# Patient Record
Sex: MALE | Race: BLACK OR AFRICAN AMERICAN | NOT HISPANIC OR LATINO | Employment: OTHER | URBAN - METROPOLITAN AREA
[De-identification: names, ages, dates, MRNs, and addresses within clinical notes are randomized per-mention and may not be internally consistent; named-entity substitution may affect disease eponyms.]

---

## 2020-02-09 ENCOUNTER — NURSE TRIAGE (OUTPATIENT)
Dept: ADMINISTRATIVE | Facility: CLINIC | Age: 70
End: 2020-02-09

## 2020-02-09 NOTE — TELEPHONE ENCOUNTER
Reason for Disposition   [1] SEVERE abdominal pain AND [2] age > 60    Additional Information   Negative: Shock suspected (e.g., cold/pale/clammy skin, too weak to stand, low BP, rapid pulse)   Negative: Difficult to awaken or acting confused (e.g., disoriented, slurred speech)   Negative: Sounds like a life-threatening emergency to the triager   Negative: [1] SEVERE abdominal pain (e.g., excruciating) AND [2] present > 1 hour    Protocols used: DIARRHEA-A-AH    Patient states diarrhea x 4 days and he went 10 times in the last 24 hours. He is drinking water but not a lot. He states he feels his heart beating faster. I explained that it is as sign he needs more fluids in his body. Advised him to go to the ED now. He verbalized understanding.

## 2024-07-01 ENCOUNTER — HOSPITAL ENCOUNTER (INPATIENT)
Facility: HOSPITAL | Age: 74
LOS: 3 days | Discharge: HOME HEALTH | DRG: 638 | End: 2024-07-05
Attending: EMERGENCY MEDICINE | Admitting: STUDENT IN AN ORGANIZED HEALTH CARE EDUCATION/TRAINING PROGRAM
Payer: MEDICARE

## 2024-07-01 DIAGNOSIS — I10 HYPERTENSION, UNSPECIFIED TYPE: ICD-10-CM

## 2024-07-01 DIAGNOSIS — M79.606 LEG PAIN: ICD-10-CM

## 2024-07-01 DIAGNOSIS — E11.628 DIABETIC FOOT INFECTION: ICD-10-CM

## 2024-07-01 DIAGNOSIS — R65.10 SIRS (SYSTEMIC INFLAMMATORY RESPONSE SYNDROME): Primary | ICD-10-CM

## 2024-07-01 DIAGNOSIS — R73.9 HYPERGLYCEMIA: ICD-10-CM

## 2024-07-01 DIAGNOSIS — I50.9 CHF (CONGESTIVE HEART FAILURE): ICD-10-CM

## 2024-07-01 DIAGNOSIS — L08.9 DIABETIC FOOT INFECTION: ICD-10-CM

## 2024-07-01 DIAGNOSIS — R07.9 CHEST PAIN: ICD-10-CM

## 2024-07-01 DIAGNOSIS — M79.89 LEG SWELLING: ICD-10-CM

## 2024-07-01 DIAGNOSIS — E87.5 HYPERKALEMIA: ICD-10-CM

## 2024-07-01 PROBLEM — D64.9 ANEMIA: Status: ACTIVE | Noted: 2024-07-01

## 2024-07-01 PROBLEM — E11.9 INSULIN DEPENDENT TYPE 2 DIABETES MELLITUS: Status: ACTIVE | Noted: 2024-07-01

## 2024-07-01 PROBLEM — E87.1 HYPONATREMIA: Status: ACTIVE | Noted: 2024-07-01

## 2024-07-01 PROBLEM — E87.1 HYPONATREMIA: Status: RESOLVED | Noted: 2024-07-01 | Resolved: 2024-07-01

## 2024-07-01 PROBLEM — N18.30 CKD (CHRONIC KIDNEY DISEASE) STAGE 3, GFR 30-59 ML/MIN: Status: ACTIVE | Noted: 2024-07-01

## 2024-07-01 PROBLEM — L03.90 CELLULITIS: Status: ACTIVE | Noted: 2024-07-01

## 2024-07-01 PROBLEM — E87.20 METABOLIC ACIDOSIS, NORMAL ANION GAP (NAG): Status: ACTIVE | Noted: 2024-07-01

## 2024-07-01 PROBLEM — Z79.4 INSULIN DEPENDENT TYPE 2 DIABETES MELLITUS: Status: ACTIVE | Noted: 2024-07-01

## 2024-07-01 PROBLEM — G89.29 CHRONIC PAIN: Status: ACTIVE | Noted: 2024-07-01

## 2024-07-01 LAB
ALBUMIN SERPL BCP-MCNC: 3.5 G/DL (ref 3.5–5.2)
ALLENS TEST: ABNORMAL
ALLENS TEST: NORMAL
ALP SERPL-CCNC: 98 U/L (ref 55–135)
ALT SERPL W/O P-5'-P-CCNC: 24 U/L (ref 10–44)
ANION GAP SERPL CALC-SCNC: 7 MMOL/L (ref 8–16)
ANION GAP SERPL CALC-SCNC: 8 MMOL/L (ref 8–16)
AST SERPL-CCNC: 23 U/L (ref 10–40)
BASOPHILS # BLD AUTO: 0.06 K/UL (ref 0–0.2)
BASOPHILS NFR BLD: 0.7 % (ref 0–1.9)
BILIRUB SERPL-MCNC: 0.5 MG/DL (ref 0.1–1)
BILIRUB UR QL STRIP: NEGATIVE
BNP SERPL-MCNC: <10 PG/ML (ref 0–99)
BUN SERPL-MCNC: 27 MG/DL (ref 8–23)
BUN SERPL-MCNC: 32 MG/DL (ref 8–23)
CALCIUM SERPL-MCNC: 9.4 MG/DL (ref 8.7–10.5)
CALCIUM SERPL-MCNC: 9.8 MG/DL (ref 8.7–10.5)
CHLORIDE SERPL-SCNC: 105 MMOL/L (ref 95–110)
CHLORIDE SERPL-SCNC: 111 MMOL/L (ref 95–110)
CLARITY UR REFRACT.AUTO: CLEAR
CO2 SERPL-SCNC: 15 MMOL/L (ref 23–29)
CO2 SERPL-SCNC: 15 MMOL/L (ref 23–29)
COLOR UR AUTO: YELLOW
CREAT SERPL-MCNC: 1.2 MG/DL (ref 0.5–1.4)
CREAT SERPL-MCNC: 1.3 MG/DL (ref 0.5–1.4)
DIFFERENTIAL METHOD BLD: ABNORMAL
EOSINOPHIL # BLD AUTO: 0.1 K/UL (ref 0–0.5)
EOSINOPHIL NFR BLD: 1.3 % (ref 0–8)
ERYTHROCYTE [DISTWIDTH] IN BLOOD BY AUTOMATED COUNT: 14.4 % (ref 11.5–14.5)
EST. GFR  (NO RACE VARIABLE): 58.4 ML/MIN/1.73 M^2
EST. GFR  (NO RACE VARIABLE): >60 ML/MIN/1.73 M^2
ESTIMATED AVG GLUCOSE: 206 MG/DL (ref 68–131)
FERRITIN SERPL-MCNC: 229 NG/ML (ref 20–300)
GLUCOSE SERPL-MCNC: 145 MG/DL (ref 70–110)
GLUCOSE SERPL-MCNC: 213 MG/DL (ref 70–110)
GLUCOSE SERPL-MCNC: 303 MG/DL (ref 70–110)
GLUCOSE UR QL STRIP: ABNORMAL
HBA1C MFR BLD: 8.8 % (ref 4–5.6)
HCO3 UR-SCNC: 17.1 MMOL/L (ref 24–28)
HCT VFR BLD AUTO: 38.2 % (ref 40–54)
HGB BLD-MCNC: 12.6 G/DL (ref 14–18)
HGB UR QL STRIP: NEGATIVE
IMM GRANULOCYTES # BLD AUTO: 0.05 K/UL (ref 0–0.04)
IMM GRANULOCYTES NFR BLD AUTO: 0.6 % (ref 0–0.5)
IRON SERPL-MCNC: 66 UG/DL (ref 45–160)
KETONES UR QL STRIP: ABNORMAL
LDH SERPL L TO P-CCNC: 1.43 MMOL/L (ref 0.5–2.2)
LEUKOCYTE ESTERASE UR QL STRIP: NEGATIVE
LYMPHOCYTES # BLD AUTO: 1.5 K/UL (ref 1–4.8)
LYMPHOCYTES NFR BLD: 17.6 % (ref 18–48)
MCH RBC QN AUTO: 30.1 PG (ref 27–31)
MCHC RBC AUTO-ENTMCNC: 33 G/DL (ref 32–36)
MCV RBC AUTO: 91 FL (ref 82–98)
MONOCYTES # BLD AUTO: 0.5 K/UL (ref 0.3–1)
MONOCYTES NFR BLD: 6.5 % (ref 4–15)
NEUTROPHILS # BLD AUTO: 6.1 K/UL (ref 1.8–7.7)
NEUTROPHILS NFR BLD: 73.3 % (ref 38–73)
NITRITE UR QL STRIP: NEGATIVE
NRBC BLD-RTO: 0 /100 WBC
OHS QRS DURATION: 76 MS
OHS QTC CALCULATION: 437 MS
PCO2 BLDA: 36.5 MMHG (ref 35–45)
PH SMN: 7.28 [PH] (ref 7.35–7.45)
PH UR STRIP: 5 [PH] (ref 5–8)
PLATELET # BLD AUTO: 280 K/UL (ref 150–450)
PMV BLD AUTO: 10.2 FL (ref 9.2–12.9)
PO2 BLDA: 44 MMHG (ref 40–60)
POC BE: -10 MMOL/L
POC SATURATED O2: 74 % (ref 95–100)
POC TCO2: 18 MMOL/L (ref 24–29)
POCT GLUCOSE: 154 MG/DL (ref 70–110)
POCT GLUCOSE: 167 MG/DL (ref 70–110)
POTASSIUM SERPL-SCNC: 5 MMOL/L (ref 3.5–5.1)
POTASSIUM SERPL-SCNC: 5.7 MMOL/L (ref 3.5–5.1)
PROT SERPL-MCNC: 8.8 G/DL (ref 6–8.4)
PROT UR QL STRIP: ABNORMAL
RBC # BLD AUTO: 4.19 M/UL (ref 4.6–6.2)
SAMPLE: ABNORMAL
SAMPLE: NORMAL
SATURATED IRON: 25 % (ref 20–50)
SITE: ABNORMAL
SITE: NORMAL
SODIUM SERPL-SCNC: 128 MMOL/L (ref 136–145)
SODIUM SERPL-SCNC: 133 MMOL/L (ref 136–145)
SP GR UR STRIP: 1.02 (ref 1–1.03)
TOTAL IRON BINDING CAPACITY: 265 UG/DL (ref 250–450)
TRANSFERRIN SERPL-MCNC: 179 MG/DL (ref 200–375)
TROPONIN I SERPL DL<=0.01 NG/ML-MCNC: <0.006 NG/ML (ref 0–0.03)
URN SPEC COLLECT METH UR: ABNORMAL
WBC # BLD AUTO: 8.26 K/UL (ref 3.9–12.7)

## 2024-07-01 PROCEDURE — 81003 URINALYSIS AUTO W/O SCOPE: CPT

## 2024-07-01 PROCEDURE — 96372 THER/PROPH/DIAG INJ SC/IM: CPT | Performed by: STUDENT IN AN ORGANIZED HEALTH CARE EDUCATION/TRAINING PROGRAM

## 2024-07-01 PROCEDURE — 96375 TX/PRO/DX INJ NEW DRUG ADDON: CPT

## 2024-07-01 PROCEDURE — 80069 RENAL FUNCTION PANEL: CPT | Performed by: STUDENT IN AN ORGANIZED HEALTH CARE EDUCATION/TRAINING PROGRAM

## 2024-07-01 PROCEDURE — 96368 THER/DIAG CONCURRENT INF: CPT

## 2024-07-01 PROCEDURE — 63600175 PHARM REV CODE 636 W HCPCS: Performed by: STUDENT IN AN ORGANIZED HEALTH CARE EDUCATION/TRAINING PROGRAM

## 2024-07-01 PROCEDURE — 63600175 PHARM REV CODE 636 W HCPCS: Performed by: EMERGENCY MEDICINE

## 2024-07-01 PROCEDURE — 25000003 PHARM REV CODE 250: Performed by: EMERGENCY MEDICINE

## 2024-07-01 PROCEDURE — G0378 HOSPITAL OBSERVATION PER HR: HCPCS

## 2024-07-01 PROCEDURE — 96365 THER/PROPH/DIAG IV INF INIT: CPT

## 2024-07-01 PROCEDURE — 99900035 HC TECH TIME PER 15 MIN (STAT)

## 2024-07-01 PROCEDURE — 63600175 PHARM REV CODE 636 W HCPCS

## 2024-07-01 PROCEDURE — 83880 ASSAY OF NATRIURETIC PEPTIDE: CPT

## 2024-07-01 PROCEDURE — 80053 COMPREHEN METABOLIC PANEL: CPT

## 2024-07-01 PROCEDURE — 96367 TX/PROPH/DG ADDL SEQ IV INF: CPT

## 2024-07-01 PROCEDURE — 94640 AIRWAY INHALATION TREATMENT: CPT

## 2024-07-01 PROCEDURE — 83540 ASSAY OF IRON: CPT | Performed by: STUDENT IN AN ORGANIZED HEALTH CARE EDUCATION/TRAINING PROGRAM

## 2024-07-01 PROCEDURE — 83036 HEMOGLOBIN GLYCOSYLATED A1C: CPT

## 2024-07-01 PROCEDURE — 96366 THER/PROPH/DIAG IV INF ADDON: CPT

## 2024-07-01 PROCEDURE — 25000242 PHARM REV CODE 250 ALT 637 W/ HCPCS

## 2024-07-01 PROCEDURE — 82728 ASSAY OF FERRITIN: CPT | Performed by: STUDENT IN AN ORGANIZED HEALTH CARE EDUCATION/TRAINING PROGRAM

## 2024-07-01 PROCEDURE — 87040 BLOOD CULTURE FOR BACTERIA: CPT | Mod: 59

## 2024-07-01 PROCEDURE — 25000003 PHARM REV CODE 250: Performed by: STUDENT IN AN ORGANIZED HEALTH CARE EDUCATION/TRAINING PROGRAM

## 2024-07-01 PROCEDURE — 85025 COMPLETE CBC W/AUTO DIFF WBC: CPT

## 2024-07-01 PROCEDURE — 83605 ASSAY OF LACTIC ACID: CPT

## 2024-07-01 PROCEDURE — 84484 ASSAY OF TROPONIN QUANT: CPT

## 2024-07-01 PROCEDURE — 93010 ELECTROCARDIOGRAM REPORT: CPT | Mod: ,,, | Performed by: INTERNAL MEDICINE

## 2024-07-01 PROCEDURE — 36415 COLL VENOUS BLD VENIPUNCTURE: CPT | Performed by: STUDENT IN AN ORGANIZED HEALTH CARE EDUCATION/TRAINING PROGRAM

## 2024-07-01 PROCEDURE — 82962 GLUCOSE BLOOD TEST: CPT

## 2024-07-01 PROCEDURE — 99285 EMERGENCY DEPT VISIT HI MDM: CPT | Mod: 25

## 2024-07-01 PROCEDURE — 93005 ELECTROCARDIOGRAM TRACING: CPT

## 2024-07-01 PROCEDURE — 82803 BLOOD GASES ANY COMBINATION: CPT

## 2024-07-01 PROCEDURE — 82800 BLOOD PH: CPT

## 2024-07-01 PROCEDURE — 25000003 PHARM REV CODE 250

## 2024-07-01 PROCEDURE — 80048 BASIC METABOLIC PNL TOTAL CA: CPT | Mod: XB | Performed by: STUDENT IN AN ORGANIZED HEALTH CARE EDUCATION/TRAINING PROGRAM

## 2024-07-01 RX ORDER — INSULIN ASPART 100 [IU]/ML
5 INJECTION, SOLUTION INTRAVENOUS; SUBCUTANEOUS
Status: DISCONTINUED | OUTPATIENT
Start: 2024-07-01 | End: 2024-07-05 | Stop reason: HOSPADM

## 2024-07-01 RX ORDER — POLYETHYLENE GLYCOL 3350 17 G/17G
17 POWDER, FOR SOLUTION ORAL DAILY
Status: DISCONTINUED | OUTPATIENT
Start: 2024-07-02 | End: 2024-07-05 | Stop reason: HOSPADM

## 2024-07-01 RX ORDER — GLUCAGON 1 MG
1 KIT INJECTION
Status: DISCONTINUED | OUTPATIENT
Start: 2024-07-01 | End: 2024-07-05 | Stop reason: HOSPADM

## 2024-07-01 RX ORDER — ONDANSETRON HYDROCHLORIDE 2 MG/ML
4 INJECTION, SOLUTION INTRAVENOUS
Status: COMPLETED | OUTPATIENT
Start: 2024-07-01 | End: 2024-07-01

## 2024-07-01 RX ORDER — GABAPENTIN 300 MG/1
300 CAPSULE ORAL ONCE
Status: COMPLETED | OUTPATIENT
Start: 2024-07-01 | End: 2024-07-01

## 2024-07-01 RX ORDER — FUROSEMIDE 10 MG/ML
40 INJECTION INTRAMUSCULAR; INTRAVENOUS
Status: COMPLETED | OUTPATIENT
Start: 2024-07-01 | End: 2024-07-01

## 2024-07-01 RX ORDER — ACETAMINOPHEN 325 MG/1
650 TABLET ORAL EVERY 4 HOURS PRN
Status: DISCONTINUED | OUTPATIENT
Start: 2024-07-01 | End: 2024-07-05 | Stop reason: HOSPADM

## 2024-07-01 RX ORDER — CALCIUM GLUCONATE 20 MG/ML
1 INJECTION, SOLUTION INTRAVENOUS
Status: COMPLETED | OUTPATIENT
Start: 2024-07-01 | End: 2024-07-01

## 2024-07-01 RX ORDER — LOSARTAN POTASSIUM 50 MG/1
100 TABLET ORAL DAILY
Status: DISCONTINUED | OUTPATIENT
Start: 2024-07-02 | End: 2024-07-02

## 2024-07-01 RX ORDER — IBUPROFEN 200 MG
16 TABLET ORAL
Status: DISCONTINUED | OUTPATIENT
Start: 2024-07-01 | End: 2024-07-05 | Stop reason: HOSPADM

## 2024-07-01 RX ORDER — MORPHINE SULFATE 4 MG/ML
8 INJECTION, SOLUTION INTRAMUSCULAR; INTRAVENOUS
Status: COMPLETED | OUTPATIENT
Start: 2024-07-01 | End: 2024-07-01

## 2024-07-01 RX ORDER — AMLODIPINE BESYLATE 10 MG/1
10 TABLET ORAL DAILY
Status: DISCONTINUED | OUTPATIENT
Start: 2024-07-01 | End: 2024-07-05 | Stop reason: HOSPADM

## 2024-07-01 RX ORDER — SODIUM CHLORIDE 0.9 % (FLUSH) 0.9 %
10 SYRINGE (ML) INJECTION EVERY 12 HOURS PRN
Status: DISCONTINUED | OUTPATIENT
Start: 2024-07-01 | End: 2024-07-05 | Stop reason: HOSPADM

## 2024-07-01 RX ORDER — ATORVASTATIN CALCIUM 20 MG/1
20 TABLET, FILM COATED ORAL DAILY
Status: DISCONTINUED | OUTPATIENT
Start: 2024-07-02 | End: 2024-07-05 | Stop reason: HOSPADM

## 2024-07-01 RX ORDER — TRAMADOL HYDROCHLORIDE 50 MG/1
50 TABLET ORAL EVERY 8 HOURS PRN
Status: DISCONTINUED | OUTPATIENT
Start: 2024-07-01 | End: 2024-07-05 | Stop reason: HOSPADM

## 2024-07-01 RX ORDER — INSULIN GLARGINE 100 [IU]/ML
20 INJECTION, SOLUTION SUBCUTANEOUS DAILY
Status: DISCONTINUED | OUTPATIENT
Start: 2024-07-02 | End: 2024-07-05 | Stop reason: HOSPADM

## 2024-07-01 RX ORDER — IBUPROFEN 200 MG
24 TABLET ORAL
Status: DISCONTINUED | OUTPATIENT
Start: 2024-07-01 | End: 2024-07-05 | Stop reason: HOSPADM

## 2024-07-01 RX ORDER — SODIUM BICARBONATE 650 MG/1
650 TABLET ORAL 3 TIMES DAILY
Status: DISCONTINUED | OUTPATIENT
Start: 2024-07-01 | End: 2024-07-05 | Stop reason: HOSPADM

## 2024-07-01 RX ORDER — LISINOPRIL 20 MG/1
20 TABLET ORAL
Status: COMPLETED | OUTPATIENT
Start: 2024-07-01 | End: 2024-07-01

## 2024-07-01 RX ORDER — ALBUTEROL SULFATE 2.5 MG/.5ML
10 SOLUTION RESPIRATORY (INHALATION)
Status: COMPLETED | OUTPATIENT
Start: 2024-07-01 | End: 2024-07-01

## 2024-07-01 RX ORDER — INSULIN ASPART 100 [IU]/ML
0-5 INJECTION, SOLUTION INTRAVENOUS; SUBCUTANEOUS
Status: DISCONTINUED | OUTPATIENT
Start: 2024-07-01 | End: 2024-07-05 | Stop reason: HOSPADM

## 2024-07-01 RX ORDER — TAMSULOSIN HYDROCHLORIDE 0.4 MG/1
0.4 CAPSULE ORAL DAILY
Status: DISCONTINUED | OUTPATIENT
Start: 2024-07-02 | End: 2024-07-05 | Stop reason: HOSPADM

## 2024-07-01 RX ORDER — HEPARIN SODIUM 5000 [USP'U]/ML
7500 INJECTION, SOLUTION INTRAVENOUS; SUBCUTANEOUS EVERY 8 HOURS
Status: DISCONTINUED | OUTPATIENT
Start: 2024-07-01 | End: 2024-07-05 | Stop reason: HOSPADM

## 2024-07-01 RX ORDER — BACLOFEN 10 MG/1
10 TABLET ORAL 3 TIMES DAILY
Status: DISCONTINUED | OUTPATIENT
Start: 2024-07-01 | End: 2024-07-03

## 2024-07-01 RX ORDER — NALOXONE HCL 0.4 MG/ML
0.02 VIAL (ML) INJECTION
Status: DISCONTINUED | OUTPATIENT
Start: 2024-07-01 | End: 2024-07-05 | Stop reason: HOSPADM

## 2024-07-01 RX ORDER — ASPIRIN 81 MG/1
81 TABLET ORAL DAILY
Status: DISCONTINUED | OUTPATIENT
Start: 2024-07-02 | End: 2024-07-05 | Stop reason: HOSPADM

## 2024-07-01 RX ORDER — HYDROCODONE BITARTRATE AND ACETAMINOPHEN 10; 325 MG/1; MG/1
1 TABLET ORAL EVERY 6 HOURS PRN
Status: DISCONTINUED | OUTPATIENT
Start: 2024-07-01 | End: 2024-07-05 | Stop reason: HOSPADM

## 2024-07-01 RX ADMIN — CALCIUM GLUCONATE 1 G: 20 INJECTION, SOLUTION INTRAVENOUS at 01:07

## 2024-07-01 RX ADMIN — HYDROCODONE BITARTRATE AND ACETAMINOPHEN 1 TABLET: 10; 325 TABLET ORAL at 03:07

## 2024-07-01 RX ADMIN — HEPARIN SODIUM 7500 UNITS: 5000 INJECTION INTRAVENOUS; SUBCUTANEOUS at 10:07

## 2024-07-01 RX ADMIN — AMLODIPINE BESYLATE 10 MG: 10 TABLET ORAL at 03:07

## 2024-07-01 RX ADMIN — ALBUTEROL SULFATE 10 MG: 2.5 SOLUTION RESPIRATORY (INHALATION) at 01:07

## 2024-07-01 RX ADMIN — MORPHINE SULFATE 8 MG: 4 INJECTION INTRAVENOUS at 10:07

## 2024-07-01 RX ADMIN — VANCOMYCIN HYDROCHLORIDE 2250 MG: 1 INJECTION, POWDER, LYOPHILIZED, FOR SOLUTION INTRAVENOUS at 01:07

## 2024-07-01 RX ADMIN — DEXTROSE MONOHYDRATE 500 ML: 100 INJECTION, SOLUTION INTRAVENOUS at 02:07

## 2024-07-01 RX ADMIN — TRAMADOL HYDROCHLORIDE 50 MG: 50 TABLET, COATED ORAL at 08:07

## 2024-07-01 RX ADMIN — SODIUM ZIRCONIUM CYCLOSILICATE 5 G: 5 POWDER, FOR SUSPENSION ORAL at 08:07

## 2024-07-01 RX ADMIN — BACLOFEN 10 MG: 10 TABLET ORAL at 08:07

## 2024-07-01 RX ADMIN — PIPERACILLIN SODIUM AND TAZOBACTAM SODIUM 4.5 G: 4; .5 INJECTION, POWDER, FOR SOLUTION INTRAVENOUS at 12:07

## 2024-07-01 RX ADMIN — INSULIN ASPART 5 UNITS: 100 INJECTION, SOLUTION INTRAVENOUS; SUBCUTANEOUS at 05:07

## 2024-07-01 RX ADMIN — FUROSEMIDE 40 MG: 10 INJECTION, SOLUTION INTRAVENOUS at 01:07

## 2024-07-01 RX ADMIN — ONDANSETRON 4 MG: 2 INJECTION INTRAMUSCULAR; INTRAVENOUS at 10:07

## 2024-07-01 RX ADMIN — INSULIN HUMAN 5 UNITS: 100 INJECTION, SOLUTION PARENTERAL at 02:07

## 2024-07-01 RX ADMIN — LISINOPRIL 20 MG: 20 TABLET ORAL at 02:07

## 2024-07-01 RX ADMIN — GABAPENTIN 300 MG: 300 CAPSULE ORAL at 01:07

## 2024-07-01 RX ADMIN — HYDROCODONE BITARTRATE AND ACETAMINOPHEN 1 TABLET: 10; 325 TABLET ORAL at 09:07

## 2024-07-01 NOTE — ASSESSMENT & PLAN NOTE
Creatine stable for now. BMP reviewed- noted Estimated Creatinine Clearance: 77 mL/min (based on SCr of 1.3 mg/dL). according to latest data. Based on current GFR, CKD stage is stage 3 - GFR 30-59.  Monitor UOP and serial BMP and adjust therapy as needed. Renally dose meds. Avoid nephrotoxic medications and procedures.

## 2024-07-01 NOTE — PLAN OF CARE
[facility] Dept  Initial Discharge Assessment       Primary Care Provider: Berhane Reyes (Inactive)    Admission Diagnosis: No admission diagnoses are documented for this encounter.    Admission Date: 7/1/2024  Expected Discharge Date:     Pt stated he is independent with his ADL's and ambulation and does not require assistance or equipment.    Pt to d/c home with no needs when ready    Transition of Care Barriers: (P) None    Payor: /     Extended Emergency Contact Information  Primary Emergency Contact: RashaunVictoria  Mobile Phone: [phone]  Relation: Sister  Preferred language: English   needed? No    Discharge Plan A: (P) Home  Discharge Plan B: (P) Home      [facility] [address]  [address] [place] [address]  Phone: [phone] Fax: [phone]    [id number] [address] [address]  Phone: [phone] Fax: [phone]      Initial Assessment (most recent)       Adult Discharge Assessment - 07/01/24 1409          Discharge Assessment    Assessment Type Discharge Planning Assessment     Confirmed/corrected address, phone number and insurance Yes     Confirmed Demographics Correct on Facesheet     Source of Information patient     People in Home alone     Facility Arrived From: home     Do you expect to return to your current living situation? Yes     Do you have help at home or someone to help you manage your care at home? No     Prior to hospitilization cognitive status: Alert/Oriented;No Deficits     Current cognitive status: No Deficits;Alert/Oriented     Walking or Climbing Stairs Difficulty yes     Walking or Climbing Stairs ambulation difficulty, requires equipment     Mobility Management rollater     Dressing/Bathing Difficulty no     Home Accessibility wheelchair accessible     Home Layout Able to live on 1st floor      Equipment Currently Used at Home rollator     Patient currently being followed by outpatient case management? No (P)      Do you currently have service(s) that help you manage your care at home? No (P)      Do you have any problems affording any of your prescribed medications? No (P)      Is the patient taking medications as prescribed? yes (P)      Who is going to help you get home at discharge? family/friends (P)      How do you get to doctors appointments? car, drives self (P)      Are you on dialysis? No (P)      Do you take coumadin? No (P)      Discharge Plan A Home (P)      Discharge Plan B Home (P)      DME Needed Upon Discharge  none (P)      Discharge Plan discussed with: Patient (P)      Transition of Care Barriers None (P)         Physical Activity    On average, how many days per week do you engage in moderate to strenuous exercise (like a brisk walk)? 0 days (P)      On average, how many minutes do you engage in exercise at this level? 0 min (P)         Financial Resource Strain    How hard is it for you to pay for the very basics like food, housing, medical care, and heating? Not very hard (P)         Housing Stability    In the last 12 months, was there a time when you were not able to pay the mortgage or rent on time? No (P)      At any time in the past 12 months, were you homeless or living in a shelter (including now)? No (P)         Transportation Needs    Has the lack of transportation kept you from medical appointments, meetings, work or from getting things needed for daily living? No (P)         Food Insecurity    Within the past 12 months, you worried that your food would run out before you got the money to buy more. Never true (P)      Within the past 12 months, the food you bought just didn't last and you didn't have money to get more. Never true (P)         Stress    Do you feel stress - tense, restless, nervous, or anxious, or unable to sleep at night because your mind is troubled all  the time - these days? To some extent (P)         Social Isolation    How often do you feel lonely or isolated from those around you?  Rarely (P)         Alcohol Use    Q1: How often do you have a drink containing alcohol? 2-3 times a week (P)      Q2: How many drinks containing alcohol do you have on a typical day when you are drinking? 3 or 4 (P)      Q3: How often do you have six or more drinks on one occasion? Never (P)         Utilities    In the past 12 months has the electric, gas, oil, or water company threatened to shut off services in your home? No (P)         Health Literacy    How often do you need to have someone help you when you read instructions, pamphlets, or other written material from your doctor or pharmacy? Sometimes (P)

## 2024-07-01 NOTE — ED PROVIDER NOTES
Encounter Date: 7/1/2024       History     Chief Complaint   Patient presents with    Leg Pain     Multiple weeping wounds on legs.  PMH HTN. Large Habitus.     Riaz Robb is a 73 yo male with PMH of HTN, DMII who presents today with chief complaint of leg pain in both legs originating from wounds at the posterior ankle. He states that he has had this pain for a month, it has been worsening and has been inhibiting his ability to walk, although at baseline he state he has trouble with walking. The pain is continuous in both extremities and is exacerbated by palpation of the wound. He states that he was seen at a community ER in the last 2 weeks where he was given IV antibiotics at the time. He states that he has not tried anything at home to attempt to treat the wounds. He states he has not had fevers, or shortness of breath.     The history is provided by the patient and medical records. No  was used.     Review of patient's allergies indicates:   Allergen Reactions    Lisinopril Other (See Comments)     cough     Past Medical History:   Diagnosis Date    Depression     Diabetes mellitus     Gout     High cholesterol     Hypertension      History reviewed. No pertinent surgical history.  No family history on file.  Social History     Tobacco Use    Smoking status: Never    Smokeless tobacco: Never   Substance Use Topics    Alcohol use: Not Currently     Comment: occasionally    Drug use: Yes     Types: Marijuana         Physical Exam     Initial Vitals [07/01/24 0921]   BP Pulse Resp Temp SpO2   (!) 224/128 110 (!) 22 99.9 °F (37.7 °C) 100 %      MAP       --         Physical Exam    Nursing note and vitals reviewed.  Constitutional: He appears well-developed.   Patient appears uncomfortable in the bed 2/2 pain    HENT:   Head: Normocephalic and atraumatic.   Eyes: Right eye exhibits no discharge. Left eye exhibits no discharge.   Neck:   Normal range of motion.  Cardiovascular:  Regular  rhythm, normal heart sounds and intact distal pulses.   Tachycardia present.         Pulses:       Dorsalis pedis pulses are 2+ on the right side and 2+ on the left side.   Pulmonary/Chest: Breath sounds normal. No respiratory distress.   Abdominal: Abdomen is soft. There is no abdominal tenderness.   Musculoskeletal:         General: Edema present.      Cervical back: Normal range of motion.     Neurological: He is alert and oriented to person, place, and time.   Skin:   Patient has bilateral posterior wounds to the ankles the wounds that are weeping, with  a base is granulation tissue.The wounds are tender to the touch.   Surrounding skin tense and edematous.    See media tab   Psychiatric: He has a normal mood and affect. Thought content normal.         ED Course   Procedures  Labs Reviewed   CBC W/ AUTO DIFFERENTIAL - Abnormal; Notable for the following components:       Result Value    RBC 4.19 (*)     Hemoglobin 12.6 (*)     Hematocrit 38.2 (*)     Immature Granulocytes 0.6 (*)     Immature Grans (Abs) 0.05 (*)     Gran % 73.3 (*)     Lymph % 17.6 (*)     All other components within normal limits   COMPREHENSIVE METABOLIC PANEL - Abnormal; Notable for the following components:    Sodium 133 (*)     Potassium 5.7 (*)     Chloride 111 (*)     CO2 15 (*)     Glucose 145 (*)     BUN 32 (*)     Total Protein 8.8 (*)     eGFR 58.4 (*)     Anion Gap 7 (*)     All other components within normal limits   URINALYSIS, REFLEX TO URINE CULTURE - Abnormal; Notable for the following components:    Protein, UA Trace (*)     Glucose, UA 1+ (*)     Ketones, UA 1+ (*)     All other components within normal limits    Narrative:     Specimen Source->Urine   BASIC METABOLIC PANEL - Abnormal; Notable for the following components:    Sodium 128 (*)     CO2 15 (*)     Glucose 303 (*)     BUN 27 (*)     All other components within normal limits   HEMOGLOBIN A1C - Abnormal; Notable for the following components:    Hemoglobin A1C 8.8 (*)      Estimated Avg Glucose 206 (*)     All other components within normal limits    Narrative:     Add-on GHGB to [phone] per Carrie Ramírez MD on  07/01/2024    16:20    ISTAT PROCEDURE - Abnormal; Notable for the following components:    POC PH 7.278 (*)     POC HCO3 17.1 (*)     POC BE -10 (*)     POC TCO2 18 (*)     All other components within normal limits   POCT GLUCOSE - Abnormal; Notable for the following components:    POCT Glucose 154 (*)     All other components within normal limits   CULTURE, BLOOD   CULTURE, BLOOD   B-TYPE NATRIURETIC PEPTIDE   TROPONIN I   HEMOGLOBIN A1C   FERRITIN   IRON AND TIBC   POCT GLUCOSE, HAND-HELD DEVICE   ISTAT LACTATE     EKG Readings: (Independently Interpreted)   Initial Reading: No STEMI. Previous EKG: Compared with most recent EKG Previous EKG Date: 4/10/2018. Rhythm: Sinus Tachycardia. Heart Rate: 111. Clinical Impression: Sinus Tachycardia     ECG Results              EKG 12-lead (Final result)        Collection Time Result Time QRS Duration OHS QTC Calculation    07/01/24 09:50:36 07/01/24 16:23:45 76 437                     Final result by Interface, Lab In [facility] (07/01/24 16:23:54)                   Narrative:    Test Reason : M79.606,    Vent. Rate : 111 BPM     Atrial Rate : 111 BPM     P-R Int : 182 ms          QRS Dur : 076 ms      QT Int : 322 ms       P-R-T Axes : 055 029 013 degrees     QTc Int : 437 ms    Sinus tachycardia with Premature supraventricular complexes  Otherwise normal ECG  When compared with ECG of 10-APR-2018 21:51,  Premature supraventricular complexes are now Present    Confirmed by Cezar Zamarripa MD ([id number]) on 7/1/2024 4:23:42 PM    Referred By: [email or web address]   SELF           Confirmed By:Cezar Zamarripa MD                                  Imaging Results              XR Tibia Fibula 2 View Bilateral (Final result)  Result time 07/01/24 16:15:24      Final result by Hilario Mckeon MD (07/01/24 16:15:24)                    Impression:      No osteomyelitis, septic joint, fracture, dislocation.  Additional findings above.      Electronically signed by: Hilario Mckeon MD  Date:    07/01/2024  Time:    16:15               Narrative:    EXAMINATION:  XR TIBIA FIBULA 2 VIEW BILATERAL    CLINICAL HISTORY:  Other specified soft tissue disorders    TECHNIQUE:  AP and lateral views of both tibia.    COMPARISON:  CT scan left tibia 06/21/2024    FINDINGS:  Moderate severe tricompartment DJD change on left, less prominent changes contralateral side involving medial compartment.  Bilateral subcutaneous edema appearing more prominent on left particularly left lateral ankle.  Mild moderate DJD ankle joint mortise and adjacent condyle spur most prominent on right.  Additional prominent DJD tarsal joints particularly dorsal talonavicular joints, mild left heel spur, spurring at Achilles tendon insertion site bilaterally.  Scattered calcified plaque popliteal and trifurcation vessels, scattered dystrophic subcutaneous calcification particularly left pretibial.                                       US Lower Extremity Veins Bilateral (Final result)  Result time 07/01/24 12:40:01      Final result by Michael Campos MD (07/01/24 12:40:01)                   Impression:      No evidence of deep venous thrombosis in either lower extremity.      Electronically signed by: Michael Campos MD  Date:    07/01/2024  Time:    12:40               Narrative:    EXAMINATION:  US LOWER EXTREMITY VEINS BILATERAL    CLINICAL HISTORY:  Pain in leg, unspecified    TECHNIQUE:  Duplex and color flow Doppler and dynamic compression was performed of the bilateral lower extremity veins was performed.    COMPARISON:  None    FINDINGS:  Right thigh veins: The common femoral, femoral, popliteal, upper greater saphenous, and deep femoral veins are patent and free of thrombus. The veins are normally compressible and have normal phasic flow and augmentation response.    Right calf  veins: The visualized calf veins are patent.    Left thigh veins: The common femoral, femoral, popliteal, upper greater saphenous, and deep femoral veins are patent and free of thrombus. The veins are normally compressible and have normal phasic flow and augmentation response.    Left calf veins: The visualized calf veins are patent.    Miscellaneous: None                                       X-Ray Chest AP Portable (Final result)  Result time 07/01/24 11:13:45      Final result by Enoc Thibodeaux MD (07/01/24 11:13:45)                   Impression:      No significant intrathoracic abnormality, allowing for projection and inspiratory depth level.  No significant detrimental interval change since the examinations referenced above is appreciated.      Electronically signed by: Enoc Thibodeaux MD  Date:    07/01/2024  Time:    11:13               Narrative:    EXAMINATION:  XR CHEST AP PORTABLE    CLINICAL HISTORY:  Sepsis;    TECHNIQUE:  One view    COMPARISON:  Comparison is made to 06/21/2024 and 11/17/2022.    FINDINGS:  Heart size is normal, as is the appearance of the pulmonary vascularity.  Lung zones appear clear, and are free of significant airspace consolidation or volume loss.  No pleural fluid of any substantial volume is seen on either side.  No pneumothorax.                                       Medications   dextrose 10% bolus 500 mL 500 mL (0 mLs Intravenous Stopped 7/1/24 1450)     And   dextrose 10% bolus 250 mL 250 mL (has no administration in time range)   amLODIPine tablet 10 mg (10 mg Oral Given 7/1/24 1522)   aspirin EC tablet 81 mg (has no administration in time range)   atorvastatin tablet 20 mg (has no administration in time range)   baclofen tablet 10 mg (has no administration in time range)   losartan tablet 100 mg (has no administration in time range)   tamsulosin 24 hr capsule 0.4 mg (has no administration in time range)   traMADoL tablet 50 mg (has no administration in time range)   sodium  chloride 0.9% flush 10 mL (has no administration in time range)   naloxone 0.4 mg/mL injection 0.02 mg (has no administration in time range)   glucose chewable tablet 16 g (has no administration in time range)   glucose chewable tablet 24 g (has no administration in time range)   glucagon (human recombinant) injection 1 mg (has no administration in time range)   heparin (porcine) injection 7,500 Units (has no administration in time range)   polyethylene glycol packet 17 g (has no administration in time range)   HYDROcodone-acetaminophen  mg per tablet 1 tablet (1 tablet Oral Given 7/1/24 5205)   acetaminophen tablet 650 mg (has no administration in time range)   insulin glargine U-100 (Lantus) pen 20 Units (has no administration in time range)   insulin aspart U-100 pen 5 Units (has no administration in time range)   insulin aspart U-100 pen 0-5 Units (has no administration in time range)   dextrose 10% bolus 125 mL 125 mL (has no administration in time range)   dextrose 10% bolus 250 mL 250 mL (has no administration in time range)   vancomycin - pharmacy to dose (has no administration in time range)   ceFEPIme (MAXIPIME) 1 g in D5W 100 mL IVPB (MB+) (has no administration in time range)   vancomycin 1,500 mg in D5W 250 mL IVPB (Vial-Mate) (has no administration in time range)   sodium zirconium cyclosilicate packet 5 g (has no administration in time range)   sodium bicarbonate tablet 650 mg (has no administration in time range)   morphine injection 8 mg (8 mg Intravenous Given 7/1/24 1012)   ondansetron injection 4 mg (4 mg Intravenous Given 7/1/24 1012)   vancomycin (VANCOCIN) 2,250 mg in D5W 500 mL IVPB (0 mg Intravenous Stopped 7/1/24 1539)   gabapentin capsule 300 mg (300 mg Oral Given 7/1/24 1307)   furosemide injection 40 mg (40 mg Intravenous Given 7/1/24 1356)   albuterol sulfate nebulizer solution 10 mg (10 mg Nebulization Given 7/1/24 1339)   calcium gluconate 1 g in NS IVPB (premixed) (0 g  Intravenous Stopped 7/1/24 1356)   insulin regular injection 5 Units 0.05 mL (5 Units Intravenous Given 7/1/24 1401)   lisinopriL tablet 20 mg (20 mg Oral Given 7/1/24 1405)     Medical Decision Making  Mr. Guerra 75 yo PMH of DMII, HTN, obesity presents with chief complaint of bilateral leg pain with weeping symmetrical wounds in the posterior ankle. The patient has recently been seen in the community and was treated with antibiotics for concern for cellulitis and today presents similarly  with concern for sepsis, meeting SIRS criteria with tachycardia and tachypnea. For this reason we are concerned for possible sepsis 2/2 to cellulitis and a sepsis order set has been ordered.   Additionally, concern for DVT considering the symmetry of the wounds and DVT U/S ordered to rule out. Also on the Ddx is venous stasis considering considerable edema in the skin surrounding the wounds however he does not have other sxs of fluid overload at this time.     Has been persistently hypertensive at systolic's 200+ and states he did not take his at home medication. His home medication of amlodipine (confirmed with patient) and other medication lisinopril(which is documented that he has previously taken but is not aware of taking at this time.   He is in considerable pain and was given morphine for this.   Hyperkalemic to 5.7 will treat with K shifting protocol.   Plan:   - Vanc/Zosyn   - Amlodipine 10 mg   - Lisinopril 20 mg   - K shift with albuterol, calcium gluconate, insulin   - Admitting to medicine for ongoing management of his bilateral cellulitis    Amount and/or Complexity of Data Reviewed  External Data Reviewed: notes.     Details: Recently prescribed Bactrim from 6/21 ED visit   No prior ECHO on record review     CT b/l LE from 6/21:  Induration diffusely superficial soft tissues bilateral tibia fibula most pronounced ventrally with fat stranding/induration while nonspecific may represent edema with cellulitis a  consideration in light of history.  No evidence for peripheral enhancing collection to suggest abscess.       Labs: ordered. Decision-making details documented in ED Course.  Radiology: ordered. Decision-making details documented in ED Course.     Details: No gas or osteo on plain films, no acute CXR findings   ECG/medicine tests: ordered and independent interpretation performed.    Risk  OTC drugs.  Prescription drug management.  Decision regarding hospitalization.              Attending Attestation:   Physician Attestation Statement for Resident:  As the supervising MD   Physician Attestation Statement: I have personally seen and examined this patient.   I agree with the above history.  -: 73 yo  male presenting with bilateral lower extremity swelling, wounds, pain.   Patient reports pain has been ongoing. He was previously taking Gabapentin for the pain.  He states he was evaluated in the ED recently and received antibiotics.   Denies fevers or LE numbness.    As the supervising MD I agree with the above PE.   -: Appears uncomfortable due to pain  Tachycardia  Diminished breath sounds to bases  LE are edematous, see media tab for wounds, can range knee and ankle joints w/o significant pain, DP signals on doppler b/l, compartments soft      As the supervising MD I agree with the above treatment, course, plan, and disposition.   -: No e/o acute ischemic limb.  DVT study negative.   Labs reviewed - acidosis could be 2/2 metformin use, AG normal, doubt DKA.  HGB stable. BP improved with missed home dose.   Given worsened symptoms on outpatient abx, agree with admission for IV abx.  Ordered shifting medications for hyperkalemia and diuresis.    Fluid challenge Not needed - patient is not hypotensive    Post- resuscitation assessment Yes Perfusion exam was performed within 6 hours of possible sepsis presentation after bolus shows Adequate tissue perfusion assessed by non-invasive monitoring.      I have reviewed and  agree with the residents interpretation of the following: lab data, x-rays and EKG.  I have reviewed the following: old records at this facility.                ED Course as of 07/01/24 1712 Mon Jul 01, 2024   1052 WBC: 8.26  No leukocytosis  [AB]   1052 Hemoglobin(!): 12.6  Stable, most recent 12.6 [AB]   1149 POCT Venous Blood Gas (Lactate) #1 [MR]   1149 US Lower Extremity Veins Bilateral [MR]   1237 POCT Venous Blood Gas [MR]   1304 POC PH(!!): 7.278 [MR]   1305 Creatinine: 1.3 [MR]      ED Course User Index  [AB] Karlos Hyman MD  [MR] Bj Mcgregor MD                             Clinical Impression:  Final diagnoses:  [M79.606] Leg pain  [M79.89] Leg swelling  [R65.10] SIRS (systemic inflammatory response syndrome) (Primary)  [R73.9] Hyperglycemia  [I10] Hypertension, unspecified type  [E87.5] Hyperkalemia          ED Disposition Condition    Observation Stable                Bj Mcgregor MD  Resident  07/01/24 1548       Karlos Hyman MD  07/01/24 1712

## 2024-07-01 NOTE — ASSESSMENT & PLAN NOTE
Serum bicarb 15 in the setting of normal AG with hyperkalemia. Could be secondary to recent bactrim use, possible RTA vs use of metformin and associated diarrhea.     -Monitor with RFP.   -Start sodium bicarb.

## 2024-07-01 NOTE — ED NOTES
"Pt c/o 10/10 leg pain, PRN Norco given. RN offered to clean up pt again, pt refusing stating, "I am waiting until I get upstairs".  "

## 2024-07-01 NOTE — ED NOTES
Nurses Note -- 4 Eyes      7/1/2024   9:55 AM      Skin assessed during: Admit      [] No Altered Skin Integrity Present    []Prevention Measures Documented      [x] Yes- Altered Skin Integrity Present or Discovered   [x] LDA Added if Not in Epic (Describe Wound)   [x] New Altered Skin Integrity was Present on Admit and Documented in LDA   [x] Wound Image Taken    Wound Care Consulted? No    Attending Nurse:  Soha Montano RN/Staff Member:   TIMUR Bray

## 2024-07-01 NOTE — ASSESSMENT & PLAN NOTE
Patient's FSGs are controlled on current medication regimen.  Last A1c reviewed-   Lab Results   Component Value Date    HGBA1C 8.8 (H) 07/01/2024     Most recent fingerstick glucose reviewed-   Recent Labs   Lab 07/01/24  1401   POCTGLUCOSE 154*     Current correctional scale  Low  Maintain anti-hyperglycemic dose as follows-   Antihyperglycemics (From admission, onward)      Start     Stop Route Frequency Ordered    07/02/24 0900  insulin glargine U-100 (Lantus) pen 20 Units         -- SubQ Daily 07/01/24 1518    07/01/24 1645  insulin aspart U-100 pen 5 Units         -- SubQ 3 times daily with meals 07/01/24 1518    07/01/24 1617  insulin aspart U-100 pen 0-5 Units         -- SubQ Before meals & nightly PRN 07/01/24 1518          Hold Oral hypoglycemics while patient is in the hospital.  Uptitrate to maintain 140 to 180.

## 2024-07-01 NOTE — ED NOTES
Assumed care of this pt at this time   Patient identifiers verified and correct for Riaz Guerra  LOC: The patient is aao x3  APPEARANCE: Patient appears uncomfortable but in no acute distress  SKIN: The skin is warm and dry, color consistent with ethnicity, patient has normal skin turgor and moist mucus membranes, wounds noted to BLE  MUSCULOSKELETAL: Patient moving all extremities spontaneously, swelling noted to BLE   RESPIRATORY: Airway is open and patent, respirations are spontaneous, patient has a normal effort and rate, no accessory muscle use noted, pt placed on continuous pulse ox with O2 sats noted at 96% on room air.  CARDIAC: Pt placed on cardiac monitor. Patient has a tachy rate  GASTRO: Soft and non tender to palpation, no distention noted  : Pt denies any pain or frequency with urination.  NEURO: Pt opens eyes spontaneously, facial expression symmetrical, bilateral hand grasp equal and even, purposeful motor response noted, normal sensation in all extremities when touched with a finger.

## 2024-07-01 NOTE — HPI
Riaz Guerra is a 74 year old male with insulin dependent Type 2 DM (A1c 8.8), HTN, HLD, anal fistula s/p fistulotomy, chronic pain (followed by pain medicine), chronic bilateral LE wounds with recent ED visit on 6/21 with concerns for cellulitis treated with Bactrim and discharged home now representing to [facility] from [facility] with worsening pain of bilateral lower extremities. Majority of history from chart review as he repeatedly told me to refer to the chart when asking questions. He thinks he has had fevers and chills, but is unable to elaborate more. On arrival to the ED, he was hypertensive 224/128, tachycardic 110 BPM and tachypneic 22, otherwise on room air. Labs notable for NAGMA with serum bicarb 15, hyperkalemia 5.7, Cr 1.3 (baseline), hyponatremia 133, serum glucose 300s, hgb 12.6 (baseline). He was given calcium, albuterol, lasix 40 mgx1, insulin, lisinopril, Vanc and Zosyn. Repeat BMP showed K 5.0, Na 128 (corrected 131). CXR, US LE and Xray tibia/fibula unremarkable. He was admitted to Hospital Medicine for further workup.

## 2024-07-01 NOTE — ASSESSMENT & PLAN NOTE
This patient has hyperkalemia which is uncontrolled. We will monitor for arrhythmias with EKG or continuous telemetry. We will treat the hyperkalemia with Calcium gluconate, IV insulin and dextrose, Nebulized albuterol sulfate, and Furosemide. The likely etiology of the hyperkalemia is Medication induced.  The patients latest potassium has been reviewed and the results are listed below  Recent Labs   Lab 07/01/24  1538   K 5.0     -K 5.7 to 5.0 after shifting. Lisinopril and Losartan both of med list at home, but unsure if taking both. Was also given bactrim recently which can cause type IV RTA.   -Loklema 5 mg TID with RFP q8 hours.   -Per chart review, developed cough with lisinopril, placed as an allergy.   -Strict I&O

## 2024-07-01 NOTE — PROGRESS NOTES
"Pharmacokinetic Initial Assessment: IV Vancomycin    Assessment/Plan:    Initiate intravenous vancomycin with loading dose of 2250 mg once, done in ED, followed by a maintenance dose of vancomycin 1500 mg IV every 12 hours.  Desired empiric serum trough concentration is 10 to 20 mcg/mL.  Draw vancomycin trough level 60 min prior to fourth dose on 07/03/2024 at midnight.  Pharmacy will continue to follow and monitor vancomycin.      Please contact pharmacy at extension [phone] with any questions regarding this assessment.     Thank you for the consult,   Brad Guzman       Patient brief summary:  Riaz Guerra Jr. is a 74 y.o. male initiated on antimicrobial therapy with IV Vancomycin for treatment of suspected skin & soft tissue infection.    Drug Allergies:   Review of patient's allergies indicates:   Allergen Reactions    Lisinopril Other (See Comments)     cough       Actual Body Weight:   152 kg    Renal Function:   Estimated Creatinine Clearance: 83.4 mL/min (based on SCr of 1.2 mg/dL).    CBC (last 72 hours):  Recent Labs   Lab Result Units 07/01/24  1030   WBC K/uL 8.26   Hemoglobin g/dL 12.6*   Hematocrit % 38.2*   Platelets K/uL 280   Gran % % 73.3*   Lymph % % 17.6*   Mono % % 6.5   Eosinophil % % 1.3   Basophil % % 0.7   Differential Method  Automated       Metabolic Panel (last 72 hours):  Recent Labs   Lab Result Units 07/01/24  1020 07/01/24  1030 07/01/24  1538   Sodium mmol/L  --  133* 128*   Potassium mmol/L  --  5.7* 5.0   Chloride mmol/L  --  111* 105   CO2 mmol/L  --  15* 15*   Glucose mg/dL  --  145* 303*   Glucose, UA  1+*  --   --    BUN mg/dL  --  32* 27*   Creatinine mg/dL  --  1.3 1.2   Albumin g/dL  --  3.5  --    Total Bilirubin mg/dL  --  0.5  --    Alkaline Phosphatase U/L  --  98  --    AST U/L  --  23  --    ALT U/L  --  24  --        Drug levels (last 3 results):  No results for input(s): "VANCOMYCINRA", "VANCORANDOM", "VANCOMYCINPE", "VANCOPEAK", "VANCOMYCINTR", " ""[facility]" in the last 72 hours.    Microbiologic Results:  Microbiology Results (last 7 days)       Procedure Component Value Units Date/Time    Blood culture x two cultures. Draw prior to antibiotics. [[id number]] Collected: 07/01/24 1030    Order Status: Sent Specimen: Blood from Peripheral, Hand, Left Updated: 07/01/24 1039    Blood culture x two cultures. Draw prior to antibiotics. [[id number]] Collected: 07/01/24 1029    Order Status: Sent Specimen: Blood from Peripheral, Hand, Right Updated: 07/01/24 1039            "

## 2024-07-01 NOTE — ASSESSMENT & PLAN NOTE
Patient's anemia is currently controlled. Has not received any PRBCs to date.   Current CBC reviewed-   Lab Results   Component Value Date    HGB 12.6 (L) 07/01/2024    HCT 38.2 (L) 07/01/2024     -Ferritin, Fe and TIBC  -Monitor serial CBC and transfuse if patient becomes hemodynamically unstable, symptomatic or H/H drops below 7/21.

## 2024-07-01 NOTE — ASSESSMENT & PLAN NOTE
Follows with pain medicine as an outpatient. He believes he is taking baclofen but is unsure of other medications.Unable to take gabapentin due to drowsiness.     -Continue Baclofen  -Tramadol and Norco prn for pain.

## 2024-07-01 NOTE — ED NOTES
Telemetry Confirmation    Box#: [id number]  Rhythm: Sinus tach  Rate: 101      Awaiting escort for transport

## 2024-07-01 NOTE — ED TRIAGE NOTES
Patient presents to the ED via EMS from [facility] with complaints of wounds to bilateral legs. Patient endorses pain 10 out of 10.

## 2024-07-01 NOTE — SUBJECTIVE & OBJECTIVE
Past Medical History:   Diagnosis Date    Depression     Diabetes mellitus     Gout     High cholesterol     Hypertension        History reviewed. No pertinent surgical history.    Review of patient's allergies indicates:   Allergen Reactions    Lisinopril Other (See Comments)     cough       No current facility-administered medications on file prior to encounter.     Current Outpatient Medications on File Prior to Encounter   Medication Sig    amLODIPine (NORVASC) 10 MG tablet Take 1 tablet (10 mg total) by mouth once daily.    aspirin (ECOTRIN) 81 MG EC tablet Take 81 mg by mouth once daily.    atorvastatin (LIPITOR) 20 MG tablet Take 20 mg by mouth once daily.    baclofen (LIORESAL) 10 MG tablet Take 10 mg by mouth 3 (three) times daily.    BASAGLAR KWIKPEN U-100 INSULIN glargine 100 units/mL SubQ pen Inject 35 Units into the skin once daily.    cetirizine (ZYRTEC) 10 MG tablet Take 10 mg by mouth once daily.    clotrimazole (LOTRIMIN) 1 % cream Apply topically 2 (two) times daily. for 14 days    diclofenac (VOLTAREN) 75 MG EC tablet Take 1 tablet (75 mg total) by mouth 2 (two) times daily.    furosemide (LASIX) 40 MG tablet Take 1 tablet (40 mg total) by mouth once daily. for 7 days    lisinopril-hydrochlorothiazide (PRINZIDE,ZESTORETIC) 20-25 mg Tab Take 1 tablet by mouth once daily.    losartan (COZAAR) 100 MG tablet Take 100 mg by mouth once daily.    metFORMIN (GLUCOPHAGE) 1000 MG tablet Take 1,000 mg by mouth 2 (two) times daily with meals.    NOVOLOG FLEXPEN U-100 INSULIN 100 unit/mL (3 mL) InPn pen Inject 10 Units into the skin 2 (two) times a day.    potassium chloride (KLOR-CON) 10 MEQ TbSR Take 1 tablet (10 mEq total) by mouth once daily.    tamsulosin (FLOMAX) 0.4 mg Cap Take by mouth once daily.    traMADoL (ULTRAM) 50 mg tablet Take 1 tablet (50 mg total) by mouth every 8 (eight) hours as needed for Pain.     Family History    None       Tobacco Use    Smoking status: Never    Smokeless tobacco:  Never   Substance and Sexual Activity    Alcohol use: Not Currently     Comment: occasionally    Drug use: Yes     Types: Marijuana    Sexual activity: Not on file     Review of Systems   Constitutional:  Positive for chills and fever (subjective).   Respiratory:  Negative for cough and shortness of breath.    Cardiovascular:  Negative for chest pain.   Gastrointestinal:  Negative for abdominal pain, nausea and vomiting.   Genitourinary:  Negative for difficulty urinating.   Musculoskeletal:  Positive for gait problem.   Skin:  Positive for wound.     Objective:     Vital Signs (Most Recent):  Temp: 99.9 °F (37.7 °C) (07/01/24 0921)  Pulse: 92 (07/01/24 1744)  Resp: 18 (07/01/24 1701)  BP: 138/82 (07/01/24 1701)  SpO2: 95 % (07/01/24 1701) Vital Signs (24h Range):  Temp:  [99.9 °F (37.7 °C)] 99.9 °F (37.7 °C)  Pulse:  [] 92  Resp:  [15-22] 18  SpO2:  [95 %-100 %] 95 %  BP: (138-224)/() 138/82     Weight: (!) 152 kg (335 lb)  Body mass index is 46.72 kg/m².     Physical Exam  Vitals reviewed.   Constitutional:       General: He is not in acute distress.     Appearance: He is obese. He is not ill-appearing.   HENT:      Head: Normocephalic.   Eyes:      Extraocular Movements: Extraocular movements intact.   Cardiovascular:      Rate and Rhythm: Normal rate and regular rhythm.   Pulmonary:      Effort: Pulmonary effort is normal. No respiratory distress.      Breath sounds: Normal breath sounds. No wheezing or rales.   Abdominal:      General: There is no distension.      Palpations: Abdomen is soft.      Tenderness: There is no abdominal tenderness.   Musculoskeletal:      Comments: Bilateral lower extremity wounds which are bandaged. Unable to assess for erythema but R>L is warm and tender.    Neurological:      Mental Status: He is alert. Mental status is at baseline.               Significant Labs: All pertinent labs within the past 24 hours have been reviewed.    Significant Imaging: I have reviewed  all pertinent imaging results/findings within the past 24 hours.

## 2024-07-01 NOTE — ASSESSMENT & PLAN NOTE
74 year old male with insulin dependent Type 2 DM (A1c 8.8), HTN, HLD, anal fistula s/p fistulotomy, chronic pain (followed by pain medicine), chronic bilateral LE wounds with recent ED visit on 6/21 with concerns for cellulitis treated with Bactrim and discharged home now representing to [facility] from [facility] with worsening pain of bilateral lower extremities, subjective fevers and chills. Given Vanc, Zosyn.     -Continue broad spectrum abx.   -Follow up blood cx.   -Wound care consult.

## 2024-07-01 NOTE — ED NOTES
"Pt states he urinated on himself. RN offered to clean up patient, pt states "I want to wait until I get to my room upstairs". Pt educated room assigned upstairs is dirty and may take awhile until he is physically upstairs. Pt verbalizes understanding and states he wants to wait.  "

## 2024-07-02 LAB
ALBUMIN SERPL BCP-MCNC: 3 G/DL (ref 3.5–5.2)
ALBUMIN SERPL BCP-MCNC: 3.1 G/DL (ref 3.5–5.2)
ALBUMIN SERPL BCP-MCNC: 3.1 G/DL (ref 3.5–5.2)
ANION GAP SERPL CALC-SCNC: 11 MMOL/L (ref 8–16)
ANION GAP SERPL CALC-SCNC: 8 MMOL/L (ref 8–16)
ANION GAP SERPL CALC-SCNC: 8 MMOL/L (ref 8–16)
ANION GAP SERPL CALC-SCNC: 9 MMOL/L (ref 8–16)
ANION GAP SERPL CALC-SCNC: 9 MMOL/L (ref 8–16)
BASOPHILS # BLD AUTO: 0.05 K/UL (ref 0–0.2)
BASOPHILS NFR BLD: 0.6 % (ref 0–1.9)
BUN SERPL-MCNC: 32 MG/DL (ref 8–23)
BUN SERPL-MCNC: 35 MG/DL (ref 8–23)
BUN SERPL-MCNC: 37 MG/DL (ref 8–23)
BUN SERPL-MCNC: 38 MG/DL (ref 8–23)
BUN SERPL-MCNC: 43 MG/DL (ref 8–23)
CALCIUM SERPL-MCNC: 9 MG/DL (ref 8.7–10.5)
CALCIUM SERPL-MCNC: 9 MG/DL (ref 8.7–10.5)
CALCIUM SERPL-MCNC: 9.2 MG/DL (ref 8.7–10.5)
CALCIUM SERPL-MCNC: 9.3 MG/DL (ref 8.7–10.5)
CALCIUM SERPL-MCNC: 9.5 MG/DL (ref 8.7–10.5)
CHLORIDE SERPL-SCNC: 104 MMOL/L (ref 95–110)
CHLORIDE SERPL-SCNC: 105 MMOL/L (ref 95–110)
CHLORIDE SERPL-SCNC: 105 MMOL/L (ref 95–110)
CHLORIDE SERPL-SCNC: 107 MMOL/L (ref 95–110)
CHLORIDE SERPL-SCNC: 107 MMOL/L (ref 95–110)
CHLORIDE UR-SCNC: 65 MMOL/L (ref 25–200)
CO2 SERPL-SCNC: 14 MMOL/L (ref 23–29)
CO2 SERPL-SCNC: 15 MMOL/L (ref 23–29)
CO2 SERPL-SCNC: 16 MMOL/L (ref 23–29)
CO2 SERPL-SCNC: 17 MMOL/L (ref 23–29)
CO2 SERPL-SCNC: 17 MMOL/L (ref 23–29)
CREAT SERPL-MCNC: 1.4 MG/DL (ref 0.5–1.4)
CREAT SERPL-MCNC: 1.6 MG/DL (ref 0.5–1.4)
CREAT SERPL-MCNC: 1.9 MG/DL (ref 0.5–1.4)
DIFFERENTIAL METHOD BLD: ABNORMAL
EOSINOPHIL # BLD AUTO: 0.1 K/UL (ref 0–0.5)
EOSINOPHIL NFR BLD: 1.7 % (ref 0–8)
ERYTHROCYTE [DISTWIDTH] IN BLOOD BY AUTOMATED COUNT: 14.6 % (ref 11.5–14.5)
EST. GFR  (NO RACE VARIABLE): 37 ML/MIN/1.73 M^2
EST. GFR  (NO RACE VARIABLE): 45.5 ML/MIN/1.73 M^2
EST. GFR  (NO RACE VARIABLE): 53.4 ML/MIN/1.73 M^2
GLUCOSE SERPL-MCNC: 143 MG/DL (ref 70–110)
GLUCOSE SERPL-MCNC: 166 MG/DL (ref 70–110)
GLUCOSE SERPL-MCNC: 180 MG/DL (ref 70–110)
GLUCOSE SERPL-MCNC: 191 MG/DL (ref 70–110)
GLUCOSE SERPL-MCNC: 191 MG/DL (ref 70–110)
HCT VFR BLD AUTO: 35 % (ref 40–54)
HGB BLD-MCNC: 11.2 G/DL (ref 14–18)
IMM GRANULOCYTES # BLD AUTO: 0.04 K/UL (ref 0–0.04)
IMM GRANULOCYTES NFR BLD AUTO: 0.5 % (ref 0–0.5)
LYMPHOCYTES # BLD AUTO: 1.4 K/UL (ref 1–4.8)
LYMPHOCYTES NFR BLD: 17.4 % (ref 18–48)
MAGNESIUM SERPL-MCNC: 1.8 MG/DL (ref 1.6–2.6)
MCH RBC QN AUTO: 29.4 PG (ref 27–31)
MCHC RBC AUTO-ENTMCNC: 32 G/DL (ref 32–36)
MCV RBC AUTO: 92 FL (ref 82–98)
MONOCYTES # BLD AUTO: 0.8 K/UL (ref 0.3–1)
MONOCYTES NFR BLD: 9.7 % (ref 4–15)
NEUTROPHILS # BLD AUTO: 5.6 K/UL (ref 1.8–7.7)
NEUTROPHILS NFR BLD: 70.1 % (ref 38–73)
NRBC BLD-RTO: 0 /100 WBC
OHS QRS DURATION: 76 MS
OHS QTC CALCULATION: 416 MS
PHOSPHATE SERPL-MCNC: 4.1 MG/DL (ref 2.7–4.5)
PHOSPHATE SERPL-MCNC: 4.8 MG/DL (ref 2.7–4.5)
PHOSPHATE SERPL-MCNC: 4.8 MG/DL (ref 2.7–4.5)
PHOSPHATE SERPL-MCNC: 5.3 MG/DL (ref 2.7–4.5)
PHOSPHATE SERPL-MCNC: 5.6 MG/DL (ref 2.7–4.5)
PLATELET # BLD AUTO: 260 K/UL (ref 150–450)
PMV BLD AUTO: 9.8 FL (ref 9.2–12.9)
POCT GLUCOSE: 142 MG/DL (ref 70–110)
POCT GLUCOSE: 154 MG/DL (ref 70–110)
POCT GLUCOSE: 177 MG/DL (ref 70–110)
POCT GLUCOSE: 193 MG/DL (ref 70–110)
POCT GLUCOSE: 206 MG/DL (ref 70–110)
POTASSIUM SERPL-SCNC: 4.7 MMOL/L (ref 3.5–5.1)
POTASSIUM SERPL-SCNC: 4.7 MMOL/L (ref 3.5–5.1)
POTASSIUM SERPL-SCNC: 4.9 MMOL/L (ref 3.5–5.1)
POTASSIUM SERPL-SCNC: 4.9 MMOL/L (ref 3.5–5.1)
POTASSIUM SERPL-SCNC: 5.9 MMOL/L (ref 3.5–5.1)
POTASSIUM UR-SCNC: 35 MMOL/L (ref 15–95)
RBC # BLD AUTO: 3.81 M/UL (ref 4.6–6.2)
SODIUM SERPL-SCNC: 129 MMOL/L (ref 136–145)
SODIUM SERPL-SCNC: 130 MMOL/L (ref 136–145)
SODIUM SERPL-SCNC: 130 MMOL/L (ref 136–145)
SODIUM SERPL-SCNC: 131 MMOL/L (ref 136–145)
SODIUM SERPL-SCNC: 132 MMOL/L (ref 136–145)
SODIUM UR-SCNC: 43 MMOL/L (ref 20–250)
WBC # BLD AUTO: 8.01 K/UL (ref 3.9–12.7)

## 2024-07-02 PROCEDURE — 25000003 PHARM REV CODE 250: Mod: JZ,JG | Performed by: STUDENT IN AN ORGANIZED HEALTH CARE EDUCATION/TRAINING PROGRAM

## 2024-07-02 PROCEDURE — 25000003 PHARM REV CODE 250

## 2024-07-02 PROCEDURE — 94761 N-INVAS EAR/PLS OXIMETRY MLT: CPT

## 2024-07-02 PROCEDURE — 84300 ASSAY OF URINE SODIUM: CPT | Performed by: STUDENT IN AN ORGANIZED HEALTH CARE EDUCATION/TRAINING PROGRAM

## 2024-07-02 PROCEDURE — 84133 ASSAY OF URINE POTASSIUM: CPT | Performed by: STUDENT IN AN ORGANIZED HEALTH CARE EDUCATION/TRAINING PROGRAM

## 2024-07-02 PROCEDURE — 80069 RENAL FUNCTION PANEL: CPT | Mod: 91 | Performed by: STUDENT IN AN ORGANIZED HEALTH CARE EDUCATION/TRAINING PROGRAM

## 2024-07-02 PROCEDURE — 25000242 PHARM REV CODE 250 ALT 637 W/ HCPCS: Performed by: STUDENT IN AN ORGANIZED HEALTH CARE EDUCATION/TRAINING PROGRAM

## 2024-07-02 PROCEDURE — 82436 ASSAY OF URINE CHLORIDE: CPT | Performed by: STUDENT IN AN ORGANIZED HEALTH CARE EDUCATION/TRAINING PROGRAM

## 2024-07-02 PROCEDURE — 96376 TX/PRO/DX INJ SAME DRUG ADON: CPT

## 2024-07-02 PROCEDURE — 80202 ASSAY OF VANCOMYCIN: CPT | Performed by: STUDENT IN AN ORGANIZED HEALTH CARE EDUCATION/TRAINING PROGRAM

## 2024-07-02 PROCEDURE — 96366 THER/PROPH/DIAG IV INF ADDON: CPT

## 2024-07-02 PROCEDURE — 96375 TX/PRO/DX INJ NEW DRUG ADDON: CPT

## 2024-07-02 PROCEDURE — 93005 ELECTROCARDIOGRAM TRACING: CPT

## 2024-07-02 PROCEDURE — 21400001 HC TELEMETRY ROOM

## 2024-07-02 PROCEDURE — 36415 COLL VENOUS BLD VENIPUNCTURE: CPT | Mod: XB | Performed by: STUDENT IN AN ORGANIZED HEALTH CARE EDUCATION/TRAINING PROGRAM

## 2024-07-02 PROCEDURE — 85025 COMPLETE CBC W/AUTO DIFF WBC: CPT | Performed by: STUDENT IN AN ORGANIZED HEALTH CARE EDUCATION/TRAINING PROGRAM

## 2024-07-02 PROCEDURE — 93010 ELECTROCARDIOGRAM REPORT: CPT | Mod: ,,, | Performed by: INTERNAL MEDICINE

## 2024-07-02 PROCEDURE — 11000001 HC ACUTE MED/SURG PRIVATE ROOM

## 2024-07-02 PROCEDURE — 63600175 PHARM REV CODE 636 W HCPCS: Performed by: STUDENT IN AN ORGANIZED HEALTH CARE EDUCATION/TRAINING PROGRAM

## 2024-07-02 PROCEDURE — 94640 AIRWAY INHALATION TREATMENT: CPT | Mod: XB

## 2024-07-02 PROCEDURE — 96372 THER/PROPH/DIAG INJ SC/IM: CPT | Performed by: STUDENT IN AN ORGANIZED HEALTH CARE EDUCATION/TRAINING PROGRAM

## 2024-07-02 PROCEDURE — 83735 ASSAY OF MAGNESIUM: CPT | Performed by: STUDENT IN AN ORGANIZED HEALTH CARE EDUCATION/TRAINING PROGRAM

## 2024-07-02 RX ORDER — CALCIUM GLUCONATE 20 MG/ML
1 INJECTION, SOLUTION INTRAVENOUS EVERY 10 MIN PRN
Status: DISCONTINUED | OUTPATIENT
Start: 2024-07-02 | End: 2024-07-02

## 2024-07-02 RX ORDER — FUROSEMIDE 10 MG/ML
20 INJECTION INTRAMUSCULAR; INTRAVENOUS ONCE
Status: COMPLETED | OUTPATIENT
Start: 2024-07-02 | End: 2024-07-02

## 2024-07-02 RX ORDER — ALBUTEROL SULFATE 2.5 MG/.5ML
10 SOLUTION RESPIRATORY (INHALATION) ONCE
Status: COMPLETED | OUTPATIENT
Start: 2024-07-02 | End: 2024-07-02

## 2024-07-02 RX ORDER — CALCIUM GLUCONATE 20 MG/ML
1 INJECTION, SOLUTION INTRAVENOUS ONCE
Status: COMPLETED | OUTPATIENT
Start: 2024-07-02 | End: 2024-07-02

## 2024-07-02 RX ADMIN — VANCOMYCIN HYDROCHLORIDE 1500 MG: 1.5 INJECTION, POWDER, LYOPHILIZED, FOR SOLUTION INTRAVENOUS at 12:07

## 2024-07-02 RX ADMIN — POLYETHYLENE GLYCOL 3350 17 G: 17 POWDER, FOR SOLUTION ORAL at 08:07

## 2024-07-02 RX ADMIN — TAMSULOSIN HYDROCHLORIDE 0.4 MG: 0.4 CAPSULE ORAL at 08:07

## 2024-07-02 RX ADMIN — HYDROCODONE BITARTRATE AND ACETAMINOPHEN 1 TABLET: 10; 325 TABLET ORAL at 03:07

## 2024-07-02 RX ADMIN — INSULIN ASPART 5 UNITS: 100 INJECTION, SOLUTION INTRAVENOUS; SUBCUTANEOUS at 04:07

## 2024-07-02 RX ADMIN — CEFEPIME 1 G: 1 INJECTION, POWDER, FOR SOLUTION INTRAMUSCULAR; INTRAVENOUS at 10:07

## 2024-07-02 RX ADMIN — ASPIRIN 81 MG: 81 TABLET, COATED ORAL at 08:07

## 2024-07-02 RX ADMIN — SODIUM ZIRCONIUM CYCLOSILICATE 10 G: 10 POWDER, FOR SUSPENSION ORAL at 08:07

## 2024-07-02 RX ADMIN — BACLOFEN 10 MG: 10 TABLET ORAL at 08:07

## 2024-07-02 RX ADMIN — SODIUM BICARBONATE 650 MG: 650 TABLET ORAL at 08:07

## 2024-07-02 RX ADMIN — ALBUTEROL SULFATE 10 MG: 2.5 SOLUTION RESPIRATORY (INHALATION) at 08:07

## 2024-07-02 RX ADMIN — INSULIN ASPART 5 UNITS: 100 INJECTION, SOLUTION INTRAVENOUS; SUBCUTANEOUS at 08:07

## 2024-07-02 RX ADMIN — HEPARIN SODIUM 7500 UNITS: 5000 INJECTION INTRAVENOUS; SUBCUTANEOUS at 05:07

## 2024-07-02 RX ADMIN — HEPARIN SODIUM 7500 UNITS: 5000 INJECTION INTRAVENOUS; SUBCUTANEOUS at 01:07

## 2024-07-02 RX ADMIN — INSULIN ASPART 5 UNITS: 100 INJECTION, SOLUTION INTRAVENOUS; SUBCUTANEOUS at 11:07

## 2024-07-02 RX ADMIN — CEFEPIME 1 G: 1 INJECTION, POWDER, FOR SOLUTION INTRAMUSCULAR; INTRAVENOUS at 05:07

## 2024-07-02 RX ADMIN — BACLOFEN 10 MG: 10 TABLET ORAL at 03:07

## 2024-07-02 RX ADMIN — TRAMADOL HYDROCHLORIDE 50 MG: 50 TABLET, COATED ORAL at 08:07

## 2024-07-02 RX ADMIN — AMLODIPINE BESYLATE 10 MG: 10 TABLET ORAL at 08:07

## 2024-07-02 RX ADMIN — CEFEPIME 1 G: 1 INJECTION, POWDER, FOR SOLUTION INTRAMUSCULAR; INTRAVENOUS at 02:07

## 2024-07-02 RX ADMIN — CALCIUM GLUCONATE 1 G: 20 INJECTION, SOLUTION INTRAVENOUS at 09:07

## 2024-07-02 RX ADMIN — VANCOMYCIN HYDROCHLORIDE 1500 MG: 1.5 INJECTION, POWDER, LYOPHILIZED, FOR SOLUTION INTRAVENOUS at 01:07

## 2024-07-02 RX ADMIN — ATORVASTATIN CALCIUM 20 MG: 20 TABLET, FILM COATED ORAL at 08:07

## 2024-07-02 RX ADMIN — FUROSEMIDE 20 MG: 10 INJECTION, SOLUTION INTRAMUSCULAR; INTRAVENOUS at 08:07

## 2024-07-02 RX ADMIN — INSULIN GLARGINE 20 UNITS: 100 INJECTION, SOLUTION SUBCUTANEOUS at 08:07

## 2024-07-02 RX ADMIN — SODIUM BICARBONATE 650 MG: 650 TABLET ORAL at 03:07

## 2024-07-02 RX ADMIN — HEPARIN SODIUM 7500 UNITS: 5000 INJECTION INTRAVENOUS; SUBCUTANEOUS at 08:07

## 2024-07-02 NOTE — NURSING
Patient request to sleep in recliner at this is what he does @ home. Patient assisted to recliner at bedside, patient is max assit to get up. Patient given call light , water, bedside table/ urinal within reach

## 2024-07-02 NOTE — PROGRESS NOTES
[facility] [address] [place] [address] Medicine  Progress Note    Patient Name: Riaz Guerra Jr.  MRN: [id number]  Patient Class: OP- Observation   Admission Date: 7/1/2024  Length of Stay: 0 days  Attending Physician: Carrie Ramírez MD  Primary Care Provider: Berhane Reyes (Inactive)    Subjective:     Principal Problem:Cellulitis    HPI:  Riaz Guerra is a 74 year old male with insulin dependent Type 2 DM (A1c 8.8), HTN, HLD, anal fistula s/p fistulotomy, chronic pain (followed by pain medicine), chronic bilateral LE wounds with recent ED visit on 6/21 with concerns for cellulitis treated with Bactrim and discharged home now representing to [facility] from [facility] with worsening pain of bilateral lower extremities. Majority of history from chart review as he repeatedly told me to refer to the chart when asking questions. He thinks he has had fevers and chills, but is unable to elaborate more. On arrival to the ED, he was hypertensive 224/128, tachycardic 110 BPM and tachypneic 22, otherwise on room air. Labs notable for NAGMA with serum bicarb 15, hyperkalemia 5.7, Cr 1.3 (baseline), hyponatremia 133, serum glucose 300s, hgb 12.6 (baseline). He was given calcium, albuterol, lasix 40 mgx1, insulin, lisinopril, Vanc and Zosyn. Repeat BMP showed K 5.0, Na 128 (corrected 131). CXR, US LE and Xray tibia/fibula unremarkable. He was admitted to Hospital Medicine for further workup.     Overview/Hospital Course:  74 year old male admitted to Hospital Medicine for suspected LE cellulitis, uncontrolled hypertension and hyperkalemia. He was started on broad spectrum abx. Blood cx with NGTD. Hypertension improved with resumption of home medications but ARB currently on hold given hyperkalemia. Hyperkalemia improved with shifting. Wound care consulted. If blood cx negative x48 hours, would switch to PO abx for cellulitis.     Interval History:   No complaints this morning. Pain is better controlled. RLE feels less warm  and less tender today.     Review of Systems   Constitutional:  Negative for chills and fever.   Respiratory:  Negative for cough and shortness of breath.    Cardiovascular:  Negative for chest pain.   Gastrointestinal:  Negative for abdominal pain, nausea and vomiting.   Genitourinary:  Negative for difficulty urinating.   Musculoskeletal:  Negative for gait problem.   Skin:  Positive for wound.     Objective:     Vital Signs (Most Recent):  Temp: 97.5 °F (36.4 °C) (07/02/24 1108)  Pulse: 84 (07/02/24 1108)  Resp: 20 (07/02/24 1108)  BP: 116/68 (07/02/24 1108)  SpO2: (!) 94 % (07/02/24 1108) Vital Signs (24h Range):  Temp:  [97.3 °F (36.3 °C)-98.2 °F (36.8 °C)] 97.5 °F (36.4 °C)  Pulse:  [] 84  Resp:  [16-20] 20  SpO2:  [94 %-99 %] 94 %  BP: (108-207)/() 116/68     Weight: (!) 152.2 kg (335 lb 8.6 oz)  Body mass index is 46.8 kg/m².  No intake or output data in the 24 hours ending 07/02/24 1311      Physical Exam  Vitals reviewed.   Constitutional:       General: He is not in acute distress.     Appearance: He is obese. He is not ill-appearing.   HENT:      Head: Normocephalic.   Eyes:      Extraocular Movements: Extraocular movements intact.   Cardiovascular:      Rate and Rhythm: Normal rate and regular rhythm.   Pulmonary:      Effort: Pulmonary effort is normal. No respiratory distress.      Breath sounds: Normal breath sounds. No wheezing or rales.   Abdominal:      General: There is no distension.      Palpations: Abdomen is soft.      Tenderness: There is no abdominal tenderness.   Musculoskeletal:      Comments: Bilateral lower extremity wounds which are bandaged. Unable to assess for erythema but R>L is warm and tender but significantly less than day prior.    Neurological:      Mental Status: He is alert. Mental status is at baseline.             Significant Labs: All pertinent labs within the past 24 hours have been reviewed.      Assessment/Plan:      * Cellulitis  74 year old male with  insulin dependent Type 2 DM (A1c 8.8), HTN, HLD, anal fistula s/p fistulotomy, chronic pain (followed by pain medicine), chronic bilateral LE wounds with recent ED visit on 6/21 with concerns for cellulitis treated with Bactrim and discharged home now representing to [facility] from [facility] with worsening pain of bilateral lower extremities, subjective fevers and chills. Increased warmth and tenderness of RLE. Given Vanc, Zosyn.     -Continue broad spectrum abx. If blood cx negative after 48 hours, would transition to PO.  -Follow up blood cx.   -Wound care consult.     Metabolic acidosis, normal anion gap (NAG)  Serum bicarb 15 in the setting of normal AG with hyperkalemia. Could be secondary to recent bactrim use (possible RTA) vs use of metformin and associated diarrhea vs CKD progression.    -Monitor with RFP.   -Continue sodium bicarb.       Anemia  Patient's anemia is currently controlled. Has not received any PRBCs to date.   Current CBC reviewed-   Lab Results   Component Value Date    HGB 11.2 (L) 07/02/2024    HCT 35.0 (L) 07/02/2024     -Iron studies do not reflect KENNEDY.  -Monitor serial CBC and transfuse if patient becomes hemodynamically unstable, symptomatic or H/H drops below 7/21.    CKD (chronic kidney disease) stage 3, GFR 30-59 ml/min  Creatine stable for now. Baseline 1.3 to 1.4. BMP reviewed- noted Estimated Creatinine Clearance: 62.6 mL/min (A) (based on SCr of 1.6 mg/dL (H)). according to latest data. Based on current GFR, CKD stage is stage 3 - GFR 30-59.  Monitor UOP and serial BMP and adjust therapy as needed. Renally dose meds. Avoid nephrotoxic medications and procedures.    Chronic pain  Follows with pain medicine as an outpatient. He believes he is taking baclofen but is unsure of other medications.Unable to take gabapentin due to drowsiness.     -Continue Baclofen  -Tramadol and Norco prn for pain.      Hyperkalemia  This patient has hyperkalemia which is uncontrolled. We will monitor for  arrhythmias with EKG or continuous telemetry. We will treat the hyperkalemia with Calcium gluconate, IV insulin and dextrose, Nebulized albuterol sulfate, and Furosemide. The likely etiology of the hyperkalemia is Medication induced.  The patients latest potassium has been reviewed and the results are listed below  Recent Labs   Lab 07/02/24  1059   K 4.7  4.7       -K 5.7 to 5.0 after shifting. Lisinopril and Losartan both of med list at home, but unsure if taking both. Was also given bactrim recently which can cause type IV RTA.   -RFP q4 hours. Can space out once K is below 5 on at least two repeats.   -Continue Lokelma 10 mg for now. Can increase to BID/TID if persistently hyperkalemic.  -If he needs shifting again, caution with lasix given slight uptrend in Cr.  -Per chart review, developed cough with lisinopril, placed as an allergy.   -Strict I&O    HTN (hypertension)  Chronic, controlled. Latest blood pressure and vitals reviewed-     Temp:  [97.3 °F (36.3 °C)-98.2 °F (36.8 °C)]   Pulse:  []   Resp:  [16-20]   BP: (108-207)/()   SpO2:  [94 %-99 %] .     -Continue Amlodipine. Losartan on hold given hyperkalemia.  -Can add hydralazine if systolic remains >180.     Insulin dependent type 2 diabetes mellitus  Patient's FSGs are controlled on current medication regimen.  Last A1c reviewed-   Lab Results   Component Value Date    HGBA1C 8.8 (H) 07/01/2024     Most recent fingerstick glucose reviewed-   Recent Labs   Lab 07/01/24 2022 07/02/24  0747 07/02/24  0807 07/02/24  1109   POCTGLUCOSE 167* 142* 154* 206*       Current correctional scale  Low  Maintain anti-hyperglycemic dose as follows-   Antihyperglycemics (From admission, onward)      Start     Stop Route Frequency Ordered    07/02/24 0900  insulin glargine U-100 (Lantus) pen 20 Units         -- SubQ Daily 07/01/24 1518    07/01/24 1645  insulin aspart U-100 pen 5 Units         -- SubQ 3 times daily with meals 07/01/24 1518    07/01/24 1615   insulin aspart U-100 pen 0-5 Units         -- SubQ Before meals & nightly PRN 07/01/24 1518          -Hold Oral hypoglycemics while patient is in the hospital.  -Uptitrate to maintain 140 to 180.       VTE Risk Mitigation (From admission, onward)           Ordered     heparin (porcine) injection 7,500 Units  Every 8 hours         07/01/24 1518     IP VTE HIGH RISK PATIENT  Once         07/01/24 1518     Place sequential compression device  Until discontinued         07/01/24 1518                    Discharge Planning   KELSEY:      Code Status: Full Code   Is the patient medically ready for discharge?:     Reason for patient still in hospital (select all that apply): Patient trending condition, Laboratory test, and Treatment  Discharge Plan A: Home            Carrie Casey MD  Department of Hospital Medicine   [facility] 11H

## 2024-07-02 NOTE — ASSESSMENT & PLAN NOTE
Chronic, controlled. Latest blood pressure and vitals reviewed-     Temp:  [97.3 °F (36.3 °C)-98.2 °F (36.8 °C)]   Pulse:  []   Resp:  [16-20]   BP: (108-207)/()   SpO2:  [94 %-99 %] .     -Continue Amlodipine. Losartan on hold given hyperkalemia.  -Can add hydralazine if systolic remains >180.

## 2024-07-02 NOTE — NURSING
Nurses Note -- 4 Eyes      7/1/2024   9:11 PM      Skin assessed during: Admit      [] No Altered Skin Integrity Present    []Prevention Measures Documented      [x] Yes- Altered Skin Integrity Present or Discovered   [x] LDA Added if Not in Epic (Describe Wound)   [x] New Altered Skin Integrity was Present on Admit and Documented in LDA   [x] Wound Image Taken    Wound Care Consulted? Yes    Attending Nurse:  Rudy Montano RN/Staff Member:  Lakeisha

## 2024-07-02 NOTE — ASSESSMENT & PLAN NOTE
Serum bicarb 15 in the setting of normal AG with hyperkalemia. Could be secondary to recent bactrim use (possible RTA) vs use of metformin and associated diarrhea vs CKD progression.    -Monitor with RFP.   -Continue sodium bicarb.

## 2024-07-02 NOTE — PLAN OF CARE
Patient AAOX4. No distress noted. Patient sitting in the recliner. Call light within reach.   Problem: Adult Inpatient Plan of Care  Goal: Plan of Care Review  Outcome: Progressing  Goal: Patient-Specific Goal (Individualized)  Outcome: Progressing  Goal: Absence of Hospital-Acquired Illness or Injury  Outcome: Progressing  Goal: Optimal Comfort and Wellbeing  Outcome: Progressing  Goal: Readiness for Transition of Care  Outcome: Progressing     Problem: Bariatric Environmental Safety  Goal: Safety Maintained with Care  Outcome: Progressing     Problem: Diabetes Comorbidity  Goal: Blood Glucose Level Within Targeted Range  Outcome: Progressing     Problem: Wound  Goal: Optimal Coping  Outcome: Progressing  Goal: Optimal Functional Ability  Outcome: Progressing  Goal: Absence of Infection Signs and Symptoms  Outcome: Progressing  Goal: Improved Oral Intake  Outcome: Progressing  Goal: Optimal Pain Control and Function  Outcome: Progressing  Goal: Skin Health and Integrity  Outcome: Progressing  Goal: Optimal Wound Healing  Outcome: Progressing     Problem: Skin Injury Risk Increased  Goal: Skin Health and Integrity  Outcome: Progressing     Problem: Hypertension Acute  Goal: Blood Pressure Within Desired Range  Outcome: Progressing     Problem: Pain Acute  Goal: Optimal Pain Control and Function  Outcome: Progressing     Problem: Fall Injury Risk  Goal: Absence of Fall and Fall-Related Injury  Outcome: Progressing     Problem: Electrolyte Imbalance  Goal: Electrolyte Balance  Outcome: Progressing     Problem: Chronic Kidney Disease  Goal: Optimal Coping with Chronic Illness  Outcome: Progressing  Goal: Electrolyte Balance  Outcome: Progressing  Goal: Fluid Balance  Outcome: Progressing  Goal: Optimal Functional Ability  Outcome: Progressing  Goal: Absence of Anemia Signs and Symptoms  Outcome: Progressing  Goal: Optimal Oral Intake  Outcome: Progressing  Goal: Acceptable Pain Control  Outcome: Progressing  Goal:  Minimize Renal Failure Effects  Outcome: Progressing     Problem: Anemia  Goal: Anemia Symptom Improvement  Outcome: Progressing

## 2024-07-02 NOTE — SUBJECTIVE & OBJECTIVE
Interval History:   No complaints this morning. Pain is better controlled. RLE feels less warm and less tender today.     Review of Systems   Constitutional:  Negative for chills and fever.   Respiratory:  Negative for cough and shortness of breath.    Cardiovascular:  Negative for chest pain.   Gastrointestinal:  Negative for abdominal pain, nausea and vomiting.   Genitourinary:  Negative for difficulty urinating.   Musculoskeletal:  Negative for gait problem.   Skin:  Positive for wound.     Objective:     Vital Signs (Most Recent):  Temp: 97.5 °F (36.4 °C) (07/02/24 1108)  Pulse: 84 (07/02/24 1108)  Resp: 20 (07/02/24 1108)  BP: 116/68 (07/02/24 1108)  SpO2: (!) 94 % (07/02/24 1108) Vital Signs (24h Range):  Temp:  [97.3 °F (36.3 °C)-98.2 °F (36.8 °C)] 97.5 °F (36.4 °C)  Pulse:  [] 84  Resp:  [16-20] 20  SpO2:  [94 %-99 %] 94 %  BP: (108-207)/() 116/68     Weight: (!) 152.2 kg (335 lb 8.6 oz)  Body mass index is 46.8 kg/m².  No intake or output data in the 24 hours ending 07/02/24 1311      Physical Exam  Vitals reviewed.   Constitutional:       General: He is not in acute distress.     Appearance: He is obese. He is not ill-appearing.   HENT:      Head: Normocephalic.   Eyes:      Extraocular Movements: Extraocular movements intact.   Cardiovascular:      Rate and Rhythm: Normal rate and regular rhythm.   Pulmonary:      Effort: Pulmonary effort is normal. No respiratory distress.      Breath sounds: Normal breath sounds. No wheezing or rales.   Abdominal:      General: There is no distension.      Palpations: Abdomen is soft.      Tenderness: There is no abdominal tenderness.   Musculoskeletal:      Comments: Bilateral lower extremity wounds which are bandaged. Unable to assess for erythema but R>L is warm and tender but significantly less than day prior.    Neurological:      Mental Status: He is alert. Mental status is at baseline.             Significant Labs: All pertinent labs within the past  24 hours have been reviewed.

## 2024-07-02 NOTE — ASSESSMENT & PLAN NOTE
This patient has hyperkalemia which is uncontrolled. We will monitor for arrhythmias with EKG or continuous telemetry. We will treat the hyperkalemia with Calcium gluconate, IV insulin and dextrose, Nebulized albuterol sulfate, and Furosemide. The likely etiology of the hyperkalemia is Medication induced.  The patients latest potassium has been reviewed and the results are listed below  Recent Labs   Lab 07/02/24  1059   K 4.7  4.7       -K 5.7 to 5.0 after shifting. Lisinopril and Losartan both of med list at home, but unsure if taking both. Was also given bactrim recently which can cause type IV RTA.   -RFP q4 hours. Can space out once K is below 5 on at least two repeats.   -Continue Lokelma 10 mg for now. Can increase to BID/TID if persistently hyperkalemic.  -If he needs shifting again, caution with lasix given slight uptrend in Cr.  -Per chart review, developed cough with lisinopril, placed as an allergy.   -Strict I&O

## 2024-07-02 NOTE — PLAN OF CARE
Inpatient Upgrade Note    Riaz Guerra Jr. has warranted treatment spanning two or more midnights of hospital level care for the management of celluitis and hyperkalemia . He continues to require IV antibiotics, daily labs, further testing/imaging, monitoring of vital signs, medication adjustments, further evaluation by consultants, and wound care . His condition is also complicated by the following comorbidities:   insulin dependent Type 2 DM (A1c 8.8), HTN, HLD, anal fistula s/p fistulotomy, chronic pain (followed by pain medicine), chronic bilateral LE wounds .

## 2024-07-02 NOTE — ASSESSMENT & PLAN NOTE
Creatine stable for now. Baseline 1.3 to 1.4. BMP reviewed- noted Estimated Creatinine Clearance: 62.6 mL/min (A) (based on SCr of 1.6 mg/dL (H)). according to latest data. Based on current GFR, CKD stage is stage 3 - GFR 30-59.  Monitor UOP and serial BMP and adjust therapy as needed. Renally dose meds. Avoid nephrotoxic medications and procedures.

## 2024-07-02 NOTE — HOSPITAL COURSE
74 year old man admitted to Hospital Medicine for LE cellulitis, uncontrolled hypertension and hyperkalemia. He was started on broad spectrum abx. Blood cx with NGTD. Hypertension improved with resumption of home medications but ARB held initially given hyperkalemia. Hyperkalemia improved with Lokelma. Wound care consulted. Arterial ABIs normal. Creatinine improved with diuresis and ARB was ultimately able to be restarted.     PT and OT were consulted and recommended moderate intensity therapy, but patient prefers to discharge to home with home health services.  Home health services were contacted and requested to provide PT, OT and wound care.  He will have an ambulatory referral to both wound care and podiatry.  He will discharge to home with levofloxacin for diabetic foot infection.

## 2024-07-02 NOTE — ASSESSMENT & PLAN NOTE
74 year old male with insulin dependent Type 2 DM (A1c 8.8), HTN, HLD, anal fistula s/p fistulotomy, chronic pain (followed by pain medicine), chronic bilateral LE wounds with recent ED visit on 6/21 with concerns for cellulitis treated with Bactrim and discharged home now representing to [facility] from [facility] with worsening pain of bilateral lower extremities, subjective fevers and chills. Increased warmth and tenderness of RLE. Given Vanc, Zosyn.     -Continue broad spectrum abx. If blood cx negative after 48 hours, would transition to PO.  -Follow up blood cx.   -Wound care consult.

## 2024-07-02 NOTE — ASSESSMENT & PLAN NOTE
Patient's anemia is currently controlled. Has not received any PRBCs to date.   Current CBC reviewed-   Lab Results   Component Value Date    HGB 11.2 (L) 07/02/2024    HCT 35.0 (L) 07/02/2024     -Iron studies do not reflect KENNEDY.  -Monitor serial CBC and transfuse if patient becomes hemodynamically unstable, symptomatic or H/H drops below 7/21.

## 2024-07-02 NOTE — ASSESSMENT & PLAN NOTE
Patient's FSGs are controlled on current medication regimen.  Last A1c reviewed-   Lab Results   Component Value Date    HGBA1C 8.8 (H) 07/01/2024     Most recent fingerstick glucose reviewed-   Recent Labs   Lab 07/01/24 2022 07/02/24  0747 07/02/24  0807 07/02/24  1109   POCTGLUCOSE 167* 142* 154* 206*       Current correctional scale  Low  Maintain anti-hyperglycemic dose as follows-   Antihyperglycemics (From admission, onward)      Start     Stop Route Frequency Ordered    07/02/24 0900  insulin glargine U-100 (Lantus) pen 20 Units         -- SubQ Daily 07/01/24 1518    07/01/24 1645  insulin aspart U-100 pen 5 Units         -- SubQ 3 times daily with meals 07/01/24 1518    07/01/24 1617  insulin aspart U-100 pen 0-5 Units         -- SubQ Before meals & nightly PRN 07/01/24 1518          -Hold Oral hypoglycemics while patient is in the hospital.  -Uptitrate to maintain 140 to 180.

## 2024-07-02 NOTE — PLAN OF CARE
Problem: Adult Inpatient Plan of Care  Goal: Plan of Care Review  Outcome: Progressing  Goal: Patient-Specific Goal (Individualized)  Outcome: Progressing  Goal: Absence of Hospital-Acquired Illness or Injury  Outcome: Progressing  Goal: Optimal Comfort and Wellbeing  Outcome: Progressing  Goal: Readiness for Transition of Care  Outcome: Progressing     Problem: Bariatric Environmental Safety  Goal: Safety Maintained with Care  Outcome: Progressing     Problem: Diabetes Comorbidity  Goal: Blood Glucose Level Within Targeted Range  Outcome: Progressing     Problem: Wound  Goal: Optimal Coping  Outcome: Progressing  Goal: Optimal Functional Ability  Outcome: Progressing  Goal: Absence of Infection Signs and Symptoms  Outcome: Progressing  Goal: Improved Oral Intake  Outcome: Progressing  Goal: Optimal Pain Control and Function  Outcome: Progressing  Goal: Skin Health and Integrity  Outcome: Progressing  Goal: Optimal Wound Healing  Outcome: Progressing     Problem: Skin Injury Risk Increased  Goal: Skin Health and Integrity  Outcome: Progressing     Problem: Hypertension Acute  Goal: Blood Pressure Within Desired Range  Outcome: Progressing     Problem: Pain Acute  Goal: Optimal Pain Control and Function  Outcome: Progressing     Problem: Fall Injury Risk  Goal: Absence of Fall and Fall-Related Injury  Outcome: Progressing     Problem: Electrolyte Imbalance  Goal: Electrolyte Balance  Outcome: Progressing     Problem: Chronic Kidney Disease  Goal: Optimal Coping with Chronic Illness  Outcome: Progressing  Goal: Electrolyte Balance  Outcome: Progressing  Goal: Fluid Balance  Outcome: Progressing  Goal: Optimal Functional Ability  Outcome: Progressing  Goal: Absence of Anemia Signs and Symptoms  Outcome: Progressing  Goal: Optimal Oral Intake  Outcome: Progressing  Goal: Acceptable Pain Control  Outcome: Progressing  Goal: Minimize Renal Failure Effects  Outcome: Progressing     Problem: Anemia  Goal: Anemia Symptom  Improvement  Outcome: Progressing

## 2024-07-02 NOTE — CONSULTS
[facility] - [facility]ation 11H  Wound Care    Patient Name:  Riaz Guerra Jr.   MRN:  [id number]  Date: 7/2/2024  Diagnosis: Cellulitis    History:     Past Medical History:   Diagnosis Date    Depression     Diabetes mellitus     Gout     High cholesterol     Hypertension        Social History     Socioeconomic History    Marital status: Single   Tobacco Use    Smoking status: Never    Smokeless tobacco: Never   Substance and Sexual Activity    Alcohol use: Not Currently     Comment: occasionally    Drug use: Yes     Types: Marijuana     Social Determinants of Health     Financial Resource Strain: Low Risk  (7/1/2024)    Overall Financial Resource Strain (CARDIA)     Difficulty of Paying Living Expenses: Not very hard   Food Insecurity: No Food Insecurity (7/1/2024)    Hunger Vital Sign     Worried About Running Out of Food in the Last Year: Never true     Ran Out of Food in the Last Year: Never true   Transportation Needs: No Transportation Needs (7/1/2024)    TRANSPORTATION NEEDS     Transportation : No   Physical Activity: Inactive (7/1/2024)    Exercise Vital Sign     Days of Exercise per Week: 0 days     Minutes of Exercise per Session: 0 min   Stress: Stress Concern Present (7/1/2024)    [employer] [place] [employer] - Occupational Stress Questionnaire     Feeling of Stress : To some extent   Housing Stability: Low Risk  (7/1/2024)    Housing Stability Vital Sign     Unable to Pay for Housing in the Last Year: No     Homeless in the Last Year: No       Precautions:     Allergies as of 07/01/2024 - Reviewed 07/01/2024   Allergen Reaction Noted    Lisinopril Other (See Comments) 06/21/2024       [facility] Assessment Details/Treatment   Pt seen for wound care consult- bilateral lower legs. Pt agreed to wound consult; primary nurse at bedside. Pt sitting up in chair, AAOx3. Indicated wounds are not new and have been weeping. Dressings to BLE were loose, moderate amount of drainage noted to pad under feet.  "See assessment below; pt has multiple wounds to lower legs, likely venous ulcers. Legs and feet are edematous and skin is "tight", pt did endorse pain when wounds were cleaned but tolerated wound care. Covered with Hydrofera blue foam to absorb/clean wounds, along with ABD for absorption and wrapped in kerlix. Pt said he has worn compression stockings in the past and they were not comfortable. Discussed wound care plan while inpatient and suggested to pt to f/u with outpatient wound care for BLE wounds. Encouraged pt to elevate legs when sitting or in bed. Discussed wound plan with nurse.     Recommendations:  Clean BLE with soap/water- either no rinse, CHG soap or bath wipes, every other day and prn. Apply Hydrofera foam to wounds, cover with Abd pad and wrap in kerlix.     Nova Gaston RN/VANCE  7/2/2024 07/02/24 1300   WOCN Assessment   WOCN Total Time (mins) 60   Visit Date 07/02/24   Visit Time 1300   Consult Type New   WOCN Speciality Wound   Wound venous   Number of Wounds 5   Intervention assessed;changed;chart review;coordination of care   Teaching on-going        Wound 07/01/24 0954 Ulceration Right lower Calf   Date First Assessed/Time First Assessed: 07/01/24 0954   Present on Original Admission: Yes  Primary Wound Type: Ulceration  Side: Right  Orientation: lower  Location: Calf  Is this injury device related?: No   Dressing Appearance Moist drainage;Intact   Drainage Amount Large   Drainage Characteristics/Odor No odor;Serous   Appearance Pink;Red;White;Yellow;Slough;Moist;Not granulating   Red (%), Wound Tissue Color 90 %   Yellow (%), Wound Tissue Color 5 %   Periwound Area Denuded;Edematous;Macerated;Moist;Satellite lesion   Wound Length (cm) 5 cm   Wound Width (cm) 3 cm   Wound Depth (cm) 0.2 cm   Wound Volume (cm^3) 3 cm^3   Wound Surface Area (cm^2) 15 cm^2   Care Cleansed with:;Soap and water   Dressing Removed;Changed;Methylene blue/gentian violet;Foam;Absorptive Pad;Rolled gauze        " Wound 07/01/24 0955 Ulceration Left Calf   Date First Assessed/Time First Assessed: 07/01/24 0955   Present on Original Admission: Yes  Primary Wound Type: Ulceration  Side: Left  Location: Calf  Is this injury device related?: No   Wound Image   (rover unable to take photos)   Dressing Appearance Moist drainage;Intact   Drainage Amount Moderate   Drainage Characteristics/Odor Serous;No odor   Appearance Red;Pink;White;Fibrin;Moist;Yellow   Tissue loss description Partial thickness   Red (%), Wound Tissue Color 90 %   Yellow (%), Wound Tissue Color 5 %   Periwound Area Macerated;Denuded;Dry;Edematous;Moist;Satellite lesion   Wound Edges Irregular   Wound Length (cm) 6 cm   Wound Width (cm) 3 cm   Wound Depth (cm) 0.2 cm   Wound Volume (cm^3) 3.6 cm^3   Wound Surface Area (cm^2) 18 cm^2   Care Cleansed with:;Soap and water   Dressing Changed;Methylene blue/gentian violet;Foam;Absorptive Pad;Rolled gauze

## 2024-07-03 LAB
ALBUMIN SERPL BCP-MCNC: 3 G/DL (ref 3.5–5.2)
ALBUMIN SERPL BCP-MCNC: 3.1 G/DL (ref 3.5–5.2)
ANION GAP SERPL CALC-SCNC: 9 MMOL/L (ref 8–16)
ANION GAP SERPL CALC-SCNC: 9 MMOL/L (ref 8–16)
AV INDEX (PROSTH): 1.1
AV MEAN GRADIENT: 4 MMHG
AV PEAK GRADIENT: 6 MMHG
AV VALVE AREA BY VELOCITY RATIO: 3.43 CM²
AV VALVE AREA: 4.12 CM²
AV VELOCITY RATIO: 0.92
BASOPHILS # BLD AUTO: 0.05 K/UL (ref 0–0.2)
BASOPHILS NFR BLD: 0.6 % (ref 0–1.9)
BSA FOR ECHO PROCEDURE: 2.76 M2
BUN SERPL-MCNC: 40 MG/DL (ref 8–23)
BUN SERPL-MCNC: 45 MG/DL (ref 8–23)
CALCIUM SERPL-MCNC: 9.2 MG/DL (ref 8.7–10.5)
CALCIUM SERPL-MCNC: 9.6 MG/DL (ref 8.7–10.5)
CHLORIDE SERPL-SCNC: 105 MMOL/L (ref 95–110)
CHLORIDE SERPL-SCNC: 107 MMOL/L (ref 95–110)
CO2 SERPL-SCNC: 15 MMOL/L (ref 23–29)
CO2 SERPL-SCNC: 15 MMOL/L (ref 23–29)
CREAT SERPL-MCNC: 1.5 MG/DL (ref 0.5–1.4)
CREAT SERPL-MCNC: 1.8 MG/DL (ref 0.5–1.4)
CV ECHO LV RWT: 0.88 CM
DIFFERENTIAL METHOD BLD: ABNORMAL
DOP CALC AO PEAK VEL: 1.26 M/S
DOP CALC AO VTI: 23.37 CM
DOP CALC LVOT AREA: 3.7 CM2
DOP CALC LVOT DIAMETER: 2.18 CM
DOP CALC LVOT PEAK VEL: 1.16 M/S
DOP CALC LVOT STROKE VOLUME: 96.18 CM3
DOP CALCLVOT PEAK VEL VTI: 25.78 CM
E WAVE DECELERATION TIME: 315.13 MSEC
E/A RATIO: 0.72
E/E' RATIO: 7.38 M/S
ECHO LV POSTERIOR WALL: 1.19 CM (ref 0.6–1.1)
EOSINOPHIL # BLD AUTO: 0.2 K/UL (ref 0–0.5)
EOSINOPHIL NFR BLD: 1.7 % (ref 0–8)
ERYTHROCYTE [DISTWIDTH] IN BLOOD BY AUTOMATED COUNT: 14.4 % (ref 11.5–14.5)
EST. GFR  (NO RACE VARIABLE): 39.5 ML/MIN/1.73 M^2
EST. GFR  (NO RACE VARIABLE): 49.2 ML/MIN/1.73 M^2
FRACTIONAL SHORTENING: 36 % (ref 28–44)
GLUCOSE SERPL-MCNC: 148 MG/DL (ref 70–110)
GLUCOSE SERPL-MCNC: 157 MG/DL (ref 70–110)
HCT VFR BLD AUTO: 38.1 % (ref 40–54)
HGB BLD-MCNC: 12.2 G/DL (ref 14–18)
IMM GRANULOCYTES # BLD AUTO: 0.06 K/UL (ref 0–0.04)
IMM GRANULOCYTES NFR BLD AUTO: 0.7 % (ref 0–0.5)
INTERVENTRICULAR SEPTUM: 1.04 CM (ref 0.6–1.1)
LA MAJOR: 5.27 CM
LA WIDTH: 3.62 CM
LEFT ATRIUM SIZE: 1.9 CM
LEFT ATRIUM VOLUME INDEX MOD: 16.1 ML/M2
LEFT ATRIUM VOLUME MOD: 42.39 CM3
LEFT INTERNAL DIMENSION IN SYSTOLE: 1.71 CM (ref 2.1–4)
LEFT VENTRICLE DIASTOLIC VOLUME INDEX: 10.19 ML/M2
LEFT VENTRICLE DIASTOLIC VOLUME: 26.81 ML
LEFT VENTRICLE MASS INDEX: 32 G/M2
LEFT VENTRICLE SYSTOLIC VOLUME INDEX: 3.2 ML/M2
LEFT VENTRICLE SYSTOLIC VOLUME: 8.54 ML
LEFT VENTRICULAR INTERNAL DIMENSION IN DIASTOLE: 2.69 CM (ref 3.5–6)
LEFT VENTRICULAR MASS: 83.49 G
LV LATERAL E/E' RATIO: 7.38 M/S
LV SEPTAL E/E' RATIO: 7.38 M/S
LYMPHOCYTES # BLD AUTO: 1.5 K/UL (ref 1–4.8)
LYMPHOCYTES NFR BLD: 17.1 % (ref 18–48)
MAGNESIUM SERPL-MCNC: 2 MG/DL (ref 1.6–2.6)
MCH RBC QN AUTO: 29.9 PG (ref 27–31)
MCHC RBC AUTO-ENTMCNC: 32 G/DL (ref 32–36)
MCV RBC AUTO: 93 FL (ref 82–98)
MONOCYTES # BLD AUTO: 0.6 K/UL (ref 0.3–1)
MONOCYTES NFR BLD: 6.4 % (ref 4–15)
MV PEAK A VEL: 0.82 M/S
MV PEAK E VEL: 0.59 M/S
MV STENOSIS PRESSURE HALF TIME: 91.39 MS
MV VALVE AREA P 1/2 METHOD: 2.41 CM2
NEUTROPHILS # BLD AUTO: 6.6 K/UL (ref 1.8–7.7)
NEUTROPHILS NFR BLD: 73.5 % (ref 38–73)
NRBC BLD-RTO: 0 /100 WBC
PHOSPHATE SERPL-MCNC: 4.7 MG/DL (ref 2.7–4.5)
PHOSPHATE SERPL-MCNC: 5.7 MG/DL (ref 2.7–4.5)
PLATELET # BLD AUTO: 277 K/UL (ref 150–450)
PMV BLD AUTO: 10.2 FL (ref 9.2–12.9)
POCT GLUCOSE: 155 MG/DL (ref 70–110)
POCT GLUCOSE: 161 MG/DL (ref 70–110)
POCT GLUCOSE: 178 MG/DL (ref 70–110)
POCT GLUCOSE: 194 MG/DL (ref 70–110)
POTASSIUM SERPL-SCNC: 5.1 MMOL/L (ref 3.5–5.1)
POTASSIUM SERPL-SCNC: 5.1 MMOL/L (ref 3.5–5.1)
RA PRESSURE ESTIMATED: 8 MMHG
RBC # BLD AUTO: 4.08 M/UL (ref 4.6–6.2)
RV TISSUE DOPPLER FREE WALL SYSTOLIC VELOCITY 1 (APICAL 4 CHAMBER VIEW): 15.31 CM/S
SINUS: 3.45 CM
SODIUM SERPL-SCNC: 129 MMOL/L (ref 136–145)
SODIUM SERPL-SCNC: 131 MMOL/L (ref 136–145)
TDI LATERAL: 0.08 M/S
TDI SEPTAL: 0.08 M/S
TDI: 0.08 M/S
TRICUSPID ANNULAR PLANE SYSTOLIC EXCURSION: 2.58 CM
VANCOMYCIN SERPL-MCNC: 18.8 UG/ML
VANCOMYCIN TROUGH SERPL-MCNC: 24.7 UG/ML (ref 10–22)
WBC # BLD AUTO: 9 K/UL (ref 3.9–12.7)
Z-SCORE OF LEFT VENTRICULAR DIMENSION IN END DIASTOLE: -20.19
Z-SCORE OF LEFT VENTRICULAR DIMENSION IN END SYSTOLE: -15.31

## 2024-07-03 PROCEDURE — 83735 ASSAY OF MAGNESIUM: CPT | Performed by: STUDENT IN AN ORGANIZED HEALTH CARE EDUCATION/TRAINING PROGRAM

## 2024-07-03 PROCEDURE — 11000001 HC ACUTE MED/SURG PRIVATE ROOM

## 2024-07-03 PROCEDURE — 21400001 HC TELEMETRY ROOM

## 2024-07-03 PROCEDURE — 25000003 PHARM REV CODE 250: Performed by: STUDENT IN AN ORGANIZED HEALTH CARE EDUCATION/TRAINING PROGRAM

## 2024-07-03 PROCEDURE — 36415 COLL VENOUS BLD VENIPUNCTURE: CPT | Performed by: STUDENT IN AN ORGANIZED HEALTH CARE EDUCATION/TRAINING PROGRAM

## 2024-07-03 PROCEDURE — 80069 RENAL FUNCTION PANEL: CPT | Performed by: STUDENT IN AN ORGANIZED HEALTH CARE EDUCATION/TRAINING PROGRAM

## 2024-07-03 PROCEDURE — 63600175 PHARM REV CODE 636 W HCPCS: Performed by: STUDENT IN AN ORGANIZED HEALTH CARE EDUCATION/TRAINING PROGRAM

## 2024-07-03 PROCEDURE — 25000003 PHARM REV CODE 250

## 2024-07-03 PROCEDURE — 85025 COMPLETE CBC W/AUTO DIFF WBC: CPT | Performed by: STUDENT IN AN ORGANIZED HEALTH CARE EDUCATION/TRAINING PROGRAM

## 2024-07-03 PROCEDURE — 25000003 PHARM REV CODE 250: Performed by: PHYSICIAN ASSISTANT

## 2024-07-03 PROCEDURE — 25500020 PHARM REV CODE 255: Performed by: INTERNAL MEDICINE

## 2024-07-03 PROCEDURE — 80202 ASSAY OF VANCOMYCIN: CPT | Performed by: STUDENT IN AN ORGANIZED HEALTH CARE EDUCATION/TRAINING PROGRAM

## 2024-07-03 RX ORDER — FUROSEMIDE 10 MG/ML
40 INJECTION INTRAMUSCULAR; INTRAVENOUS EVERY 12 HOURS
Status: DISCONTINUED | OUTPATIENT
Start: 2024-07-03 | End: 2024-07-05 | Stop reason: HOSPADM

## 2024-07-03 RX ORDER — SODIUM CHLORIDE, SODIUM LACTATE, POTASSIUM CHLORIDE, CALCIUM CHLORIDE 600; 310; 30; 20 MG/100ML; MG/100ML; MG/100ML; MG/100ML
INJECTION, SOLUTION INTRAVENOUS CONTINUOUS
Status: DISCONTINUED | OUTPATIENT
Start: 2024-07-03 | End: 2024-07-03

## 2024-07-03 RX ORDER — TALC
6 POWDER (GRAM) TOPICAL NIGHTLY PRN
Status: DISCONTINUED | OUTPATIENT
Start: 2024-07-03 | End: 2024-07-05 | Stop reason: HOSPADM

## 2024-07-03 RX ADMIN — CEFEPIME 1 G: 1 INJECTION, POWDER, FOR SOLUTION INTRAMUSCULAR; INTRAVENOUS at 09:07

## 2024-07-03 RX ADMIN — HUMAN ALBUMIN MICROSPHERES AND PERFLUTREN 0.66 MG: 10; .22 INJECTION, SOLUTION INTRAVENOUS at 03:07

## 2024-07-03 RX ADMIN — SODIUM CHLORIDE, POTASSIUM CHLORIDE, SODIUM LACTATE AND CALCIUM CHLORIDE: 600; 310; 30; 20 INJECTION, SOLUTION INTRAVENOUS at 09:07

## 2024-07-03 RX ADMIN — FUROSEMIDE 40 MG: 10 INJECTION, SOLUTION INTRAVENOUS at 09:07

## 2024-07-03 RX ADMIN — HYDROCODONE BITARTRATE AND ACETAMINOPHEN 1 TABLET: 10; 325 TABLET ORAL at 05:07

## 2024-07-03 RX ADMIN — HYDROCODONE BITARTRATE AND ACETAMINOPHEN 1 TABLET: 10; 325 TABLET ORAL at 11:07

## 2024-07-03 RX ADMIN — SODIUM BICARBONATE 650 MG: 650 TABLET ORAL at 09:07

## 2024-07-03 RX ADMIN — SODIUM BICARBONATE 650 MG: 650 TABLET ORAL at 03:07

## 2024-07-03 RX ADMIN — Medication 6 MG: at 09:07

## 2024-07-03 RX ADMIN — VANCOMYCIN HYDROCHLORIDE 500 MG: 500 INJECTION, POWDER, LYOPHILIZED, FOR SOLUTION INTRAVENOUS at 02:07

## 2024-07-03 RX ADMIN — HEPARIN SODIUM 7500 UNITS: 5000 INJECTION INTRAVENOUS; SUBCUTANEOUS at 09:07

## 2024-07-03 RX ADMIN — HEPARIN SODIUM 7500 UNITS: 5000 INJECTION INTRAVENOUS; SUBCUTANEOUS at 02:07

## 2024-07-03 RX ADMIN — POLYETHYLENE GLYCOL 3350 17 G: 17 POWDER, FOR SOLUTION ORAL at 09:07

## 2024-07-03 RX ADMIN — AMLODIPINE BESYLATE 10 MG: 10 TABLET ORAL at 09:07

## 2024-07-03 RX ADMIN — BACLOFEN 10 MG: 10 TABLET ORAL at 09:07

## 2024-07-03 RX ADMIN — CEFEPIME 1 G: 1 INJECTION, POWDER, FOR SOLUTION INTRAMUSCULAR; INTRAVENOUS at 05:07

## 2024-07-03 RX ADMIN — INSULIN ASPART 5 UNITS: 100 INJECTION, SOLUTION INTRAVENOUS; SUBCUTANEOUS at 11:07

## 2024-07-03 RX ADMIN — TAMSULOSIN HYDROCHLORIDE 0.4 MG: 0.4 CAPSULE ORAL at 09:07

## 2024-07-03 RX ADMIN — INSULIN ASPART 5 UNITS: 100 INJECTION, SOLUTION INTRAVENOUS; SUBCUTANEOUS at 08:07

## 2024-07-03 RX ADMIN — HYDROCODONE BITARTRATE AND ACETAMINOPHEN 1 TABLET: 10; 325 TABLET ORAL at 09:07

## 2024-07-03 RX ADMIN — CEFEPIME 1 G: 1 INJECTION, POWDER, FOR SOLUTION INTRAMUSCULAR; INTRAVENOUS at 01:07

## 2024-07-03 RX ADMIN — HEPARIN SODIUM 7500 UNITS: 5000 INJECTION INTRAVENOUS; SUBCUTANEOUS at 05:07

## 2024-07-03 RX ADMIN — INSULIN GLARGINE 20 UNITS: 100 INJECTION, SOLUTION SUBCUTANEOUS at 09:07

## 2024-07-03 RX ADMIN — ASPIRIN 81 MG: 81 TABLET, COATED ORAL at 09:07

## 2024-07-03 RX ADMIN — SODIUM ZIRCONIUM CYCLOSILICATE 10 G: 10 POWDER, FOR SUSPENSION ORAL at 09:07

## 2024-07-03 RX ADMIN — ATORVASTATIN CALCIUM 20 MG: 20 TABLET, FILM COATED ORAL at 09:07

## 2024-07-03 NOTE — NURSING
Nurses Note -- 4 Eyes      7/3/2024   1:15 AM      Skin assessed during: Daily Assessment      [] No Altered Skin Integrity Present    []Prevention Measures Documented      [x] Yes- Altered Skin Integrity Present or Discovered   [x] LDA Added if Not in Epic (Describe Wound)   [x] New Altered Skin Integrity was Present on Admit and Documented in LDA   [] Wound Image Taken    Wound Care Consulted? Yes    Attending Nurse:  Rudy Montano RN/Staff Member:  Gabriel Grant

## 2024-07-03 NOTE — ASSESSMENT & PLAN NOTE
74 year old male with insulin dependent Type 2 DM (A1c 8.8), HTN, HLD, anal fistula s/p fistulotomy, chronic pain (followed by pain medicine), chronic bilateral LE wounds with recent ED visit on 6/21 with concerns for cellulitis treated with Bactrim and discharged home now representing to [facility] from [facility] with worsening pain of bilateral lower extremities, subjective fevers and chills. Increased warmth and tenderness of RLE. Given Vanc, Zosyn.     -Continue broad spectrum abx. If blood cx negative after 48 hours, would transition to PO.  -Follow up blood cx.   -Wound care consult.

## 2024-07-03 NOTE — NURSING
Patient wanted to use bedside commode, patient was too weak to get out of the recliner. Patient did make it to edge of recliner , 4 staff members attempted to assit / to no avail. Patient remains in recliner.

## 2024-07-03 NOTE — PROGRESS NOTES
Pharmacokinetic Assessment Follow Up: IV Vancomycin    Vancomycin serum concentration assessment(s):    The trough level was drawn correctly and can be used to guide therapy at this time. The measurement is above the desired definitive target range of 10 to 20 mcg/mL.    Vancomycin Regimen Plan:    Patients BUN is increasing and scr is up to 1.8 from a baseline of ~1.1-1.3 with a history of CKD3. Will switch to dosing by levels.     Discontinue the scheduled vancomycin regimen and re-dose when the random level is less than 20 mcg/mL, next level to be drawn at 1200 on 7/3 (~24 hours post dose).    Drug levels (last 3 results):  Recent Labs   Lab Result Units 07/02/24  2314   Vancomycin-Trough ug/mL 24.7*       Pharmacy will continue to follow and monitor vancomycin.    Please contact pharmacy at extension [phone] for questions regarding this assessment.    Thank you for the consult,   Sondra Hogue       Patient brief summary:  Riaz Gurera Jr. is a 74 y.o. male initiated on antimicrobial therapy with IV Vancomycin for treatment of skin & soft tissue infection    The patient's current regimen is pulse dosing    Drug Allergies:   Review of patient's allergies indicates:   Allergen Reactions    Lisinopril Other (See Comments)     cough       Actual Body Weight:   152.2 kg (would recommend dosing based on adjusted body weight in the setting of BMI >40).    Renal Function:   Estimated Creatinine Clearance: 55.7 mL/min (A) (based on SCr of 1.8 mg/dL (H)).,     Dialysis Method (if applicable):  N/A    CBC (last 72 hours):  Recent Labs   Lab Result Units 07/01/24  1030 07/02/24  0637   WBC K/uL 8.26 8.01   Hemoglobin g/dL 12.6* 11.2*   Hemoglobin A1C % 8.8*  --    Hematocrit % 38.2* 35.0*   Platelets K/uL 280 260   Gran % % 73.3* 70.1   Lymph % % 17.6* 17.4*   Mono % % 6.5 9.7   Eosinophil % % 1.3 1.7   Basophil % % 0.7 0.6   Differential Method  Automated Automated       Metabolic Panel (last 72 hours):  Recent  Labs   Lab Result Units 07/01/24  1020 07/01/24  1030 07/01/24  1538 07/01/24  2335 07/02/24  0637 07/02/24  1059 07/02/24  1327 07/02/24  1548 07/02/24  1946 07/02/24  2314   Sodium mmol/L  --  133* 128* 130*  --  131*  130*  --  132* 129* 129*   Sodium, Urine mmol/L  --   --   --   --   --   --  43  --   --   --    Potassium mmol/L  --  5.7* 5.0 5.9*  --  4.7  4.7  --  4.9 4.9 5.1   Potassium, Urine mmol/L  --   --   --   --   --   --  35  --   --   --    Chloride mmol/L  --  111* 105 107  --  105  105  --  107 104 105   Chloride, Urine mmol/L  --   --   --   --   --   --  65  --   --   --    CO2 mmol/L  --  15* 15* 15*  --  17*  17*  --  14* 16* 15*   Glucose mg/dL  --  145* 303* 143*  --  191*  191*  --  180* 166* 157*   Glucose, UA  1+*  --   --   --   --   --   --   --   --   --    BUN mg/dL  --  32* 27* 32*  --  37*  35*  --  38* 43* 45*   Creatinine mg/dL  --  1.3 1.2 1.4  --  1.6*  1.6*  --  1.6* 1.9* 1.8*   Albumin g/dL  --  3.5  --  3.1*  --  3.0*  3.0*  --  3.1* 3.0* 3.0*   Total Bilirubin mg/dL  --  0.5  --   --   --   --   --   --   --   --    Alkaline Phosphatase U/L  --  98  --   --   --   --   --   --   --   --    AST U/L  --  23  --   --   --   --   --   --   --   --    ALT U/L  --  24  --   --   --   --   --   --   --   --    Magnesium mg/dL  --   --   --   --  1.8  --   --   --   --   --    Phosphorus mg/dL  --   --   --  4.1  --  4.8*  4.8*  --  5.3* 5.6* 5.7*       Vancomycin Administrations:  vancomycin given in the last 96 hours                     vancomycin 1,500 mg in D5W 250 mL IVPB (Vial-Mate) (mg) 1,500 mg New Bag 07/02/24 1310     1,500 mg New Bag  0037    vancomycin (VANCOCIN) 2,250 mg in D5W 500 mL IVPB (mg) 2,250 mg New Bag 07/01/24 1309                    Microbiologic Results:  Microbiology Results (last 7 days)       Procedure Component Value Units Date/Time    Blood culture x two cultures. Draw prior to antibiotics. [[id number]] Collected: 07/01/24 1029    Order Status:  Completed Specimen: Blood from Peripheral, Hand, Right Updated: 07/02/24 1212     Blood Culture, Routine No Growth to date      No Growth to date    Narrative:      Aerobic and anaerobic    Blood culture x two cultures. Draw prior to antibiotics. [[id number]] Collected: 07/01/24 1030    Order Status: Completed Specimen: Blood from Peripheral, Hand, Left Updated: 07/02/24 1212     Blood Culture, Routine No Growth to date      No Growth to date    Narrative:      Aerobic and anaerobic

## 2024-07-03 NOTE — PROGRESS NOTES
Pharmacokinetic Assessment Follow Up: IV Vancomycin    Vancomycin serum concentration assessment(s):    The random level was drawn correctly and can be used to guide therapy at this time. The measurement is above the desired definitive target range of 10 to 15 mcg/mL.    Vancomycin Regimen Plan:    Re-dose when the random level is less than 20 mcg/mL, next level to be drawn at with am labs on 07/04/2024    KEATON; watch out for accumulation due to KEATON and weight     Drug levels (last 3 results):  Recent Labs   Lab Result Units 07/02/24  2314 07/03/24  1153   Vancomycin, Random ug/mL  --  18.8   Vancomycin-Trough ug/mL 24.7*  --        Pharmacy will continue to follow and monitor vancomycin.    Please contact pharmacy at extension [phone] for questions regarding this assessment.    Thank you for the consult,   CINDI BROOKS       Patient brief summary:  Riaz Guerra Jr. is a 74 y.o. male initiated on antimicrobial therapy with IV Vancomycin for treatment of skin & soft tissue infection    The patient's current regimen is pulse dosing due to KEATON but there has been some resolution    Drug Allergies:   Review of patient's allergies indicates:   Allergen Reactions    Lisinopril Other (See Comments)     cough       Actual Body Weight:   152 kg  BMI: 46.80    Renal Function:   Estimated Creatinine Clearance: 66.8 mL/min (A) (based on SCr of 1.5 mg/dL (H)).,     Dialysis Method (if applicable):  N/A    CBC (last 72 hours):  Recent Labs   Lab Result Units 07/01/24  1030 07/02/24  0637 07/03/24  0842   WBC K/uL 8.26 8.01 9.00   Hemoglobin g/dL 12.6* 11.2* 12.2*   Hemoglobin A1C % 8.8*  --   --    Hematocrit % 38.2* 35.0* 38.1*   Platelets K/uL 280 260 277   Gran % % 73.3* 70.1 73.5*   Lymph % % 17.6* 17.4* 17.1*   Mono % % 6.5 9.7 6.4   Eosinophil % % 1.3 1.7 1.7   Basophil % % 0.7 0.6 0.6   Differential Method  Automated Automated Automated       Metabolic Panel (last 72 hours):  Recent Labs   Lab Result Units  07/01/24  1020 07/01/24  1030 07/01/24  1538 07/01/24  2335 07/02/24  0637 07/02/24  1059 07/02/24  1327 07/02/24  1548 07/02/24  1946 07/02/24  2314 07/03/24  0842   Sodium mmol/L  --  133* 128* 130*  --  131*  130*  --  132* 129* 129* 131*   Sodium, Urine mmol/L  --   --   --   --   --   --  43  --   --   --   --    Potassium mmol/L  --  5.7* 5.0 5.9*  --  4.7  4.7  --  4.9 4.9 5.1 5.1   Potassium, Urine mmol/L  --   --   --   --   --   --  35  --   --   --   --    Chloride mmol/L  --  111* 105 107  --  105  105  --  107 104 105 107   Chloride, Urine mmol/L  --   --   --   --   --   --  65  --   --   --   --    CO2 mmol/L  --  15* 15* 15*  --  17*  17*  --  14* 16* 15* 15*   Glucose mg/dL  --  145* 303* 143*  --  191*  191*  --  180* 166* 157* 148*   Glucose, UA  1+*  --   --   --   --   --   --   --   --   --   --    BUN mg/dL  --  32* 27* 32*  --  37*  35*  --  38* 43* 45* 40*   Creatinine mg/dL  --  1.3 1.2 1.4  --  1.6*  1.6*  --  1.6* 1.9* 1.8* 1.5*   Albumin g/dL  --  3.5  --  3.1*  --  3.0*  3.0*  --  3.1* 3.0* 3.0* 3.1*   Total Bilirubin mg/dL  --  0.5  --   --   --   --   --   --   --   --   --    Alkaline Phosphatase U/L  --  98  --   --   --   --   --   --   --   --   --    AST U/L  --  23  --   --   --   --   --   --   --   --   --    ALT U/L  --  24  --   --   --   --   --   --   --   --   --    Magnesium mg/dL  --   --   --   --  1.8  --   --   --   --   --  2.0   Phosphorus mg/dL  --   --   --  4.1  --  4.8*  4.8*  --  5.3* 5.6* 5.7* 4.7*       Vancomycin Administrations:  vancomycin given in the last 96 hours                     vancomycin 1,500 mg in D5W 250 mL IVPB (Vial-Mate) (mg) 1,500 mg New Bag 07/02/24 1310     1,500 mg New Bag  0037    vancomycin (VANCOCIN) 2,250 mg in D5W 500 mL IVPB (mg) 2,250 mg New Bag 07/01/24 1309                    Microbiologic Results:  Microbiology Results (last 7 days)       Procedure Component Value Units Date/Time    Blood culture x two cultures. Draw  prior to antibiotics. [[id number]] Collected: 07/01/24 1029    Order Status: Completed Specimen: Blood from Peripheral, Hand, Right Updated: 07/03/24 1212     Blood Culture, Routine No Growth to date      No Growth to date      No Growth to date    Narrative:      Aerobic and anaerobic    Blood culture x two cultures. Draw prior to antibiotics. [[id number]] Collected: 07/01/24 1030    Order Status: Completed Specimen: Blood from Peripheral, Hand, Left Updated: 07/03/24 1212     Blood Culture, Routine No Growth to date      No Growth to date      No Growth to date    Narrative:      Aerobic and anaerobic

## 2024-07-03 NOTE — ASSESSMENT & PLAN NOTE
Patient's anemia is currently controlled. Has not received any PRBCs to date.   Current CBC reviewed-   Lab Results   Component Value Date    HGB 12.2 (L) 07/03/2024    HCT 38.1 (L) 07/03/2024     -Iron studies do not reflect KENNEDY.  -Monitor serial CBC and transfuse if patient becomes hemodynamically unstable, symptomatic or H/H drops below 7/21.

## 2024-07-03 NOTE — ASSESSMENT & PLAN NOTE
Cc:  1. Elevated prolactin: resolved  2. Weight gain: sub optimal   3. ADD  4. Autism and anxiety       HOPC:   1. Patient is 23year old followed for evaluation of poor weight gain. Since last visit mom reported no illness. She is eating better and her portion are better and getting boost everyday, has 3 meals and 1 snack per day   2. She has ADD and is on ritalin and is on Abilify for autism and anxiety   3. Other concerns:had elevated proalctin related to medication and is resolved.    ROS: no bone pain, muscle cramps, no headache or visual problems, no abdominal pain, normal bowel movements, Good energy, no weakness    Visit Vitals    /79 (BP 1 Location: Left arm, BP Patient Position: Sitting)    Pulse 75    Temp 97.6 °F (36.4 °C) (Axillary)    Resp 18    Ht 4' 11.17\" (1.503 m)    Wt 105 lb 9.6 oz (47.9 kg)    LMP 10/04/2017 (Approximate)    SpO2 99%    BMI 21.2 kg/m2     Neck is supple, no lymphadenopathy, no thyromegaly, no dark circles around the neck   S1 s2 heard normal rhythm   Abdomen is soft, no striae,   Labs from last visit reviewed:   Lab Results   Component Value Date/Time    TSH 1.210 03/14/2016 10:39 AM         A/P:   1. Elevated prolactin: resolved  2. Weight gain: improved   3. ADD  4. Autism and anxiety     Growth chart reviewed. Labs reviewed, Follow up with PCP. Creatine stable for now. Baseline 1.3 to 1.4. BMP reviewed- noted Estimated Creatinine Clearance: 66.9 mL/min (A) (based on SCr of 1.5 mg/dL (H)). according to latest data. Based on current GFR, CKD stage is stage 3 - GFR 30-59.  Monitor UOP and serial BMP and adjust therapy as needed. Renally dose meds. Avoid nephrotoxic medications and procedures.

## 2024-07-03 NOTE — NURSING
patient has been in recliner since aprox 2 hours after arriving to this floor . he was able to do that on his own in about a 20 min time period. I highly encouraged him to not get into the recliner, he insisted that's how he sleeps at home. this am patient felt he might need to have a bowel movement. patient stated he was too weak to ambulate to bathroom. bedside commode provided. 4 staff members attempted to assit him out of the recliner, he is dead weight, not assisting at all, he states his right side is too weak to get up. so patient remains in recliner. Charge will contact pt to see if they can assit in getting the patient back to bed , if he will allow us to do so as he insist in staying in recliner. Physician notified .

## 2024-07-03 NOTE — NURSING
Patient educated several times during this shift about changing positions in the recliner to prevent skin break down, he verbalized understanding. Patient still continues to remain in recliner at bedside.

## 2024-07-03 NOTE — PROGRESS NOTES
[facility]ation [address] [place] [address] Medicine  Progress Note    Patient Name: Riaz Guerra Jr.  MRN: [id number]  Patient Class: IP- Inpatient   Admission Date: 7/1/2024  Length of Stay: 1 days  Attending Physician: Ellis Church,*  Primary Care Provider: Berhane Reyes (Inactive)        Subjective:     Principal Problem:Cellulitis        HPI:  Riaz Guerra is a 74 year old male with insulin dependent Type 2 DM (A1c 8.8), HTN, HLD, anal fistula s/p fistulotomy, chronic pain (followed by pain medicine), chronic bilateral LE wounds with recent ED visit on 6/21 with concerns for cellulitis treated with Bactrim and discharged home now [facility] with worsening pain of bilateral lower extremities. Majority of history from chart review as he repeatedly told me to refer to the chart when asking questions. He thinks he has had fevers and chills, but is unable to elaborate more. On arrival to the ED, he was hypertensive 224/128, tachycardic 110 BPM and tachypneic 22, otherwise on room air. Labs notable for NAGMA with serum bicarb 15, hyperkalemia 5.7, Cr 1.3 (baseline), hyponatremia 133, serum glucose 300s, hgb 12.6 (baseline). He was given calcium, albuterol, lasix 40 mgx1, insulin, lisinopril, Vanc and Zosyn. Repeat BMP showed K 5.0, Na 128 (corrected 131). CXR, US LE and Xray tibia/fibula unremarkable. He was admitted to Hospital Medicine for further workup.     Overview/Hospital Course:  74 year old male admitted to Hospital Medicine for suspected LE cellulitis, uncontrolled hypertension and hyperkalemia. He was started on broad spectrum abx. Blood cx with NGTD. Hypertension improved with resumption of home medications but ARB currently on hold given hyperkalemia. Hyperkalemia improving. Wound care consulted. If blood cx negative x48 hours, would switch to PO abx for cellulitis.     Interval History: Patient is very weak and sitting in th recliner No fever/ swelling of BLE with  wounds    Review of Systems   Constitutional: Negative.    Respiratory: Negative.     Cardiovascular:  Positive for leg swelling.   Skin:  Positive for wound.   Neurological:  Positive for weakness.     Objective:     Vital Signs (Most Recent):  Temp: 97.6 °F (36.4 °C) (07/03/24 1116)  Pulse: 84 (07/03/24 1510)  Resp: 20 (07/03/24 1116)  BP: 130/82 (07/03/24 1510)  SpO2: 97 % (07/03/24 1116) Vital Signs (24h Range):  Temp:  [97.3 °F (36.3 °C)-97.8 °F (36.6 °C)] 97.6 °F (36.4 °C)  Pulse:  [71-94] 84  Resp:  [18-20] 20  SpO2:  [93 %-97 %] 97 %  BP: (100-130)/(55-94) 130/82     Weight: (!) 152.5 kg (336 lb 3.2 oz)  Body mass index is 46.89 kg/m².  No intake or output data in the 24 hours ending 07/03/24 1627      Physical Exam  Constitutional:       General: He is not in acute distress.     Appearance: He is obese.   Cardiovascular:      Rate and Rhythm: Normal rate.      Pulses: Normal pulses.      Heart sounds: No murmur heard.  Pulmonary:      Effort: No respiratory distress.      Breath sounds: Normal breath sounds. No wheezing.   Abdominal:      General: Bowel sounds are normal. There is no distension.      Palpations: Abdomen is soft.      Tenderness: There is no abdominal tenderness.   Musculoskeletal:         General: Swelling (BLE) and tenderness present.      Right lower leg: Edema present.      Left lower leg: Edema present.   Skin:     Findings: Lesion (BLE wounds noted) present.   Neurological:      Mental Status: He is alert. Mental status is at baseline.   Psychiatric:         Mood and Affect: Mood normal.             Significant Labs: All pertinent labs within the past 24 hours have been reviewed.    Significant Imaging: I have reviewed all pertinent imaging results/findings within the past 24 hours.      Assessment/Plan:      * Cellulitis  74 year old male with insulin dependent Type 2 DM (A1c 8.8), HTN, HLD, anal fistula s/p fistulotomy, chronic pain (followed by pain medicine), chronic bilateral LE  wounds with recent ED visit on 6/21 with concerns for cellulitis treated with Bactrim and discharged home now representing to [facility] from [facility] with worsening pain of bilateral lower extremities, subjective fevers and chills. Increased warmth and tenderness of RLE. Given Vanc, Zosyn.     -Continue broad spectrum abx. If blood cx negative after 48 hours, would transition to PO.  -Follow up blood cx.   -Wound care consult.     Metabolic acidosis, normal anion gap (NAG)  Serum bicarb 15 in the setting of normal AG with hyperkalemia. Could be secondary to recent bactrim use (possible RTA) vs use of metformin and associated diarrhea vs CKD progression.    -Monitor with RFP.   -Continue sodium bicarb.       Anemia  Patient's anemia is currently controlled. Has not received any PRBCs to date.   Current CBC reviewed-   Lab Results   Component Value Date    HGB 12.2 (L) 07/03/2024    HCT 38.1 (L) 07/03/2024     -Iron studies do not reflect KENNEDY.  -Monitor serial CBC and transfuse if patient becomes hemodynamically unstable, symptomatic or H/H drops below 7/21.    CKD (chronic kidney disease) stage 3, GFR 30-59 ml/min  Creatine stable for now. Baseline 1.3 to 1.4. BMP reviewed- noted Estimated Creatinine Clearance: 66.9 mL/min (A) (based on SCr of 1.5 mg/dL (H)). according to latest data. Based on current GFR, CKD stage is stage 3 - GFR 30-59.  Monitor UOP and serial BMP and adjust therapy as needed. Renally dose meds. Avoid nephrotoxic medications and procedures.    Chronic pain  Follows with pain medicine as an outpatient. He believes he is taking baclofen but is unsure of other medications.Unable to take gabapentin due to drowsiness.     -Continue Baclofen  -Tramadol and Norco prn for pain.      Hyperkalemia  This patient has hyperkalemia which is uncontrolled. We will monitor for arrhythmias with EKG or continuous telemetry. We will treat the hyperkalemia with Calcium gluconate, IV insulin and dextrose, Nebulized  albuterol sulfate, and Furosemide. The likely etiology of the hyperkalemia is Medication induced.  The patients latest potassium has been reviewed and the results are listed below  Recent Labs   Lab 07/03/24  0842   K 5.1       -K 5.7 to 5.0 after shifting. Lisinopril and Losartan both of med list at home, but unsure if taking both. Was also given bactrim recently which can cause type IV RTA.   -RFP q4 hours. Can space out once K is below 5 on at least two repeats.   -Continue Lokelma 10 mg for now. Can increase to BID/TID if persistently hyperkalemic.  -If he needs shifting again, caution with lasix given slight uptrend in Cr.  -Per chart review, developed cough with lisinopril, placed as an allergy.   -Strict I&O    HTN (hypertension)  Chronic, controlled. Latest blood pressure and vitals reviewed-     Temp:  [97.3 °F (36.3 °C)-97.8 °F (36.6 °C)]   Pulse:  [71-94]   Resp:  [18-20]   BP: (100-130)/(55-94)   SpO2:  [93 %-97 %] .     -Continue Amlodipine. Losartan on hold given hyperkalemia.  -Can add hydralazine if systolic remains >180.     Insulin dependent type 2 diabetes mellitus  Patient's FSGs are controlled on current medication regimen.  Last A1c reviewed-   Lab Results   Component Value Date    HGBA1C 8.8 (H) 07/01/2024     Most recent fingerstick glucose reviewed-   Recent Labs   Lab 07/02/24  2106 07/03/24  0731 07/03/24  1114   POCTGLUCOSE 177* 155* 194*       Current correctional scale  Low  Maintain anti-hyperglycemic dose as follows-   Antihyperglycemics (From admission, onward)      Start     Stop Route Frequency Ordered    07/02/24 0900  insulin glargine U-100 (Lantus) pen 20 Units         -- SubQ Daily 07/01/24 1518    07/01/24 1645  insulin aspart U-100 pen 5 Units         -- SubQ 3 times daily with meals 07/01/24 1518    07/01/24 1617  insulin aspart U-100 pen 0-5 Units         -- SubQ Before meals & nightly PRN 07/01/24 1518          -Hold Oral hypoglycemics while patient is in the  hospital.  -Uptitrate to maintain 140 to 180.       VTE Risk Mitigation (From admission, onward)           Ordered     heparin (porcine) injection 7,500 Units  Every 8 hours         07/01/24 1518     IP VTE HIGH RISK PATIENT  Once         07/01/24 1518     Place sequential compression device  Until discontinued         07/01/24 1518                    Discharge Planning   KELSEY: 7/5/2024     Code Status: Full Code   Is the patient medically ready for discharge?:     Reason for patient still in hospital (select all that apply): Patient trending condition and Treatment  Discharge Plan A: Home                  Ellis Church MD  Department of Hospital Medicine   [facility] - Observation 11H

## 2024-07-03 NOTE — NURSING
"0830: Pt refuses Telemetry monitor.     1230:Pt up in chair. States that he is unable to ambulate to his bed as his legs are heavy and "in too much pain." This RN placed a PT consult as this has been ongoing since last night. Pt encouraged to try with assistance. Pt refused. This RN spoke with PT- informed that pt will be seen in the am.     13:30: Chair locked in place. Pt placed in a reclining position with legs elevated to facilitate elevation of his MARCELO LE.    1719: Pt requesting to have chair placed back in a straight up position. Legs placed back to sitting position per pt request. Complains of pain to his legs. Pain medication to follow.    1730: Wound care performed per wound care orders to patients MARCELO LE. Tolerated well.   "

## 2024-07-03 NOTE — ASSESSMENT & PLAN NOTE
Chronic, controlled. Latest blood pressure and vitals reviewed-     Temp:  [97.3 °F (36.3 °C)-97.8 °F (36.6 °C)]   Pulse:  [71-94]   Resp:  [18-20]   BP: (100-130)/(55-94)   SpO2:  [93 %-97 %] .     -Continue Amlodipine. Losartan on hold given hyperkalemia.  -Can add hydralazine if systolic remains >180.

## 2024-07-03 NOTE — SUBJECTIVE & OBJECTIVE
Interval History: Patient is very weak and sitting in th recliner No fever/ swelling of BLE with wounds    Review of Systems   Constitutional: Negative.    Respiratory: Negative.     Cardiovascular:  Positive for leg swelling.   Skin:  Positive for wound.   Neurological:  Positive for weakness.     Objective:     Vital Signs (Most Recent):  Temp: 97.6 °F (36.4 °C) (07/03/24 1116)  Pulse: 84 (07/03/24 1510)  Resp: 20 (07/03/24 1116)  BP: 130/82 (07/03/24 1510)  SpO2: 97 % (07/03/24 1116) Vital Signs (24h Range):  Temp:  [97.3 °F (36.3 °C)-97.8 °F (36.6 °C)] 97.6 °F (36.4 °C)  Pulse:  [71-94] 84  Resp:  [18-20] 20  SpO2:  [93 %-97 %] 97 %  BP: (100-130)/(55-94) 130/82     Weight: (!) 152.5 kg (336 lb 3.2 oz)  Body mass index is 46.89 kg/m².  No intake or output data in the 24 hours ending 07/03/24 1627      Physical Exam  Constitutional:       General: He is not in acute distress.     Appearance: He is obese.   Cardiovascular:      Rate and Rhythm: Normal rate.      Pulses: Normal pulses.      Heart sounds: No murmur heard.  Pulmonary:      Effort: No respiratory distress.      Breath sounds: Normal breath sounds. No wheezing.   Abdominal:      General: Bowel sounds are normal. There is no distension.      Palpations: Abdomen is soft.      Tenderness: There is no abdominal tenderness.   Musculoskeletal:         General: Swelling (BLE) and tenderness present.      Right lower leg: Edema present.      Left lower leg: Edema present.   Skin:     Findings: Lesion (BLE wounds noted) present.   Neurological:      Mental Status: He is alert. Mental status is at baseline.   Psychiatric:         Mood and Affect: Mood normal.             Significant Labs: All pertinent labs within the past 24 hours have been reviewed.    Significant Imaging: I have reviewed all pertinent imaging results/findings within the past 24 hours.

## 2024-07-03 NOTE — ASSESSMENT & PLAN NOTE
Patient's FSGs are controlled on current medication regimen.  Last A1c reviewed-   Lab Results   Component Value Date    HGBA1C 8.8 (H) 07/01/2024     Most recent fingerstick glucose reviewed-   Recent Labs   Lab 07/02/24  2106 07/03/24  0731 07/03/24  1114   POCTGLUCOSE 177* 155* 194*       Current correctional scale  Low  Maintain anti-hyperglycemic dose as follows-   Antihyperglycemics (From admission, onward)    Start     Stop Route Frequency Ordered    07/02/24 0900  insulin glargine U-100 (Lantus) pen 20 Units         -- SubQ Daily 07/01/24 1518    07/01/24 1645  insulin aspart U-100 pen 5 Units         -- SubQ 3 times daily with meals 07/01/24 1518    07/01/24 1617  insulin aspart U-100 pen 0-5 Units         -- SubQ Before meals & nightly PRN 07/01/24 1518        -Hold Oral hypoglycemics while patient is in the hospital.  -Uptitrate to maintain 140 to 180.

## 2024-07-03 NOTE — ASSESSMENT & PLAN NOTE
This patient has hyperkalemia which is uncontrolled. We will monitor for arrhythmias with EKG or continuous telemetry. We will treat the hyperkalemia with Calcium gluconate, IV insulin and dextrose, Nebulized albuterol sulfate, and Furosemide. The likely etiology of the hyperkalemia is Medication induced.  The patients latest potassium has been reviewed and the results are listed below  Recent Labs   Lab 07/03/24  0842   K 5.1       -K 5.7 to 5.0 after shifting. Lisinopril and Losartan both of med list at home, but unsure if taking both. Was also given bactrim recently which can cause type IV RTA.   -RFP q4 hours. Can space out once K is below 5 on at least two repeats.   -Continue Lokelma 10 mg for now. Can increase to BID/TID if persistently hyperkalemic.  -If he needs shifting again, caution with lasix given slight uptrend in Cr.  -Per chart review, developed cough with lisinopril, placed as an allergy.   -Strict I&O

## 2024-07-04 LAB
ANION GAP SERPL CALC-SCNC: 9 MMOL/L (ref 8–16)
BASOPHILS # BLD AUTO: 0.05 K/UL (ref 0–0.2)
BASOPHILS NFR BLD: 0.7 % (ref 0–1.9)
BUN SERPL-MCNC: 35 MG/DL (ref 8–23)
CALCIUM SERPL-MCNC: 9.4 MG/DL (ref 8.7–10.5)
CHLORIDE SERPL-SCNC: 106 MMOL/L (ref 95–110)
CO2 SERPL-SCNC: 19 MMOL/L (ref 23–29)
CREAT SERPL-MCNC: 1.3 MG/DL (ref 0.5–1.4)
DIFFERENTIAL METHOD BLD: ABNORMAL
EOSINOPHIL # BLD AUTO: 0.1 K/UL (ref 0–0.5)
EOSINOPHIL NFR BLD: 1.5 % (ref 0–8)
ERYTHROCYTE [DISTWIDTH] IN BLOOD BY AUTOMATED COUNT: 14.1 % (ref 11.5–14.5)
EST. GFR  (NO RACE VARIABLE): 58.4 ML/MIN/1.73 M^2
GLUCOSE SERPL-MCNC: 139 MG/DL (ref 70–110)
HCT VFR BLD AUTO: 36 % (ref 40–54)
HGB BLD-MCNC: 12 G/DL (ref 14–18)
IMM GRANULOCYTES # BLD AUTO: 0.04 K/UL (ref 0–0.04)
IMM GRANULOCYTES NFR BLD AUTO: 0.5 % (ref 0–0.5)
LYMPHOCYTES # BLD AUTO: 1.3 K/UL (ref 1–4.8)
LYMPHOCYTES NFR BLD: 18 % (ref 18–48)
MAGNESIUM SERPL-MCNC: 2 MG/DL (ref 1.6–2.6)
MCH RBC QN AUTO: 30.2 PG (ref 27–31)
MCHC RBC AUTO-ENTMCNC: 33.3 G/DL (ref 32–36)
MCV RBC AUTO: 91 FL (ref 82–98)
MONOCYTES # BLD AUTO: 0.5 K/UL (ref 0.3–1)
MONOCYTES NFR BLD: 7.2 % (ref 4–15)
NEUTROPHILS # BLD AUTO: 5.3 K/UL (ref 1.8–7.7)
NEUTROPHILS NFR BLD: 72.1 % (ref 38–73)
NRBC BLD-RTO: 0 /100 WBC
PLATELET # BLD AUTO: 290 K/UL (ref 150–450)
PMV BLD AUTO: 10.4 FL (ref 9.2–12.9)
POCT GLUCOSE: 126 MG/DL (ref 70–110)
POCT GLUCOSE: 139 MG/DL (ref 70–110)
POCT GLUCOSE: 161 MG/DL (ref 70–110)
POCT GLUCOSE: 203 MG/DL (ref 70–110)
POTASSIUM SERPL-SCNC: 4.7 MMOL/L (ref 3.5–5.1)
RBC # BLD AUTO: 3.97 M/UL (ref 4.6–6.2)
SODIUM SERPL-SCNC: 134 MMOL/L (ref 136–145)
VANCOMYCIN SERPL-MCNC: 14.9 UG/ML
WBC # BLD AUTO: 7.4 K/UL (ref 3.9–12.7)

## 2024-07-04 PROCEDURE — 97165 OT EVAL LOW COMPLEX 30 MIN: CPT

## 2024-07-04 PROCEDURE — 97162 PT EVAL MOD COMPLEX 30 MIN: CPT

## 2024-07-04 PROCEDURE — 83735 ASSAY OF MAGNESIUM: CPT | Performed by: STUDENT IN AN ORGANIZED HEALTH CARE EDUCATION/TRAINING PROGRAM

## 2024-07-04 PROCEDURE — 63600175 PHARM REV CODE 636 W HCPCS: Performed by: STUDENT IN AN ORGANIZED HEALTH CARE EDUCATION/TRAINING PROGRAM

## 2024-07-04 PROCEDURE — 25000003 PHARM REV CODE 250

## 2024-07-04 PROCEDURE — 97535 SELF CARE MNGMENT TRAINING: CPT

## 2024-07-04 PROCEDURE — 21400001 HC TELEMETRY ROOM

## 2024-07-04 PROCEDURE — 80202 ASSAY OF VANCOMYCIN: CPT | Performed by: STUDENT IN AN ORGANIZED HEALTH CARE EDUCATION/TRAINING PROGRAM

## 2024-07-04 PROCEDURE — 36415 COLL VENOUS BLD VENIPUNCTURE: CPT | Performed by: STUDENT IN AN ORGANIZED HEALTH CARE EDUCATION/TRAINING PROGRAM

## 2024-07-04 PROCEDURE — 85025 COMPLETE CBC W/AUTO DIFF WBC: CPT | Performed by: STUDENT IN AN ORGANIZED HEALTH CARE EDUCATION/TRAINING PROGRAM

## 2024-07-04 PROCEDURE — 80048 BASIC METABOLIC PNL TOTAL CA: CPT | Performed by: STUDENT IN AN ORGANIZED HEALTH CARE EDUCATION/TRAINING PROGRAM

## 2024-07-04 PROCEDURE — 11000001 HC ACUTE MED/SURG PRIVATE ROOM

## 2024-07-04 PROCEDURE — 25000003 PHARM REV CODE 250: Performed by: STUDENT IN AN ORGANIZED HEALTH CARE EDUCATION/TRAINING PROGRAM

## 2024-07-04 PROCEDURE — 97530 THERAPEUTIC ACTIVITIES: CPT

## 2024-07-04 RX ADMIN — HYDROCODONE BITARTRATE AND ACETAMINOPHEN 1 TABLET: 10; 325 TABLET ORAL at 07:07

## 2024-07-04 RX ADMIN — CEFEPIME 1 G: 1 INJECTION, POWDER, FOR SOLUTION INTRAMUSCULAR; INTRAVENOUS at 02:07

## 2024-07-04 RX ADMIN — HYDROCODONE BITARTRATE AND ACETAMINOPHEN 1 TABLET: 10; 325 TABLET ORAL at 05:07

## 2024-07-04 RX ADMIN — INSULIN ASPART 5 UNITS: 100 INJECTION, SOLUTION INTRAVENOUS; SUBCUTANEOUS at 11:07

## 2024-07-04 RX ADMIN — SODIUM ZIRCONIUM CYCLOSILICATE 10 G: 10 POWDER, FOR SUSPENSION ORAL at 08:07

## 2024-07-04 RX ADMIN — VANCOMYCIN HYDROCHLORIDE 1000 MG: 1 INJECTION, POWDER, LYOPHILIZED, FOR SOLUTION INTRAVENOUS at 09:07

## 2024-07-04 RX ADMIN — FUROSEMIDE 40 MG: 10 INJECTION, SOLUTION INTRAVENOUS at 09:07

## 2024-07-04 RX ADMIN — ATORVASTATIN CALCIUM 20 MG: 20 TABLET, FILM COATED ORAL at 08:07

## 2024-07-04 RX ADMIN — INSULIN ASPART 5 UNITS: 100 INJECTION, SOLUTION INTRAVENOUS; SUBCUTANEOUS at 05:07

## 2024-07-04 RX ADMIN — HYDROCODONE BITARTRATE AND ACETAMINOPHEN 1 TABLET: 10; 325 TABLET ORAL at 01:07

## 2024-07-04 RX ADMIN — ASPIRIN 81 MG: 81 TABLET, COATED ORAL at 08:07

## 2024-07-04 RX ADMIN — CEFEPIME 1 G: 1 INJECTION, POWDER, FOR SOLUTION INTRAMUSCULAR; INTRAVENOUS at 09:07

## 2024-07-04 RX ADMIN — INSULIN ASPART 2 UNITS: 100 INJECTION, SOLUTION INTRAVENOUS; SUBCUTANEOUS at 11:07

## 2024-07-04 RX ADMIN — FUROSEMIDE 40 MG: 10 INJECTION, SOLUTION INTRAVENOUS at 08:07

## 2024-07-04 RX ADMIN — HEPARIN SODIUM 7500 UNITS: 5000 INJECTION INTRAVENOUS; SUBCUTANEOUS at 09:07

## 2024-07-04 RX ADMIN — CEFEPIME 1 G: 1 INJECTION, POWDER, FOR SOLUTION INTRAMUSCULAR; INTRAVENOUS at 05:07

## 2024-07-04 RX ADMIN — INSULIN ASPART 5 UNITS: 100 INJECTION, SOLUTION INTRAVENOUS; SUBCUTANEOUS at 08:07

## 2024-07-04 RX ADMIN — SODIUM BICARBONATE 650 MG: 650 TABLET ORAL at 08:07

## 2024-07-04 RX ADMIN — INSULIN GLARGINE 20 UNITS: 100 INJECTION, SOLUTION SUBCUTANEOUS at 08:07

## 2024-07-04 RX ADMIN — HEPARIN SODIUM 7500 UNITS: 5000 INJECTION INTRAVENOUS; SUBCUTANEOUS at 05:07

## 2024-07-04 RX ADMIN — TAMSULOSIN HYDROCHLORIDE 0.4 MG: 0.4 CAPSULE ORAL at 08:07

## 2024-07-04 RX ADMIN — TRAMADOL HYDROCHLORIDE 50 MG: 50 TABLET, COATED ORAL at 05:07

## 2024-07-04 RX ADMIN — SODIUM BICARBONATE 650 MG: 650 TABLET ORAL at 05:07

## 2024-07-04 RX ADMIN — POLYETHYLENE GLYCOL 3350 17 G: 17 POWDER, FOR SOLUTION ORAL at 08:07

## 2024-07-04 RX ADMIN — HEPARIN SODIUM 7500 UNITS: 5000 INJECTION INTRAVENOUS; SUBCUTANEOUS at 01:07

## 2024-07-04 RX ADMIN — AMLODIPINE BESYLATE 10 MG: 10 TABLET ORAL at 08:07

## 2024-07-04 RX ADMIN — SODIUM BICARBONATE 650 MG: 650 TABLET ORAL at 09:07

## 2024-07-04 NOTE — PROGRESS NOTES
[facility] [address] [place] [address] Medicine  Progress Note    Patient Name: Riaz Guerra Jr.  MRN: [id number]  Patient Class: IP- Inpatient   Admission Date: 7/1/2024  Length of Stay: 2 days  Attending Physician: Ellis Church,*  Primary Care Provider: Berhane Reyes (Inactive)        Subjective:     Principal Problem:Cellulitis        HPI:  Riaz Guerra is a 74 year old male with insulin dependent Type 2 DM (A1c 8.8), HTN, HLD, anal fistula s/p fistulotomy, chronic pain (followed by pain medicine), chronic bilateral LE wounds with recent ED visit on 6/21 with concerns for cellulitis treated with Bactrim and discharged home now representing to [facility] from [facility] with worsening pain of bilateral lower extremities. Majority of history from chart review as he repeatedly told me to refer to the chart when asking questions. He thinks he has had fevers and chills, but is unable to elaborate more. On arrival to the ED, he was hypertensive 224/128, tachycardic 110 BPM and tachypneic 22, otherwise on room air. Labs notable for NAGMA with serum bicarb 15, hyperkalemia 5.7, Cr 1.3 (baseline), hyponatremia 133, serum glucose 300s, hgb 12.6 (baseline). He was given calcium, albuterol, lasix 40 mgx1, insulin, lisinopril, Vanc and Zosyn. Repeat BMP showed K 5.0, Na 128 (corrected 131). CXR, US LE and Xray tibia/fibula unremarkable. He was admitted to Hospital Medicine for further workup.     Overview/Hospital Course:  74 year old male admitted to Hospital Medicine for suspected LE cellulitis, uncontrolled hypertension and hyperkalemia. He was started on broad spectrum abx. Blood cx with NGTD. Hypertension improved with resumption of home medications but ARB currently on hold given hyperkalemia. Hyperkalemia improving.On lokelma  Wound care on board Pending arterial U/S Creatinine improving with diuresis Patient lives alone at home. Acute on chronic debility PT/OT consulted, rec mod intensity CM to assist  with placement    Interval History: Net negative 1200cc over 24 hrs Creat improving. Reports generalized weakness    Review of Systems   Constitutional: Negative.    Respiratory: Negative.     Cardiovascular: Negative.    Gastrointestinal: Negative.    Musculoskeletal:  Positive for arthralgias and myalgias.   Skin:  Positive for wound.   Neurological:  Positive for weakness.     Objective:     Vital Signs (Most Recent):  Temp: 98 °F (36.7 °C) (07/04/24 1148)  Pulse: 93 (07/04/24 1148)  Resp: 20 (07/04/24 1331)  BP: 112/66 (07/04/24 1148)  SpO2: 97 % (07/04/24 1148) Vital Signs (24h Range):  Temp:  [97.5 °F (36.4 °C)-98 °F (36.7 °C)] 98 °F (36.7 °C)  Pulse:  [82-94] 93  Resp:  [17-20] 20  SpO2:  [94 %-99 %] 97 %  BP: (112-155)/(66-89) 112/66     Weight: (!) 152.5 kg (336 lb 3.2 oz)  Body mass index is 46.89 kg/m².    Intake/Output Summary (Last 24 hours) at 7/4/2024 1404  Last data filed at 7/4/2024 0530  Gross per 24 hour   Intake 540 ml   Output 1740 ml   Net -1200 ml         Physical Exam  Constitutional:       General: He is not in acute distress.     Appearance: He is obese.   Cardiovascular:      Rate and Rhythm: Normal rate.      Pulses: Normal pulses.      Heart sounds: No murmur heard.  Pulmonary:      Effort: No respiratory distress.      Breath sounds: Normal breath sounds. No wheezing.   Abdominal:      General: Bowel sounds are normal. There is no distension.      Palpations: Abdomen is soft.      Tenderness: There is no abdominal tenderness.   Musculoskeletal:      Right lower leg: Edema present.      Left lower leg: Edema present.   Skin:     Findings: Lesion (BLE wounds noted) present.   Neurological:      Mental Status: He is alert and oriented to person, place, and time. Mental status is at baseline.   Psychiatric:         Mood and Affect: Mood normal.             Significant Labs: All pertinent labs within the past 24 hours have been reviewed.    Significant Imaging: I have reviewed all pertinent  imaging results/findings within the past 24 hours.      Assessment/Plan:      * Cellulitis  74 year old male with insulin dependent Type 2 DM (A1c 8.8), HTN, HLD, anal fistula s/p fistulotomy, chronic pain (followed by pain medicine), chronic bilateral LE wounds with recent ED visit on 6/21 with concerns for cellulitis treated with Bactrim and discharged home now representing [place] [facility] with worsening pain of bilateral lower extremities, subjective fevers and chills. Increased warmth and tenderness of RLE. Given Vanc, Zosyn.     -Continue broad spectrum abx. If blood cx negative , would transition to PO.  -Follow up blood cx.   -Wound care on board  Pending arterial U/S.     Metabolic acidosis, normal anion gap (NAG)  Serum bicarb 15 in the setting of normal AG with hyperkalemia. Could be secondary to recent bactrim use (possible RTA) vs use of metformin and associated diarrhea vs CKD progression.    -Monitor with RFP.   -Continue sodium bicarb.       Anemia  Patient's anemia is currently controlled. Has not received any PRBCs to date.   Current CBC reviewed-   Lab Results   Component Value Date    HGB 12.2 (L) 07/03/2024    HCT 38.1 (L) 07/03/2024     -Iron studies do not reflect KENNEDY.  -Monitor serial CBC and transfuse if patient becomes hemodynamically unstable, symptomatic or H/H drops below 7/21.    CKD (chronic kidney disease) stage 3, GFR 30-59 ml/min  Creatine stable for now. Baseline 1.3 to 1.4. BMP reviewed- noted Estimated Creatinine Clearance: 66.9 mL/min (A) (based on SCr of 1.5 mg/dL (H)). according to latest data. Based on current GFR, CKD stage is stage 3 - GFR 30-59.  Monitor UOP and serial BMP and adjust therapy as needed. Renally dose meds. Avoid nephrotoxic medications and procedures.    Chronic pain  Follows with pain medicine as an outpatient. He believes he is taking baclofen but is unsure of other medications.Unable to take gabapentin due to drowsiness.     -Continue  Baclofen  -Tramadol and Norco prn for pain.      Hyperkalemia  This patient has hyperkalemia which is uncontrolled. We will monitor for arrhythmias with EKG or continuous telemetry. We will treat the hyperkalemia with Calcium gluconate, IV insulin and dextrose, Nebulized albuterol sulfate, and Furosemide. The likely etiology of the hyperkalemia is Medication induced.  The patients latest potassium has been reviewed and the results are listed below  Recent Labs   Lab 07/03/24  0842   K 5.1       -K 5.7 to 5.0 after shifting. Lisinopril and Losartan both of med list at home, but unsure if taking both. Was also given bactrim recently which can cause type IV RTA.   -RFP q4 hours. Can space out once K is below 5 on at least two repeats.   -Continue Lokelma 10 mg for now. Can increase to BID/TID if persistently hyperkalemic.  -If he needs shifting again, caution with lasix given slight uptrend in Cr.  -Per chart review, developed cough with lisinopril, placed as an allergy.   -Strict I&O    HTN (hypertension)  Chronic, controlled. Latest blood pressure and vitals reviewed-     Temp:  [97.3 °F (36.3 °C)-97.8 °F (36.6 °C)]   Pulse:  [71-94]   Resp:  [18-20]   BP: (100-130)/(55-94)   SpO2:  [93 %-97 %] .     -Continue Amlodipine. Losartan on hold given hyperkalemia.  -Can add hydralazine if systolic remains >180.     Insulin dependent type 2 diabetes mellitus  Patient's FSGs are controlled on current medication regimen.  Last A1c reviewed-   Lab Results   Component Value Date    HGBA1C 8.8 (H) 07/01/2024     Most recent fingerstick glucose reviewed-   Recent Labs   Lab 07/02/24  2106 07/03/24  0731 07/03/24  1114   POCTGLUCOSE 177* 155* 194*       Current correctional scale  Low  Maintain anti-hyperglycemic dose as follows-   Antihyperglycemics (From admission, onward)      Start     Stop Route Frequency Ordered    07/02/24 0900  insulin glargine U-100 (Lantus) pen 20 Units         -- SubQ Daily 07/01/24 1518    07/01/24  1645  insulin aspart U-100 pen 5 Units         -- SubQ 3 times daily with meals 07/01/24 1518    07/01/24 1617  insulin aspart U-100 pen 0-5 Units         -- SubQ Before meals & nightly PRN 07/01/24 1518          -Hold Oral hypoglycemics while patient is in the hospital.  -Uptitrate to maintain 140 to 180.       VTE Risk Mitigation (From admission, onward)           Ordered     heparin (porcine) injection 7,500 Units  Every 8 hours         07/01/24 1518     IP VTE HIGH RISK PATIENT  Once         07/01/24 1518     Place sequential compression device  Until discontinued         07/01/24 1518                    Discharge Planning   KELSEY: 7/5/2024     Code Status: Full Code   Is the patient medically ready for discharge?:     Reason for patient still in hospital (select all that apply): Patient trending condition and Treatment  Discharge Plan A: Home                  Ellis Church MD  Department of Hospital Medicine   [facility] 11H

## 2024-07-04 NOTE — NURSING
Care provided to patient as per POC and as charted.  Pt. Intermittently yells from his room, on some instances, it is because he dropped the call light, while other instances he was either yawning or in pain.  Pt. Reports that he does not remember the last time he has walked but states it has been months.  Discussed in depth with patient the importance of getting his legs out of a dependent position.  Pt. Reports he sleeps sitting up at home and wanted to know how we were going to get him in his bed.  Pt. Was offered assistance twice overnight and refused.  Pt. Is aware that PT should be coming to see him today. Pt. Also reports that he is not able to use his fingers correctly.  He has a decreased understanding of time and it is unclear if he is confused or if he is not able to read the clock in his room.  Pt. Has been advised of the time of his next dose of pain medication and will begin calling for it about every 15 minutes one hour prior to when it is due. Upon arriving to the room, the patient once asked what time it was and stated he thought it was later than time stated. Dressings to BLE remain in tact, No visible urine on the dressings during last round.

## 2024-07-04 NOTE — PLAN OF CARE
Problem: Occupational Therapy  Goal: Occupational Therapy Goal  Description: Goals to be met by: 8/3/24     Patient will increase functional independence with ADLs by performing:    UE Dressing with Minimal Assistance.  LE Dressing with Moderate Assistance.  Grooming while standing with Stand-by Assistance.  Toileting from toilet with Moderate Assistance for hygiene and clothing management.   All functional transfers performed with Mod A      The mobility limitation cannot be sufficiently resolved by the use of a cane.   Patient's functional mobility deficit can be sufficiently resolved with the use of a walker.  Patient's mobility limitation significantly impairs their ability to participate in one of more activities of daily living.  The use of a walker will significantly improve the patient's ability to participate in MRADLS and the patient will use it on regular basis in the home.      Patient requires a hospital bed for positioning of the body in ways that are not feasible with an ordinary bed. The patient requires special positioning for pain relief, limited mobility, and/or being unable to independently make changes in body position without the use of a hospital bed. Pillows and wedges will not be adequate for resolving these positional issues.     Patient has a mobility limitation that significantly impairs their ability to participate in one or more mobility related activities of daily living, including toileting. This deficit can be resolved by using a bedside commode. Patient demonstrates mobility limitations that will cause them to be confined to one room at home without bathroom access for up to 30 days. Using a bedside commode will greatly improve the patient's ability to participate in MRADLs.   Outcome: Progressing

## 2024-07-04 NOTE — PLAN OF CARE
Problem: Physical Therapy  Goal: Physical Therapy Goal  Description: Goals to be met by: 2024     Patient will increase functional independence with mobility by performin. Supine to sit with MInimal Assistance  2. Sit to supine with MInimal Assistance  3. Sit to stand transfer with Minimal Assistance  4. Bed to chair transfer with Minimal Assistance using Rolling Walker  5. Gait  x 10 feet with Minimal Assistance using Rolling Walker.   6. Lower extremity exercise program x15 reps per handout, with assistance as needed    Outcome: Progressing

## 2024-07-04 NOTE — PLAN OF CARE
Problem: Adult Inpatient Plan of Care  Goal: Plan of Care Review  Outcome: Progressing  Goal: Patient-Specific Goal (Individualized)  Outcome: Progressing  Goal: Absence of Hospital-Acquired Illness or Injury  Outcome: Progressing  Goal: Optimal Comfort and Wellbeing  Outcome: Progressing  Goal: Readiness for Transition of Care  Outcome: Progressing     Problem: Bariatric Environmental Safety  Goal: Safety Maintained with Care  Outcome: Progressing     Problem: Diabetes Comorbidity  Goal: Blood Glucose Level Within Targeted Range  Outcome: Progressing     Problem: Wound  Goal: Optimal Coping  Outcome: Progressing  Goal: Optimal Functional Ability  Outcome: Progressing  Goal: Absence of Infection Signs and Symptoms  Outcome: Progressing  Goal: Improved Oral Intake  Outcome: Progressing  Goal: Optimal Pain Control and Function  Outcome: Progressing  Goal: Skin Health and Integrity  Outcome: Progressing  Goal: Optimal Wound Healing  Outcome: Progressing     Problem: Skin Injury Risk Increased  Goal: Skin Health and Integrity  Outcome: Progressing     Problem: Hypertension Acute  Goal: Blood Pressure Within Desired Range  Outcome: Progressing     Problem: Pain Acute  Goal: Optimal Pain Control and Function  Outcome: Progressing     Problem: Fall Injury Risk  Goal: Absence of Fall and Fall-Related Injury  Outcome: Progressing     Problem: Electrolyte Imbalance  Goal: Electrolyte Balance  Outcome: Progressing     Problem: Chronic Kidney Disease  Goal: Optimal Coping with Chronic Illness  Outcome: Progressing  Goal: Electrolyte Balance  Outcome: Progressing  Goal: Fluid Balance  Outcome: Progressing  Goal: Optimal Functional Ability  Outcome: Progressing  Goal: Absence of Anemia Signs and Symptoms  Outcome: Progressing  Goal: Optimal Oral Intake  Outcome: Progressing  Goal: Acceptable Pain Control  Outcome: Progressing  Goal: Minimize Renal Failure Effects  Outcome: Progressing     Problem: Anemia  Goal: Anemia Symptom  Improvement  Outcome: Progressing     Problem: Skin or Soft Tissue Infection  Goal: Absence of Infection Signs and Symptoms  Outcome: Progressing     Problem: Comorbidity Management  Goal: Blood Pressure in Desired Range  Outcome: Progressing     Problem: Pain Chronic (Persistent)  Goal: Optimal Pain Control and Function  Outcome: Progressing

## 2024-07-04 NOTE — NURSING
Pt requesting nurse to find a news channel.  Instructed patient on use of call light/remote, patient stated he could not press buttons.

## 2024-07-04 NOTE — PT/OT/SLP EVAL
"Occupational Therapy   Evaluation    Name: Riaz Guerra Jr.  MRN: [id number]  Admitting Diagnosis: Cellulitis  Recent Surgery: * No surgery found *      Recommendations:     Discharge Recommendations: Moderate Intensity Therapy  Discharge Equipment Recommendations:  bedside commode, bath bench, hospital bed, walker, rolling  Barriers to discharge:  None    Assessment:   CO-TX with PT for pt safety and max participation with both disciplines.    Riaz Guerra Jr. is a 74 y.o. male with a medical diagnosis of Cellulitis.  He presents with weeping and pain in BLE. Performance deficits affecting function: weakness, impaired endurance, impaired self care skills, impaired functional mobility, gait instability, impaired balance, pain, decreased lower extremity function, decreased upper extremity function, other (comment), impaired skin, edema (slight confusion).  PTA, pt was a resident of [facility], and needed assistance with ADLs as needed; pt used rollator and cane to ambulate but was unable to leave recliner once swelling started in BLE. Upon eval, pt is limited by BLE pain, requiring max x 2 for mobility and LB dsg.    Rehab Prognosis: Fair; patient would benefit from acute skilled OT services to address these deficits and reach maximum level of function.       Plan:     Patient to be seen 4 x/week to address the above listed problems via    Plan of Care Expires: 08/03/24  Plan of Care Reviewed with: patient    Subjective     Chief Complaint: "I don't think I can stand"  Patient/Family Comments/goals: return home    Occupational Profile:  Living Environment: Lives alone, in 1st floor apt, 0 PENELOPE and no hand rail, and tub shower in bathroom  Previous level of function: Assistance at baseline  Roles and Routines: Brother, friend  Equipment Used at Home: rollator, cane, quad  Assistance upon Discharge: Family and friends    Pain/Comfort:  Pain Rating 1: 8/10  Location - Side 1: Bilateral  Location - " Orientation 1: lower  Location 1: leg  Pain Addressed 1: Cessation of Activity, Distraction, Reposition, Nurse notified    Patients cultural, spiritual, Mu-ism conflicts given the current situation: no    Objective:     Communicated with: nsg prior to session.  Patient found up in chair with telemetry, peripheral IV upon OT entry to room.    General Precautions: Standard, fall  Orthopedic Precautions: N/A  Braces: N/A  Respiratory Status: Room air    Occupational Performance:    Bed Mobility:    Patient completed Sit to Supine with maximal assistance    Functional Mobility/Transfers:  Patient completed Sit <> Stand Transfer with maximal assistance and of 2 persons  with  rolling walker   Patient completed Bed <> Chair Transfer using Stand Pivot technique with maximal assistance and of 2 persons with rolling walker  Functional Mobility: Pt able to take small steps to perform transfer c/ max A x2 for balance and safety.    Activities of Daily Living:  Grooming: stand by assistance facial hygiene using wash cloth  Upper Body Dressing: maximal assistance don and adjust gown  Lower Body Dressing: total assistance don socks    Cognitive/Visual Perceptual:  A&O x4    Physical Exam:  BUE WFL and 3+/5 for MMT  Balance: fair      AMPAC 6 Click ADL:  AMPAC Total Score: 14    Treatment & Education:  Pt educated on role and purpose of therapy  Pt educated on goal setting  Pt educated on benefits of OOB activity  Pt educated on self advocacy     Patient left HOB elevated and LE propped up with all lines intact, call button in reach, nsg notified, and sister present    GOALS:   Multidisciplinary Problems       Occupational Therapy Goals          Problem: Occupational Therapy    Goal Priority Disciplines Outcome Interventions   Occupational Therapy Goal     OT, PT/OT Progressing    Description: Goals to be met by: 8/3/24     Patient will increase functional independence with ADLs by performing:    UE Dressing with Minimal  Assistance.  LE Dressing with Moderate Assistance.  Grooming while standing with Stand-by Assistance.  Toileting from toilet with Moderate Assistance for hygiene and clothing management.   All functional transfers performed with Mod A      The mobility limitation cannot be sufficiently resolved by the use of a cane.   Patient's functional mobility deficit can be sufficiently resolved with the use of a walker.  Patient's mobility limitation significantly impairs their ability to participate in one of more activities of daily living.  The use of a walker will significantly improve the patient's ability to participate in MRADLS and the patient will use it on regular basis in the home.      Patient requires a hospital bed for positioning of the body in ways that are not feasible with an ordinary bed. The patient requires special positioning for pain relief, limited mobility, and/or being unable to independently make changes in body position without the use of a hospital bed. Pillows and wedges will not be adequate for resolving these positional issues.     Patient has a mobility limitation that significantly impairs their ability to participate in one or more mobility related activities of daily living, including toileting. This deficit can be resolved by using a bedside commode. Patient demonstrates mobility limitations that will cause them to be confined to one room at home without bathroom access for up to 30 days. Using a bedside commode will greatly improve the patient's ability to participate in MRADLs.                        History:     Past Medical History:   Diagnosis Date    Depression     Diabetes mellitus     Gout     High cholesterol     Hypertension        History reviewed. No pertinent surgical history.    Time Tracking:     OT Date of Treatment: 07/04/24  OT Start Time: 0955  OT Stop Time: 1024  OT Total Time (min): 29 min    Billable Minutes:Evaluation 8  Self Care/Home Management 21 7/4/2024

## 2024-07-04 NOTE — NURSING
Pt in recliner, Telemetry not in place.  Pt. Agreed that telemetry can be placed back on him when medications administered.  Pt. States he cannot get out of recliner and deferred assistance to be repositioned.  Pt. States he is unable to use foot of recliner to elevate BLE as it causes too much pain.

## 2024-07-04 NOTE — PROGRESS NOTES
Pharmacokinetic Assessment Follow Up: IV Vancomycin    Vancomycin serum concentration assessment(s):    The random level was drawn correctly and can be used to guide therapy at this time. The measurement is within the desired definitive target range of 10 to 20 mcg/mL.    Vancomycin Regimen Plan:    Re-dose when the random level is less than 20 mcg/mL, next level to be drawn at 0430 w/ AM labs on 7/5    Drug levels (last 3 results):  Recent Labs   Lab Result Units 07/02/24  2314 07/03/24  1153 07/04/24  0551   Vancomycin, Random ug/mL  --  18.8 14.9   Vancomycin-Trough ug/mL 24.7*  --   --        Pharmacy will continue to follow and monitor vancomycin.    Please contact pharmacy at extension [phone] for questions regarding this assessment.    Thank you for the consult,   Kaycee Taylor, PharmD       Patient brief summary:  Riaz Guerra Jr. is a 74 y.o. male initiated on antimicrobial therapy with IV Vancomycin for treatment of skin & soft tissue infection    The patient's current regimen is pulse dosing    Drug Allergies:   Review of patient's allergies indicates:   Allergen Reactions    Lisinopril Other (See Comments)     cough       Actual Body Weight:   152.5 kg    Renal Function:   Estimated Creatinine Clearance: 77.2 mL/min (based on SCr of 1.3 mg/dL).,     Dialysis Method (if applicable):  N/A    CBC (last 72 hours):  Recent Labs   Lab Result Units 07/01/24  1030 07/02/24  0637 07/03/24  0842 07/04/24  0551   WBC K/uL 8.26 8.01 9.00 7.40   Hemoglobin g/dL 12.6* 11.2* 12.2* 12.0*   Hemoglobin A1C % 8.8*  --   --   --    Hematocrit % 38.2* 35.0* 38.1* 36.0*   Platelets K/uL 280 260 277 290   Gran % % 73.3* 70.1 73.5* 72.1   Lymph % % 17.6* 17.4* 17.1* 18.0   Mono % % 6.5 9.7 6.4 7.2   Eosinophil % % 1.3 1.7 1.7 1.5   Basophil % % 0.7 0.6 0.6 0.7   Differential Method  Automated Automated Automated Automated       Metabolic Panel (last 72 hours):  Recent Labs   Lab Result Units 07/01/24  1020 07/01/24  1030  07/01/24  1538 07/01/24  2335 07/02/24  0637 07/02/24  1059 07/02/24  1327 07/02/24  1548 07/02/24  1946 07/02/24  2314 07/03/24  0842 07/04/24  0551   Sodium mmol/L  --  133* 128* 130*  --  131*  130*  --  132* 129* 129* 131* 134*   Sodium, Urine mmol/L  --   --   --   --   --   --  43  --   --   --   --   --    Potassium mmol/L  --  5.7* 5.0 5.9*  --  4.7  4.7  --  4.9 4.9 5.1 5.1 4.7   Potassium, Urine mmol/L  --   --   --   --   --   --  35  --   --   --   --   --    Chloride mmol/L  --  111* 105 107  --  105  105  --  107 104 105 107 106   Chloride, Urine mmol/L  --   --   --   --   --   --  65  --   --   --   --   --    CO2 mmol/L  --  15* 15* 15*  --  17*  17*  --  14* 16* 15* 15* 19*   Glucose mg/dL  --  145* 303* 143*  --  191*  191*  --  180* 166* 157* 148* 139*   Glucose, UA  1+*  --   --   --   --   --   --   --   --   --   --   --    BUN mg/dL  --  32* 27* 32*  --  37*  35*  --  38* 43* 45* 40* 35*   Creatinine mg/dL  --  1.3 1.2 1.4  --  1.6*  1.6*  --  1.6* 1.9* 1.8* 1.5* 1.3   Albumin g/dL  --  3.5  --  3.1*  --  3.0*  3.0*  --  3.1* 3.0* 3.0* 3.1*  --    Total Bilirubin mg/dL  --  0.5  --   --   --   --   --   --   --   --   --   --    Alkaline Phosphatase U/L  --  98  --   --   --   --   --   --   --   --   --   --    AST U/L  --  23  --   --   --   --   --   --   --   --   --   --    ALT U/L  --  24  --   --   --   --   --   --   --   --   --   --    Magnesium mg/dL  --   --   --   --  1.8  --   --   --   --   --  2.0 2.0   Phosphorus mg/dL  --   --   --  4.1  --  4.8*  4.8*  --  5.3* 5.6* 5.7* 4.7*  --        Vancomycin Administrations:  vancomycin given in the last 96 hours                     vancomycin (VANCOCIN) 500 mg in D5W 100 mL IVPB (MB+) (mg) 500 mg New Bag 07/03/24 1400    vancomycin 1,500 mg in D5W 250 mL IVPB (Vial-Mate) (mg) 1,500 mg New Bag 07/02/24 1310     1,500 mg New Bag  0037    vancomycin (VANCOCIN) 2,250 mg in D5W 500 mL IVPB (mg) 2,250 mg New Bag 07/01/24 1309                     Microbiologic Results:  Microbiology Results (last 7 days)       Procedure Component Value Units Date/Time    Blood culture x two cultures. Draw prior to antibiotics. [[id number]] Collected: 07/01/24 1029    Order Status: Completed Specimen: Blood from Peripheral, Hand, Right Updated: 07/03/24 1212     Blood Culture, Routine No Growth to date      No Growth to date      No Growth to date    Narrative:      Aerobic and anaerobic    Blood culture x two cultures. Draw prior to antibiotics. [[id number]] Collected: 07/01/24 1030    Order Status: Completed Specimen: Blood from Peripheral, Hand, Left Updated: 07/03/24 1212     Blood Culture, Routine No Growth to date      No Growth to date      No Growth to date    Narrative:      Aerobic and anaerobic

## 2024-07-04 NOTE — NURSING
PT. Remains in chair, states buttocks hurt but refused to move to bed with max staff assist.  PT. Reports he thought he urinated on his dressings to leg, bilateral dressings dry at this time.  Advised patient that he will have to lift his legs on recliner to change them if they become soiled, pt. Stated he does not want to lift his legs.

## 2024-07-04 NOTE — ASSESSMENT & PLAN NOTE
74 year old male with insulin dependent Type 2 DM (A1c 8.8), HTN, HLD, anal fistula s/p fistulotomy, chronic pain (followed by pain medicine), chronic bilateral LE wounds with recent ED visit on 6/21 with concerns for cellulitis treated with Bactrim and discharged home now representing to [facility] with worsening pain of bilateral lower extremities, subjective fevers and chills. Increased warmth and tenderness of RLE. Given Vanc, Zosyn.     -Continue broad spectrum abx. If blood cx negative , would transition to PO.  -Follow up blood cx.   -Wound care on board  Pending arterial U/S.

## 2024-07-04 NOTE — PT/OT/SLP EVAL
Physical Therapy Co-Evaluation    Patient Name:  Riaz Guerra Jr.   MRN:  [id number]    Co-evaluation and co-treatment performed for this visit due to suspected patient need for two skilled therapists to ensure patient and staff safety and to accommodate for patient activity tolerance/pain management     Recommendations:     Discharge Recommendations: Moderate Intensity Therapy   Discharge Equipment Recommendations: bedside commode, hospital bed, wheelchair   Barriers to discharge: None    Assessment:     Riaz Guerra Jr. is a 74 y.o. male admitted with a medical diagnosis of Cellulitis.  He presents with the following impairments/functional limitations: weakness, impaired endurance, impaired self care skills, impaired functional mobility, gait instability, impaired balance, decreased lower extremity function, decreased safety awareness, pain, impaired skin, edema Pt. cooperative but had limited tolerance to treatment due to LE discomfort with mobility/weight bearing. Pt. required Max A x2 for stand pivot transfer from bedside chair back to bed.    Rehab Prognosis: Fair; patient would benefit from acute skilled PT services to address these deficits and reach maximum level of function.    Recent Surgery: * No surgery found *      Plan:     During this hospitalization, patient to be seen 4 x/week to address the identified rehab impairments via gait training, therapeutic activities, therapeutic exercises, neuromuscular re-education and progress toward the following goals:    Plan of Care Expires:  08/03/24    Subjective     Chief Complaint: (B) LE pain  Patient/Family Comments/goals: pt. Agreeable to PT  Pain/Comfort:  Pain Rating 1: 8/10  Location - Side 1: Bilateral  Location - Orientation 1: lower  Location 1: leg  Pain Addressed 1: Reposition, Distraction, Cessation of Activity, Nurse notified  Pain Rating Post-Intervention 1: 8/10    Patients cultural, spiritual, Mosque conflicts given the current  situation: no    Living Environment:  Pt. Lives in senior living apartment with no steps to enter  Prior to admission, patients level of function was limited amb. with rollator.  Equipment used at home: rollator.  Upon discharge, patient will need to have assistance.    Objective:     Communicated with nursing prior to session.  Patient found up in chair with peripheral IV, telemetry  upon PT entry to room.    General Precautions: Standard, fall  Orthopedic Precautions:N/A   Braces: N/A  Respiratory Status: Room air    Exams:  RLE ROM: WFL  RLE Strength: WFL  LLE ROM: WFL  LLE Strength: WFL    Functional Mobility:  Bed Mobility:     Sit to Supine: maximal assistance  Transfers:     Sit to Stand:  maximal assistance and of 2 persons with rolling walker  Bed to Chair: maximal assistance and of 2 persons with  rolling walker  using  Stand Pivot  Gait: 4 steps from bedside chair to bed with RW and Max A x2 for balance/support/weight shifting/guidance/safety. Pt. Amb. With decreased step length/yumiko/floor clearance and wide LACEY.  Balance: fair sitting and poor standing      AM-PAC 6 CLICK MOBILITY  Total Score:11       Treatment & Education:  Discussed therapy needs, goals, and POC.    Patient left supine with all lines intact and call button in reach.    GOALS:   Multidisciplinary Problems       Physical Therapy Goals          Problem: Physical Therapy    Goal Priority Disciplines Outcome Goal Variances Interventions   Physical Therapy Goal     PT, PT/OT Progressing     Description: Goals to be met by: 2024     Patient will increase functional independence with mobility by performin. Supine to sit with MInimal Assistance  2. Sit to supine with MInimal Assistance  3. Sit to stand transfer with Minimal Assistance  4. Bed to chair transfer with Minimal Assistance using Rolling Walker  5. Gait  x 10 feet with Minimal Assistance using Rolling Walker.   6. Lower extremity exercise program x15 reps per handout,  with assistance as needed                         History:     Past Medical History:   Diagnosis Date    Depression     Diabetes mellitus     Gout     High cholesterol     Hypertension        History reviewed. No pertinent surgical history.    Time Tracking:     PT Received On: 07/04/24  PT Start Time: 0955     PT Stop Time: 1020  PT Total Time (min): 25 min     Billable Minutes: Evaluation 10 and Therapeutic Activity 15      07/04/2024

## 2024-07-04 NOTE — SUBJECTIVE & OBJECTIVE
Interval History: Net negative 1200cc over 24 hrs Creat improving. Reports generalized weakness    Review of Systems   Constitutional: Negative.    Respiratory: Negative.     Cardiovascular: Negative.    Gastrointestinal: Negative.    Musculoskeletal:  Positive for arthralgias and myalgias.   Skin:  Positive for wound.   Neurological:  Positive for weakness.     Objective:     Vital Signs (Most Recent):  Temp: 98 °F (36.7 °C) (07/04/24 1148)  Pulse: 93 (07/04/24 1148)  Resp: 20 (07/04/24 1331)  BP: 112/66 (07/04/24 1148)  SpO2: 97 % (07/04/24 1148) Vital Signs (24h Range):  Temp:  [97.5 °F (36.4 °C)-98 °F (36.7 °C)] 98 °F (36.7 °C)  Pulse:  [82-94] 93  Resp:  [17-20] 20  SpO2:  [94 %-99 %] 97 %  BP: (112-155)/(66-89) 112/66     Weight: (!) 152.5 kg (336 lb 3.2 oz)  Body mass index is 46.89 kg/m².    Intake/Output Summary (Last 24 hours) at 7/4/2024 1404  Last data filed at 7/4/2024 0530  Gross per 24 hour   Intake 540 ml   Output 1740 ml   Net -1200 ml         Physical Exam  Constitutional:       General: He is not in acute distress.     Appearance: He is obese.   Cardiovascular:      Rate and Rhythm: Normal rate.      Pulses: Normal pulses.      Heart sounds: No murmur heard.  Pulmonary:      Effort: No respiratory distress.      Breath sounds: Normal breath sounds. No wheezing.   Abdominal:      General: Bowel sounds are normal. There is no distension.      Palpations: Abdomen is soft.      Tenderness: There is no abdominal tenderness.   Musculoskeletal:      Right lower leg: Edema present.      Left lower leg: Edema present.   Skin:     Findings: Lesion (BLE wounds noted) present.   Neurological:      Mental Status: He is alert and oriented to person, place, and time. Mental status is at baseline.   Psychiatric:         Mood and Affect: Mood normal.             Significant Labs: All pertinent labs within the past 24 hours have been reviewed.    Significant Imaging: I have reviewed all pertinent imaging  results/findings within the past 24 hours.

## 2024-07-04 NOTE — PLAN OF CARE
Problem: Occupational Therapy  Goal: Occupational Therapy Goal  Description: Goals to be met by: 8/3/24     Patient will increase functional independence with ADLs by performing:    UE Dressing with Minimal Assistance.  LE Dressing with Moderate Assistance.  Grooming while standing with Stand-by Assistance.  Toileting from toilet with Moderate Assistance for hygiene and clothing management.   All functional transfers performed with Mod A     Outcome: Progressing

## 2024-07-05 VITALS
DIASTOLIC BLOOD PRESSURE: 74 MMHG | WEIGHT: 315 LBS | SYSTOLIC BLOOD PRESSURE: 136 MMHG | TEMPERATURE: 99 F | HEIGHT: 71 IN | BODY MASS INDEX: 44.1 KG/M2 | HEART RATE: 94 BPM | RESPIRATION RATE: 16 BRPM | OXYGEN SATURATION: 97 %

## 2024-07-05 PROBLEM — E11.628 DIABETIC FOOT INFECTION: Status: ACTIVE | Noted: 2024-07-01

## 2024-07-05 PROBLEM — L08.9 DIABETIC FOOT INFECTION: Status: ACTIVE | Noted: 2024-07-01

## 2024-07-05 LAB
CREAT SERPL-MCNC: 1.4 MG/DL (ref 0.5–1.4)
EST. GFR  (NO RACE VARIABLE): 53.4 ML/MIN/1.73 M^2
POCT GLUCOSE: 148 MG/DL (ref 70–110)
VANCOMYCIN SERPL-MCNC: 13.6 UG/ML

## 2024-07-05 PROCEDURE — 63600175 PHARM REV CODE 636 W HCPCS: Performed by: STUDENT IN AN ORGANIZED HEALTH CARE EDUCATION/TRAINING PROGRAM

## 2024-07-05 PROCEDURE — 36415 COLL VENOUS BLD VENIPUNCTURE: CPT | Performed by: INTERNAL MEDICINE

## 2024-07-05 PROCEDURE — 80202 ASSAY OF VANCOMYCIN: CPT | Performed by: STUDENT IN AN ORGANIZED HEALTH CARE EDUCATION/TRAINING PROGRAM

## 2024-07-05 PROCEDURE — 36415 COLL VENOUS BLD VENIPUNCTURE: CPT | Performed by: STUDENT IN AN ORGANIZED HEALTH CARE EDUCATION/TRAINING PROGRAM

## 2024-07-05 PROCEDURE — 97535 SELF CARE MNGMENT TRAINING: CPT | Mod: CO

## 2024-07-05 PROCEDURE — 25000003 PHARM REV CODE 250: Performed by: STUDENT IN AN ORGANIZED HEALTH CARE EDUCATION/TRAINING PROGRAM

## 2024-07-05 PROCEDURE — 97530 THERAPEUTIC ACTIVITIES: CPT

## 2024-07-05 PROCEDURE — 25000003 PHARM REV CODE 250

## 2024-07-05 PROCEDURE — 82565 ASSAY OF CREATININE: CPT | Performed by: INTERNAL MEDICINE

## 2024-07-05 RX ORDER — HYDROCODONE BITARTRATE AND ACETAMINOPHEN 5; 325 MG/1; MG/1
1 TABLET ORAL EVERY 8 HOURS PRN
Qty: 21 TABLET | Refills: 0 | Status: SHIPPED | OUTPATIENT
Start: 2024-07-05 | End: 2024-07-12

## 2024-07-05 RX ORDER — GABAPENTIN 800 MG/1
800 TABLET ORAL 3 TIMES DAILY
COMMUNITY
Start: 2024-05-07

## 2024-07-05 RX ORDER — HYDROCODONE BITARTRATE AND ACETAMINOPHEN 7.5; 325 MG/1; MG/1
1 TABLET ORAL EVERY 8 HOURS PRN
COMMUNITY
Start: 2024-03-22

## 2024-07-05 RX ORDER — IBUPROFEN 800 MG/1
1 TABLET ORAL EVERY 4 HOURS PRN
COMMUNITY

## 2024-07-05 RX ORDER — AMMONIUM LACTATE 12 G/100G
LOTION TOPICAL
COMMUNITY
Start: 2024-05-14

## 2024-07-05 RX ORDER — DICLOFENAC POTASSIUM 50 MG/1
50 TABLET, FILM COATED ORAL
COMMUNITY

## 2024-07-05 RX ORDER — LEVOFLOXACIN 750 MG/1
750 TABLET ORAL DAILY
Qty: 10 TABLET | Refills: 0 | Status: SHIPPED | OUTPATIENT
Start: 2024-07-05 | End: 2024-07-15

## 2024-07-05 RX ORDER — TRAMADOL HYDROCHLORIDE 50 MG/1
50 TABLET ORAL ONCE AS NEEDED
Status: COMPLETED | OUTPATIENT
Start: 2024-07-05 | End: 2024-07-05

## 2024-07-05 RX ADMIN — HEPARIN SODIUM 7500 UNITS: 5000 INJECTION INTRAVENOUS; SUBCUTANEOUS at 03:07

## 2024-07-05 RX ADMIN — HYDROCODONE BITARTRATE AND ACETAMINOPHEN 1 TABLET: 10; 325 TABLET ORAL at 08:07

## 2024-07-05 RX ADMIN — ATORVASTATIN CALCIUM 20 MG: 20 TABLET, FILM COATED ORAL at 08:07

## 2024-07-05 RX ADMIN — HYDROCODONE BITARTRATE AND ACETAMINOPHEN 1 TABLET: 10; 325 TABLET ORAL at 01:07

## 2024-07-05 RX ADMIN — HEPARIN SODIUM 7500 UNITS: 5000 INJECTION INTRAVENOUS; SUBCUTANEOUS at 05:07

## 2024-07-05 RX ADMIN — HYDROCODONE BITARTRATE AND ACETAMINOPHEN 1 TABLET: 10; 325 TABLET ORAL at 03:07

## 2024-07-05 RX ADMIN — CEFEPIME 1 G: 1 INJECTION, POWDER, FOR SOLUTION INTRAMUSCULAR; INTRAVENOUS at 10:07

## 2024-07-05 RX ADMIN — SODIUM BICARBONATE 650 MG: 650 TABLET ORAL at 08:07

## 2024-07-05 RX ADMIN — SODIUM ZIRCONIUM CYCLOSILICATE 10 G: 10 POWDER, FOR SUSPENSION ORAL at 08:07

## 2024-07-05 RX ADMIN — INSULIN ASPART 5 UNITS: 100 INJECTION, SOLUTION INTRAVENOUS; SUBCUTANEOUS at 08:07

## 2024-07-05 RX ADMIN — CEFEPIME 1 G: 1 INJECTION, POWDER, FOR SOLUTION INTRAMUSCULAR; INTRAVENOUS at 01:07

## 2024-07-05 RX ADMIN — TRAMADOL HYDROCHLORIDE 50 MG: 50 TABLET, COATED ORAL at 05:07

## 2024-07-05 RX ADMIN — AMLODIPINE BESYLATE 10 MG: 10 TABLET ORAL at 08:07

## 2024-07-05 RX ADMIN — ASPIRIN 81 MG: 81 TABLET, COATED ORAL at 08:07

## 2024-07-05 RX ADMIN — INSULIN GLARGINE 20 UNITS: 100 INJECTION, SOLUTION SUBCUTANEOUS at 08:07

## 2024-07-05 RX ADMIN — TAMSULOSIN HYDROCHLORIDE 0.4 MG: 0.4 CAPSULE ORAL at 08:07

## 2024-07-05 RX ADMIN — TRAMADOL HYDROCHLORIDE 50 MG: 50 TABLET, COATED ORAL at 12:07

## 2024-07-05 RX ADMIN — SODIUM BICARBONATE 650 MG: 650 TABLET ORAL at 03:07

## 2024-07-05 RX ADMIN — FUROSEMIDE 40 MG: 10 INJECTION, SOLUTION INTRAVENOUS at 08:07

## 2024-07-05 RX ADMIN — POLYETHYLENE GLYCOL 3350 17 G: 17 POWDER, FOR SOLUTION ORAL at 08:07

## 2024-07-05 NOTE — NURSING
"Pt discharge orders are in. RN went to room to explain discharge information to pt. Pt had already removed his own IV and took off their own telemetry monitor before discharge information had been given.     Pt refused lunch time vitals, blood sugar check and lunch time insulin.     Per pt, "I do not want nothing else done. I'm trying to go home. I need a ride home."     Informed pt that we need to set up a ride home for pt and it may take some time. Pt verbalized understanding.   "

## 2024-07-05 NOTE — PT/OT/SLP PROGRESS
Physical Therapy Treatment    Patient Name:  Riaz Guerra Jr.   MRN:  [id number]    Recommendations:     Discharge Recommendations: Moderate Intensity Therapy  Discharge Equipment Recommendations: bedside commode, hospital bed, wheelchair  Barriers to discharge: None    Assessment:     Riaz Guerra Jr. is a 74 y.o. male admitted with a medical diagnosis of Diabetic foot infection.  He presents with the following impairments/functional limitations: weakness, impaired endurance, impaired self care skills, impaired functional mobility, gait instability, impaired balance, pain, decreased lower extremity function, impaired skin, edema, decreased safety awareness Pt. cooperative and tolerated treatment better today, but did not want to get OOB. Pt. agreeable to sitting EOB and then return to supine.    Rehab Prognosis: Fair; patient would benefit from acute skilled PT services to address these deficits and reach maximum level of function.    Recent Surgery: * No surgery found *      Plan:     During this hospitalization, patient to be seen 4 x/week to address the identified rehab impairments via gait training, therapeutic activities, therapeutic exercises, neuromuscular re-education and progress toward the following goals:    Plan of Care Expires:  08/03/24    Subjective     Chief Complaint: (B) LE discomfort  Patient/Family Comments/goals: pt. Agreeable to PT  Pain/Comfort:  Pain Rating 1:  (pt. did not rate)  Location - Side 1: Bilateral  Location - Orientation 1: lower  Location 1: leg  Pain Addressed 1: Reposition, Distraction, Cessation of Activity      Objective:     Communicated with nursing prior to session.  Patient found supine with peripheral IV, telemetry upon PT entry to room.     General Precautions: Standard, fall  Orthopedic Precautions: N/A  Braces: N/A  Respiratory Status: Room air     Functional Mobility:  Bed Mobility:     Rolling Left:  maximal assistance  Scooting: maximal  assistance  Supine to Sit: maximal assistance and of 2 persons  Sit to Supine: maximal assistance and of 2 persons  Balance: fair sitting       AM-PAC 6 CLICK MOBILITY  Turning over in bed (including adjusting bedclothes, sheets and blankets)?: 2  Sitting down on and standing up from a chair with arms (e.g., wheelchair, bedside commode, etc.): 2  Moving from lying on back to sitting on the side of the bed?: 2  Moving to and from a bed to a chair (including a wheelchair)?: 2  Need to walk in hospital room?: 2  Climbing 3-5 steps with a railing?: 1  Basic Mobility Total Score: 11       Treatment & Education:  Discussed pt.'s progress, goals, importance of progressing mobility, and POC. Pt. Performed sitting balance/tolerance along EOB while completing ADLs seated.    Patient left supine with all lines intact and call button in reach..    GOALS:   Multidisciplinary Problems       Physical Therapy Goals          Problem: Physical Therapy    Goal Priority Disciplines Outcome Goal Variances Interventions   Physical Therapy Goal     PT, PT/OT Progressing     Description: Goals to be met by: 2024     Patient will increase functional independence with mobility by performin. Supine to sit with MInimal Assistance  2. Sit to supine with MInimal Assistance  3. Sit to stand transfer with Minimal Assistance  4. Bed to chair transfer with Minimal Assistance using Rolling Walker  5. Gait  x 10 feet with Minimal Assistance using Rolling Walker.   6. Lower extremity exercise program x15 reps per handout, with assistance as needed                         Time Tracking:     PT Received On: 24  PT Start Time: 1323     PT Stop Time: 1343  PT Total Time (min): 20 min     Billable Minutes: Therapeutic Activity 20    Treatment Type: Treatment  PT/PTA: PT           2024

## 2024-07-05 NOTE — PLAN OF CARE
07/05/24 1533   Final Note   Assessment Type Final Discharge Note   Anticipated Discharge Disposition Home-Health   Hospital Resources/Appts/Education Provided Provided patient/caregiver with written discharge plan information   Post-Acute Status   Post-Acute Authorization Home Health   Home Health Status Set-up Complete/Auth obtained   Patient choice form signed by patient/caregiver List with quality metrics by geographic area provided   Discharge Delays None known at this time     [facility]  Discharge Final Note    Primary Care Provider: Berhane Reyes (Inactive)    Expected Discharge Date: 7/6/2024    Patient discharged home with [facility]      SW arranged ambulance transport via [facility]. Requested  time is 5:35 PM.  Requested  time does not guarantee arrival time.  If transport does not arrive by 6:00 pm please contact assigned SW or on-call for assistance.     Patient's bedside nurse notified of the above.      Discharge Plan A and Plan B have been determined by review of patient's clinical status, future medical and therapeutic needs, and coverage/benefits for post-acute care in coordination with multidisciplinary team members.    Patient cleared for discharge from case management standpoint.       Final Discharge Note (most recent)       Final Note - 07/05/24 1533          Final Note    Assessment Type Final Discharge Note (P)      Anticipated Discharge Disposition Home-Health Care Svc (P)      Hospital Resources/Appts/Education Provided Provided patient/caregiver with written discharge plan information (P)         Post-Acute Status    Post-Acute Authorization Home Health (P)      Home Health Status Set-up Complete/Auth obtained (P)      Patient choice form signed by patient/caregiver List with quality metrics by geographic area provided (P)      Discharge Delays None known at this time (P)                      Important Message from  Medicare  Important Message from Medicare regarding Discharge Appeal Rights: Given to patient/caregiver, Explained to patient/caregiver, Signed/date by patient/caregiver     Date IMM was signed: 07/05/24  Time IMM was signed: 0933    Contact Info       [email or web address]   Specialty: Podiatry    [address]  [facility] [address]   Phone: [phone]       Next Steps: Schedule an appointment as soon as possible for a visit    Instructions: For wound re-check            Future Appointments   Date Time Provider Department Center   7/8/2024  1:30 PM Álvaro Hatfield, DPM [facility]       SW scheduled post-discharge follow-up appointment and information added to AVS.     Christiana Castano, MSW  [facility]  Ext. [phone]

## 2024-07-05 NOTE — NURSING
Pt yelling loudly for help.  Pt has the call light across his abdomen.  He has been informed not to yell and to use his call light.  He states he is hurting, but then states the pain medication is working.  He also states that the staff attacked him when in fact they turned him from side to side to change him because he refused to use the urinal and urinated on himself requiring him to be dried.  I myself was standing at the door to observe his behavior during that process and at no point was the staff rough.  He refuses to be turned and repositioned to assist in alleviating pain.  He states he will discuss everything with his Drs this AM.  I have reinforced to him to use the call light instead of yelling.  The pt's near by him are complaining and if he continues I will have to call security to come speak with him. He voices understanding and states he will not yell anymore and will use his call light

## 2024-07-05 NOTE — PLAN OF CARE
CHW met with patient/family at bedside. Patient experience rounding completed and reviewed the following.     Do you know your discharge plan? Yes       If yes, what is the plan? Home Health     Have you discussed your needs and preferences with your SW/CM? Yes     If you are discharging home, do you have help at home? No  Do you think you will need help additional at home at discharge? Yes     Do you currently have difficulty keeping up with bills, affording medicine or buying food? No    Assigned SW/CM notified of any patient/family needs or concerns. Appropriate resources provided to address patient's needs.

## 2024-07-05 NOTE — NURSING
"Pt screaming for nurse and escalated to screaming help.  Upon arriving to room, patient is holding call bell in his hand.  When asked if he remembered that he was instructed to use the call light by the charge RN, Vicki, he claimed he had just found it.  When asked what he needed, pt. Stated "You fucked me over"... When asked what patient if he was referring to being cleaned and having bed linens changed two hours ago, he stated yes.  He further stated "you don't believe I am in pain".  When questioned if his pain medication helped, patient stated it was.  Advised patient that I will not longer discuss with him about whether or not we believe he is in pain and that he is not to scream into the [place]illo as it is disturbing other patients.  Call bell is in bed with patient on his right side.  "

## 2024-07-05 NOTE — PLAN OF CARE
Problem: Adult Inpatient Plan of Care  Goal: Plan of Care Review  Outcome: Progressing  Goal: Patient-Specific Goal (Individualized)  Outcome: Progressing  Goal: Absence of Hospital-Acquired Illness or Injury  Outcome: Progressing  Goal: Optimal Comfort and Wellbeing  Outcome: Progressing  Goal: Readiness for Transition of Care  Outcome: Progressing     Problem: Bariatric Environmental Safety  Goal: Safety Maintained with Care  Outcome: Progressing     Problem: Diabetes Comorbidity  Goal: Blood Glucose Level Within Targeted Range  Outcome: Progressing     Problem: Wound  Goal: Optimal Coping  Outcome: Progressing  Goal: Optimal Functional Ability  Outcome: Progressing  Goal: Absence of Infection Signs and Symptoms  Outcome: Progressing  Goal: Improved Oral Intake  Outcome: Progressing  Goal: Optimal Pain Control and Function  Outcome: Progressing  Goal: Skin Health and Integrity  Outcome: Progressing  Goal: Optimal Wound Healing  Outcome: Progressing     Problem: Skin Injury Risk Increased  Goal: Skin Health and Integrity  Outcome: Progressing     Problem: Hypertension Acute  Goal: Blood Pressure Within Desired Range  Outcome: Progressing     Problem: Pain Acute  Goal: Optimal Pain Control and Function  Outcome: Progressing     Problem: Fall Injury Risk  Goal: Absence of Fall and Fall-Related Injury  Outcome: Progressing     Problem: Electrolyte Imbalance  Goal: Electrolyte Balance  Outcome: Progressing     Problem: Chronic Kidney Disease  Goal: Optimal Coping with Chronic Illness  Outcome: Progressing  Goal: Electrolyte Balance  Outcome: Progressing  Goal: Fluid Balance  Outcome: Progressing  Goal: Optimal Functional Ability  Outcome: Progressing  Goal: Absence of Anemia Signs and Symptoms  Outcome: Progressing  Goal: Optimal Oral Intake  Outcome: Progressing  Goal: Acceptable Pain Control  Outcome: Progressing  Goal: Minimize Renal Failure Effects  Outcome: Progressing     Problem: Anemia  Goal: Anemia Symptom  Improvement  Outcome: Progressing     Problem: Skin or Soft Tissue Infection  Goal: Absence of Infection Signs and Symptoms  Outcome: Progressing     Problem: Comorbidity Management  Goal: Blood Pressure in Desired Range  Outcome: Progressing     Problem: Pain Chronic (Persistent)  Goal: Optimal Pain Control and Function  Outcome: Progressing     Problem: Mobility Impairment  Goal: Optimal Mobility Tate and Safety  Outcome: Progressing

## 2024-07-05 NOTE — DISCHARGE SUMMARY
[facility]ation [address] [place] [address] Medicine  Discharge Summary      Patient Name: Riaz Guerra Jr.  MRN: [id number]  NANCY: [id number]  Patient Class: IP- Inpatient  Admission Date: 7/1/2024  Hospital Length of Stay: 3 days  Discharge Date and Time:  07/05/2024 4:02 PM  Attending Physician: Georgiana De Souza MD   Discharging Provider: Georigana De Souza MD  Primary Care Provider: Berhane Reyes (Inactive)  Hospital Medicine Team: [facility] MED J Georgiana De Souza MD  Primary Care Team: [facility] MED J    HPI:   Riaz Guerra is a 74 year old male with insulin dependent Type 2 DM (A1c 8.8), HTN, HLD, anal fistula s/p fistulotomy, chronic pain (followed by pain medicine), chronic bilateral LE wounds with recent ED visit on 6/21 with concerns for cellulitis treated with Bactrim and discharged home now representing to [facility] from [facility] with worsening pain of bilateral lower extremities. Majority of history from chart review as he repeatedly told me to refer to the chart when asking questions. He thinks he has had fevers and chills, but is unable to elaborate more. On arrival to the ED, he was hypertensive 224/128, tachycardic 110 BPM and tachypneic 22, otherwise on room air. Labs notable for NAGMA with serum bicarb 15, hyperkalemia 5.7, Cr 1.3 (baseline), hyponatremia 133, serum glucose 300s, hgb 12.6 (baseline). He was given calcium, albuterol, lasix 40 mgx1, insulin, lisinopril, Vanc and Zosyn. Repeat BMP showed K 5.0, Na 128 (corrected 131). CXR, US LE and Xray tibia/fibula unremarkable. He was admitted to Hospital Medicine for further workup.     * No surgery found *      Hospital Course:   74 year old man admitted to Hospital Medicine for LE cellulitis, uncontrolled hypertension and hyperkalemia. He was started on broad spectrum abx. Blood cx with NGTD. Hypertension improved with resumption of home medications but ARB held initially given hyperkalemia. Hyperkalemia improved with Lokelma. Wound care  consulted. Arterial ABIs normal. Creatinine improved with diuresis and ARB was ultimately able to be restarted.     PT and OT were consulted and recommended moderate intensity therapy, but patient prefers to discharge to home with home health services.  Home health services were contacted and requested to provide PT, OT and wound care.  He will have an ambulatory referral to both wound care and podiatry.  He will discharge to home with levofloxacin for diabetic foot infection.     Goals of Care Treatment Preferences:  Code Status: Full Code      Consults:     No new Assessment & Plan notes have been filed under this hospital service since the last note was generated.  Service: Hospital Medicine    Final Active Diagnoses:    Diagnosis Date Noted POA    PRINCIPAL PROBLEM:  Diabetic foot infection [E11.628, L08.9] 07/01/2024 Yes    Insulin dependent type 2 diabetes mellitus [E11.9, Z79.4] 07/01/2024 Not Applicable    HTN (hypertension) [I10] 07/01/2024 Yes    Hyperkalemia [E87.5] 07/01/2024 Yes    Chronic pain [G89.29] 07/01/2024 Yes    CKD (chronic kidney disease) stage 3, GFR 30-59 ml/min [N18.30] 07/01/2024 Yes    Anemia [D64.9] 07/01/2024 Yes    Metabolic acidosis, normal anion gap (NAG) [E87.20] 07/01/2024 Yes      Problems Resolved During this Admission:    Diagnosis Date Noted Date Resolved POA    Hyponatremia [E87.1] 07/01/2024 07/01/2024 Yes       Discharged Condition: good    [place] [facility]    Follow Up:   Follow-up Information       [facility] [address]. Schedule an appointment as soon as possible for a visit.    Specialty: Podiatry  Why: For wound re-check  Contact information:  [address]  [facility] [address] [phone]  Additional information:  [facility] - Main Building, 5th Floor   Please park in [address] and use Atrium elevator                         Future Appointments   Date Time Provider Department Center   7/8/2024  1:30  Álvaro Montez, DPM [facility] [place]         Patient Instructions:      Ambulatory referral/consult to [facility]   Standing Status: Future   Referral Priority: Routine Referral Type: Consultation   Referral Reason: Specialty Services Required   Number of Visits Requested: 1     Ambulatory referral/consult to Podiatry   Standing Status: Future   Referral Priority: Routine Referral Type: Consultation   Referral Reason: Specialty Services Required   Requested Specialty: Podiatry   Number of Visits Requested: 1     Ambulatory referral/consult to Wound Clinic   Standing Status: Future   Referral Priority: Routine Referral Type: Consultation   Referral Reason: Specialty Services Required   Requested Specialty: Wound Care   Number of Visits Requested: 1     Diet diabetic     Notify your health care provider if you experience any of the following:  temperature >100.4     Change dressing (specify)   Order Comments: Change dressing every 2 days or if soiled.     Activity as tolerated       Significant Diagnostic Studies: Labs: CMP   Recent Labs   Lab 07/04/24  0551 07/05/24  0932   *  --    K 4.7  --      --    CO2 19*  --    *  --    BUN 35*  --    CREATININE 1.3 1.4   CALCIUM 9.4  --    ANIONGAP 9  --     and CBC   Recent Labs   Lab 07/04/24  0551   WBC 7.40   HGB 12.0*   HCT 36.0*        Microbiology: Blood Culture   Lab Results   Component Value Date    LABBLOO No Growth to date 07/01/2024    LABBLOO No Growth to date 07/01/2024    LABBLOO No Growth to date 07/01/2024    LABBLOO No Growth to date 07/01/2024    LABBLOO No Growth to date 07/01/2024       Pending Diagnostic Studies:       None           Medications:  Reconciled Home Medications:      Medication List        START taking these medications      levoFLOXacin 750 MG tablet  Commonly known as: LEVAQUIN  Take 1 tablet (750 mg total) by mouth once daily. for 10 days            CHANGE how you take these  medications      * HYDROcodone-acetaminophen 7.5-325 mg per tablet  Commonly known as: NORCO  1 tablet every 8 (eight) hours as needed for Pain.  What changed: Another medication with the same name was added. Make sure you understand how and when to take each.     * HYDROcodone-acetaminophen 5-325 mg per tablet  Commonly known as: NORCO  Take 1 tablet by mouth every 8 (eight) hours as needed for Pain.  What changed: You were already taking a medication with the same name, and this prescription was added. Make sure you understand how and when to take each.           * This list has 2 medication(s) that are the same as other medications prescribed for you. Read the directions carefully, and ask your doctor or other care provider to review them with you.                CONTINUE taking these medications      amLODIPine 10 MG tablet  Commonly known as: NORVASC  Take 1 tablet (10 mg total) by mouth once daily.     aspirin 81 MG EC tablet  Commonly known as: ECOTRIN  Take 81 mg by mouth once daily.     atorvastatin 20 MG tablet  Commonly known as: LIPITOR  Take 20 mg by mouth once daily.     baclofen 10 MG tablet  Commonly known as: LIORESAL  Take 10 mg by mouth 3 (three) times daily.     BASAGLAR KWIKPEN U-100 INSULIN 100 unit/mL (3 mL) Inpn pen  Generic drug: insulin glargine U-100 (Lantus)  Inject 35 Units into the skin once daily.     cetirizine 10 MG tablet  Commonly known as: ZYRTEC  Take 10 mg by mouth once daily.     furosemide 40 MG tablet  Commonly known as: LASIX  Take 1 tablet (40 mg total) by mouth once daily. for 7 days     gabapentin 800 MG tablet  Commonly known as: NEURONTIN  Take 800 mg by mouth 3 (three) times daily.     losartan 100 MG tablet  Commonly known as: COZAAR  Take 100 mg by mouth once daily.     metFORMIN 1000 MG tablet  Commonly known as: GLUCOPHAGE  Take 1,000 mg by mouth 2 (two) times daily with meals.     NovoLOG Flexpen U-100 Insulin 100 unit/mL (3 mL) Inpn pen  Generic drug: insulin  aspart U-100  Inject 10 Units into the skin 2 (two) times a day.     tamsulosin 0.4 mg Cap  Commonly known as: FLOMAX  Take by mouth once daily.     traMADoL 50 mg tablet  Commonly known as: ULTRAM  Take 1 tablet (50 mg total) by mouth every 8 (eight) hours as needed for Pain.            STOP taking these medications      clotrimazole 1 % cream  Commonly known as: LOTRIMIN     lisinopriL-hydrochlorothiazide 20-25 mg Tab  Commonly known as: PRINZIDE,ZESTORETIC     potassium chloride 10 MEQ Tbsr  Commonly known as: KLOR-CON            ASK your doctor about these medications      ammonium lactate 12 % lotion  Commonly known as: LAC-HYDRIN  SMARTSIG:Application Topical     diclofenac 50 MG tablet  Commonly known as: CATAFLAM  Take 50 mg by mouth.     diclofenac 75 MG EC tablet  Commonly known as: VOLTAREN  Take 1 tablet (75 mg total) by mouth 2 (two) times daily.     ibuprofen 800 MG tablet  Commonly known as: ADVIL,MOTRIN  Take 1 tablet by mouth every 4 (four) hours as needed for Pain.              Indwelling Lines/Drains at time of discharge:   Lines/Drains/Airways       None                   Time spent on the discharge of patient: 35 minutes         Georgiana D eSouza MD  Department of Hospital Medicine  [facility] - Observation 11H

## 2024-07-05 NOTE — PLAN OF CARE
Problem: Adult Inpatient Plan of Care  Goal: Plan of Care Review  Outcome: Met  Goal: Patient-Specific Goal (Individualized)  Outcome: Met  Goal: Absence of Hospital-Acquired Illness or Injury  Outcome: Met  Goal: Optimal Comfort and Wellbeing  Outcome: Met  Goal: Readiness for Transition of Care  Outcome: Met     Problem: Bariatric Environmental Safety  Goal: Safety Maintained with Care  Outcome: Met     Problem: Diabetes Comorbidity  Goal: Blood Glucose Level Within Targeted Range  Outcome: Met     Problem: Wound  Goal: Optimal Coping  Outcome: Met  Goal: Optimal Functional Ability  Outcome: Met  Goal: Absence of Infection Signs and Symptoms  Outcome: Met  Goal: Improved Oral Intake  Outcome: Met  Goal: Optimal Pain Control and Function  Outcome: Met  Goal: Skin Health and Integrity  Outcome: Met  Goal: Optimal Wound Healing  Outcome: Met     Problem: Skin Injury Risk Increased  Goal: Skin Health and Integrity  Outcome: Met     Problem: Hypertension Acute  Goal: Blood Pressure Within Desired Range  Outcome: Met     Problem: Pain Acute  Goal: Optimal Pain Control and Function  Outcome: Met     Problem: Fall Injury Risk  Goal: Absence of Fall and Fall-Related Injury  Outcome: Met     Problem: Electrolyte Imbalance  Goal: Electrolyte Balance  Outcome: Met     Problem: Chronic Kidney Disease  Goal: Optimal Coping with Chronic Illness  Outcome: Met  Goal: Electrolyte Balance  Outcome: Met  Goal: Fluid Balance  Outcome: Met  Goal: Optimal Functional Ability  Outcome: Met  Goal: Absence of Anemia Signs and Symptoms  Outcome: Met  Goal: Optimal Oral Intake  Outcome: Met  Goal: Acceptable Pain Control  Outcome: Met  Goal: Minimize Renal Failure Effects  Outcome: Met     Problem: Anemia  Goal: Anemia Symptom Improvement  Outcome: Met     Problem: Skin or Soft Tissue Infection  Goal: Absence of Infection Signs and Symptoms  Outcome: Met     Problem: Comorbidity Management  Goal: Blood Pressure in Desired Range  Outcome:  Met     Problem: Pain Chronic (Persistent)  Goal: Optimal Pain Control and Function  Outcome: Met     Problem: Mobility Impairment  Goal: Optimal Mobility [place] and Safety  Outcome: Met

## 2024-07-05 NOTE — PT/OT/SLP PROGRESS
Occupational Therapy   Treatment    Name: Riaz Guerra Jr.  MRN: [id number]  Admitting Diagnosis:  Diabetic foot infection       Recommendations:     Discharge Recommendations: Moderate Intensity Therapy  Discharge Equipment Recommendations:  bedside commode, hospital bed, wheelchair  Barriers to discharge:  None    Assessment:     Riaz Guerra Jr. is a 74 y.o. male with a medical diagnosis of Diabetic foot infection.  He presents with the fallowing performance deficits affecting function are weakness, impaired endurance, impaired self care skills, impaired functional mobility, gait instability, impaired balance, pain, decreased safety awareness, impaired skin, decreased lower extremity function, decreased upper extremity function. Patient agreeable to tx session, patient continues to required significant assistance with functional transfers, bed mobility, and self-care task, patient continues  to have limited activity tolerance due to pain and weakness 2/2 to recent hospitalization. Patient would benefit from Moderate intensity therapy intervention to address over all functional decline with mobility task, endurance, and ADLs in order to return to PLOF.     Rehab Prognosis:  Good; patient would benefit from acute skilled OT services to address these deficits and reach maximum level of function.       Plan:     Patient to be seen 4 x/week to address the above listed problems via self-care/home management, therapeutic activities, therapeutic exercises  Plan of Care Expires: 08/03/24  Plan of Care Reviewed with: patient    Subjective     Chief Complaint: pain  Patient/Family Comments/goals: to return to PLOF  Pain/Comfort:  Pain Rating 1:  (patient did not rate)  Location - Side 1: Bilateral  Location - Orientation 1: lower  Location 1: leg  Pain Addressed 1: Reposition, Distraction  Pain Rating Post-Intervention 1:  (patient did not rate)    Objective:     Communicated with: Nurse prior to session.   Patient found HOB elevated with telemetry, peripheral IV upon OT entry to room.  A client care conference was completed by the OTR and the SHANE prior to treatment by the SHANE to discuss the patient's POC and current status.   Co-treated with PT due to patient complexity deficits requiring two skilled therapist to appropriately and safely mobilized patient while facilitating functional task in addition to accommodating for patient's activity tolerance.    General Precautions: Standard, fall    Orthopedic Precautions:N/A  Braces: N/A  Respiratory Status: Room air     Occupational Performance:     Bed Mobility:    Patient completed Rolling/Turning to Left with  maximal assistance to reach for bed rail with HOB elevated   Patient completed Scooting anteriorly to EOB  with maximal assistance   Patient completed Supine to Sit with maximal assistance of 2 persons   Patient completed Sit to Supine with maximal assistance of 2 persons   Patient was able to sit up at EOB for ~10 min with SBA for static sitting balance     Functional Mobility/Transfers:  Patient decline further mobility task due to pain     Activities of Daily Living:  Grooming: supervision and stand by assistance to complete an oral care task sitting up at the EOB     [facility] 6 Click ADL: 14    Treatment & Education:  Discussed OT POC and progress  Educated patient on the importance to continue to perform exercises in order to reduce stiffness and promote joint mobility and blood flow  Addressed patient's questions and concerns within SHANE scope of practice      Patient left HOB elevated with all lines intact and call button in reach    GOALS:   Multidisciplinary Problems       Occupational Therapy Goals          Problem: Occupational Therapy    Goal Priority Disciplines Outcome Interventions   Occupational Therapy Goal     OT, PT/OT Progressing    Description: Goals to be met by: 8/3/24     Patient will increase functional independence with ADLs by  performing:    UE Dressing with Minimal Assistance.  LE Dressing with Moderate Assistance.  Grooming while standing with Stand-by Assistance.  Toileting from toilet with Moderate Assistance for hygiene and clothing management.   All functional transfers performed with Mod A      The mobility limitation cannot be sufficiently resolved by the use of a cane.   Patient's functional mobility deficit can be sufficiently resolved with the use of a walker.  Patient's mobility limitation significantly impairs their ability to participate in one of more activities of daily living.  The use of a walker will significantly improve the patient's ability to participate in MRADLS and the patient will use it on regular basis in the home.      Patient requires a hospital bed for positioning of the body in ways that are not feasible with an ordinary bed. The patient requires special positioning for pain relief, limited mobility, and/or being unable to independently make changes in body position without the use of a hospital bed. Pillows and wedges will not be adequate for resolving these positional issues.     Patient has a mobility limitation that significantly impairs their ability to participate in one or more mobility related activities of daily living, including toileting. This deficit can be resolved by using a bedside commode. Patient demonstrates mobility limitations that will cause them to be confined to one room at home without bathroom access for up to 30 days. Using a bedside commode will greatly improve the patient's ability to participate in MRADLs.                        Time Tracking:     OT Date of Treatment: 07/05/24  OT Start Time: 1324  OT Stop Time: 1344  OT Total Time (min): 20 min    Billable Minutes:Self Care/Home Management 20    OT/TALA: TALA     Number of TALA visits since last OT visit: 1    7/5/2024

## 2024-07-05 NOTE — NURSING
Nurses Note -- 4 Eyes      7/5/2024   1:30 AM      Skin assessed during: Q Shift Change      [] No Altered Skin Integrity Present    []Prevention Measures Documented      [x] Yes- Altered Skin Integrity Present or Discovered   [] LDA Added if Not in Epic (Describe Wound)   [x] New Altered Skin Integrity was Present on Admit and Documented in LDA   [] Wound Image Taken    Wound Care Consulted? Yes    Attending Nurse:  Carmita Montano RN/Staff Member:  Johanna Gallo LPN    Pt. Unable to turn for posterior skin assessment. Pt. Refused assistance from both staff members.     0140:  able to assess posterior skin, no additional wounds noted, Two PCT's at bedside for assessment, Ted

## 2024-07-05 NOTE — PLAN OF CARE
[facility]      HOME HEALTH ORDERS  FACE TO FACE ENCOUNTER    Patient Name: Riaz Guerra Jr.  YOB: 1950    PCP: Berhane Reyes (Inactive)   PCP Address: [address] / [place] [address]  PCP Phone Number: [phone]  PCP Fax: [phone]    Encounter Date: 7/1/24    Admit to Home Health    Diagnoses:  Active Hospital Problems    Diagnosis  POA    *Diabetic foot infection [E11.628, L08.9]  Yes    Insulin dependent type 2 diabetes mellitus [E11.9, Z79.4]  Not Applicable    HTN (hypertension) [I10]  Yes    Hyperkalemia [E87.5]  Yes    Chronic pain [G89.29]  Yes    CKD (chronic kidney disease) stage 3, GFR 30-59 ml/min [N18.30]  Yes    Anemia [D64.9]  Yes    Metabolic acidosis, normal anion gap (NAG) [E87.20]  Yes      Resolved Hospital Problems    Diagnosis Date Resolved POA    Hyponatremia [E87.1] 07/01/2024 Yes       Follow Up Appointments:  No future appointments.          Allergies:  Review of patient's allergies indicates:   Allergen Reactions    Lisinopril Other (See Comments)     cough       Medications: Review discharge medications with patient and family and provide education.    Current Facility-Administered Medications   Medication Dose Route Frequency Provider Last Rate Last Admin    acetaminophen tablet 650 mg  650 mg Oral Q4H PRN Carrie Ramírez MD        amLODIPine tablet 10 mg  10 mg Oral Daily Bj Mcgregor MD   10 mg at 07/05/24 0858    aspirin EC tablet 81 mg  81 mg Oral Daily Carrie Ramírez MD   81 mg at 07/05/24 0858    atorvastatin tablet 20 mg  20 mg Oral Daily Carrie Ramírez MD   20 mg at 07/05/24 0858    dextrose 10% bolus 125 mL 125 mL  12.5 g Intravenous PRN Carrie Ramírez MD        dextrose 10% bolus 250 mL 250 mL  25 g Intravenous PRN Carrie Ramírez MD        furosemide injection 40 mg  40 mg Intravenous Q12H Ellis Church MD   40 mg at 07/05/24 0858    glucagon (human recombinant) injection 1 mg  1 mg  Intramuscular PRN Carrie Ramírez MD        glucose chewable tablet 16 g  16 g Oral PRN Carrie Ramírez MD        glucose chewable tablet 24 g  24 g Oral PRN Carrie Ramírez MD        heparin (porcine) injection 7,500 Units  7,500 Units Subcutaneous Q8H Carrie Ramírez MD   7,500 Units at 07/05/24 0503    HYDROcodone-acetaminophen  mg per tablet 1 tablet  1 tablet Oral Q6H PRN Carrie Ramírez MD   1 tablet at 07/05/24 0857    insulin aspart U-100 pen 0-5 Units  0-5 Units Subcutaneous QID (AC + HS) PRN Carrie Ramírez MD   2 Units at 07/04/24 1149    insulin aspart U-100 pen 5 Units  5 Units Subcutaneous TIDWM Carrie Ramírez MD   5 Units at 07/05/24 0857    insulin glargine U-100 (Lantus) pen 20 Units  20 Units Subcutaneous Daily Carrie Ramírez MD   20 Units at 07/05/24 0857    melatonin tablet 6 mg  6 mg Oral Nightly PRN Seda Ng PA-C   6 mg at 07/03/24 2137    naloxone 0.4 mg/mL injection 0.02 mg  0.02 mg Intravenous PRN Carrie Ramírez MD        pneumoc 20-erlinda conj-dip cr(PF) (PREVNAR-20 (PF)) injection Syrg 0.5 mL  0.5 mL Intramuscular vaccine x 1 dose Carrie Ramírez MD        polyethylene glycol packet 17 g  17 g Oral Daily Carrie Ramírez MD   17 g at 07/05/24 0857    sodium bicarbonate tablet 650 mg  650 mg Oral TID Carrie Ramírez MD   650 mg at 07/05/24 0858    sodium chloride 0.9% flush 10 mL  10 mL Intravenous Q12H PRN Carrie Ramírez MD        sodium zirconium cyclosilicate packet 10 g  10 g Oral Daily Carrie Ramírez MD   10 g at 07/05/24 0857    tamsulosin 24 hr capsule 0.4 mg  0.4 mg Oral Daily Carrie Ramírez MD   0.4 mg at 07/05/24 0858    traMADoL tablet 50 mg  50 mg Oral Q8H PRN Carrie Ramírez MD   50 mg at 07/04/24 1570     Current Discharge Medication List        START taking these medications    Details   HYDROcodone-acetaminophen (NORCO) 5-325 mg per tablet Take 1 tablet by mouth every 8 (eight) hours as needed for Pain.  Qty: 21 tablet, Refills: 0     Comments: Quantity prescribed more than 7 day supply? No      levoFLOXacin (LEVAQUIN) 500 MG tablet Take 1.5 tablets (750 mg total) by mouth once daily. for 10 days  Qty: 15 tablet, Refills: 0           CONTINUE these medications which have NOT CHANGED    Details   ammonium lactate (LAC-HYDRIN) 12 % lotion SMARTSIG:Application Topical      gabapentin (NEURONTIN) 800 MG tablet Take 800 mg by mouth 3 (three) times daily.      HYDROcodone-acetaminophen (NORCO) 7.5-325 mg per tablet 1 tablet every 8 (eight) hours as needed for Pain.      amLODIPine (NORVASC) 10 MG tablet Take 1 tablet (10 mg total) by mouth once daily.  Qty: 30 tablet, Refills: 0    Associated Diagnoses: Hypertension, unspecified type      aspirin (ECOTRIN) 81 MG EC tablet Take 81 mg by mouth once daily.      atorvastatin (LIPITOR) 20 MG tablet Take 20 mg by mouth once daily.      baclofen (LIORESAL) 10 MG tablet Take 10 mg by mouth 3 (three) times daily.      BASAGLAR KWIKPEN U-100 INSULIN glargine 100 units/mL SubQ pen Inject 35 Units into the skin once daily.      cetirizine (ZYRTEC) 10 MG tablet Take 10 mg by mouth once daily.      diclofenac (CATAFLAM) 50 MG tablet Take 50 mg by mouth.      diclofenac (VOLTAREN) 75 MG EC tablet Take 1 tablet (75 mg total) by mouth 2 (two) times daily.  Qty: 40 tablet, Refills: 0    Associated Diagnoses: Arthritis of left elbow      furosemide (LASIX) 40 MG tablet Take 1 tablet (40 mg total) by mouth once daily. for 7 days  Qty: 7 tablet, Refills: 0    Associated Diagnoses: Bilateral lower leg cellulitis      ibuprofen (ADVIL,MOTRIN) 800 MG tablet Take 1 tablet by mouth every 4 (four) hours as needed for Pain.      losartan (COZAAR) 100 MG tablet Take 100 mg by mouth once daily.      metFORMIN (GLUCOPHAGE) 1000 MG tablet Take 1,000 mg by mouth 2 (two) times daily with meals.      NOVOLOG FLEXPEN U-100 INSULIN 100 unit/mL (3 mL) InPn pen Inject 10 Units into the skin 2 (two) times a day.      tamsulosin (FLOMAX) 0.4  mg Cap Take by mouth once daily.      traMADoL (ULTRAM) 50 mg tablet Take 1 tablet (50 mg total) by mouth every 8 (eight) hours as needed for Pain.  Qty: 12 each, Refills: 0    Associated Diagnoses: Bilateral lower leg cellulitis           STOP taking these medications       clotrimazole (LOTRIMIN) 1 % cream Comments:   Reason for Stopping:         lisinopril-hydrochlorothiazide (PRINZIDE,ZESTORETIC) 20-25 mg Tab Comments:   Reason for Stopping:         potassium chloride (KLOR-CON) 10 MEQ TbSR Comments:   Reason for Stopping:                 I have seen and examined this patient within the last 30 days. My clinical findings that support the need for the home health skilled services and home bound status are the following:no   Medical restrictions requiring assistance of another human to leave home due to  Unstable ambulation and Wound care needs.     Diet:   diabetic diet 2000 calorie    Labs:  SN to perform labs:  BMP: 1 week after discharge; for 1 occasion and Report Lab results to PCP.    Referrals/ Consults  Physical Therapy to evaluate and treat. Evaluate for home safety and equipment needs; Establish/upgrade home exercise program. Perform / instruct on therapeutic exercises, gait training, transfer training, and Range of Motion.  Occupational Therapy to evaluate and treat. Evaluate home environment for safety and equipment needs. Perform/Instruct on transfers, ADL training, ROM, and therapeutic exercises.    Activities:   activity as tolerated    Nursing:   Agency to admit patient within 24 hours of hospital discharge unless specified on physician order or at patient request    SN to complete comprehensive assessment including routine vital signs. Instruct on disease process and s/s of complications to report to MD. Review/verify medication list sent home with the patient at time of discharge  and instruct patient/caregiver as needed. Frequency may be adjusted depending on start of care date.     Skilled  nurse to perform up to 3 visits PRN for symptoms related to diagnosis    Notify MD if SBP > 160 or < 90; DBP > 90 or < 50; HR > 120 or < 50; Temp > 101; O2 < 88%; Other:       Ok to schedule additional visits based on staff availability and patient request on consecutive days within the home health episode.    When multiple disciplines ordered:    Start of Care occurs on Sunday - Wednesday schedule remaining discipline evaluations as ordered on separate consecutive days following the start of care.    Thursday SOC -schedule subsequent evaluations Friday and Monday the following week.     Friday - Saturday SOC - schedule subsequent discipline evaluations on consecutive days starting Monday of the following week.    For all post-discharge communication and subsequent orders please contact patient's primary care physician. If unable to reach primary care physician or do not receive response within 30 minutes, please contact PCP for clinical staff order clarification    Miscellaneous   Diabetic Care:   SN to perform and educate Diabetic management with blood glucose monitoring:, Fingerstick blood sugar AC and HS, and Report CBG < 60 or > 350 to physician.        Wound Care Orders  yes:  Diabetic Foot Ulcer:  Location: bilateral lower extremities ulceration   Every 2 days   Clean with soap/water (either no rinse, CHG soap, or bath wipes)   Apply Hydrofera foam to wounds   Cover with Abd pad and wrap in kerlix    I certify that this patient is confined to his home and needs intermittent skilled nursing care, physical therapy, and occupational therapy.      Georgiana De Souza MD, MPH, FACP  Senior Physician, Department of Hospital Medicine  [facility]  [address], [place], [place]

## 2024-07-05 NOTE — NURSING
Pt. With incontinent episode, when arriving to room, PCT's were changing patient.  Pt. Accused staff of lying about coming in to give him his pain medication and not believing he is in pain.  Pt. Does report that itching has resolved.  During  this time, pt. Was rolled and posterior aspect of skin inspected, no additional wounds noted at this time.

## 2024-07-05 NOTE — PROGRESS NOTES
Pharmacokinetic Assessment Follow Up: IV Vancomycin    Vancomycin serum concentration assessment(s):    The random level was drawn correctly and can be used to guide therapy at this time. The measurement is within the desired definitive target range of 10 to 20 mcg/mL.  Scr improving 1.8>1.5>1.4 if continues to improve change vancomycin to schedule regimen.    Vancomycin Regimen Plan:    Vancomycin 1500 mg IV x1 today   Re-dose when the random level is less than 20 mcg/mL, next level to be drawn at 0400 on 7/5/24    Drug levels (last 3 results):  Recent Labs   Lab Result Units 07/02/24  2314 07/03/24  1153 07/04/24  0551 07/05/24  0614   Vancomycin, Random ug/mL  --  18.8 14.9 13.6   Vancomycin-Trough ug/mL 24.7*  --   --   --        Pharmacy will continue to follow and monitor vancomycin.    Please contact pharmacy at extension [phone] for questions regarding this assessment.    Thank you for the consult,   Lexi Patel       Patient brief summary:  Riaz Guerra Jr. is a 74 y.o. male initiated on antimicrobial therapy with IV Vancomycin for treatment of skin & soft tissue infection    The patient's current regimen is pulse dosing    Drug Allergies:   Review of patient's allergies indicates:   Allergen Reactions    Lisinopril Other (See Comments)     cough       Actual Body Weight:   152.5 kg    Renal Function:   Estimated Creatinine Clearance: 71.6 mL/min (based on SCr of 1.4 mg/dL).,     Dialysis Method (if applicable):  N/A    CBC (last 72 hours):  Recent Labs   Lab Result Units 07/03/24  0842 07/04/24  0551   WBC K/uL 9.00 7.40   Hemoglobin g/dL 12.2* 12.0*   Hematocrit % 38.1* 36.0*   Platelets K/uL 277 290   Gran % % 73.5* 72.1   Lymph % % 17.1* 18.0   Mono % % 6.4 7.2   Eosinophil % % 1.7 1.5   Basophil % % 0.6 0.7   Differential Method  Automated Automated       Metabolic Panel (last 72 hours):  Recent Labs   Lab Result Units 07/02/24  1059 07/02/24  1327 07/02/24  1548 07/02/24  1946  07/02/24  2314 07/03/24  0842 07/04/24  0551 07/05/24  0932   Sodium mmol/L 131*  130*  --  132* 129* 129* 131* 134*  --    Sodium, Urine mmol/L  --  43  --   --   --   --   --   --    Potassium mmol/L 4.7  4.7  --  4.9 4.9 5.1 5.1 4.7  --    Potassium, Urine mmol/L  --  35  --   --   --   --   --   --    Chloride mmol/L 105  105  --  107 104 105 107 106  --    Chloride, Urine mmol/L  --  65  --   --   --   --   --   --    CO2 mmol/L 17*  17*  --  14* 16* 15* 15* 19*  --    Glucose mg/dL 191*  191*  --  180* 166* 157* 148* 139*  --    BUN mg/dL 37*  35*  --  38* 43* 45* 40* 35*  --    Creatinine mg/dL 1.6*  1.6*  --  1.6* 1.9* 1.8* 1.5* 1.3 1.4   Albumin g/dL 3.0*  3.0*  --  3.1* 3.0* 3.0* 3.1*  --   --    Magnesium mg/dL  --   --   --   --   --  2.0 2.0  --    Phosphorus mg/dL 4.8*  4.8*  --  5.3* 5.6* 5.7* 4.7*  --   --        Vancomycin Administrations:  vancomycin given in the last 96 hours                     vancomycin (VANCOCIN) 1,000 mg in D5W 250 mL IVPB (Vial-Mate) (mg) 1,000 mg New Bag 07/04/24 0911    vancomycin (VANCOCIN) 500 mg in D5W 100 mL IVPB (MB+) (mg) 500 mg New Bag 07/03/24 1400    vancomycin 1,500 mg in D5W 250 mL IVPB (Vial-Mate) (mg) 1,500 mg New Bag 07/02/24 1310     1,500 mg New Bag  0037    vancomycin (VANCOCIN) 2,250 mg in D5W 500 mL IVPB (mg) 2,250 mg New Bag 07/01/24 1309                    Microbiologic Results:  Microbiology Results (last 7 days)       Procedure Component Value Units Date/Time    Blood culture x two cultures. Draw prior to antibiotics. [[id number]] Collected: 07/01/24 1029    Order Status: Completed Specimen: Blood from Peripheral, Hand, Right Updated: 07/04/24 1212     Blood Culture, Routine No Growth to date      No Growth to date      No Growth to date      No Growth to date    Narrative:      Aerobic and anaerobic    Blood culture x two cultures. Draw prior to antibiotics. [[id number]] Collected: 07/01/24 1030    Order Status: Completed Specimen: Blood  from Peripheral, Hand, Left Updated: 07/04/24 1212     Blood Culture, Routine No Growth to date      No Growth to date      No Growth to date      No Growth to date    Narrative:      Aerobic and anaerobic

## 2024-07-05 NOTE — PROGRESS NOTES
VANCOMYCIN DOSING BY PHARMACY DISCONTINUATION NOTE    Riaz Guerra Jr. is a 74 y.o. male who had been consulted for vancomycin dosing.    The pharmacy consult for vancomycin dosing has been discontinued.     Vancomycin Dosing by Pharmacy Consult will sign-off. Please reconsult if necessary. Thank you for allowing us to participate in this patient's care.     Lexi Patel, Pharm.D.,[facility]  Phone: [phone]  ]

## 2024-07-05 NOTE — PLAN OF CARE
3:18 PM [facility] accepted patient for HH   2:54 PM SW faxed referral to [facility]      07/05/24 1511   Post-Acute Status   Post-Acute Authorization Home Health   Home Health Status Referrals Sent   Hospital Resources/Appts/Education Provided Provided patient/caregiver with written discharge plan information   Patient choice form signed by patient/caregiver List with quality metrics by geographic area provided   Discharge Delays None known at this time   Discharge Plan   Discharge Plan A Home Health   Discharge Plan B Home     Met with patient to review discharge recommendation of HH and is agreeable to plan    Patient/family provided list of facilities in-network with patient's payor plan. Providers that are owned, operated, or affiliated with [facility] are included on the list.     Notified that referral sent to below listed facilities from in-network list based on proximity to home/family support:   1.No preference   2.  3.  4.  5. (can send more than 5)    Patient/family instructed to identify preference.    Preferred Facility: (if more than 1, listed in order of descending preference)  1.No preference     If an additional preferred facility not listed above is identified, additional referral to be sent. If above facilities unable to accept, will send additional referrals to in-network providers.      Christiana Castano, JOYA  [facility]  Ext. [phone]

## 2024-07-05 NOTE — NURSING
Pt. Called RN to bedside c/o itching to bilateral feet and chest.  Reports it began after nurse checked his telemetry box to verify TTX# in use.  Johanna Gallo LPN at bedside to assess skin, no visible rash.  Pt. Repositioned as best as possible as patient stated he cannot roll over and refused assistance from nurses at bedside.

## 2024-07-06 LAB
BACTERIA BLD CULT: NORMAL
BACTERIA BLD CULT: NORMAL

## 2024-08-09 ENCOUNTER — DOCUMENT SCAN (OUTPATIENT)
Dept: HOME HEALTH SERVICES | Facility: HOSPITAL | Age: 74
End: 2024-08-09
Payer: MEDICARE

## 2024-08-13 ENCOUNTER — EXTERNAL HOME HEALTH (OUTPATIENT)
Dept: HOME HEALTH SERVICES | Facility: HOSPITAL | Age: 74
End: 2024-08-13
Payer: MEDICARE

## 2024-09-28 ENCOUNTER — HOSPITAL ENCOUNTER (EMERGENCY)
Facility: HOSPITAL | Age: 74
Discharge: HOME | End: 2024-09-29
Attending: EMERGENCY MEDICINE
Payer: MEDICARE

## 2024-09-28 DIAGNOSIS — T50.901A ACCIDENTAL OVERDOSE, INITIAL ENCOUNTER: Primary | ICD-10-CM

## 2024-09-28 DIAGNOSIS — R41.82 ALTERED MENTAL STATUS: ICD-10-CM

## 2024-09-28 DIAGNOSIS — I10 HYPERTENSION, UNSPECIFIED TYPE: ICD-10-CM

## 2024-09-28 DIAGNOSIS — E87.6 HYPOKALEMIA: ICD-10-CM

## 2024-09-28 LAB
ALLENS TEST: ABNORMAL
HCO3 UR-SCNC: 23.4 MMOL/L (ref 24–28)
PCO2 BLDA: 37.2 MMHG (ref 35–45)
PH SMN: 7.41 [PH] (ref 7.35–7.45)
PO2 BLDA: 56 MMHG (ref 40–60)
POC BE: -1 MMOL/L
POC SATURATED O2: 89 % (ref 95–100)
POC TCO2: 25 MMOL/L (ref 24–29)
SAMPLE: ABNORMAL
SITE: ABNORMAL

## 2024-09-28 PROCEDURE — 99900035 HC TECH TIME PER 15 MIN (STAT)

## 2024-09-28 PROCEDURE — 87389 HIV-1 AG W/HIV-1&-2 AB AG IA: CPT | Performed by: PHYSICIAN ASSISTANT

## 2024-09-28 PROCEDURE — 93005 ELECTROCARDIOGRAM TRACING: CPT

## 2024-09-28 PROCEDURE — 82803 BLOOD GASES ANY COMBINATION: CPT

## 2024-09-28 PROCEDURE — 87522 HEPATITIS C REVRS TRNSCRPJ: CPT | Performed by: PHYSICIAN ASSISTANT

## 2024-09-28 PROCEDURE — 82800 BLOOD PH: CPT

## 2024-09-28 PROCEDURE — 99284 EMERGENCY DEPT VISIT MOD MDM: CPT | Mod: 25

## 2024-09-28 PROCEDURE — 93010 ELECTROCARDIOGRAM REPORT: CPT | Mod: ,,, | Performed by: INTERNAL MEDICINE

## 2024-09-28 PROCEDURE — 82077 ASSAY SPEC XCP UR&BREATH IA: CPT

## 2024-09-28 PROCEDURE — 83880 ASSAY OF NATRIURETIC PEPTIDE: CPT

## 2024-09-28 PROCEDURE — 85025 COMPLETE CBC W/AUTO DIFF WBC: CPT

## 2024-09-28 PROCEDURE — 86803 HEPATITIS C AB TEST: CPT | Performed by: PHYSICIAN ASSISTANT

## 2024-09-28 PROCEDURE — 84443 ASSAY THYROID STIM HORMONE: CPT

## 2024-09-28 PROCEDURE — 80053 COMPREHEN METABOLIC PANEL: CPT

## 2024-09-29 VITALS
TEMPERATURE: 98 F | WEIGHT: 315 LBS | BODY MASS INDEX: 44.1 KG/M2 | DIASTOLIC BLOOD PRESSURE: 97 MMHG | OXYGEN SATURATION: 98 % | RESPIRATION RATE: 17 BRPM | HEART RATE: 87 BPM | SYSTOLIC BLOOD PRESSURE: 168 MMHG | HEIGHT: 71 IN

## 2024-09-29 LAB
ALBUMIN SERPL BCP-MCNC: 2.8 G/DL (ref 3.5–5.2)
ALP SERPL-CCNC: 75 U/L (ref 55–135)
ALT SERPL W/O P-5'-P-CCNC: 14 U/L (ref 10–44)
ANION GAP SERPL CALC-SCNC: 9 MMOL/L (ref 8–16)
AST SERPL-CCNC: 15 U/L (ref 10–40)
BASOPHILS # BLD AUTO: 0.05 K/UL (ref 0–0.2)
BASOPHILS NFR BLD: 0.6 % (ref 0–1.9)
BILIRUB SERPL-MCNC: 0.5 MG/DL (ref 0.1–1)
BNP SERPL-MCNC: <10 PG/ML (ref 0–99)
BUN SERPL-MCNC: 14 MG/DL (ref 8–23)
CALCIUM SERPL-MCNC: 8.7 MG/DL (ref 8.7–10.5)
CHLORIDE SERPL-SCNC: 111 MMOL/L (ref 95–110)
CO2 SERPL-SCNC: 21 MMOL/L (ref 23–29)
CREAT SERPL-MCNC: 1 MG/DL (ref 0.5–1.4)
DIFFERENTIAL METHOD BLD: ABNORMAL
EOSINOPHIL # BLD AUTO: 0.2 K/UL (ref 0–0.5)
EOSINOPHIL NFR BLD: 1.7 % (ref 0–8)
ERYTHROCYTE [DISTWIDTH] IN BLOOD BY AUTOMATED COUNT: 14.2 % (ref 11.5–14.5)
EST. GFR  (NO RACE VARIABLE): >60 ML/MIN/1.73 M^2
ETHANOL SERPL-MCNC: <10 MG/DL
GLUCOSE SERPL-MCNC: 131 MG/DL (ref 70–110)
HCT VFR BLD AUTO: 31.6 % (ref 40–54)
HCV AB SERPL QL IA: REACTIVE
HGB BLD-MCNC: 10.4 G/DL (ref 14–18)
HIV 1+2 AB+HIV1 P24 AG SERPL QL IA: NORMAL
IMM GRANULOCYTES # BLD AUTO: 0.04 K/UL (ref 0–0.04)
IMM GRANULOCYTES NFR BLD AUTO: 0.5 % (ref 0–0.5)
LYMPHOCYTES # BLD AUTO: 1.2 K/UL (ref 1–4.8)
LYMPHOCYTES NFR BLD: 14.2 % (ref 18–48)
MCH RBC QN AUTO: 30.2 PG (ref 27–31)
MCHC RBC AUTO-ENTMCNC: 32.9 G/DL (ref 32–36)
MCV RBC AUTO: 92 FL (ref 82–98)
MONOCYTES # BLD AUTO: 0.6 K/UL (ref 0.3–1)
MONOCYTES NFR BLD: 7.1 % (ref 4–15)
NEUTROPHILS # BLD AUTO: 6.5 K/UL (ref 1.8–7.7)
NEUTROPHILS NFR BLD: 75.9 % (ref 38–73)
NRBC BLD-RTO: 0 /100 WBC
OHS QRS DURATION: 88 MS
OHS QTC CALCULATION: 457 MS
PLATELET # BLD AUTO: 280 K/UL (ref 150–450)
PMV BLD AUTO: 10.6 FL (ref 9.2–12.9)
POTASSIUM SERPL-SCNC: 2.8 MMOL/L (ref 3.5–5.1)
PROT SERPL-MCNC: 7.2 G/DL (ref 6–8.4)
RBC # BLD AUTO: 3.44 M/UL (ref 4.6–6.2)
SODIUM SERPL-SCNC: 141 MMOL/L (ref 136–145)
TSH SERPL DL<=0.005 MIU/L-ACNC: 0.75 UIU/ML (ref 0.4–4)
WBC # BLD AUTO: 8.59 K/UL (ref 3.9–12.7)

## 2024-09-29 PROCEDURE — 25000003 PHARM REV CODE 250

## 2024-09-29 PROCEDURE — 25000003 PHARM REV CODE 250: Performed by: EMERGENCY MEDICINE

## 2024-09-29 RX ORDER — AMLODIPINE BESYLATE 10 MG/1
10 TABLET ORAL
Status: COMPLETED | OUTPATIENT
Start: 2024-09-29 | End: 2024-09-29

## 2024-09-29 RX ORDER — POTASSIUM CHLORIDE 750 MG/1
30 CAPSULE, EXTENDED RELEASE ORAL ONCE
Status: COMPLETED | OUTPATIENT
Start: 2024-09-29 | End: 2024-09-29

## 2024-09-29 RX ADMIN — AMLODIPINE BESYLATE 10 MG: 10 TABLET ORAL at 07:09

## 2024-09-29 RX ADMIN — POTASSIUM CHLORIDE 30 MEQ: 750 CAPSULE, EXTENDED RELEASE ORAL at 12:09

## 2024-09-29 RX ADMIN — POTASSIUM BICARBONATE 50 MEQ: 978 TABLET, EFFERVESCENT ORAL at 12:09

## 2024-09-29 NOTE — ED NOTES
Bed: 03  Expected date:   Expected time:   Means of arrival:   Comments:  Donate   [FreeTextEntry3] : PA present due to pt gender / age/  physicality  / acted as scribe for this encounter.\par

## 2024-09-29 NOTE — ED TRIAGE NOTES
Riaz Salazarkurtis Jordan, a 74 y.o. male presents to the ED w/ complaint of found down in vehicle asleep. Took 2 gabapentin for Bilateral leg pain. Bilateral lower extremity swelling. GCS 14. Per SO speech is difficult to understand at baseline.     Triage note:  Chief Complaint   Patient presents with    Leg Pain     Found down in vehicle asleep. Took 2 gabapentin for Bilateral leg pain. Bilateral lower extremity swelling. GCS 14. Per SO speech is difficult to understand at baseline.      Review of patient's allergies indicates:   Allergen Reactions    Lisinopril Other (See Comments)     cough     Past Medical History:   Diagnosis Date    Depression     Diabetes mellitus     Gout     High cholesterol     Hypertension

## 2024-09-29 NOTE — DISCHARGE INSTRUCTIONS
Your blood pressure was elevated in the emergency department.  You must follow-up with your primary care doctor for adjustment of your medicine.    Tests today showed:   Labs Reviewed   ISTAT PROCEDURE - Abnormal       Result Value    POC PH 7.406      POC PCO2 37.2      POC PO2 56      POC HCO3 23.4 (*)     POC BE -1      POC SATURATED O2 89      POC TCO2 25      Sample VENOUS      Site Other      Allens Test N/A     TSH   HIV 1 / 2 ANTIBODY   HEPATITIS C ANTIBODY   CBC W/ AUTO DIFFERENTIAL   COMPREHENSIVE METABOLIC PANEL   B-TYPE NATRIURETIC PEPTIDE   URINALYSIS, REFLEX TO URINE CULTURE   DRUG SCREEN PANEL, URINE EMERGENCY   ALCOHOL,MEDICAL (ETHANOL)   POCT GLUCOSE MONITORING CONTINUOUS     No orders to display       Treatments you had today:   Medications - No data to display    Follow-Up Plan:  - Follow-up with primary care doctor within 3 - 5 days  - Additional testing and/or evaluation as directed by your primary doctor    Return to the Emergency Department for symptoms including but not limited to: worsening symptoms, shortness of breath or chest pain, vomiting with inability to hold down fluids, fevers greater than 100.4°F, passing out/fainting/unconsciousness, or other concerning symptoms.

## 2024-09-29 NOTE — ED PROVIDER NOTES
Encounter Date: 9/28/2024       History     Chief Complaint   Patient presents with    Leg Pain     Found down in vehicle asleep. Took 2 gabapentin for Bilateral leg pain. Bilateral lower extremity swelling. GCS 14. Per SO speech is difficult to understand at baseline.      74-year-old male with a past medical history hypertension, diabetes, hyperlipidemia presents for evaluation after being found down in his car.  EMS reports finding the patient unconscious in his car; patient stated that he took 2 gabapentin and fell asleep.  EMS spoke with the family and reports that patient is very hard to understand at baseline with garbled speech.  In emergency department, patient is somnolent but arouses to questions.  He endorses dry mouth and thirst but no other symptoms at this time.  Patient reports taking gabapentin but denies any other substance use.  Is the extent of the patient's complaints at this time.    The history is provided by the patient and the EMS personnel.     Review of patient's allergies indicates:   Allergen Reactions    Lisinopril Other (See Comments)     cough     Past Medical History:   Diagnosis Date    Depression     Diabetes mellitus     Gout     High cholesterol     Hypertension      History reviewed. No pertinent surgical history.  No family history on file.  Social History     Tobacco Use    Smoking status: Never    Smokeless tobacco: Never   Substance Use Topics    Alcohol use: Not Currently     Comment: occasionally    Drug use: Yes     Types: Marijuana         Physical Exam     Initial Vitals [09/28/24 2107]   BP Pulse Resp Temp SpO2   127/74 99 19 98.4 °F (36.9 °C) 100 %      MAP       --         Physical Exam    Nursing note and vitals reviewed.  Constitutional: He is Obese .   HENT: Mouth/Throat: Mucous membranes are dry.   Eyes:   Pinpoint pupils bilaterally   Cardiovascular:  Normal rate, regular rhythm and normal heart sounds.           Pulmonary/Chest: Breath sounds normal. No  respiratory distress.   Abdominal: Abdomen is soft. He exhibits distension. There is no abdominal tenderness.   Musculoskeletal:      Comments: 3+ nonpitting edema of lower extremities bilaterally     Neurological:   Arouses to voice  Oriented to person but not to place or time.  Unclear baseline  Following commands, moving all extremities   Skin:   Numerous scattered wounds over posterior lower extremities; see media tab for images         ED Course   Procedures  Labs Reviewed   HEPATITIS C ANTIBODY - Abnormal       Result Value    Hepatitis C Ab Reactive (*)     Narrative:     Release to patient->Immediate   CBC W/ AUTO DIFFERENTIAL - Abnormal    WBC 8.59      RBC 3.44 (*)     Hemoglobin 10.4 (*)     Hematocrit 31.6 (*)     MCV 92      MCH 30.2      MCHC 32.9      RDW 14.2      Platelets 280      MPV 10.6      Immature Granulocytes 0.5      Gran # (ANC) 6.5      Immature Grans (Abs) 0.04      Lymph # 1.2      Mono # 0.6      Eos # 0.2      Baso # 0.05      nRBC 0      Gran % 75.9 (*)     Lymph % 14.2 (*)     Mono % 7.1      Eosinophil % 1.7      Basophil % 0.6      Differential Method Automated     COMPREHENSIVE METABOLIC PANEL - Abnormal    Sodium 141      Potassium 2.8 (*)     Chloride 111 (*)     CO2 21 (*)     Glucose 131 (*)     BUN 14      Creatinine 1.0      Calcium 8.7      Total Protein 7.2      Albumin 2.8 (*)     Total Bilirubin 0.5      Alkaline Phosphatase 75      AST 15      ALT 14      eGFR >60.0      Anion Gap 9     ISTAT PROCEDURE - Abnormal    POC PH 7.406      POC PCO2 37.2      POC PO2 56      POC HCO3 23.4 (*)     POC BE -1      POC SATURATED O2 89      POC TCO2 25      Sample VENOUS      Site Other      Allens Test N/A     HIV 1 / 2 ANTIBODY    HIV 1/2 Ag/Ab Non-reactive      Narrative:     Release to patient->Immediate   B-TYPE NATRIURETIC PEPTIDE    BNP <10     ALCOHOL,MEDICAL (ETHANOL)    Alcohol, Serum <10     TSH   TSH    TSH 0.753      Narrative:     ADD ON TSH PER DR ADALBERTO GALLEGO/ORDER#  [id number] @ 00:41   HEPATITIS C RNA, QUANTITATIVE, PCR     EKG Readings: (Independently Interpreted)   Initial Reading: No STEMI. Previous EKG: Compared with most recent EKG Previous EKG Date: 7/2/24. Rhythm: Normal Sinus Rhythm. Heart Rate: 92. Axis: Normal. Other Impression: Prolonged PA interval       Imaging Results    None          Medications   potassium bicarbonate disintegrating tablet 50 mEq (50 mEq Oral Given 9/29/24 0054)   potassium chloride CR capsule 30 mEq (30 mEq Oral Given 9/29/24 0054)     Medical Decision Making  74-year-old male with a past medical history hypertension, diabetes, hyperlipidemia presents for evaluation after being found down in his car.     DdX includes but isn't limited to paranoid delusions, intracranial bleed, intracranial mass, UTI, electrolyte disturbance, intoxication, dehydration, hyper hypo thyroidism.    In emergency department, hemodynamically stable.  Following commands, but does appear to be altered.  Attempted to contact family, but unable to reach anyone, left voicemail.  Given unclear baseline in this patient, initiating broad workup for altered mental status including CT head, urinalysis, drug screen, additional labs.    After initiating workup, nurse reported that patient was significantly more awake was requesting discharge.  Had discussion with patient regarding tonight's events; patient states that his significant other gave him 2 gabapentin instead of his usual 1 and patient became extremely somnolent and fell asleep in his car.  At this time, patient is alert and oriented and has capacity to make his own decisions.  Further, patient has no indications for emergent conditions that would require admission.  Patient is hypokalemic, but without symptoms.  Patient received oral replacement of potassium while in the emergency department.  Given patient's improving mental status, deferred head CT that was previously ordered.  Patient is ambulatory at baseline with  the assistance of a walker/Rollator.  We will discharge patient with walker.  Patient will need transportation back home since he was brought to ED by EMS; transport will be arranged.  Return precautions given.  Patient is comfortable with plan.    Amount and/or Complexity of Data Reviewed  Labs: ordered. Decision-making details documented in ED Course.  Radiology: ordered.  ECG/medicine tests: ordered and independent interpretation performed. Decision-making details documented in ED Course.    Risk  Prescription drug management.              Attending Attestation:   Physician Attestation Statement for Resident:  As the supervising MD   Physician Attestation Statement: I have personally seen and examined this patient.   I agree with the above history.  -:   As the supervising MD I agree with the above PE.     As the supervising MD I agree with the above treatment, course, plan, and disposition.    I have reviewed and agree with the residents interpretation of the following: lab data.  I have reviewed the following: old records at this facility.                ED Course as of 09/29/24 0634   Sat Sep 28, 2024   2354 ISTAT PROCEDURE(!)  No hypercarbia or acidosis [BH]   Sun Sep 29, 2024   0005 Patient now more awake and interactive. He states that his significant other gave him a double dose of his gabapentin medication, which caused him to become very sleepy, but he is feeling better now and requesting discharge. [BH]   0020 Brain natriuretic peptide  Not concerning for CHF [BH]   0020 Ethanol  Not concerning for intoxication [BH]   0021 Comprehensive metabolic panel(!)  Hypokalemia, decreased bicarb [BH]   0033 CBC auto differential(!)  Anemia, most recent 12.0 [BH]      ED Course User Index  [BH] Greg Stratton MD                           Clinical Impression:  Final diagnoses:  [R41.82] Altered mental status  [T50.901A] Accidental overdose, initial encounter (Primary)  [I10] Hypertension, unspecified type  [E87.6]  Hypokalemia          ED Disposition Condition    Discharge Stable          ED Prescriptions    None       Follow-up Information       Follow up With Specialties Details Why Contact Info    Berhane Reyes  Schedule an appointment as soon as possible for a visit   [address] [place] [address] [phone]               Greg Stratton MD  Resident  09/29/24 0634       Greg Stratton MD  Resident  09/29/24 0635       Soha Jaime MD  09/29/24 3280

## 2024-09-30 LAB
HCV RNA SERPL QL NAA+PROBE: NOT DETECTED
HCV RNA SPEC NAA+PROBE-ACNC: NOT DETECTED IU/ML

## 2024-11-21 ENCOUNTER — HOSPITAL ENCOUNTER (INPATIENT)
Facility: OTHER | Age: 74
LOS: 5 days | Discharge: NURSING, REHAB OR LONG-TERM CARE | DRG: 300 | End: 2024-11-26
Admitting: STUDENT IN AN ORGANIZED HEALTH CARE EDUCATION/TRAINING PROGRAM
Payer: MEDICARE

## 2024-11-21 DIAGNOSIS — L97.919 VENOUS STASIS ULCERS OF BOTH LOWER EXTREMITIES: ICD-10-CM

## 2024-11-21 DIAGNOSIS — I83.029 VENOUS STASIS ULCERS OF BOTH LOWER EXTREMITIES: ICD-10-CM

## 2024-11-21 DIAGNOSIS — L03.119 CELLULITIS OF LOWER EXTREMITY, UNSPECIFIED LATERALITY: ICD-10-CM

## 2024-11-21 DIAGNOSIS — L97.929 VENOUS STASIS ULCERS OF BOTH LOWER EXTREMITIES: ICD-10-CM

## 2024-11-21 DIAGNOSIS — M79.89 SWELLING OF LOWER EXTREMITY: ICD-10-CM

## 2024-11-21 DIAGNOSIS — G89.29 OTHER CHRONIC PAIN: ICD-10-CM

## 2024-11-21 DIAGNOSIS — I10 HYPERTENSION, UNSPECIFIED TYPE: ICD-10-CM

## 2024-11-21 DIAGNOSIS — M79.89 LEG SWELLING: ICD-10-CM

## 2024-11-21 DIAGNOSIS — Z51.89 ENCOUNTER FOR WOUND RE-CHECK: Primary | ICD-10-CM

## 2024-11-21 DIAGNOSIS — I83.019 VENOUS STASIS ULCERS OF BOTH LOWER EXTREMITIES: ICD-10-CM

## 2024-11-21 DIAGNOSIS — I87.2 VENOUS STASIS DERMATITIS OF BOTH LOWER EXTREMITIES: ICD-10-CM

## 2024-11-21 LAB
ALBUMIN SERPL BCP-MCNC: 3 G/DL (ref 3.5–5.2)
ALP SERPL-CCNC: 73 U/L (ref 40–150)
ALT SERPL W/O P-5'-P-CCNC: 15 U/L (ref 10–44)
ANION GAP SERPL CALC-SCNC: 8 MMOL/L (ref 8–16)
AST SERPL-CCNC: 26 U/L (ref 10–40)
BASOPHILS # BLD AUTO: 0.06 K/UL (ref 0–0.2)
BASOPHILS NFR BLD: 0.6 % (ref 0–1.9)
BILIRUB SERPL-MCNC: 0.5 MG/DL (ref 0.1–1)
BNP SERPL-MCNC: <10 PG/ML (ref 0–99)
BUN SERPL-MCNC: 16 MG/DL (ref 8–23)
CALCIUM SERPL-MCNC: 9.1 MG/DL (ref 8.7–10.5)
CHLORIDE SERPL-SCNC: 111 MMOL/L (ref 95–110)
CO2 SERPL-SCNC: 17 MMOL/L (ref 23–29)
CREAT SERPL-MCNC: 1 MG/DL (ref 0.5–1.4)
DIFFERENTIAL METHOD BLD: ABNORMAL
EOSINOPHIL # BLD AUTO: 0.3 K/UL (ref 0–0.5)
EOSINOPHIL NFR BLD: 2.7 % (ref 0–8)
ERYTHROCYTE [DISTWIDTH] IN BLOOD BY AUTOMATED COUNT: 14.5 % (ref 11.5–14.5)
EST. GFR  (NO RACE VARIABLE): >60 ML/MIN/1.73 M^2
GLUCOSE SERPL-MCNC: 142 MG/DL (ref 70–110)
HCT VFR BLD AUTO: 42.2 % (ref 40–54)
HGB BLD-MCNC: 13.6 G/DL (ref 14–18)
IMM GRANULOCYTES # BLD AUTO: 0.03 K/UL (ref 0–0.04)
IMM GRANULOCYTES NFR BLD AUTO: 0.3 % (ref 0–0.5)
LDH SERPL L TO P-CCNC: 1.77 MMOL/L (ref 0.5–2.2)
LYMPHOCYTES # BLD AUTO: 1.7 K/UL (ref 1–4.8)
LYMPHOCYTES NFR BLD: 17.6 % (ref 18–48)
MAGNESIUM SERPL-MCNC: 2.1 MG/DL (ref 1.6–2.6)
MCH RBC QN AUTO: 29.5 PG (ref 27–31)
MCHC RBC AUTO-ENTMCNC: 32.2 G/DL (ref 32–36)
MCV RBC AUTO: 92 FL (ref 82–98)
MONOCYTES # BLD AUTO: 0.7 K/UL (ref 0.3–1)
MONOCYTES NFR BLD: 7.7 % (ref 4–15)
NEUTROPHILS # BLD AUTO: 6.7 K/UL (ref 1.8–7.7)
NEUTROPHILS NFR BLD: 71.1 % (ref 38–73)
NRBC BLD-RTO: 0 /100 WBC
PLATELET # BLD AUTO: 325 K/UL (ref 150–450)
PMV BLD AUTO: 10.5 FL (ref 9.2–12.9)
POCT GLUCOSE: 159 MG/DL (ref 70–110)
POTASSIUM SERPL-SCNC: 4 MMOL/L (ref 3.5–5.1)
PROT SERPL-MCNC: 8.1 G/DL (ref 6–8.4)
RBC # BLD AUTO: 4.61 M/UL (ref 4.6–6.2)
SAMPLE: NORMAL
SODIUM SERPL-SCNC: 136 MMOL/L (ref 136–145)
TROPONIN I SERPL DL<=0.01 NG/ML-MCNC: 0.01 NG/ML (ref 0–0.03)
WBC # BLD AUTO: 9.41 K/UL (ref 3.9–12.7)

## 2024-11-21 PROCEDURE — 25000003 PHARM REV CODE 250: Performed by: PHYSICIAN ASSISTANT

## 2024-11-21 PROCEDURE — 80053 COMPREHEN METABOLIC PANEL: CPT | Performed by: NURSE PRACTITIONER

## 2024-11-21 PROCEDURE — 83880 ASSAY OF NATRIURETIC PEPTIDE: CPT | Performed by: PHYSICIAN ASSISTANT

## 2024-11-21 PROCEDURE — 63600175 PHARM REV CODE 636 W HCPCS: Performed by: PHYSICIAN ASSISTANT

## 2024-11-21 PROCEDURE — 96365 THER/PROPH/DIAG IV INF INIT: CPT

## 2024-11-21 PROCEDURE — 87040 BLOOD CULTURE FOR BACTERIA: CPT | Mod: 59 | Performed by: PHYSICIAN ASSISTANT

## 2024-11-21 PROCEDURE — 84484 ASSAY OF TROPONIN QUANT: CPT | Performed by: PHYSICIAN ASSISTANT

## 2024-11-21 PROCEDURE — 82962 GLUCOSE BLOOD TEST: CPT

## 2024-11-21 PROCEDURE — 93005 ELECTROCARDIOGRAM TRACING: CPT

## 2024-11-21 PROCEDURE — 96375 TX/PRO/DX INJ NEW DRUG ADDON: CPT

## 2024-11-21 PROCEDURE — 11000001 HC ACUTE MED/SURG PRIVATE ROOM

## 2024-11-21 PROCEDURE — 83735 ASSAY OF MAGNESIUM: CPT | Performed by: NURSE PRACTITIONER

## 2024-11-21 PROCEDURE — 85025 COMPLETE CBC W/AUTO DIFF WBC: CPT | Performed by: PHYSICIAN ASSISTANT

## 2024-11-21 PROCEDURE — 99285 EMERGENCY DEPT VISIT HI MDM: CPT | Mod: 25

## 2024-11-21 PROCEDURE — A4216 STERILE WATER/SALINE, 10 ML: HCPCS | Performed by: PHYSICIAN ASSISTANT

## 2024-11-21 PROCEDURE — 93010 ELECTROCARDIOGRAM REPORT: CPT | Mod: ,,, | Performed by: INTERNAL MEDICINE

## 2024-11-21 PROCEDURE — 25000242 PHARM REV CODE 250 ALT 637 W/ HCPCS: Performed by: PHYSICIAN ASSISTANT

## 2024-11-21 RX ORDER — BACLOFEN 10 MG/1
10 TABLET ORAL 3 TIMES DAILY
Status: DISCONTINUED | OUTPATIENT
Start: 2024-11-22 | End: 2024-11-26 | Stop reason: HOSPADM

## 2024-11-21 RX ORDER — TALC
6 POWDER (GRAM) TOPICAL NIGHTLY PRN
Status: DISCONTINUED | OUTPATIENT
Start: 2024-11-21 | End: 2024-11-26 | Stop reason: HOSPADM

## 2024-11-21 RX ORDER — ATORVASTATIN CALCIUM 20 MG/1
20 TABLET, FILM COATED ORAL DAILY
Status: DISCONTINUED | OUTPATIENT
Start: 2024-11-22 | End: 2024-11-26 | Stop reason: HOSPADM

## 2024-11-21 RX ORDER — LOSARTAN POTASSIUM 50 MG/1
100 TABLET ORAL DAILY
Status: DISCONTINUED | OUTPATIENT
Start: 2024-11-22 | End: 2024-11-26 | Stop reason: HOSPADM

## 2024-11-21 RX ORDER — GLUCAGON 1 MG
1 KIT INJECTION
Status: DISCONTINUED | OUTPATIENT
Start: 2024-11-21 | End: 2024-11-26 | Stop reason: HOSPADM

## 2024-11-21 RX ORDER — INSULIN GLARGINE 100 [IU]/ML
20 INJECTION, SOLUTION SUBCUTANEOUS DAILY
Status: DISCONTINUED | OUTPATIENT
Start: 2024-11-22 | End: 2024-11-25

## 2024-11-21 RX ORDER — SODIUM CHLORIDE 0.9 % (FLUSH) 0.9 %
10 SYRINGE (ML) INJECTION EVERY 8 HOURS
Status: DISCONTINUED | OUTPATIENT
Start: 2024-11-21 | End: 2024-11-26 | Stop reason: HOSPADM

## 2024-11-21 RX ORDER — INSULIN ASPART 100 [IU]/ML
5 INJECTION, SOLUTION INTRAVENOUS; SUBCUTANEOUS
Status: DISCONTINUED | OUTPATIENT
Start: 2024-11-22 | End: 2024-11-25

## 2024-11-21 RX ORDER — ACETAMINOPHEN 325 MG/1
650 TABLET ORAL EVERY 6 HOURS PRN
Status: DISCONTINUED | OUTPATIENT
Start: 2024-11-21 | End: 2024-11-26 | Stop reason: HOSPADM

## 2024-11-21 RX ORDER — KETOROLAC TROMETHAMINE 30 MG/ML
10 INJECTION, SOLUTION INTRAMUSCULAR; INTRAVENOUS
Status: COMPLETED | OUTPATIENT
Start: 2024-11-21 | End: 2024-11-21

## 2024-11-21 RX ORDER — TAMSULOSIN HYDROCHLORIDE 0.4 MG/1
0.4 CAPSULE ORAL DAILY
Status: DISCONTINUED | OUTPATIENT
Start: 2024-11-22 | End: 2024-11-26 | Stop reason: HOSPADM

## 2024-11-21 RX ORDER — MORPHINE SULFATE 4 MG/ML
4 INJECTION, SOLUTION INTRAMUSCULAR; INTRAVENOUS
Status: COMPLETED | OUTPATIENT
Start: 2024-11-21 | End: 2024-11-21

## 2024-11-21 RX ORDER — IBUPROFEN 200 MG
24 TABLET ORAL
Status: DISCONTINUED | OUTPATIENT
Start: 2024-11-21 | End: 2024-11-26 | Stop reason: HOSPADM

## 2024-11-21 RX ORDER — AMLODIPINE BESYLATE 5 MG/1
10 TABLET ORAL DAILY
Status: DISCONTINUED | OUTPATIENT
Start: 2024-11-22 | End: 2024-11-26 | Stop reason: HOSPADM

## 2024-11-21 RX ORDER — HEPARIN SODIUM 5000 [USP'U]/ML
5000 INJECTION, SOLUTION INTRAVENOUS; SUBCUTANEOUS EVERY 8 HOURS
Status: DISCONTINUED | OUTPATIENT
Start: 2024-11-21 | End: 2024-11-26 | Stop reason: HOSPADM

## 2024-11-21 RX ORDER — GABAPENTIN 400 MG/1
800 CAPSULE ORAL 3 TIMES DAILY
Status: DISCONTINUED | OUTPATIENT
Start: 2024-11-22 | End: 2024-11-23

## 2024-11-21 RX ORDER — IBUPROFEN 200 MG
16 TABLET ORAL
Status: DISCONTINUED | OUTPATIENT
Start: 2024-11-21 | End: 2024-11-26 | Stop reason: HOSPADM

## 2024-11-21 RX ORDER — LEVALBUTEROL 1.25 MG/.5ML
1.25 SOLUTION, CONCENTRATE RESPIRATORY (INHALATION) ONCE
Status: COMPLETED | OUTPATIENT
Start: 2024-11-21 | End: 2024-11-21

## 2024-11-21 RX ORDER — NALOXONE HCL 0.4 MG/ML
0.02 VIAL (ML) INJECTION
Status: DISCONTINUED | OUTPATIENT
Start: 2024-11-21 | End: 2024-11-26 | Stop reason: HOSPADM

## 2024-11-21 RX ORDER — AMOXICILLIN 250 MG
1 CAPSULE ORAL 2 TIMES DAILY PRN
Status: DISCONTINUED | OUTPATIENT
Start: 2024-11-21 | End: 2024-11-26 | Stop reason: HOSPADM

## 2024-11-21 RX ORDER — INSULIN ASPART 100 [IU]/ML
0-5 INJECTION, SOLUTION INTRAVENOUS; SUBCUTANEOUS
Status: DISCONTINUED | OUTPATIENT
Start: 2024-11-21 | End: 2024-11-26 | Stop reason: HOSPADM

## 2024-11-21 RX ORDER — ASPIRIN 81 MG/1
81 TABLET ORAL DAILY
Status: DISCONTINUED | OUTPATIENT
Start: 2024-11-22 | End: 2024-11-26 | Stop reason: HOSPADM

## 2024-11-21 RX ORDER — TRAMADOL HYDROCHLORIDE 50 MG/1
50 TABLET ORAL ONCE
Status: COMPLETED | OUTPATIENT
Start: 2024-11-21 | End: 2024-11-21

## 2024-11-21 RX ORDER — HYDROCODONE BITARTRATE AND ACETAMINOPHEN 7.5; 325 MG/1; MG/1
1 TABLET ORAL EVERY 6 HOURS PRN
Status: DISCONTINUED | OUTPATIENT
Start: 2024-11-21 | End: 2024-11-22

## 2024-11-21 RX ADMIN — MORPHINE SULFATE 4 MG: 4 INJECTION, SOLUTION INTRAMUSCULAR; INTRAVENOUS at 06:11

## 2024-11-21 RX ADMIN — LEVALBUTEROL 1.25 MG: 1.25 SOLUTION, CONCENTRATE RESPIRATORY (INHALATION) at 09:11

## 2024-11-21 RX ADMIN — Medication 10 ML: at 10:11

## 2024-11-21 RX ADMIN — PIPERACILLIN SODIUM AND TAZOBACTAM SODIUM 4.5 G: 4; .5 INJECTION, POWDER, LYOPHILIZED, FOR SOLUTION INTRAVENOUS at 07:11

## 2024-11-21 RX ADMIN — TRAMADOL HYDROCHLORIDE 50 MG: 50 TABLET, COATED ORAL at 11:11

## 2024-11-21 RX ADMIN — HYDROCODONE BITARTRATE AND ACETAMINOPHEN 1 TABLET: 7.5; 325 TABLET ORAL at 10:11

## 2024-11-21 RX ADMIN — KETOROLAC TROMETHAMINE 10 MG: 30 INJECTION, SOLUTION INTRAMUSCULAR; INTRAVENOUS at 06:11

## 2024-11-21 RX ADMIN — VANCOMYCIN HYDROCHLORIDE 2000 MG: 500 INJECTION, POWDER, LYOPHILIZED, FOR SOLUTION INTRAVENOUS at 08:11

## 2024-11-21 RX ADMIN — HEPARIN SODIUM 5000 UNITS: 5000 INJECTION INTRAVENOUS; SUBCUTANEOUS at 10:11

## 2024-11-22 PROBLEM — R33.9 URINE RETENTION: Status: ACTIVE | Noted: 2024-11-22

## 2024-11-22 LAB
ANION GAP SERPL CALC-SCNC: 4 MMOL/L (ref 8–16)
AORTIC ROOT ANNULUS: 3.39 CM
ASCENDING AORTA: 2.69 CM
AV INDEX (PROSTH): 0.7
AV MEAN GRADIENT: 5.2 MMHG
AV PEAK GRADIENT: 9 MMHG
AV VALVE AREA BY VELOCITY RATIO: 2.3 CM²
AV VALVE AREA: 2.2 CM²
AV VELOCITY RATIO: 0.73
BASOPHILS # BLD AUTO: 0.09 K/UL (ref 0–0.2)
BASOPHILS NFR BLD: 0.6 % (ref 0–1.9)
BSA FOR ECHO PROCEDURE: 2.69 M2
BUN SERPL-MCNC: 20 MG/DL (ref 8–23)
CALCIUM SERPL-MCNC: 8.6 MG/DL (ref 8.7–10.5)
CHLORIDE SERPL-SCNC: 109 MMOL/L (ref 95–110)
CO2 SERPL-SCNC: 23 MMOL/L (ref 23–29)
CREAT SERPL-MCNC: 1.4 MG/DL (ref 0.5–1.4)
CV ECHO LV RWT: 0.49 CM
DIFFERENTIAL METHOD BLD: ABNORMAL
DOP CALC AO PEAK VEL: 1.5 M/S
DOP CALC AO VTI: 30.5 CM
DOP CALC LVOT AREA: 3.1 CM2
DOP CALC LVOT DIAMETER: 2 CM
DOP CALC LVOT PEAK VEL: 1.1 M/S
DOP CALC LVOT STROKE VOLUME: 67.5 CM3
DOP CALCLVOT PEAK VEL VTI: 21.5 CM
E WAVE DECELERATION TIME: 210.87 MSEC
E/A RATIO: 1.04
E/E' RATIO: 8.22 M/S
ECHO LV POSTERIOR WALL: 1.2 CM (ref 0.6–1.1)
EOSINOPHIL # BLD AUTO: 0.1 K/UL (ref 0–0.5)
EOSINOPHIL NFR BLD: 0.6 % (ref 0–8)
ERYTHROCYTE [DISTWIDTH] IN BLOOD BY AUTOMATED COUNT: 14.5 % (ref 11.5–14.5)
EST. GFR  (NO RACE VARIABLE): 53 ML/MIN/1.73 M^2
ESTIMATED AVG GLUCOSE: 151 MG/DL (ref 68–131)
FRACTIONAL SHORTENING: 28.6 % (ref 28–44)
GLUCOSE SERPL-MCNC: 209 MG/DL (ref 70–110)
HBA1C MFR BLD: 6.9 % (ref 4–5.6)
HCT VFR BLD AUTO: 34.8 % (ref 40–54)
HGB BLD-MCNC: 11.5 G/DL (ref 14–18)
IMM GRANULOCYTES # BLD AUTO: 0.07 K/UL (ref 0–0.04)
IMM GRANULOCYTES NFR BLD AUTO: 0.5 % (ref 0–0.5)
INTERVENTRICULAR SEPTUM: 1.2 CM (ref 0.6–1.1)
IVC DIAMETER: 1.72 CM
LA MAJOR: 5.91 CM
LA MINOR: 5.32 CM
LA WIDTH: 3.9 CM
LAAS-AP2: 24 CM2
LAAS-AP4: 21 CM2
LEFT ATRIUM AREA SYSTOLIC (APICAL 2 CHAMBER): 24.87 CM2
LEFT ATRIUM AREA SYSTOLIC (APICAL 4 CHAMBER): 21.45 CM2
LEFT ATRIUM SIZE: 3.19 CM
LEFT ATRIUM VOLUME INDEX MOD: 25.8 ML/M2
LEFT ATRIUM VOLUME INDEX: 23 ML/M2
LEFT ATRIUM VOLUME MOD: 66.67 ML
LEFT ATRIUM VOLUME: 59.21 CM3
LEFT INTERNAL DIMENSION IN SYSTOLE: 3.5 CM (ref 2.1–4)
LEFT VENTRICLE DIASTOLIC VOLUME INDEX: 43.49 ML/M2
LEFT VENTRICLE DIASTOLIC VOLUME: 112.21 ML
LEFT VENTRICLE END SYSTOLIC VOLUME APICAL 2 CHAMBER: 80.09 ML
LEFT VENTRICLE END SYSTOLIC VOLUME APICAL 4 CHAMBER: 56.28 ML
LEFT VENTRICLE MASS INDEX: 87.7 G/M2
LEFT VENTRICLE SYSTOLIC VOLUME INDEX: 19.8 ML/M2
LEFT VENTRICLE SYSTOLIC VOLUME: 50.96 ML
LEFT VENTRICULAR INTERNAL DIMENSION IN DIASTOLE: 4.9 CM (ref 3.5–6)
LEFT VENTRICULAR MASS: 226.4 G
LV LATERAL E/E' RATIO: 8.22 M/S
LV SEPTAL E/E' RATIO: 8.22 M/S
LVED V (TEICH): 112.21 ML
LVES V (TEICH): 50.96 ML
LVOT MG: 2.13 MMHG
LVOT MV: 0.65 CM/S
LYMPHOCYTES # BLD AUTO: 0.8 K/UL (ref 1–4.8)
LYMPHOCYTES NFR BLD: 5.1 % (ref 18–48)
MAGNESIUM SERPL-MCNC: 2 MG/DL (ref 1.6–2.6)
MCH RBC QN AUTO: 30.1 PG (ref 27–31)
MCHC RBC AUTO-ENTMCNC: 33 G/DL (ref 32–36)
MCV RBC AUTO: 91 FL (ref 82–98)
MONOCYTES # BLD AUTO: 0.8 K/UL (ref 0.3–1)
MONOCYTES NFR BLD: 5.4 % (ref 4–15)
MV PEAK A VEL: 0.71 M/S
MV PEAK E VEL: 0.74 M/S
MV STENOSIS PRESSURE HALF TIME: 61.15 MS
MV VALVE AREA P 1/2 METHOD: 3.6 CM2
NEUTROPHILS # BLD AUTO: 13.6 K/UL (ref 1.8–7.7)
NEUTROPHILS NFR BLD: 87.8 % (ref 38–73)
NRBC BLD-RTO: 0 /100 WBC
PLATELET # BLD AUTO: 295 K/UL (ref 150–450)
PMV BLD AUTO: 10.4 FL (ref 9.2–12.9)
POCT GLUCOSE: 129 MG/DL (ref 70–110)
POCT GLUCOSE: 138 MG/DL (ref 70–110)
POCT GLUCOSE: 165 MG/DL (ref 70–110)
POCT GLUCOSE: 188 MG/DL (ref 70–110)
POTASSIUM SERPL-SCNC: 4 MMOL/L (ref 3.5–5.1)
PV MV: 0.61 M/S
PV PEAK GRADIENT: 2 MMHG
PV PEAK VELOCITY: 0.78 M/S
RA MAJOR: 3.61 CM
RA PRESSURE ESTIMATED: 3 MMHG
RA WIDTH: 3.1 CM
RBC # BLD AUTO: 3.82 M/UL (ref 4.6–6.2)
SINUS: 2.6 CM
SODIUM SERPL-SCNC: 136 MMOL/L (ref 136–145)
STJ: 2.67 CM
TDI LATERAL: 0.09 M/S
TDI SEPTAL: 0.09 M/S
TDI: 0.09 M/S
TRICUSPID ANNULAR PLANE SYSTOLIC EXCURSION: 2.2 CM
WBC # BLD AUTO: 15.5 K/UL (ref 3.9–12.7)
Z-SCORE OF LEFT VENTRICULAR DIMENSION IN END DIASTOLE: -12.67
Z-SCORE OF LEFT VENTRICULAR DIMENSION IN END SYSTOLE: -8.48

## 2024-11-22 PROCEDURE — 25000003 PHARM REV CODE 250: Performed by: STUDENT IN AN ORGANIZED HEALTH CARE EDUCATION/TRAINING PROGRAM

## 2024-11-22 PROCEDURE — 36415 COLL VENOUS BLD VENIPUNCTURE: CPT | Performed by: PHYSICIAN ASSISTANT

## 2024-11-22 PROCEDURE — 51701 INSERT BLADDER CATHETER: CPT

## 2024-11-22 PROCEDURE — 25000003 PHARM REV CODE 250: Performed by: PHYSICIAN ASSISTANT

## 2024-11-22 PROCEDURE — 80048 BASIC METABOLIC PNL TOTAL CA: CPT | Performed by: PHYSICIAN ASSISTANT

## 2024-11-22 PROCEDURE — 21400001 HC TELEMETRY ROOM

## 2024-11-22 PROCEDURE — 94761 N-INVAS EAR/PLS OXIMETRY MLT: CPT

## 2024-11-22 PROCEDURE — A4216 STERILE WATER/SALINE, 10 ML: HCPCS | Performed by: PHYSICIAN ASSISTANT

## 2024-11-22 PROCEDURE — 27000207 HC ISOLATION

## 2024-11-22 PROCEDURE — 63600175 PHARM REV CODE 636 W HCPCS: Performed by: PHYSICIAN ASSISTANT

## 2024-11-22 PROCEDURE — 51798 US URINE CAPACITY MEASURE: CPT

## 2024-11-22 PROCEDURE — 83735 ASSAY OF MAGNESIUM: CPT | Performed by: PHYSICIAN ASSISTANT

## 2024-11-22 PROCEDURE — 63600175 PHARM REV CODE 636 W HCPCS: Performed by: STUDENT IN AN ORGANIZED HEALTH CARE EDUCATION/TRAINING PROGRAM

## 2024-11-22 PROCEDURE — 83036 HEMOGLOBIN GLYCOSYLATED A1C: CPT | Performed by: PHYSICIAN ASSISTANT

## 2024-11-22 PROCEDURE — 85025 COMPLETE CBC W/AUTO DIFF WBC: CPT | Performed by: PHYSICIAN ASSISTANT

## 2024-11-22 RX ORDER — MEROPENEM 1 G/1
1 INJECTION, POWDER, FOR SOLUTION INTRAVENOUS
Status: DISCONTINUED | OUTPATIENT
Start: 2024-11-22 | End: 2024-11-22

## 2024-11-22 RX ORDER — TRAMADOL HYDROCHLORIDE 50 MG/1
50 TABLET ORAL EVERY 6 HOURS PRN
Status: DISCONTINUED | OUTPATIENT
Start: 2024-11-22 | End: 2024-11-26 | Stop reason: HOSPADM

## 2024-11-22 RX ORDER — CEFEPIME HYDROCHLORIDE 1 G/1
1 INJECTION, POWDER, FOR SOLUTION INTRAMUSCULAR; INTRAVENOUS
Status: DISCONTINUED | OUTPATIENT
Start: 2024-11-22 | End: 2024-11-24

## 2024-11-22 RX ADMIN — AMLODIPINE BESYLATE 10 MG: 5 TABLET ORAL at 09:11

## 2024-11-22 RX ADMIN — VANCOMYCIN HYDROCHLORIDE 1500 MG: 1.5 INJECTION, POWDER, LYOPHILIZED, FOR SOLUTION INTRAVENOUS at 12:11

## 2024-11-22 RX ADMIN — ATORVASTATIN CALCIUM 20 MG: 20 TABLET, FILM COATED ORAL at 09:11

## 2024-11-22 RX ADMIN — HEPARIN SODIUM 5000 UNITS: 5000 INJECTION INTRAVENOUS; SUBCUTANEOUS at 03:11

## 2024-11-22 RX ADMIN — Medication 10 ML: at 05:11

## 2024-11-22 RX ADMIN — TRAMADOL HYDROCHLORIDE 50 MG: 50 TABLET, COATED ORAL at 12:11

## 2024-11-22 RX ADMIN — HYDROCODONE BITARTRATE AND ACETAMINOPHEN 1 TABLET: 7.5; 325 TABLET ORAL at 05:11

## 2024-11-22 RX ADMIN — CEFEPIME 1 G: 1 INJECTION, POWDER, FOR SOLUTION INTRAMUSCULAR; INTRAVENOUS at 12:11

## 2024-11-22 RX ADMIN — GABAPENTIN 800 MG: 400 CAPSULE ORAL at 10:11

## 2024-11-22 RX ADMIN — TRAMADOL HYDROCHLORIDE 50 MG: 50 TABLET, COATED ORAL at 06:11

## 2024-11-22 RX ADMIN — ACETAMINOPHEN 650 MG: 325 TABLET, FILM COATED ORAL at 03:11

## 2024-11-22 RX ADMIN — HEPARIN SODIUM 5000 UNITS: 5000 INJECTION INTRAVENOUS; SUBCUTANEOUS at 05:11

## 2024-11-22 RX ADMIN — PIPERACILLIN SODIUM AND TAZOBACTAM SODIUM 4.5 G: 4; .5 INJECTION, POWDER, LYOPHILIZED, FOR SOLUTION INTRAVENOUS at 01:11

## 2024-11-22 RX ADMIN — INSULIN ASPART 5 UNITS: 100 INJECTION, SOLUTION INTRAVENOUS; SUBCUTANEOUS at 09:11

## 2024-11-22 RX ADMIN — Medication 10 ML: at 10:11

## 2024-11-22 RX ADMIN — ASPIRIN 81 MG: 81 TABLET, COATED ORAL at 09:11

## 2024-11-22 RX ADMIN — BACLOFEN 10 MG: 10 TABLET ORAL at 03:11

## 2024-11-22 RX ADMIN — TAMSULOSIN HYDROCHLORIDE 0.4 MG: 0.4 CAPSULE ORAL at 09:11

## 2024-11-22 RX ADMIN — INSULIN ASPART 5 UNITS: 100 INJECTION, SOLUTION INTRAVENOUS; SUBCUTANEOUS at 12:11

## 2024-11-22 RX ADMIN — INSULIN ASPART 5 UNITS: 100 INJECTION, SOLUTION INTRAVENOUS; SUBCUTANEOUS at 06:11

## 2024-11-22 RX ADMIN — GABAPENTIN 800 MG: 400 CAPSULE ORAL at 03:11

## 2024-11-22 RX ADMIN — BACLOFEN 10 MG: 10 TABLET ORAL at 09:11

## 2024-11-22 RX ADMIN — GABAPENTIN 800 MG: 400 CAPSULE ORAL at 09:11

## 2024-11-22 RX ADMIN — Medication 10 ML: at 03:11

## 2024-11-22 RX ADMIN — INSULIN GLARGINE 20 UNITS: 100 INJECTION, SOLUTION SUBCUTANEOUS at 09:11

## 2024-11-22 RX ADMIN — HEPARIN SODIUM 5000 UNITS: 5000 INJECTION INTRAVENOUS; SUBCUTANEOUS at 10:11

## 2024-11-22 RX ADMIN — MEROPENEM 1 G: 1 INJECTION, POWDER, FOR SOLUTION INTRAVENOUS at 04:11

## 2024-11-22 RX ADMIN — ACETAMINOPHEN 650 MG: 325 TABLET, FILM COATED ORAL at 06:11

## 2024-11-22 RX ADMIN — LOSARTAN POTASSIUM 100 MG: 50 TABLET, FILM COATED ORAL at 09:11

## 2024-11-22 RX ADMIN — CEFEPIME 1 G: 1 INJECTION, POWDER, FOR SOLUTION INTRAMUSCULAR; INTRAVENOUS at 10:11

## 2024-11-22 NOTE — HPI
"HPI by Collette ORELLANA:  Mr. Riaz Guerra Jr. Is a 74 y.o. male, with PMH of venous stasis ulcers, T2DM, HTN, CKD-3, anemia, who presented to [facility] ED on 11/21/24 for a "wound check" of the chronic wounds on his b/l lower extremities as the staff at [facility] felt the wounds had an odor today. ED notes indicate the wounds have worsening pain, drainage, and overall appearance of the wound. He reports he did have Wound Care coming to care for his wounds, but then his significant other took over care of his wounds, and now they have broken up. Last week his PCP started an outpatient care regimen for him, and he had an ED visit on 11/16 during which time he was started on Clindamycin for his wounds but reports he has had no improvement. He endorses associated chills, and diarrhea since beginning his antibiotics. He denies fever, chest pain, shortness of breath. He was evaluated in the ED with labs showing no leukocytosis or left shift on CBC. A metabolic panel was without significant abnormalities. A CXR showed no acute intrathoracic abnormalities. He was treated in the ED with vancomycin and zosyn. He was admitted to inpatient status.   "

## 2024-11-22 NOTE — ED NOTES
LOC: The patient is awake, alert, and oriented to self, place, time, and situation. Pt is calm and cooperative. Affect is appropriate.  Speech is appropriate and clear.     APPEARANCE: Patient resting on stretcher; appears uncomfortable but in no acute distress.  Patient is poorly groomed.    SKIN: The skin is warm and dry; color consistent with ethnicity.  Patient has normal skin turgor and moist mucus membranes.  Significant venous stasis wounds noted to the BLE (see photos in media). No bruising noted.     MUSCULOSKELETAL: Patient moving upper extremities without difficulty but has limited mobility to the BLE; denies reports pain in the RLE.  Denies weakness.     RESPIRATORY: Airway is open and patent. Respirations spontaneous, even, easy, and non-labored.  Patient has a normal effort and rate.  No accessory muscle use noted. Denies cough.     CARDIAC: Pt is hypertensive. Normal heart rate noted.  Peripheral edema noted to the BLE. No complaints of chest pain.     ABDOMEN: Pt is morbidly obese. Abdomen is soft and non tender to palpation.  No distention noted. Pt denies abdominal pain; denies nausea, vomiting or constipation; reports recent diarrhea.    NEUROLOGIC: Eyes open spontaneously.  Behavior appropriate to situation.  Follows commands; facial expression symmetrical.  Purposeful motor response noted; normal sensation in all extremities. Pt denies headache; denies lightheadedness or dizziness; denies visual disturbances; denies loss of balance; denies unilateral weakness.

## 2024-11-22 NOTE — PROGRESS NOTES
Pharmacokinetic Initial Assessment: IV Vancomycin    Assessment/Plan:    Initiate intravenous vancomycin with loading dose of 2000 mg once followed by a maintenance dose of vancomycin 1500 mg IV every 12 hours  Desired empiric serum trough concentration is 10 to 20 mcg/mL  Draw vancomycin trough level 60 min prior to fourth dose on 11/23 at approximately 11:00  Pharmacy will continue to follow and monitor vancomycin.      Please contact pharmacy at extension [phone] with any questions regarding this assessment.     Thank you for the consult,   Beatriz Spears       Patient brief summary:  Riaz Guerra Jr. is a 74 y.o. male initiated on antimicrobial therapy with IV Vancomycin for treatment of suspected skin & soft tissue infection    Drug Allergies:   Review of patient's allergies indicates:   Allergen Reactions    Lisinopril Other (See Comments)     cough       Actual Body Weight:   142.9 kg    Renal Function:   Estimated Creatinine Clearance: 70 mL/min (based on SCr of 1.4 mg/dL).,     Dialysis Method (if applicable):  N/A    CBC (last 72 hours):  Recent Labs   Lab Result Units 11/21/24  1802 11/22/24  0444   WBC K/uL 9.41 15.50*   Hemoglobin g/dL 13.6* 11.5*   Hematocrit % 42.2 34.8*   Platelets K/uL 325 295   Gran % % 71.1 87.8*   Lymph % % 17.6* 5.1*   Mono % % 7.7 5.4   Eosinophil % % 2.7 0.6   Basophil % % 0.6 0.6   Differential Method  Automated Automated       Metabolic Panel (last 72 hours):  Recent Labs   Lab Result Units 11/21/24  1959 11/22/24  0444   Sodium mmol/L 136 136   Potassium mmol/L 4.0 4.0   Chloride mmol/L 111* 109   CO2 mmol/L 17* 23   Glucose mg/dL 142* 209*   BUN mg/dL 16 20   Creatinine mg/dL 1.0 1.4   Albumin g/dL 3.0*  --    Total Bilirubin mg/dL 0.5  --    Alkaline Phosphatase U/L 73  --    AST U/L 26  --    ALT U/L 15  --    Magnesium mg/dL 2.1 2.0       Microbiologic Results:  Microbiology Results (last 7 days)       Procedure Component Value Units Date/Time    Blood culture #1  **CANNOT BE ORDERED STAT** [[id number]] Collected: 11/21/24 1801    Order Status: Completed Specimen: Blood from Peripheral, Hand, Right Updated: 11/22/24 0345     Blood Culture, Routine No Growth to date    Blood culture #2 **CANNOT BE ORDERED STAT** [[id number]] Collected: 11/21/24 1801    Order Status: Completed Specimen: Blood from Peripheral, Hand, Right Updated: 11/22/24 0345     Blood Culture, Routine No Growth to date    Clostridium difficile EIA [[id number]]     Order Status: No result Specimen: Stool

## 2024-11-22 NOTE — HOSPITAL COURSE
Patient was admitted on empiric antibiotics and was seen by wound care for stasis dermatitis and lymphedema.  Compression was recommended to aid with wound healing.  Antibiotics not needed on discharge.    He has chronic weakness and inability to ambulate comfortably due to leg edema.  PT/OT were consulted and SNF recommended.  Blood cultures showed no growth and the leukocytosis resolved, antibiotics discontinued.    He is on insulin as outpatient but HbA1c was only 6.9 and his glucose checks were ranging from 120-160 while he was here on no insulin, and he also had a couple of low readings.  For now would resume his metformin and Trulicity but hold off on basal/prandial insulin except for a low dose sliding scale while in SNF.  If glucose checks become consistently elevated again he can resume some basal insulin.  Patient was accepted to SNF and transferred on 11/26.  He was seen and examined on the day of discharge.

## 2024-11-22 NOTE — ASSESSMENT & PLAN NOTE
Patients blood pressure range in the last 24 hours was: BP  Min: 155/92  Max: 171/109.The patient's inpatient anti-hypertensive regimen is listed below:  Current Antihypertensives       Plan  - BP is controlled, no changes needed to their regimen

## 2024-11-22 NOTE — ED TRIAGE NOTES
Pt presents to the ER from [facility] with complaints of drainage and foul odor from venous stasis wound to the RLE. Pt also reporting multiple episodes of diarrhea for the past 3-4 days.

## 2024-11-22 NOTE — PLAN OF CARE
CLARISSA received a message from RN to give patient bry a call.     -SW called patient bry Mariana [phone]. Mariana states that patient is facing eviction from his senior independent living apartment due to deplorable living conditions. Patient can barely complete his ADLs and refuses to let anyone in to help him, per Mariana. All of patient siblings have their own health issues and he cannot live with any of them. Patient would need placement if he is not agreeable to the terms in which the apartments will set once he is discharged.

## 2024-11-22 NOTE — H&P
"  [facility] Medicine  History & Physical    Patient Name: Riaz Guerra Jr.  MRN: [id number]  Patient Class: IP- Inpatient  Admission Date: 11/21/2024  Attending Physician: Terry Avilez MD   Primary Care Provider: Berhane Reyes (Inactive)         Patient information was obtained from patient, past medical records, and ER records.     Subjective:     Principal Problem:Venous stasis ulcers of both lower extremities    Chief Complaint:   Chief Complaint   Patient presents with    Wound Check     BLE swelling, Hx of venous stasis uclers, recently seen for same c/o. Sent from [facility] d/t wound smell per EMS.         HPI: Mr. Riaz Guerra Jr. Is a 74 y.o. male, with PMH of venous stasis ulcers, T2DM, HTN, CKD-3, anemia, who presented to [facility] ED on 11/21/24 for a "wound check" of the chronic wounds on his b/l lower extremities as the staff at [facility] felt the wounds had an odor today. ED notes indicate the wounds have worsening pain, drainage, and overall appearance of the wound. He reports he did have Wound Care coming to care for his wounds, but then his significant other took over care of his wounds, and now they have broken up. Last week his PCP started an outpatient care regimen for him, and he had an ED visit on 11/16 during which time he was started on Clindamycin for his wounds but reports he has had no improvement. He endorses associated chills, and diarrhea since beginning his antibiotics. He denies fever, chest pain, shortness of breath. He was evaluated in the ED with labs showing no leukocytosis or left shift on CBC. A metabolic panel was without significant abnormalities. A CXR showed no acute intrathoracic abnormalities. He was treated in the ED with vancomycin and zosyn. He was admitted to inpatient status.     Past Medical History:   Diagnosis Date    Depression     Diabetes mellitus     Gout     High cholesterol     Hypertension  "       History reviewed. No pertinent surgical history.    Review of patient's allergies indicates:   Allergen Reactions    Lisinopril Other (See Comments)     cough       No current facility-administered medications on file prior to encounter.     Current Outpatient Medications on File Prior to Encounter   Medication Sig    amLODIPine (NORVASC) 10 MG tablet Take 1 tablet (10 mg total) by mouth once daily.    ammonium lactate (LAC-HYDRIN) 12 % lotion SMARTSIG:Application Topical    aspirin (ECOTRIN) 81 MG EC tablet Take 81 mg by mouth once daily.    atorvastatin (LIPITOR) 20 MG tablet Take 20 mg by mouth once daily.    baclofen (LIORESAL) 10 MG tablet Take 10 mg by mouth 3 (three) times daily.    BASAGLAR KWIKPEN U-100 INSULIN glargine 100 units/mL SubQ pen Inject 35 Units into the skin once daily.    cetirizine (ZYRTEC) 10 MG tablet Take 10 mg by mouth once daily.    clindamycin (CLEOCIN) 300 MG capsule Take 1 capsule (300 mg total) by mouth every 6 (six) hours. for 7 days    diclofenac (CATAFLAM) 50 MG tablet Take 50 mg by mouth.    diclofenac (VOLTAREN) 75 MG EC tablet Take 1 tablet (75 mg total) by mouth 2 (two) times daily.    furosemide (LASIX) 40 MG tablet Take 1 tablet (40 mg total) by mouth once daily. for 7 days    gabapentin (NEURONTIN) 800 MG tablet Take 800 mg by mouth 3 (three) times daily.    HYDROcodone-acetaminophen (NORCO) 7.5-325 mg per tablet 1 tablet every 8 (eight) hours as needed for Pain.    [] HYDROcodone-acetaminophen (NORCO) 7.5-325 mg per tablet Take 1 tablet by mouth every 6 (six) hours as needed for Pain.    ibuprofen (ADVIL,MOTRIN) 800 MG tablet Take 1 tablet by mouth every 4 (four) hours as needed for Pain.    losartan (COZAAR) 100 MG tablet Take 100 mg by mouth once daily.    metFORMIN (GLUCOPHAGE) 1000 MG tablet Take 1,000 mg by mouth 2 (two) times daily with meals.    NOVOLOG FLEXPEN U-100 INSULIN 100 unit/mL (3 mL) InPn pen Inject 10 Units into the skin 2 (two) times a day.     tamsulosin (FLOMAX) 0.4 mg Cap Take by mouth once daily.    traMADoL (ULTRAM) 50 mg tablet Take 1 tablet (50 mg total) by mouth every 8 (eight) hours as needed for Pain.     Family History    None       Tobacco Use    Smoking status: Never    Smokeless tobacco: Never   Substance and Sexual Activity    Alcohol use: Not Currently     Comment: occasionally    Drug use: Yes     Types: Marijuana    Sexual activity: Yes     Review of Systems   Constitutional:  Negative for chills, diaphoresis and fever.   Respiratory:  Negative for cough, shortness of breath and wheezing.    Cardiovascular:  Positive for leg swelling (b/l leg swelling). Negative for chest pain and palpitations.   Gastrointestinal:  Negative for abdominal pain, constipation, diarrhea, nausea and vomiting.   Genitourinary:  Negative for difficulty urinating, dysuria, frequency, hematuria and urgency.   Musculoskeletal:  Negative for back pain, joint swelling, myalgias, neck pain and neck stiffness.   Skin:  Positive for color change and wound. Negative for rash.   Neurological:  Negative for dizziness, syncope, weakness, light-headedness, numbness and headaches.   Hematological:  Does not bruise/bleed easily.   Psychiatric/Behavioral:  Negative for agitation, confusion and decreased concentration.      Objective:     Vital Signs (Most Recent):  Temp: 97.7 °F (36.5 °C) (11/21/24 2227)  Pulse: 89 (11/22/24 0309)  Resp: 20 (11/22/24 0526)  BP: 138/75 (11/22/24 0245)  SpO2: 99 % (11/22/24 0015) Vital Signs (24h Range):  Temp:  [97.2 °F (36.2 °C)-98.4 °F (36.9 °C)] 97.7 °F (36.5 °C)  Pulse:  [] 89  Resp:  [14-23] 20  SpO2:  [94 %-99 %] 99 %  BP: (125-171)/() 138/75     Weight: (!) 142.9 kg (315 lb 0.6 oz)  Body mass index is 42.73 kg/m².     Physical Exam  Vitals and nursing note reviewed.   Constitutional:       General: He is not in acute distress.     Appearance: Normal appearance. He is well-developed. He is obese. He is not ill-appearing,  toxic-appearing or diaphoretic.   HENT:      Head: Normocephalic and atraumatic.      Right Ear: External ear normal.      Left Ear: External ear normal.   Eyes:      General: No scleral icterus.        Right eye: No discharge.         Left eye: No discharge.      Conjunctiva/sclera: Conjunctivae normal.   Neck:      Vascular: No JVD.      Trachea: No tracheal deviation.   Cardiovascular:      Rate and Rhythm: Normal rate and regular rhythm.      Heart sounds: Normal heart sounds. No murmur heard.     No gallop.   Pulmonary:      Effort: Pulmonary effort is normal. No respiratory distress.      Breath sounds: Normal breath sounds. No stridor. No wheezing or rales.   Abdominal:      General: Bowel sounds are normal. There is no distension.      Palpations: Abdomen is soft. There is no mass.      Tenderness: There is no abdominal tenderness. There is no guarding.   Musculoskeletal:         General: No deformity. Normal range of motion.      Cervical back: Normal range of motion and neck supple.   Skin:     General: Skin is warm and dry.      Findings: Lesion present.      Comments: Chronic appearing darkened discoloration of the skin of the b/l ankles. RLE with lesion that is somewhat erythematous, with slightly more warmth than the LLE. There is no drainage or discharge.    Neurological:      General: No focal deficit present.      Mental Status: He is alert and oriented to person, place, and time.      Cranial Nerves: No cranial nerve deficit.      Motor: No abnormal muscle tone.      Coordination: Coordination normal.   Psychiatric:         Mood and Affect: Mood normal.         Behavior: Behavior normal.         Thought Content: Thought content normal.         Judgment: Judgment normal.                Significant Labs: All pertinent labs within the past 24 hours have been reviewed.  BMP:   Recent Labs   Lab 11/22/24  0444   *      K 4.0      CO2 23   BUN 20   CREATININE 1.4   CALCIUM 8.6*   MG  "2.0     CBC:   Recent Labs   Lab 11/21/24  1802 11/22/24  0444   WBC 9.41 15.50*   HGB 13.6* 11.5*   HCT 42.2 34.8*    295     CMP:   Recent Labs   Lab 11/21/24 1959 11/22/24  0444    136   K 4.0 4.0   * 109   CO2 17* 23   * 209*   BUN 16 20   CREATININE 1.0 1.4   CALCIUM 9.1 8.6*   PROT 8.1  --    ALBUMIN 3.0*  --    BILITOT 0.5  --    ALKPHOS 73  --    AST 26  --    ALT 15  --    ANIONGAP 8 4*     Urine Culture: No results for input(s): "LABURIN" in the last 48 hours.  Urine Studies: No results for input(s): "COLORU", "APPEARANCEUA", "PHUR", "SPECGRAV", "PROTEINUA", "GLUCUA", "KETONESU", "BILIRUBINUA", "OCCULTUA", "NITRITE", "UROBILINOGEN", "LEUKOCYTESUR", "RBCUA", "WBCUA", "BACTERIA", "SQUAMEPITHEL", "HYALINECASTS" in the last 48 hours.    Invalid input(s): "WRIGHTSUR"    Significant Imaging: I have reviewed all pertinent imaging results/findings within the past 24 hours.  Imaging Results              X-Ray Chest AP Portable (Final result)  Result time 11/21/24 19:04:09      Final result by Archana Dimas MD (11/21/24 19:04:09)                   Impression:      No acute intrathoracic abnormality detected.      Electronically signed by: Archana Dimas  Date:    11/21/2024  Time:    19:04               Narrative:    EXAMINATION:  AP PORTABLE CHEST    CLINICAL HISTORY:  leg swelling;    TECHNIQUE:  AP portable chest radiograph was submitted.    COMPARISON:  07/01/2024    FINDINGS:  AP portable chest radiograph demonstrates a cardiac silhouette within normal limits.  There is no focal consolidation, pneumothorax, or pleural effusion.                                       Assessment/Plan:     * Venous stasis ulcers of both lower extremities  - Mr. Riaz Guerra Jr. Presents with worsening appearance of his b/l LE wounds   - he states his help in caring for the wounds (his former significant other) is no longer present   - wound care and social work consulted   - patient states " "he has worsening swelling of the b/l LEs    - BNP is not elevated    - echo in July w/ preserved EF    - will repeat echo due to worsening LE edema     Insulin dependent type 2 diabetes mellitus  Patient's FSGs are controlled on current medication regimen.  Last A1c reviewed-   Lab Results   Component Value Date    HGBA1C 8.8 (H) 07/01/2024     Most recent fingerstick glucose reviewed- No results for input(s): "POCTGLUCOSE" in the last 24 hours.  Current correctional scale  Low  Maintain anti-hyperglycemic dose as follows-   Antihyperglycemics (From admission, onward)      Start     Stop Route Frequency Ordered    11/22/24 0900  insulin glargine U-100 (Lantus) pen 20 Units         -- SubQ Daily 11/21/24 2100 11/22/24 0715  insulin aspart U-100 pen 5 Units         -- SubQ 3 times daily with meals 11/21/24 2100 11/21/24 2159  insulin aspart U-100 pen 0-5 Units         -- SubQ Before meals & nightly PRN 11/21/24 2100          Hold Oral hypoglycemics while patient is in the hospital.    Chronic pain  - continue home meds      HTN (hypertension)  Patients blood pressure range in the last 24 hours was: BP  Min: 155/92  Max: 171/109.The patient's inpatient anti-hypertensive regimen is listed below:  Current Antihypertensives       Plan  - BP is controlled, no changes needed to their regimen    CKD (chronic kidney disease) stage 3, GFR 30-59 ml/min  Creatine stable for now. BMP reviewed- noted Estimated Creatinine Clearance: 97.9 mL/min (based on SCr of 1 mg/dL). according to latest data. Based on current GFR, CKD stage is stage 3 - GFR 30-59.  Monitor UOP and serial BMP and adjust therapy as needed. Renally dose meds. Avoid nephrotoxic medications and procedures.      VTE Risk Mitigation (From admission, onward)           Ordered     heparin (porcine) injection 5,000 Units  Every 8 hours         11/21/24 2058     IP VTE HIGH RISK PATIENT  Once         11/21/24 2058     Place sequential compression device  Until " discontinued         11/21/24 2058                                    Collette Franklin PA-C  Department of Hospital Medicine  [facility] - Emergency Dept

## 2024-11-22 NOTE — SUBJECTIVE & OBJECTIVE
Past Medical History:   Diagnosis Date    Depression     Diabetes mellitus     Gout     High cholesterol     Hypertension        History reviewed. No pertinent surgical history.    Review of patient's allergies indicates:   Allergen Reactions    Lisinopril Other (See Comments)     cough       No current facility-administered medications on file prior to encounter.     Current Outpatient Medications on File Prior to Encounter   Medication Sig    amLODIPine (NORVASC) 10 MG tablet Take 1 tablet (10 mg total) by mouth once daily.    ammonium lactate (LAC-HYDRIN) 12 % lotion SMARTSIG:Application Topical    aspirin (ECOTRIN) 81 MG EC tablet Take 81 mg by mouth once daily.    atorvastatin (LIPITOR) 20 MG tablet Take 20 mg by mouth once daily.    baclofen (LIORESAL) 10 MG tablet Take 10 mg by mouth 3 (three) times daily.    BASAGLAR KWIKPEN U-100 INSULIN glargine 100 units/mL SubQ pen Inject 35 Units into the skin once daily.    cetirizine (ZYRTEC) 10 MG tablet Take 10 mg by mouth once daily.    clindamycin (CLEOCIN) 300 MG capsule Take 1 capsule (300 mg total) by mouth every 6 (six) hours. for 7 days    diclofenac (CATAFLAM) 50 MG tablet Take 50 mg by mouth.    diclofenac (VOLTAREN) 75 MG EC tablet Take 1 tablet (75 mg total) by mouth 2 (two) times daily.    furosemide (LASIX) 40 MG tablet Take 1 tablet (40 mg total) by mouth once daily. for 7 days    gabapentin (NEURONTIN) 800 MG tablet Take 800 mg by mouth 3 (three) times daily.    HYDROcodone-acetaminophen (NORCO) 7.5-325 mg per tablet 1 tablet every 8 (eight) hours as needed for Pain.    [] HYDROcodone-acetaminophen (NORCO) 7.5-325 mg per tablet Take 1 tablet by mouth every 6 (six) hours as needed for Pain.    ibuprofen (ADVIL,MOTRIN) 800 MG tablet Take 1 tablet by mouth every 4 (four) hours as needed for Pain.    losartan (COZAAR) 100 MG tablet Take 100 mg by mouth once daily.    metFORMIN (GLUCOPHAGE) 1000 MG tablet Take 1,000 mg by mouth 2 (two) times daily  with meals.    NOVOLOG FLEXPEN U-100 INSULIN 100 unit/mL (3 mL) InPn pen Inject 10 Units into the skin 2 (two) times a day.    tamsulosin (FLOMAX) 0.4 mg Cap Take by mouth once daily.    traMADoL (ULTRAM) 50 mg tablet Take 1 tablet (50 mg total) by mouth every 8 (eight) hours as needed for Pain.     Family History    None       Tobacco Use    Smoking status: Never    Smokeless tobacco: Never   Substance and Sexual Activity    Alcohol use: Not Currently     Comment: occasionally    Drug use: Yes     Types: Marijuana    Sexual activity: Yes     Review of Systems   Constitutional:  Negative for chills, diaphoresis and fever.   Respiratory:  Negative for cough, shortness of breath and wheezing.    Cardiovascular:  Positive for leg swelling (b/l leg swelling). Negative for chest pain and palpitations.   Gastrointestinal:  Negative for abdominal pain, constipation, diarrhea, nausea and vomiting.   Genitourinary:  Negative for difficulty urinating, dysuria, frequency, hematuria and urgency.   Musculoskeletal:  Negative for back pain, joint swelling, myalgias, neck pain and neck stiffness.   Skin:  Positive for color change and wound. Negative for rash.   Neurological:  Negative for dizziness, syncope, weakness, light-headedness, numbness and headaches.   Hematological:  Does not bruise/bleed easily.   Psychiatric/Behavioral:  Negative for agitation, confusion and decreased concentration.      Objective:     Vital Signs (Most Recent):  Temp: 97.7 °F (36.5 °C) (11/21/24 2227)  Pulse: 89 (11/22/24 0309)  Resp: 20 (11/22/24 0526)  BP: 138/75 (11/22/24 0245)  SpO2: 99 % (11/22/24 0015) Vital Signs (24h Range):  Temp:  [97.2 °F (36.2 °C)-98.4 °F (36.9 °C)] 97.7 °F (36.5 °C)  Pulse:  [] 89  Resp:  [14-23] 20  SpO2:  [94 %-99 %] 99 %  BP: (125-171)/() 138/75     Weight: (!) 142.9 kg (315 lb 0.6 oz)  Body mass index is 42.73 kg/m².     Physical Exam  Vitals and nursing note reviewed.   Constitutional:       General: He  is not in acute distress.     Appearance: Normal appearance. He is well-developed. He is obese. He is not ill-appearing, toxic-appearing or diaphoretic.   HENT:      Head: Normocephalic and atraumatic.      Right Ear: External ear normal.      Left Ear: External ear normal.   Eyes:      General: No scleral icterus.        Right eye: No discharge.         Left eye: No discharge.      Conjunctiva/sclera: Conjunctivae normal.   Neck:      Vascular: No JVD.      Trachea: No tracheal deviation.   Cardiovascular:      Rate and Rhythm: Normal rate and regular rhythm.      Heart sounds: Normal heart sounds. No murmur heard.     No gallop.   Pulmonary:      Effort: Pulmonary effort is normal. No respiratory distress.      Breath sounds: Normal breath sounds. No stridor. No wheezing or rales.   Abdominal:      General: Bowel sounds are normal. There is no distension.      Palpations: Abdomen is soft. There is no mass.      Tenderness: There is no abdominal tenderness. There is no guarding.   Musculoskeletal:         General: No deformity. Normal range of motion.      Cervical back: Normal range of motion and neck supple.   Skin:     General: Skin is warm and dry.      Findings: Lesion present.      Comments: Chronic appearing darkened discoloration of the skin of the b/l ankles. RLE with lesion that is somewhat erythematous, with slightly more warmth than the LLE. There is no drainage or discharge.    Neurological:      General: No focal deficit present.      Mental Status: He is alert and oriented to person, place, and time.      Cranial Nerves: No cranial nerve deficit.      Motor: No abnormal muscle tone.      Coordination: Coordination normal.   Psychiatric:         Mood and Affect: Mood normal.         Behavior: Behavior normal.         Thought Content: Thought content normal.         Judgment: Judgment normal.                Significant Labs: All pertinent labs within the past 24 hours have been reviewed.  BMP:   Recent  "Labs   Lab 11/22/24  0444   *      K 4.0      CO2 23   BUN 20   CREATININE 1.4   CALCIUM 8.6*   MG 2.0     CBC:   Recent Labs   Lab 11/21/24 1802 11/22/24  0444   WBC 9.41 15.50*   HGB 13.6* 11.5*   HCT 42.2 34.8*    295     CMP:   Recent Labs   Lab 11/21/24 1959 11/22/24  0444    136   K 4.0 4.0   * 109   CO2 17* 23   * 209*   BUN 16 20   CREATININE 1.0 1.4   CALCIUM 9.1 8.6*   PROT 8.1  --    ALBUMIN 3.0*  --    BILITOT 0.5  --    ALKPHOS 73  --    AST 26  --    ALT 15  --    ANIONGAP 8 4*     Urine Culture: No results for input(s): "LABURIN" in the last 48 hours.  Urine Studies: No results for input(s): "COLORU", "APPEARANCEUA", "PHUR", "SPECGRAV", "PROTEINUA", "GLUCUA", "KETONESU", "BILIRUBINUA", "OCCULTUA", "NITRITE", "UROBILINOGEN", "LEUKOCYTESUR", "RBCUA", "WBCUA", "BACTERIA", "SQUAMEPITHEL", "HYALINECASTS" in the last 48 hours.    Invalid input(s): "WRIGHTSUR"    Significant Imaging: I have reviewed all pertinent imaging results/findings within the past 24 hours.  Imaging Results              X-Ray Chest AP Portable (Final result)  Result time 11/21/24 19:04:09      Final result by Archana Dimas MD (11/21/24 19:04:09)                   Impression:      No acute intrathoracic abnormality detected.      Electronically signed by: Archana Dimas  Date:    11/21/2024  Time:    19:04               Narrative:    EXAMINATION:  AP PORTABLE CHEST    CLINICAL HISTORY:  leg swelling;    TECHNIQUE:  AP portable chest radiograph was submitted.    COMPARISON:  07/01/2024    FINDINGS:  AP portable chest radiograph demonstrates a cardiac silhouette within normal limits.  There is no focal consolidation, pneumothorax, or pleural effusion.                                     "

## 2024-11-22 NOTE — ED PROVIDER NOTES
Source of History:  Patient and medical chart    Chief complaint:  Wound Check (BLE swelling, Hx of venous stasis uclers, recently seen for same c/o. Sent from [facility] d/t wound smell per EMS. )      HPI:  Riaz Guerra Jr. is a 74 y.o. male with PMH of hypertension, DM, gout, HLD and depression presents with worsening bilateral lower extremity pain and wound appearance.  Patient has known venous stasis ulcers ongoing for some time.  Had wound care coming to house however significant other assumed care of wounds.  States that they recently broke up and hence he has not been having adequate wound care.  He was seen by PCP last week where he was placed on outpatient regimen.  As he had worsening pain presented to an outside ER on 11/16.  Was started on clindamycin and pain medication.  States he has not seen any improvement in the clindamycin.  Does report increased pain and drainage from bilateral lower extremities with prevalence to the right lower.  Reports some chills however denies any definite fever.  Denies any chest pain or shortness of breath.  He also reports diarrhea after beginning antibiotic that he describes as loose and watery.    This is the extent to the patients complaints today here in the emergency department.    ROS: As per HPI     Review of patient's allergies indicates:   Allergen Reactions    Lisinopril Other (See Comments)     cough       PMH:  As per HPI and below:  Past Medical History:   Diagnosis Date    Depression     Diabetes mellitus     Gout     High cholesterol     Hypertension      History reviewed. No pertinent surgical history.    Social History     Tobacco Use    Smoking status: Never    Smokeless tobacco: Never   Substance Use Topics    Alcohol use: Not Currently     Comment: occasionally    Drug use: Yes     Types: Marijuana       Physical Exam:    BP (!) 155/92   Pulse 95   Temp 98.4 °F (36.9 °C) (Oral)   Resp 16   Wt (!) 142.9 kg (315 lb)   SpO2 96%    BMI 42.72 kg/m²   Nursing note and vital signs reviewed.  Constitutional:  Chronically ill-appearing; OB  Nontoxic  Eyes: No conjunctival injection.Extraocular muscles are intact.  ENT: Oropharynx clear.  Normal phonation.   Cardiovascular: Regular rate and rhythm.  No murmurs. No gallops. No rubs  Respiratory: Clear to auscultation bilaterally.  Good air movement.  No wheezes.  No rhonchi. No rales. No accessory muscle use.  Abdomen: Soft.  Not distended.  Nontender.  No guarding.  No rebound. Non-peritoneal.  Musculoskeletal:  Limited range of motion secondary to pain.  3+ edema to bilateral lower extremities.  Distal pulses intact  Skin:  See pictures  Neurologic: Motor intact.  Sensation intact.  No ataxia. No focal neurological deficits.  Psych: Appropriate, conversant    Labs that have been ordered have been independently reviewed and interpreted by myself.    I decided to obtain the patient's medical records.            MDM/ Differential Dx:        Results for orders placed or performed during the hospital encounter of 11/21/24   CBC auto differential    Collection Time: 11/21/24  6:02 PM   Result Value Ref Range    WBC 9.41 3.90 - 12.70 K/uL    RBC 4.61 4.60 - 6.20 M/uL    Hemoglobin 13.6 (L) 14.0 - 18.0 g/dL    Hematocrit 42.2 40.0 - 54.0 %    MCV 92 82 - 98 fL    MCH 29.5 27.0 - 31.0 pg    MCHC 32.2 32.0 - 36.0 g/dL    RDW 14.5 11.5 - 14.5 %    Platelets 325 150 - 450 K/uL    MPV 10.5 9.2 - 12.9 fL    Immature Granulocytes 0.3 0.0 - 0.5 %    Gran # (ANC) 6.7 1.8 - 7.7 K/uL    Immature Grans (Abs) 0.03 0.00 - 0.04 K/uL    Lymph # 1.7 1.0 - 4.8 K/uL    Mono # 0.7 0.3 - 1.0 K/uL    Eos # 0.3 0.0 - 0.5 K/uL    Baso # 0.06 0.00 - 0.20 K/uL    nRBC 0 0 /100 WBC    Gran % 71.1 38.0 - 73.0 %    Lymph % 17.6 (L) 18.0 - 48.0 %    Mono % 7.7 4.0 - 15.0 %    Eosinophil % 2.7 0.0 - 8.0 %    Basophil % 0.6 0.0 - 1.9 %    Differential Method Automated    Troponin I #1    Collection Time: 11/21/24  6:02 PM   Result Value  Ref Range    Troponin I 0.013 0.000 - 0.026 ng/mL   B-Type natriuretic peptide (BNP)    Collection Time: 11/21/24  6:02 PM   Result Value Ref Range    BNP <10 0 - 99 pg/mL   ISTAT Lactate    Collection Time: 11/21/24  6:13 PM   Result Value Ref Range    POC Lactate 1.77 0.5 - 2.2 mmol/L    Sample VENOUS      Imaging Results              X-Ray Chest AP Portable (Final result)  Result time 11/21/24 19:04:09      Final result by Archana Dimas MD (11/21/24 19:04:09)                   Impression:      No acute intrathoracic abnormality detected.      Electronically signed by: Archana Dimas  Date:    11/21/2024  Time:    19:04               Narrative:    EXAMINATION:  AP PORTABLE CHEST    CLINICAL HISTORY:  leg swelling;    TECHNIQUE:  AP portable chest radiograph was submitted.    COMPARISON:  07/01/2024    FINDINGS:  AP portable chest radiograph demonstrates a cardiac silhouette within normal limits.  There is no focal consolidation, pneumothorax, or pleural effusion.                                  Riaz Salazarkurtis Brian. 74 y.o. presented to the ED with c/o worsening skin eruption.  Physical exam reveals chronically ill-appearing male with longstanding history of venous stasis wounds.  Increased erythema, purulent drainage and tenderness palpation of bilateral lower extremity wounds.  Noted fecal material to left lower extremity.  States that he has had diarrhea since initiating antibiotics.  Reports chills however denies any definite fever    Differential Diagnosis includes, but is not limited to:  Venous stasis ulcer, failed outpatient treatment, CHF cellulitis,  abscess,  MRSA,  superficial thrombophlebitis, varicose vein, drug eruption, allergic reaction/urticatia, irritant/contact dermatitis, viral exanthem, local trauma/contusion, abrasion.      ED management:  Given appearance and recent antibiotic with worsening appearance reported will obtain labs including lactic, blood cultures and initiate  empiric antibiotics.  Given reported diarrhea and recent antibiotic use will place C difficile isolation orders and order set.  Labs were reassuring with no elevated lactate.  No leukocytosis however given the marked involvement of bilateral lower extremities believe he would benefit from inpatient IV antibiotics and wound care consult.     Chemistry was hemolyzed hence redraw pending at time of admission however CMP on 11/16 with no significant signs of kidney injury and blood glucose mildly elevated at that time.  Given the edema chest x-ray obtained.  No acute intrathoracic process.  Discussed plan for hospitalization with patient he is agreeable.  Discuss with Hospital Medicine who agree with admission    Impression/Plan: Patient informed of diagnosis    1. Encounter for wound re-check    2. Leg swelling    3. Venous stasis dermatitis of both lower extremities    4. Cellulitis of lower extremity, unspecified laterality           Diagnostic Impression:    1. Encounter for wound re-check    2. Leg swelling    3. Venous stasis dermatitis of both lower extremities    4. Cellulitis of lower extremity, unspecified laterality         ED Disposition Condition    Admit                Chani Monroe PA  11/21/24 2002

## 2024-11-22 NOTE — PROGRESS NOTES
"[facility] Surg [address] Medicine  Progress Note    Patient Name: Riaz Guerra Jr.  MRN: [id number]  Patient Class: IP- Inpatient   Admission Date: 11/21/2024  Length of Stay: 1 days  Attending Physician: Terry Avilez MD  Primary Care Provider: Berhane Reyes (Inactive)        Subjective:     Principal Problem:Venous stasis ulcers of both lower extremities        HPI:  . Riaz Guerra Jr. Is a 74 y.o. male, with PMH of venous stasis ulcers, T2DM, HTN, CKD-3, anemia, who presented to [facility] ED on 11/21/24 for a "wound check" of the chronic wounds on his b/l lower extremities as the staff at [facility] felt the wounds had an odor today. ED notes indicate the wounds have worsening pain, drainage, and overall appearance of the wound. He reports he did have Wound Care coming to care for his wounds, but then his significant other took over care of his wounds, and now they have broken up. Last week his PCP started an outpatient care regimen for him, and he had an ED visit on 11/16 during which time he was started on Clindamycin for his wounds but reports he has had no improvement. He endorses associated chills, and diarrhea since beginning his antibiotics. He denies fever, chest pain, shortness of breath. He was evaluated in the ED with labs showing no leukocytosis or left shift on CBC. A metabolic panel was without significant abnormalities. A CXR showed no acute intrathoracic abnormalities. He was treated in the ED with vancomycin and zosyn. He was admitted to inpatient status.     Overview/Hospital Course:  Seen by wound care. Needs compression to aid with wound healing. BNP not elevated. New leukocytosis. Cont IV abx at this time. Follow cultures. Echo, DVT US. PT/OT when medically appropriate. Reports chronic weakness and unable to walk due to leg heaviness. C diff ordered if repeat diarrhea. Retaining 322 ml and straight cath x 1. Cont flomax.     Interval History: Seen " by wound care. Needs compression. Pt amenable. Reports chronic weakness and being unable to walk from leg heaviness. Retaining and straight cath x1.     Review of Systems   Constitutional:  Positive for chills. Negative for diaphoresis and fever.   Respiratory:  Negative for cough, shortness of breath and wheezing.    Cardiovascular:  Positive for leg swelling (b/l leg swelling). Negative for chest pain and palpitations.   Gastrointestinal:  Positive for diarrhea. Negative for abdominal pain, nausea and vomiting.   Genitourinary:  Negative for difficulty urinating, dysuria, frequency, hematuria and urgency.   Skin:  Positive for color change and wound. Negative for rash.   Psychiatric/Behavioral:  Negative for agitation and confusion.      Objective:     Vital Signs (Most Recent):  Temp: 97.6 °F (36.4 °C) (11/22/24 0807)  Pulse: 79 (11/22/24 1111)  Resp: 15 (11/22/24 1103)  BP: 126/72 (11/22/24 1103)  SpO2: 96 % (11/22/24 0807) Vital Signs (24h Range):  Temp:  [97.2 °F (36.2 °C)-98.4 °F (36.9 °C)] 97.6 °F (36.4 °C)  Pulse:  [] 79  Resp:  [14-23] 15  SpO2:  [94 %-99 %] 96 %  BP: (115-171)/() 126/72     Weight: (!) 142.9 kg (315 lb)  Body mass index is 42.72 kg/m².    Intake/Output Summary (Last 24 hours) at 11/22/2024 1125  Last data filed at 11/22/2024 0003  Gross per 24 hour   Intake 720 ml   Output --   Net 720 ml         Physical Exam  Constitutional:       General: He is not in acute distress.  Pulmonary:      Effort: No respiratory distress.      Breath sounds: No wheezing.   Abdominal:      General: There is no distension.      Tenderness: There is no abdominal tenderness.   Musculoskeletal:      Right lower leg: Edema present.      Left lower leg: Edema present.   Skin:     Comments: Chronic appearing darkened discoloration of the skin of the b/l ankles. RLE with lesion that is somewhat erythematous, with slightly more warmth than the LLE. There is no drainage or discharge.  See pictures in  media     Neurological:      Mental Status: He is oriented to person, place, and time.      Comments: Legs weak. Can barely lift them off bed. Has been this way for weeks. Wheeled around on walker at home              Significant Labs: All pertinent labs within the past 24 hours have been reviewed.    Significant Imaging: I have reviewed all pertinent imaging results/findings within the past 24 hours.    Assessment/Plan:      * Venous stasis ulcers of both lower extremities  Leukocytosis  Reported chills     Mr. Riaz Guerra Jr. Presents with worsening appearance of his b/l LE wounds   He states his help in caring for the wounds (his former significant other) is no longer present  BNP not elevated     Plan -    - wound care and social work consulted   - Compression   - echo  - DVT US  - Does not appear overtly infected but in setting of leukocytosis and reported chills cont IV abx  - Follow cultures   - PT/OT       Urine retention  11/22 - Retaining 322 ml and straight cath x 1. Cont flomax.       CKD (chronic kidney disease) stage 3, GFR 30-59 ml/min  Creatine stable for now. BMP reviewed- noted Estimated Creatinine Clearance: 97.9 mL/min (based on SCr of 1 mg/dL). according to latest data. Based on current GFR, CKD stage is stage 3 - GFR 30-59.  Monitor UOP and serial BMP and adjust therapy as needed. Renally dose meds. Avoid nephrotoxic medications and procedures.    Chronic pain  - continue home meds (gabapentin).  Is intermittently prescribed Norco and tramadol      HTN (hypertension)  - continue home amlodipine on losartan    Insulin dependent type 2 diabetes mellitus  -LA, SA and SS - titrate as needed       VTE Risk Mitigation (From admission, onward)           Ordered     heparin (porcine) injection 5,000 Units  Every 8 hours         11/21/24 2058     IP VTE HIGH RISK PATIENT  Once         11/21/24 2058     Place sequential compression device  Until discontinued         11/21/24 2058                     Discharge Planning   KELSEY:      Code Status: Full Code   Is the patient medically ready for discharge?:     Reason for patient still in hospital (select all that apply): Treatment  Discharge Plan A: Home, Home Health                  Terry Aguila MD  Department of Hospital Medicine   [facility] - [facility] S[facility] ([address])

## 2024-11-22 NOTE — ASSESSMENT & PLAN NOTE
- Mr. Riaz Salazarkurtis Brian. Presents with worsening appearance of his b/l LE wounds   - he states his help in caring for the wounds (his former significant other) is no longer present   - wound care and social work consulted   - patient states he has worsening swelling of the b/l LEs    - BNP is not elevated    - echo in July w/ preserved EF    - will repeat echo due to worsening LE edema

## 2024-11-22 NOTE — CONSULTS
[facility] - Med Surg ([address])  Wound Care    Patient Name:  Riaz Guerra Jr.   MRN:  [id number]  Date: 11/22/2024  Diagnosis: Venous stasis ulcers of both lower extremities    History:     Past Medical History:   Diagnosis Date    Depression     Diabetes mellitus     Gout     High cholesterol     Hypertension        Social History     Socioeconomic History    Marital status: Single   Tobacco Use    Smoking status: Never    Smokeless tobacco: Never   Substance and Sexual Activity    Alcohol use: Not Currently     Comment: occasionally    Drug use: Yes     Types: Marijuana    Sexual activity: Yes     Social Drivers of Health     Financial Resource Strain: Low Risk  (11/22/2024)    Overall Financial Resource Strain (CARDIA)     Difficulty of Paying Living Expenses: Not very hard   Food Insecurity: No Food Insecurity (11/22/2024)    Hunger Vital Sign     Worried About Running Out of Food in the Last Year: Never true     Ran Out of Food in the Last Year: Never true   Transportation Needs: No Transportation Needs (11/22/2024)    TRANSPORTATION NEEDS     Transportation : No   Physical Activity: Inactive (11/22/2024)    Exercise Vital Sign     Days of Exercise per Week: 0 days     Minutes of Exercise per Session: 0 min   Stress: Stress Concern Present (11/22/2024)    [employer] - Occupational Stress Questionnaire     Feeling of Stress : To some extent   Housing Stability: Low Risk  (11/22/2024)    Housing Stability Vital Sign     Unable to Pay for Housing in the Last Year: No     Homeless in the Last Year: No       Precautions:     Allergies as of 11/21/2024 - Reviewed 11/21/2024   Allergen Reaction Noted    Lisinopril Other (See Comments) 06/21/2024       [facility] Assessment Details/Treatment     Wound care consult received for assessment of bilateral lower extremities. Patient is a 74 year old male with PMH of venous stasis ulcers, T2DM, HTN, CKD-3, anemia, who presented to [facility] ED on  "11/21/24 for a "wound check" of the chronic wounds on his b/l lower extremities as the staff at [facility] felt the wounds had an odor today. ED notes indicate the wounds have worsening pain, drainage, and overall appearance of the wound. He reports he did have Wound Care coming to care for his wounds, but then his significant other took over care of his wounds, and now they have broken up. Last week his PCP started an outpatient care regimen for him, and he had an ED visit on 11/16 during which time he was started on Clindamycin for his wounds but reports he has had no improvement. He endorses associated chills, and diarrhea since beginning his antibiotics. He denies fever, chest pain, shortness of breath. He was evaluated in the ED with labs showing no leukocytosis or left shift on CBC. A metabolic panel was without significant abnormalities. A CXR showed no acute intrathoracic abnormalities. He was treated in the ED with vancomycin and zosyn. He was admitted to inpatient status.     Upon assessment to bilateral lower extremities noted chronic venous stasis ulcerations with full thickness skin loss, slough and moderate serosanguinous drainage to right lower leg wound. Right leg wound with epithelialized islands across the wound bed.    Discussed with patient the need for compression therapy. Cleansed wound bed with Vashe and applied Mepliex Ag foam dressing to open wounds. Dressing secured with ABD pad and rolled gauze. Patient tolerated dressing change well. Will apply tubigrip for light compression.     Recommend 3x/week dressing changes: MWF: cleanse with Vashe and cover wounds with Mepilex Ag foam. Secure inplace with ABD pad, rolled gauze and tubi  compression stockings.     Patient will also benefit from wound care outpatient. Nursing and MD team notified. Orders placed. Wound care will follow.           11/22/24 1238        Wound 07/01/24 0954 Ulceration Right lower Calf #1   Date First " Assessed/Time First Assessed: 07/01/24 0954   Present on Original Admission: Yes  Primary Wound Type: Ulceration  Side: Right  Orientation: lower  Location: Calf  Wound Number: #1  Is this injury device related?: No   Wound Image    Dressing Appearance Open to air   Drainage Amount Moderate   Drainage Characteristics/Odor Serosanguineous;Creamy;No odor   Appearance Pink;Red;Moist   Tissue loss description Full thickness   Periwound Area Macerated;Moist   Wound Edges Open   Wound Length (cm) 13 cm   Wound Width (cm) 8 cm   Wound Depth (cm) 0.2 cm   Wound Volume (cm^3) 20.8 cm^3   Wound Surface Area (cm^2) 104 cm^2   Care Cleansed with:;Antimicrobial agent;Applied:;Removed:   Dressing Applied;Silver;Foam;Absorptive Pad;Rolled gauze  (Mepilex Ag)        Wound 07/01/24 0955 Ulceration Left Calf #2   Date First Assessed/Time First Assessed: 07/01/24 0955   Present on Original Admission: Yes  Primary Wound Type: Ulceration  Side: Left  Location: Calf  Wound Number: #2  Is this injury device related?: No   Wound Image    Dressing Appearance Open to air   Drainage Amount Small   Drainage Characteristics/Odor Serosanguineous;No odor   Appearance Pink;Red;Moist   Tissue loss description Full thickness   Periwound Area Dry   Wound Edges Open   Care Cleansed with:;Antimicrobial agent   Dressing Applied;Silver;Foam;Absorptive Pad;Rolled gauze  (Mepilex Ag)             11/22/2024

## 2024-11-22 NOTE — ASSESSMENT & PLAN NOTE
Leukocytosis  Reported chills     Mr. Riaz Guerra . Presents with worsening appearance of his b/l LE wounds   He states his help in caring for the wounds (his former significant other) is no longer present  BNP not elevated     Plan -    - wound care and social work consulted   - Compression   - echo  - DVT US  - Cont IV abx  - Follow cultures

## 2024-11-22 NOTE — ASSESSMENT & PLAN NOTE
Creatine stable for now. BMP reviewed- noted Estimated Creatinine Clearance: 97.9 mL/min (based on SCr of 1 mg/dL). according to latest data. Based on current GFR, CKD stage is stage 3 - GFR 30-59.  Monitor UOP and serial BMP and adjust therapy as needed. Renally dose meds. Avoid nephrotoxic medications and procedures.

## 2024-11-22 NOTE — PLAN OF CARE
Assessment completed with patient at bedside---patient alert and oriented. Patient denies HH, dialysis and coumadin. Patient utilizes DME listed below. Patient takes meds as prescribed and can afford with insurance. Patient will need ride home at discharge.     [facility] ([address])  Initial Discharge Assessment       Primary Care Provider: Berhane Reyes (Inactive)    Admission Diagnosis: Leg swelling [M79.89]  Encounter for wound re-check [Z51.89]  Venous stasis dermatitis of both lower extremities [I87.2]  Cellulitis of lower extremity, unspecified laterality [L03.119]    Admission Date: 11/21/2024  Expected Discharge Date:     Transition of Care Barriers: None    Payor: [employer] MANAGED MEDICARE / Plan: [employer] HMO PPO SPECIAL NEEDS / Product Type: Medicare Advantage /     Extended Emergency Contact Information  Primary Emergency Contact: Victoria Rodney  Mobile Phone: [phone]  Relation: Sister  Preferred language: English   needed? No    Discharge Plan A: Home, Home Health  Discharge Plan B: New Nursing Home placement - correction care facility      [facility] [address]  [address] [place] [address]  Phone: [phone] Fax: [phone]    [id number] [address] [address]  Phone: [phone] Fax: [phone]      Initial Assessment (most recent)       Adult Discharge Assessment - 11/22/24 1047          Discharge Assessment    Assessment Type Discharge Planning Assessment     Confirmed/corrected address, phone number and insurance Yes     Confirmed Demographics Correct on Facesheet     Source of Information patient     Communicated KELSEY with patient/caregiver Date not available/Unable to determine     People in Home alone     Do you expect to return to your current living situation? Yes     Do you have help at home or  someone to help you manage your care at home? No     Prior to hospitilization cognitive status: Alert/Oriented     Current cognitive status: Alert/Oriented     Walking or Climbing Stairs Difficulty yes     Walking or Climbing Stairs ambulation difficulty, requires equipment;transferring difficulty, requires equipment;stair climbing difficulty, dependent     Mobility Management rollator, rolling walker     Dressing/Bathing Difficulty yes     Dressing/Bathing bathing difficulty, assistance 1 person;dressing difficulty, assistance 1 person     Home Accessibility wheelchair accessible     Equipment Currently Used at Home walker, rolling;rollator;cane, quad     Readmission within 30 days? No     Patient currently being followed by outpatient case management? No     Do you currently have service(s) that help you manage your care at home? No     Do you take prescription medications? Yes     Do you have prescription coverage? Yes     Coverage [employer]     Do you have any problems affording any of your prescribed medications? No     Is the patient taking medications as prescribed? yes     Who is going to help you get home at discharge? family or ambulance     How do you get to doctors appointments? health plan transportation     Are you on dialysis? No     Do you take coumadin? No     Discharge Plan A Home;Home Health     Discharge Plan B New Nursing Home placement - senior care care facility     DME Needed Upon Discharge  none     Discharge Plan discussed with: Patient     Transition of Care Barriers None        Physical Activity    On average, how many days per week do you engage in moderate to strenuous exercise (like a brisk walk)? 0 days     On average, how many minutes do you engage in exercise at this level? 0 min        Financial Resource Strain    How hard is it for you to pay for the very basics like food, housing, medical care, and heating? Not very hard        Housing Stability    In the last 12 months, was there  a time when you were not able to pay the mortgage or rent on time? No     At any time in the past 12 months, were you homeless or living in a shelter (including now)? No        Transportation Needs    Has the lack of transportation kept you from medical appointments, meetings, work or from getting things needed for daily living? No        Food Insecurity    Within the past 12 months, you worried that your food would run out before you got the money to buy more. Never true     Within the past 12 months, the food you bought just didn't last and you didn't have money to get more. Never true        Stress    Do you feel stress - tense, restless, nervous, or anxious, or unable to sleep at night because your mind is troubled all the time - these days? To some extent        Social Isolation    How often do you feel lonely or isolated from those around you?  Rarely        Alcohol Use    Q1: How often do you have a drink containing alcohol? 2-3 times a week     Q2: How many drinks containing alcohol do you have on a typical day when you are drinking? 3 or 4     Q3: How often do you have six or more drinks on one occasion? Never        Utilities    In the past 12 months has the electric, gas, oil, or water company threatened to shut off services in your home? No        Health Literacy    How often do you need to have someone help you when you read instructions, pamphlets, or other written material from your doctor or pharmacy? Never

## 2024-11-22 NOTE — SUBJECTIVE & OBJECTIVE
Interval History: Seen by wound care. Needs compression. Pt amenable. Reports chronic weakness and being unable to walk from leg heaviness. Retaining and straight cath x1.     Review of Systems   Constitutional:  Positive for chills. Negative for diaphoresis and fever.   Respiratory:  Negative for cough, shortness of breath and wheezing.    Cardiovascular:  Positive for leg swelling (b/l leg swelling). Negative for chest pain and palpitations.   Gastrointestinal:  Positive for diarrhea. Negative for abdominal pain, nausea and vomiting.   Genitourinary:  Negative for difficulty urinating, dysuria, frequency, hematuria and urgency.   Skin:  Positive for color change and wound. Negative for rash.   Psychiatric/Behavioral:  Negative for agitation and confusion.      Objective:     Vital Signs (Most Recent):  Temp: 97.6 °F (36.4 °C) (11/22/24 0807)  Pulse: 79 (11/22/24 1111)  Resp: 15 (11/22/24 1103)  BP: 126/72 (11/22/24 1103)  SpO2: 96 % (11/22/24 0807) Vital Signs (24h Range):  Temp:  [97.2 °F (36.2 °C)-98.4 °F (36.9 °C)] 97.6 °F (36.4 °C)  Pulse:  [] 79  Resp:  [14-23] 15  SpO2:  [94 %-99 %] 96 %  BP: (115-171)/() 126/72     Weight: (!) 142.9 kg (315 lb)  Body mass index is 42.72 kg/m².    Intake/Output Summary (Last 24 hours) at 11/22/2024 1125  Last data filed at 11/22/2024 0003  Gross per 24 hour   Intake 720 ml   Output --   Net 720 ml         Physical Exam  Constitutional:       General: He is not in acute distress.  Pulmonary:      Effort: No respiratory distress.      Breath sounds: No wheezing.   Abdominal:      General: There is no distension.      Tenderness: There is no abdominal tenderness.   Musculoskeletal:      Right lower leg: Edema present.      Left lower leg: Edema present.   Skin:     Comments: Chronic appearing darkened discoloration of the skin of the b/l ankles. RLE with lesion that is somewhat erythematous, with slightly more warmth than the LLE. There is no drainage or discharge.   See pictures in media     Neurological:      Mental Status: He is oriented to person, place, and time.      Comments: Legs weak. Can barely lift them off bed. Has been this way for weeks. Wheeled around on walker at home              Significant Labs: All pertinent labs within the past 24 hours have been reviewed.    Significant Imaging: I have reviewed all pertinent imaging results/findings within the past 24 hours.

## 2024-11-22 NOTE — ASSESSMENT & PLAN NOTE
"Patient's FSGs are controlled on current medication regimen.  Last A1c reviewed-   Lab Results   Component Value Date    HGBA1C 8.8 (H) 07/01/2024     Most recent fingerstick glucose reviewed- No results for input(s): "POCTGLUCOSE" in the last 24 hours.  Current correctional scale  Low  Maintain anti-hyperglycemic dose as follows-   Antihyperglycemics (From admission, onward)      Start     Stop Route Frequency Ordered    11/22/24 0900  insulin glargine U-100 (Lantus) pen 20 Units         -- SubQ Daily 11/21/24 2100 11/22/24 0715  insulin aspart U-100 pen 5 Units         -- SubQ 3 times daily with meals 11/21/24 2100 11/21/24 2159  insulin aspart U-100 pen 0-5 Units         -- SubQ Before meals & nightly PRN 11/21/24 2100          Hold Oral hypoglycemics while patient is in the hospital.  "

## 2024-11-23 LAB
ANION GAP SERPL CALC-SCNC: 11 MMOL/L (ref 8–16)
BASOPHILS # BLD AUTO: 0.09 K/UL (ref 0–0.2)
BASOPHILS NFR BLD: 1.1 % (ref 0–1.9)
BUN SERPL-MCNC: 29 MG/DL (ref 8–23)
CALCIUM SERPL-MCNC: 8.3 MG/DL (ref 8.7–10.5)
CHLORIDE SERPL-SCNC: 108 MMOL/L (ref 95–110)
CO2 SERPL-SCNC: 17 MMOL/L (ref 23–29)
CREAT SERPL-MCNC: 1.4 MG/DL (ref 0.5–1.4)
DIFFERENTIAL METHOD BLD: ABNORMAL
EOSINOPHIL # BLD AUTO: 0.3 K/UL (ref 0–0.5)
EOSINOPHIL NFR BLD: 3.8 % (ref 0–8)
ERYTHROCYTE [DISTWIDTH] IN BLOOD BY AUTOMATED COUNT: 14.5 % (ref 11.5–14.5)
EST. GFR  (NO RACE VARIABLE): 53 ML/MIN/1.73 M^2
GLUCOSE SERPL-MCNC: 109 MG/DL (ref 70–110)
HCT VFR BLD AUTO: 34.8 % (ref 40–54)
HGB BLD-MCNC: 10.9 G/DL (ref 14–18)
IMM GRANULOCYTES # BLD AUTO: 0.04 K/UL (ref 0–0.04)
IMM GRANULOCYTES NFR BLD AUTO: 0.5 % (ref 0–0.5)
LYMPHOCYTES # BLD AUTO: 1.2 K/UL (ref 1–4.8)
LYMPHOCYTES NFR BLD: 14.5 % (ref 18–48)
MAGNESIUM SERPL-MCNC: 2.1 MG/DL (ref 1.6–2.6)
MCH RBC QN AUTO: 29.4 PG (ref 27–31)
MCHC RBC AUTO-ENTMCNC: 31.3 G/DL (ref 32–36)
MCV RBC AUTO: 94 FL (ref 82–98)
MONOCYTES # BLD AUTO: 0.6 K/UL (ref 0.3–1)
MONOCYTES NFR BLD: 7 % (ref 4–15)
NEUTROPHILS # BLD AUTO: 6 K/UL (ref 1.8–7.7)
NEUTROPHILS NFR BLD: 73.1 % (ref 38–73)
NRBC BLD-RTO: 0 /100 WBC
PLATELET # BLD AUTO: 284 K/UL (ref 150–450)
PMV BLD AUTO: 10.1 FL (ref 9.2–12.9)
POCT GLUCOSE: 138 MG/DL (ref 70–110)
POCT GLUCOSE: 145 MG/DL (ref 70–110)
POCT GLUCOSE: 176 MG/DL (ref 70–110)
POCT GLUCOSE: 179 MG/DL (ref 70–110)
POTASSIUM SERPL-SCNC: 3.9 MMOL/L (ref 3.5–5.1)
RBC # BLD AUTO: 3.71 M/UL (ref 4.6–6.2)
SODIUM SERPL-SCNC: 136 MMOL/L (ref 136–145)
WBC # BLD AUTO: 8.19 K/UL (ref 3.9–12.7)

## 2024-11-23 PROCEDURE — 63600175 PHARM REV CODE 636 W HCPCS: Performed by: STUDENT IN AN ORGANIZED HEALTH CARE EDUCATION/TRAINING PROGRAM

## 2024-11-23 PROCEDURE — 25000003 PHARM REV CODE 250: Performed by: PHYSICIAN ASSISTANT

## 2024-11-23 PROCEDURE — A4216 STERILE WATER/SALINE, 10 ML: HCPCS | Performed by: PHYSICIAN ASSISTANT

## 2024-11-23 PROCEDURE — 27000207 HC ISOLATION

## 2024-11-23 PROCEDURE — 85025 COMPLETE CBC W/AUTO DIFF WBC: CPT | Performed by: PHYSICIAN ASSISTANT

## 2024-11-23 PROCEDURE — 36415 COLL VENOUS BLD VENIPUNCTURE: CPT | Performed by: PHYSICIAN ASSISTANT

## 2024-11-23 PROCEDURE — 97166 OT EVAL MOD COMPLEX 45 MIN: CPT

## 2024-11-23 PROCEDURE — 63600175 PHARM REV CODE 636 W HCPCS: Performed by: PHYSICIAN ASSISTANT

## 2024-11-23 PROCEDURE — 25000003 PHARM REV CODE 250: Performed by: STUDENT IN AN ORGANIZED HEALTH CARE EDUCATION/TRAINING PROGRAM

## 2024-11-23 PROCEDURE — 51798 US URINE CAPACITY MEASURE: CPT

## 2024-11-23 PROCEDURE — 97162 PT EVAL MOD COMPLEX 30 MIN: CPT

## 2024-11-23 PROCEDURE — 83735 ASSAY OF MAGNESIUM: CPT | Performed by: PHYSICIAN ASSISTANT

## 2024-11-23 PROCEDURE — 97530 THERAPEUTIC ACTIVITIES: CPT

## 2024-11-23 PROCEDURE — 97535 SELF CARE MNGMENT TRAINING: CPT

## 2024-11-23 PROCEDURE — 21400001 HC TELEMETRY ROOM

## 2024-11-23 PROCEDURE — 80048 BASIC METABOLIC PNL TOTAL CA: CPT | Performed by: PHYSICIAN ASSISTANT

## 2024-11-23 PROCEDURE — 94761 N-INVAS EAR/PLS OXIMETRY MLT: CPT

## 2024-11-23 RX ORDER — GABAPENTIN 400 MG/1
800 CAPSULE ORAL 2 TIMES DAILY
Status: DISCONTINUED | OUTPATIENT
Start: 2024-11-23 | End: 2024-11-26 | Stop reason: HOSPADM

## 2024-11-23 RX ADMIN — TRAMADOL HYDROCHLORIDE 50 MG: 50 TABLET, COATED ORAL at 08:11

## 2024-11-23 RX ADMIN — Medication 10 ML: at 10:11

## 2024-11-23 RX ADMIN — LOSARTAN POTASSIUM 100 MG: 50 TABLET, FILM COATED ORAL at 08:11

## 2024-11-23 RX ADMIN — CEFEPIME 1 G: 1 INJECTION, POWDER, FOR SOLUTION INTRAMUSCULAR; INTRAVENOUS at 08:11

## 2024-11-23 RX ADMIN — INSULIN ASPART 5 UNITS: 100 INJECTION, SOLUTION INTRAVENOUS; SUBCUTANEOUS at 12:11

## 2024-11-23 RX ADMIN — BACLOFEN 10 MG: 10 TABLET ORAL at 08:11

## 2024-11-23 RX ADMIN — AMLODIPINE BESYLATE 10 MG: 5 TABLET ORAL at 08:11

## 2024-11-23 RX ADMIN — GABAPENTIN 800 MG: 400 CAPSULE ORAL at 08:11

## 2024-11-23 RX ADMIN — BACLOFEN 10 MG: 10 TABLET ORAL at 02:11

## 2024-11-23 RX ADMIN — ASPIRIN 81 MG: 81 TABLET, COATED ORAL at 08:11

## 2024-11-23 RX ADMIN — TAMSULOSIN HYDROCHLORIDE 0.4 MG: 0.4 CAPSULE ORAL at 08:11

## 2024-11-23 RX ADMIN — VANCOMYCIN HYDROCHLORIDE 1500 MG: 1.5 INJECTION, POWDER, LYOPHILIZED, FOR SOLUTION INTRAVENOUS at 12:11

## 2024-11-23 RX ADMIN — INSULIN ASPART 5 UNITS: 100 INJECTION, SOLUTION INTRAVENOUS; SUBCUTANEOUS at 04:11

## 2024-11-23 RX ADMIN — TRAMADOL HYDROCHLORIDE 50 MG: 50 TABLET, COATED ORAL at 02:11

## 2024-11-23 RX ADMIN — CEFEPIME 1 G: 1 INJECTION, POWDER, FOR SOLUTION INTRAMUSCULAR; INTRAVENOUS at 01:11

## 2024-11-23 RX ADMIN — INSULIN GLARGINE 20 UNITS: 100 INJECTION, SOLUTION SUBCUTANEOUS at 08:11

## 2024-11-23 RX ADMIN — Medication 10 ML: at 05:11

## 2024-11-23 RX ADMIN — Medication 10 ML: at 01:11

## 2024-11-23 RX ADMIN — HEPARIN SODIUM 5000 UNITS: 5000 INJECTION INTRAVENOUS; SUBCUTANEOUS at 05:11

## 2024-11-23 RX ADMIN — ATORVASTATIN CALCIUM 20 MG: 20 TABLET, FILM COATED ORAL at 08:11

## 2024-11-23 RX ADMIN — HEPARIN SODIUM 5000 UNITS: 5000 INJECTION INTRAVENOUS; SUBCUTANEOUS at 09:11

## 2024-11-23 RX ADMIN — HEPARIN SODIUM 5000 UNITS: 5000 INJECTION INTRAVENOUS; SUBCUTANEOUS at 01:11

## 2024-11-23 RX ADMIN — INSULIN ASPART 5 UNITS: 100 INJECTION, SOLUTION INTRAVENOUS; SUBCUTANEOUS at 08:11

## 2024-11-23 RX ADMIN — CEFEPIME 1 G: 1 INJECTION, POWDER, FOR SOLUTION INTRAMUSCULAR; INTRAVENOUS at 05:11

## 2024-11-23 NOTE — PROGRESS NOTES
"[facility] - [facility] [address] Medicine  Progress Note    Patient Name: Riaz Guerra Jr.  MRN: [id number]  Patient Class: IP- Inpatient   Admission Date: 11/21/2024  Length of Stay: 2 days  Attending Physician: Terry Avilez MD  Primary Care Provider: Berhane Reyes (Inactive)        Subjective:     Principal Problem:Venous stasis ulcers of both lower extremities        HPI:  . Riaz Guerra Jr. Is a 74 y.o. male, with PMH of venous stasis ulcers, T2DM, HTN, CKD-3, anemia, who [place] [facility] [place]24 for a "wound check" of the chronic wounds on his b/l lower extremities as the staff at [facility] felt the wounds had an odor today. ED notes indicate the wounds have worsening pain, drainage, and overall appearance of the wound. He reports he did have Wound Care coming to care for his wounds, but then his significant other took over care of his wounds, and now they have broken up. Last week his PCP started an outpatient care regimen for him, and he had an ED visit on 11/16 during which time he was started on Clindamycin for his wounds but reports he has had no improvement. He endorses associated chills, and diarrhea since beginning his antibiotics. He denies fever, chest pain, shortness of breath. He was evaluated in the ED with labs showing no leukocytosis or left shift on CBC. A metabolic panel was without significant abnormalities. A CXR showed no acute intrathoracic abnormalities. He was treated in the ED with vancomycin and zosyn. He was admitted to inpatient status.     Overview/Hospital Course:  Seen by wound care. Needs compression to aid with wound healing. BNP not elevated. New leukocytosis. Cont IV abx at this time. Follow cultures. Echo, DVT US.  Reports chronic weakness and unable to walk due to leg heaviness. C diff ordered if repeat diarrhea. PT/OT. 11/22 Retaining 322 ml and straight cath x 1. Cont flomax. Urinating by self thus far. No diarrhea while " inpatient.     Interval History: Urinating by self. Not documented as soaked bed. PT/OT.     Review of Systems   Constitutional:  Positive for chills. Negative for diaphoresis and fever.   Respiratory:  Negative for cough, shortness of breath and wheezing.    Cardiovascular:  Positive for leg swelling (b/l leg swelling). Negative for chest pain and palpitations.   Gastrointestinal:  Positive for diarrhea. Negative for abdominal pain, nausea and vomiting.   Genitourinary:  Negative for difficulty urinating, dysuria, frequency, hematuria and urgency.   Skin:  Positive for color change and wound. Negative for rash.   Psychiatric/Behavioral:  Negative for agitation and confusion.      Objective:     Vital Signs (Most Recent):  Temp: 97.6 °F (36.4 °C) (11/22/24 0807)  Pulse: 80 (11/23/24 0733)  Resp: 18 (11/23/24 0831)  BP: (!) 146/77 (11/23/24 0733)  SpO2: 99 % (11/23/24 0733) Vital Signs (24h Range):  Pulse:  [75-89] 80  Resp:  [14-18] 18  SpO2:  [98 %-99 %] 99 %  BP: (122-153)/(71-78) 146/77     Weight: (!) 142.9 kg (315 lb)  Body mass index is 42.72 kg/m².    Intake/Output Summary (Last 24 hours) at 11/23/2024 1142  Last data filed at 11/23/2024 1003  Gross per 24 hour   Intake 1917.79 ml   Output 400 ml   Net 1517.79 ml         Physical Exam  Constitutional:       General: He is not in acute distress.  Pulmonary:      Effort: No respiratory distress.      Breath sounds: No wheezing.   Abdominal:      General: There is no distension.      Tenderness: There is no abdominal tenderness.   Musculoskeletal:      Right lower leg: Edema present.      Left lower leg: Edema present.   Skin:     Comments: Chronic appearing darkened discoloration of the skin of the b/l ankles. RLE with lesion that is somewhat erythematous, with slightly more warmth than the LLE. There is no drainage or discharge.  See pictures in media     Neurological:      Mental Status: He is oriented to person, place, and time.      Comments: Legs weak. Can  barely lift them off bed. Has been this way for weeks. Wheeled around on walker at home              Significant Labs: All pertinent labs within the past 24 hours have been reviewed.    Significant Imaging: I have reviewed all pertinent imaging results/findings within the past 24 hours.    Assessment/Plan:      * Venous stasis ulcers of both lower extremities  Leukocytosis - resolved   Reported chills     Mr. Riaz Guerra Jr. Presents with worsening appearance of his b/l LE wounds   He states his help in caring for the wounds (his former significant other) is no longer present  BNP not elevated     DVT US - no thrombosis   Echo - 'There is normal systolic function with a visually estimated ejection fraction of 55 - 60%. There is indeterminate diastolic function. '    Plan -    - wound care and social work consulted   - Compression   - Does not appear overtly infected but in setting of leukocytosis and reported chills cont IV abx - if blood cx remain neg and WBC remains resolved trial off abx    - Follow cultures - NGTD   - PT/OT       Urine retention  11/22 - Retaining 322 ml and straight cath x 1. Cont flomax.   Urinating by self      CKD (chronic kidney disease) stage 3, GFR 30-59 ml/min  Creatine stable for now. BMP reviewed- noted Estimated Creatinine Clearance: 97.9 mL/min (based on SCr of 1 mg/dL). according to latest data. Based on current GFR, CKD stage is stage 3 - GFR 30-59.  Monitor UOP and serial BMP and adjust therapy as needed. Renally dose meds. Avoid nephrotoxic medications and procedures.    Chronic pain  - continue home meds (gabapentin - reports he takes it BID).  Is intermittently prescribed Norco and tramadol      HTN (hypertension)  - continue home amlodipine on losartan    Insulin dependent type 2 diabetes mellitus  Unsure of his home doses and he says he does not take insulin regularly. HbA1c 6.9%   - Requiring LA, SA and SS here - titrate as needed. Consider oral alternative on  discharge since non compliance with insulin and well controlled HbA1c       VTE Risk Mitigation (From admission, onward)           Ordered     heparin (porcine) injection 5,000 Units  Every 8 hours         11/21/24 2058     IP VTE HIGH RISK PATIENT  Once         11/21/24 2058     Place sequential compression device  Until discontinued         11/21/24 2058                    Discharge Planning   KELSEY: 11/27/2024     Code Status: Full Code   Is the patient medically ready for discharge?:     Reason for patient still in hospital (select all that apply): Treatment  Discharge Plan A: Home, Home Health                  Terry Aguila MD  Department of Hospital Medicine   [facility] ([address])

## 2024-11-23 NOTE — PLAN OF CARE
Problem: Adult Inpatient Plan of Care  Goal: Plan of Care Review  Outcome: Progressing  Flowsheets (Taken 11/23/2024 1157)  Plan of Care Reviewed With: patient  Goal: Optimal Comfort and Wellbeing  Outcome: Progressing  Intervention: Monitor Pain and Promote Comfort  Flowsheets (Taken 11/23/2024 1157)  Pain Management Interventions:   around-the-clock dosing utilized   care clustered   pain management plan reviewed with patient/caregiver   quiet environment facilitated   relaxation techniques promoted   pillow support provided   position adjusted     Problem: Diabetes Comorbidity  Goal: Blood Glucose Level Within Targeted Range  Outcome: Progressing  Intervention: Monitor and Manage Glycemia  Flowsheets (Taken 11/23/2024 1157)  Glycemic Management: blood glucose monitored     Problem: Wound  Goal: Absence of Infection Signs and Symptoms  Outcome: Progressing  Intervention: Prevent or Manage Infection  Flowsheets (Taken 11/23/2024 1157)  Fever Reduction/Comfort Measures: fluid intake increased  Infection Management: aseptic technique maintained  Isolation Precautions:   enhanced contact   precautions maintained  Goal: Skin Health and Integrity  Outcome: Progressing  Intervention: Optimize Skin Protection  Flowsheets (Taken 11/23/2024 1157)  Pressure Reduction Techniques:   frequent weight shift encouraged   weight shift assistance provided  Pressure Reduction Devices:   foam padding utilized   heel offloading device utilized   positioning supports utilized  Skin Protection: incontinence pads utilized  Activity Management: Arm raise - L1  Head of Bed (HOB) Positioning: HOB elevated  Goal: Optimal Wound Healing  Outcome: Progressing     Problem: Skin Injury Risk Increased  Goal: Skin Health and Integrity  Outcome: Progressing  Intervention: Optimize Skin Protection  Flowsheets (Taken 11/23/2024 1157)  Pressure Reduction Techniques:   frequent weight shift encouraged   weight shift assistance provided  Pressure  Reduction Devices:   foam padding utilized   heel offloading device utilized   positioning supports utilized  Skin Protection: incontinence pads utilized  Activity Management: Arm raise - L1  Head of Bed (HOB) Positioning: HOB elevated     Problem: Infection  Goal: Absence of Infection Signs and Symptoms  Outcome: Progressing  Intervention: Prevent or Manage Infection  Flowsheets (Taken 11/23/2024 1157)  Fever Reduction/Comfort Measures: fluid intake increased  Infection Management: aseptic technique maintained  Isolation Precautions:   enhanced contact   precautions maintained

## 2024-11-23 NOTE — ASSESSMENT & PLAN NOTE
Unsure of his home doses and he says he does not take insulin regularly. HbA1c 6.9%   - Requiring LA, SA and SS here - titrate as needed. Consider oral alternative on discharge since non compliance with insulin and well controlled HbA1c

## 2024-11-23 NOTE — ASSESSMENT & PLAN NOTE
- continue home meds (gabapentin - reports he takes it BID).  Is intermittently prescribed Norco and tramadol

## 2024-11-23 NOTE — PLAN OF CARE
Problem: Occupational Therapy  Goal: Occupational Therapy Goal  Description: Goals to be met by: 12/7/2024     Patient will increase functional independence with ADLs by performing:    UE Dressing with Moderate Assistance.  LE Dressing with Moderate Assistance.  Grooming while seated with Set-up Assistance.  Toileting from bedside commode with Moderate Assistance for hygiene and clothing management.   Toilet transfer to bedside commode with Moderate Assistance.    Outcome: Progressing     Initial OT eval/treat complete.  Has rollator.  DME needs TBD at next level of care though currently needs bariatric BSC and TTB.  Will defer AD needs to PT.  Recommend post acute Moderate Intensity therapy.  To benefit from continued acute care OT services to increase independence in self-care/functional transfers.  OT to follow.

## 2024-11-23 NOTE — PT/OT/SLP EVAL
Occupational Therapy   Evaluation and Treatment    Name: Riaz Guerra Jr.  MRN: [id number]  Admitting Diagnosis: Venous stasis ulcers of both lower extremities  Recent Surgery: * No surgery found *      Recommendations:     Discharge Recommendations: Moderate Intensity Therapy  Discharge Equipment Recommendations:  bath bench, bedside commode (will defer AD needs to PT)  Barriers to discharge:  Decreased caregiver support (current functional level)    Assessment:   Initial OT eval/treat complete.  R-lateral scoots and supine>sit requiring MIN A and MOD A respectively d/t need for LE management.  Sit<>stand MOD A of 2 persons for lift/lower EOB<>RW with MOD cues for safe hand placement during transitions; only able to tolerate static stance X 20-seconds before requiring seated rest break.  TOTAL A to don socks at bed level.  Requires short seated rest breaks after bed mobility and standing task d/t decreased endurance.  Has rollator.  DME needs TBD at next level of care though currently needs bariatric BSC and TTB.  Will defer AD needs to PT.  Recommend post acute Moderate Intensity therapy.  To benefit from continued acute care OT services to increase independence in self-care/functional transfers.  OT to follow.     Riaz Guerra Jr. is a 74 y.o. male with a medical diagnosis of Venous stasis ulcers of both lower extremities.  He presents with below deficits decreasing independence in self-care/functional transfers. Performance deficits affecting function: weakness, impaired endurance, impaired self care skills, impaired functional mobility, gait instability, impaired balance, decreased upper extremity function, decreased lower extremity function, decreased safety awareness, pain, impaired skin, decreased ROM.      Rehab Prognosis: Good; patient would benefit from acute skilled OT services to address these deficits and reach maximum level of function.       Plan:     Patient to be seen 5 x/week to address  the above listed problems via self-care/home management, therapeutic exercises, therapeutic activities  Plan of Care Expires: 12/07/24  Plan of Care Reviewed with: patient    Subjective     Chief Complaint: With c/o RLE medical thigh pain.   Patient/Family Comments/goals: No goals stated at this time.  Pt. Expressed desire to get stronger.      Occupational Profile:  Lives alone  [address] [place] apt; independent living facility  Uses rollator for ambulation  Seated using feet to propel  Tub/shower   Takes sink baths  Reports needing increased assist  With bed mobility/ADL  Gives conflicting information  States he has someone to assist initially  Then states having no one to assist  Equipment Used at Home: rollator  Assistance upon Discharge: Lives alone    Pain/Comfort:  Pain Rating 1: 5/10  Location - Side 1: Right  Location - Orientation 1: medial  Location 1: thigh  Pain Addressed 1: Distraction, Cessation of Activity, Nurse notified  Pain Rating Post-Intervention 1: 5/10    Patients cultural, spiritual, Rastafarian conflicts given the current situation:  (None stated.)    Objective:     Communicated with: Aleena James RN prior to session.  Patient found HOB elevated with peripheral IV, telemetry, bed alarm upon OT entry to room.    General Precautions: Standard, fall, diabetic, special contact (strict I&O's)  Orthopedic Precautions: N/A  Braces: N/A  Respiratory Status: Room air    Occupational Performance:    Bed Mobility:    Supine>sit MOD A   Assist for BLE management  Increased time  MIN sequencing cues  HOB elevated  Bed rail used   Short seated rest after task  Sit>supine MAX A of 2 persons   Assist for BLE management  Increased time  Increased sequencing cues  R-lateral scoots X 4 seated EOB MIN A  Assist for LE management  Increased time  Initial sequencing cues   Good follow through  Short seated rest after task  Bridging towards HOB X 3 SBA  Sequencing cues  Manual assist for BLE flex  Overhead bed rails  used    Functional Mobility/Transfers:  Sit<>stand EOB<>RW MOD A of 2 persons  Assist for lift/lower  MOD cues for safe hand placement  During transitions  Short seated rest break after task    Activities of Daily Living:  LB dress TOTAL A   Donned socks to BLE  At bed level    Cognitive/Visual Perceptual:  Cognitive/Psychosocial Skills:  -       Oriented to: Person, Place, Time, and Situation   -       Follows Commands/attention:Follows one-step commands  -       Communication: able to make basic needs known and answer questions appropriately  -       Memory: No Deficits noted  -       Safety awareness/insight to disability: impaired     Physical Exam:  Postural examination/scapula alignment: -       Rounded shoulders  -       Forward head  Skin integrity: BLE calf wounds well documented in EMR  Upper Extremity Range of Motion:  -       Right Upper Extremity: WFL except approx. 60% shoulder flex  -       Left Upper Extremity: WFL except approx. 60% shoulder flex  Upper Extremity Strength: -       Right Upper Extremity:  proximally 3-/5; distally 4-/5  -       Left Upper Extremity:  proximally 3-/5; distally 4-/5   Strength: -       Right Upper Extremity: WFL  -       Left Upper Extremity: WFL  Fine Motor Coordination: -       Intact  Left hand thumb/finger opposition skills and Right hand thumb/finger opposition skills    AMPAC 6 Click ADL:  AMPAC Total Score: 12    Treatment & Education:  Educated on role of OT, POC, bed mobility/transitional movement/ADL safety, review of call light and importance of calling for assist as needed.     Patient left HOB elevated with all lines intact, call button in reach, bed alarm on, and nursing notified    GOALS:   Multidisciplinary Problems       Occupational Therapy Goals          Problem: Occupational Therapy    Goal Priority Disciplines Outcome Interventions   Occupational Therapy Goal     OT, PT/OT Progressing    Description: Goals to be met by: 12/7/2024     Patient  will increase functional independence with ADLs by performing:    UE Dressing with Moderate Assistance.  LE Dressing with Moderate Assistance.  Grooming while seated with Set-up Assistance.  Toileting from bedside commode with Moderate Assistance for hygiene and clothing management.   Toilet transfer to bedside commode with Moderate Assistance.                         History:     Past Medical History:   Diagnosis Date    Depression     Diabetes mellitus     Gout     High cholesterol     Hypertension        History reviewed. No pertinent surgical history.    Time Tracking:     OT Date of Treatment: 11/23/24  OT Start Time: 0936  OT Stop Time: 1009  OT Total Time (min): 33 min    Overlap with PT for portions of session due to complex nature of patient, tolerance for therapeutic modalities, and safety with mobility to decrease fall risk for patient and caregiver injury requiring two skilled therapists to provide interventions.    Billable Minutes:Evaluation 10  Self Care/Home Management 10    11/23/2024

## 2024-11-23 NOTE — PT/OT/SLP EVAL
Physical Therapy Evaluation    Patient Name:  Riaz Guerra Jr.   MRN:  [id number]    Recommendations:     Discharge Recommendations: Moderate Intensity Therapy  with transition to nursing home  Discharge Equipment Recommendations: walker, rolling, wheelchair   Barriers to discharge: Decreased caregiver support and current functional status     Assessment:     Riaz Guerra Jr. is a 74 y.o. male admitted with a medical diagnosis of Venous stasis ulcers of both lower extremities.  He presents with the following impairments/functional limitations: gait instability, weakness, decreased ROM, decreased upper extremity function, impaired cardiopulmonary response to activity, impaired endurance, decreased lower extremity function, impaired balance, impaired skin, impaired functional mobility, edema, impaired self care skills, impaired muscle length, decreased safety awareness .     Patient evaluated by PT and goals established. The patient required moderate assist of two to stand edge of bed with RW. Patient reports living in assisted living facility but being unable to adequately care for himself in recent months. He reports he uses his rollator as a chair to roll around in. The patient would benefit from WC and RW at discharge.  PT will continue to follow and progress as tolerated. Rec for dc to Moderate Intensity Therapy.     The mobility limitation cannot be sufficiently resolved by the use of a cane.   Patient's functional mobility deficit can be sufficiently resolved with the use of a rolling walker. Patient's mobility limitation significantly impairs their ability to participate in one of more activities of daily living. The use of a rolling walker will significantly improve the patient's ability to participate in MRADLS and the patient will use it on regular basis in the home.       The patient has a mobility limitation that significantly impairs their ability to participate in one or more mobility related  activities of daily living (MRADLS) such as toileting, feeding, dressing, grooming, and bathing in customary locations in the home. The mobility limitation cannot be sufficiently resolved by the use of a cane or walker. The use of a manual wheelchair will significantly improve the patient's ability to participate in MRADLS and and the patient will use it on a regular basis in the home. The patient has expressed their willingness to use a manual wheelchair at home. They also have a caregiver who is available, willing and able to provide assistance with the wheelchair when needed.       Rehab Prognosis: Good; patient would benefit from acute skilled PT services to address these deficits and reach maximum level of function.    Recent Surgery: * No surgery found *      Plan:     During this hospitalization, patient to be seen 5 x/week to address the identified rehab impairments via gait training, therapeutic activities, therapeutic exercises and progress toward the following goals:    Plan of Care Expires:  12/23/24    Subjective     Chief Complaint: that he really doesn't want to get up but he knows he needs to so he will  Patient/Family Comments/goals: The patient agrees to participate in PT evaluation   Pain/Comfort:  Pain Rating 1: 5/10  Location - Side 1: Right  Location 1: thigh  Pain Addressed 1: Reposition, Distraction, Cessation of Activity    Patients cultural, spiritual, Mu-ism conflicts given the current situation: no    Living Environment:  The patient lives in an assisted living facility and reports that  he uses his rollator as a wheelchair around the facility. He reports that for the last 8 months he has been requiring more assistance with getting out of bed and transferring from the bed to the seat of the rollator.   Prior to admission, patients level of function was requiring assist with all functional mobility and ADLs.  Equipment used at home: rollator.  DME owned (not currently used): none.   Upon discharge, patient will have assistance from unsure.    Objective:     Communicated with nursing prior to session.  Patient found supine with peripheral IV, telemetry, bed alarm  upon PT entry to room.    General Precautions: Standard, fall, special contact  Orthopedic Precautions:N/A   Braces: N/A  Respiratory Status: Room air    Exams:  Cognition:   Patient is oriented to person, place, time and situation.  Pt follows approximately 100% of one and two commands.    Mood: Pleasant and cooperative.   Safety Awareness: fair   Musculoskeletal:  BMI: 42  Posture:  slouched shoulders, forward head posture   LE ROM/Strength:   R ROM: WNL  L ROM: WNL  R Strength:   Knee extension: 4/5  Dorsiflexion: unable to formally assess   L Strength:   Knee extension: 4/5  Dorsiflexion:unable to formally assess   Neuromuscular:  Sensation: not formally assessed   Tone/Reflexes: No impairments identified with functional mobility. No formal testing performed.     Balance:   Static sitting: good (CGA)  Static standing: poor(maximal assist)  Dynamic standing: NA  Visual-vestibular: No impairments identified with functional mobility. No formal testing performed.  Integument:  impaired skin of bilateral LE   Cardiopulmonary:  Edema: moderate of BLE      Functional Mobility:  Bed Mobility:     Rolling Right: moderate assistance  Scooting: contact guard assistance  Supine to Sit: moderate assistance  Sit to Supine: maximal assistance and of two  persons  Transfers:     Sit to Stand:  moderate assistance and of two persons with rolling walker  Gait: not assessed  Seated edge of bed scooting: CGA for about four small scoots to the R. Verbal cues for anterior trunk lean to facilitate scooting      AM-PAC 6 CLICK MOBILITY  Total Score:9       Treatment & Education:  PT educated patient on the following:   PT plan of care/role of PT  Safety with OOB mobility  Use of RW   Discharge disposition    Pt  verbalized understanding      Sitting  tolerance:   - the patient sat on the edge of the bed for about 10 minutes total with SBA     Standing tolerance:   - patient stood with moderate assist x 2 using the rolling walker for about 15 seconds and reports needing to sit down     Patient left supine with all lines intact and call button in reach.    GOALS:   Multidisciplinary Problems       Physical Therapy Goals          Problem: Physical Therapy    Goal Priority Disciplines Outcome Interventions   Physical Therapy Goal     PT, PT/OT Progressing    Description: Goals to be met by: 2024    Patient will increase functional independence with mobility by performin. Sit<>stand with minimal assist  with RW.  2. Gait x 10 feet with moderate assist  with RW.  3. Sitting to supine with minimal assist   4. Stand with RW for 4 minutes                           History:     Past Medical History:   Diagnosis Date    Depression     Diabetes mellitus     Gout     High cholesterol     Hypertension        History reviewed. No pertinent surgical history.    Time Tracking:     PT Received On: 24  PT Start Time: 937     PT Stop Time: 1010  PT Total Time (min): 33 min     Billable Minutes: Evaluation 20 and Therapeutic Activity 13      2024

## 2024-11-23 NOTE — PLAN OF CARE
Problem: Physical Therapy  Goal: Physical Therapy Goal  Description: Goals to be met by: 2024    Patient will increase functional independence with mobility by performin. Sit<>stand with minimal assist  with RW.  2. Gait x 10 feet with moderate assist  with RW.  3. Sitting to supine with minimal assist   4. Stand with RW for 4 minutes      Outcome: Progressing   Patient evaluated by PT and goals established. The patient required moderate assist of two to stand edge of bed with RW. Patient reports living in assisted living facility but being unable to adequately care for himself in recent months. He reports he uses his rollator as a chair to roll around in. The patient would benefit from WC and RW at discharge.  PT will continue to follow and progress as tolerated. Rec for dc to Moderate Intensity Therapy.

## 2024-11-23 NOTE — PLAN OF CARE
Problem: Adult Inpatient Plan of Care  Goal: Plan of Care Review  Outcome: Progressing  Goal: Patient-Specific Goal (Individualized)  Outcome: Progressing  Goal: Absence of Hospital-Acquired Illness or Injury  Outcome: Progressing  Goal: Optimal Comfort and Wellbeing  Outcome: Progressing  Goal: Readiness for Transition of Care  Outcome: Progressing     Problem: Bariatric Environmental Safety  Goal: Safety Maintained with Care  Outcome: Progressing     Problem: Diabetes Comorbidity  Goal: Blood Glucose Level Within Targeted Range  Outcome: Progressing     Problem: Wound  Goal: Optimal Coping  Outcome: Progressing  Goal: Optimal Functional Ability  Outcome: Progressing  Goal: Absence of Infection Signs and Symptoms  Outcome: Progressing  Goal: Improved Oral Intake  Outcome: Progressing  Goal: Optimal Pain Control and Function  Outcome: Progressing  Goal: Skin Health and Integrity  Outcome: Progressing  Goal: Optimal Wound Healing  Outcome: Progressing     Problem: Skin Injury Risk Increased  Goal: Skin Health and Integrity  Outcome: Progressing     Problem: Infection  Goal: Absence of Infection Signs and Symptoms  Outcome: Progressing

## 2024-11-23 NOTE — SUBJECTIVE & OBJECTIVE
Interval History: Urinating by self. Not documented as soaked bed. PT/OT.     Review of Systems   Constitutional:  Positive for chills. Negative for diaphoresis and fever.   Respiratory:  Negative for cough, shortness of breath and wheezing.    Cardiovascular:  Positive for leg swelling (b/l leg swelling). Negative for chest pain and palpitations.   Gastrointestinal:  Positive for diarrhea. Negative for abdominal pain, nausea and vomiting.   Genitourinary:  Negative for difficulty urinating, dysuria, frequency, hematuria and urgency.   Skin:  Positive for color change and wound. Negative for rash.   Psychiatric/Behavioral:  Negative for agitation and confusion.      Objective:     Vital Signs (Most Recent):  Temp: 97.6 °F (36.4 °C) (11/22/24 0807)  Pulse: 80 (11/23/24 0733)  Resp: 18 (11/23/24 0831)  BP: (!) 146/77 (11/23/24 0733)  SpO2: 99 % (11/23/24 0733) Vital Signs (24h Range):  Pulse:  [75-89] 80  Resp:  [14-18] 18  SpO2:  [98 %-99 %] 99 %  BP: (122-153)/(71-78) 146/77     Weight: (!) 142.9 kg (315 lb)  Body mass index is 42.72 kg/m².    Intake/Output Summary (Last 24 hours) at 11/23/2024 1142  Last data filed at 11/23/2024 1003  Gross per 24 hour   Intake 1917.79 ml   Output 400 ml   Net 1517.79 ml         Physical Exam  Constitutional:       General: He is not in acute distress.  Pulmonary:      Effort: No respiratory distress.      Breath sounds: No wheezing.   Abdominal:      General: There is no distension.      Tenderness: There is no abdominal tenderness.   Musculoskeletal:      Right lower leg: Edema present.      Left lower leg: Edema present.   Skin:     Comments: Chronic appearing darkened discoloration of the skin of the b/l ankles. RLE with lesion that is somewhat erythematous, with slightly more warmth than the LLE. There is no drainage or discharge.  See pictures in media     Neurological:      Mental Status: He is oriented to person, place, and time.      Comments: Legs weak. Can barely lift  them off bed. Has been this way for weeks. Wheeled around on walker at home              Significant Labs: All pertinent labs within the past 24 hours have been reviewed.    Significant Imaging: I have reviewed all pertinent imaging results/findings within the past 24 hours.

## 2024-11-23 NOTE — PROGRESS NOTES
Therapy with vancomycin complete and/or consult discontinued by provider.  Pharmacy will sign off, please re-consult as needed.    Thank you for the consult,    Beatriz Spears  11/23/2024

## 2024-11-24 LAB
ALBUMIN SERPL BCP-MCNC: 2.6 G/DL (ref 3.5–5.2)
ALP SERPL-CCNC: 65 U/L (ref 40–150)
ALT SERPL W/O P-5'-P-CCNC: 11 U/L (ref 10–44)
ANION GAP SERPL CALC-SCNC: 9 MMOL/L (ref 8–16)
AST SERPL-CCNC: 10 U/L (ref 10–40)
BILIRUB SERPL-MCNC: 0.2 MG/DL (ref 0.1–1)
BUN SERPL-MCNC: 20 MG/DL (ref 8–23)
CALCIUM SERPL-MCNC: 8.6 MG/DL (ref 8.7–10.5)
CHLORIDE SERPL-SCNC: 110 MMOL/L (ref 95–110)
CO2 SERPL-SCNC: 21 MMOL/L (ref 23–29)
CREAT SERPL-MCNC: 1.2 MG/DL (ref 0.5–1.4)
ERYTHROCYTE [DISTWIDTH] IN BLOOD BY AUTOMATED COUNT: 14.3 % (ref 11.5–14.5)
EST. GFR  (NO RACE VARIABLE): >60 ML/MIN/1.73 M^2
GLUCOSE SERPL-MCNC: 108 MG/DL (ref 70–110)
HCT VFR BLD AUTO: 35.3 % (ref 40–54)
HGB BLD-MCNC: 11.3 G/DL (ref 14–18)
MCH RBC QN AUTO: 29.1 PG (ref 27–31)
MCHC RBC AUTO-ENTMCNC: 32 G/DL (ref 32–36)
MCV RBC AUTO: 91 FL (ref 82–98)
OHS QRS DURATION: 78 MS
OHS QTC CALCULATION: 464 MS
PLATELET # BLD AUTO: 301 K/UL (ref 150–450)
PMV BLD AUTO: 10.6 FL (ref 9.2–12.9)
POCT GLUCOSE: 126 MG/DL (ref 70–110)
POCT GLUCOSE: 129 MG/DL (ref 70–110)
POCT GLUCOSE: 138 MG/DL (ref 70–110)
POCT GLUCOSE: 29 MG/DL (ref 70–110)
POTASSIUM SERPL-SCNC: 4.1 MMOL/L (ref 3.5–5.1)
PROT SERPL-MCNC: 7.2 G/DL (ref 6–8.4)
RBC # BLD AUTO: 3.88 M/UL (ref 4.6–6.2)
SODIUM SERPL-SCNC: 140 MMOL/L (ref 136–145)
WBC # BLD AUTO: 6.31 K/UL (ref 3.9–12.7)

## 2024-11-24 PROCEDURE — 94761 N-INVAS EAR/PLS OXIMETRY MLT: CPT

## 2024-11-24 PROCEDURE — 25000003 PHARM REV CODE 250: Performed by: PHYSICIAN ASSISTANT

## 2024-11-24 PROCEDURE — 21400001 HC TELEMETRY ROOM

## 2024-11-24 PROCEDURE — A4216 STERILE WATER/SALINE, 10 ML: HCPCS | Performed by: PHYSICIAN ASSISTANT

## 2024-11-24 PROCEDURE — 80053 COMPREHEN METABOLIC PANEL: CPT | Performed by: STUDENT IN AN ORGANIZED HEALTH CARE EDUCATION/TRAINING PROGRAM

## 2024-11-24 PROCEDURE — 85027 COMPLETE CBC AUTOMATED: CPT | Performed by: STUDENT IN AN ORGANIZED HEALTH CARE EDUCATION/TRAINING PROGRAM

## 2024-11-24 PROCEDURE — 27000207 HC ISOLATION

## 2024-11-24 PROCEDURE — 25000003 PHARM REV CODE 250: Performed by: STUDENT IN AN ORGANIZED HEALTH CARE EDUCATION/TRAINING PROGRAM

## 2024-11-24 PROCEDURE — 63600175 PHARM REV CODE 636 W HCPCS: Performed by: PHYSICIAN ASSISTANT

## 2024-11-24 PROCEDURE — 36415 COLL VENOUS BLD VENIPUNCTURE: CPT | Performed by: STUDENT IN AN ORGANIZED HEALTH CARE EDUCATION/TRAINING PROGRAM

## 2024-11-24 PROCEDURE — 63600175 PHARM REV CODE 636 W HCPCS: Performed by: STUDENT IN AN ORGANIZED HEALTH CARE EDUCATION/TRAINING PROGRAM

## 2024-11-24 RX ADMIN — BACLOFEN 10 MG: 10 TABLET ORAL at 08:11

## 2024-11-24 RX ADMIN — LOSARTAN POTASSIUM 100 MG: 50 TABLET, FILM COATED ORAL at 09:11

## 2024-11-24 RX ADMIN — BACLOFEN 10 MG: 10 TABLET ORAL at 02:11

## 2024-11-24 RX ADMIN — TAMSULOSIN HYDROCHLORIDE 0.4 MG: 0.4 CAPSULE ORAL at 09:11

## 2024-11-24 RX ADMIN — BACLOFEN 10 MG: 10 TABLET ORAL at 09:11

## 2024-11-24 RX ADMIN — GABAPENTIN 800 MG: 400 CAPSULE ORAL at 09:11

## 2024-11-24 RX ADMIN — GABAPENTIN 800 MG: 400 CAPSULE ORAL at 08:11

## 2024-11-24 RX ADMIN — Medication 24 G: at 09:11

## 2024-11-24 RX ADMIN — HEPARIN SODIUM 5000 UNITS: 5000 INJECTION INTRAVENOUS; SUBCUTANEOUS at 09:11

## 2024-11-24 RX ADMIN — Medication 10 ML: at 02:11

## 2024-11-24 RX ADMIN — TRAMADOL HYDROCHLORIDE 50 MG: 50 TABLET, COATED ORAL at 08:11

## 2024-11-24 RX ADMIN — INSULIN ASPART 5 UNITS: 100 INJECTION, SOLUTION INTRAVENOUS; SUBCUTANEOUS at 09:11

## 2024-11-24 RX ADMIN — TRAMADOL HYDROCHLORIDE 50 MG: 50 TABLET, COATED ORAL at 02:11

## 2024-11-24 RX ADMIN — AMLODIPINE BESYLATE 10 MG: 5 TABLET ORAL at 09:11

## 2024-11-24 RX ADMIN — INSULIN GLARGINE 20 UNITS: 100 INJECTION, SOLUTION SUBCUTANEOUS at 09:11

## 2024-11-24 RX ADMIN — INSULIN ASPART 5 UNITS: 100 INJECTION, SOLUTION INTRAVENOUS; SUBCUTANEOUS at 11:11

## 2024-11-24 RX ADMIN — ATORVASTATIN CALCIUM 20 MG: 20 TABLET, FILM COATED ORAL at 09:11

## 2024-11-24 RX ADMIN — Medication 10 ML: at 06:11

## 2024-11-24 RX ADMIN — CEFEPIME 1 G: 1 INJECTION, POWDER, FOR SOLUTION INTRAMUSCULAR; INTRAVENOUS at 05:11

## 2024-11-24 RX ADMIN — HEPARIN SODIUM 5000 UNITS: 5000 INJECTION INTRAVENOUS; SUBCUTANEOUS at 05:11

## 2024-11-24 RX ADMIN — TRAMADOL HYDROCHLORIDE 50 MG: 50 TABLET, COATED ORAL at 05:11

## 2024-11-24 RX ADMIN — HEPARIN SODIUM 5000 UNITS: 5000 INJECTION INTRAVENOUS; SUBCUTANEOUS at 02:11

## 2024-11-24 RX ADMIN — ASPIRIN 81 MG: 81 TABLET, COATED ORAL at 09:11

## 2024-11-24 RX ADMIN — Medication 10 ML: at 09:11

## 2024-11-24 NOTE — PROGRESS NOTES
"[facility] Surg [address] Medicine  Progress Note    Patient Name: Riaz Guerra Jr.  MRN: [id number]  Patient Class: IP- Inpatient   Admission Date: 11/21/2024  Length of Stay: 3 days  Attending Physician: Terry Avilez MD  Primary Care Provider: Berhane Reyes (Inactive)        Subjective:     Principal Problem:Venous stasis ulcers of both lower extremities        HPI:  . Riaz Guerra Jr. Is a 74 y.o. male, with PMH of venous stasis ulcers, T2DM, HTN, CKD-3, anemia, who presented to [facility] ED on 11/21/24 for a "wound check" of the chronic wounds on his b/l lower extremities as the staff at [facility] felt the wounds had an odor today. ED notes indicate the wounds have worsening pain, drainage, and overall appearance of the wound. He reports he did have Wound Care coming to care for his wounds, but then his significant other took over care of his wounds, and now they have broken up. Last week his PCP started an outpatient care regimen for him, and he had an ED visit on 11/16 during which time he was started on Clindamycin for his wounds but reports he has had no improvement. He endorses associated chills, and diarrhea since beginning his antibiotics. He denies fever, chest pain, shortness of breath. He was evaluated in the ED with labs showing no leukocytosis or left shift on CBC. A metabolic panel was without significant abnormalities. A CXR showed no acute intrathoracic abnormalities. He was treated in the ED with vancomycin and zosyn. He was admitted to inpatient status.     Overview/Hospital Course:  Seen by wound care. Needs compression to aid with wound healing. Does not believe its overtly infected. BNP not elevated. New leukocytosis. Cont IV abx at this time. Follow cultures. Echo, DVT US.  Reports chronic weakness and unable to walk due to leg heaviness. C diff ordered if repeat diarrhea. PT/OT. 11/22 Retaining 322 ml and straight cath x 1. Cont flomax. Urinating " by self thus far. No diarrhea while inpatient. PT/OT rec SNF. Pt amenable. CM to discuss options with patient. Blood cx negative and leukocytosis resolved. Trial off abx.      Interval History: PT/OT rec SNF. Pt amenable. CM to discuss options with patient. Blood cx negative and leukocytosis resolved. Trial off abx.      Review of Systems   Constitutional:  Positive for chills. Negative for diaphoresis and fever.   Respiratory:  Negative for cough, shortness of breath and wheezing.    Cardiovascular:  Positive for leg swelling (b/l leg swelling). Negative for chest pain and palpitations.   Gastrointestinal:  Positive for diarrhea. Negative for abdominal pain, nausea and vomiting.   Genitourinary:  Negative for difficulty urinating, dysuria, frequency, hematuria and urgency.   Skin:  Positive for color change and wound. Negative for rash.   Psychiatric/Behavioral:  Negative for agitation and confusion.      Objective:     Vital Signs (Most Recent):  Temp: 98 °F (36.7 °C) (11/24/24 0730)  Pulse: 81 (11/24/24 1115)  Resp: 17 (11/24/24 1108)  BP: (!) 153/78 (11/24/24 1108)  SpO2: 98 % (11/24/24 1108) Vital Signs (24h Range):  Temp:  [98 °F (36.7 °C)-98.4 °F (36.9 °C)] 98 °F (36.7 °C)  Pulse:  [78-93] 81  Resp:  [16-20] 17  SpO2:  [93 %-99 %] 98 %  BP: (135-153)/(75-91) 153/78     Weight: (!) 151.4 kg (333 lb 12.4 oz)  Body mass index is 45.27 kg/m².    Intake/Output Summary (Last 24 hours) at 11/24/2024 1222  Last data filed at 11/24/2024 0524  Gross per 24 hour   Intake 480 ml   Output 300 ml   Net 180 ml         Physical Exam  Constitutional:       General: He is not in acute distress.  Pulmonary:      Effort: No respiratory distress.      Breath sounds: No wheezing.   Abdominal:      General: There is no distension.      Tenderness: There is no abdominal tenderness.   Musculoskeletal:      Right lower leg: Edema present.      Left lower leg: Edema present.   Skin:     Comments: Chronic appearing darkened  discoloration of the skin of the b/l ankles. RLE with lesion that is somewhat erythematous, with slightly more warmth than the LLE. There is no drainage or discharge.  See pictures in media     Neurological:      Mental Status: He is oriented to person, place, and time.      Comments: Legs weak. Can barely lift them off bed. Has been this way for weeks. Wheeled around on walker at home              Significant Labs: All pertinent labs within the past 24 hours have been reviewed.    Significant Imaging: I have reviewed all pertinent imaging results/findings within the past 24 hours.    Assessment/Plan:      * Venous stasis ulcers of both lower extremities  Leukocytosis - resolved   Reported chills     Mr. Riaz Guerra Jr. Presents with worsening appearance of his b/l LE wounds   He states his help in caring for the wounds (his former significant other) is no longer present  BNP not elevated     DVT US - no thrombosis   Echo - 'There is normal systolic function with a visually estimated ejection fraction of 55 - 60%. There is indeterminate diastolic function. '    Plan -    - wound care and social work consulted   - Compression   - Wound does not appear overtly infected but in setting of leukocytosis and reported chills cont IV abx - if blood cx remain neg and WBC remains resolved trial off abx. 11/24 - stop abx    - Follow cultures - NGTD   - PT/OT rec SNF. Pt amenable. CM to discuss options with patient.      Urine retention  11/22 - Retaining 322 ml and straight cath x 1. Cont flomax.   Urinating by self      CKD (chronic kidney disease) stage 3, GFR 30-59 ml/min  Creatine stable for now. BMP reviewed- noted Estimated Creatinine Clearance: 97.9 mL/min (based on SCr of 1 mg/dL). according to latest data. Based on current GFR, CKD stage is stage 3 - GFR 30-59.  Monitor UOP and serial BMP and adjust therapy as needed. Renally dose meds. Avoid nephrotoxic medications and procedures.    Chronic pain  - continue  home meds (gabapentin - reports he takes it BID).  Is intermittently prescribed Norco and tramadol      HTN (hypertension)  - continue home amlodipine on losartan    Insulin dependent type 2 diabetes mellitus  Unsure of his home doses and he says he does not take insulin regularly. HbA1c 6.9%   - Requiring LA, SA and SS here - titrate as needed. Consider oral alternative on discharge since non compliance with insulin and well controlled HbA1c       VTE Risk Mitigation (From admission, onward)           Ordered     heparin (porcine) injection 5,000 Units  Every 8 hours         11/21/24 2058     IP VTE HIGH RISK PATIENT  Once         11/21/24 2058     Place sequential compression device  Until discontinued         11/21/24 2058                    Discharge Planning   KELSEY: 11/27/2024     Code Status: Full Code   Is the patient medically ready for discharge?:     Reason for patient still in hospital (select all that apply): Treatment  Discharge Plan A: Home, Home Health                  Terry Aguila MD  Department of Hospital Medicine   [facility] - Med Surg ([address])

## 2024-11-24 NOTE — ASSESSMENT & PLAN NOTE
Leukocytosis - resolved   Reported chills     Mr. Riaz Guerra Jr. Presents with worsening appearance of his b/l LE wounds   He states his help in caring for the wounds (his former significant other) is no longer present  BNP not elevated     DVT US - no thrombosis   Echo - 'There is normal systolic function with a visually estimated ejection fraction of 55 - 60%. There is indeterminate diastolic function. '    Plan -    - wound care and social work consulted   - Compression   - Wound does not appear overtly infected but in setting of leukocytosis and reported chills cont IV abx - if blood cx remain neg and WBC remains resolved trial off abx. 11/24 - stop abx    - Follow cultures - NGTD   - PT/OT rec SNF. Pt amenable. CM to discuss options with patient.

## 2024-11-24 NOTE — SUBJECTIVE & OBJECTIVE
Interval History: PT/OT rec SNF. Pt amenable. CM to discuss options with patient. Blood cx negative and leukocytosis resolved. Trial off abx.      Review of Systems   Constitutional:  Positive for chills. Negative for diaphoresis and fever.   Respiratory:  Negative for cough, shortness of breath and wheezing.    Cardiovascular:  Positive for leg swelling (b/l leg swelling). Negative for chest pain and palpitations.   Gastrointestinal:  Positive for diarrhea. Negative for abdominal pain, nausea and vomiting.   Genitourinary:  Negative for difficulty urinating, dysuria, frequency, hematuria and urgency.   Skin:  Positive for color change and wound. Negative for rash.   Psychiatric/Behavioral:  Negative for agitation and confusion.      Objective:     Vital Signs (Most Recent):  Temp: 98 °F (36.7 °C) (11/24/24 0730)  Pulse: 81 (11/24/24 1115)  Resp: 17 (11/24/24 1108)  BP: (!) 153/78 (11/24/24 1108)  SpO2: 98 % (11/24/24 1108) Vital Signs (24h Range):  Temp:  [98 °F (36.7 °C)-98.4 °F (36.9 °C)] 98 °F (36.7 °C)  Pulse:  [78-93] 81  Resp:  [16-20] 17  SpO2:  [93 %-99 %] 98 %  BP: (135-153)/(75-91) 153/78     Weight: (!) 151.4 kg (333 lb 12.4 oz)  Body mass index is 45.27 kg/m².    Intake/Output Summary (Last 24 hours) at 11/24/2024 1222  Last data filed at 11/24/2024 0524  Gross per 24 hour   Intake 480 ml   Output 300 ml   Net 180 ml         Physical Exam  Constitutional:       General: He is not in acute distress.  Pulmonary:      Effort: No respiratory distress.      Breath sounds: No wheezing.   Abdominal:      General: There is no distension.      Tenderness: There is no abdominal tenderness.   Musculoskeletal:      Right lower leg: Edema present.      Left lower leg: Edema present.   Skin:     Comments: Chronic appearing darkened discoloration of the skin of the b/l ankles. RLE with lesion that is somewhat erythematous, with slightly more warmth than the LLE. There is no drainage or discharge.  See pictures in  media     Neurological:      Mental Status: He is oriented to person, place, and time.      Comments: Legs weak. Can barely lift them off bed. Has been this way for weeks. Wheeled around on walker at home              Significant Labs: All pertinent labs within the past 24 hours have been reviewed.    Significant Imaging: I have reviewed all pertinent imaging results/findings within the past 24 hours.

## 2024-11-24 NOTE — PLAN OF CARE
Problem: Gas Exchange Impaired  Goal: Optimal Gas Exchange  Outcome: Progressing  Intervention: Optimize Oxygenation and Ventilation  Flowsheets (Taken 11/24/2024 1217)  Airway/Ventilation Management: airway patency maintained  Head of Bed (HOB) Positioning: HOB elevated

## 2024-11-24 NOTE — CARE UPDATE
11/24/24 0931   PRE-TX-O2   Device (Oxygen Therapy) room air   SpO2 96 %   $ Pulse Oximetry - Multiple Charge Pulse Oximetry - Multiple   Pulse 82   Resp 18

## 2024-11-24 NOTE — PLAN OF CARE
Problem: Adult Inpatient Plan of Care  Goal: Plan of Care Review  Outcome: Progressing  Goal: Patient-Specific Goal (Individualized)  Outcome: Progressing  Goal: Absence of Hospital-Acquired Illness or Injury  Outcome: Progressing  Goal: Optimal Comfort and Wellbeing  Outcome: Progressing  Goal: Readiness for Transition of Care  Outcome: Progressing     Problem: Bariatric Environmental Safety  Goal: Safety Maintained with Care  Outcome: Progressing     Problem: Diabetes Comorbidity  Goal: Blood Glucose Level Within Targeted Range  Outcome: Progressing     Problem: Wound  Goal: Optimal Coping  Outcome: Progressing  Goal: Optimal Functional Ability  Outcome: Progressing  Goal: Absence of Infection Signs and Symptoms  Outcome: Progressing  Goal: Optimal Pain Control and Function  Outcome: Progressing  Goal: Skin Health and Integrity  Outcome: Progressing  Goal: Optimal Wound Healing  Outcome: Progressing     Problem: Skin Injury Risk Increased  Goal: Skin Health and Integrity  Outcome: Progressing     Problem: Skin or Soft Tissue Infection  Goal: Absence of Infection Signs and Symptoms  Outcome: Progressing

## 2024-11-25 LAB
ANION GAP SERPL CALC-SCNC: 9 MMOL/L (ref 8–16)
BUN SERPL-MCNC: 15 MG/DL (ref 8–23)
CALCIUM SERPL-MCNC: 8.6 MG/DL (ref 8.7–10.5)
CHLORIDE SERPL-SCNC: 110 MMOL/L (ref 95–110)
CO2 SERPL-SCNC: 19 MMOL/L (ref 23–29)
CREAT SERPL-MCNC: 1 MG/DL (ref 0.5–1.4)
ERYTHROCYTE [DISTWIDTH] IN BLOOD BY AUTOMATED COUNT: 14 % (ref 11.5–14.5)
EST. GFR  (NO RACE VARIABLE): >60 ML/MIN/1.73 M^2
GLUCOSE SERPL-MCNC: 105 MG/DL (ref 70–110)
HCT VFR BLD AUTO: 35.8 % (ref 40–54)
HGB BLD-MCNC: 11.5 G/DL (ref 14–18)
MCH RBC QN AUTO: 29.1 PG (ref 27–31)
MCHC RBC AUTO-ENTMCNC: 32.1 G/DL (ref 32–36)
MCV RBC AUTO: 91 FL (ref 82–98)
PLATELET # BLD AUTO: 324 K/UL (ref 150–450)
PMV BLD AUTO: 10.7 FL (ref 9.2–12.9)
POCT GLUCOSE: 169 MG/DL (ref 70–110)
POTASSIUM SERPL-SCNC: 4.2 MMOL/L (ref 3.5–5.1)
RBC # BLD AUTO: 3.95 M/UL (ref 4.6–6.2)
SODIUM SERPL-SCNC: 138 MMOL/L (ref 136–145)
WBC # BLD AUTO: 9.19 K/UL (ref 3.9–12.7)

## 2024-11-25 PROCEDURE — 94761 N-INVAS EAR/PLS OXIMETRY MLT: CPT

## 2024-11-25 PROCEDURE — 25000003 PHARM REV CODE 250: Performed by: HOSPITALIST

## 2024-11-25 PROCEDURE — 97530 THERAPEUTIC ACTIVITIES: CPT | Mod: CQ

## 2024-11-25 PROCEDURE — 25000003 PHARM REV CODE 250: Performed by: PHYSICIAN ASSISTANT

## 2024-11-25 PROCEDURE — 80048 BASIC METABOLIC PNL TOTAL CA: CPT | Performed by: STUDENT IN AN ORGANIZED HEALTH CARE EDUCATION/TRAINING PROGRAM

## 2024-11-25 PROCEDURE — 36415 COLL VENOUS BLD VENIPUNCTURE: CPT | Performed by: STUDENT IN AN ORGANIZED HEALTH CARE EDUCATION/TRAINING PROGRAM

## 2024-11-25 PROCEDURE — 63600175 PHARM REV CODE 636 W HCPCS: Performed by: PHYSICIAN ASSISTANT

## 2024-11-25 PROCEDURE — A4216 STERILE WATER/SALINE, 10 ML: HCPCS | Performed by: PHYSICIAN ASSISTANT

## 2024-11-25 PROCEDURE — 25000003 PHARM REV CODE 250: Performed by: STUDENT IN AN ORGANIZED HEALTH CARE EDUCATION/TRAINING PROGRAM

## 2024-11-25 PROCEDURE — 85027 COMPLETE CBC AUTOMATED: CPT | Performed by: STUDENT IN AN ORGANIZED HEALTH CARE EDUCATION/TRAINING PROGRAM

## 2024-11-25 PROCEDURE — 21400001 HC TELEMETRY ROOM

## 2024-11-25 RX ORDER — METFORMIN HYDROCHLORIDE 500 MG/1
500 TABLET ORAL 2 TIMES DAILY WITH MEALS
Status: DISCONTINUED | OUTPATIENT
Start: 2024-11-25 | End: 2024-11-26 | Stop reason: HOSPADM

## 2024-11-25 RX ADMIN — LOSARTAN POTASSIUM 100 MG: 50 TABLET, FILM COATED ORAL at 09:11

## 2024-11-25 RX ADMIN — TAMSULOSIN HYDROCHLORIDE 0.4 MG: 0.4 CAPSULE ORAL at 09:11

## 2024-11-25 RX ADMIN — GABAPENTIN 800 MG: 400 CAPSULE ORAL at 08:11

## 2024-11-25 RX ADMIN — HEPARIN SODIUM 5000 UNITS: 5000 INJECTION INTRAVENOUS; SUBCUTANEOUS at 02:11

## 2024-11-25 RX ADMIN — Medication 10 ML: at 09:11

## 2024-11-25 RX ADMIN — METFORMIN HYDROCHLORIDE 500 MG: 500 TABLET, FILM COATED ORAL at 09:11

## 2024-11-25 RX ADMIN — ATORVASTATIN CALCIUM 20 MG: 20 TABLET, FILM COATED ORAL at 09:11

## 2024-11-25 RX ADMIN — BACLOFEN 10 MG: 10 TABLET ORAL at 09:11

## 2024-11-25 RX ADMIN — Medication 10 ML: at 06:11

## 2024-11-25 RX ADMIN — AMLODIPINE BESYLATE 10 MG: 5 TABLET ORAL at 09:11

## 2024-11-25 RX ADMIN — ASPIRIN 81 MG: 81 TABLET, COATED ORAL at 09:11

## 2024-11-25 RX ADMIN — TRAMADOL HYDROCHLORIDE 50 MG: 50 TABLET, COATED ORAL at 12:11

## 2024-11-25 RX ADMIN — TRAMADOL HYDROCHLORIDE 50 MG: 50 TABLET, COATED ORAL at 07:11

## 2024-11-25 RX ADMIN — BACLOFEN 10 MG: 10 TABLET ORAL at 02:11

## 2024-11-25 RX ADMIN — HEPARIN SODIUM 5000 UNITS: 5000 INJECTION INTRAVENOUS; SUBCUTANEOUS at 09:11

## 2024-11-25 RX ADMIN — BACLOFEN 10 MG: 10 TABLET ORAL at 08:11

## 2024-11-25 RX ADMIN — Medication 10 ML: at 02:11

## 2024-11-25 RX ADMIN — TRAMADOL HYDROCHLORIDE 50 MG: 50 TABLET, COATED ORAL at 06:11

## 2024-11-25 RX ADMIN — METFORMIN HYDROCHLORIDE 500 MG: 500 TABLET, FILM COATED ORAL at 04:11

## 2024-11-25 RX ADMIN — HEPARIN SODIUM 5000 UNITS: 5000 INJECTION INTRAVENOUS; SUBCUTANEOUS at 06:11

## 2024-11-25 RX ADMIN — GABAPENTIN 800 MG: 400 CAPSULE ORAL at 09:11

## 2024-11-25 NOTE — PROGRESS NOTES
[facility] - Med Surg ([address])  Wound Care    Patient Name:  Riaz Guerra Jr.   MRN:  [id number]  Date: 11/25/2024  Diagnosis: Venous stasis ulcers of both lower extremities    History:     Past Medical History:   Diagnosis Date    Depression     Diabetes mellitus     Gout     High cholesterol     Hypertension        Social History     Socioeconomic History    Marital status: Single   Tobacco Use    Smoking status: Never    Smokeless tobacco: Never   Substance and Sexual Activity    Alcohol use: Not Currently     Comment: occasionally    Drug use: Yes     Types: Marijuana    Sexual activity: Yes     Social Drivers of Health     Financial Resource Strain: Low Risk  (11/22/2024)    Overall Financial Resource Strain (CARDIA)     Difficulty of Paying Living Expenses: Not very hard   Food Insecurity: No Food Insecurity (11/22/2024)    Hunger Vital Sign     Worried About Running Out of Food in the Last Year: Never true     Ran Out of Food in the Last Year: Never true   Transportation Needs: No Transportation Needs (11/22/2024)    TRANSPORTATION NEEDS     Transportation : No   Physical Activity: Inactive (11/22/2024)    Exercise Vital Sign     Days of Exercise per Week: 0 days     Minutes of Exercise per Session: 0 min   Stress: Stress Concern Present (11/22/2024)    [place] [employer] - Occupational Stress Questionnaire     Feeling of Stress : To some extent   Housing Stability: Low Risk  (11/22/2024)    Housing Stability Vital Sign     Unable to Pay for Housing in the Last Year: No     Homeless in the Last Year: No       Precautions:     Allergies as of 11/21/2024 - Reviewed 11/21/2024   Allergen Reaction Noted    Lisinopril Other (See Comments) 06/21/2024       WO Assessment Details/Treatment     Attempted to see patient for bilateral lower leg dressing changes. Patient received some bad news and visibly upset. Patient asked that I come back at a later time. Nursing notified. Will try  again later today.     11/25/2024

## 2024-11-25 NOTE — SUBJECTIVE & OBJECTIVE
Interval History: Patient very upset, says he is being put out of his apartment.   came to bedside, patient became calmer.  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    Review of Systems   Unable to perform ROS: Acuity of condition     Objective:     Vital Signs (Most Recent):  Temp: 98.4 °F (36.9 °C) (11/24/24 2109)  Pulse: 98 (11/25/24 1635)  Resp: 14 (11/25/24 1635)  BP: (!) 143/74 (11/25/24 1635)  SpO2: 99 % (11/25/24 1105) Vital Signs (24h Range):  Temp:  [98.4 °F (36.9 °C)] 98.4 °F (36.9 °C)  Pulse:  [] 98  Resp:  [14-21] 14  SpO2:  [96 %-99 %] 99 %  BP: (141-174)/(74-84) 143/74     Weight: (!) 151.4 kg (333 lb 12.4 oz)  Body mass index is 45.27 kg/m².    Intake/Output Summary (Last 24 hours) at 11/25/2024 1754  Last data filed at 11/25/2024 1747  Gross per 24 hour   Intake --   Output 200 ml   Net -200 ml         Physical Exam  Constitutional:       Comments: Tearful, yelling on the phone   Cardiovascular:      Rate and Rhythm: Normal rate and regular rhythm.      Pulses: Normal pulses.      Heart sounds: Normal heart sounds. No murmur heard.     No gallop.   Pulmonary:      Effort: Pulmonary effort is normal.      Breath sounds: Normal breath sounds.   Abdominal:      General: Bowel sounds are normal.      Palpations: Abdomen is soft.   Musculoskeletal:      Comments: Both legs with wraps/tubigrips in place.   Skin:     General: Skin is warm and dry.   Neurological:      Mental Status: He is alert.             Significant Labs: All pertinent labs within the past 24 hours have been reviewed.    Significant Imaging: I have reviewed all pertinent imaging results/findings within the past 24 hours.

## 2024-11-25 NOTE — PLAN OF CARE
Patient has been accepted to [facility] for SNF. [place] [facility] is submitting for auth today.

## 2024-11-25 NOTE — PROGRESS NOTES
"[facility] Surg [address] Medicine  Progress Note    Patient Name: Riaz Guerra Jr.  MRN: [id number]  Patient Class: IP- Inpatient   Admission Date: 11/21/2024  Length of Stay: 4 days  Attending Physician: Melissa Desouza MD  Primary Care Provider: Berhane Reyes (Inactive)        Subjective:     Principal Problem:Venous stasis ulcers of both lower extremities        HPI:  HPI by Collette Franklin PA:  Mr. Riaz Guerra Jr. Is a 74 y.o. male, with PMH of venous stasis ulcers, T2DM, HTN, CKD-3, anemia, who presented to [facility] ED on 11/21/24 for a "wound check" of the chronic wounds on his b/l lower extremities as the staff at [facility] felt the wounds had an odor today. ED notes indicate the wounds have worsening pain, drainage, and overall appearance of the wound. He reports he did have Wound Care coming to care for his wounds, but then his significant other took over care of his wounds, and now they have broken up. Last week his PCP started an outpatient care regimen for him, and he had an ED visit on 11/16 during which time he was started on Clindamycin for his wounds but reports he has had no improvement. He endorses associated chills, and diarrhea since beginning his antibiotics. He denies fever, chest pain, shortness of breath. He was evaluated in the ED with labs showing no leukocytosis or left shift on CBC. A metabolic panel was without significant abnormalities. A CXR showed no acute intrathoracic abnormalities. He was treated in the ED with vancomycin and zosyn. He was admitted to inpatient status.     Overview/Hospital Course:  Seen by wound care.  Legs not infected, need compression to aid with wound healing. BNP not elevated. New leukocytosis. Cont IV abx at this time. Follow cultures. Echo, DVT US.  Reports chronic weakness and unable to walk due to leg heaviness. C diff ordered if repeat diarrhea. PT/OT. 11/22 Retaining 322 ml and straight cath x 1. Cont flomax. " Urinating by self thus far. No diarrhea while inpatient. PT/OT rec SNF. Pt amenable. CM to discuss options with patient. Blood cx negative and leukocytosis resolved. Trial off abx.      Interval History: Patient very upset, says he is being put out of his apartment.   came to bedside, patient became calmer.  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    Review of Systems   Unable to perform ROS: Acuity of condition     Objective:     Vital Signs (Most Recent):  Temp: 98.4 °F (36.9 °C) (11/24/24 2109)  Pulse: 98 (11/25/24 1635)  Resp: 14 (11/25/24 1635)  BP: (!) 143/74 (11/25/24 1635)  SpO2: 99 % (11/25/24 1105) Vital Signs (24h Range):  Temp:  [98.4 °F (36.9 °C)] 98.4 °F (36.9 °C)  Pulse:  [] 98  Resp:  [14-21] 14  SpO2:  [96 %-99 %] 99 %  BP: (141-174)/(74-84) 143/74     Weight: (!) 151.4 kg (333 lb 12.4 oz)  Body mass index is 45.27 kg/m².    Intake/Output Summary (Last 24 hours) at 11/25/2024 1754  Last data filed at 11/25/2024 1747  Gross per 24 hour   Intake --   Output 200 ml   Net -200 ml         Physical Exam  Constitutional:       Comments: Tearful, yelling on the phone   Cardiovascular:      Rate and Rhythm: Normal rate and regular rhythm.      Pulses: Normal pulses.      Heart sounds: Normal heart sounds. No murmur heard.     No gallop.   Pulmonary:      Effort: Pulmonary effort is normal.      Breath sounds: Normal breath sounds.   Abdominal:      General: Bowel sounds are normal.      Palpations: Abdomen is soft.   Musculoskeletal:      Comments: Both legs with wraps/tubigrips in place.   Skin:     General: Skin is warm and dry.   Neurological:      Mental Status: He is alert.             Significant Labs: All pertinent labs within the past 24 hours have been reviewed.    Significant Imaging: I have reviewed all pertinent imaging results/findings within the past 24 hours.    Assessment/Plan:      * Venous stasis ulcers of both lower extremities  Leukocytosis - resolved    Reported chills     Mr. Riaz Guerra Jr. Presents with worsening appearance of his b/l LE wounds   He states his help in caring for the wounds (his former significant other) is no longer present  BNP not elevated     DVT US - no thrombosis   Echo - 'There is normal systolic function with a visually estimated ejection fraction of 55 - 60%. There is indeterminate diastolic function. '    Plan -    - wound care and social work consulted   - Compression   - Wound does not appear overtly infected but in setting of leukocytosis and reported chills cont IV abx - if blood cx remain neg and WBC remains resolved trial off abx. 11/24 - stop abx    - Follow cultures - NGTD   - PT/OT rec SNF. Pt amenable. CM to discuss options with patient.      Urine retention  11/22 - Retaining 322 ml and straight cath x 1. Cont flomax.   Urinating by self      CKD (chronic kidney disease) stage 3, GFR 30-59 ml/min  Creatine stable for now. BMP reviewed- noted Estimated Creatinine Clearance: 97.9 mL/min (based on SCr of 1 mg/dL). according to latest data. Based on current GFR, CKD stage is stage 3 - GFR 30-59.  Monitor UOP and serial BMP and adjust therapy as needed. Renally dose meds. Avoid nephrotoxic medications and procedures.    Chronic pain  - continue home meds (gabapentin - reports he takes it BID).  Is intermittently prescribed Norco and tramadol      HTN (hypertension)  - continue home amlodipine on losartan    Insulin dependent type 2 diabetes mellitus  Unsure of his home doses and he says he does not take insulin regularly. HbA1c 6.9%   - Requiring LA, SA and SS here - titrate as needed. Consider oral alternative on discharge since non compliance with insulin and well controlled HbA1c       VTE Risk Mitigation (From admission, onward)           Ordered     heparin (porcine) injection 5,000 Units  Every 8 hours         11/21/24 2058     IP VTE HIGH RISK PATIENT  Once         11/21/24 2058     Place sequential compression  device  Until discontinued         11/21/24 2058                    Discharge Planning   KELSEY: 11/26/2024     Code Status: Full Code   Is the patient medically ready for discharge?:     Reason for patient still in hospital (select all that apply): Pending disposition  Discharge Plan A: Home, Home Health                  Melissa Antonio MD  Department of Hospital Medicine   [facility] - [facility] Surg ([address])

## 2024-11-25 NOTE — PT/OT/SLP PROGRESS
"Physical Therapy Treatment    Patient Name:  Riaz Guerra Jr.   MRN:  [id number]    Recommendations:     Discharge Recommendations: Moderate Intensity Therapy  Discharge Equipment Recommendations: walker, rolling, wheelchair  Barriers to discharge: Decreased caregiver support    Assessment:     Riaz Guerra Jr. is a 74 y.o. male admitted with a medical diagnosis of Venous stasis ulcers of both lower extremities.  He presents with the following impairments/functional limitations: weakness, impaired endurance, impaired self care skills, impaired functional mobility, gait instability, impaired balance, decreased coordination, decreased upper extremity function, decreased lower extremity function, decreased safety awareness, pain, decreased ROM, impaired skin, edema, impaired cardiopulmonary response to activity ;pt with fair mobility today, limited by weakness and B LE pain when trying to move OOB and stand. Req'd mod/max.A x 2 to stand from EOB, w/ bed ht elevated slightly.    Rehab Prognosis: Good and Fair; patient would benefit from acute skilled PT services to address these deficits and reach maximum level of function.    Recent Surgery: * No surgery found *      Plan:     During this hospitalization, patient to be seen 5 x/week to address the identified rehab impairments via gait training, therapeutic activities, therapeutic exercises and progress toward the following goals:    Plan of Care Expires:  12/23/24    Subjective     Chief Complaint: pain in lower legs, and B knees  Patient/Family Comments/goals: pt agreeable to session , later stating "Why can't I stand up?"   Pain/Comfort:  Pain Rating 1: 10/10  Location - Side 1: Bilateral  Location - Orientation 1: lower  Location 1: leg  Pain Addressed 1: Pre-medicate for activity, Reposition, Distraction, Cessation of Activity, Nurse notified  Pain Rating Post-Intervention 1: 10/10      Objective:     Communicated with nurse prior to session.  Patient " found HOB elevated with bed alarm, telemetry, peripheral IV upon PT entry to room.     General Precautions: Standard, fall, diabetic  Orthopedic Precautions: N/A  Braces: N/A  Respiratory Status: Room air     Functional Mobility:  Bed Mobility:     Rolling Right: maximal assistance and of 1 persons  Scooting: moderate assistance, of 2 persons, and in supine w/ bed in trendelenburg and pt using UE/LE's to assist w/ max. Cueing.  Supine to Sit: maximal assistance and of 1 persons  Sit to Supine: maximal assistance, total assistance, and of 2 persons  Transfers:     Sit to Stand:  moderate assistance, maximal assistance, and of 2 persons with rolling walker      AM-PAC 6 CLICK MOBILITY  Turning over in bed (including adjusting bedclothes, sheets and blankets)?: 2  Sitting down on and standing up from a chair with arms (e.g., wheelchair, bedside commode, etc.): 2  Moving from lying on back to sitting on the side of the bed?: 2  Moving to and from a bed to a chair (including a wheelchair)?: 1  Need to walk in hospital room?: 1  Climbing 3-5 steps with a railing?: 1  Basic Mobility Total Score: 9       Treatment & Education:  Pt sat up EOB ~20 min. w/ SBA/Sup, attempted standing 4x's from EOB w/ max/totA x 1 person, pt not putting effort out, even w/ bed ht elevated, unable to stand.   PCT assisted w/ sit to stand w/ BRW and mod/maxA x 2, able to stand ~:3 sec. On 1st attempt and ~:5 sec on 2nd attempt, max cueing req'd for technique.   Worked on scooting along EOB w/ max.A, once supine able to assist w/ scooting by using UE's on rails and LE's on bed, needing modA x 2.    Patient left HOB elevated with all lines intact, call button in reach, nurse notified, and LE's elevated w/ pillow ..    GOALS:   Multidisciplinary Problems       Physical Therapy Goals          Problem: Physical Therapy    Goal Priority Disciplines Outcome Interventions   Physical Therapy Goal     PT, PT/OT Progressing    Description: Goals to be met  by: 2024    Patient will increase functional independence with mobility by performin. Sit<>stand with minimal assist  with RW.  2. Gait x 10 feet with moderate assist  with RW.  3. Sitting to supine with minimal assist   4. Stand with RW for 4 minutes                           Time Tracking:     PT Received On: 24  PT Start Time: 1618     PT Stop Time: 1650  PT Total Time (min): 32 min     Billable Minutes: Therapeutic Activity 32    Treatment Type: Treatment  PT/PTA: PTA     Number of PTA visits since last PT visit: 1     2024

## 2024-11-25 NOTE — PLAN OF CARE
"SW called in [facility], faxed in [facility]--awaiting 142.     Your fax has been successfully sent to [phone] at [phone].  ------------------------------------------------------------  From: [id number]  ------------------------------------------------------------  11/25/2024 9:49:09 AM Transmission Record          Sent to [phone] with remote ID "Fax "          Result: (0/339;0/0) Success          Page record: 1 - 6          Elapsed time: 02:22 on channel 63   "

## 2024-11-25 NOTE — PROGRESS NOTES
[facility] - Med Surg ([address])  Wound Care    Patient Name:  Riaz Guerra Jr.   MRN:  [id number]  Date: 11/25/2024  Diagnosis: Venous stasis ulcers of both lower extremities    History:     Past Medical History:   Diagnosis Date    Depression     Diabetes mellitus     Gout     High cholesterol     Hypertension        Social History     Socioeconomic History    Marital status: Single   Tobacco Use    Smoking status: Never    Smokeless tobacco: Never   Substance and Sexual Activity    Alcohol use: Not Currently     Comment: occasionally    Drug use: Yes     Types: Marijuana    Sexual activity: Yes     Social Drivers of Health     Financial Resource Strain: Low Risk  (11/22/2024)    Overall Financial Resource Strain (CARDIA)     Difficulty of Paying Living Expenses: Not very hard   Food Insecurity: No Food Insecurity (11/22/2024)    Hunger Vital Sign     Worried About Running Out of Food in the Last Year: Never true     Ran Out of Food in the Last Year: Never true   Transportation Needs: No Transportation Needs (11/22/2024)    TRANSPORTATION NEEDS     Transportation : No   Physical Activity: Inactive (11/22/2024)    Exercise Vital Sign     Days of Exercise per Week: 0 days     Minutes of Exercise per Session: 0 min   Stress: Stress Concern Present (11/22/2024)    [employer] - Occupational Stress Questionnaire     Feeling of Stress : To some extent   Housing Stability: Low Risk  (11/22/2024)    Housing Stability Vital Sign     Unable to Pay for Housing in the Last Year: No     Homeless in the Last Year: No       Precautions:     Allergies as of 11/21/2024 - Reviewed 11/21/2024   Allergen Reaction Noted    Lisinopril Other (See Comments) 06/21/2024       [facility] Assessment Details/Treatment     Wound care follow up:     Completed wound care to bilateral lower extremities. Wounds responding well to Mepilex Ag dressings.Right leg continues with maceration. Will likely need to increase  frequency of dressing changes.  Patient complaining of pain and does not want Tubigrip compression stockings applied at this time. Tubigrip left at bedside.  Will continue with orders currently in place.Will continue to follow.        11/25/24 1252        Wound 07/01/24 0954 Ulceration Right lower Calf #1   Date First Assessed/Time First Assessed: 07/01/24 0954   Present on Original Admission: Yes  Primary Wound Type: Ulceration  Side: Right  Orientation: lower  Location: Calf  Wound Number: #1  Is this injury device related?: No   Wound Image    Dressing Appearance Dry;Intact;Moist drainage   Drainage Amount Small   Drainage Characteristics/Odor Serosanguineous;No odor   Appearance Pink;Red;Moist   Tissue loss description Full thickness   Periwound Area Macerated   Wound Edges Open   Care Cleansed with:;Antimicrobial agent   Dressing Applied;Silver;Foam;Absorptive Pad;Rolled gauze   Dressing Change Due 11/27/24        Wound 07/01/24 0955 Ulceration Left Calf #2   Date First Assessed/Time First Assessed: 07/01/24 0955   Present on Original Admission: Yes  Primary Wound Type: Ulceration  Side: Left  Location: Calf  Wound Number: #2  Is this injury device related?: No   Wound Image    Dressing Appearance Dry;Intact;Clean   Drainage Amount Scant   Drainage Characteristics/Odor Serosanguineous;No odor   Appearance Pink;Red;Moist;Epithelialization   Tissue loss description Partial thickness   Periwound Area Dry   Wound Edges Open   Care Cleansed with:;Antimicrobial agent   Dressing Applied;Silver;Foam;Absorptive Pad;Rolled gauze   Dressing Change Due 11/27/24 11/22  - left leg                                      11/25- left leg        11/22 right leg                                          11/25 right leg       11/25/2024

## 2024-11-25 NOTE — PLAN OF CARE
Problem: Adult Inpatient Plan of Care  Goal: Plan of Care Review  Outcome: Progressing  Goal: Patient-Specific Goal (Individualized)  Outcome: Progressing  Goal: Absence of Hospital-Acquired Illness or Injury  Outcome: Progressing  Goal: Optimal Comfort and Wellbeing  Outcome: Progressing  Goal: Readiness for Transition of Care  Outcome: Progressing     Problem: Bariatric Environmental Safety  Goal: Safety Maintained with Care  Outcome: Progressing     Problem: Diabetes Comorbidity  Goal: Blood Glucose Level Within Targeted Range  Outcome: Progressing     Problem: Wound  Goal: Optimal Coping  Outcome: Progressing  Goal: Optimal Functional Ability  Outcome: Progressing  Goal: Absence of Infection Signs and Symptoms  Outcome: Progressing  Goal: Improved Oral Intake  Outcome: Progressing  Goal: Optimal Pain Control and Function  Outcome: Progressing  Goal: Skin Health and Integrity  Outcome: Progressing  Goal: Optimal Wound Healing  Outcome: Progressing     Problem: Skin Injury Risk Increased  Goal: Skin Health and Integrity  Outcome: Progressing     Problem: Infection  Goal: Absence of Infection Signs and Symptoms  Outcome: Progressing     Problem: Skin or Soft Tissue Infection  Goal: Absence of Infection Signs and Symptoms  Outcome: Progressing     Problem: Gas Exchange Impaired  Goal: Optimal Gas Exchange  Outcome: Progressing

## 2024-11-26 VITALS
RESPIRATION RATE: 19 BRPM | DIASTOLIC BLOOD PRESSURE: 73 MMHG | OXYGEN SATURATION: 99 % | BODY MASS INDEX: 42.66 KG/M2 | SYSTOLIC BLOOD PRESSURE: 149 MMHG | HEIGHT: 72 IN | TEMPERATURE: 98 F | WEIGHT: 315 LBS | HEART RATE: 96 BPM

## 2024-11-26 LAB
BACTERIA BLD CULT: NORMAL
BACTERIA BLD CULT: NORMAL

## 2024-11-26 PROCEDURE — 25000003 PHARM REV CODE 250: Performed by: PHYSICIAN ASSISTANT

## 2024-11-26 PROCEDURE — 25000003 PHARM REV CODE 250: Performed by: STUDENT IN AN ORGANIZED HEALTH CARE EDUCATION/TRAINING PROGRAM

## 2024-11-26 PROCEDURE — 30200315 PPD INTRADERMAL TEST REV CODE 302: Performed by: HOSPITALIST

## 2024-11-26 PROCEDURE — 97535 SELF CARE MNGMENT TRAINING: CPT

## 2024-11-26 PROCEDURE — 86580 TB INTRADERMAL TEST: CPT | Performed by: HOSPITALIST

## 2024-11-26 PROCEDURE — 25000003 PHARM REV CODE 250: Performed by: HOSPITALIST

## 2024-11-26 PROCEDURE — 63600175 PHARM REV CODE 636 W HCPCS: Performed by: PHYSICIAN ASSISTANT

## 2024-11-26 PROCEDURE — A4216 STERILE WATER/SALINE, 10 ML: HCPCS | Performed by: PHYSICIAN ASSISTANT

## 2024-11-26 PROCEDURE — 97530 THERAPEUTIC ACTIVITIES: CPT | Mod: CQ

## 2024-11-26 RX ORDER — LOSARTAN POTASSIUM AND HYDROCHLOROTHIAZIDE 12.5; 1 MG/1; MG/1
1 TABLET ORAL DAILY
Status: ON HOLD | COMMUNITY
Start: 2024-11-21

## 2024-11-26 RX ORDER — AMLODIPINE BESYLATE 10 MG/1
10 TABLET ORAL DAILY
Status: ON HOLD
Start: 2024-11-26 | End: 2024-12-26

## 2024-11-26 RX ORDER — HYDROCODONE BITARTRATE AND ACETAMINOPHEN 7.5; 325 MG/1; MG/1
1 TABLET ORAL EVERY 8 HOURS PRN
Qty: 15 TABLET | Refills: 0 | Status: ON HOLD | OUTPATIENT
Start: 2024-11-26

## 2024-11-26 RX ORDER — CLOTRIMAZOLE 1 G/ML
1 SOLUTION TOPICAL 2 TIMES DAILY
Status: ON HOLD | COMMUNITY
End: 2024-11-26 | Stop reason: HOSPADM

## 2024-11-26 RX ORDER — HYDROCODONE BITARTRATE AND ACETAMINOPHEN 7.5; 325 MG/1; MG/1
1 TABLET ORAL EVERY 8 HOURS PRN
Qty: 15 TABLET | Refills: 0 | Status: SHIPPED | OUTPATIENT
Start: 2024-11-26 | End: 2024-11-26

## 2024-11-26 RX ORDER — GABAPENTIN 800 MG/1
800 TABLET ORAL 2 TIMES DAILY
Status: ON HOLD
Start: 2024-11-26

## 2024-11-26 RX ORDER — DULAGLUTIDE 0.75 MG/.5ML
0.75 INJECTION, SOLUTION SUBCUTANEOUS
Status: ON HOLD | COMMUNITY
Start: 2024-10-31 | End: 2024-11-26 | Stop reason: HOSPADM

## 2024-11-26 RX ADMIN — ATORVASTATIN CALCIUM 20 MG: 20 TABLET, FILM COATED ORAL at 08:11

## 2024-11-26 RX ADMIN — BACLOFEN 10 MG: 10 TABLET ORAL at 02:11

## 2024-11-26 RX ADMIN — METFORMIN HYDROCHLORIDE 500 MG: 500 TABLET, FILM COATED ORAL at 08:11

## 2024-11-26 RX ADMIN — ASPIRIN 81 MG: 81 TABLET, COATED ORAL at 08:11

## 2024-11-26 RX ADMIN — AMLODIPINE BESYLATE 10 MG: 5 TABLET ORAL at 08:11

## 2024-11-26 RX ADMIN — GABAPENTIN 800 MG: 400 CAPSULE ORAL at 08:11

## 2024-11-26 RX ADMIN — HEPARIN SODIUM 5000 UNITS: 5000 INJECTION INTRAVENOUS; SUBCUTANEOUS at 01:11

## 2024-11-26 RX ADMIN — TRAMADOL HYDROCHLORIDE 50 MG: 50 TABLET, COATED ORAL at 10:11

## 2024-11-26 RX ADMIN — BACLOFEN 10 MG: 10 TABLET ORAL at 08:11

## 2024-11-26 RX ADMIN — TRAMADOL HYDROCHLORIDE 50 MG: 50 TABLET, COATED ORAL at 02:11

## 2024-11-26 RX ADMIN — LOSARTAN POTASSIUM 100 MG: 50 TABLET, FILM COATED ORAL at 08:11

## 2024-11-26 RX ADMIN — Medication 10 ML: at 05:11

## 2024-11-26 RX ADMIN — HEPARIN SODIUM 5000 UNITS: 5000 INJECTION INTRAVENOUS; SUBCUTANEOUS at 05:11

## 2024-11-26 RX ADMIN — TAMSULOSIN HYDROCHLORIDE 0.4 MG: 0.4 CAPSULE ORAL at 08:11

## 2024-11-26 RX ADMIN — Medication 10 ML: at 02:11

## 2024-11-26 RX ADMIN — TUBERCULIN PURIFIED PROTEIN DERIVATIVE 5 UNITS: 5 INJECTION, SOLUTION INTRADERMAL at 02:11

## 2024-11-26 NOTE — PLAN OF CARE
SKILLED NURSING ORDERS    11/26/2024  [facility] LOC[facility] ([facility])  [facility] - [facility] ([address])  [address]  [facility] [address]  Dept: [phone]  Loc: [phone]     Admit to Nursing Home:      Diagnoses:  Active Hospital Problems    Diagnosis  POA    *Venous stasis ulcers of both lower extremities [I83.019, I83.029, L97.919, L97.929]  Yes    Urine retention [R33.9]  Yes    CKD (chronic kidney disease) stage 3, GFR 30-59 ml/min [N18.30]  Yes    Chronic pain [G89.29]  Yes    HTN (hypertension) [I10]  Yes    Insulin dependent type 2 diabetes mellitus [E11.9, Z79.4]  Not Applicable      Resolved Hospital Problems   No resolved problems to display.       Patient is homebound due to:  Venous stasis ulcers of both lower extremities    Allergies:  Review of patient's allergies indicates:   Allergen Reactions    Lisinopril Other (See Comments)     cough       Vitals:  Routine    Diet: diabetic diet: 2000 calorie    Activities:   Activity as tolerated    Goals of Care Treatment Preferences:  Code Status: Full Code      Nursing Precautions:  Fall and Pressure ulcer prevention    Consults:   PT to evaluate and treat- 5 times a week, OT to evaluate and treat- 5 times a week, and Wound Care     Wound Care:  BLEs: cleanse with Vashe and cover wounds with Mepilex Ag foam. Secure inplace with ABD pad, rolled gauze and tubi  compression stockings.  Change every other day or when soiled.                   Diabetes Care:  Fingerstick blood sugar AC and HS, check every 6 hours if NPO or if unable to eat.    Sliding Scale/Hypoglycemia Protocol:     For FSG <80, give 1/2 amp D50 or 1 glucose tablet and call MD  For FSG , do nothing. For -200, give 1 unit of novolog in addition to pre-meal insulin.   For -250, give 2 units of insulin aspart in addition to pre-meal insulin.   For -300, give 3 units of insulin aspart in addition to pre-meal insulin.   For -350, give 4 units of  insulin aspart in addition to pre-meal insulin.   For -400, give 5 units of insulin aspart in addition to pre-meal insulin.   For FSG >400, give 5 units of insulin aspart in addition to pre-meal insulin and please call MD     Medications: Discontinue all previous medication orders, if any. See new list below.     Medication List        CHANGE how you take these medications      gabapentin 800 MG tablet  Commonly known as: NEURONTIN  Take 1 tablet (800 mg total) by mouth 2 (two) times daily.  What changed: when to take this     HYDROcodone-acetaminophen 7.5-325 mg per tablet  Commonly known as: NORCO  Take 1 tablet by mouth every 8 (eight) hours as needed for Pain.  What changed: how to take this            CONTINUE taking these medications      amLODIPine 10 MG tablet  Commonly known as: NORVASC  Take 1 tablet (10 mg total) by mouth once daily.     aspirin 81 MG EC tablet  Commonly known as: ECOTRIN  Take 81 mg by mouth once daily.     atorvastatin 20 MG tablet  Commonly known as: LIPITOR  Take 20 mg by mouth once daily.     baclofen 10 MG tablet  Commonly known as: LIORESAL  Take 10 mg by mouth 3 (three) times daily.     losartan-hydrochlorothiazide 100-12.5 mg 100-12.5 mg Tab  Commonly known as: HYZAAR  Take 1 tablet by mouth once daily.     metFORMIN 1000 MG tablet  Commonly known as: GLUCOPHAGE  Take 1,000 mg by mouth 2 (two) times daily with meals.     tamsulosin 0.4 mg Cap  Commonly known as: FLOMAX  Take by mouth once daily.            STOP taking these medications      ammonium lactate 12 % lotion  Commonly known as: LAC-HYDRIN     BASAGLAR KWIKPEN U-100 INSULIN 100 unit/mL (3 mL) Inpn pen  Generic drug: insulin glargine U-100 (Lantus)     cetirizine 10 MG tablet  Commonly known as: ZYRTEC     clotrimazole 1 % Soln  Commonly known as: LOTRIMIN     diclofenac 50 MG tablet  Commonly known as: CATAFLAM     ibuprofen 800 MG tablet  Commonly known as: ADVIL,MOTRIN     NovoLOG Flexpen U-100 Insulin 100  unit/mL (3 mL) Inpn pen  Generic drug: insulin aspart U-100     TRULICITY 0.75 mg/0.5 mL pen injector  Generic drug: dulaglutide                _________________________________  Melissa Antonio MD  11/26/2024

## 2024-11-26 NOTE — PT/OT/SLP PROGRESS
Physical Therapy Treatment    Patient Name:  Riaz Guerra Jr.   MRN:  [id number]    Recommendations:     Discharge Recommendations: Moderate Intensity Therapy  Discharge Equipment Recommendations: walker, rolling, wheelchair, to be determined by next level of care (bariatric sizes)  Barriers to discharge: Decreased caregiver support    Assessment:     Riaz Guerra Jr. is a 74 y.o. male admitted with a medical diagnosis of Venous stasis ulcers of both lower extremities.  He presents with the following impairments/functional limitations: weakness, impaired endurance, impaired self care skills, impaired functional mobility, gait instability, impaired balance, decreased upper extremity function, decreased lower extremity function, decreased safety awareness, pain, decreased ROM, impaired skin, edema, impaired cardiopulmonary response to activity ;pt with fair/good mobility today, able to stand 3x's w/ RW and min/modA x 2, ~1 min each, and take 2 small side steps, limited by pain in R hip and knee mostly. .    Rehab Prognosis: Good; patient would benefit from acute skilled PT services to address these deficits and reach maximum level of function.    Recent Surgery: * No surgery found *      Plan:     During this hospitalization, patient to be seen 5 x/week to address the identified rehab impairments via gait training, therapeutic activities, therapeutic exercises and progress toward the following goals:    Plan of Care Expires:  12/23/24    Subjective     Chief Complaint: pain in R hip/knee  Patient/Family Comments/goals: pt agreeable to session, though needing some encouragement to participate  Pain/Comfort:  Pain Rating 1:  (did not rate, but appears to be high)  Location - Side 1: Right  Location - Orientation 1: generalized  Location 1: hip (and knee)  Pain Addressed 1: Reposition, Distraction, Pre-medicate for activity, Nurse notified      Objective:     Communicated with nurse prior to session.  Patient  found HOB elevated with bed alarm, peripheral IV, Condom Catheter, telemetry upon PT entry to room.     General Precautions: Standard, diabetic, fall  Orthopedic Precautions: N/A  Braces: N/A  Respiratory Status: Room air     Functional Mobility:  Bed Mobility:     Rolling Right: maximal assistance and of 1 persons  Scooting: stand by assistance and in supine w/ bed in trendelenburg and pt using UE's on rails and LE's to assist  Supine to Sit: moderate assistance, maximal assistance, and of 2 persons  Sit to Supine: maximal assistance, total assistance, and of 2 persons  Transfers:     Sit to Stand:  minimum assistance, moderate assistance, and of 2 persons with rolling walker  Gait: pt took 2 small side steps w/ RW and Cher x 2, cueing and wt shifting assistance needed, pt w/ minimal ability to lift feet off floor.      AM-PAC 6 CLICK MOBILITY  Turning over in bed (including adjusting bedclothes, sheets and blankets)?: 2  Sitting down on and standing up from a chair with arms (e.g., wheelchair, bedside commode, etc.): 2  Moving from lying on back to sitting on the side of the bed?: 2  Moving to and from a bed to a chair (including a wheelchair)?: 1  Need to walk in hospital room?: 1  Climbing 3-5 steps with a railing?: 1  Basic Mobility Total Score: 9       Treatment & Education:  Pt found in bed, c/o pain, not wanting to move, agreeable to session, perf'd AAROM heelslides in bed x 3-5 reps ea., pt assisted to EOB w/ mod/maxA x 2, cueing to using bedrail, sat up EOB w/ SBA/Sup, elevated EOB to assist w/ sit to stand t/f's w/ Bariatric RW and min/modA x 2 for 2 stands, then Cher x 2 for last stand.     Patient left HOB elevated with all lines intact, call button in reach, bed alarm on, nruse notified, and LE's elevated on pillow ..    GOALS:   Multidisciplinary Problems       Physical Therapy Goals          Problem: Physical Therapy    Goal Priority Disciplines Outcome Interventions   Physical Therapy Goal     PT,  PT/OT Progressing    Description: Goals to be met by: 2024    Patient will increase functional independence with mobility by performin. Sit<>stand with minimal assist  with RW.  2. Gait x 10 feet with moderate assist  with RW.  3. Sitting to supine with minimal assist   4. Stand with RW for 4 minutes                           Time Tracking:     PT Received On: 24  PT Start Time: 1135     PT Stop Time: 1211  PT Total Time (min): 36 min     Billable Minutes: Therapeutic Activity 36    Treatment Type: Treatment  PT/PTA: PTA     Number of PTA visits since last PT visit: 2     2024

## 2024-11-26 NOTE — PT/OT/SLP PROGRESS
"Physical Therapy Treatment    Patient Name:  Riaz Guerra Jr.   MRN:  [id number]    Recommendations:     Discharge Recommendations: Moderate Intensity Therapy  Discharge Equipment Recommendations: walker, rolling, wheelchair, to be determined by next level of care (bariatric sizes)  Barriers to discharge: Decreased caregiver support    Assessment:     Riaz Guerra Jr. is a 74 y.o. male admitted with a medical diagnosis of Venous stasis ulcers of both lower extremities.  He presents with the following impairments/functional limitations: weakness, impaired endurance, impaired self care skills, impaired functional mobility, gait instability, impaired balance, decreased upper extremity function, decreased lower extremity function, decreased safety awareness, pain, decreased ROM, impaired skin, edema, impaired cardiopulmonary response to activity.    Patient seen for t/f to . Required mod-maxA x2 people for sit<>stand and unable to take steps. Rec [facility] dc to [facility].    Rehab Prognosis: Good; patient would benefit from acute skilled PT services to address these deficits and reach maximum level of function.    Recent Surgery: * No surgery found *      Plan:     During this hospitalization, patient to be seen 5 x/week to address the identified rehab impairments via gait training, therapeutic activities, therapeutic exercises and progress toward the following goals:    Plan of Care Expires:  12/23/24    Subjective     Chief Complaint: Pain, feels like he's going to fall in the josué steady  Patient/Family Comments/goals: "they're putting me out!"  Pain/Comfort:  Pain Rating 1:  (unrated)  Pain Rating Post-Intervention 1:  (unrated)      Objective:     Communicated with RN prior to session.  Patient found sitting edge of bed with   upon PT entry to room.     General Precautions: Standard, diabetic, fall  Orthopedic Precautions: N/A  Braces: N/A  Respiratory Status: Room air     Patient donned non slip socks and gait " belt for OOB mobility.    Functional Mobility:  Transfers:     Sit to Stand:  moderate assistance, maximal assistance, and of 2 persons with josué steady      AM-PAC 6 CLICK MOBILITY  Turning over in bed (including adjusting bedclothes, sheets and blankets)?: 2  Sitting down on and standing up from a chair with arms (e.g., wheelchair, bedside commode, etc.): 2  Moving from lying on back to sitting on the side of the bed?: 2  Moving to and from a bed to a chair (including a wheelchair)?: 1  Need to walk in hospital room?: 1  Climbing 3-5 steps with a railing?: 1  Basic Mobility Total Score: 9       Treatment & Education:  Cueing for hand placement and knee extension for sit<>stand from elevated EOB and from josué steady seat    Patient left  sitting in WC  with  transporter present ..    GOALS:   Multidisciplinary Problems       Physical Therapy Goals       Not on file              Multidisciplinary Problems (Resolved)          Problem: Physical Therapy    Goal Priority Disciplines Outcome Interventions   Physical Therapy Goal   (Resolved)     PT, PT/OT Met    Description: Goals to be met by: 2024    Patient will increase functional independence with mobility by performin. Sit<>stand with minimal assist  with RW.  2. Gait x 10 feet with moderate assist  with RW.  3. Sitting to supine with minimal assist   4. Stand with RW for 4 minutes                           Time Tracking:     PT Received On: 24  PT Start Time: 1600     PT Stop Time: 1611  PT Total Time (min): 11 min     Billable Minutes: Therapeutic Activity 11    Treatment Type: Treatment  PT/PTA: PT     Number of PTA visits since last PT visit: 0     2024

## 2024-11-26 NOTE — DISCHARGE SUMMARY
"[facility] - [facility] ([address] Medicine  Discharge Summary      Patient Name: Riaz Guerra Jr.  MRN: [id number]  [facility]: [id number]  Patient Class: IP- Inpatient  Admission Date: 11/21/2024  Hospital Length of Stay: 5 days  Discharge Date and Time:  11/26/2024 5:01 PM  Attending Physician: Melissa Desouza MD   Discharging Provider: Melissa Desouza MD  Primary Care Provider: Berhane Reyes (Inactive)    Primary Care Team: Networked reference to record PCT     HPI:   HPI by Collette Franklin PA:  Mr. Riaz Guerra Jr. Is a 74 y.o. male, with PMH of venous stasis ulcers, T2DM, HTN, CKD-3, anemia, who [place] [facility] [place] for a "wound check" of the chronic wounds on his b/l lower extremities as the staff at [facility] felt the wounds had an odor today. ED notes indicate the wounds have worsening pain, drainage, and overall appearance of the wound. He reports he did have Wound Care coming to care for his wounds, but then his significant other took over care of his wounds, and now they have broken up. Last week his PCP started an outpatient care regimen for him, and he had an ED visit on 11/16 during which time he was started on Clindamycin for his wounds but reports he has had no improvement. He endorses associated chills, and diarrhea since beginning his antibiotics. He denies fever, chest pain, shortness of breath. He was evaluated in the ED with labs showing no leukocytosis or left shift on CBC. A metabolic panel was without significant abnormalities. A CXR showed no acute intrathoracic abnormalities. He was treated in the ED with vancomycin and zosyn. He was admitted to inpatient status.           Hospital Course:   Patient was admitted on empiric antibiotics and was seen by wound care for stasis dermatitis and lymphedema.  Compression was recommended to aid with wound healing.  Antibiotics not needed on discharge.    He has chronic weakness and inability to ambulate " comfortably due to leg edema.  PT/OT were consulted and SNF recommended.  Blood cultures showed no growth and the leukocytosis resolved, antibiotics discontinued.    He is on insulin as outpatient but HbA1c was only 6.9 and his glucose checks were ranging from 120-160 while he was here on no insulin, and he also had a couple of low readings.  For now would resume his metformin and Trulicity but hold off on basal/prandial insulin except for a low dose sliding scale while in SNF.  If glucose checks become consistently elevated again he can resume some basal insulin.  Patient was accepted to SNF and transferred on 11/26.  He was seen and examined on the day of discharge.       Goals of Care Treatment Preferences:  Code Status: Full Code       Consults:   Consults (From admission, onward)          Status Ordering Provider     Inpatient consult to Social Work  Once        Provider:  (Not yet assigned)    Completed KRISTA NORIEGA              Final Active Diagnoses:    Diagnosis Date Noted POA    PRINCIPAL PROBLEM:  Venous stasis ulcers of both lower extremities [I83.019, I83.029, L97.919, L97.929] 11/21/2024 Yes    Urine retention [R33.9] 11/22/2024 Yes    CKD (chronic kidney disease) stage 3, GFR 30-59 ml/min [N18.30] 07/01/2024 Yes    Chronic pain [G89.29] 07/01/2024 Yes    HTN (hypertension) [I10] 07/01/2024 Yes    Insulin dependent type 2 diabetes mellitus [E11.9, Z79.4] 07/01/2024 Not Applicable      Problems Resolved During this Admission:       Discharged Condition: stable    Disposition: Skilled Nursing Facility    Follow Up:   Follow-up Information       Berhane Reyes Follow up.    Why: Follow up for the next available appointment after SNF  Contact information:  [address] [address] [phone]                           Patient Instructions:      Diet diabetic     Activity as tolerated         Medications:  Reconciled Home Medications:      Medication List        CHANGE how  you take these medications      gabapentin 800 MG tablet  Commonly known as: NEURONTIN  Take 1 tablet (800 mg total) by mouth 2 (two) times daily.  What changed: when to take this     HYDROcodone-acetaminophen 7.5-325 mg per tablet  Commonly known as: NORCO  Take 1 tablet by mouth every 8 (eight) hours as needed for Pain.  What changed: how to take this            CONTINUE taking these medications      amLODIPine 10 MG tablet  Commonly known as: NORVASC  Take 1 tablet (10 mg total) by mouth once daily.     aspirin 81 MG EC tablet  Commonly known as: ECOTRIN  Take 81 mg by mouth once daily.     atorvastatin 20 MG tablet  Commonly known as: LIPITOR  Take 20 mg by mouth once daily.     baclofen 10 MG tablet  Commonly known as: LIORESAL  Take 10 mg by mouth 3 (three) times daily.     losartan-hydrochlorothiazide 100-12.5 mg 100-12.5 mg Tab  Commonly known as: HYZAAR  Take 1 tablet by mouth once daily.     metFORMIN 1000 MG tablet  Commonly known as: GLUCOPHAGE  Take 1,000 mg by mouth 2 (two) times daily with meals.     tamsulosin 0.4 mg Cap  Commonly known as: FLOMAX  Take by mouth once daily.            STOP taking these medications      ammonium lactate 12 % lotion  Commonly known as: LAC-HYDRIN     BASAGLAR KWIKPEN U-100 INSULIN 100 unit/mL (3 mL) Inpn pen  Generic drug: insulin glargine U-100 (Lantus)     cetirizine 10 MG tablet  Commonly known as: ZYRTEC     clotrimazole 1 % Soln  Commonly known as: LOTRIMIN     diclofenac 50 MG tablet  Commonly known as: CATAFLAM     ibuprofen 800 MG tablet  Commonly known as: ADVIL,MOTRIN     NovoLOG Flexpen U-100 Insulin 100 unit/mL (3 mL) Inpn pen  Generic drug: insulin aspart U-100     TRULICITY 0.75 mg/0.5 mL pen injector  Generic drug: dulaglutide                Time spent on the discharge of patient: >30 minutes         Melissa Antonio MD  Department of Hospital Medicine  [facility] [place] ([address])

## 2024-11-26 NOTE — PLAN OF CARE
Case Management Final Discharge Note      Discharge Disposition: Skilled Nursing Facility ([facility])    New DME ordered / company name: none    Relevant SDOH / Transition of Care Barriers:  none    Primary Caretaker and contact information: self    Scheduled followup appointment: n/a    Referrals placed: none    Transportation: Patient will transport via [place] van.     SW called and updated patient niece Mariana of transport and room number for facility. Patient niece verbalized understanding.       Patient and family educated on discharge services and updated on DC plan. Bedside RN notified, patient clear to discharge from Case Management Perspective.       [facility] ([address])  Discharge Final Note    Primary Care Provider: Berhane Reyes (Inactive)    Expected Discharge Date: 11/26/2024    Final Discharge Note (most recent)       Final Note - 11/26/24 1451          Final Note    Assessment Type Final Discharge Note     Anticipated Discharge Disposition Skilled Nursing Facility     Hospital Resources/Appts/Education Provided Provided patient/caregiver with written discharge plan information        Post-Acute Status    Post-Acute Authorization Placement     Post-Acute Placement Status Set-up Complete/Auth obtained     Discharge Delays None known at this time                     Important Message from Medicare             Contact Info       Berhane Reyes   Relationship: PCP - General    [facility]  [address] [place] [address]   Phone: [phone]       Next Steps: Follow up    Instructions: Follow up for the next available appointment after SNF             no

## 2024-11-26 NOTE — PHYSICIAN QUERY
Provider, please specify the diagnosis associated with the documented BMI of 45.27.  Morbid Obesity

## 2024-11-26 NOTE — PLAN OF CARE
Medicare Message     Important Message from Medicare regarding Discharge Appeal Rights Explained to patient/caregiver; Other (comments)Important Message from Medicare regarding Discharge Appeal Rights. Explained to patient/caregiver; Other (comments). The comment is Verbal Consent. Taken on 11/26/24 1502   Date IMM was signed 11/26/2024   Time IMM was signed 1058

## 2024-11-26 NOTE — PT/OT/SLP PROGRESS
Occupational Therapy   Treatment    Name: Riaz Guerra Jr.  MRN: [id number]  Admitting Diagnosis:  Venous stasis ulcers of both lower extremities       Recommendations:     Discharge Recommendations: Moderate Intensity Therapy  Discharge Equipment Recommendations:  to be determined by next level of care, wheelchair (Bariatric RW)  Barriers to discharge:  Decreased caregiver support, Inaccessible home environment (Current functional level)    Assessment:     Riaz Guerra Jr. is a 74 y.o. male with a medical diagnosis of Venous stasis ulcers of both lower extremities.  He presents with the following performance deficits affecting function: weakness, impaired endurance, impaired self care skills, impaired functional mobility, gait instability, impaired balance, decreased coordination, edema, impaired skin, decreased ROM, pain, decreased safety awareness, decreased lower extremity function, impaired cardiopulmonary response to activity, impaired joint extensibility.     Rehab Prognosis:   Fair/Good ; patient would benefit from acute skilled OT services to address these deficits and reach maximum level of function.       Plan:     Patient to be seen 5 x/week to address the above listed problems via self-care/home management, therapeutic activities, therapeutic exercises  Plan of Care Expires: 12/07/24  Plan of Care Reviewed with: patient    Subjective     Chief Complaint: Right hip and knee pain  Patient/Family Comments/goals: Pt agreeable to OT tx session but fearful due to pain.   Pain/Comfort:  Pain Rating 1:  (Did not rate pain, nurse reporting pt had Tramadol around 1000, pt reporting right hip and knee pain)    Objective:     Communicated with: nurse prior to session.  Patient found HOB elevated with bed alarm, peripheral IV, Condom Catheter, telemetry upon OT entry to room.    General Precautions: Standard, diabetic, fall    Orthopedic Precautions:N/A  Braces: N/A  Respiratory Status: Room air      Occupational Performance:     Bed Mobility:    Supine to sit: Mod A x 2   Sit to supine:  Max A x 2    Functional/ADL Mobility/Transfers:  Sit to stand: Min A x 1 and Mod A x 1 with RW and height of bed elevated (2 person assist) for first and second stand then Min A x 2 with RW for third stand.  Functional Mobility: Pt able to take steps with right foot but unable to lift left foot and bear increased weight through right LE  Cues for looking up and pushing through UE's when standing and counting prior to sit to stand    Activities of Daily Living:  LB  dressing: Total A  Toileting: Condom catheter      [facility] 6 Click ADL: 12    Treatment & Education:  Role of OT, POC, education on pt's progress and how he has to keep focused on doing more than he did the day prior for motivation, increasing activity tolerance, strength and balance for increased Indep with ADL and ADL mobility, discharge recs, bed mobility    Patient left HOB elevated and bilateral heels floated off bed with pillow with all lines intact, call button in reach, and nurse notified    GOALS:   Multidisciplinary Problems       Occupational Therapy Goals          Problem: Occupational Therapy    Goal Priority Disciplines Outcome Interventions   Occupational Therapy Goal     OT, PT/OT Progressing    Description: Goals to be met by: 12/7/2024     Patient will increase functional independence with ADLs by performing:    UE Dressing with Moderate Assistance.  LE Dressing with Moderate Assistance.  Grooming while seated with Set-up Assistance.  Toileting from bedside commode with Moderate Assistance for hygiene and clothing management.   Toilet transfer to bedside commode with Moderate Assistance.                         Time Tracking:     OT Date of Treatment: 11/26/24  OT Start Time: 1140  OT Stop Time: 1215  OT Total Time (min): 35 min    Billable Minutes:Self Care/Home Management 35    OT/TALA: OT          11/26/2024

## 2024-11-27 LAB
POCT GLUCOSE: 110 MG/DL (ref 70–110)
POCT GLUCOSE: 115 MG/DL (ref 70–110)
POCT GLUCOSE: 116 MG/DL (ref 70–110)
POCT GLUCOSE: 138 MG/DL (ref 70–110)
POCT GLUCOSE: 148 MG/DL (ref 70–110)
POCT GLUCOSE: 162 MG/DL (ref 70–110)
POCT GLUCOSE: 165 MG/DL (ref 70–110)

## 2024-12-06 ENCOUNTER — HOSPITAL ENCOUNTER (OUTPATIENT)
Facility: OTHER | Age: 74
Discharge: HOME | End: 2024-12-13
Attending: EMERGENCY MEDICINE | Admitting: EMERGENCY MEDICINE
Payer: MEDICARE

## 2024-12-06 DIAGNOSIS — Z74.09 IMPAIRED MOBILITY AND ACTIVITIES OF DAILY LIVING: ICD-10-CM

## 2024-12-06 DIAGNOSIS — Z78.9 IMPAIRED MOBILITY AND ACTIVITIES OF DAILY LIVING: ICD-10-CM

## 2024-12-06 DIAGNOSIS — Z75.8 PROBLEM RELATED TO DISCHARGE PLANNING: ICD-10-CM

## 2024-12-06 DIAGNOSIS — G89.29 OTHER CHRONIC PAIN: ICD-10-CM

## 2024-12-06 DIAGNOSIS — M79.606 LEG PAIN: ICD-10-CM

## 2024-12-06 DIAGNOSIS — T14.8XXA CHRONIC WOUND: Primary | ICD-10-CM

## 2024-12-06 PROBLEM — D63.1 ANEMIA DUE TO STAGE 3 CHRONIC KIDNEY DISEASE: Status: ACTIVE | Noted: 2024-07-01

## 2024-12-06 PROBLEM — N18.30 ANEMIA DUE TO STAGE 3 CHRONIC KIDNEY DISEASE: Status: ACTIVE | Noted: 2024-07-01

## 2024-12-06 LAB
ALBUMIN SERPL BCP-MCNC: 3.1 G/DL (ref 3.5–5.2)
ALP SERPL-CCNC: 99 U/L (ref 40–150)
ALT SERPL W/O P-5'-P-CCNC: 12 U/L (ref 10–44)
ANION GAP SERPL CALC-SCNC: 12 MMOL/L (ref 8–16)
AST SERPL-CCNC: 19 U/L (ref 10–40)
BASOPHILS # BLD AUTO: 0.1 K/UL (ref 0–0.2)
BASOPHILS NFR BLD: 1.2 % (ref 0–1.9)
BILIRUB SERPL-MCNC: 0.4 MG/DL (ref 0.1–1)
BUN SERPL-MCNC: 31 MG/DL (ref 8–23)
CALCIUM SERPL-MCNC: 10 MG/DL (ref 8.7–10.5)
CHLORIDE SERPL-SCNC: 103 MMOL/L (ref 95–110)
CK SERPL-CCNC: 82 U/L (ref 20–200)
CO2 SERPL-SCNC: 23 MMOL/L (ref 23–29)
CREAT SERPL-MCNC: 1.4 MG/DL (ref 0.5–1.4)
CRP SERPL-MCNC: 81 MG/L (ref 0–8.2)
DIFFERENTIAL METHOD BLD: ABNORMAL
EOSINOPHIL # BLD AUTO: 0.2 K/UL (ref 0–0.5)
EOSINOPHIL NFR BLD: 2.5 % (ref 0–8)
ERYTHROCYTE [DISTWIDTH] IN BLOOD BY AUTOMATED COUNT: 13.6 % (ref 11.5–14.5)
EST. GFR  (NO RACE VARIABLE): 53 ML/MIN/1.73 M^2
GLUCOSE SERPL-MCNC: 147 MG/DL (ref 70–110)
HCT VFR BLD AUTO: 40.6 % (ref 40–54)
HGB BLD-MCNC: 13.4 G/DL (ref 14–18)
IMM GRANULOCYTES # BLD AUTO: 0.04 K/UL (ref 0–0.04)
IMM GRANULOCYTES NFR BLD AUTO: 0.5 % (ref 0–0.5)
LYMPHOCYTES # BLD AUTO: 1.4 K/UL (ref 1–4.8)
LYMPHOCYTES NFR BLD: 16.4 % (ref 18–48)
MCH RBC QN AUTO: 29.3 PG (ref 27–31)
MCHC RBC AUTO-ENTMCNC: 33 G/DL (ref 32–36)
MCV RBC AUTO: 89 FL (ref 82–98)
MONOCYTES # BLD AUTO: 0.5 K/UL (ref 0.3–1)
MONOCYTES NFR BLD: 5.7 % (ref 4–15)
NEUTROPHILS # BLD AUTO: 6.3 K/UL (ref 1.8–7.7)
NEUTROPHILS NFR BLD: 73.7 % (ref 38–73)
NRBC BLD-RTO: 0 /100 WBC
PLATELET # BLD AUTO: 466 K/UL (ref 150–450)
PMV BLD AUTO: 9.8 FL (ref 9.2–12.9)
POCT GLUCOSE: 194 MG/DL (ref 70–110)
POCT GLUCOSE: 196 MG/DL (ref 70–110)
POTASSIUM SERPL-SCNC: 4.3 MMOL/L (ref 3.5–5.1)
PROT SERPL-MCNC: 9.5 G/DL (ref 6–8.4)
RBC # BLD AUTO: 4.57 M/UL (ref 4.6–6.2)
SODIUM SERPL-SCNC: 138 MMOL/L (ref 136–145)
WBC # BLD AUTO: 8.49 K/UL (ref 3.9–12.7)

## 2024-12-06 PROCEDURE — 94761 N-INVAS EAR/PLS OXIMETRY MLT: CPT

## 2024-12-06 PROCEDURE — 25000003 PHARM REV CODE 250: Performed by: NURSE PRACTITIONER

## 2024-12-06 PROCEDURE — G0378 HOSPITAL OBSERVATION PER HR: HCPCS

## 2024-12-06 PROCEDURE — 82962 GLUCOSE BLOOD TEST: CPT

## 2024-12-06 PROCEDURE — 86140 C-REACTIVE PROTEIN: CPT | Performed by: EMERGENCY MEDICINE

## 2024-12-06 PROCEDURE — 36415 COLL VENOUS BLD VENIPUNCTURE: CPT | Performed by: EMERGENCY MEDICINE

## 2024-12-06 PROCEDURE — 82550 ASSAY OF CK (CPK): CPT | Performed by: EMERGENCY MEDICINE

## 2024-12-06 PROCEDURE — 96374 THER/PROPH/DIAG INJ IV PUSH: CPT

## 2024-12-06 PROCEDURE — 96372 THER/PROPH/DIAG INJ SC/IM: CPT | Performed by: NURSE PRACTITIONER

## 2024-12-06 PROCEDURE — 85025 COMPLETE CBC W/AUTO DIFF WBC: CPT | Performed by: EMERGENCY MEDICINE

## 2024-12-06 PROCEDURE — 87040 BLOOD CULTURE FOR BACTERIA: CPT | Mod: 59 | Performed by: EMERGENCY MEDICINE

## 2024-12-06 PROCEDURE — 63600175 PHARM REV CODE 636 W HCPCS: Performed by: EMERGENCY MEDICINE

## 2024-12-06 PROCEDURE — 80053 COMPREHEN METABOLIC PANEL: CPT | Performed by: EMERGENCY MEDICINE

## 2024-12-06 PROCEDURE — 99285 EMERGENCY DEPT VISIT HI MDM: CPT | Mod: 25

## 2024-12-06 PROCEDURE — 25000003 PHARM REV CODE 250: Performed by: EMERGENCY MEDICINE

## 2024-12-06 PROCEDURE — 96375 TX/PRO/DX INJ NEW DRUG ADDON: CPT

## 2024-12-06 PROCEDURE — 63600175 PHARM REV CODE 636 W HCPCS: Performed by: NURSE PRACTITIONER

## 2024-12-06 RX ORDER — ONDANSETRON HYDROCHLORIDE 2 MG/ML
4 INJECTION, SOLUTION INTRAVENOUS
Status: COMPLETED | OUTPATIENT
Start: 2024-12-06 | End: 2024-12-06

## 2024-12-06 RX ORDER — AMOXICILLIN 250 MG
1 CAPSULE ORAL 2 TIMES DAILY
Status: DISCONTINUED | OUTPATIENT
Start: 2024-12-06 | End: 2024-12-13 | Stop reason: HOSPADM

## 2024-12-06 RX ORDER — LOSARTAN POTASSIUM 50 MG/1
100 TABLET ORAL DAILY
Status: DISCONTINUED | OUTPATIENT
Start: 2024-12-07 | End: 2024-12-13 | Stop reason: HOSPADM

## 2024-12-06 RX ORDER — GLUCAGON 1 MG
1 KIT INJECTION
Status: DISCONTINUED | OUTPATIENT
Start: 2024-12-06 | End: 2024-12-13 | Stop reason: HOSPADM

## 2024-12-06 RX ORDER — ACETAMINOPHEN 325 MG/1
650 TABLET ORAL EVERY 4 HOURS PRN
Status: DISCONTINUED | OUTPATIENT
Start: 2024-12-06 | End: 2024-12-13 | Stop reason: HOSPADM

## 2024-12-06 RX ORDER — INSULIN ASPART 100 [IU]/ML
0-5 INJECTION, SOLUTION INTRAVENOUS; SUBCUTANEOUS
Status: DISCONTINUED | OUTPATIENT
Start: 2024-12-06 | End: 2024-12-13 | Stop reason: HOSPADM

## 2024-12-06 RX ORDER — AMLODIPINE BESYLATE 5 MG/1
10 TABLET ORAL DAILY
Status: DISCONTINUED | OUTPATIENT
Start: 2024-12-07 | End: 2024-12-13 | Stop reason: HOSPADM

## 2024-12-06 RX ORDER — KETOROLAC TROMETHAMINE 30 MG/ML
15 INJECTION, SOLUTION INTRAMUSCULAR; INTRAVENOUS
Status: COMPLETED | OUTPATIENT
Start: 2024-12-06 | End: 2024-12-06

## 2024-12-06 RX ORDER — SODIUM CHLORIDE 0.9 % (FLUSH) 0.9 %
10 SYRINGE (ML) INJECTION
Status: DISCONTINUED | OUTPATIENT
Start: 2024-12-06 | End: 2024-12-06

## 2024-12-06 RX ORDER — ONDANSETRON 8 MG/1
8 TABLET, ORALLY DISINTEGRATING ORAL EVERY 8 HOURS PRN
Status: DISCONTINUED | OUTPATIENT
Start: 2024-12-06 | End: 2024-12-13 | Stop reason: HOSPADM

## 2024-12-06 RX ORDER — GABAPENTIN 300 MG/1
600 CAPSULE ORAL 2 TIMES DAILY
Status: DISCONTINUED | OUTPATIENT
Start: 2024-12-06 | End: 2024-12-07

## 2024-12-06 RX ORDER — IBUPROFEN 200 MG
16 TABLET ORAL
Status: DISCONTINUED | OUTPATIENT
Start: 2024-12-06 | End: 2024-12-13 | Stop reason: HOSPADM

## 2024-12-06 RX ORDER — HYDROCODONE BITARTRATE AND ACETAMINOPHEN 5; 325 MG/1; MG/1
1 TABLET ORAL EVERY 4 HOURS PRN
Status: DISCONTINUED | OUTPATIENT
Start: 2024-12-06 | End: 2024-12-13 | Stop reason: HOSPADM

## 2024-12-06 RX ORDER — ATORVASTATIN CALCIUM 20 MG/1
20 TABLET, FILM COATED ORAL DAILY
Status: DISCONTINUED | OUTPATIENT
Start: 2024-12-07 | End: 2024-12-13 | Stop reason: HOSPADM

## 2024-12-06 RX ORDER — MORPHINE SULFATE 4 MG/ML
4 INJECTION, SOLUTION INTRAMUSCULAR; INTRAVENOUS
Status: COMPLETED | OUTPATIENT
Start: 2024-12-06 | End: 2024-12-06

## 2024-12-06 RX ORDER — TAMSULOSIN HYDROCHLORIDE 0.4 MG/1
0.4 CAPSULE ORAL DAILY
Status: DISCONTINUED | OUTPATIENT
Start: 2024-12-07 | End: 2024-12-13 | Stop reason: HOSPADM

## 2024-12-06 RX ORDER — ONDANSETRON HYDROCHLORIDE 2 MG/ML
4 INJECTION, SOLUTION INTRAVENOUS EVERY 8 HOURS PRN
Status: DISCONTINUED | OUTPATIENT
Start: 2024-12-06 | End: 2024-12-13 | Stop reason: HOSPADM

## 2024-12-06 RX ORDER — HEPARIN SODIUM 5000 [USP'U]/ML
5000 INJECTION, SOLUTION INTRAVENOUS; SUBCUTANEOUS EVERY 8 HOURS
Status: DISCONTINUED | OUTPATIENT
Start: 2024-12-06 | End: 2024-12-13 | Stop reason: HOSPADM

## 2024-12-06 RX ORDER — POLYETHYLENE GLYCOL 3350 17 G/17G
17 POWDER, FOR SOLUTION ORAL 2 TIMES DAILY PRN
Status: DISCONTINUED | OUTPATIENT
Start: 2024-12-06 | End: 2024-12-07

## 2024-12-06 RX ORDER — HYDROCHLOROTHIAZIDE 12.5 MG/1
12.5 TABLET ORAL DAILY
Status: DISCONTINUED | OUTPATIENT
Start: 2024-12-07 | End: 2024-12-09

## 2024-12-06 RX ORDER — TALC
6 POWDER (GRAM) TOPICAL NIGHTLY PRN
Status: DISCONTINUED | OUTPATIENT
Start: 2024-12-06 | End: 2024-12-06

## 2024-12-06 RX ORDER — TALC
6 POWDER (GRAM) TOPICAL NIGHTLY PRN
Status: DISCONTINUED | OUTPATIENT
Start: 2024-12-06 | End: 2024-12-13 | Stop reason: HOSPADM

## 2024-12-06 RX ORDER — IBUPROFEN 200 MG
24 TABLET ORAL
Status: DISCONTINUED | OUTPATIENT
Start: 2024-12-06 | End: 2024-12-13 | Stop reason: HOSPADM

## 2024-12-06 RX ORDER — HYDROCODONE BITARTRATE AND ACETAMINOPHEN 10; 325 MG/1; MG/1
1 TABLET ORAL EVERY 4 HOURS PRN
Status: DISCONTINUED | OUTPATIENT
Start: 2024-12-06 | End: 2024-12-13 | Stop reason: HOSPADM

## 2024-12-06 RX ORDER — BACLOFEN 10 MG/1
10 TABLET ORAL 3 TIMES DAILY
Status: DISCONTINUED | OUTPATIENT
Start: 2024-12-06 | End: 2024-12-13 | Stop reason: HOSPADM

## 2024-12-06 RX ORDER — SODIUM CHLORIDE 0.9 % (FLUSH) 0.9 %
10 SYRINGE (ML) INJECTION
Status: DISCONTINUED | OUTPATIENT
Start: 2024-12-06 | End: 2024-12-07

## 2024-12-06 RX ORDER — ASPIRIN 81 MG/1
81 TABLET ORAL DAILY
Status: DISCONTINUED | OUTPATIENT
Start: 2024-12-07 | End: 2024-12-13 | Stop reason: HOSPADM

## 2024-12-06 RX ORDER — HYDROCODONE BITARTRATE AND ACETAMINOPHEN 10; 325 MG/1; MG/1
1 TABLET ORAL
Status: COMPLETED | OUTPATIENT
Start: 2024-12-06 | End: 2024-12-06

## 2024-12-06 RX ADMIN — HYDROCODONE BITARTRATE AND ACETAMINOPHEN 1 TABLET: 5; 325 TABLET ORAL at 06:12

## 2024-12-06 RX ADMIN — KETOROLAC TROMETHAMINE 15 MG: 30 INJECTION, SOLUTION INTRAMUSCULAR at 12:12

## 2024-12-06 RX ADMIN — HEPARIN SODIUM 5000 UNITS: 5000 INJECTION INTRAVENOUS; SUBCUTANEOUS at 10:12

## 2024-12-06 RX ADMIN — GABAPENTIN 600 MG: 300 CAPSULE ORAL at 09:12

## 2024-12-06 RX ADMIN — ONDANSETRON 4 MG: 2 INJECTION INTRAMUSCULAR; INTRAVENOUS at 11:12

## 2024-12-06 RX ADMIN — MORPHINE SULFATE 4 MG: 4 INJECTION, SOLUTION INTRAMUSCULAR; INTRAVENOUS at 11:12

## 2024-12-06 RX ADMIN — HYDROCODONE BITARTRATE AND ACETAMINOPHEN 1 TABLET: 10; 325 TABLET ORAL at 02:12

## 2024-12-06 RX ADMIN — HYDROCODONE BITARTRATE AND ACETAMINOPHEN 1 TABLET: 10; 325 TABLET ORAL at 11:12

## 2024-12-06 RX ADMIN — BACLOFEN 10 MG: 10 TABLET ORAL at 09:12

## 2024-12-06 NOTE — Clinical Note
Diagnosis: Chronic wound [[id number]]   Future Attending Provider: LENA RÍOS [[id number]]   Special Needs:: No Special Needs [1]

## 2024-12-06 NOTE — PLAN OF CARE
12/06/24 1428   Post-Acute Status   Post-Acute Authorization Placement   Post-Acute Placement Status Referrals Sent   Patient choice form signed by patient/caregiver List with quality metrics by geographic area provided;List from CMS Compare;List from System Post-Acute Care   Discharge Delays None known at this time   Discharge Plan   Discharge Plan A New Nursing Home placement - halfway care facility   Discharge Plan B New Nursing Home placement - halfway care facility     I certify I provided patient choice and a list to the patient/family of CMS rated Home Health, SNF, IRF, LTACH, halfway Nursing Homes.  Patient/Family signed Patient's Choice Disclosure Form choosing the following  [facility]  2. [facility]

## 2024-12-06 NOTE — ED NOTES
Pt complaining of pain to his R leg, R knee, and R hip. Notified provider. Pt had morphine, now given toradol for pain and more imaging added.

## 2024-12-06 NOTE — PLAN OF CARE
Patient was sent to [facility] for SNF and AMA'd. Patient is now evicted from his apartment. Patient has no family willing to let him live with him. Patient looking for [facility].

## 2024-12-06 NOTE — ED PROVIDER NOTES
"Encounter Date: 12/6/2024       History     Chief Complaint   Patient presents with    Leg Pain     BL chronic leg pain and weakness, worsening over past 3 days. Per EMS, pt fell on ground last night d/t weakness and was down for approx 3 hours. Denies head trauma, LOC. Hx diabetes, noncompliant.     Seen by physician at 10:40AM:    Patient is a 74-year-old male who presents to the emergency department with diffuse body pain and concerns with open wounds.  Patient was recently admitted for these wounds and discharged to a skilled nursing facility.  Patient states that he was discharged a couple days ago because he was "not improving" and has been at home with minimal help.  He states that his pain has been worsening and he has no one to help care for his wounds.  He fell while trying to get up out of bed last night and was unable to get back up.  He denies any fevers.  He denies any chest pain or shortness breath.  Denies any vomiting or diarrhea.          Review of patient's allergies indicates:   Allergen Reactions    Lisinopril Other (See Comments)     cough     Past Medical History:   Diagnosis Date    Depression     Diabetes mellitus     Gout     High cholesterol     Hypertension      History reviewed. No pertinent surgical history.  No family history on file.  Social History     Tobacco Use    Smoking status: Never    Smokeless tobacco: Never   Substance Use Topics    Alcohol use: Not Currently     Comment: occasionally    Drug use: Yes     Types: Marijuana     Review of Systems   Constitutional:  Negative for chills and fever.   HENT:  Negative for congestion and rhinorrhea.    Respiratory:  Negative for chest tightness and shortness of breath.    Cardiovascular:  Negative for chest pain and palpitations.   Gastrointestinal:  Negative for abdominal pain, diarrhea, nausea and vomiting.   Genitourinary:  Negative for dysuria and flank pain.   Musculoskeletal:  Positive for arthralgias. Negative for back pain " and neck pain.   Skin:  Positive for wound. Negative for color change.   Neurological:  Negative for dizziness and headaches.       Physical Exam     Initial Vitals [12/06/24 1034]   BP Pulse Resp Temp SpO2   (!) 189/103 104 18 97.9 °F (36.6 °C) 95 %      MAP       --         Physical Exam    Nursing note and vitals reviewed.  Constitutional: He appears well-developed and well-nourished.   HENT:   Head: Normocephalic and atraumatic.   Eyes: Conjunctivae are normal.   Neck:   Normal range of motion.  Cardiovascular:  Normal rate, regular rhythm and normal heart sounds.           Pulmonary/Chest: Breath sounds normal. No respiratory distress. He has no wheezes. He has no rales.   Musculoskeletal:         General: Edema present.      Cervical back: Normal range of motion.      Comments: Chronic venous stasis.       Neurological: He is alert and oriented to person, place, and time.   Skin: Skin is warm and dry. Capillary refill takes less than 2 seconds.   Large open wound to the right distal lateral calf, no surrounding erythema, no drainage noted, pink healing base.         ED Course   Procedures  Labs Reviewed   COMPREHENSIVE METABOLIC PANEL - Abnormal       Result Value    Sodium 138      Potassium 4.3      Chloride 103      CO2 23      Glucose 147 (*)     BUN 31 (*)     Creatinine 1.4      Calcium 10.0      Total Protein 9.5 (*)     Albumin 3.1 (*)     Total Bilirubin 0.4      Alkaline Phosphatase 99      AST 19      ALT 12      eGFR 53 (*)     Anion Gap 12     CBC W/ AUTO DIFFERENTIAL - Abnormal    WBC 8.49      RBC 4.57 (*)     Hemoglobin 13.4 (*)     Hematocrit 40.6      MCV 89      MCH 29.3      MCHC 33.0      RDW 13.6      Platelets 466 (*)     MPV 9.8      Immature Granulocytes 0.5      Gran # (ANC) 6.3      Immature Grans (Abs) 0.04      Lymph # 1.4      Mono # 0.5      Eos # 0.2      Baso # 0.10      nRBC 0      Gran % 73.7 (*)     Lymph % 16.4 (*)     Mono % 5.7      Eosinophil % 2.5      Basophil % 1.2       Differential Method Automated     C-REACTIVE PROTEIN - Abnormal    CRP 81.0 (*)    CULTURE, BLOOD   CULTURE, BLOOD   CK   CK    CPK 82            Imaging Results              X-Ray Tibia Fibula 2 View Right (Final result)  Result time 12/06/24 13:20:14      Final result by Blanco Arzate III, MD (12/06/24 13:20:14)                   Narrative:    EXAMINATION:  XR TIBIA FIBULA 2 VIEW RIGHT    CLINICAL HISTORY:  Pain in leg, unspecified    FINDINGS:  Tib fib two views right.    There is DJD of the knee and ankle with spurs on the patella.  No fracture dislocation bone destruction or periosteal reaction seen.      Electronically signed by: Blanco Arzate MD  Date:    12/06/2024  Time:    13:20                                     X-Ray Hip 2 or 3 views Right with Pelvis when performed (Final result)  Result time 12/06/24 13:22:00      Final result by Blanco Arzate III, MD (12/06/24 13:22:00)                   Narrative:    EXAMINATION:  XR HIP WITH PELVIS WHEN PERFORMED 2 OR 3 VIEWS RIGHT    CLINICAL HISTORY:  hip pain;    FINDINGS:  Right hip.  Three views right hip.    There is severe advanced DJD and impingement change.  There is likely a chronic labral tear.  No acute fracture dislocation bone destruction seen.      Electronically signed by: Blanco Arzate MD  Date:    12/06/2024  Time:    13:22                                     CT Cervical Spine Without Contrast (Final result)  Result time 12/06/24 12:13:54      Final result by Blanco Arzate III, MD (12/06/24 12:13:54)                   Impression:      No acute process seen.    DJD and bilateral areas of neural foraminal narrowing.      Electronically signed by: Blanco Arzate MD  Date:    12/06/2024  Time:    12:13               Narrative:    EXAMINATION:  CT CERVICAL SPINE WITHOUT CONTRAST    CLINICAL HISTORY:  Neck trauma (Age >= 65y);    FINDINGS:  There is moderate DJD and dish seen throughout the cervical spine.  Craniocervical junction is  unremarkable.  Odontoid, prevertebral soft tissues, and posterior elements are intact.  The facets are well aligned.  Intracranial structures demonstrate nothing unusual.  No soft tissue mass, or adenopathy is seen.  Upper mediastinum and lungs are unremarkable.  No skull base fracture seen.  There is DJD with disc osteophyte complex and bilateral areas of neural foraminal narrowing.  No fracture, dislocation, or bone destruction seen.                                       CT Head Without Contrast (Final result)  Result time 12/06/24 12:14:23      Final result by Ry Doshi MD (12/06/24 12:14:23)                   Impression:      1. There is a punctate focus of low attenuation along the medial aspect of the caudate and as well as within the anterior limb of the right internal capsule.  Findings likely reflect that of remote infarct however no previous exams are available for comparison and therefore remain age-indeterminate.  Correlation with current symptomatology recommended.  2. Sequela of chronic microvascular ischemic change and senescent change.  3. Mild mastoid effusions.      Electronically signed by: Ry Doshi MD  Date:    12/06/2024  Time:    12:14               Narrative:    EXAMINATION:  CT HEAD WITHOUT CONTRAST    CLINICAL HISTORY:  Head trauma, minor (Age >= 65y);    TECHNIQUE:  Low dose axial images were obtained through the head.  Coronal and sagittal reformations were also performed. Contrast was not administered.    COMPARISON:  None.    FINDINGS:  There is motion artifact.    There is generalized cerebral volume loss.  There is hypoattenuation in a periventricular fashion, likely sequela of chronic microvascular ischemic change.There is a focus of low attenuation along the medial aspect of the right caudate.  An additional punctate focus of low attenuation is noted at the level of the anterior limb of the right internal capsule.  There is no evidence of acute major vascular  territory infarct, hemorrhage, or mass.  There is no hydrocephalus.  There are no abnormal extra-axial fluid collections.  There is fluid within the left sphenoid sinus and mild fluid in the inferior most mastoid air cells, otherwise the visualized paranasal sinuses and mastoid air cells are clear, and there is no evidence of calvarial fracture.  The visualized soft tissues are unremarkable.                                    X-Rays:   Independently Interpreted Readings:   Other Readings:  Hip XR: No acute fracture or dislocation  Tib-fib XR: No acute fracture or dislocation    Medications   morphine injection 4 mg (4 mg Intravenous Given 12/6/24 1116)   ondansetron injection 4 mg (4 mg Intravenous Given 12/6/24 1116)   ketorolac injection 15 mg (15 mg Intravenous Given 12/6/24 1241)   HYDROcodone-acetaminophen  mg per tablet 1 tablet (1 tablet Oral Given 12/6/24 7132)     Medical Decision Making  10:40AM:  Patient is a 74-year-old male who presents to the emergency department with open wounds along with diffuse body pain.  It appears well, nontoxic.  The main wound in his right lower leg is large but does not appear acutely infected.  Will plan for labs, will continue to follow and reassess.    Amount and/or Complexity of Data Reviewed  External Data Reviewed: notes.  Labs: ordered. Decision-making details documented in ED Course.  Radiology: ordered and independent interpretation performed. Decision-making details documented in ED Course.    Risk  Prescription drug management.      2:30PM:  Patient doing well, feeling better after pain medication.  His imaging is negative for any acute findings.  I had a further discussion with the patient.  Patient states that he is getting evicted from his home and he is not sure why.  I had a discussion with  who came and discussed his living situation with him.  Patient was supposed to go to SNF to become strong enough to become independent and continue  living at the assisted living facility but he was unable to do so and therefore he has no where else to go.  Per , patient does need to be admitted for nursing home placement.  Patient agreeable to plan.    3:06 PM:  I discussed pt with Dr. Avilez, [facility] medicine, who is agreeable to plan for admission under Dr. Aguero.                                    Clinical Impression:  Final diagnoses:  [M79.606] Leg pain  [T14.8XXA] Chronic wound (Primary)          ED Disposition Condition    Observation Stable                Cherry Gonzalez MD  12/06/24 1858

## 2024-12-07 LAB
ALBUMIN SERPL BCP-MCNC: 2.6 G/DL (ref 3.5–5.2)
ALP SERPL-CCNC: 85 U/L (ref 40–150)
ALT SERPL W/O P-5'-P-CCNC: 10 U/L (ref 10–44)
ANION GAP SERPL CALC-SCNC: 9 MMOL/L (ref 8–16)
AST SERPL-CCNC: 14 U/L (ref 10–40)
BASOPHILS # BLD AUTO: 0.09 K/UL (ref 0–0.2)
BASOPHILS NFR BLD: 1.4 % (ref 0–1.9)
BILIRUB SERPL-MCNC: 0.4 MG/DL (ref 0.1–1)
BUN SERPL-MCNC: 31 MG/DL (ref 8–23)
CALCIUM SERPL-MCNC: 9.3 MG/DL (ref 8.7–10.5)
CHLORIDE SERPL-SCNC: 105 MMOL/L (ref 95–110)
CO2 SERPL-SCNC: 26 MMOL/L (ref 23–29)
CREAT SERPL-MCNC: 1.3 MG/DL (ref 0.5–1.4)
DIFFERENTIAL METHOD BLD: ABNORMAL
EOSINOPHIL # BLD AUTO: 0.3 K/UL (ref 0–0.5)
EOSINOPHIL NFR BLD: 4.1 % (ref 0–8)
ERYTHROCYTE [DISTWIDTH] IN BLOOD BY AUTOMATED COUNT: 13.6 % (ref 11.5–14.5)
EST. GFR  (NO RACE VARIABLE): 58 ML/MIN/1.73 M^2
GLUCOSE SERPL-MCNC: 154 MG/DL (ref 70–110)
HCT VFR BLD AUTO: 36.5 % (ref 40–54)
HGB BLD-MCNC: 11.9 G/DL (ref 14–18)
IMM GRANULOCYTES # BLD AUTO: 0.02 K/UL (ref 0–0.04)
IMM GRANULOCYTES NFR BLD AUTO: 0.3 % (ref 0–0.5)
LYMPHOCYTES # BLD AUTO: 1.7 K/UL (ref 1–4.8)
LYMPHOCYTES NFR BLD: 26.3 % (ref 18–48)
MAGNESIUM SERPL-MCNC: 2.1 MG/DL (ref 1.6–2.6)
MCH RBC QN AUTO: 29.2 PG (ref 27–31)
MCHC RBC AUTO-ENTMCNC: 32.6 G/DL (ref 32–36)
MCV RBC AUTO: 90 FL (ref 82–98)
MONOCYTES # BLD AUTO: 0.7 K/UL (ref 0.3–1)
MONOCYTES NFR BLD: 9.9 % (ref 4–15)
NEUTROPHILS # BLD AUTO: 3.8 K/UL (ref 1.8–7.7)
NEUTROPHILS NFR BLD: 58 % (ref 38–73)
NRBC BLD-RTO: 0 /100 WBC
PLATELET # BLD AUTO: 426 K/UL (ref 150–450)
PMV BLD AUTO: 9.5 FL (ref 9.2–12.9)
POCT GLUCOSE: 150 MG/DL (ref 70–110)
POCT GLUCOSE: 165 MG/DL (ref 70–110)
POCT GLUCOSE: 173 MG/DL (ref 70–110)
POCT GLUCOSE: 227 MG/DL (ref 70–110)
POTASSIUM SERPL-SCNC: 4.1 MMOL/L (ref 3.5–5.1)
PROT SERPL-MCNC: 8.1 G/DL (ref 6–8.4)
RBC # BLD AUTO: 4.08 M/UL (ref 4.6–6.2)
SODIUM SERPL-SCNC: 140 MMOL/L (ref 136–145)
WBC # BLD AUTO: 6.59 K/UL (ref 3.9–12.7)

## 2024-12-07 PROCEDURE — 36415 COLL VENOUS BLD VENIPUNCTURE: CPT | Performed by: NURSE PRACTITIONER

## 2024-12-07 PROCEDURE — G0378 HOSPITAL OBSERVATION PER HR: HCPCS

## 2024-12-07 PROCEDURE — 25000003 PHARM REV CODE 250: Performed by: NURSE PRACTITIONER

## 2024-12-07 PROCEDURE — 80053 COMPREHEN METABOLIC PANEL: CPT | Performed by: NURSE PRACTITIONER

## 2024-12-07 PROCEDURE — 85025 COMPLETE CBC W/AUTO DIFF WBC: CPT | Performed by: NURSE PRACTITIONER

## 2024-12-07 PROCEDURE — 83735 ASSAY OF MAGNESIUM: CPT | Performed by: NURSE PRACTITIONER

## 2024-12-07 PROCEDURE — 63600175 PHARM REV CODE 636 W HCPCS: Performed by: NURSE PRACTITIONER

## 2024-12-07 PROCEDURE — 96372 THER/PROPH/DIAG INJ SC/IM: CPT | Performed by: NURSE PRACTITIONER

## 2024-12-07 RX ORDER — FLUTICASONE PROPIONATE 50 MCG
2 SPRAY, SUSPENSION (ML) NASAL DAILY
Status: DISCONTINUED | OUTPATIENT
Start: 2024-12-08 | End: 2024-12-13 | Stop reason: HOSPADM

## 2024-12-07 RX ORDER — POLYETHYLENE GLYCOL 3350 17 G/17G
17 POWDER, FOR SOLUTION ORAL 2 TIMES DAILY
Status: DISCONTINUED | OUTPATIENT
Start: 2024-12-07 | End: 2024-12-13 | Stop reason: HOSPADM

## 2024-12-07 RX ORDER — GABAPENTIN 300 MG/1
600 CAPSULE ORAL 3 TIMES DAILY
Status: DISCONTINUED | OUTPATIENT
Start: 2024-12-08 | End: 2024-12-07

## 2024-12-07 RX ORDER — GABAPENTIN 400 MG/1
800 CAPSULE ORAL 2 TIMES DAILY
Status: COMPLETED | OUTPATIENT
Start: 2024-12-07 | End: 2024-12-07

## 2024-12-07 RX ORDER — GABAPENTIN 400 MG/1
800 CAPSULE ORAL 2 TIMES DAILY
Status: DISCONTINUED | OUTPATIENT
Start: 2024-12-07 | End: 2024-12-07

## 2024-12-07 RX ORDER — GABAPENTIN 400 MG/1
400 CAPSULE ORAL 3 TIMES DAILY
Status: DISCONTINUED | OUTPATIENT
Start: 2024-12-08 | End: 2024-12-08

## 2024-12-07 RX ADMIN — HEPARIN SODIUM 5000 UNITS: 5000 INJECTION INTRAVENOUS; SUBCUTANEOUS at 09:12

## 2024-12-07 RX ADMIN — LOSARTAN POTASSIUM 100 MG: 50 TABLET, FILM COATED ORAL at 08:12

## 2024-12-07 RX ADMIN — HYDROCODONE BITARTRATE AND ACETAMINOPHEN 1 TABLET: 10; 325 TABLET ORAL at 05:12

## 2024-12-07 RX ADMIN — GABAPENTIN 800 MG: 400 CAPSULE ORAL at 08:12

## 2024-12-07 RX ADMIN — TAMSULOSIN HYDROCHLORIDE 0.4 MG: 0.4 CAPSULE ORAL at 08:12

## 2024-12-07 RX ADMIN — HEPARIN SODIUM 5000 UNITS: 5000 INJECTION INTRAVENOUS; SUBCUTANEOUS at 03:12

## 2024-12-07 RX ADMIN — ATORVASTATIN CALCIUM 20 MG: 20 TABLET, FILM COATED ORAL at 08:12

## 2024-12-07 RX ADMIN — SENNOSIDES AND DOCUSATE SODIUM 1 TABLET: 50; 8.6 TABLET ORAL at 08:12

## 2024-12-07 RX ADMIN — ASPIRIN 81 MG: 81 TABLET, COATED ORAL at 08:12

## 2024-12-07 RX ADMIN — BACLOFEN 10 MG: 10 TABLET ORAL at 08:12

## 2024-12-07 RX ADMIN — HYDROCODONE BITARTRATE AND ACETAMINOPHEN 1 TABLET: 10; 325 TABLET ORAL at 06:12

## 2024-12-07 RX ADMIN — HYDROCHLOROTHIAZIDE 12.5 MG: 12.5 TABLET ORAL at 08:12

## 2024-12-07 RX ADMIN — HEPARIN SODIUM 5000 UNITS: 5000 INJECTION INTRAVENOUS; SUBCUTANEOUS at 06:12

## 2024-12-07 RX ADMIN — BACLOFEN 10 MG: 10 TABLET ORAL at 03:12

## 2024-12-07 RX ADMIN — INSULIN ASPART 2 UNITS: 100 INJECTION, SOLUTION INTRAVENOUS; SUBCUTANEOUS at 12:12

## 2024-12-07 NOTE — H&P
"[facility] Medicine  History & Physical    Patient Name: Riaz Guerra Jr.  MRN: [id number]  Patient Class: OP- Observation  Admission Date: 12/6/2024  Attending Physician: LENA Aguero MD   Primary Care Provider: Berhane Alvarez MD    Patient information was obtained from patient, past medical records, and ER records.     Subjective:     Principal Problem:Problem related to discharge planning    Chief Complaint:   Chief Complaint   Patient presents with    Leg Pain     BL chronic leg pain and weakness, worsening over past 3 days. Per EMS, pt fell on ground last night d/t weakness and was down for approx 3 hours. Denies head trauma, LOC. Hx diabetes, noncompliant.        HPI: Mr. Guerra is a 74 YOM with PMHx of HTN, HLD, DM type II, CKD III (baseline SCr 1-1.6), anemia of chronic disease, chronic bilateral venous stasis with chronic wounds, debility/weakness, and obesity.     He presents to ED via EMS due to fall at home last night and inability to get up by himself. He notes that since fall he has had diffuse body pain, worse from baseline chronic pain and he was concerned about his chronic BLE wounds. He reports that he was recently admitted to [facility] and discharged to SNF facility. While at SNF he notes that he was not improving and having more pain than "when he went in" and ultimately ended up returning home to [facility]. It is unclear if he was discharged home or left AMA from SNF. He also reports that today once he activated EMS his was approached with an eviction notice from his apartment. He reports no further housing options and he has not family that he "is close with". He endorses needed assistance with placement. He denies fever, chills, palpitations, SOB, ROSSI, CP, abdominal pain, N/V/D, constipation, urinary symptoms or hematuria,decreased appetite, changes in PO intake, light headiness, dizziness, or headaches.     In the ED he was initially " hypertensive with improvement over ED course, otherwise HDS and afebrile.  CBC with WBC 8, H/H 13.4/40.6, platelets 466.  Chemistry was , K 4.3, chloride 103, CO2 23, BUN 31, SCr 1.4 (baseline 1-1.6), glucose 147.  LFTs unremarkable.  CRP 81.  CPK 82.  Blood cultures in process.  CT C-spine with no acute process seen. DJD and bilateral areas of neural foraminal narrowing. CTH with a punctate focus of low attenuation along the medial aspect of the caudate and as well as within the anterior limb of the right internal capsule.  Findings likely reflect that of remote infarct however no previous exams are available for comparison and therefore remain age-indeterminate.  Correlation with current symptomatology recommended. Sequela of chronic microvascular ischemic change and senescent change.  Mild mastoid effusions. R tib/fib XR with DJD of the knee and ankle with spurs on the patella.  No fracture dislocation bone destruction or periosteal reaction seen. R hip and pelvis with severe advanced DJD and impingement change.  There is likely a chronic labral tear.  No acute fracture dislocation bone destruction seen.    The patient was placed in observation to the Hospital Medicine Service for further evaluation and treatment.     Past Medical History:   Diagnosis Date    Depression     Diabetes mellitus     Gout     High cholesterol     Hypertension        History reviewed. No pertinent surgical history.    Review of patient's allergies indicates:   Allergen Reactions    Lisinopril Other (See Comments)     cough       No current facility-administered medications on file prior to encounter.     Current Outpatient Medications on File Prior to Encounter   Medication Sig    amLODIPine (NORVASC) 10 MG tablet Take 1 tablet (10 mg total) by mouth once daily.    aspirin (ECOTRIN) 81 MG EC tablet Take 81 mg by mouth once daily.    atorvastatin (LIPITOR) 20 MG tablet Take 20 mg by mouth once daily.    baclofen (LIORESAL) 10 MG  tablet Take 10 mg by mouth 3 (three) times daily.    gabapentin (NEURONTIN) 800 MG tablet Take 1 tablet (800 mg total) by mouth 2 (two) times daily.    HYDROcodone-acetaminophen (NORCO) 7.5-325 mg per tablet Take 1 tablet by mouth every 8 (eight) hours as needed for Pain.    losartan-hydrochlorothiazide 100-12.5 mg (HYZAAR) 100-12.5 mg Tab Take 1 tablet by mouth once daily.    metFORMIN (GLUCOPHAGE) 1000 MG tablet Take 1,000 mg by mouth 2 (two) times daily with meals.    tamsulosin (FLOMAX) 0.4 mg Cap Take by mouth once daily.     Family History    None       Tobacco Use    Smoking status: Never    Smokeless tobacco: Never   Substance and Sexual Activity    Alcohol use: Not Currently     Comment: occasionally    Drug use: Yes     Types: Marijuana    Sexual activity: Yes     Review of Systems   Constitutional:  Positive for activity change and fatigue. Negative for appetite change.   Respiratory:  Negative for cough, chest tightness, shortness of breath and wheezing.    Cardiovascular:  Negative for chest pain, palpitations and leg swelling.   Gastrointestinal:  Negative for abdominal pain, constipation, diarrhea, nausea and vomiting.   Genitourinary:  Negative for difficulty urinating and dysuria.   Musculoskeletal:  Positive for arthralgias and gait problem.   Skin:  Positive for wound.   Neurological:  Positive for weakness. Negative for dizziness, light-headedness and headaches.     Objective:     Vital Signs (Most Recent):  Temp: 99.4 °F (37.4 °C) (12/06/24 1913)  Pulse: 87 (12/06/24 2044)  Resp: 18 (12/06/24 2044)  BP: (!) 97/56 (12/06/24 2003)  SpO2: 95 % (12/06/24 2044) Vital Signs (24h Range):  Temp:  [97.9 °F (36.6 °C)-99.4 °F (37.4 °C)] 99.4 °F (37.4 °C)  Pulse:  [] 87  Resp:  [15-20] 18  SpO2:  [93 %-99 %] 95 %  BP: ()/() 97/56     Weight: (!) 140.6 kg (310 lb)  Body mass index is 43.24 kg/m².     Physical Exam  Vitals and nursing note reviewed.   Constitutional:       General: He is not  in acute distress.     Appearance: Normal appearance. He is well-developed. He is obese. He is ill-appearing (chronically ill appearing). He is not toxic-appearing.   HENT:      Head: Normocephalic and atraumatic.      Mouth/Throat:      Dentition: Normal dentition.   Eyes:      General: Lids are normal.      Extraocular Movements: Extraocular movements intact.      Conjunctiva/sclera: Conjunctivae normal.   Cardiovascular:      Rate and Rhythm: Normal rate and regular rhythm.      Heart sounds: Normal heart sounds. No murmur heard.  Pulmonary:      Effort: Pulmonary effort is normal.      Breath sounds: Normal breath sounds.   Abdominal:      General: There is no distension.      Palpations: Abdomen is soft.      Tenderness: There is no abdominal tenderness.   Musculoskeletal:      Cervical back: Neck supple.      Right lower leg: No edema.      Left lower leg: No edema.   Skin:     General: Skin is warm and dry.      Findings: No erythema or rash.   Neurological:      Mental Status: He is alert and oriented to person, place, and time.             Significant Labs: All pertinent labs within the past 24 hours have been reviewed.  CBC:   Recent Labs   Lab 12/06/24  1115   WBC 8.49   HGB 13.4*   HCT 40.6   *     CMP:   Recent Labs   Lab 12/06/24  1115      K 4.3      CO2 23   *   BUN 31*   CREATININE 1.4   CALCIUM 10.0   PROT 9.5*   ALBUMIN 3.1*   BILITOT 0.4   ALKPHOS 99   AST 19   ALT 12   ANIONGAP 12     Significant Imaging: I have reviewed all pertinent imaging results/findings within the past 24 hours.  Assessment/Plan:     Problem related to discharge planning  Chronic wounds; BLE   Venous stasis ulcers of both lower extremities   Chronic pain   Impaired mobility and activities of daily living   Recurrent falls  -placed in observation due to fall at home and inability to complete ADLS with decreased caregiver support and likely homelessness  -at admission HDS and afebrile  -ED workup  detailed above  -PT/OT consulted   -CM/SW to follow with likely nursing home placement   -continue home baclofen and gabapentin  -PRN supportive care for pain   -dose/medication adjustment as appropriate   -Wound Care consulted  -dressing changes per their recommendations  -fall precautions     HTN (hypertension)  -chronic   -controlled   -continue home amlodipine, losartan, and HCTZ  -dose/medication adjustment as appropriate     Insulin dependent type 2 diabetes mellitus  -chronic   -not controlled   -most recent hemoglobin A1c 6.9   -hold home metformin and begin low-dose SSI  -dose/medication adjustment as appropriate   -monitor accuchecks AC/HS and PRN hypoglycemic protocol     CKD (chronic kidney disease) stage 3, GFR 30-59 ml/min  -chronic   -SCr 1.4 at admission   -baseline SCr 1-1.6   -avoid nephrotoxins and hypotension as able, renally dose medications    Anemia due to stage 3 chronic kidney disease  -chronic   -H/H stable at admission   -trend CBC over course and transfuse if becomes clinically indicated      VTE Risk Mitigation (From admission, onward)           Ordered     heparin (porcine) injection 5,000 Units  Every 8 hours         12/06/24 1545     IP VTE HIGH RISK PATIENT  Once         12/06/24 1545     Place sequential compression device  Until discontinued         12/06/24 1545                  On 12/06/2024, patient placed in hospital observation services.        Andie Cheema, DNP, AG-ACNP, BC  Department of Hospital Medicine  [facility]

## 2024-12-07 NOTE — ASSESSMENT & PLAN NOTE
-chronic   -H/H stable at admission   -trend CBC over course and transfuse if becomes clinically indicated

## 2024-12-07 NOTE — ASSESSMENT & PLAN NOTE
Chronic wounds; BLE   Venous stasis ulcers of both lower extremities   Chronic pain   Impaired mobility and activities of daily living   Recurrent falls  -placed in observation due to fall at home and inability to complete ADLS with decreased caregiver support and likely homelessness  -at admission HDS and afebrile  -ED workup detailed above  -PT/OT consulted   -CM/SW to follow with likely nursing home placement   -continue home baclofen and gabapentin  -PRN supportive care for pain   -dose/medication adjustment as appropriate   -Wound Care consulted  -dressing changes per their recommendations  -fall precautions

## 2024-12-07 NOTE — SUBJECTIVE & OBJECTIVE
Past Medical History:   Diagnosis Date    Depression     Diabetes mellitus     Gout     High cholesterol     Hypertension        History reviewed. No pertinent surgical history.    Review of patient's allergies indicates:   Allergen Reactions    Lisinopril Other (See Comments)     cough       No current facility-administered medications on file prior to encounter.     Current Outpatient Medications on File Prior to Encounter   Medication Sig    amLODIPine (NORVASC) 10 MG tablet Take 1 tablet (10 mg total) by mouth once daily.    aspirin (ECOTRIN) 81 MG EC tablet Take 81 mg by mouth once daily.    atorvastatin (LIPITOR) 20 MG tablet Take 20 mg by mouth once daily.    baclofen (LIORESAL) 10 MG tablet Take 10 mg by mouth 3 (three) times daily.    gabapentin (NEURONTIN) 800 MG tablet Take 1 tablet (800 mg total) by mouth 2 (two) times daily.    HYDROcodone-acetaminophen (NORCO) 7.5-325 mg per tablet Take 1 tablet by mouth every 8 (eight) hours as needed for Pain.    losartan-hydrochlorothiazide 100-12.5 mg (HYZAAR) 100-12.5 mg Tab Take 1 tablet by mouth once daily.    metFORMIN (GLUCOPHAGE) 1000 MG tablet Take 1,000 mg by mouth 2 (two) times daily with meals.    tamsulosin (FLOMAX) 0.4 mg Cap Take by mouth once daily.     Family History    None       Tobacco Use    Smoking status: Never    Smokeless tobacco: Never   Substance and Sexual Activity    Alcohol use: Not Currently     Comment: occasionally    Drug use: Yes     Types: Marijuana    Sexual activity: Yes     Review of Systems   Constitutional:  Positive for activity change and fatigue. Negative for appetite change.   Respiratory:  Negative for cough, chest tightness, shortness of breath and wheezing.    Cardiovascular:  Negative for chest pain, palpitations and leg swelling.   Gastrointestinal:  Negative for abdominal pain, constipation, diarrhea, nausea and vomiting.   Genitourinary:  Negative for difficulty urinating and dysuria.   Musculoskeletal:  Positive  for arthralgias and gait problem.   Skin:  Positive for wound.   Neurological:  Positive for weakness. Negative for dizziness, light-headedness and headaches.     Objective:     Vital Signs (Most Recent):  Temp: 99.4 °F (37.4 °C) (12/06/24 1913)  Pulse: 87 (12/06/24 2044)  Resp: 18 (12/06/24 2044)  BP: (!) 97/56 (12/06/24 2003)  SpO2: 95 % (12/06/24 2044) Vital Signs (24h Range):  Temp:  [97.9 °F (36.6 °C)-99.4 °F (37.4 °C)] 99.4 °F (37.4 °C)  Pulse:  [] 87  Resp:  [15-20] 18  SpO2:  [93 %-99 %] 95 %  BP: ()/() 97/56     Weight: (!) 140.6 kg (310 lb)  Body mass index is 43.24 kg/m².     Physical Exam  Vitals and nursing note reviewed.   Constitutional:       General: He is not in acute distress.     Appearance: Normal appearance. He is well-developed. He is obese. He is ill-appearing (chronically ill appearing). He is not toxic-appearing.   HENT:      Head: Normocephalic and atraumatic.      Mouth/Throat:      Dentition: Normal dentition.   Eyes:      General: Lids are normal.      Extraocular Movements: Extraocular movements intact.      Conjunctiva/sclera: Conjunctivae normal.   Cardiovascular:      Rate and Rhythm: Normal rate and regular rhythm.      Heart sounds: Normal heart sounds. No murmur heard.  Pulmonary:      Effort: Pulmonary effort is normal.      Breath sounds: Normal breath sounds.   Abdominal:      General: There is no distension.      Palpations: Abdomen is soft.      Tenderness: There is no abdominal tenderness.   Musculoskeletal:      Cervical back: Neck supple.      Right lower leg: No edema.      Left lower leg: No edema.   Skin:     General: Skin is warm and dry.      Findings: No erythema or rash.   Neurological:      Mental Status: He is alert and oriented to person, place, and time.             Significant Labs: All pertinent labs within the past 24 hours have been reviewed.  CBC:   Recent Labs   Lab 12/06/24  1115   WBC 8.49   HGB 13.4*   HCT 40.6   *     CMP:    Recent Labs   Lab 12/06/24  1115      K 4.3      CO2 23   *   BUN 31*   CREATININE 1.4   CALCIUM 10.0   PROT 9.5*   ALBUMIN 3.1*   BILITOT 0.4   ALKPHOS 99   AST 19   ALT 12   ANIONGAP 12     Significant Imaging: I have reviewed all pertinent imaging results/findings within the past 24 hours.

## 2024-12-07 NOTE — ASSESSMENT & PLAN NOTE
-chronic   -controlled   -continue home amlodipine, losartan, and HCTZ  -dose/medication adjustment as appropriate

## 2024-12-07 NOTE — PLAN OF CARE
Case Management Assessment     PCP: Berhane Alvarez   Pharmacy: Nursing home     Patient Arrived From: SNF  Existing Help at Home: None    Barriers to Discharge: Needs nursing home placement     Discharge Plan:    A. SNF   B. senior living nursing home     [facility] ([address])  Initial Discharge Assessment       Primary Care Provider: Berhane Alvarez MD    Admission Diagnosis: Leg pain [M79.606]  Chronic wound [T14.8XXA]    Admission Date: 12/6/2024  Expected Discharge Date:     Transition of Care Barriers: (P) Unable to afford medication, Homeless    Payor: [employer] MANAGED MEDICARE / Plan: [employer] SNP HMO PPO SPECIAL NEEDS / Product Type: Medicare Advantage /     Extended Emergency Contact Information  Primary Emergency Contact: SusanElinor  Mobile Phone: [phone]  Relation: Sister   needed? No  Secondary Emergency Contact: Victoria Rodney  Mobile Phone: [phone]  Relation: Sister  Preferred language: English   needed? No    Discharge Plan A: (P) New Nursing Home placement - [facility] care facility  Discharge Plan B: (P) New Nursing Home placement - [facility] care facility      [facility] [address]  [address] [place] [address]  Phone: [phone] Fax: [phone]    [id number] [address] [address]  Phone: [phone] Fax: [phone]      Initial Assessment (most recent)       Adult Discharge Assessment - 12/07/24 0857          Discharge Assessment    Assessment Type Discharge Planning Assessment (P)      Confirmed/corrected address, phone number and insurance Yes (P)      Confirmed Demographics Correct on Facesheet (P)      Source of Information patient;health record (P)      People in Home alone (P)      Do you expect to return to your current living situation? No (P)      Do you have help at home or someone to  help you manage your care at home? No (P)      Prior to hospitilization cognitive status: Alert/Oriented;No Deficits (P)      Current cognitive status: Alert/Oriented;No Deficits (P)      Equipment Currently Used at Home wheelchair;walker, rolling (P)      Readmission within 30 days? Yes (P)      Patient currently being followed by outpatient case management? No (P)      Do you currently have service(s) that help you manage your care at home? No (P)      Do you take prescription medications? Yes (P)      Do you have prescription coverage? Yes (P)      Do you have any problems affording any of your prescribed medications? Yes (P)      Is the patient taking medications as prescribed? yes (P)      How do you get to doctors appointments? family or friend will provide (P)      Are you on dialysis? No (P)    Patient is diabetic    Do you take coumadin? No (P)      Discharge Plan A New Nursing Home placement - senior living care facility (P)      Discharge Plan B New Nursing Home placement - senior living care facility (P)      Discharge Plan discussed with: Patient (P)      Transition of Care Barriers Unable to afford medication;Homeless (P)

## 2024-12-07 NOTE — PLAN OF CARE
Patient refused to sign EDDY.        12/07/24 0904   EDDY Message   Medicare Outpatient and Observation Notification regarding financial responsibility Given to patient/caregiver;Explained to patient/caregiver

## 2024-12-07 NOTE — PLAN OF CARE
Problem: Skin Injury Risk Increased  Goal: Skin Health and Integrity  12/7/2024 0442 by Apple Aguilera RN  Outcome: Progressing  12/7/2024 0441 by Apple Aguilera RN  Outcome: Progressing     Problem: Adult Inpatient Plan of Care  Goal: Plan of Care Review  12/7/2024 0442 by Apple Aguilera RN  Outcome: Progressing  12/7/2024 0441 by Apple Aguilera RN  Outcome: Progressing  Goal: Patient-Specific Goal (Individualized)  12/7/2024 0442 by Apple Aguilera RN  Outcome: Progressing  12/7/2024 0441 by Apple Aguilera RN  Outcome: Progressing  Goal: Absence of Hospital-Acquired Illness or Injury  12/7/2024 0442 by Apple Aguilera RN  Outcome: Progressing  12/7/2024 0441 by Apple Aguilera RN  Outcome: Progressing  Goal: Optimal Comfort and Wellbeing  12/7/2024 0442 by Apple Aguilera RN  Outcome: Progressing  12/7/2024 0441 by Apple Aguilera RN  Outcome: Progressing  Goal: Readiness for Transition of Care  12/7/2024 0442 by Apple Aguilera RN  Outcome: Progressing  12/7/2024 0441 by Apple Aguilera RN  Outcome: Progressing     Problem: Bariatric Environmental Safety  Goal: Safety Maintained with Care  12/7/2024 0442 by Apple Aguilera RN  Outcome: Progressing  12/7/2024 0441 by Apple Aguilera RN  Outcome: Progressing     Problem: Diabetes Comorbidity  Goal: Blood Glucose Level Within Targeted Range  12/7/2024 0442 by Apple Aguilera RN  Outcome: Progressing  12/7/2024 0441 by Apple Aguilera RN  Outcome: Progressing     Problem: Wound  Goal: Optimal Coping  12/7/2024 0442 by Apple Aguilera RN  Outcome: Progressing  12/7/2024 0441 by Apple Aguilera RN  Outcome: Progressing  Goal: Optimal Functional Ability  12/7/2024 0442 by Apple Aguilera RN  Outcome: Progressing  12/7/2024 0441 by Apple Aguilera RN  Outcome: Progressing  Goal: Absence of Infection Signs and Symptoms  12/7/2024 0442 by Apple Aguilera RN  Outcome: Progressing  12/7/2024 0441 by Apple Aguilera, RN  Outcome: Progressing  Goal:  Improved Oral Intake  12/7/2024 0442 by Apple Aguilera RN  Outcome: Progressing  12/7/2024 0441 by Apple Aguilera RN  Outcome: Progressing  Goal: Optimal Pain Control and Function  12/7/2024 0442 by Apple Aguilera RN  Outcome: Progressing  12/7/2024 0441 by Apple Aguilera RN  Outcome: Progressing  Goal: Skin Health and Integrity  12/7/2024 0442 by Apple Aguilera RN  Outcome: Progressing  12/7/2024 0441 by Apple Aguilera RN  Outcome: Progressing  Goal: Optimal Wound Healing  12/7/2024 0442 by Apple Aguilera RN  Outcome: Progressing  12/7/2024 0441 by Apple Aguilera RN  Outcome: Progressing

## 2024-12-07 NOTE — ASSESSMENT & PLAN NOTE
-chronic   -not controlled   -most recent hemoglobin A1c 6.9   -hold home metformin and begin low-dose SSI  -dose/medication adjustment as appropriate   -monitor accuchecks AC/HS and PRN hypoglycemic protocol

## 2024-12-07 NOTE — ASSESSMENT & PLAN NOTE
Chronic wounds; BLE   Venous stasis ulcers of both lower extremities   Chronic pain   Impaired mobility and activities of daily living   Recurrent falls  -placed in observation due to fall at home and inability to complete ADLS with decreased caregiver support and likely homelessness  -at admission HDS and afebrile  -ED workup detailed above  -PT/OT consulted   -CM/SW to follow with likely nursing home placement   -continue home baclofen and gabapentin  -PRN supportive care for pain   -dose/medication adjustment as appropriate   -Wound Care consulted  -dressing changes per their recommendations  -fall precautions   -MRI lumbar spine ordered

## 2024-12-07 NOTE — SUBJECTIVE & OBJECTIVE
Interval History: Mr Mello is having 0/10 pain to right LE> He also reports pain all over his body. He was evicted from his apartment and he believes it is because he called EMS too many times. He also left [facility] bc he felt that his pain was getting worse and poorly managed.  He is unable to stand at this time bc of weakness. He reports that he was bale to get around his apt with his walker. Discussed importance of working with therapy.    Review of Systems   Constitutional:  Positive for activity change. Negative for diaphoresis.   HENT:  Negative for congestion.    Eyes:  Negative for visual disturbance.   Respiratory:  Negative for cough and shortness of breath.    Gastrointestinal:  Positive for constipation (NO BM in one week). Negative for abdominal distention, abdominal pain, diarrhea, nausea and vomiting.   Genitourinary:  Negative for difficulty urinating and dysuria.   Musculoskeletal:  Positive for arthralgias and gait problem.   Neurological:  Positive for weakness. Negative for dizziness and headaches.     Objective:     Vital Signs (Most Recent):  Temp: 97.5 °F (36.4 °C) (12/07/24 0748)  Pulse: 83 (12/07/24 1428)  Resp: 18 (12/07/24 0748)  BP: (!) 142/84 (12/07/24 0748)  SpO2: (!) 92 % (12/07/24 0748) Vital Signs (24h Range):  Temp:  [97.5 °F (36.4 °C)-99.5 °F (37.5 °C)] 97.5 °F (36.4 °C)  Pulse:  [74-95] 83  Resp:  [17-20] 18  SpO2:  [92 %-97 %] 92 %  BP: ()/(56-85) 142/84     Weight: (!) 140.5 kg (309 lb 11.9 oz)  Body mass index is 43.2 kg/m².    Intake/Output Summary (Last 24 hours) at 12/7/2024 1510  Last data filed at 12/7/2024 1216  Gross per 24 hour   Intake 720 ml   Output 1100 ml   Net -380 ml         Physical Exam  Vitals and nursing note reviewed.   HENT:      Head: Normocephalic.      Mouth/Throat:      Mouth: Mucous membranes are moist.   Cardiovascular:      Rate and Rhythm: Normal rate.   Pulmonary:      Effort: Pulmonary effort is normal.   Abdominal:      General:  Abdomen is flat.      Palpations: Abdomen is soft.   Musculoskeletal:         General: Swelling (BLLE) present.      Comments: Decreased ROM to BBLE   Skin:     General: Skin is dry.      Comments: BLLE dry, thick skin, wound right shin   Neurological:      Mental Status: He is alert. Mental status is at baseline.             Significant Labs: All pertinent labs within the past 24 hours have been reviewed.  BMP:   Recent Labs   Lab 12/07/24  0449   *      K 4.1      CO2 26   BUN 31*   CREATININE 1.3   CALCIUM 9.3   MG 2.1     CBC:   Recent Labs   Lab 12/06/24  1115 12/07/24  0449   WBC 8.49 6.59   HGB 13.4* 11.9*   HCT 40.6 36.5*   * 426     CMP:   Recent Labs   Lab 12/06/24  1115 12/07/24  0449    140   K 4.3 4.1    105   CO2 23 26   * 154*   BUN 31* 31*   CREATININE 1.4 1.3   CALCIUM 10.0 9.3   PROT 9.5* 8.1   ALBUMIN 3.1* 2.6*   BILITOT 0.4 0.4   ALKPHOS 99 85   AST 19 14   ALT 12 10   ANIONGAP 12 9       Significant Imaging: I have reviewed all pertinent imaging results/findings within the past 24 hours.  X-Ray Hip 2 or 3 views Right with Pelvis when performed  EXAMINATION:  XR HIP WITH PELVIS WHEN PERFORMED 2 OR 3 VIEWS RIGHT    CLINICAL HISTORY:  hip pain;    FINDINGS:  Right hip.  Three views right hip.    There is severe advanced DJD and impingement change.  There is likely a chronic labral tear.  No acute fracture dislocation bone destruction seen.    Electronically signed by: Blanco Arzate MD  Date:    12/06/2024  Time:    13:22  X-Ray Tibia Fibula 2 View Right  EXAMINATION:  XR TIBIA FIBULA 2 VIEW RIGHT    CLINICAL HISTORY:  Pain in leg, unspecified    FINDINGS:  Tib fib two views right.    There is DJD of the knee and ankle with spurs on the patella.  No fracture dislocation bone destruction or periosteal reaction seen.    Electronically signed by: Blanco Arzate MD  Date:    12/06/2024  Time:    13:20  CT Head Without Contrast  Narrative: EXAMINATION:  CT  HEAD WITHOUT CONTRAST    CLINICAL HISTORY:  Head trauma, minor (Age >= 65y);    TECHNIQUE:  Low dose axial images were obtained through the head.  Coronal and sagittal reformations were also performed. Contrast was not administered.    COMPARISON:  None.    FINDINGS:  There is motion artifact.    There is generalized cerebral volume loss.  There is hypoattenuation in a periventricular fashion, likely sequela of chronic microvascular ischemic change.There is a focus of low attenuation along the medial aspect of the right caudate.  An additional punctate focus of low attenuation is noted at the level of the anterior limb of the right internal capsule.  There is no evidence of acute major vascular territory infarct, hemorrhage, or mass.  There is no hydrocephalus.  There are no abnormal extra-axial fluid collections.  There is fluid within the left sphenoid sinus and mild fluid in the inferior most mastoid air cells, otherwise the visualized paranasal sinuses and mastoid air cells are clear, and there is no evidence of calvarial fracture.  The visualized soft tissues are unremarkable.  Impression: 1. There is a punctate focus of low attenuation along the medial aspect of the caudate and as well as within the anterior limb of the right internal capsule.  Findings likely reflect that of remote infarct however no previous exams are available for comparison and therefore remain age-indeterminate.  Correlation with current symptomatology recommended.  2. Sequela of chronic microvascular ischemic change and senescent change.  3. Mild mastoid effusions.    Electronically signed by: Ry Doshi MD  Date:    12/06/2024  Time:    12:14  CT Cervical Spine Without Contrast  Narrative: EXAMINATION:  CT CERVICAL SPINE WITHOUT CONTRAST    CLINICAL HISTORY:  Neck trauma (Age >= 65y);    FINDINGS:  There is moderate DJD and dish seen throughout the cervical spine.  Craniocervical junction is unremarkable.  Odontoid, prevertebral  soft tissues, and posterior elements are intact.  The facets are well aligned.  Intracranial structures demonstrate nothing unusual.  No soft tissue mass, or adenopathy is seen.  Upper mediastinum and lungs are unremarkable.  No skull base fracture seen.  There is DJD with disc osteophyte complex and bilateral areas of neural foraminal narrowing.  No fracture, dislocation, or bone destruction seen.  Impression: No acute process seen.    DJD and bilateral areas of neural foraminal narrowing.    Electronically signed by: Blanco Arzate MD  Date:    12/06/2024  Time:    12:13

## 2024-12-07 NOTE — ASSESSMENT & PLAN NOTE
-chronic   -SCr 1.4 at admission --1.3 on 12/7  -baseline SCr 1-1.6   -avoid nephrotoxins and hypotension as able, renally dose medications

## 2024-12-07 NOTE — PROGRESS NOTES
"[facility] ([address] Medicine  Progress Note    Patient Name: Riaz Guerra Jr.  MRN: [id number]  Patient Class: OP- Observation   Admission Date: 12/6/2024  Length of Stay: 0 days  Attending Physician: Idalmis Gao MD  Primary Care Provider: Berhane Avlarez MD        Subjective     Principal Problem:Problem related to discharge planning        HPI:  Mr. Guerra is a 74 YOM with PMHx of HTN, HLD, DM type II, CKD III (baseline SCr 1-1.6), anemia of chronic disease, chronic bilateral venous stasis with chronic wounds, debility/weakness, and obesity.     He presents to ED via EMS due to fall at home last night and inability to get up by himself. He notes that since fall he has had diffuse body pain, worse from baseline chronic pain and he was concerned about his chronic BLE wounds. He reports that he was recently admitted to [facility] and discharged to SNF facility. While at SNF he notes that he was not improving and having more pain than "when he went in" and ultimately ended up returning home to [facility]. It is unclear if he was discharged home or left AMA from SNF. He also reports that today once he activated EMS his was approached with an eviction notice from his apartment. He reports no further housing options and he has not family that he "is close with". He endorses needed assistance with placement. He denies fever, chills, palpitations, SOB, ROSSI, CP, abdominal pain, N/V/D, constipation, urinary symptoms or hematuria,decreased appetite, changes in PO intake, light headiness, dizziness, or headaches.     In the ED he was initially hypertensive with improvement over ED course, otherwise HDS and afebrile.  CBC with WBC 8, H/H 13.4/40.6, platelets 466.  Chemistry was , K 4.3, chloride 103, CO2 23, BUN 31, SCr 1.4 (baseline 1-1.6), glucose 147.  LFTs unremarkable.  CRP 81.  CPK 82.  Blood cultures in process.  CT C-spine with no acute process seen. DJD and " bilateral areas of neural foraminal narrowing. CTH with a punctate focus of low attenuation along the medial aspect of the caudate and as well as within the anterior limb of the right internal capsule.  Findings likely reflect that of remote infarct however no previous exams are available for comparison and therefore remain age-indeterminate.  Correlation with current symptomatology recommended. Sequela of chronic microvascular ischemic change and senescent change.  Mild mastoid effusions. R tib/fib XR with DJD of the knee and ankle with spurs on the patella.  No fracture dislocation bone destruction or periosteal reaction seen. R hip and pelvis with severe advanced DJD and impingement change.  There is likely a chronic labral tear.  No acute fracture dislocation bone destruction seen.    The patient was placed in observation to the Hospital Medicine Service for further evaluation and treatment.     Overview/Hospital Course:  No notes on file    Interval History: Mr Mello is having 0/10 pain to right LE> He also reports pain all over his body. He was evicted from his apartment and he believes it is because he called EMS too many times. He also left [facility] bc he felt that his pain was getting worse and poorly managed.  He is unable to stand at this time bc of weakness. He reports that he was bale to get around his apt with his walker. Discussed importance of working with therapy.    Review of Systems   Constitutional:  Positive for activity change. Negative for diaphoresis.   HENT:  Negative for congestion.    Eyes:  Negative for visual disturbance.   Respiratory:  Negative for cough and shortness of breath.    Gastrointestinal:  Positive for constipation (NO BM in one week). Negative for abdominal distention, abdominal pain, diarrhea, nausea and vomiting.   Genitourinary:  Negative for difficulty urinating and dysuria.   Musculoskeletal:  Positive for arthralgias and gait problem.   Neurological:  Positive  for weakness. Negative for dizziness and headaches.     Objective:     Vital Signs (Most Recent):  Temp: 97.5 °F (36.4 °C) (12/07/24 0748)  Pulse: 83 (12/07/24 1428)  Resp: 18 (12/07/24 0748)  BP: (!) 142/84 (12/07/24 0748)  SpO2: (!) 92 % (12/07/24 0748) Vital Signs (24h Range):  Temp:  [97.5 °F (36.4 °C)-99.5 °F (37.5 °C)] 97.5 °F (36.4 °C)  Pulse:  [74-95] 83  Resp:  [17-20] 18  SpO2:  [92 %-97 %] 92 %  BP: ()/(56-85) 142/84     Weight: (!) 140.5 kg (309 lb 11.9 oz)  Body mass index is 43.2 kg/m².    Intake/Output Summary (Last 24 hours) at 12/7/2024 1510  Last data filed at 12/7/2024 1216  Gross per 24 hour   Intake 720 ml   Output 1100 ml   Net -380 ml         Physical Exam  Vitals and nursing note reviewed.   HENT:      Head: Normocephalic.      Mouth/Throat:      Mouth: Mucous membranes are moist.   Cardiovascular:      Rate and Rhythm: Normal rate.   Pulmonary:      Effort: Pulmonary effort is normal.   Abdominal:      General: Abdomen is flat.      Palpations: Abdomen is soft.   Musculoskeletal:         General: Swelling (BLLE) present.      Comments: Decreased ROM to BBLE   Skin:     General: Skin is dry.      Comments: BLLE dry, thick skin, wound right shin   Neurological:      Mental Status: He is alert. Mental status is at baseline.             Significant Labs: All pertinent labs within the past 24 hours have been reviewed.  BMP:   Recent Labs   Lab 12/07/24 0449   *      K 4.1      CO2 26   BUN 31*   CREATININE 1.3   CALCIUM 9.3   MG 2.1     CBC:   Recent Labs   Lab 12/06/24  1115 12/07/24 0449   WBC 8.49 6.59   HGB 13.4* 11.9*   HCT 40.6 36.5*   * 426     CMP:   Recent Labs   Lab 12/06/24  1115 12/07/24 0449    140   K 4.3 4.1    105   CO2 23 26   * 154*   BUN 31* 31*   CREATININE 1.4 1.3   CALCIUM 10.0 9.3   PROT 9.5* 8.1   ALBUMIN 3.1* 2.6*   BILITOT 0.4 0.4   ALKPHOS 99 85   AST 19 14   ALT 12 10   ANIONGAP 12 9       Significant Imaging: I  have reviewed all pertinent imaging results/findings within the past 24 hours.  X-Ray Hip 2 or 3 views Right with Pelvis when performed  EXAMINATION:  XR HIP WITH PELVIS WHEN PERFORMED 2 OR 3 VIEWS RIGHT    CLINICAL HISTORY:  hip pain;    FINDINGS:  Right hip.  Three views right hip.    There is severe advanced DJD and impingement change.  There is likely a chronic labral tear.  No acute fracture dislocation bone destruction seen.    Electronically signed by: Blanco Arzate MD  Date:    12/06/2024  Time:    13:22  X-Ray Tibia Fibula 2 View Right  EXAMINATION:  XR TIBIA FIBULA 2 VIEW RIGHT    CLINICAL HISTORY:  Pain in leg, unspecified    FINDINGS:  Tib fib two views right.    There is DJD of the knee and ankle with spurs on the patella.  No fracture dislocation bone destruction or periosteal reaction seen.    Electronically signed by: Blanco Arzate MD  Date:    12/06/2024  Time:    13:20  CT Head Without Contrast  Narrative: EXAMINATION:  CT HEAD WITHOUT CONTRAST    CLINICAL HISTORY:  Head trauma, minor (Age >= 65y);    TECHNIQUE:  Low dose axial images were obtained through the head.  Coronal and sagittal reformations were also performed. Contrast was not administered.    COMPARISON:  None.    FINDINGS:  There is motion artifact.    There is generalized cerebral volume loss.  There is hypoattenuation in a periventricular fashion, likely sequela of chronic microvascular ischemic change.There is a focus of low attenuation along the medial aspect of the right caudate.  An additional punctate focus of low attenuation is noted at the level of the anterior limb of the right internal capsule.  There is no evidence of acute major vascular territory infarct, hemorrhage, or mass.  There is no hydrocephalus.  There are no abnormal extra-axial fluid collections.  There is fluid within the left sphenoid sinus and mild fluid in the inferior most mastoid air cells, otherwise the visualized paranasal sinuses and mastoid air cells  are clear, and there is no evidence of calvarial fracture.  The visualized soft tissues are unremarkable.  Impression: 1. There is a punctate focus of low attenuation along the medial aspect of the caudate and as well as within the anterior limb of the right internal capsule.  Findings likely reflect that of remote infarct however no previous exams are available for comparison and therefore remain age-indeterminate.  Correlation with current symptomatology recommended.  2. Sequela of chronic microvascular ischemic change and senescent change.  3. Mild mastoid effusions.    Electronically signed by: Ry Doshi MD  Date:    12/06/2024  Time:    12:14  CT Cervical Spine Without Contrast  Narrative: EXAMINATION:  CT CERVICAL SPINE WITHOUT CONTRAST    CLINICAL HISTORY:  Neck trauma (Age >= 65y);    FINDINGS:  There is moderate DJD and dish seen throughout the cervical spine.  Craniocervical junction is unremarkable.  Odontoid, prevertebral soft tissues, and posterior elements are intact.  The facets are well aligned.  Intracranial structures demonstrate nothing unusual.  No soft tissue mass, or adenopathy is seen.  Upper mediastinum and lungs are unremarkable.  No skull base fracture seen.  There is DJD with disc osteophyte complex and bilateral areas of neural foraminal narrowing.  No fracture, dislocation, or bone destruction seen.  Impression: No acute process seen.    DJD and bilateral areas of neural foraminal narrowing.    Electronically signed by: Blanco Arzate MD  Date:    12/06/2024  Time:    12:13        Assessment and Plan     * Problem related to discharge planning  Chronic wounds; BLE   Venous stasis ulcers of both lower extremities   Chronic pain   Impaired mobility and activities of daily living   Recurrent falls  -placed in observation due to fall at home and inability to complete ADLS with decreased caregiver support and likely homelessness  -at admission HDS and afebrile  -ED workup detailed  above  -PT/OT consulted   -CM/SW to follow with likely nursing home placement   -continue home baclofen and gabapentin  -PRN supportive care for pain   -dose/medication adjustment as appropriate   -Wound Care consulted  -dressing changes per their recommendations  -fall precautions   -MRI lumbar spine ordered    Anemia due to stage 3 chronic kidney disease  -chronic   -H/H stable at admission   -trend CBC over course and transfuse if becomes clinically indicated    CKD (chronic kidney disease) stage 3, GFR 30-59 ml/min  -chronic   -SCr 1.4 at admission --1.3 on 12/7  -baseline SCr 1-1.6   -avoid nephrotoxins and hypotension as able, renally dose medications    HTN (hypertension)  -chronic   -controlled   -continue home amlodipine, losartan, and HCTZ  -dose/medication adjustment as appropriate     Insulin dependent type 2 diabetes mellitus  -chronic   -not controlled   -most recent hemoglobin A1c 6.9   -hold home metformin and begin low-dose SSI  -dose/medication adjustment as appropriate   -monitor accuchecks AC/HS and PRN hypoglycemic protocol       VTE Risk Mitigation (From admission, onward)           Ordered     heparin (porcine) injection 5,000 Units  Every 8 hours         12/06/24 1545     IP VTE HIGH RISK PATIENT  Once         12/06/24 1545     Place sequential compression device  Until discontinued         12/06/24 1545                    Discharge Planning   KELSEY:      Code Status: Full Code   Medical Readiness for Discharge Date:   Discharge Plan A: [facility] Nursing [facility] placement [facility]   Discharge Delays: None known at this time                Collette Sahu DNP  Department of Hospital Medicine   [facility] - Med Surg ([address])

## 2024-12-07 NOTE — ED NOTES
Pt resting comfortably. Connected to cardiac monitor, BP cuff, and pulse oximeter. ED workup in progress. Call light within reach. Safety measures in place. Denies further needs. Plan of care ongoing.

## 2024-12-07 NOTE — HPI
"Mr. Guerra is a 74 YOM with PMHx of HTN, HLD, DM type II, CKD III (baseline SCr 1-1.6), anemia of chronic disease, chronic bilateral venous stasis with chronic wounds, debility/weakness, and obesity.     He presents to ED via EMS due to fall at home last night and inability to get up by himself. He notes that since fall he has had diffuse body pain, worse from baseline chronic pain and he was concerned about his chronic BLE wounds. He reports that he was recently admitted to [facility] and discharged to SNF facility. While at SNF he notes that he was not improving and having more pain than "when he went in" and ultimately ended up returning home to [facility]. It is unclear if he was discharged home or left AMA from SNF. He also reports that today once he activated EMS his was approached with an eviction notice from his apartment. He reports no further housing options and he has not family that he "is close with". He endorses needed assistance with placement. He denies fever, chills, palpitations, SOB, ROSSI, CP, abdominal pain, N/V/D, constipation, urinary symptoms or hematuria,decreased appetite, changes in PO intake, light headiness, dizziness, or headaches.     In the ED he was initially hypertensive with improvement over ED course, otherwise HDS and afebrile.  CBC with WBC 8, H/H 13.4/40.6, platelets 466.  Chemistry was , K 4.3, chloride 103, CO2 23, BUN 31, SCr 1.4 (baseline 1-1.6), glucose 147.  LFTs unremarkable.  CRP 81.  CPK 82.  Blood cultures in process.  CT C-spine with no acute process seen. DJD and bilateral areas of neural foraminal narrowing. CTH with a punctate focus of low attenuation along the medial aspect of the caudate and as well as within the anterior limb of the right internal capsule.  Findings likely reflect that of remote infarct however no previous exams are available for comparison and therefore remain age-indeterminate.  Correlation with current " symptomatology recommended. Sequela of chronic microvascular ischemic change and senescent change.  Mild mastoid effusions. R tib/fib XR with DJD of the knee and ankle with spurs on the patella.  No fracture dislocation bone destruction or periosteal reaction seen. R hip and pelvis with severe advanced DJD and impingement change.  There is likely a chronic labral tear.  No acute fracture dislocation bone destruction seen.    The patient was placed in observation to the Hospital Medicine Service for further evaluation and treatment.

## 2024-12-07 NOTE — PT/OT/SLP PROGRESS
"Physical Therapy      Patient Name:  Riaz Guerra Jr.   MRN:  [id number]    Patient not seen today secondary to Pain, Patient unwilling to participate. Will follow-up Sunday if willing to participate. Upon entry to patients room, he reported that he did not want a "little girl" to help him since he is older and a large man. Discussed that therapy staff does have mostly women and that we can co treat him for more safety but he refused. 20 minutes spent discussing importance of therapy in order to get placement into NS facility. He reported that he was in too much pain to attempt to sit on EOB. Discussed male PT will be present Sunday for evaluation. .    "

## 2024-12-07 NOTE — ASSESSMENT & PLAN NOTE
-chronic   -SCr 1.4 at admission   -baseline SCr 1-1.6   -avoid nephrotoxins and hypotension as able, renally dose medications

## 2024-12-07 NOTE — PLAN OF CARE
12/07/24 0904   Readmission   Was this a planned readmission? No   Why were you readmitted? Related to previous admission   When you left the hospital where did you go? SNF   Did patient/caregiver refused recommended DC plan? No   Did you try to manage your symptoms your self? No   Did you call anyone? No   Did you try to see or did see a doctor or nurse before you came? No   Did you have  a follow-up appointment on discharge? No

## 2024-12-08 LAB
POCT GLUCOSE: 154 MG/DL (ref 70–110)
POCT GLUCOSE: 163 MG/DL (ref 70–110)
POCT GLUCOSE: 169 MG/DL (ref 70–110)
POCT GLUCOSE: 178 MG/DL (ref 70–110)

## 2024-12-08 PROCEDURE — 25000003 PHARM REV CODE 250: Performed by: NURSE PRACTITIONER

## 2024-12-08 PROCEDURE — 97530 THERAPEUTIC ACTIVITIES: CPT

## 2024-12-08 PROCEDURE — 97161 PT EVAL LOW COMPLEX 20 MIN: CPT

## 2024-12-08 PROCEDURE — 97166 OT EVAL MOD COMPLEX 45 MIN: CPT

## 2024-12-08 PROCEDURE — 96372 THER/PROPH/DIAG INJ SC/IM: CPT | Performed by: NURSE PRACTITIONER

## 2024-12-08 PROCEDURE — 97535 SELF CARE MNGMENT TRAINING: CPT

## 2024-12-08 PROCEDURE — 63600175 PHARM REV CODE 636 W HCPCS: Performed by: NURSE PRACTITIONER

## 2024-12-08 PROCEDURE — G0378 HOSPITAL OBSERVATION PER HR: HCPCS

## 2024-12-08 RX ORDER — GABAPENTIN 400 MG/1
800 CAPSULE ORAL 2 TIMES DAILY
Status: DISCONTINUED | OUTPATIENT
Start: 2024-12-08 | End: 2024-12-13 | Stop reason: HOSPADM

## 2024-12-08 RX ORDER — GABAPENTIN 400 MG/1
400 CAPSULE ORAL ONCE
Status: COMPLETED | OUTPATIENT
Start: 2024-12-08 | End: 2024-12-08

## 2024-12-08 RX ADMIN — BACLOFEN 10 MG: 10 TABLET ORAL at 09:12

## 2024-12-08 RX ADMIN — GABAPENTIN 800 MG: 400 CAPSULE ORAL at 09:12

## 2024-12-08 RX ADMIN — HEPARIN SODIUM 5000 UNITS: 5000 INJECTION INTRAVENOUS; SUBCUTANEOUS at 09:12

## 2024-12-08 RX ADMIN — ASPIRIN 81 MG: 81 TABLET, COATED ORAL at 09:12

## 2024-12-08 RX ADMIN — HYDROCHLOROTHIAZIDE 12.5 MG: 12.5 TABLET ORAL at 09:12

## 2024-12-08 RX ADMIN — LOSARTAN POTASSIUM 100 MG: 50 TABLET, FILM COATED ORAL at 09:12

## 2024-12-08 RX ADMIN — AMLODIPINE BESYLATE 10 MG: 5 TABLET ORAL at 09:12

## 2024-12-08 RX ADMIN — ATORVASTATIN CALCIUM 20 MG: 20 TABLET, FILM COATED ORAL at 09:12

## 2024-12-08 RX ADMIN — GABAPENTIN 400 MG: 400 CAPSULE ORAL at 04:12

## 2024-12-08 RX ADMIN — HYDROCODONE BITARTRATE AND ACETAMINOPHEN 1 TABLET: 10; 325 TABLET ORAL at 06:12

## 2024-12-08 RX ADMIN — POLYETHYLENE GLYCOL 3350 17 G: 17 POWDER, FOR SOLUTION ORAL at 09:12

## 2024-12-08 RX ADMIN — HEPARIN SODIUM 5000 UNITS: 5000 INJECTION INTRAVENOUS; SUBCUTANEOUS at 05:12

## 2024-12-08 RX ADMIN — HYDROCODONE BITARTRATE AND ACETAMINOPHEN 1 TABLET: 10; 325 TABLET ORAL at 11:12

## 2024-12-08 RX ADMIN — SENNOSIDES AND DOCUSATE SODIUM 1 TABLET: 50; 8.6 TABLET ORAL at 09:12

## 2024-12-08 RX ADMIN — TAMSULOSIN HYDROCHLORIDE 0.4 MG: 0.4 CAPSULE ORAL at 09:12

## 2024-12-08 RX ADMIN — GABAPENTIN 400 MG: 400 CAPSULE ORAL at 09:12

## 2024-12-08 NOTE — PLAN OF CARE
Problem: Occupational Therapy  Goal: Occupational Therapy Goal  Description: Goals to be met by: 12/22/2024     Patient will increase functional independence with ADLs by performing:    UE Dressing with Minimal Assistance.  LE Dressing with Maximum Assistance and Assistive Devices as needed.  Grooming while seated with Set-up Assistance.  Toileting from bedside commode with Maximum Assistance for hygiene and clothing management.   Toilet transfer to bedside commode with Maximum Assistance.    Outcome: Progressing     Initial OT eval/treat complete.  Has rollator though unsure of the state of his belongings since his eviction; his brother will be following up on his belongings on Tuesday, 12/10/2024.  Will defer DME needs to next level of care.  Recommend post acute Moderate Intensity therapy with transition to nursing home placement.  To benefit from continued acute care OT services to increase independence in self-care/functional transfers.  OT to follow.

## 2024-12-08 NOTE — ASSESSMENT & PLAN NOTE
Chronic wounds; BLE   Venous stasis ulcers of both lower extremities   Chronic pain   Impaired mobility and activities of daily living   Recurrent falls  -placed in observation due to fall at home and inability to complete ADLS with decreased caregiver support and likely homelessness  -at admission HDS and afebrile  -ED workup detailed above  -PT/OT consulted   -CM/SW to follow with likely nursing home placement   -continue home baclofen and gabapentin  -PRN supportive care for pain   -dose/medication adjustment as appropriate   -Wound Care consulted  -dressing changes per their recommendations  -fall precautions   -MRI lumbar spine ordered--pt declined

## 2024-12-08 NOTE — PLAN OF CARE
Problem: Physical Therapy  Goal: Physical Therapy Goal  Description: Goals to be met by: 25    Patient will increase functional independence with mobility by performin. Sit<>stand with Max A with RW.  2. Gait x 10 ft feet with RW with Mod A.  3. Supine<>sit with Mod A.      Outcome: Progressing   Orders received and Physical Therapy evaluation completed.    Pt required Max A of two to transfer supine<>sit and sit<>stand with a RW.  Pt was able to extends his knees and raise his buttock off the bed, but was not able to fully extend his hips while standing for 45 seconds. He also required Max A to scoot x 2 his L sitting at EOB. He was able to sit at EOB with SBA for approximately 20 minutes.    Rec: Moderate Intensity Therapy  DME: WC and RW, TBD at next level of care    The mobility limitation cannot be sufficiently resolved by the use of a cane.   Patient's functional mobility deficit can be sufficiently resolved with the use of a rolling walker. Patient's mobility limitation significantly impairs their ability to participate in one of more activities of daily living. The use of a rolling walker will significantly improve the patient's ability to participate in MRADLS and the patient will use it on regular basis in the home.      This patient has a mobility limitation that significantly impairs their ability to participate in or or more mobility related activities of daily living (MRADL's) such as toileting, feeing, dressing, grooming and bathing in customary locations in the home. The mobility limitation cannot be sufficiently resolved by the use of a cane or walker. The use of a manual wheelchair will significantly improve this patient's ability to participate in MRADL's and the patient will use it on a regular basis in the home. This patient is willing to use a manual wheelchair in the home. There is a caregiver who is available, willing, and able to provide assistance with the wheelchair when  needed.      This patient has a mobility limitation that   Prevents them entirely  from accomplishing MRADL's in customary locations in the home   Places them at reasonable determined heightened risk of mobility or mortality secondary to attempts to perform an MRADL   Prevents them from completing MRADL's in a reasonable time frame

## 2024-12-08 NOTE — SUBJECTIVE & OBJECTIVE
Interval History: Pt was able to stand with PT but not ambulate. The pain in his BLLE is controlled.  No drainage to RLE wound. Working with CM to get SNF to group home.     Review of Systems   Constitutional:  Positive for activity change. Negative for diaphoresis.   HENT:  Negative for congestion.    Eyes:  Negative for visual disturbance.   Respiratory:  Negative for cough and shortness of breath.    Gastrointestinal:  Positive for constipation (NO BM in one week). Negative for abdominal distention, abdominal pain, diarrhea, nausea and vomiting.   Genitourinary:  Negative for difficulty urinating and dysuria.   Musculoskeletal:  Positive for arthralgias and gait problem.   Neurological:  Positive for weakness. Negative for dizziness and headaches.     Objective:     Vital Signs (Most Recent):  Temp: 98.4 °F (36.9 °C) (12/07/24 2048)  Pulse: 80 (12/08/24 1704)  Resp: 17 (12/08/24 1704)  BP: (!) 157/75 (12/08/24 1704)  SpO2: 99 % (12/08/24 1128) Vital Signs (24h Range):  Temp:  [98.4 °F (36.9 °C)] 98.4 °F (36.9 °C)  Pulse:  [77-86] 80  Resp:  [13-19] 17  SpO2:  [93 %-99 %] 99 %  BP: (138-157)/(70-90) 157/75     Weight: (!) 140.5 kg (309 lb 11.9 oz)  Body mass index is 43.2 kg/m².    Intake/Output Summary (Last 24 hours) at 12/8/2024 1756  Last data filed at 12/8/2024 1556  Gross per 24 hour   Intake 830 ml   Output 1100 ml   Net -270 ml         Physical Exam  Vitals and nursing note reviewed.   HENT:      Head: Normocephalic.      Mouth/Throat:      Mouth: Mucous membranes are moist.   Cardiovascular:      Rate and Rhythm: Normal rate.   Pulmonary:      Effort: Pulmonary effort is normal.   Abdominal:      General: Abdomen is flat.      Palpations: Abdomen is soft.   Musculoskeletal:         General: Swelling (BLLE) present.      Comments: Decreased ROM to BBLE   Skin:     General: Skin is dry.      Comments: BLLE dry, thick skin, wound right shin   Neurological:      Mental Status: He is alert. Mental status is at  baseline.             Significant Labs: All pertinent labs within the past 24 hours have been reviewed.  BMP:   Recent Labs   Lab 12/07/24  0449   *      K 4.1      CO2 26   BUN 31*   CREATININE 1.3   CALCIUM 9.3   MG 2.1     CBC:   Recent Labs   Lab 12/07/24  0449   WBC 6.59   HGB 11.9*   HCT 36.5*        CMP:   Recent Labs   Lab 12/07/24  0449      K 4.1      CO2 26   *   BUN 31*   CREATININE 1.3   CALCIUM 9.3   PROT 8.1   ALBUMIN 2.6*   BILITOT 0.4   ALKPHOS 85   AST 14   ALT 10   ANIONGAP 9       Significant Imaging: I have reviewed all pertinent imaging results/findings within the past 24 hours.  X-Ray Hip 2 or 3 views Right with Pelvis when performed  EXAMINATION:  XR HIP WITH PELVIS WHEN PERFORMED 2 OR 3 VIEWS RIGHT    CLINICAL HISTORY:  hip pain;    FINDINGS:  Right hip.  Three views right hip.    There is severe advanced DJD and impingement change.  There is likely a chronic labral tear.  No acute fracture dislocation bone destruction seen.    Electronically signed by: Blanco Arzate MD  Date:    12/06/2024  Time:    13:22  X-Ray Tibia Fibula 2 View Right  EXAMINATION:  XR TIBIA FIBULA 2 VIEW RIGHT    CLINICAL HISTORY:  Pain in leg, unspecified    FINDINGS:  Tib fib two views right.    There is DJD of the knee and ankle with spurs on the patella.  No fracture dislocation bone destruction or periosteal reaction seen.    Electronically signed by: Blanco Arzate MD  Date:    12/06/2024  Time:    13:20  CT Head Without Contrast  Narrative: EXAMINATION:  CT HEAD WITHOUT CONTRAST    CLINICAL HISTORY:  Head trauma, minor (Age >= 65y);    TECHNIQUE:  Low dose axial images were obtained through the head.  Coronal and sagittal reformations were also performed. Contrast was not administered.    COMPARISON:  None.    FINDINGS:  There is motion artifact.    There is generalized cerebral volume loss.  There is hypoattenuation in a periventricular fashion, likely sequela of  chronic microvascular ischemic change.There is a focus of low attenuation along the medial aspect of the right caudate.  An additional punctate focus of low attenuation is noted at the level of the anterior limb of the right internal capsule.  There is no evidence of acute major vascular territory infarct, hemorrhage, or mass.  There is no hydrocephalus.  There are no abnormal extra-axial fluid collections.  There is fluid within the left sphenoid sinus and mild fluid in the inferior most mastoid air cells, otherwise the visualized paranasal sinuses and mastoid air cells are clear, and there is no evidence of calvarial fracture.  The visualized soft tissues are unremarkable.  Impression: 1. There is a punctate focus of low attenuation along the medial aspect of the caudate and as well as within the anterior limb of the right internal capsule.  Findings likely reflect that of remote infarct however no previous exams are available for comparison and therefore remain age-indeterminate.  Correlation with current symptomatology recommended.  2. Sequela of chronic microvascular ischemic change and senescent change.  3. Mild mastoid effusions.    Electronically signed by: Ry Doshi MD  Date:    12/06/2024  Time:    12:14  CT Cervical Spine Without Contrast  Narrative: EXAMINATION:  CT CERVICAL SPINE WITHOUT CONTRAST    CLINICAL HISTORY:  Neck trauma (Age >= 65y);    FINDINGS:  There is moderate DJD and dish seen throughout the cervical spine.  Craniocervical junction is unremarkable.  Odontoid, prevertebral soft tissues, and posterior elements are intact.  The facets are well aligned.  Intracranial structures demonstrate nothing unusual.  No soft tissue mass, or adenopathy is seen.  Upper mediastinum and lungs are unremarkable.  No skull base fracture seen.  There is DJD with disc osteophyte complex and bilateral areas of neural foraminal narrowing.  No fracture, dislocation, or bone destruction seen.  Impression: No  acute process seen.    DJD and bilateral areas of neural foraminal narrowing.    Electronically signed by: Blanco Arzate MD  Date:    12/06/2024  Time:    12:13

## 2024-12-08 NOTE — PROGRESS NOTES
"[facility]urg ([address] Medicine  Progress Note    Patient Name: Riaz Guerra Jr.  MRN: [id number]  Patient Class: OP- Observation   Admission Date: 12/6/2024  Length of Stay: 0 days  Attending Physician: Idalmis Gao MD  Primary Care Provider: Berhane Alvarez MD        Subjective     Principal Problem:Problem related to discharge planning        HPI:  Mr. Guerra is a 74 YOM with PMHx of HTN, HLD, DM type II, CKD III (baseline SCr 1-1.6), anemia of chronic disease, chronic bilateral venous stasis with chronic wounds, debility/weakness, and obesity.     He presents to ED via EMS due to fall at home last night and inability to get up by himself. He notes that since fall he has had diffuse body pain, worse from baseline chronic pain and he was concerned about his chronic BLE wounds. He reports that he was recently admitted to [facility] and discharged to SNF facility. While at SNF he notes that he was not improving and having more pain than "when he went in" and ultimately ended up returning home to [facility]. It is unclear if he was discharged home or left AMA from SNF. He also reports that today once he activated EMS his was approached with an eviction notice from his apartment. He reports no further housing options and he has not family that he "is close with". He endorses needed assistance with placement. He denies fever, chills, palpitations, SOB, ROSSI, CP, abdominal pain, N/V/D, constipation, urinary symptoms or hematuria,decreased appetite, changes in PO intake, light headiness, dizziness, or headaches.     In the ED he was initially hypertensive with improvement over ED course, otherwise HDS and afebrile.  CBC with WBC 8, H/H 13.4/40.6, platelets 466.  Chemistry was , K 4.3, chloride 103, CO2 23, BUN 31, SCr 1.4 (baseline 1-1.6), glucose 147.  LFTs unremarkable.  CRP 81.  CPK 82.  Blood cultures in process.  CT C-spine with no acute process seen. DJD and " bilateral areas of neural foraminal narrowing. CTH with a punctate focus of low attenuation along the medial aspect of the caudate and as well as within the anterior limb of the right internal capsule.  Findings likely reflect that of remote infarct however no previous exams are available for comparison and therefore remain age-indeterminate.  Correlation with current symptomatology recommended. Sequela of chronic microvascular ischemic change and senescent change.  Mild mastoid effusions. R tib/fib XR with DJD of the knee and ankle with spurs on the patella.  No fracture dislocation bone destruction or periosteal reaction seen. R hip and pelvis with severe advanced DJD and impingement change.  There is likely a chronic labral tear.  No acute fracture dislocation bone destruction seen.    The patient was placed in observation to the Hospital Medicine Service for further evaluation and treatment.     Overview/Hospital Course:  No notes on file    Interval History: Pt was able to stand with PT but not ambulate. The pain in his BLLE is controlled.  No drainage to RLE wound. Working with CM to get SNF to [facility].     Review of Systems   Constitutional:  Positive for activity change. Negative for diaphoresis.   HENT:  Negative for congestion.    Eyes:  Negative for visual disturbance.   Respiratory:  Negative for cough and shortness of breath.    Gastrointestinal:  Positive for constipation (NO BM in one week). Negative for abdominal distention, abdominal pain, diarrhea, nausea and vomiting.   Genitourinary:  Negative for difficulty urinating and dysuria.   Musculoskeletal:  Positive for arthralgias and gait problem.   Neurological:  Positive for weakness. Negative for dizziness and headaches.     Objective:     Vital Signs (Most Recent):  Temp: 98.4 °F (36.9 °C) (12/07/24 2048)  Pulse: 80 (12/08/24 1704)  Resp: 17 (12/08/24 1704)  BP: (!) 157/75 (12/08/24 1704)  SpO2: 99 % (12/08/24 1128) Vital Signs (24h  Range):  Temp:  [98.4 °F (36.9 °C)] 98.4 °F (36.9 °C)  Pulse:  [77-86] 80  Resp:  [13-19] 17  SpO2:  [93 %-99 %] 99 %  BP: (138-157)/(70-90) 157/75     Weight: (!) 140.5 kg (309 lb 11.9 oz)  Body mass index is 43.2 kg/m².    Intake/Output Summary (Last 24 hours) at 12/8/2024 1756  Last data filed at 12/8/2024 1556  Gross per 24 hour   Intake 830 ml   Output 1100 ml   Net -270 ml         Physical Exam  Vitals and nursing note reviewed.   HENT:      Head: Normocephalic.      Mouth/Throat:      Mouth: Mucous membranes are moist.   Cardiovascular:      Rate and Rhythm: Normal rate.   Pulmonary:      Effort: Pulmonary effort is normal.   Abdominal:      General: Abdomen is flat.      Palpations: Abdomen is soft.   Musculoskeletal:         General: Swelling (BLLE) present.      Comments: Decreased ROM to BBLE   Skin:     General: Skin is dry.      Comments: BLLE dry, thick skin, wound right shin   Neurological:      Mental Status: He is alert. Mental status is at baseline.             Significant Labs: All pertinent labs within the past 24 hours have been reviewed.  BMP:   Recent Labs   Lab 12/07/24  0449   *      K 4.1      CO2 26   BUN 31*   CREATININE 1.3   CALCIUM 9.3   MG 2.1     CBC:   Recent Labs   Lab 12/07/24  0449   WBC 6.59   HGB 11.9*   HCT 36.5*        CMP:   Recent Labs   Lab 12/07/24  0449      K 4.1      CO2 26   *   BUN 31*   CREATININE 1.3   CALCIUM 9.3   PROT 8.1   ALBUMIN 2.6*   BILITOT 0.4   ALKPHOS 85   AST 14   ALT 10   ANIONGAP 9       Significant Imaging: I have reviewed all pertinent imaging results/findings within the past 24 hours.  X-Ray Hip 2 or 3 views Right with Pelvis when performed  EXAMINATION:  XR HIP WITH PELVIS WHEN PERFORMED 2 OR 3 VIEWS RIGHT    CLINICAL HISTORY:  hip pain;    FINDINGS:  Right hip.  Three views right hip.    There is severe advanced DJD and impingement change.  There is likely a chronic labral tear.  No acute fracture  dislocation bone destruction seen.    Electronically signed by: Blanco Arzate MD  Date:    12/06/2024  Time:    13:22  X-Ray Tibia Fibula 2 View Right  EXAMINATION:  XR TIBIA FIBULA 2 VIEW RIGHT    CLINICAL HISTORY:  Pain in leg, unspecified    FINDINGS:  Tib fib two views right.    There is DJD of the knee and ankle with spurs on the patella.  No fracture dislocation bone destruction or periosteal reaction seen.    Electronically signed by: Blanco Arzate MD  Date:    12/06/2024  Time:    13:20  CT Head Without Contrast  Narrative: EXAMINATION:  CT HEAD WITHOUT CONTRAST    CLINICAL HISTORY:  Head trauma, minor (Age >= 65y);    TECHNIQUE:  Low dose axial images were obtained through the head.  Coronal and sagittal reformations were also performed. Contrast was not administered.    COMPARISON:  None.    FINDINGS:  There is motion artifact.    There is generalized cerebral volume loss.  There is hypoattenuation in a periventricular fashion, likely sequela of chronic microvascular ischemic change.There is a focus of low attenuation along the medial aspect of the right caudate.  An additional punctate focus of low attenuation is noted at the level of the anterior limb of the right internal capsule.  There is no evidence of acute major vascular territory infarct, hemorrhage, or mass.  There is no hydrocephalus.  There are no abnormal extra-axial fluid collections.  There is fluid within the left sphenoid sinus and mild fluid in the inferior most mastoid air cells, otherwise the visualized paranasal sinuses and mastoid air cells are clear, and there is no evidence of calvarial fracture.  The visualized soft tissues are unremarkable.  Impression: 1. There is a punctate focus of low attenuation along the medial aspect of the caudate and as well as within the anterior limb of the right internal capsule.  Findings likely reflect that of remote infarct however no previous exams are available for comparison and therefore  remain age-indeterminate.  Correlation with current symptomatology recommended.  2. Sequela of chronic microvascular ischemic change and senescent change.  3. Mild mastoid effusions.    Electronically signed by: Ry Doshi MD  Date:    12/06/2024  Time:    12:14  CT Cervical Spine Without Contrast  Narrative: EXAMINATION:  CT CERVICAL SPINE WITHOUT CONTRAST    CLINICAL HISTORY:  Neck trauma (Age >= 65y);    FINDINGS:  There is moderate DJD and dish seen throughout the cervical spine.  Craniocervical junction is unremarkable.  Odontoid, prevertebral soft tissues, and posterior elements are intact.  The facets are well aligned.  Intracranial structures demonstrate nothing unusual.  No soft tissue mass, or adenopathy is seen.  Upper mediastinum and lungs are unremarkable.  No skull base fracture seen.  There is DJD with disc osteophyte complex and bilateral areas of neural foraminal narrowing.  No fracture, dislocation, or bone destruction seen.  Impression: No acute process seen.    DJD and bilateral areas of neural foraminal narrowing.    Electronically signed by: Blanco Arzate MD  Date:    12/06/2024  Time:    12:13        Assessment and Plan     * Problem related to discharge planning  Chronic wounds; BLE   Venous stasis ulcers of both lower extremities   Chronic pain   Impaired mobility and activities of daily living   Recurrent falls  -placed in observation due to fall at home and inability to complete ADLS with decreased caregiver support and likely homelessness  -at admission HDS and afebrile  -ED workup detailed above  -PT/OT consulted   -CM/SW to follow with likely nursing home placement   -continue home baclofen and gabapentin  -PRN supportive care for pain   -dose/medication adjustment as appropriate   -Wound Care consulted  -dressing changes per their recommendations  -fall precautions   -MRI lumbar spine ordered--pt declined    Anemia due to stage 3 chronic kidney disease  -chronic   -H/H stable at  admission   -trend CBC over course and transfuse if becomes clinically indicated    CKD (chronic kidney disease) stage 3, GFR 30-59 ml/min  -chronic   -SCr 1.4 at admission --1.3 on 12/7  -baseline SCr 1-1.6   -avoid nephrotoxins and hypotension as able, renally dose medications    HTN (hypertension)  -chronic   -controlled   -continue home amlodipine, losartan, and HCTZ  -dose/medication adjustment as appropriate     Insulin dependent type 2 diabetes mellitus  -chronic   -not controlled   -most recent hemoglobin A1c 6.9   -hold home metformin and begin low-dose SSI  -dose/medication adjustment as appropriate   -monitor accuchecks AC/HS and PRN hypoglycemic protocol       VTE Risk Mitigation (From admission, onward)           Ordered     heparin (porcine) injection 5,000 Units  Every 8 hours         12/06/24 1545     IP VTE HIGH RISK PATIENT  Once         12/06/24 1545     Place sequential compression device  Until discontinued         12/06/24 1545                    Discharge Planning   KELSEY:      Code Status: Full Code   Medical Readiness for Discharge Date:   Discharge Plan A: New Nursing Home placement - [facility] care facility   Discharge Delays: None known at this time            Collette Sahu DNP  Department of Hospital Medicine   [facility] ([address])

## 2024-12-08 NOTE — PT/OT/SLP EVAL
Occupational Therapy   Evaluation and Treatment    Name: Riaz Guerra Jr.  MRN: [id number]  Admitting Diagnosis: Problem related to discharge planning  Recent Surgery: * No surgery found *      Recommendations:     Discharge Recommendations: Moderate Intensity Therapy (with transitoin to nursing home placement)  Discharge Equipment Recommendations:  to be determined by next level of care  Barriers to discharge:  Decreased caregiver support (homelessness d/t evicition just PTA; current functional level)    Assessment:   Initial OT eval/treat complete.  Currently requires MAX A of 2 persons for supine<>sit and sit<>stand from EOB; tolerated static stance at RW with forward stopped posture long enough for pad placement and back ni hygiene though requiring a prolonged seated rest after task d/t decreased endurance.  TOTAL A for back ni hygiene with stool while in stance d/t stool smudges noted at pads upon standing.  Has rollator though unsure of the state of his belongings since his eviction just PTA; his brother will be following up on his belongings on Tuesday, 12/10/2024.  Pt. Discharged from [facility] to SNF where he left AMA ON 12/4/2024; he was admitted to [facility] again on 12/6/2024 after being home for only 2 days.  He reports that his landlord issued an eviction d/t his frequent falls and ambulance being called too often.  Will defer DME needs to next level of care.  Recommend post acute Moderate Intensity therapy with transition to nursing home placement.  To benefit from continued acute care OT services to increase independence in self-care/functional transfers.  OT to follow.     Riaz Guerra Jr. is a 74 y.o. male with a medical diagnosis of Problem related to discharge planning.  He presents with below deficits decreasing independence in self-care/functional transfers. Performance deficits affecting function: weakness, impaired endurance, impaired self care skills, impaired  functional mobility, gait instability, impaired balance, decreased coordination, decreased upper extremity function, decreased lower extremity function, decreased safety awareness, pain, edema, impaired skin, decreased ROM, impaired cardiopulmonary response to activity.      Rehab Prognosis: Good; patient would benefit from acute skilled OT services to address these deficits and reach maximum level of function.       Plan:     Patient to be seen 4 x/week to address the above listed problems via self-care/home management, therapeutic activities, therapeutic exercises  Plan of Care Expires: 12/22/24  Plan of Care Reviewed with: patient    Subjective     Chief Complaint: With c/o thigh, knee, and hip pain.  RLE worse than LLE especially with movement.   Patient/Family Comments/goals: Pt. Expressed needing to move and get OOB more.     Occupational Profile:  Pt. Discharged from [facility] to [facility] where he left AMA ON 12/4/2024; he was admitted to [facility] again on 12/6/2024 after being home for only 2 days.  He reports that his landlord issued an eviction d/t his frequent falls and ambulance being called too often.  Prior to this, living situation was as follows:  Lives alone  [address] [place] apt; independent living facility  Uses rollator for ambulation  Seated using feet to propel  Tub/shower       Takes sink baths  Reports needing increased assist  With bed mobility/ADL  Gives conflicting information  States he has someone to assist initially  Then states having no one to assist  Equipment Used at Home: rollator (unsure if he still has rollator d/t recent eviction)  Assistance upon Discharge: Lives alone; will have assist from nursing home staff if discharging to SNF and/or nursing home.     Pain/Comfort:  Pain Rating 1: 6/10  Location - Side 1: Bilateral  Location - Orientation 1: generalized  Location 1: knee (thigh; hip)  Pain Addressed 1: Distraction, Cessation of Activity, Nurse notified, Reposition  Pain  Rating Post-Intervention 1: 8/10    Patients cultural, spiritual, Jew conflicts given the current situation:  (None stated.)    Objective:     Communicated with: Rafa BROWN RN prior to session.  Patient found HOB elevated with peripheral IV, telemetry upon OT entry to room.    General Precautions: Standard, fall, diabetic (strict I&O's)  Orthopedic Precautions: N/A  Braces: N/A  Respiratory Status: Room air    Occupational Performance:    Bed Mobility:    Supine<>sit MAX A of 2 persons  Assist for BLE/trunk management  Increased sequencing cues  HOB elevated when coming into sit  Forward scoots X 4 MAX A of 2 persons  Assist for lateral weight shift  Assist for forward scoots   MOD sequencing cues needed  R-lateral scoots X 2 MAX A of 2 persons  Assist for lift/lower/hip excursion/LE management  MOD sequencing cues needed   B-rolling MAX A   Assist for upper/lower trunk management  Assist for BLE positioning  HOHA to initiate bed rail use  Bridging towards HOB X 3 trials setup  Manual assist for BLE in tolerable knee flex  Verbal cue for overhead rail use    Functional Mobility/Transfers:  Sit<>stand MAX A of 2 persons   With initial HHA for transitions   Then transitions each hand to RW   Assist for lift/lower  Forward stooped posture observed  Increased cues to correct   MIN follow through  Tolerated static stance MAX A of 2 persons to maintain  Held for 45-seconds  Forward stooped posture   Prolonged seated rest needed after task  D/t decreased endurance/activity tolerance     Activities of Daily Living:  [facility] seated EOB  Able to thread BUE  Assist needed for completely getting over shoulders  Assist for tying at posterior neck  Back ni hygiene in stance TOTAL A   BUE supported at RW   Forward stooped posture noted   Stool smudges found at padding  Linen change received   Donned socks to BLE TOTAL A at bed level    Cognitive/Visual Perceptual:  Cognitive/Psychosocial Skills:  -        Oriented to: Person, Place, Time, and Situation   -       Follows Commands/attention:Follows one-step commands  -       Communication: able to make basic needs known and answer questions appropriately  -       Memory: No Deficits noted  -       Safety awareness/insight to disability: impaired   -       Mood/Affect/Coping skills/emotional control: Cooperative    Physical Exam:  Postural examination/scapula alignment: -       Rounded shoulders  -       Forward head  -       Forward flexed trunk in stance   Skin integrity:  B-calf/leg wounds well documented in EMR  Upper Extremity Range of Motion:  -       Right Upper Extremity: WFL  -       Left Upper Extremity: WFL  Upper Extremity Strength: 3+/5 proximally and 3+/5 to 4-/5 distally   Strength: WFL  Fine Motor Coordination: intact B-finger to thumb opposition       AMPAC 6 Click ADL:  AMPAC Total Score: 12    Treatment & Education:  Educated on role of OT, POC, bed mobility/functional transfer/ADL safety, review of call light, and importance of calling for assist as needed.      Patient left HOB elevated with all lines intact, call button in reach, bed alarm on, and nursing notified    GOALS:   Multidisciplinary Problems       Occupational Therapy Goals          Problem: Occupational Therapy    Goal Priority Disciplines Outcome Interventions   Occupational Therapy Goal     OT, PT/OT Progressing    Description: Goals to be met by: 12/22/2024     Patient will increase functional independence with ADLs by performing:    UE Dressing with Minimal Assistance.  LE Dressing with Maximum Assistance and Assistive Devices as needed.  Grooming while seated with Set-up Assistance.  Toileting from bedside commode with Maximum Assistance for hygiene and clothing management.   Toilet transfer to bedside commode with Maximum Assistance.                         History:     Past Medical History:   Diagnosis Date    Depression     Diabetes mellitus     Gout     High cholesterol      Hypertension        History reviewed. No pertinent surgical history.    Time Tracking:     OT Date of Treatment: 12/08/24  OT Start Time: 1127  OT Stop Time: 1212  OT Total Time (min): 45 min    Overlap with PT for portions of session due to complex nature of patient, tolerance for therapeutic modalities, and safety with mobility to decrease fall risk for patient and caregiver injury requiring two skilled therapists to provide interventions.    Billable Minutes:Evaluation 15  Self Care/Home Management 30    12/8/2024

## 2024-12-08 NOTE — PT/OT/SLP EVAL
Physical Therapy Evaluation    Patient Name:  Riaz Guerra Jr.   MRN:  [id number]    Recommendations:     Discharge Recommendations: Moderate Intensity Therapy   Discharge Equipment Recommendations: to be determined by next level of care, wheelchair, walker, rolling   Barriers to discharge: Inaccessible home and Decreased caregiver support    Assessment:     Riaz Guerra Jr. is a 74 y.o. male admitted with a medical diagnosis of Problem related to discharge planning.  He presents with the following impairments/functional limitations: weakness, impaired endurance, impaired self care skills, impaired functional mobility, gait instability, impaired balance, decreased coordination, decreased upper extremity function, decreased lower extremity function, decreased safety awareness, pain, impaired coordination, impaired fine motor, impaired skin, edema, impaired cardiopulmonary response to activity. Pt required Max A of two to transfer supine<>sit and sit<>stand with a RW.  Pt was able to extends his knees and raise his buttock off the bed, but was not able to fully extend his hips while standing for 45 seconds. He also required Max A to scoot x 2 his L sitting at EOB. He was able to sit at EOB with SBA for approximately 20 minutes.    Rec: Moderate Intensity Therapy  DME: WC and RW, TBD at next level of care    The mobility limitation cannot be sufficiently resolved by the use of a cane.   Patient's functional mobility deficit can be sufficiently resolved with the use of a rolling walker. Patient's mobility limitation significantly impairs their ability to participate in one of more activities of daily living. The use of a rolling walker will significantly improve the patient's ability to participate in MRADLS and the patient will use it on regular basis in the home.      This patient has a mobility limitation that significantly impairs their ability to participate in or or more mobility related activities of  daily living (MRADL's) such as toileting, feeing, dressing, grooming and bathing in customary locations in the home. The mobility limitation cannot be sufficiently resolved by the use of a cane or walker. The use of a manual wheelchair will significantly improve this patient's ability to participate in MRADL's and the patient will use it on a regular basis in the home. This patient is willing to use a manual wheelchair in the home. There is a caregiver who is available, willing, and able to provide assistance with the wheelchair when needed.      This patient has a mobility limitation that   Prevents them entirely  from accomplishing MRADL's in customary locations in the home   Places them at reasonable determined heightened risk of mobility or mortality secondary to attempts to perform an MRADL   Prevents them from completing MRADL's in a reasonable time frame   .    Rehab Prognosis: Fair; patient would benefit from acute skilled PT services to address these deficits and reach maximum level of function.    Recent Surgery: * No surgery found *      Plan:     During this hospitalization, patient to be seen 5 x/week to address the identified rehab impairments via gait training, therapeutic activities, therapeutic exercises, neuromuscular re-education, wheelchair management/training and progress toward the following goals:    Plan of Care Expires:  01/08/25    Subjective     Chief Complaint: Pain in his B knees, thighs and hips  Patient/Family Comments/goals: Pt agreeable to evaluation  Pain/Comfort:  Pain Rating 1: 6/10  Location - Side 1: Bilateral  Location 1: knee  Pain Addressed 1: Reposition, Distraction, Cessation of Activity, Nurse notified  Pain Rating Post-Intervention 1: 8/10  Pain Rating 2: 6/10  Location - Side 2: Bilateral  Location 2: hip  Pain Addressed 2: Reposition, Distraction, Cessation of Activity, Nurse notified  Pain Rating Post-Intervention 2: 8/10    Patients cultural, spiritual, Lutheran  conflicts given the current situation: no    Living Environment:  Pt was previously discharged to a SNF.  He left there AMA and returned to his assisted living facility where he states that he has been evicted from.  Prior to admission, patients level of function was Pt reported that he was living in an assisted living facility, but was not able to care for himself..  Equipment used at home: rollator, other (see comments) (Pt has recently been evicted from assisted living and unsure of the status of his rollator).  DME owned (not currently used): none.  Upon discharge, patient will have assistance from unknown.    Objective:     Communicated with Nurse prior to session.  Patient found HOB elevated with bed alarm, peripheral IV, telemetry  upon PT entry to room.    General Precautions: Standard, diabetic, fall  Orthopedic Precautions:N/A   Braces: N/A  Respiratory Status: Room air    Exams:  Cognitive Exam:  Patient is oriented to Person, Place, Time, and Situation  Sensation:    -       Intact  RLE ROM: WFL  RLE Strength: Deficits: 2/5  LLE ROM: WFL  LLE Strength: Deficits: 2/5    Functional Mobility:  Bed Mobility:     Supine to Sit: maximal assistance and of 2 persons  Sit to Supine: maximal assistance and of 2 persons  Transfers:     Sit to Stand:  maximal assistance, of 2 persons, and Pt was able to extend his knees, but was not able to fully extend his hips to stand upright with rolling walker      AM-PAC 6 CLICK MOBILITY  Total Score:9       Treatment & Education:  Role of Physical Therapy  Plan of Care  Physical Therapy evaluation   Bed mobility and transfer training with a RW    Patient left HOB elevated with all lines intact, call button in reach, bed alarm on, and Nurse notified.    GOALS:   Multidisciplinary Problems       Physical Therapy Goals          Problem: Physical Therapy    Goal Priority Disciplines Outcome Interventions   Physical Therapy Goal     PT, PT/OT Progressing    Description: Goals to  be met by: 25    Patient will increase functional independence with mobility by performin. Sit<>stand with Max A with RW.  2. Gait x 10 ft feet with RW with Mod A.  3. Supine<>sit with Mod A.                           History:     Past Medical History:   Diagnosis Date    Depression     Diabetes mellitus     Gout     High cholesterol     Hypertension        History reviewed. No pertinent surgical history.    Time Tracking:     PT Received On: 24  PT Start Time: 1126     PT Stop Time: 1212  PT Total Time (min): 46 min     Billable Minutes: Evaluation 15 and Therapeutic Activity 31      2024

## 2024-12-09 LAB
ANION GAP SERPL CALC-SCNC: 9 MMOL/L (ref 8–16)
BUN SERPL-MCNC: 29 MG/DL (ref 8–23)
CALCIUM SERPL-MCNC: 9.2 MG/DL (ref 8.7–10.5)
CHLORIDE SERPL-SCNC: 101 MMOL/L (ref 95–110)
CO2 SERPL-SCNC: 23 MMOL/L (ref 23–29)
CREAT SERPL-MCNC: 1.3 MG/DL (ref 0.5–1.4)
EST. GFR  (NO RACE VARIABLE): 58 ML/MIN/1.73 M^2
GLUCOSE SERPL-MCNC: 161 MG/DL (ref 70–110)
POCT GLUCOSE: 168 MG/DL (ref 70–110)
POCT GLUCOSE: 170 MG/DL (ref 70–110)
POCT GLUCOSE: 172 MG/DL (ref 70–110)
POCT GLUCOSE: 205 MG/DL (ref 70–110)
POTASSIUM SERPL-SCNC: 4.2 MMOL/L (ref 3.5–5.1)
SARS-COV-2 RDRP RESP QL NAA+PROBE: NEGATIVE
SODIUM SERPL-SCNC: 133 MMOL/L (ref 136–145)

## 2024-12-09 PROCEDURE — 87635 SARS-COV-2 COVID-19 AMP PRB: CPT | Performed by: NURSE PRACTITIONER

## 2024-12-09 PROCEDURE — 86580 TB INTRADERMAL TEST: CPT | Performed by: NURSE PRACTITIONER

## 2024-12-09 PROCEDURE — 25000242 PHARM REV CODE 250 ALT 637 W/ HCPCS: Performed by: NURSE PRACTITIONER

## 2024-12-09 PROCEDURE — 97608 NEG PRS WND THER NDME>50SQCM: CPT

## 2024-12-09 PROCEDURE — 25000003 PHARM REV CODE 250: Performed by: NURSE PRACTITIONER

## 2024-12-09 PROCEDURE — G0378 HOSPITAL OBSERVATION PER HR: HCPCS

## 2024-12-09 PROCEDURE — 30200315 PPD INTRADERMAL TEST REV CODE 302: Performed by: NURSE PRACTITIONER

## 2024-12-09 PROCEDURE — 36415 COLL VENOUS BLD VENIPUNCTURE: CPT | Performed by: NURSE PRACTITIONER

## 2024-12-09 PROCEDURE — 63600175 PHARM REV CODE 636 W HCPCS: Performed by: NURSE PRACTITIONER

## 2024-12-09 PROCEDURE — 80048 BASIC METABOLIC PNL TOTAL CA: CPT | Performed by: NURSE PRACTITIONER

## 2024-12-09 PROCEDURE — 96372 THER/PROPH/DIAG INJ SC/IM: CPT | Performed by: NURSE PRACTITIONER

## 2024-12-09 RX ORDER — AMMONIUM LACTATE 12 G/100G
LOTION TOPICAL 2 TIMES DAILY
Status: DISCONTINUED | OUTPATIENT
Start: 2024-12-09 | End: 2024-12-13 | Stop reason: HOSPADM

## 2024-12-09 RX ADMIN — BACLOFEN 10 MG: 10 TABLET ORAL at 10:12

## 2024-12-09 RX ADMIN — HEPARIN SODIUM 5000 UNITS: 5000 INJECTION INTRAVENOUS; SUBCUTANEOUS at 05:12

## 2024-12-09 RX ADMIN — SENNOSIDES AND DOCUSATE SODIUM 1 TABLET: 50; 8.6 TABLET ORAL at 08:12

## 2024-12-09 RX ADMIN — INSULIN ASPART 2 UNITS: 100 INJECTION, SOLUTION INTRAVENOUS; SUBCUTANEOUS at 06:12

## 2024-12-09 RX ADMIN — HEPARIN SODIUM 5000 UNITS: 5000 INJECTION INTRAVENOUS; SUBCUTANEOUS at 03:12

## 2024-12-09 RX ADMIN — ASPIRIN 81 MG: 81 TABLET, COATED ORAL at 10:12

## 2024-12-09 RX ADMIN — AMLODIPINE BESYLATE 10 MG: 5 TABLET ORAL at 10:12

## 2024-12-09 RX ADMIN — GABAPENTIN 800 MG: 400 CAPSULE ORAL at 10:12

## 2024-12-09 RX ADMIN — ATORVASTATIN CALCIUM 20 MG: 20 TABLET, FILM COATED ORAL at 10:12

## 2024-12-09 RX ADMIN — HYDROCODONE BITARTRATE AND ACETAMINOPHEN 1 TABLET: 10; 325 TABLET ORAL at 01:12

## 2024-12-09 RX ADMIN — TAMSULOSIN HYDROCHLORIDE 0.4 MG: 0.4 CAPSULE ORAL at 10:12

## 2024-12-09 RX ADMIN — BACLOFEN 10 MG: 10 TABLET ORAL at 08:12

## 2024-12-09 RX ADMIN — GABAPENTIN 800 MG: 400 CAPSULE ORAL at 08:12

## 2024-12-09 RX ADMIN — LOSARTAN POTASSIUM 100 MG: 50 TABLET, FILM COATED ORAL at 10:12

## 2024-12-09 RX ADMIN — HYDROCODONE BITARTRATE AND ACETAMINOPHEN 1 TABLET: 10; 325 TABLET ORAL at 10:12

## 2024-12-09 RX ADMIN — BACLOFEN 10 MG: 10 TABLET ORAL at 03:12

## 2024-12-09 RX ADMIN — HEPARIN SODIUM 5000 UNITS: 5000 INJECTION INTRAVENOUS; SUBCUTANEOUS at 09:12

## 2024-12-09 RX ADMIN — AMMONIUM LACTATE: 120 LOTION TOPICAL at 08:12

## 2024-12-09 RX ADMIN — POLYETHYLENE GLYCOL 3350 17 G: 17 POWDER, FOR SOLUTION ORAL at 08:12

## 2024-12-09 RX ADMIN — FLUTICASONE PROPIONATE 100 MCG: 50 SPRAY, METERED NASAL at 10:12

## 2024-12-09 RX ADMIN — TUBERCULIN PURIFIED PROTEIN DERIVATIVE 5 UNITS: 5 INJECTION, SOLUTION INTRADERMAL at 05:12

## 2024-12-09 RX ADMIN — HYDROCODONE BITARTRATE AND ACETAMINOPHEN 1 TABLET: 10; 325 TABLET ORAL at 05:12

## 2024-12-09 NOTE — PLAN OF CARE
MOON Message    Medicare Outpatient and Observation Notification regarding financial responsibilityGiven to patient/caregiver; Explained to patient/caregiverOther (comments)Medicare Outpatient and Observation Notification regarding financial responsibility. Other (comments). The comment is Patient Refused to sign EDDY. Taken on 12/9/24 0904Date EDDY was signed--12/8/2024Time EDDY was signed--1400

## 2024-12-09 NOTE — CONSULTS
[facility] ([address])  Wound Care    Patient Name:  Riaz Guerra Jr.   MRN:  [id number]  Date: 12/9/2024  Diagnosis: Problem related to discharge planning    History:     Past Medical History:   Diagnosis Date    Depression     Diabetes mellitus     Gout     High cholesterol     Hypertension        Social History     Socioeconomic History    Marital status: Single   Tobacco Use    Smoking status: Never    Smokeless tobacco: Never   Substance and Sexual Activity    Alcohol use: Not Currently     Comment: occasionally    Drug use: Yes     Types: Marijuana    Sexual activity: Yes     Social Drivers of Health     Financial Resource Strain: Low Risk  (11/22/2024)    Overall Financial Resource Strain (CARDIA)     Difficulty of Paying Living Expenses: Not very hard   Food Insecurity: No Food Insecurity (11/22/2024)    Hunger Vital Sign     Worried About Running Out of Food in the Last Year: Never true     Ran Out of Food in the Last Year: Never true   Transportation Needs: No Transportation Needs (11/22/2024)    TRANSPORTATION NEEDS     Transportation : No   Physical Activity: Inactive (11/22/2024)    Exercise Vital Sign     Days of Exercise per Week: 0 days     Minutes of Exercise per Session: 0 min   Stress: Stress Concern Present (11/22/2024)    [employer] [facility] [employer] [facility] - Occupational Stress Questionnaire     Feeling of Stress : To some extent   Housing Stability: Low Risk  (11/22/2024)    Housing Stability Vital Sign     Unable to Pay for Housing in the Last Year: No     Homeless in the Last Year: No     WO Assessment Details:        12/09/24 1310        Wound 07/01/24 0954 Ulceration Right lower Calf #1   Date First Assessed/Time First Assessed: 07/01/24 0954   Present on Original Admission: Yes  Primary Wound Type: Ulceration  Side: Right  Orientation: lower  Location: Calf  Wound Number: #1  Is this injury device related?: No   Wound Image      Drainage Amount Moderate   Drainage  Characteristics/Odor Serosanguineous   Appearance Pink;Red;Moist;Granulating;Epithelialization   Tissue loss description Full thickness   Periwound Area Scar tissue   Wound Edges Undefined;Jagged;Irregular;Open   Wound Length (cm) 7 cm   Wound Width (cm) 5 cm   Wound Depth (cm) 0.2 cm   Wound Volume (cm^3) 7 cm^3   Wound Surface Area (cm^2) 35 cm^2   Tunneling (depth (cm)/location) 0   Undermining (depth (cm)/location) 0   Care Cleansed with:;Wound cleanser;Applied:;Skin Barrier   Dressing Applied;Other (comment)  (NPWT)   Dressing Change Due 12/14/24        Negative Pressure Wound Therapy  12/09/24 1300 Right   Placement Date/Time: 12/09/24 1300   Side: Right  Location: Calf  Additional Comments: [id number]   NPWT Type Vacuum Therapy   Therapy Setting NPWT Continuous therapy   Pressure Setting NPWT 125 mmHg   Therapy Interventions NPWT Seal intact;Canister changed;Dressing changed   General Output (mL) 0   Edema   Dependent Edema 2+ (Mild)     Admitted for discharge planning, patient known to service for Bilateral venous stasis ulcers.  LLE completely healed and resurfaced, noted keratosis to bilat lower extremities.  Tolerated wound care much better today than in the past.   RLE,  NPWT applied using a peel and stick dressing for ulceration measuring 7cm x 5cm x 0.2cm.  Wound edges are irregular and open, with pink, moist wound bed.  Different stages of healing noted.  Recommendations made and orders entered.    12/09/2024

## 2024-12-09 NOTE — PT/OT/SLP PROGRESS
Physical Therapy      Patient Name:  Riaz Guerra Jr.   MRN:  [id number]    Patient not seen today secondary to Patient unwilling to participate- pt sitting EOB upon arrival and reports he has been sitting up for 2 hours and is ready to get back in bed. Assisted with returning pt to supine. Will re-attempt tomorrow.

## 2024-12-09 NOTE — ASSESSMENT & PLAN NOTE
Chronic wounds; BLE   Venous stasis ulcers of both lower extremities   Chronic pain   Impaired mobility and activities of daily living   Recurrent falls  -placed in observation due to fall at home and inability to complete ADLS with decreased caregiver support and likely homelessness  -at admission HDS and afebrile  -ED workup detailed above  -PT/OT consulted   -CM/SW to follow with likely nursing home placement   -continue home baclofen and gabapentin  -PRN supportive care for pain   -dose/medication adjustment as appropriate   -Wound Care consulted  -dressing changes per their recommendations  -fall precautions   -MRI lumbar spine ordered--pt declined  - WC placed wound vac today

## 2024-12-09 NOTE — PLAN OF CARE
Problem: Skin Injury Risk Increased  Goal: Skin Health and Integrity  Outcome: Progressing     Problem: Wound  Goal: Optimal Functional Ability  Outcome: Progressing  Intervention: Optimize Functional Ability  Flowsheets (Taken 12/9/2024 1642)  Activity Management:   Sitting at edge of bed - L2   Rolling - L1  Goal: Absence of Infection Signs and Symptoms  Outcome: Progressing  Intervention: Prevent or Manage Infection  Flowsheets (Taken 12/9/2024 1642)  Fever Reduction/Comfort Measures:   lightweight bedding   lightweight clothing

## 2024-12-09 NOTE — PROGRESS NOTES
"[facility] ([address] Medicine  Progress Note    Patient Name: Riaz Guerra Jr.  MRN: [id number]  Patient Class: OP- Observation   Admission Date: 12/6/2024  Length of Stay: 0 days  Attending Physician: Idalmis Gao MD  Primary Care Provider: Berhane Alvarez MD        Subjective     Principal Problem:Problem related to discharge planning        HPI:  Mr. Guerra is a 74 YOM with PMHx of HTN, HLD, DM type II, CKD III (baseline SCr 1-1.6), anemia of chronic disease, chronic bilateral venous stasis with chronic wounds, debility/weakness, and obesity.     He presents to ED via EMS due to fall at home last night and inability to get up by himself. He notes that since fall he has had diffuse body pain, worse from baseline chronic pain and he was concerned about his chronic BLE wounds. He reports that he was recently admitted to [facility] and discharged to SNF facility. While at SNF he notes that he was not improving and having more pain than "when he went in" and ultimately ended up returning home to [facility]. It is unclear if he was discharged home or left AMA from SNF. He also reports that today once he activated EMS his was approached with an eviction notice from his apartment. He reports no further housing options and he has not family that he "is close with". He endorses needed assistance with placement. He denies fever, chills, palpitations, SOB, ROSSI, CP, abdominal pain, N/V/D, constipation, urinary symptoms or hematuria,decreased appetite, changes in PO intake, light headiness, dizziness, or headaches.     In the ED he was initially hypertensive with improvement over ED course, otherwise HDS and afebrile.  CBC with WBC 8, H/H 13.4/40.6, platelets 466.  Chemistry was , K 4.3, chloride 103, CO2 23, BUN 31, SCr 1.4 (baseline 1-1.6), glucose 147.  LFTs unremarkable.  CRP 81.  CPK 82.  Blood cultures in process.  CT C-spine with no acute process seen. DJD and " bilateral areas of neural foraminal narrowing. CTH with a punctate focus of low attenuation along the medial aspect of the caudate and as well as within the anterior limb of the right internal capsule.  Findings likely reflect that of remote infarct however no previous exams are available for comparison and therefore remain age-indeterminate.  Correlation with current symptomatology recommended. Sequela of chronic microvascular ischemic change and senescent change.  Mild mastoid effusions. R tib/fib XR with DJD of the knee and ankle with spurs on the patella.  No fracture dislocation bone destruction or periosteal reaction seen. R hip and pelvis with severe advanced DJD and impingement change.  There is likely a chronic labral tear.  No acute fracture dislocation bone destruction seen.    The patient was placed in observation to the Hospital Medicine Service for further evaluation and treatment.     Overview/Hospital Course:  No notes on file    Interval History: Mr Guerra with impaired mobility. He needs to have BM but does not want to use BSC. He had active bowel sounds. He refused therapy today. Awaiting [facility] NH placement.     Review of Systems   Constitutional:  Positive for activity change. Negative for diaphoresis.   HENT:  Negative for congestion.    Eyes:  Negative for visual disturbance.   Respiratory:  Negative for cough and shortness of breath.    Gastrointestinal:  Positive for constipation (NO BM in one week). Negative for abdominal distention, abdominal pain, diarrhea, nausea and vomiting.   Genitourinary:  Negative for difficulty urinating and dysuria.   Musculoskeletal:  Positive for arthralgias and gait problem.   Neurological:  Positive for weakness. Negative for dizziness and headaches.     Objective:     Vital Signs (Most Recent):  Temp: 98.2 °F (36.8 °C) (12/09/24 0800)  Pulse: 74 (12/09/24 1515)  Resp: 18 (12/09/24 1007)  BP: (!) 163/97 (12/09/24 0800)  SpO2: 97 % (12/09/24 0800) Vital  "Signs (24h Range):  Temp:  [97.8 °F (36.6 °C)-98.5 °F (36.9 °C)] 98.2 °F (36.8 °C)  Pulse:  [64-89] 74  Resp:  [17-18] 18  SpO2:  [92 %-97 %] 97 %  BP: (126-163)/(66-97) 163/97     Weight: (!) 140.5 kg (309 lb 11.9 oz)  Body mass index is 43.2 kg/m².    Intake/Output Summary (Last 24 hours) at 12/9/2024 1636  Last data filed at 12/9/2024 1348  Gross per 24 hour   Intake 588 ml   Output 1100 ml   Net -512 ml         Physical Exam  Vitals and nursing note reviewed.   HENT:      Head: Normocephalic.      Mouth/Throat:      Mouth: Mucous membranes are moist.   Cardiovascular:      Rate and Rhythm: Normal rate.   Pulmonary:      Effort: Pulmonary effort is normal.   Abdominal:      General: Abdomen is flat.      Palpations: Abdomen is soft.   Musculoskeletal:         General: Swelling (BLLE) present.      Comments: Decreased ROM to BBLE   Skin:     General: Skin is dry.      Comments: BLLE dry, thick skin, wound right shin   Neurological:      Mental Status: He is alert. Mental status is at baseline.             Significant Labs: All pertinent labs within the past 24 hours have been reviewed.  Blood Culture: No results for input(s): "LABBLOO" in the last 48 hours.  BMP:   Recent Labs   Lab 12/09/24  0415   *   *   K 4.2      CO2 23   BUN 29*   CREATININE 1.3   CALCIUM 9.2     CBC: No results for input(s): "WBC", "HGB", "HCT", "PLT" in the last 48 hours.  CMP:   Recent Labs   Lab 12/09/24  0415   *   K 4.2      CO2 23   *   BUN 29*   CREATININE 1.3   CALCIUM 9.2   ANIONGAP 9       Significant Imaging: I have reviewed all pertinent imaging results/findings within the past 24 hours.  X-Ray Hip 2 or 3 views Right with Pelvis when performed  EXAMINATION:  XR HIP WITH PELVIS WHEN PERFORMED 2 OR 3 VIEWS RIGHT    CLINICAL HISTORY:  hip pain;    FINDINGS:  Right hip.  Three views right hip.    There is severe advanced DJD and impingement change.  There is likely a chronic labral tear.  No " acute fracture dislocation bone destruction seen.    Electronically signed by: Blanco Arzate MD  Date:    12/06/2024  Time:    13:22  X-Ray Tibia Fibula 2 View Right  EXAMINATION:  XR TIBIA FIBULA 2 VIEW RIGHT    CLINICAL HISTORY:  Pain in leg, unspecified    FINDINGS:  Tib fib two views right.    There is DJD of the knee and ankle with spurs on the patella.  No fracture dislocation bone destruction or periosteal reaction seen.    Electronically signed by: Blanco Arzate MD  Date:    12/06/2024  Time:    13:20  CT Head Without Contrast  Narrative: EXAMINATION:  CT HEAD WITHOUT CONTRAST    CLINICAL HISTORY:  Head trauma, minor (Age >= 65y);    TECHNIQUE:  Low dose axial images were obtained through the head.  Coronal and sagittal reformations were also performed. Contrast was not administered.    COMPARISON:  None.    FINDINGS:  There is motion artifact.    There is generalized cerebral volume loss.  There is hypoattenuation in a periventricular fashion, likely sequela of chronic microvascular ischemic change.There is a focus of low attenuation along the medial aspect of the right caudate.  An additional punctate focus of low attenuation is noted at the level of the anterior limb of the right internal capsule.  There is no evidence of acute major vascular territory infarct, hemorrhage, or mass.  There is no hydrocephalus.  There are no abnormal extra-axial fluid collections.  There is fluid within the left sphenoid sinus and mild fluid in the inferior most mastoid air cells, otherwise the visualized paranasal sinuses and mastoid air cells are clear, and there is no evidence of calvarial fracture.  The visualized soft tissues are unremarkable.  Impression: 1. There is a punctate focus of low attenuation along the medial aspect of the caudate and as well as within the anterior limb of the right internal capsule.  Findings likely reflect that of remote infarct however no previous exams are available for comparison and  therefore remain age-indeterminate.  Correlation with current symptomatology recommended.  2. Sequela of chronic microvascular ischemic change and senescent change.  3. Mild mastoid effusions.    Electronically signed by: Ry Doshi MD  Date:    12/06/2024  Time:    12:14  CT Cervical Spine Without Contrast  Narrative: EXAMINATION:  CT CERVICAL SPINE WITHOUT CONTRAST    CLINICAL HISTORY:  Neck trauma (Age >= 65y);    FINDINGS:  There is moderate DJD and dish seen throughout the cervical spine.  Craniocervical junction is unremarkable.  Odontoid, prevertebral soft tissues, and posterior elements are intact.  The facets are well aligned.  Intracranial structures demonstrate nothing unusual.  No soft tissue mass, or adenopathy is seen.  Upper mediastinum and lungs are unremarkable.  No skull base fracture seen.  There is DJD with disc osteophyte complex and bilateral areas of neural foraminal narrowing.  No fracture, dislocation, or bone destruction seen.  Impression: No acute process seen.    DJD and bilateral areas of neural foraminal narrowing.    Electronically signed by: Blanco Arzate MD  Date:    12/06/2024  Time:    12:13        Assessment and Plan     * Problem related to discharge planning  Chronic wounds; BLE   Venous stasis ulcers of both lower extremities   Chronic pain   Impaired mobility and activities of daily living   Recurrent falls  -placed in observation due to fall at home and inability to complete ADLS with decreased caregiver support and likely homelessness  -at admission HDS and afebrile  -ED workup detailed above  -PT/OT consulted   -CM/SW to follow with likely nursing home placement   -continue home baclofen and gabapentin  -PRN supportive care for pain   -dose/medication adjustment as appropriate   -Wound Care consulted  -dressing changes per their recommendations  -fall precautions   -MRI lumbar spine ordered--pt declined  - WC placed wound vac today    Anemia due to stage 3 chronic  kidney disease  -chronic   -H/H stable at admission   -trend CBC over course and transfuse if becomes clinically indicated    CKD (chronic kidney disease) stage 3, GFR 30-59 ml/min  -chronic   -SCr 1.4 at admission --1.3 on 12/7  -baseline SCr 1-1.6   -avoid nephrotoxins and hypotension as able, renally dose medications    HTN (hypertension)  -chronic   -controlled   -continue home amlodipine, losartan, and HCTZ  -dose/medication adjustment as appropriate     Insulin dependent type 2 diabetes mellitus  -chronic   -not controlled   -most recent hemoglobin A1c 6.9   -hold home metformin and begin low-dose SSI  -dose/medication adjustment as appropriate   -monitor accuchecks AC/HS and PRN hypoglycemic protocol       VTE Risk Mitigation (From admission, onward)           Ordered     heparin (porcine) injection 5,000 Units  Every 8 hours         12/06/24 1545     IP VTE HIGH RISK PATIENT  Once         12/06/24 1545     Place sequential compression device  Until discontinued         12/06/24 1545                    Discharge Planning   KELSEY: 12/11/2024     Code Status: Full Code   Medical Readiness for Discharge Date:   Discharge Plan A: New Nursing Home placement - shelter care facility   Discharge Delays: None known at this time        Collette Sahu DNP  Department of Hospital Medicine   [facility] ([address])

## 2024-12-09 NOTE — PLAN OF CARE
Pressure Injury Prevention bundle, Support surface, and Registered dietician consultation ordered for a David scale score of 16.

## 2024-12-09 NOTE — PLAN OF CARE
[facility] is able to accept for SNF to residential and are submitting for authorization. Sw updated patient and his family.

## 2024-12-09 NOTE — PLAN OF CARE
Sw met with patient at bedside. Sw assisted with obtaining bank statements. Sw obtained bank statements and sent to [facility].     [facility] cannot accept patient for SNF but can for retirement.

## 2024-12-09 NOTE — PLAN OF CARE
Britt spoke with patient's niece. She sent over patient's SSI rewards letter. She did not have 3 months worth of bank statements. Britt explained how the nursing homes might still request bank statements. Britt requested an update from [facility] and [facility] on referrals.

## 2024-12-09 NOTE — SUBJECTIVE & OBJECTIVE
Interval History: Mr Guerra with impaired mobility. He needs to have BM but does not want to use BSC. He had active bowel sounds. He refused therapy today. Awaiting long term NH placement.     Review of Systems   Constitutional:  Positive for activity change. Negative for diaphoresis.   HENT:  Negative for congestion.    Eyes:  Negative for visual disturbance.   Respiratory:  Negative for cough and shortness of breath.    Gastrointestinal:  Positive for constipation (NO BM in one week). Negative for abdominal distention, abdominal pain, diarrhea, nausea and vomiting.   Genitourinary:  Negative for difficulty urinating and dysuria.   Musculoskeletal:  Positive for arthralgias and gait problem.   Neurological:  Positive for weakness. Negative for dizziness and headaches.     Objective:     Vital Signs (Most Recent):  Temp: 98.2 °F (36.8 °C) (12/09/24 0800)  Pulse: 74 (12/09/24 1515)  Resp: 18 (12/09/24 1007)  BP: (!) 163/97 (12/09/24 0800)  SpO2: 97 % (12/09/24 0800) Vital Signs (24h Range):  Temp:  [97.8 °F (36.6 °C)-98.5 °F (36.9 °C)] 98.2 °F (36.8 °C)  Pulse:  [64-89] 74  Resp:  [17-18] 18  SpO2:  [92 %-97 %] 97 %  BP: (126-163)/(66-97) 163/97     Weight: (!) 140.5 kg (309 lb 11.9 oz)  Body mass index is 43.2 kg/m².    Intake/Output Summary (Last 24 hours) at 12/9/2024 1636  Last data filed at 12/9/2024 1348  Gross per 24 hour   Intake 588 ml   Output 1100 ml   Net -512 ml         Physical Exam  Vitals and nursing note reviewed.   HENT:      Head: Normocephalic.      Mouth/Throat:      Mouth: Mucous membranes are moist.   Cardiovascular:      Rate and Rhythm: Normal rate.   Pulmonary:      Effort: Pulmonary effort is normal.   Abdominal:      General: Abdomen is flat.      Palpations: Abdomen is soft.   Musculoskeletal:         General: Swelling (BLLE) present.      Comments: Decreased ROM to BBLE   Skin:     General: Skin is dry.      Comments: BLLE dry, thick skin, wound right shin   Neurological:      Mental  "Status: He is alert. Mental status is at baseline.             Significant Labs: All pertinent labs within the past 24 hours have been reviewed.  Blood Culture: No results for input(s): "LABBLOO" in the last 48 hours.  BMP:   Recent Labs   Lab 12/09/24  0415   *   *   K 4.2      CO2 23   BUN 29*   CREATININE 1.3   CALCIUM 9.2     CBC: No results for input(s): "WBC", "HGB", "HCT", "PLT" in the last 48 hours.  CMP:   Recent Labs   Lab 12/09/24  0415   *   K 4.2      CO2 23   *   BUN 29*   CREATININE 1.3   CALCIUM 9.2   ANIONGAP 9       Significant Imaging: I have reviewed all pertinent imaging results/findings within the past 24 hours.  X-Ray Hip 2 or 3 views Right with Pelvis when performed  EXAMINATION:  XR HIP WITH PELVIS WHEN PERFORMED 2 OR 3 VIEWS RIGHT    CLINICAL HISTORY:  hip pain;    FINDINGS:  Right hip.  Three views right hip.    There is severe advanced DJD and impingement change.  There is likely a chronic labral tear.  No acute fracture dislocation bone destruction seen.    Electronically signed by: Blanco Arzate MD  Date:    12/06/2024  Time:    13:22  X-Ray Tibia Fibula 2 View Right  EXAMINATION:  XR TIBIA FIBULA 2 VIEW RIGHT    CLINICAL HISTORY:  Pain in leg, unspecified    FINDINGS:  Tib fib two views right.    There is DJD of the knee and ankle with spurs on the patella.  No fracture dislocation bone destruction or periosteal reaction seen.    Electronically signed by: Blanco Arzate MD  Date:    12/06/2024  Time:    13:20  CT Head Without Contrast  Narrative: EXAMINATION:  CT HEAD WITHOUT CONTRAST    CLINICAL HISTORY:  Head trauma, minor (Age >= 65y);    TECHNIQUE:  Low dose axial images were obtained through the head.  Coronal and sagittal reformations were also performed. Contrast was not administered.    COMPARISON:  None.    FINDINGS:  There is motion artifact.    There is generalized cerebral volume loss.  There is hypoattenuation in a periventricular " fashion, likely sequela of chronic microvascular ischemic change.There is a focus of low attenuation along the medial aspect of the right caudate.  An additional punctate focus of low attenuation is noted at the level of the anterior limb of the right internal capsule.  There is no evidence of acute major vascular territory infarct, hemorrhage, or mass.  There is no hydrocephalus.  There are no abnormal extra-axial fluid collections.  There is fluid within the left sphenoid sinus and mild fluid in the inferior most mastoid air cells, otherwise the visualized paranasal sinuses and mastoid air cells are clear, and there is no evidence of calvarial fracture.  The visualized soft tissues are unremarkable.  Impression: 1. There is a punctate focus of low attenuation along the medial aspect of the caudate and as well as within the anterior limb of the right internal capsule.  Findings likely reflect that of remote infarct however no previous exams are available for comparison and therefore remain age-indeterminate.  Correlation with current symptomatology recommended.  2. Sequela of chronic microvascular ischemic change and senescent change.  3. Mild mastoid effusions.    Electronically signed by: Ry Doshi MD  Date:    12/06/2024  Time:    12:14  CT Cervical Spine Without Contrast  Narrative: EXAMINATION:  CT CERVICAL SPINE WITHOUT CONTRAST    CLINICAL HISTORY:  Neck trauma (Age >= 65y);    FINDINGS:  There is moderate DJD and dish seen throughout the cervical spine.  Craniocervical junction is unremarkable.  Odontoid, prevertebral soft tissues, and posterior elements are intact.  The facets are well aligned.  Intracranial structures demonstrate nothing unusual.  No soft tissue mass, or adenopathy is seen.  Upper mediastinum and lungs are unremarkable.  No skull base fracture seen.  There is DJD with disc osteophyte complex and bilateral areas of neural foraminal narrowing.  No fracture, dislocation, or bone  destruction seen.  Impression: No acute process seen.    DJD and bilateral areas of neural foraminal narrowing.    Electronically signed by: Blanco Arzate MD  Date:    12/06/2024  Time:    12:13

## 2024-12-10 LAB
POCT GLUCOSE: 160 MG/DL (ref 70–110)
POCT GLUCOSE: 171 MG/DL (ref 70–110)
POCT GLUCOSE: 175 MG/DL (ref 70–110)
POCT GLUCOSE: 215 MG/DL (ref 70–110)

## 2024-12-10 PROCEDURE — 97530 THERAPEUTIC ACTIVITIES: CPT | Mod: CQ

## 2024-12-10 PROCEDURE — 25000003 PHARM REV CODE 250: Performed by: NURSE PRACTITIONER

## 2024-12-10 PROCEDURE — 94761 N-INVAS EAR/PLS OXIMETRY MLT: CPT

## 2024-12-10 PROCEDURE — 97530 THERAPEUTIC ACTIVITIES: CPT

## 2024-12-10 PROCEDURE — 63600175 PHARM REV CODE 636 W HCPCS: Performed by: NURSE PRACTITIONER

## 2024-12-10 PROCEDURE — 97535 SELF CARE MNGMENT TRAINING: CPT

## 2024-12-10 PROCEDURE — G0378 HOSPITAL OBSERVATION PER HR: HCPCS

## 2024-12-10 PROCEDURE — 96372 THER/PROPH/DIAG INJ SC/IM: CPT | Performed by: NURSE PRACTITIONER

## 2024-12-10 RX ORDER — FLUTICASONE PROPIONATE 50 MCG
2 SPRAY, SUSPENSION (ML) NASAL DAILY
Qty: 11.1 ML | Refills: 0 | Status: SHIPPED | OUTPATIENT
Start: 2024-12-10

## 2024-12-10 RX ADMIN — BACLOFEN 10 MG: 10 TABLET ORAL at 08:12

## 2024-12-10 RX ADMIN — ATORVASTATIN CALCIUM 20 MG: 20 TABLET, FILM COATED ORAL at 10:12

## 2024-12-10 RX ADMIN — BACLOFEN 10 MG: 10 TABLET ORAL at 03:12

## 2024-12-10 RX ADMIN — TAMSULOSIN HYDROCHLORIDE 0.4 MG: 0.4 CAPSULE ORAL at 10:12

## 2024-12-10 RX ADMIN — HEPARIN SODIUM 5000 UNITS: 5000 INJECTION INTRAVENOUS; SUBCUTANEOUS at 02:12

## 2024-12-10 RX ADMIN — SENNOSIDES AND DOCUSATE SODIUM 1 TABLET: 50; 8.6 TABLET ORAL at 08:12

## 2024-12-10 RX ADMIN — BACLOFEN 10 MG: 10 TABLET ORAL at 10:12

## 2024-12-10 RX ADMIN — HEPARIN SODIUM 5000 UNITS: 5000 INJECTION INTRAVENOUS; SUBCUTANEOUS at 09:12

## 2024-12-10 RX ADMIN — AMMONIUM LACTATE: 120 LOTION TOPICAL at 10:12

## 2024-12-10 RX ADMIN — HEPARIN SODIUM 5000 UNITS: 5000 INJECTION INTRAVENOUS; SUBCUTANEOUS at 05:12

## 2024-12-10 RX ADMIN — INSULIN ASPART 2 UNITS: 100 INJECTION, SOLUTION INTRAVENOUS; SUBCUTANEOUS at 02:12

## 2024-12-10 RX ADMIN — FLUTICASONE PROPIONATE 100 MCG: 50 SPRAY, METERED NASAL at 10:12

## 2024-12-10 RX ADMIN — LOSARTAN POTASSIUM 100 MG: 50 TABLET, FILM COATED ORAL at 10:12

## 2024-12-10 RX ADMIN — ASPIRIN 81 MG: 81 TABLET, COATED ORAL at 10:12

## 2024-12-10 RX ADMIN — GABAPENTIN 800 MG: 400 CAPSULE ORAL at 10:12

## 2024-12-10 RX ADMIN — AMMONIUM LACTATE: 120 LOTION TOPICAL at 08:12

## 2024-12-10 RX ADMIN — HYDROCODONE BITARTRATE AND ACETAMINOPHEN 1 TABLET: 10; 325 TABLET ORAL at 10:12

## 2024-12-10 RX ADMIN — GABAPENTIN 800 MG: 400 CAPSULE ORAL at 08:12

## 2024-12-10 RX ADMIN — AMLODIPINE BESYLATE 10 MG: 5 TABLET ORAL at 10:12

## 2024-12-10 NOTE — PLAN OF CARE
Recommendations  1) Zach BID to promote wound healing    2) Continue diabetic diet as tolerated    3) RD to monitor and follow up    Goals: Pt will be able to tolerate >75% EEN/EPN by RD follow up  Nutrition Goal Status: new  Communication of RD Recs:  (POC)

## 2024-12-10 NOTE — PROGRESS NOTES
"[facility] Surg ([address] Medicine  Progress Note    Patient Name: Riaz Guerra Jr.  MRN: [id number]  Patient Class: OP- Observation   Admission Date: 12/6/2024  Length of Stay: 0 days  Attending Physician: Idalmis Gao MD  Primary Care Provider: Berhane Alvarez MD        Subjective     Principal Problem:Problem related to discharge planning        HPI:  Mr. Guerra is a 74 YOM with PMHx of HTN, HLD, DM type II, CKD III (baseline SCr 1-1.6), anemia of chronic disease, chronic bilateral venous stasis with chronic wounds, debility/weakness, and obesity.     He presents to ED via EMS due to fall at home last night and inability to get up by himself. He notes that since fall he has had diffuse body pain, worse from baseline chronic pain and he was concerned about his chronic BLE wounds. He reports that he was recently admitted to [facility] and discharged to SNF facility. While at SNF he notes that he was not improving and having more pain than "when he went in" and ultimately ended up returning home to [facility]. It is unclear if he was discharged home or left AMA from SNF. He also reports that today once he activated EMS his was approached with an eviction notice from his apartment. He reports no further housing options and he has not family that he "is close with". He endorses needed assistance with placement. He denies fever, chills, palpitations, SOB, ROSSI, CP, abdominal pain, N/V/D, constipation, urinary symptoms or hematuria,decreased appetite, changes in PO intake, light headiness, dizziness, or headaches.     In the ED he was initially hypertensive with improvement over ED course, otherwise HDS and afebrile.  CBC with WBC 8, H/H 13.4/40.6, platelets 466.  Chemistry was , K 4.3, chloride 103, CO2 23, BUN 31, SCr 1.4 (baseline 1-1.6), glucose 147.  LFTs unremarkable.  CRP 81.  CPK 82.  Blood cultures in process.  CT C-spine with no acute process seen. DJD and " bilateral areas of neural foraminal narrowing. CTH with a punctate focus of low attenuation along the medial aspect of the caudate and as well as within the anterior limb of the right internal capsule.  Findings likely reflect that of remote infarct however no previous exams are available for comparison and therefore remain age-indeterminate.  Correlation with current symptomatology recommended. Sequela of chronic microvascular ischemic change and senescent change.  Mild mastoid effusions. R tib/fib XR with DJD of the knee and ankle with spurs on the patella.  No fracture dislocation bone destruction or periosteal reaction seen. R hip and pelvis with severe advanced DJD and impingement change.  There is likely a chronic labral tear.  No acute fracture dislocation bone destruction seen.    The patient was placed in observation to the Hospital Medicine Service for further evaluation and treatment.     Overview/Hospital Course:  No notes on file    Interval History: Working with therapy.  Had BM today. Reports feeling better.  Working on placement in NH.     Review of Systems   Constitutional:  Positive for activity change. Negative for diaphoresis.   HENT:  Negative for congestion.    Eyes:  Negative for visual disturbance.   Respiratory:  Negative for cough and shortness of breath.    Gastrointestinal:  Positive for constipation (NO BM in one week). Negative for abdominal distention, abdominal pain, diarrhea, nausea and vomiting.   Genitourinary:  Negative for difficulty urinating and dysuria.   Musculoskeletal:  Positive for arthralgias and gait problem.   Neurological:  Positive for weakness. Negative for dizziness and headaches.     Objective:     Vital Signs (Most Recent):  Temp: 97.6 °F (36.4 °C) (12/10/24 0725)  Pulse: 83 (12/10/24 1340)  Resp: 17 (12/10/24 1340)  BP: (!) 158/75 (12/10/24 1340)  SpO2: 98 % (12/10/24 1340) Vital Signs (24h Range):  Temp:  [97.6 °F (36.4 °C)] 97.6 °F (36.4 °C)  Pulse:  [67-96]  "83  Resp:  [11-18] 17  SpO2:  [93 %-99 %] 98 %  BP: (128-176)/() 158/75     Weight: (!) 140.5 kg (309 lb 11.9 oz)  Body mass index is 43.2 kg/m².    Intake/Output Summary (Last 24 hours) at 12/10/2024 1405  Last data filed at 12/10/2024 1358  Gross per 24 hour   Intake 960 ml   Output 2550 ml   Net -1590 ml         Physical Exam  Vitals and nursing note reviewed.   HENT:      Head: Normocephalic.      Mouth/Throat:      Mouth: Mucous membranes are moist.   Cardiovascular:      Rate and Rhythm: Normal rate.   Pulmonary:      Effort: Pulmonary effort is normal.   Abdominal:      General: Abdomen is flat.      Palpations: Abdomen is soft.   Musculoskeletal:         General: Swelling (BLLE) present.      Comments: Decreased ROM to BBLE   Skin:     General: Skin is dry.      Comments: BLLE dry, thick skin, wound right shin   Neurological:      Mental Status: He is alert. Mental status is at baseline.             Significant Labs: All pertinent labs within the past 24 hours have been reviewed.  BMP:   Recent Labs   Lab 12/09/24  0415   *   *   K 4.2      CO2 23   BUN 29*   CREATININE 1.3   CALCIUM 9.2     CBC: No results for input(s): "WBC", "HGB", "HCT", "PLT" in the last 48 hours.  CMP:   Recent Labs   Lab 12/09/24  0415   *   K 4.2      CO2 23   *   BUN 29*   CREATININE 1.3   CALCIUM 9.2   ANIONGAP 9       Significant Imaging: I have reviewed all pertinent imaging results/findings within the past 24 hours.  X-Ray Chest 1 View  Narrative: EXAMINATION:  XR CHEST 1 VIEW    CLINICAL HISTORY:  NH placement-r/o Tb;    TECHNIQUE:  Single frontal view of the chest was performed.    COMPARISON:  November 21, 2024    FINDINGS:  The cardiac silhouette is not enlarged.  There is no significant pleural effusion.  The osseous structures are intact.  The lungs are clear.  Impression: No significant abnormality or detrimental interval change    Electronically signed by: Kaitlynn Doshi, " MD  Date:    12/10/2024  Time:    08:07        Assessment and Plan     * Problem related to discharge planning  Chronic wounds; BLE   Venous stasis ulcers of both lower extremities   Chronic pain   Impaired mobility and activities of daily living   Recurrent falls  -placed in observation due to fall at home and inability to complete ADLS with decreased caregiver support and likely homelessness  -at admission HDS and afebrile  -ED workup detailed above  -PT/OT consulted   -CM/SW to follow with likely nursing home placement   -continue home baclofen and gabapentin  -PRN supportive care for pain   -dose/medication adjustment as appropriate   -Wound Care consulted  -dressing changes per their recommendations  -fall precautions   -MRI lumbar spine ordered--pt declined  - WC placed wound vac today    Anemia due to stage 3 chronic kidney disease  -chronic   -H/H stable at admission   -trend CBC over course and transfuse if becomes clinically indicated    CKD (chronic kidney disease) stage 3, GFR 30-59 ml/min  -chronic   -SCr 1.4 at admission --1.3 on 12/7  -baseline SCr 1-1.6   -avoid nephrotoxins and hypotension as able, renally dose medications    HTN (hypertension)  -chronic   -controlled   -continue home amlodipine, losartan, and HCTZ  -dose/medication adjustment as appropriate     Insulin dependent type 2 diabetes mellitus  -chronic   -not controlled   -most recent hemoglobin A1c 6.9   -hold home metformin and begin low-dose SSI  -dose/medication adjustment as appropriate   -monitor accuchecks AC/HS and PRN hypoglycemic protocol       VTE Risk Mitigation (From admission, onward)           Ordered     heparin (porcine) injection 5,000 Units  Every 8 hours         12/06/24 1545     IP VTE HIGH RISK PATIENT  Once         12/06/24 1545     Place sequential compression device  Until discontinued         12/06/24 1545                    Discharge Planning   KELSEY: 12/11/2024     Code Status: Full Code   Medical Readiness for  Discharge Date:   Discharge Plan A: New Nursing Home placement - [facility]   Discharge Delays: None known at this time      Collette Sahu DNP  Department of Hospital Medicine   [facility] - Med Surg ([address])

## 2024-12-10 NOTE — PT/OT/SLP PROGRESS
Physical Therapy Treatment    Patient Name:  Riaz Guerra Jr.   MRN:  [id number]    Recommendations:     Discharge Recommendations: Moderate Intensity Therapy  Discharge Equipment Recommendations: to be determined by next level of care, wheelchair, walker, rolling  Barriers to discharge: Inaccessible home and Decreased caregiver support    Assessment:     Riaz Guerra Jr. is a 74 y.o. male admitted with a medical diagnosis of Problem related to discharge planning.  He presents with the following impairments/functional limitations: weakness, gait instability, pain, edema, impaired balance, impaired cardiopulmonary response to activity, impaired endurance, impaired functional mobility, impaired self care skills, impaired skin, decreased coordination, decreased lower extremity function, decreased ROM, decreased safety awareness, decreased upper extremity function.    Supine>sit with maxA/HHA  Sit>stand from EOB with dayton rollator and modA  Bed>rollator seat with modA and increased time, using stand/pivot (180 degrees)  Propelled self on rollator seat with BLE x 10 feet, mod(I)  Pt gratified to get OOB, no gait performed  Rec Moderate Intensity Therapy    Rehab Prognosis: Fair; patient would benefit from acute skilled PT services to address these deficits and reach maximum level of function.    Recent Surgery: * No surgery found *      Plan:     During this hospitalization, patient to be seen 5 x/week to address the identified rehab impairments via gait training, therapeutic activities, therapeutic exercises, neuromuscular re-education, wheelchair management/training and progress toward the following goals:    Plan of Care Expires:  01/08/25    Subjective     Chief Complaint: wanting to be up  Patient/Family Comments/goals: so if the SNF doesn't take me, where will they put me?  Pain/Comfort:  Pain Rating 1: 0/10  Pain Rating Post-Intervention 1: 0/10      Objective:     Communicated with nurse Edmond  prior to session.  Patient found HOB elevated with telemetry, peripheral IV, wound vac upon PT entry to room.     General Precautions: Standard, diabetic, fall  Orthopedic Precautions: N/A  Braces: N/A  Respiratory Status: Room air     Functional Mobility:  Bed Mobility:     Supine to Sit: maximal assistance  Transfers:     Sit to Stand:  moderate assistance with 4 wheeled walker  Bed to Chair: moderate assistance with  4 wheeled walker  using  Stand Pivot and (pt turning 180 degrees to sit on rollator seat, with increased time needed)      AM-PAC 6 CLICK MOBILITY  Turning over in bed (including adjusting bedclothes, sheets and blankets)?: 2  Sitting down on and standing up from a chair with arms (e.g., wheelchair, bedside commode, etc.): 2  Moving from lying on back to sitting on the side of the bed?: 2  Moving to and from a bed to a chair (including a wheelchair)?: 2  Need to walk in hospital room?: 1  Climbing 3-5 steps with a railing?: 1  Basic Mobility Total Score: 10       Treatment & Education:  Propelled self on rollator seat with BLE x 10 feet, mod(I)    Transfer as noted    Patient left  seated on rollator seat  with all lines intact, call button in reach, and nurse Feli notified..    GOALS:   Multidisciplinary Problems       Physical Therapy Goals          Problem: Physical Therapy    Goal Priority Disciplines Outcome Interventions   Physical Therapy Goal     PT, PT/OT Progressing    Description: Goals to be met by: 25    Patient will increase functional independence with mobility by performin. Sit<>stand with Max A with RW.  2. Gait x 10 ft feet with RW with Mod A.  3. Supine<>sit with Mod A.                           Time Tracking:     PT Received On: 12/10/24  PT Start Time: 1507     PT Stop Time: 1543  PT Total Time (min): 36 min     Billable Minutes: Therapeutic Activity 36    (Nursing later assisted pt back to bed)    Treatment Type: Treatment  PT/PTA: PTA     Number of PTA visits  since last PT visit: 1     12/10/2024

## 2024-12-10 NOTE — PLAN OF CARE
Problem: Occupational Therapy  Goal: Occupational Therapy Goal  Description: Goals to be met by: 12/22/2024     Patient will increase functional independence with ADLs by performing:    UE Dressing with Minimal Assistance.  LE Dressing with Maximum Assistance and Assistive Devices as needed.  Grooming while seated with Set-up Assistance.  Toileting from bedside commode with Maximum Assistance for hygiene and clothing management. MET 12/10/2024  Toilet transfer to bedside commode with Maximum Assistance. MET 12/10/2024    Goals updated 12/10/2024; to be met by 12/22/2024    Riaz is able to transfer from EOB to bed side commode with mod(A) using step pivot transfer.  Riaz is able to tolerate standing at EOB with DME for balance with CGA.    12/10/2024 1247 by VALENTIN Padilla, OTR/L  Outcome: Adequate for Care Transition  12/10/2024 1246 by VALENTIN Padilla, OTR/L  Outcome: Progressing

## 2024-12-10 NOTE — CONSULTS
"  [facility] - [facility] ([address])  Adult Nutrition  Consult Note    SUMMARY     Recommendations  1) Zach BID to promote wound healing    2) Continue diabetic diet as tolerated    3) RD to monitor and follow up    Goals: Pt will be able to tolerate >75% EEN/EPN by RD follow up  Nutrition Goal Status: new  Communication of RD Recs:  (POC)    Assessment and Plan  Nutrition Problem  Increased protein needs    Related to (etiology):   Wound healing    Signs and Symptoms (as evidenced by):   Bilateral venous stasis ulcers  NPWT     Interventions/Recommendations (treatment strategy):  Medical food supplement: zach  CHO modified diet  Collaboration of care with other providers    Nutrition Diagnosis Status:   New      Reason for Assessment    Reason For Assessment: consult (low lonnie)  Diagnosis:  (Problem related to discharge planning)  General Information Comments: Pt receiving a diabetic diet, tolerating meals with no GI issues. Good appetite noted. NPWT to bilateral venous stasis ulcers. Recommend Zach BID to promote wound healing. Pt waiting placement. PIV. lonnie 16 - R calf. NFPE pt nourished.  Nutrition Discharge Planning: dc on diabetic diet    Nutrition Related Social Determinants of Health: SDOH: Adequate food in home environment    Nutrition Risk Screen    Nutrition Risk Screen: large or nonhealing wound, burn or pressure injury    Nutrition/Diet History    Spiritual, Cultural Beliefs, Jewish Practices, Values that Affect Care:  (None stated.)  Food Allergies: NKFA  Factors Affecting Nutritional Intake: None identified at this time    Anthropometrics    Temp: 98.2 °F (36.8 °C)  Height Method: Stated  Height: 5' 11" (180.3 cm)  Height (inches): 71 in  Weight Method: Bed Scale  Weight: (!) 140.5 kg (309 lb 11.9 oz)  Weight (lb): (!) 309.75 lb  Ideal Body Weight (IBW), Male: 172 lb  % Ideal Body Weight, Male (lb): 180.09 %  BMI (Calculated): 43.2  BMI Grade: greater than 40 - morbid obesity   " "    Lab/Procedures/Meds    Pertinent Labs Reviewed: reviewed  CBC:  No results for input(s): "WBC", "HGB", "HCT", "PLT" in the last 24 hours.  CMP:  Recent Labs   Lab 12/09/24  0415   CALCIUM 9.2   *   K 4.2   CO2 23      BUN 29*   CREATININE 1.3     BMP:   Recent Labs   Lab 12/07/24  0449 12/09/24  0415   * 161*    133*   K 4.1 4.2    101   CO2 26 23   BUN 31* 29*   CREATININE 1.3 1.3   CALCIUM 9.3 9.2   MG 2.1  --        Pertinent Medications Reviewed: reviewed  Scheduled Meds:   amLODIPine  10 mg Oral Daily    ammonium lactate   Topical (Top) BID    aspirin  81 mg Oral Daily    atorvastatin  20 mg Oral Daily    baclofen  10 mg Oral TID    fluticasone propionate  2 spray Each Nostril Daily    gabapentin  800 mg Oral BID    heparin (porcine)  5,000 Units Subcutaneous Q8H    losartan  100 mg Oral Daily    polyethylene glycol  17 g Oral BID    senna-docusate 8.6-50 mg  1 tablet Oral BID    tamsulosin  0.4 mg Oral Daily     Continuous Infusions:  PRN Meds:.  Current Facility-Administered Medications:     acetaminophen, 650 mg, Oral, Q4H PRN    dextrose 10%, 12.5 g, Intravenous, PRN    dextrose 10%, 25 g, Intravenous, PRN    glucagon (human recombinant), 1 mg, Intramuscular, PRN    glucose, 16 g, Oral, PRN    glucose, 24 g, Oral, PRN    HYDROcodone-acetaminophen, 1 tablet, Oral, Q4H PRN    HYDROcodone-acetaminophen, 1 tablet, Oral, Q4H PRN    insulin aspart U-100, 0-5 Units, Subcutaneous, QID (AC + HS) PRN    melatonin, 6 mg, Oral, Nightly PRN    ondansetron, 8 mg, Oral, Q8H PRN    ondansetron, 4 mg, Intravenous, Q8H PRN    Estimated/Assessed Needs    Weight Used For Calorie Calculations: (!) 140.5 kg (309 lb 11.9 oz)  Energy Calorie Requirements (kcal): 2600  Energy Need Method: [place]-St Jeor (x 1.2)  Protein Requirements: 126 g (0.9 g/kg)  Weight Used For Protein Calculations: (!) 140.5 kg (309 lb 11.9 oz)  Fluid Requirements (mL): 1 mL/kcal  Estimated Fluid Requirement Method:  (or " per MD)  RDA Method (mL): 2600  CHO Requirement: 325 g      Nutrition Prescription Ordered    Current Diet Order: Diabetic    Evaluation of Received Nutrient/Fluid Intake    I/O: -402 mL since admit  Energy Calories Required: meeting needs  Protein Required: meeting needs  Fluid Required: meeting needs  Comments: LBM: 12/5  Tolerance: tolerating  % Intake of Estimated Energy Needs: 50 - 75 %  % Meal Intake: 50 - 75 %    Nutrition Risk    Level of Risk/Frequency of Follow-up: moderate       Monitor and Evaluation    Food and Nutrient Intake: food and beverage intake  Food and Nutrient Adminstration: diet order  Physical Activity and Function: factors affecting access to physical activity, nutrition-related ADLs and IADLs  Anthropometric Measurements: weight, body mass index  Biochemical Data, Medical Tests and Procedures: electrolyte and renal panel, gastrointestinal profile, glucose/endocrine profile, inflammatory profile, lipid profile  Nutrition-Focused Physical Findings: overall appearance, skin       Nutrition Follow-Up    RD Follow-up?: Yes    Patricia Zamorano RDN, SURIN

## 2024-12-10 NOTE — PLAN OF CARE
Britt requested an update on patient's authorization from [facility] for SNF. [facility] is requesting a Peer to Peer. Britt informed provider and provided the contact information.

## 2024-12-10 NOTE — PT/OT/SLP PROGRESS
Occupational Therapy   Treatment    Name: Riaz Guerra Jr.  MRN: [id number]  Admitting Diagnosis:  Problem related to discharge planning       Recommendations:     Discharge Recommendations: Moderate Intensity Therapy  Discharge Equipment Recommendations:  to be determined by next level of care  Barriers to discharge:   patient recently evicted due to medical needs.    Assessment:     Riaz Guerra Jr. is a 74 y.o. male with a medical diagnosis of Problem related to discharge planning.  He presents with wound vac in place and report of not having had bowel movement for 3 days. Performance deficits affecting function are weakness, impaired endurance, impaired self care skills, impaired functional mobility, gait instability, impaired balance, decreased coordination, decreased upper extremity function, decreased lower extremity function, decreased safety awareness, pain, edema, impaired skin, decreased ROM, impaired cardiopulmonary response to activity.     Rehab Prognosis:  Good; patient would benefit from acute skilled OT services to address these deficits and reach maximum level of function.       Plan:     Patient to be seen 4 x/week to address the above listed problems via self-care/home management, therapeutic activities, therapeutic exercises  Plan of Care Expires: 12/22/24  Plan of Care Reviewed with: patient    Subjective     Chief Complaint: I don't think it's right what they did to me. I have been paying rent for 9 years. I was thinking I should make a case out of it, but I don't have the energy. I don't want to end up in a nursing home. I could live in my car if need be. There are enough people that will come and help me in and out of the car.  Patient/Family Comments/goals: to be able to live in independent living housing[facility].   During session, goal to get to the commode.   Pain/Comfort:  Pain Rating 1:  (rated pain initially 8/10, but then stated it was tolerable,  indicating decreased understanding of functional pain scale.)  Location - Side 1: Right  Location - Orientation 1: lateral  Location 1: leg (general)  Pain Addressed 1: Reposition, Distraction    Objective:     Communicated with: RN prior to session. Patient found supine with telemetry, peripheral IV, wound vac upon OT entry to room. Agreeable to therapy session.    General Precautions: Standard, diabetic, fall, other (see comments) (wound right lower leg)    Orthopedic Precautions:N/A  Braces: N/A  Respiratory Status: Room air     Occupational Performance:     Bed Mobility:    Patient completed Rolling/Turning to Left with  - did not occur  Patient completed Rolling/Turning to Right with maximal assistance  Patient completed Scooting/Bridging with maximal assistance once seated partially at EOB. In supine, with bed in trendelenburg, able to scoot with min(A) provided for (R) LE management.  Patient completed Supine to Sit with maximal assistance  Patient completed Sit to Supine with maximal assistance; log rolling attempted.    Functional Mobility/Transfers: education on technique given; limited due to fear of falling and pain.  Patient completed Sit <> Stand Transfer with maximal assistance  with  no assistive device   Patient completed Toilet Transfer Scoot Pivot technique with maximal assistance with  no AD  Sit to stand from commode with min(A), but not coming to complete stand due to pain in LE.   Scoot transfer back to bed with max(A)  Scooting along side EOB with max(A), and 3 attempts.   Functional Mobility: did not occur.    Activities of Daily Living:  Bathing: total assistance ; set up for perineal hygiene once in supine.  Upper Body Dressing: minimum assistance while seated  Lower Body Dressing: total assistance donning and doffing socks, while seated. Educated on existence of AD to ease LE dressing.  Toileting: supervision to wipe while seated, and use of urinal when in supine, however, due to edema in  scrotum, difficulty managing urinal when seated on bed side commode, causing spillage on self and on floor.    [facility] 6 Click ADL: 16    Treatment & Education:  Bed mobility, transfer, ADL, education. See for details under occupational performance.    Patient left with bed in partial chair position with all lines intact, call button in reach, and RN notified.    GOALS:   Multidisciplinary Problems       Occupational Therapy Goals          Problem: Occupational Therapy    Goal Priority Disciplines Outcome Interventions   Occupational Therapy Goal     OT, PT/OT Adequate for Care Transition    Description: Goals to be met by: 12/22/2024     Patient will increase functional independence with ADLs by performing:    UE Dressing with Minimal Assistance.  LE Dressing with Maximum Assistance and Assistive Devices as needed.  Grooming while seated with Set-up Assistance.  Toileting from bedside commode with Maximum Assistance for hygiene and clothing management. MET 12/10/2024  Toilet transfer to bedside commode with Maximum Assistance. MET 12/10/2024    Goals updated 12/10/2024; to be met by 12/22/2024    Riaz is able to transfer from EOB to bed side commode with mod(A) using step pivot transfer.  Riaz is able to tolerate standing at EOB with DME for balance with CGA.                         Time Tracking:     OT Date of Treatment: 12/10/24  OT Start Time: 0848  OT Stop Time: 0956  OT Total Time (min): 68 min    Billable Minutes:Self Care/Home Management 38  Therapeutic Activity 30    VALENTIN Mata OTR/L, CEAS-I  12/10/2024

## 2024-12-10 NOTE — PLAN OF CARE
intermediate NURSING HOME ORDERS    12/10/2024  [facility] ([facility])  [facility] ([address])  [address]  [facility] [address]  Dept: [phone]  Loc: [phone]     Admit to Nursing Home:      Diagnoses:  Active Hospital Problems    Diagnosis  POA    *Problem related to discharge planning [Z75.8]  Yes     Priority: 1 - High    Anemia due to stage 3 chronic kidney disease [N18.30, D63.1]  Yes     Priority: 6     Chronic wounds; BLE [T14.8XXA]  Yes    Impaired mobility and activities of daily living [Z74.09, Z78.9]  Yes    Venous stasis ulcers of both lower extremities [I83.019, I83.029, L97.919, L97.929]  Yes    Insulin dependent type 2 diabetes mellitus [E11.9, Z79.4]  Not Applicable    HTN (hypertension) [I10]  Yes    Chronic pain [G89.29]  Yes    CKD (chronic kidney disease) stage 3, GFR 30-59 ml/min [N18.30]  Yes      Resolved Hospital Problems   No resolved problems to display.       Patient is homebound due to:  Problem related to discharge planning    Allergies:  Review of patient's allergies indicates:   Allergen Reactions    Lisinopril Other (See Comments)     cough       Vitals:  Routine    Diet: diabetic diet: 1800 calorie    Activities:   Activity as tolerated    Goals of Care Treatment Preferences:  Code Status: Full Code      Labs:  routine    Nursing Precautions:  Fall and Pressure ulcer prevention    Consults:   PT to evaluate and treat- 5 times a week, OT to evaluate and treat- 5 times a week, and Wound Care     Miscellaneous Care: Routine Skin for Bedridden     Wound Care:     Every Mon & Thurs @ 1400    Right lower extremity   Peel&Stick Vac Dressing change. Cleanse with Vashe, pat dry. Change Dressing.     Negative pressure settin mmHg      Ensure that canister is engaged into pump, replace canister weekly or when full b. If leak alarm occurs, apply additional thin film at location of leak c. Alarm troubleshooting contact [facility]. Phone # is [phone], [employer]  option 5. [employer] representative is available 24 hours a day 7 days per week. e. When therapy is discontinued or patient is discharged, please return non disposable NPWT unit per facility guidelines.                    Diabetes Care:  per facility      Medications: Discontinue all previous medication orders, if any. See new list below.     Medication List        START taking these medications      fluticasone propionate 50 mcg/actuation nasal spray  Commonly known as: FLONASE  2 sprays (100 mcg total) by Each Nostril route once daily.            CONTINUE taking these medications      amLODIPine 10 MG tablet  Commonly known as: NORVASC  Take 1 tablet (10 mg total) by mouth once daily.     aspirin 81 MG EC tablet  Commonly known as: ECOTRIN  Take 81 mg by mouth once daily.     atorvastatin 20 MG tablet  Commonly known as: LIPITOR  Take 20 mg by mouth once daily.     baclofen 10 MG tablet  Commonly known as: LIORESAL  Take 10 mg by mouth 3 (three) times daily.     gabapentin 800 MG tablet  Commonly known as: NEURONTIN  Take 1 tablet (800 mg total) by mouth 2 (two) times daily.     HYDROcodone-acetaminophen 7.5-325 mg per tablet  Commonly known as: NORCO  Take 1 tablet by mouth every 8 (eight) hours as needed for Pain.     losartan-hydrochlorothiazide 100-12.5 mg 100-12.5 mg Tab  Commonly known as: HYZAAR  Take 1 tablet by mouth once daily.     metFORMIN 1000 MG tablet  Commonly known as: GLUCOPHAGE  Take 1,000 mg by mouth 2 (two) times daily with meals.     tamsulosin 0.4 mg Cap  Commonly known as: FLOMAX  Take by mouth once daily.                Immunizations Administered as of 12/10/2024       No immunizations on file.                  _________________________________  Collette Sahu, DARSHANA  12/10/2024

## 2024-12-10 NOTE — SUBJECTIVE & OBJECTIVE
Interval History: Working with therapy.  Had BM today. Reports feeling better.  Working on placement in NH.     Review of Systems   Constitutional:  Positive for activity change. Negative for diaphoresis.   HENT:  Negative for congestion.    Eyes:  Negative for visual disturbance.   Respiratory:  Negative for cough and shortness of breath.    Gastrointestinal:  Positive for constipation (NO BM in one week). Negative for abdominal distention, abdominal pain, diarrhea, nausea and vomiting.   Genitourinary:  Negative for difficulty urinating and dysuria.   Musculoskeletal:  Positive for arthralgias and gait problem.   Neurological:  Positive for weakness. Negative for dizziness and headaches.     Objective:     Vital Signs (Most Recent):  Temp: 97.6 °F (36.4 °C) (12/10/24 0725)  Pulse: 83 (12/10/24 1340)  Resp: 17 (12/10/24 1340)  BP: (!) 158/75 (12/10/24 1340)  SpO2: 98 % (12/10/24 1340) Vital Signs (24h Range):  Temp:  [97.6 °F (36.4 °C)] 97.6 °F (36.4 °C)  Pulse:  [67-96] 83  Resp:  [11-18] 17  SpO2:  [93 %-99 %] 98 %  BP: (128-176)/() 158/75     Weight: (!) 140.5 kg (309 lb 11.9 oz)  Body mass index is 43.2 kg/m².    Intake/Output Summary (Last 24 hours) at 12/10/2024 1405  Last data filed at 12/10/2024 1358  Gross per 24 hour   Intake 960 ml   Output 2550 ml   Net -1590 ml         Physical Exam  Vitals and nursing note reviewed.   HENT:      Head: Normocephalic.      Mouth/Throat:      Mouth: Mucous membranes are moist.   Cardiovascular:      Rate and Rhythm: Normal rate.   Pulmonary:      Effort: Pulmonary effort is normal.   Abdominal:      General: Abdomen is flat.      Palpations: Abdomen is soft.   Musculoskeletal:         General: Swelling (BLLE) present.      Comments: Decreased ROM to BBLE   Skin:     General: Skin is dry.      Comments: BLLE dry, thick skin, wound right shin   Neurological:      Mental Status: He is alert. Mental status is at baseline.             Significant Labs: All pertinent  "labs within the past 24 hours have been reviewed.  BMP:   Recent Labs   Lab 12/09/24  0415   *   *   K 4.2      CO2 23   BUN 29*   CREATININE 1.3   CALCIUM 9.2     CBC: No results for input(s): "WBC", "HGB", "HCT", "PLT" in the last 48 hours.  CMP:   Recent Labs   Lab 12/09/24  0415   *   K 4.2      CO2 23   *   BUN 29*   CREATININE 1.3   CALCIUM 9.2   ANIONGAP 9       Significant Imaging: I have reviewed all pertinent imaging results/findings within the past 24 hours.  X-Ray Chest 1 View  Narrative: EXAMINATION:  XR CHEST 1 VIEW    CLINICAL HISTORY:  NH placement-r/o Tb;    TECHNIQUE:  Single frontal view of the chest was performed.    COMPARISON:  November 21, 2024    FINDINGS:  The cardiac silhouette is not enlarged.  There is no significant pleural effusion.  The osseous structures are intact.  The lungs are clear.  Impression: No significant abnormality or detrimental interval change    Electronically signed by: Kaitlynn Doshi MD  Date:    12/10/2024  Time:    08:07      " none

## 2024-12-11 LAB
BACTERIA BLD CULT: NORMAL
BACTERIA BLD CULT: NORMAL
POCT GLUCOSE: 138 MG/DL (ref 70–110)
POCT GLUCOSE: 145 MG/DL (ref 70–110)
POCT GLUCOSE: 145 MG/DL (ref 70–110)
POCT GLUCOSE: 280 MG/DL (ref 70–110)
POCT GLUCOSE: 306 MG/DL (ref 70–110)
TB INDURATION 48 - 72 HR READ: NORMAL
TB SKIN TEST 48 - 72 HR READ: NORMAL

## 2024-12-11 PROCEDURE — 63600175 PHARM REV CODE 636 W HCPCS: Performed by: NURSE PRACTITIONER

## 2024-12-11 PROCEDURE — 94761 N-INVAS EAR/PLS OXIMETRY MLT: CPT

## 2024-12-11 PROCEDURE — 97530 THERAPEUTIC ACTIVITIES: CPT | Mod: CQ

## 2024-12-11 PROCEDURE — 97530 THERAPEUTIC ACTIVITIES: CPT

## 2024-12-11 PROCEDURE — G0378 HOSPITAL OBSERVATION PER HR: HCPCS

## 2024-12-11 PROCEDURE — 96372 THER/PROPH/DIAG INJ SC/IM: CPT | Performed by: NURSE PRACTITIONER

## 2024-12-11 PROCEDURE — 25000242 PHARM REV CODE 250 ALT 637 W/ HCPCS: Performed by: NURSE PRACTITIONER

## 2024-12-11 PROCEDURE — 25000003 PHARM REV CODE 250: Performed by: NURSE PRACTITIONER

## 2024-12-11 PROCEDURE — 97535 SELF CARE MNGMENT TRAINING: CPT

## 2024-12-11 RX ORDER — LIDOCAINE 50 MG/G
1 PATCH TOPICAL
Status: DISCONTINUED | OUTPATIENT
Start: 2024-12-11 | End: 2024-12-13 | Stop reason: HOSPADM

## 2024-12-11 RX ADMIN — AMLODIPINE BESYLATE 10 MG: 5 TABLET ORAL at 10:12

## 2024-12-11 RX ADMIN — BACLOFEN 10 MG: 10 TABLET ORAL at 10:12

## 2024-12-11 RX ADMIN — ASPIRIN 81 MG: 81 TABLET, COATED ORAL at 10:12

## 2024-12-11 RX ADMIN — AMMONIUM LACTATE: 120 LOTION TOPICAL at 10:12

## 2024-12-11 RX ADMIN — HYDROCODONE BITARTRATE AND ACETAMINOPHEN 1 TABLET: 10; 325 TABLET ORAL at 05:12

## 2024-12-11 RX ADMIN — LOSARTAN POTASSIUM 100 MG: 50 TABLET, FILM COATED ORAL at 10:12

## 2024-12-11 RX ADMIN — GABAPENTIN 800 MG: 400 CAPSULE ORAL at 08:12

## 2024-12-11 RX ADMIN — FLUTICASONE PROPIONATE 100 MCG: 50 SPRAY, METERED NASAL at 10:12

## 2024-12-11 RX ADMIN — TAMSULOSIN HYDROCHLORIDE 0.4 MG: 0.4 CAPSULE ORAL at 10:12

## 2024-12-11 RX ADMIN — BACLOFEN 10 MG: 10 TABLET ORAL at 04:12

## 2024-12-11 RX ADMIN — AMMONIUM LACTATE: 120 LOTION TOPICAL at 08:12

## 2024-12-11 RX ADMIN — SENNOSIDES AND DOCUSATE SODIUM 1 TABLET: 50; 8.6 TABLET ORAL at 08:12

## 2024-12-11 RX ADMIN — HEPARIN SODIUM 5000 UNITS: 5000 INJECTION INTRAVENOUS; SUBCUTANEOUS at 05:12

## 2024-12-11 RX ADMIN — INSULIN ASPART 1 UNITS: 100 INJECTION, SOLUTION INTRAVENOUS; SUBCUTANEOUS at 08:12

## 2024-12-11 RX ADMIN — LIDOCAINE 1 PATCH: 50 PATCH CUTANEOUS at 04:12

## 2024-12-11 RX ADMIN — BACLOFEN 10 MG: 10 TABLET ORAL at 08:12

## 2024-12-11 RX ADMIN — GABAPENTIN 800 MG: 400 CAPSULE ORAL at 10:12

## 2024-12-11 RX ADMIN — HYDROCODONE BITARTRATE AND ACETAMINOPHEN 1 TABLET: 10; 325 TABLET ORAL at 01:12

## 2024-12-11 RX ADMIN — HEPARIN SODIUM 5000 UNITS: 5000 INJECTION INTRAVENOUS; SUBCUTANEOUS at 09:12

## 2024-12-11 RX ADMIN — ATORVASTATIN CALCIUM 20 MG: 20 TABLET, FILM COATED ORAL at 10:12

## 2024-12-11 NOTE — PT/OT/SLP PROGRESS
Occupational Therapy   Treatment    Name: Riaz Guerra Jr.  MRN: [id number]  Admitting Diagnosis:  Problem related to discharge planning       Recommendations:     Discharge Recommendations: Moderate Intensity Therapy  Discharge Equipment Recommendations:  to be determined by next level of care  Barriers to discharge:  Decreased caregiver support (Current functional level)    Assessment:     Riaz Guerra Jr. is a 74 y.o. male with a medical diagnosis of Problem related to discharge planning.  He presents with the following performance deficits affecting function: weakness, impaired endurance, pain, impaired self care skills, impaired functional mobility, gait instability, impaired balance, decreased ROM, decreased coordination, edema, impaired skin, decreased safety awareness, decreased lower extremity function, impaired cardiopulmonary response to activity, impaired joint extensibility.     Rehab Prognosis:  Fair; patient would benefit from acute skilled OT services to address these deficits and reach maximum level of function.       Plan:     Patient to be seen 4 x/week to address the above listed problems via self-care/home management, therapeutic activities, therapeutic exercises  Plan of Care Expires: 12/22/24  Plan of Care Reviewed with: patient    Subjective     Chief Complaint: Bilateral knee pain during mobility and standing  Patient/Family Comments/goals: Pt wanting to return to Indep level.   Pain/Comfort:  Pain Rating 1:  (Bilateral knees during mobility and standing. Pt asking for pain  cream or gel to rub on knees, NP and nurse notified.)    Objective:     Communicated with: nurse prior to session.  Patient found up in chair with telemetry, wound vac, peripheral IV upon OT entry to room.    General Precautions: Standard, diabetic, fall (Wound right LE)    Orthopedic Precautions:N/A  Braces: N/A  Respiratory Status: Room air     Occupational Performance:     Functional  Mobility/Transfers:  Sit to stand: Three attempts, First attempt: 25% stand at Max A with RW for 1 second, Second attempt: 50% at Max A with RW for 3 seconds, Third attempt: Full stand at Max A with RW for 20 seconds  Education on set up of sit to stand transition including hand and feet placement, use of momentum and counting to assist with sit to stand      [facility] 6 Click ADL: 15    Treatment & Education:    Role of OT, POC, importance of standing each treatment session to progress wtih ADL and ADL mobility, discharge recs     Patient left up in chair with all lines intact, call button in reach, and nurse notified    GOALS:   Multidisciplinary Problems       Occupational Therapy Goals          Problem: Occupational Therapy    Goal Priority Disciplines Outcome Interventions   Occupational Therapy Goal     OT, PT/OT Adequate for Care Transition    Description: Goals to be met by: 12/22/2024     Patient will increase functional independence with ADLs by performing:    UE Dressing with Minimal Assistance.  LE Dressing with Maximum Assistance and Assistive Devices as needed.  Grooming while seated with Set-up Assistance.  Toileting from bedside commode with Maximum Assistance for hygiene and clothing management. MET 12/10/2024  Toilet transfer to bedside commode with Maximum Assistance. MET 12/10/2024    Goals updated 12/10/2024; to be met by 12/22/2024    Riaz is able to transfer from EOB to bed side commode with mod(A) using step pivot transfer.  Riaz is able to tolerate standing at EOB with DME for balance with CGA.                         Time Tracking:     OT Date of Treatment: 12/11/24  OT Start Time: 1348  OT Stop Time: 1411  OT Total Time (min): 23 min    Billable Minutes:Therapeutic Activity 23    OT/TALA: OT          12/11/2024

## 2024-12-11 NOTE — PLAN OF CARE
Problem: Skin Injury Risk Increased  Goal: Skin Health and Integrity  Outcome: Progressing     Problem: Adult Inpatient Plan of Care  Goal: Plan of Care Review  Outcome: Progressing  Goal: Patient-Specific Goal (Individualized)  Outcome: Progressing  Goal: Absence of Hospital-Acquired Illness or Injury  Outcome: Progressing  Goal: Optimal Comfort and Wellbeing  Outcome: Progressing  Goal: Readiness for Transition of Care  Outcome: Progressing     Problem: Bariatric Environmental Safety  Goal: Safety Maintained with Care  Outcome: Progressing     Problem: Diabetes Comorbidity  Goal: Blood Glucose Level Within Targeted Range  Outcome: Progressing     Problem: Wound  Goal: Optimal Coping  Outcome: Progressing  Goal: Optimal Functional Ability  Outcome: Progressing  Goal: Absence of Infection Signs and Symptoms  Outcome: Progressing  Goal: Improved Oral Intake  Outcome: Progressing  Goal: Optimal Pain Control and Function  Outcome: Progressing  Goal: Skin Health and Integrity  Outcome: Progressing  Goal: Optimal Wound Healing  Outcome: Progressing     POC reviewed with patient. Patient verbalized understanding. AAOX4. VSS. Pain managed per prn medication orders. Call bell left within reach. Bed lowered and wheels locked. Patient free of falls and injury.

## 2024-12-11 NOTE — PLAN OF CARE
[facility] ([address])      HOME HEALTH ORDERS  FACE TO FACE ENCOUNTER    Patient Name: Riaz Guerra Jr.  YOB: 1950    PCP: Berhane Alvarez MD   PCP Address: [address] [place] [address] / [facility] [address]  PCP Phone Number: [phone]  PCP Fax: [phone]    Encounter Date: 12/13/2024    Admit to Home Health    Diagnoses:  Active Hospital Problems    Diagnosis  POA    *Problem related to discharge planning [Z75.8]  Yes     Priority: 1 - High    Anemia due to stage 3 chronic kidney disease [N18.30, D63.1]  Yes     Priority: 6     Chronic wounds; BLE [T14.8XXA]  Yes    Impaired mobility and activities of daily living [Z74.09, Z78.9]  Yes    Venous stasis ulcers of both lower extremities [I83.019, I83.029, L97.919, L97.929]  Yes    Insulin dependent type 2 diabetes mellitus [E11.9, Z79.4]  Not Applicable    HTN (hypertension) [I10]  Yes    Chronic pain [G89.29]  Yes    CKD (chronic kidney disease) stage 3, GFR 30-59 ml/min [N18.30]  Yes      Resolved Hospital Problems   No resolved problems to display.       Follow Up Appointments:  No future appointments.    Allergies:  Review of patient's allergies indicates:   Allergen Reactions    Lisinopril Other (See Comments)     cough       Medications: Review discharge medications with patient and family and provide education.    Current Facility-Administered Medications   Medication Dose Route Frequency Provider Last Rate Last Admin    acetaminophen tablet 650 mg  650 mg Oral Q4H PRN Andie Cheema, NP        amLODIPine tablet 10 mg  10 mg Oral Daily Andie Cheema, NP   10 mg at 12/11/24 1030    ammonium lactate 12 % lotion   Topical (Top) BID Collette Sahu DNP   Given at 12/11/24 1032    aspirin EC tablet 81 mg  81 mg Oral Daily Andie Cheema, NP   81 mg at 12/11/24 1030    atorvastatin tablet 20 mg  20 mg Oral Daily Andie Cheema, NP   20 mg at 12/11/24 1030    baclofen tablet 10 mg  10 mg  Oral TID Andie Cheema, NP   10 mg at 12/11/24 1030    dextrose 10% bolus 125 mL 125 mL  12.5 g Intravenous PRN Andie Cheema, NP        dextrose 10% bolus 250 mL 250 mL  25 g Intravenous PRN Andie Cheema, NP        fluticasone propionate 50 mcg/actuation nasal spray 100 mcg  2 spray Each Nostril Daily Collette Sahu, DARSHANA   100 mcg at 12/11/24 1034    gabapentin capsule 800 mg  800 mg Oral BID Collette Sahu, DNP   800 mg at 12/11/24 1030    glucagon (human recombinant) injection 1 mg  1 mg Intramuscular PRN Andie Cheema, NP        glucose chewable tablet 16 g  16 g Oral PRN Andie Cheema, NP        glucose chewable tablet 24 g  24 g Oral PRN Andie Cheema, NP        heparin (porcine) injection 5,000 Units  5,000 Units Subcutaneous Q8H Andie Cheema, NP   5,000 Units at 12/11/24 0511    HYDROcodone-acetaminophen  mg per tablet 1 tablet  1 tablet Oral Q4H PRN Andie Cheema, NP   1 tablet at 12/11/24 0511    HYDROcodone-acetaminophen 5-325 mg per tablet 1 tablet  1 tablet Oral Q4H PRN Andie Cheema, NP   1 tablet at 12/06/24 1829    insulin aspart U-100 pen 0-5 Units  0-5 Units Subcutaneous QID (AC + HS) PRN Andie Cheema, NP   2 Units at 12/10/24 1401    losartan tablet 100 mg  100 mg Oral Daily Andie Cheema, NP   100 mg at 12/11/24 1030    melatonin tablet 6 mg  6 mg Oral Nightly PRN Andie Cheema, NP        ondansetron disintegrating tablet 8 mg  8 mg Oral Q8H PRN Andie Cheema, NP        ondansetron injection 4 mg  4 mg Intravenous Q8H PRN Andie Cheema, NP        polyethylene glycol packet 17 g  17 g Oral BID Collette Sahu, DNP   17 g at 12/09/24 2006    senna-docusate 8.6-50 mg per tablet 1 tablet  1 tablet Oral BID Andei Cheema NP   1 tablet at 12/10/24 2055    tamsulosin 24 hr capsule 0.4 mg  0.4 mg Oral Daily Andie Cheema NP   0.4 mg at 12/11/24 1030        Medication List        START  taking these medications      fluticasone propionate 50 mcg/actuation nasal spray  Commonly known as: FLONASE  2 sprays (100 mcg total) by Each Nostril route once daily.            CONTINUE taking these medications      amLODIPine 10 MG tablet  Commonly known as: NORVASC  Take 1 tablet (10 mg total) by mouth once daily.     aspirin 81 MG EC tablet  Commonly known as: ECOTRIN  Take 81 mg by mouth once daily.     atorvastatin 20 MG tablet  Commonly known as: LIPITOR  Take 20 mg by mouth once daily.     baclofen 10 MG tablet  Commonly known as: LIORESAL  Take 10 mg by mouth 3 (three) times daily.     gabapentin 800 MG tablet  Commonly known as: NEURONTIN  Take 1 tablet (800 mg total) by mouth 2 (two) times daily.     HYDROcodone-acetaminophen 7.5-325 mg per tablet  Commonly known as: NORCO  Take 1 tablet by mouth every 8 (eight) hours as needed for Pain.     losartan-hydrochlorothiazide 100-12.5 mg 100-12.5 mg Tab  Commonly known as: HYZAAR  Take 1 tablet by mouth once daily.     metFORMIN 1000 MG tablet  Commonly known as: GLUCOPHAGE  Take 1,000 mg by mouth 2 (two) times daily with meals.     tamsulosin 0.4 mg Cap  Commonly known as: FLOMAX  Take by mouth once daily.                I have seen and examined this patient within the last 30 days. My clinical findings that support the need for the home health skilled services and home bound status are the following:no   Weakness/numbness causing balance and gait disturbance due to Weakness/Debility making it taxing to leave home.  Requiring assistive device to leave home due to unsteady gait caused by  Weakness/Debility.     Diet:   cardiac diet, diabetic diet 1800 calorie, and 2 gram sodium diet    Labs:  none    Referrals/ Consults  Physical Therapy to evaluate and treat. Evaluate for home safety and equipment needs; Establish/upgrade home exercise program. Perform / instruct on therapeutic exercises, gait training, transfer training, and Range of Motion.  Occupational  Therapy to evaluate and treat. Evaluate home environment for safety and equipment needs. Perform/Instruct on transfers, ADL training, ROM, and therapeutic exercises.  Aide to provide assistance with personal care, ADLs, and vital signs.    Activities:   activity as tolerated    Nursing:   Agency to admit patient within 24 hours of hospital discharge unless specified on physician order or at patient request    SN to complete comprehensive assessment including routine vital signs. Instruct on disease process and s/s of complications to report to MD. Review/verify medication list sent home with the patient at time of discharge  and instruct patient/caregiver as needed. Frequency may be adjusted depending on start of care date.     Skilled nurse to perform up to 3 visits PRN for symptoms related to diagnosis    Notify MD if SBP > 160 or < 90; DBP > 90 or < 50; HR > 120 or < 50; Temp > 101; O2 < 88%    Ok to schedule additional visits based on staff availability and patient request on consecutive days within the home health episode.    When multiple disciplines ordered:    Start of Care occurs on Sunday - Wednesday schedule remaining discipline evaluations as ordered on separate consecutive days following the start of care.    Thursday SOC -schedule subsequent evaluations Friday and Monday the following week.     Friday - Saturday SOC - schedule subsequent discipline evaluations on consecutive days starting Monday of the following week.    For all post-discharge communication and subsequent orders please contact patient's primary care physician. If unable to reach primary care physician or do not receive response within 30 minutes, please contact [facility] for clinical staff order clarification    Miscellaneous   Routine Skin for Bedridden Patients: Instruct patient/caregiver to apply moisture barrier cream to all skin folds and wet areas in perineal area daily and after baths and all bowel movements.    Home Health  Aide:  Nursing Three times weekly, Physical Therapy Three times weekly, Occupational Therapy Three times weekly, and Home Health Aide Twice weekly    Wound Care Orders  Every Mon & Thurs    Right lower extremity    Cleanse with Vashe, pat dry. Change Dressing.       I certify that this patient is confined to his home and needs intermittent skilled nursing care, physical therapy, and occupational therapy.

## 2024-12-11 NOTE — SUBJECTIVE & OBJECTIVE
Interval History: c/o right knee pain and requested a brace. Will xray. P2P with [facility] today. They feel that he is not a SNF candidate from reading his chart.     Review of Systems   Constitutional:  Positive for activity change. Negative for diaphoresis.   HENT:  Negative for congestion.    Eyes:  Negative for visual disturbance.   Respiratory:  Negative for cough and shortness of breath.    Gastrointestinal:  Negative for abdominal distention, abdominal pain, constipation, diarrhea, nausea and vomiting.        BM 12/10   Genitourinary:  Negative for difficulty urinating and dysuria.   Musculoskeletal:  Positive for arthralgias and gait problem.   Neurological:  Positive for weakness. Negative for dizziness and headaches.     Objective:     Vital Signs (Most Recent):  Temp: 98.4 °F (36.9 °C) (12/11/24 0809)  Pulse: 64 (12/11/24 1113)  Resp: 18 (12/11/24 0809)  BP: (!) 140/80 (12/11/24 1030)  SpO2: 95 % (12/11/24 0809) Vital Signs (24h Range):  Temp:  [98 °F (36.7 °C)-98.4 °F (36.9 °C)] 98.4 °F (36.9 °C)  Pulse:  [64-93] 64  Resp:  [13-18] 18  SpO2:  [94 %-99 %] 95 %  BP: (130-167)/(68-99) 140/80     Weight: (!) 140.5 kg (309 lb 11.9 oz)  Body mass index is 43.2 kg/m².    Intake/Output Summary (Last 24 hours) at 12/11/2024 1200  Last data filed at 12/11/2024 0558  Gross per 24 hour   Intake 840 ml   Output 1150 ml   Net -310 ml         Physical Exam  Vitals and nursing note reviewed.   HENT:      Head: Normocephalic.      Mouth/Throat:      Mouth: Mucous membranes are moist.   Cardiovascular:      Rate and Rhythm: Normal rate.   Pulmonary:      Effort: Pulmonary effort is normal.   Abdominal:      General: Abdomen is flat.      Palpations: Abdomen is soft.   Musculoskeletal:         General: Swelling (BLLE) present.      Comments: Decreased ROM to BBLE   Skin:     General: Skin is dry.      Comments: BLLE dry, thick skin, wound right shin   Neurological:      Mental Status: He is alert. Mental status is at  "baseline.             Significant Labs: All pertinent labs within the past 24 hours have been reviewed.  BMP: No results for input(s): "GLU", "NA", "K", "CL", "CO2", "BUN", "CREATININE", "CALCIUM", "MG" in the last 48 hours.  CBC: No results for input(s): "WBC", "HGB", "HCT", "PLT" in the last 48 hours.  CMP: No results for input(s): "NA", "K", "CL", "CO2", "GLU", "BUN", "CREATININE", "CALCIUM", "PROT", "ALBUMIN", "BILITOT", "ALKPHOS", "AST", "ALT", "ANIONGAP", "EGFRNONAA" in the last 48 hours.    Invalid input(s): "[place]"    Significant Imaging: I have reviewed all pertinent imaging results/findings within the past 24 hours.  X-Ray Chest 1 View  Narrative: EXAMINATION:  XR CHEST 1 VIEW    CLINICAL HISTORY:  NH placement-r/o Tb;    TECHNIQUE:  Single frontal view of the chest was performed.    COMPARISON:  November 21, 2024    FINDINGS:  The cardiac silhouette is not enlarged.  There is no significant pleural effusion.  The osseous structures are intact.  The lungs are clear.  Impression: No significant abnormality or detrimental interval change    Electronically signed by: Kaitlynn Dosih MD  Date:    12/10/2024  Time:    08:07      "

## 2024-12-11 NOTE — PT/OT/SLP PROGRESS
Physical Therapy Treatment    Patient Name:  Riaz Guerra Jr.   MRN:  [id number]    Recommendations:     Discharge Recommendations: Moderate Intensity Therapy  Discharge Equipment Recommendations: to be determined by next level of care, wheelchair, walker, rolling  Barriers to discharge: Inaccessible home and Decreased caregiver support    Assessment:     Riaz Guerra Jr. is a 74 y.o. male admitted with a medical diagnosis of Problem related to discharge planning.  He presents with the following impairments/functional limitations: weakness, gait instability, pain, edema, impaired balance, impaired cardiopulmonary response to activity, impaired endurance, impaired functional mobility, impaired self care skills, impaired skin, decreased coordination, decreased lower extremity function, decreased ROM, decreased safety awareness, decreased upper extremity function.    Sit>stand from EOB with RW and maxA x 2  Bed>Chair with RW and modA x 2, using stand/pivot, cueing for sequencing, increased time needed  Pt's knee pain makes WB'ing and transferring difficult, though pt is motivated to progress his mobility  Rec Moderate Intensity Therapy    Rehab Prognosis: Fair; patient would benefit from acute skilled PT services to address these deficits and reach maximum level of function.    Recent Surgery: * No surgery found *      Plan:     During this hospitalization, patient to be seen 5 x/week to address the identified rehab impairments via gait training, therapeutic activities, therapeutic exercises, neuromuscular re-education, wheelchair management/training and progress toward the following goals:    Plan of Care Expires:  01/08/25    Subjective     Chief Complaint: knees  Patient/Family Comments/goals: patient wanting to get into chair  Pain/Comfort:  Pain Rating 1: other (see comments) (unrated pain with transfer/WB)  Location - Side 1: Bilateral  Location 1: knee  Pain Addressed 1: Reposition, Distraction,  Cessation of Activity, Nurse notified  Pain Rating Post-Intervention 1: other (see comments) (pt comfortably situated in chair)      Objective:     Communicated with nurse Heidi prior to session.  Patient found sitting edge of bed with telemetry, peripheral IV, wound vac upon PT entry to room.     General Precautions: Standard, diabetic, fall  Orthopedic Precautions: N/A  Braces: N/A  Respiratory Status: Room air     Functional Mobility:  Transfers:     Sit to Stand:  maximal assistance and of 2 persons with rolling walker  Bed to Chair: moderate assistance and of 2 persons with  rolling walker  using  Stand Pivot      AM-PAC 6 CLICK MOBILITY  Turning over in bed (including adjusting bedclothes, sheets and blankets)?: 2  Sitting down on and standing up from a chair with arms (e.g., wheelchair, bedside commode, etc.): 2  Moving from lying on back to sitting on the side of the bed?: 2  Moving to and from a bed to a chair (including a wheelchair)?: 2  Need to walk in hospital room?: 1  Climbing 3-5 steps with a railing?: 1  Basic Mobility Total Score: 10       Treatment & Education:  Transfer as noted  Pt asked about braces for his knees, nurse notified    Patient left up in chair with all lines intact, call button in reach, and nurse Heidi notified..    GOALS:   Multidisciplinary Problems       Physical Therapy Goals          Problem: Physical Therapy    Goal Priority Disciplines Outcome Interventions   Physical Therapy Goal     PT, PT/OT Progressing    Description: Goals to be met by: 25    Patient will increase functional independence with mobility by performin. Sit<>stand with Max A with RW.  2. Gait x 10 ft feet with RW with Mod A.  3. Supine<>sit with Mod A.                           Time Tracking:     PT Received On: 24  PT Start Time: 955     PT Stop Time:   PT Total Time (min): 17 min     Billable Minutes: Therapeutic Activity 17    Treatment Type: Treatment  PT/PTA: PTA     Number of  PTA visits since last PT visit: 2     12/11/2024

## 2024-12-11 NOTE — HOSPITAL COURSE
Mr Mello was admitted for placement after falling a few days after leaving [facility]. He was weakness to right side of body. He refused MRI lumbar spine.  Strength improved with therapy but he remains a moderate to max assist with standing, ambulating, and transferring from chair. Wound care placed a wound vac to RLE wound. [employer] denied him SNF due to poor therapy participation and for leaving SNF [place] [facility]. The recommend retirement with HH PT/OT. Patient was indecisive on dispo, ultimately decided on retirement NH. Stable for dc with PCP fu, return precautions discussed, no further questions at dc

## 2024-12-11 NOTE — PROGRESS NOTES
"[facility] Surg ([address] Medicine  Progress Note    Patient Name: Riaz Guerra Jr.  MRN: [id number]  Patient Class: OP- Observation   Admission Date: 12/6/2024  Length of Stay: 0 days  Attending Physician: Idalmis Gao MD  Primary Care Provider: Berhane Alvarez MD        Subjective     Principal Problem:Problem related to discharge planning        HPI:  Mr. Guerra is a 74 YOM with PMHx of HTN, HLD, DM type II, CKD III (baseline SCr 1-1.6), anemia of chronic disease, chronic bilateral venous stasis with chronic wounds, debility/weakness, and obesity.     He presents to ED via EMS due to fall at home last night and inability to get up by himself. He notes that since fall he has had diffuse body pain, worse from baseline chronic pain and he was concerned about his chronic BLE wounds. He reports that he was recently admitted to [facility] and discharged to SNF facility. While at SNF he notes that he was not improving and having more pain than "when he went in" and ultimately ended up returning home to [facility]. It is unclear if he was discharged home or left AMA from SNF. He also reports that today once he activated EMS his was approached with an eviction notice from his apartment. He reports no further housing options and he has not family that he "is close with". He endorses needed assistance with placement. He denies fever, chills, palpitations, SOB, ROSSI, CP, abdominal pain, N/V/D, constipation, urinary symptoms or hematuria,decreased appetite, changes in PO intake, light headiness, dizziness, or headaches.     In the ED he was initially hypertensive with improvement over ED course, otherwise HDS and afebrile.  CBC with WBC 8, H/H 13.4/40.6, platelets 466.  Chemistry was , K 4.3, chloride 103, CO2 23, BUN 31, SCr 1.4 (baseline 1-1.6), glucose 147.  LFTs unremarkable.  CRP 81.  CPK 82.  Blood cultures in process.  CT C-spine with no acute process seen. DJD and " bilateral areas of neural foraminal narrowing. CTH with a punctate focus of low attenuation along the medial aspect of the caudate and as well as within the anterior limb of the right internal capsule.  Findings likely reflect that of remote infarct however no previous exams are available for comparison and therefore remain age-indeterminate.  Correlation with current symptomatology recommended. Sequela of chronic microvascular ischemic change and senescent change.  Mild mastoid effusions. R tib/fib XR with DJD of the knee and ankle with spurs on the patella.  No fracture dislocation bone destruction or periosteal reaction seen. R hip and pelvis with severe advanced DJD and impingement change.  There is likely a chronic labral tear.  No acute fracture dislocation bone destruction seen.    The patient was placed in observation to the Hospital Medicine Service for further evaluation and treatment.     Overview/Hospital Course:  Mr Mello was admitted for placement after falling a few days after leaving [facility]. He was weakness to right side of body. He refused MRI lumbar spine.  Right knee XRAY and brace ordered. Strength improved with therapy but he remains a moderate to max assist with standing, ambulating, and transferring from chair. Wound care placed a wound vac to RLE wound. [employer] denied him SNF due to poor therapy participation and for leaving SNF [facility]. The recommend alf with HH PT/OT.     Interval History: c/o right knee pain and requested a brace. Will xray. P2P with nichelle today. They feel that he is not a SNF candidate from reading his chart.     Review of Systems   Constitutional:  Positive for activity change. Negative for diaphoresis.   HENT:  Negative for congestion.    Eyes:  Negative for visual disturbance.   Respiratory:  Negative for cough and shortness of breath.    Gastrointestinal:  Negative for abdominal distention, abdominal pain, constipation, diarrhea, nausea and vomiting.         "BM 12/10   Genitourinary:  Negative for difficulty urinating and dysuria.   Musculoskeletal:  Positive for arthralgias and gait problem.   Neurological:  Positive for weakness. Negative for dizziness and headaches.     Objective:     Vital Signs (Most Recent):  Temp: 98.4 °F (36.9 °C) (12/11/24 0809)  Pulse: 64 (12/11/24 1113)  Resp: 18 (12/11/24 0809)  BP: (!) 140/80 (12/11/24 1030)  SpO2: 95 % (12/11/24 0809) Vital Signs (24h Range):  Temp:  [98 °F (36.7 °C)-98.4 °F (36.9 °C)] 98.4 °F (36.9 °C)  Pulse:  [64-93] 64  Resp:  [13-18] 18  SpO2:  [94 %-99 %] 95 %  BP: (130-167)/(68-99) 140/80     Weight: (!) 140.5 kg (309 lb 11.9 oz)  Body mass index is 43.2 kg/m².    Intake/Output Summary (Last 24 hours) at 12/11/2024 1200  Last data filed at 12/11/2024 0558  Gross per 24 hour   Intake 840 ml   Output 1150 ml   Net -310 ml         Physical Exam  Vitals and nursing note reviewed.   HENT:      Head: Normocephalic.      Mouth/Throat:      Mouth: Mucous membranes are moist.   Cardiovascular:      Rate and Rhythm: Normal rate.   Pulmonary:      Effort: Pulmonary effort is normal.   Abdominal:      General: Abdomen is flat.      Palpations: Abdomen is soft.   Musculoskeletal:         General: Swelling (BLLE) present.      Comments: Decreased ROM to BBLE   Skin:     General: Skin is dry.      Comments: BLLE dry, thick skin, wound right shin   Neurological:      Mental Status: He is alert. Mental status is at baseline.             Significant Labs: All pertinent labs within the past 24 hours have been reviewed.  BMP: No results for input(s): "GLU", "NA", "K", "CL", "CO2", "BUN", "CREATININE", "CALCIUM", "MG" in the last 48 hours.  CBC: No results for input(s): "WBC", "HGB", "HCT", "PLT" in the last 48 hours.  CMP: No results for input(s): "NA", "K", "CL", "CO2", "GLU", "BUN", "CREATININE", "CALCIUM", "PROT", "ALBUMIN", "BILITOT", "ALKPHOS", "AST", "ALT", "ANIONGAP", "EGFRNONAA" in the last 48 hours.    Invalid input(s): " ""ESTGFAFR[place]"    Significant Imaging: I have reviewed all pertinent imaging results/findings within the past 24 hours.  X-Ray Chest 1 View  Narrative: EXAMINATION:  XR CHEST 1 VIEW    CLINICAL HISTORY:  NH placement-r/o Tb;    TECHNIQUE:  Single frontal view of the chest was performed.    COMPARISON:  November 21, 2024    FINDINGS:  The cardiac silhouette is not enlarged.  There is no significant pleural effusion.  The osseous structures are intact.  The lungs are clear.  Impression: No significant abnormality or detrimental interval change    Electronically signed by: Kaitlynn Doshi MD  Date:    12/10/2024  Time:    08:07        Assessment and Plan     * Problem related to discharge planning  Chronic wounds; BLE   Venous stasis ulcers of both lower extremities   Chronic pain   Impaired mobility and activities of daily living   Recurrent falls  -placed in observation due to fall at home and inability to complete ADLS with decreased caregiver support and likely homelessness  -at admission HDS and afebrile  -ED workup detailed above  -PT/OT consulted   -CM/SW to follow with likely nursing home placement   -continue home baclofen and gabapentin  -PRN supportive care for pain   -dose/medication adjustment as appropriate   -Wound Care consulted  -dressing changes per their recommendations  -fall precautions   -MRI lumbar spine ordered--pt declined  - WC placed wound vac today  - Right knee brace and xray ordered    Anemia due to stage 3 chronic kidney disease  -chronic   -H/H stable at admission   -trend CBC over course and transfuse if becomes clinically indicated    CKD (chronic kidney disease) stage 3, GFR 30-59 ml/min  -chronic   -SCr 1.4 at admission --1.3 on 12/7  -baseline SCr 1-1.6   -avoid nephrotoxins and hypotension as able, renally dose medications    HTN (hypertension)  -chronic   -controlled   -continue home amlodipine, losartan, and HCTZ  -dose/medication adjustment as appropriate     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  -chronic   -not controlled   -most recent hemoglobin A1c 6.9   -hold home metformin and begin low-dose SSI  -dose/medication adjustment as appropriate   -monitor accuchecks AC/HS and PRN hypoglycemic protocol       VTE Risk Mitigation (From admission, onward)           Ordered     heparin (porcine) injection 5,000 Units  Every 8 hours         12/06/24 1545     IP VTE HIGH RISK PATIENT  Once         12/06/24 1545     Place sequential compression device  Until discontinued         12/06/24 1545                    Discharge Planning   KELSEY: 12/11/2024     Code Status: Full Code   Medical Readiness for Discharge Date:   Discharge Plan A: New Nursing Home placement - nursing home care facility   Discharge Delays: None known at this time      Collette Sahu DNP  Department of Hospital Medicine   [facility] ([address])

## 2024-12-11 NOTE — PLAN OF CARE
Patient's was denied for SNF. Sw met with patient and spoke with him at bedside. Patient is agreeable to senior living nursing home placement at [facility]. Britt informed Bethanie with [facility].       [facility] is unable to accept patient. Sw reached out to [facility] who will need to review patient again to see if they can accept for a senior living placement. Sw resent packet to [facility] and send more referrals to other facilities for a senior living nursing home placement.     Sw updated patient's family

## 2024-12-11 NOTE — ASSESSMENT & PLAN NOTE
Chronic wounds; BLE   Venous stasis ulcers of both lower extremities   Chronic pain   Impaired mobility and activities of daily living   Recurrent falls  -placed in observation due to fall at home and inability to complete ADLS with decreased caregiver support and likely homelessness  -at admission HDS and afebrile  -ED workup detailed above  -PT/OT consulted   -CM/SW to follow with likely nursing home placement   -continue home baclofen and gabapentin  -PRN supportive care for pain   -dose/medication adjustment as appropriate   -Wound Care consulted  -dressing changes per their recommendations  -fall precautions   -MRI lumbar spine ordered--pt declined  - WC placed wound vac today  - Right knee brace and xray ordered

## 2024-12-11 NOTE — PLAN OF CARE
Patient sat up in chair for a majority of shift today, did not want PT to help him back to chair, patient educated to call for help when he is ready and discussed with SHERRELL Phan, the best way to get back in the bed  Problem: Skin Injury Risk Increased  Goal: Skin Health and Integrity  Outcome: Progressing     Problem: Adult Inpatient Plan of Care  Goal: Plan of Care Review  Outcome: Progressing  Goal: Patient-Specific Goal (Individualized)  Outcome: Progressing  Goal: Absence of Hospital-Acquired Illness or Injury  Outcome: Progressing  Goal: Optimal Comfort and Wellbeing  Outcome: Progressing  Goal: Readiness for Transition of Care  Outcome: Progressing     Problem: Bariatric Environmental Safety  Goal: Safety Maintained with Care  Outcome: Progressing     Problem: Diabetes Comorbidity  Goal: Blood Glucose Level Within Targeted Range  Outcome: Progressing     Problem: Wound  Goal: Optimal Coping  Outcome: Progressing  Goal: Optimal Functional Ability  Outcome: Progressing  Goal: Absence of Infection Signs and Symptoms  Outcome: Progressing  Goal: Improved Oral Intake  Outcome: Progressing  Goal: Optimal Pain Control and Function  Outcome: Progressing  Goal: Skin Health and Integrity  Outcome: Progressing  Goal: Optimal Wound Healing  Outcome: Progressing

## 2024-12-12 LAB
POCT GLUCOSE: 147 MG/DL (ref 70–110)
POCT GLUCOSE: 156 MG/DL (ref 70–110)
POCT GLUCOSE: 168 MG/DL (ref 70–110)
POCT GLUCOSE: 227 MG/DL (ref 70–110)

## 2024-12-12 PROCEDURE — 97535 SELF CARE MNGMENT TRAINING: CPT

## 2024-12-12 PROCEDURE — 25000003 PHARM REV CODE 250: Performed by: NURSE PRACTITIONER

## 2024-12-12 PROCEDURE — 94761 N-INVAS EAR/PLS OXIMETRY MLT: CPT

## 2024-12-12 PROCEDURE — 25000242 PHARM REV CODE 250 ALT 637 W/ HCPCS: Performed by: NURSE PRACTITIONER

## 2024-12-12 PROCEDURE — 63600175 PHARM REV CODE 636 W HCPCS: Performed by: NURSE PRACTITIONER

## 2024-12-12 PROCEDURE — G0378 HOSPITAL OBSERVATION PER HR: HCPCS

## 2024-12-12 PROCEDURE — 96372 THER/PROPH/DIAG INJ SC/IM: CPT | Performed by: NURSE PRACTITIONER

## 2024-12-12 RX ADMIN — ASPIRIN 81 MG: 81 TABLET, COATED ORAL at 08:12

## 2024-12-12 RX ADMIN — HYDROCODONE BITARTRATE AND ACETAMINOPHEN 1 TABLET: 10; 325 TABLET ORAL at 05:12

## 2024-12-12 RX ADMIN — BACLOFEN 10 MG: 10 TABLET ORAL at 02:12

## 2024-12-12 RX ADMIN — HEPARIN SODIUM 5000 UNITS: 5000 INJECTION INTRAVENOUS; SUBCUTANEOUS at 05:12

## 2024-12-12 RX ADMIN — AMMONIUM LACTATE: 120 LOTION TOPICAL at 08:12

## 2024-12-12 RX ADMIN — LOSARTAN POTASSIUM 100 MG: 50 TABLET, FILM COATED ORAL at 08:12

## 2024-12-12 RX ADMIN — FLUTICASONE PROPIONATE 100 MCG: 50 SPRAY, METERED NASAL at 08:12

## 2024-12-12 RX ADMIN — GABAPENTIN 800 MG: 400 CAPSULE ORAL at 08:12

## 2024-12-12 RX ADMIN — HEPARIN SODIUM 5000 UNITS: 5000 INJECTION INTRAVENOUS; SUBCUTANEOUS at 02:12

## 2024-12-12 RX ADMIN — TAMSULOSIN HYDROCHLORIDE 0.4 MG: 0.4 CAPSULE ORAL at 08:12

## 2024-12-12 RX ADMIN — ATORVASTATIN CALCIUM 20 MG: 20 TABLET, FILM COATED ORAL at 08:12

## 2024-12-12 RX ADMIN — BACLOFEN 10 MG: 10 TABLET ORAL at 08:12

## 2024-12-12 RX ADMIN — HYDROCODONE BITARTRATE AND ACETAMINOPHEN 1 TABLET: 10; 325 TABLET ORAL at 08:12

## 2024-12-12 RX ADMIN — HEPARIN SODIUM 5000 UNITS: 5000 INJECTION INTRAVENOUS; SUBCUTANEOUS at 10:12

## 2024-12-12 RX ADMIN — AMMONIUM LACTATE: 120 LOTION TOPICAL at 09:12

## 2024-12-12 RX ADMIN — LIDOCAINE 1 PATCH: 50 PATCH CUTANEOUS at 02:12

## 2024-12-12 RX ADMIN — HYDROCODONE BITARTRATE AND ACETAMINOPHEN 1 TABLET: 10; 325 TABLET ORAL at 02:12

## 2024-12-12 RX ADMIN — AMLODIPINE BESYLATE 10 MG: 5 TABLET ORAL at 08:12

## 2024-12-12 NOTE — PLAN OF CARE
Problem: Skin Injury Risk Increased  Goal: Skin Health and Integrity  Outcome: Progressing     Problem: Adult Inpatient Plan of Care  Goal: Plan of Care Review  Outcome: Progressing  Goal: Patient-Specific Goal (Individualized)  Outcome: Progressing  Goal: Absence of Hospital-Acquired Illness or Injury  Outcome: Progressing  Goal: Optimal Comfort and Wellbeing  Outcome: Progressing  Goal: Readiness for Transition of Care  Outcome: Progressing     Problem: Bariatric Environmental Safety  Goal: Safety Maintained with Care  Outcome: Progressing     Problem: Diabetes Comorbidity  Goal: Blood Glucose Level Within Targeted Range  Outcome: Progressing     Problem: Wound  Goal: Optimal Coping  Outcome: Progressing  Goal: Optimal Functional Ability  Outcome: Progressing  Goal: Absence of Infection Signs and Symptoms  Outcome: Progressing  Goal: Improved Oral Intake  Outcome: Progressing  Goal: Optimal Pain Control and Function  Outcome: Progressing  Goal: Skin Health and Integrity  Outcome: Progressing  Goal: Optimal Wound Healing  Outcome: Progressing    POC reviewed with patient. Patient verbalized understanding. AAOX4. VSS. Call bell left within reach. Bed lowered and wheels locked. Patient free of falls and injury

## 2024-12-12 NOTE — PT/OT/SLP PROGRESS
Occupational Therapy   Treatment    Name: Riaz Guerra Jr.  MRN: [id number]  Admitting Diagnosis:  Problem related to discharge planning       Recommendations:     Discharge Recommendations: Moderate Intensity Therapy  Discharge Equipment Recommendations:  to be determined by next level of care (Bariatric RW, Bariatric BSC)  Barriers to discharge:  Inaccessible home environment, Decreased caregiver support (Current functional level)    Assessment:     Riaz Guerra Jr. is a 74 y.o. male with a medical diagnosis of Problem related to discharge planning.  He presents with the following performance deficits affecting function: weakness, impaired self care skills, impaired functional mobility, gait instability, impaired balance, decreased coordination, edema, impaired skin, impaired fine motor, decreased ROM, pain, decreased safety awareness, impaired endurance, decreased lower extremity function, decreased upper extremity function, impaired cardiopulmonary response to activity, impaired joint extensibility.     Rehab Prognosis:  Fair; patient would benefit from acute skilled OT services to address these deficits and reach maximum level of function.       Plan:     Patient to be seen 4 x/week to address the above listed problems via self-care/home management, therapeutic activities, therapeutic exercises  Plan of Care Expires: 12/22/24  Plan of Care Reviewed with: patient    Subjective     Chief Complaint: Not wanting to wear a hospital gown any longer.   Patient/Family Comments/goals: Pt reporting he spilled his urinal on the floor.  OT cleaned urine from floor with towel then wiped area with Sani-wipes.  Communicated to housekeeping that pt needed floor mopped.    Pain/Comfort:  Pain Rating 1:  (Bilateral knees, did not rate pain, reporting that his knee pain is what is preventing him from doing better with standing and walking)    Objective:   Patient found up in chair with peripheral IV, wound vac  (Telemetry laying on bed, urinal was spilled on the floor, pt wet with urine, food on floor in front of pt's chair, shorts half way up pt's legs put not over his hips, with wound VAC tubing not through the shorts leg.) upon OT entry to room.    General Precautions: Standard, diabetic, fall (wound right LE)    Orthopedic Precautions:N/A  Braces: N/A  Respiratory Status: Room air     Occupational Performance:     Functional Mobility/Transfers:  Sit to stand: Max A with RW, pt needing about a minute from start of sit to stand to full standing, extended time to walk feet backwards as he is pushing up from the recliner and then more time to transfer each hand from recliner arm rest to RW.    Standing tolerance: 1 minute for toilet hygiene, pt standing at CGA with RW      Activities of Daily Living:  UB dressing: Set up  for T shirt  Sponge bathing: Max A for LB sitting in recliner  LB dressing:  Pt donned slip on shoes at SBA with extended time needed, OT threaded wound VAC line through pt's shorts, pt needing Total A to don shorts over his feet, shorts not pulled up over pt's hips because he said he needed to sit down.  Once pt sitting back down, he said he couldn't stand again.  Pt declined donning hospital gown.  Pt to have shorts pulled all the way up later after he rested,  pt not exposed but unable to standing again to pull shorts over his hips.      [facility] 6 Click ADL: 16    Treatment & Education:  Role of OT, POC, ADL and ADL mobility, safety, infection control/sking integrity with urinary incontinence, use of call button for needs, discharge recs     Patient left up in chair with all lines intact, call button in reach, and nurse notified.   Sylvester WYNNE to see pt for therapy later today.  OT communicated about pt activities during OT session.     GOALS:   Multidisciplinary Problems       Occupational Therapy Goals          Problem: Occupational Therapy    Goal Priority Disciplines Outcome Interventions    Occupational Therapy Goal     OT, PT/OT Adequate for Care Transition    Description: Goals to be met by: 12/22/2024     Patient will increase functional independence with ADLs by performing:    UE Dressing with Minimal Assistance.  LE Dressing with Maximum Assistance and Assistive Devices as needed.  Grooming while seated with Set-up Assistance.  Toileting from bedside commode with Maximum Assistance for hygiene and clothing management. MET 12/10/2024  Toilet transfer to bedside commode with Maximum Assistance. MET 12/10/2024    Goals updated 12/10/2024; to be met by 12/22/2024    Riaz is able to transfer from EOB to bed side commode with mod(A) using step pivot transfer.  Riaz is able to tolerate standing at EOB with DME for balance with CGA.                         Time Tracking:     OT Date of Treatment: 12/12/24  OT Start Time: 0950  OT Stop Time: 1026  OT Total Time (min): 36 min    Billable Minutes:Self Care/Home Management 36    OT/TALA: OT          12/12/2024

## 2024-12-12 NOTE — SUBJECTIVE & OBJECTIVE
Interval History: Notes reviewed no acute events overnight. Wound care consulted and RLE wound improving. Patient agreeable to skilled nursing care and referrals sent. Chronic R knee swelling.       Review of Systems   Constitutional:  Positive for activity change. Negative for appetite change, chills and fever.   HENT:  Negative for congestion, sore throat and trouble swallowing.    Eyes:  Negative for photophobia and visual disturbance.   Respiratory:  Negative for cough, chest tightness and shortness of breath.    Cardiovascular:  Negative for chest pain, palpitations and leg swelling.   Gastrointestinal:  Negative for abdominal pain, diarrhea and nausea.   Genitourinary:  Negative for dysuria, flank pain and hematuria.   Musculoskeletal:  Positive for arthralgias, gait problem, joint swelling (r knee -chronic) and myalgias. Negative for back pain.   Neurological:  Negative for dizziness, weakness and headaches.   Psychiatric/Behavioral:  Negative for confusion.      Objective:     Vital Signs (Most Recent):  Temp: 97.5 °F (36.4 °C) (12/12/24 1128)  Pulse: 91 (12/12/24 1448)  Resp: 16 (12/12/24 1448)  BP: (!) 146/72 (12/12/24 1448)  SpO2: 95 % (12/12/24 1128) Vital Signs (24h Range):  Temp:  [97.4 °F (36.3 °C)-98.2 °F (36.8 °C)] 97.5 °F (36.4 °C)  Pulse:  [74-97] 91  Resp:  [14-18] 16  SpO2:  [95 %-100 %] 95 %  BP: (119-155)/(70-82) 146/72     Weight: (!) 140.5 kg (309 lb 11.9 oz)  Body mass index is 43.2 kg/m².    Intake/Output Summary (Last 24 hours) at 12/12/2024 1702  Last data filed at 12/12/2024 1633  Gross per 24 hour   Intake 300 ml   Output 600 ml   Net -300 ml         Physical Exam  Vitals reviewed.   Constitutional:       Appearance: Normal appearance. He is normal weight.   HENT:      Head: Normocephalic.      Mouth/Throat:      Mouth: Mucous membranes are moist.      Pharynx: Oropharynx is clear.   Eyes:      Pupils: Pupils are equal, round, and reactive to light.   Cardiovascular:      Rate and Rhythm:  "Normal rate and regular rhythm.      Pulses: Normal pulses.      Heart sounds: Normal heart sounds.   Pulmonary:      Effort: Pulmonary effort is normal.      Breath sounds: Normal breath sounds.   Abdominal:      General: Bowel sounds are normal.      Palpations: Abdomen is soft.   Musculoskeletal:         General: Normal range of motion.      Cervical back: Normal range of motion.   Skin:     General: Skin is warm and dry.      Comments: Chronic venous stasis with open RLE wound - no drainage (see photo in chart)   Neurological:      Mental Status: He is alert and oriented to person, place, and time. Mental status is at baseline.   Psychiatric:         Mood and Affect: Mood normal.             Significant Labs: All pertinent labs within the past 24 hours have been reviewed.  CBC: No results for input(s): "WBC", "HGB", "HCT", "PLT" in the last 48 hours.  CMP: No results for input(s): "NA", "K", "CL", "CO2", "GLU", "BUN", "CREATININE", "CALCIUM", "PROT", "ALBUMIN", "BILITOT", "ALKPHOS", "AST", "ALT", "ANIONGAP", "EGFRNONAA" in the last 48 hours.    Invalid input(s): "ESTGFAFRICA"    Significant Imaging: I have reviewed all pertinent imaging results/findings within the past 24 hours.  "

## 2024-12-12 NOTE — PLAN OF CARE
Britt spoke with [facility] who stated they can admit patient tomorrow. They are sending the admission paperwork to patient's niece to be completed tonight.

## 2024-12-12 NOTE — PLAN OF CARE
Sw sent orders to [facility].     [facility] asked for more bank statements. Sw upload bank statements to care [facility] and sent them to [facility].

## 2024-12-12 NOTE — PLAN OF CARE
Problem: Skin Injury Risk Increased  Goal: Skin Health and Integrity  12/12/2024 1732 by Neha Baires RN  Outcome: Progressing  12/12/2024 1709 by Neha Baires RN  Outcome: Progressing     Problem: Adult Inpatient Plan of Care  Goal: Plan of Care Review  12/12/2024 1732 by Neha Baires RN  Outcome: Progressing  12/12/2024 1709 by Neha Baires RN  Outcome: Progressing  Goal: Patient-Specific Goal (Individualized)  12/12/2024 1732 by Neha Baires RN  Outcome: Progressing  12/12/2024 1709 by Neha Baires RN  Outcome: Progressing  Goal: Absence of Hospital-Acquired Illness or Injury  12/12/2024 1732 by Neha Baires RN  Outcome: Progressing  12/12/2024 1709 by Neha Baires RN  Outcome: Progressing  Goal: Optimal Comfort and Wellbeing  12/12/2024 1732 by Neha Baires RN  Outcome: Progressing  12/12/2024 1709 by Neha Baires RN  Outcome: Progressing  Goal: Readiness for Transition of Care  12/12/2024 1732 by Neha Baires RN  Outcome: Progressing  12/12/2024 1709 by Neha Baires RN  Outcome: Progressing     Problem: Bariatric Environmental Safety  Goal: Safety Maintained with Care  12/12/2024 1732 by Neha Baires RN  Outcome: Progressing  12/12/2024 1709 by Neha Baires RN  Outcome: Progressing     Problem: Diabetes Comorbidity  Goal: Blood Glucose Level Within Targeted Range  12/12/2024 1732 by Neha Baires RN  Outcome: Progressing  12/12/2024 1709 by Neha Baires RN  Outcome: Progressing     Problem: Wound  Goal: Optimal Coping  12/12/2024 1732 by Neha Baires RN  Outcome: Progressing  12/12/2024 1709 by Neha Baires RN  Outcome: Progressing  Goal: Optimal Functional Ability  12/12/2024 1732 by Neha Baires RN  Outcome: Progressing  12/12/2024 1709 by Neha Baires RN  Outcome: Progressing  Goal: Absence of Infection Signs and Symptoms  12/12/2024 1732 by Neha Baires, RN  Outcome: Progressing  12/12/2024 1709 by Neha Baires,  RN  Outcome: Progressing  Goal: Improved Oral Intake  12/12/2024 1732 by Neha Baires RN  Outcome: Progressing  12/12/2024 1709 by Neha Baires RN  Outcome: Progressing  Goal: Optimal Pain Control and Function  12/12/2024 1732 by Neha Baires, RN  Outcome: Progressing  12/12/2024 1709 by Neha Baires, RN  Outcome: Progressing  Goal: Skin Health and Integrity  12/12/2024 1732 by Neha Baires, RN  Outcome: Progressing  12/12/2024 1709 by Neha Baires, RN  Outcome: Progressing  Goal: Optimal Wound Healing  12/12/2024 1732 by Neha Baires RN  Outcome: Progressing  12/12/2024 1709 by Neha Baires, RN  Outcome: Progressing

## 2024-12-12 NOTE — PT/OT/SLP PROGRESS
Physical Therapy      Patient Name:  Riaz Guerra    MRN:  [id number]    Patient not seen today secondary to Patient unwilling to participate (pt preparing to d/c to NH, feeling depressed.). Will follow-up next treatment day if pt remains in the hospital.

## 2024-12-12 NOTE — PLAN OF CARE
Recommendations  1) Continue diabetic diet as tolerated    2) Encourage intake of meals and Zach to promote wound healing   3) RD to monitor and follow up    Goals: Pt will be able to tolerate >75% EEN/EPN by RD follow up  Nutrition Goal Status: progressing towards goal  Communication of RD Recs:  (POC)

## 2024-12-12 NOTE — PLAN OF CARE
Sw reviewed accepting nursing homes with the patient at bedside. Patient is agreeable to [facility] for [facility] nursing home placement.     Sw contacted [facility] and updated them. They stated they would be reaching out to his niece for the admissions paperwork.

## 2024-12-12 NOTE — PLAN OF CARE
NURSING HOME ORDERS skilled nursing CARE    12/13/2024  [facility])  [facility] ([address])  [address]  [facility] [address]  Dept: [phone]  Loc: [phone]     Admit to Nursing Home:      Diagnoses:  Active Hospital Problems    Diagnosis  POA    *Problem related to discharge planning [Z75.8]  Yes    Chronic wounds; BLE [T14.8XXA]  Yes    Impaired mobility and activities of daily living [Z74.09, Z78.9]  Yes    Venous stasis ulcers of both lower extremities [I83.019, I83.029, L97.919, L97.929]  Yes    Insulin dependent type 2 diabetes mellitus [E11.9, Z79.4]  Not Applicable    HTN (hypertension) [I10]  Yes    Chronic pain [G89.29]  Yes    CKD (chronic kidney disease) stage 3, GFR 30-59 ml/min [N18.30]  Yes    Anemia due to stage 3 chronic kidney disease [N18.30, D63.1]  Yes      Resolved Hospital Problems   No resolved problems to display.       Patient is homebound due to:  Problem related to discharge planning    Allergies:  Review of patient's allergies indicates:   Allergen Reactions    Lisinopril Other (See Comments)     cough       Vitals:  Routine    Diet: cardiac diet    Activities:   Activity as tolerated    Goals of Care Treatment Preferences:  Code Status: Full Code      Nursing Precautions:  Fall    Consults:   PT to evaluate and treat- 3-5 times a week, OT to evaluate and treat- 3-5 times a week, and Wound Care     Miscellaneous Care: Wound Care: yes:  Wound:   Location:  RLE           Dressing changes every Monday, Wednesday and Friday.         Clean wound with wound cleanser and apply AG hydrofiber dressing and wrap with rolled laci                   Diabetes Care:  SN to perform and educate Diabetic management with blood glucose monitoring:      Medications: Discontinue all previous medication orders, if any. See new list below.     Medication List        START taking these medications      fluticasone propionate 50 mcg/actuation nasal spray  Commonly known as:  FLONASE  2 sprays (100 mcg total) by Each Nostril route once daily.            CONTINUE taking these medications      amLODIPine 10 MG tablet  Commonly known as: NORVASC  Take 1 tablet (10 mg total) by mouth once daily.     aspirin 81 MG EC tablet  Commonly known as: ECOTRIN  Take 81 mg by mouth once daily.     atorvastatin 20 MG tablet  Commonly known as: LIPITOR  Take 20 mg by mouth once daily.     baclofen 10 MG tablet  Commonly known as: LIORESAL  Take 10 mg by mouth 3 (three) times daily.     gabapentin 800 MG tablet  Commonly known as: NEURONTIN  Take 1 tablet (800 mg total) by mouth 2 (two) times daily.     HYDROcodone-acetaminophen 7.5-325 mg per tablet  Commonly known as: NORCO  Take 1 tablet by mouth every 8 (eight) hours as needed for Pain.     losartan-hydrochlorothiazide 100-12.5 mg 100-12.5 mg Tab  Commonly known as: HYZAAR  Take 1 tablet by mouth once daily.     metFORMIN 1000 MG tablet  Commonly known as: GLUCOPHAGE  Take 1,000 mg by mouth 2 (two) times daily with meals.     tamsulosin 0.4 mg Cap  Commonly known as: FLOMAX  Take by mouth once daily.                Immunizations Administered as of 12/12/2024       No immunizations on file.                Some patients may experience side effects after vaccination.  These may include fever, headache, muscle or joint aches.  Most symptoms resolve with 24-48 hours and do not require urgent medical evaluation unless they persist for more than 72 hours or symptoms are concerning for an unrelated medical condition.          _________________________________  Aysha Cruz NP  12/12/2024

## 2024-12-12 NOTE — PROGRESS NOTES
"[facility] ([address])  Adult Nutrition  Progress Note    SUMMARY       Recommendations  1) Continue diabetic diet as tolerated    2) Encourage intake of meals and Zach to promote wound healing   3) RD to monitor and follow up    Goals: Pt will be able to tolerate >75% EEN/EPN by RD follow up  Nutrition Goal Status: progressing towards goal  Communication of RD Recs:  (POC)    Assessment and Plan  Nutrition Problem  Increased protein needs     Related to (etiology):   Wound healing     Signs and Symptoms (as evidenced by):   Bilateral venous stasis ulcers  NPWT      Interventions  (treatment strategy):  Medical food supplement: zach  CHO modified diet  Collaboration of care with other providers     Nutrition Diagnosis Status:   Continues    Reason for Assessment    Reason For Assessment: RD follow-up  Diagnosis:  (Problem related to discharge planning)  General Information Comments: Pt sitting in chair at time of assessment. Receiving a diabetic diet, tolerating meals. Discussed Zach product sitting on bedside table and demonstrated how to prepare in ~8oz cup of water. Pt really liked orange flavored Zach. Encouraged intake to aid in wound healing. Lunch arrived shortly after. NPWT to calf. Pt pending placement. PIV. [facility] - BL[facility]. NFPE completed 12/9 pt is nourished.  Nutrition Discharge Planning: dc on diabetic diet    Nutrition Related Social Determinants of Health: SDOH: Adequate food in home environment    Nutrition Risk Screen    Nutrition Risk Screen: large or nonhealing wound, burn or pressure injury    Nutrition/Diet History    Spiritual, Cultural Beliefs, Advent Practices, Values that Affect Care: no  Food Allergies: NKFA  Factors Affecting Nutritional Intake: None identified at this time    Anthropometrics    Temp: 97.5 °F (36.4 °C)  Height Method: Stated  Height: 5' 11" (180.3 cm)  Height (inches): 71 in  Weight Method: Bed Scale  Weight: (!) 140.5 kg (309 lb 11.9 oz)  Weight (lb): " (!) 309.75 lb  Ideal Body Weight (IBW), Male: 172 lb  % Ideal Body Weight, Male (lb): 180.09 %  BMI (Calculated): 43.2  BMI Grade: greater than 40 - morbid obesity       Lab/Procedures/Meds    Pertinent Labs Reviewed: reviewed  Pertinent Medications Reviewed: reviewed  Scheduled Meds:   amLODIPine  10 mg Oral Daily    ammonium lactate   Topical (Top) BID    aspirin  81 mg Oral Daily    atorvastatin  20 mg Oral Daily    baclofen  10 mg Oral TID    fluticasone propionate  2 spray Each Nostril Daily    gabapentin  800 mg Oral BID    heparin (porcine)  5,000 Units Subcutaneous Q8H    LIDOcaine  1 patch Transdermal Q24H    losartan  100 mg Oral Daily    polyethylene glycol  17 g Oral BID    senna-docusate 8.6-50 mg  1 tablet Oral BID    tamsulosin  0.4 mg Oral Daily     Continuous Infusions:  PRN Meds:.  Current Facility-Administered Medications:     acetaminophen, 650 mg, Oral, Q4H PRN    dextrose 10%, 12.5 g, Intravenous, PRN    dextrose 10%, 25 g, Intravenous, PRN    glucagon (human recombinant), 1 mg, Intramuscular, PRN    glucose, 16 g, Oral, PRN    glucose, 24 g, Oral, PRN    HYDROcodone-acetaminophen, 1 tablet, Oral, Q4H PRN    HYDROcodone-acetaminophen, 1 tablet, Oral, Q4H PRN    insulin aspart U-100, 0-5 Units, Subcutaneous, QID (AC + HS) PRN    melatonin, 6 mg, Oral, Nightly PRN    ondansetron, 8 mg, Oral, Q8H PRN    ondansetron, 4 mg, Intravenous, Q8H PRN      Estimated/Assessed Needs    Weight Used For Calorie Calculations: (!) 140.5 kg (309 lb 11.9 oz)  Energy Calorie Requirements (kcal): 2600  Energy Need Method: [place]-St Jeor (x 1.2)  Protein Requirements: 126 g (0.9 g/kg)  Weight Used For Protein Calculations: (!) 140.5 kg (309 lb 11.9 oz)  Fluid Requirements (mL): 1 mL/kcal  Estimated Fluid Requirement Method:  (or per MD)  RDA Method (mL): 2600  CHO Requirement: 325 g      Nutrition Prescription Ordered    Current Diet Order: Diabetic  Oral Nutrition Supplement: Zach BID    Evaluation of Received  Nutrient/Fluid Intake    I/O: 300/600  Energy Calories Required: meeting needs  Protein Required: meeting needs  Fluid Required: meeting needs  Comments: LBM: 12/11  Tolerance: tolerating  % Intake of Estimated Energy Needs: 50 - 75 %  % Meal Intake: 50 - 75 %    Nutrition Risk    Level of Risk/Frequency of Follow-up: moderate     Monitor and Evaluation    Food and Nutrient Intake: food and beverage intake  Food and Nutrient Adminstration: diet order  Physical Activity and Function: factors affecting access to physical activity, nutrition-related ADLs and IADLs  Anthropometric Measurements: weight, body mass index  Biochemical Data, Medical Tests and Procedures: electrolyte and renal panel, gastrointestinal profile, glucose/endocrine profile, inflammatory profile, lipid profile  Nutrition-Focused Physical Findings: overall appearance, skin     Nutrition Follow-Up    RD Follow-up?: Yes    Patricia Zamorano RDN, SURIN

## 2024-12-12 NOTE — PROGRESS NOTES
"[facility] Surg ([address] Medicine  Progress Note    Patient Name: Riaz Guerra Jr.  MRN: [id number]  Patient Class: OP- Observation   Admission Date: 12/6/2024  Length of Stay: 0 days  Attending Physician: LENA Aguero MD  Primary Care Provider: Berhane Alvarez MD        Subjective     Principal Problem:Problem related to discharge planning        HPI:  Mr. Guerra is a 74 YOM with PMHx of HTN, HLD, DM type II, CKD III (baseline SCr 1-1.6), anemia of chronic disease, chronic bilateral venous stasis with chronic wounds, debility/weakness, and obesity.     He presents to ED via EMS due to fall at home last night and inability to get up by himself. He notes that since fall he has had diffuse body pain, worse from baseline chronic pain and he was concerned about his chronic BLE wounds. He reports that he was recently admitted to [facility] and discharged to SNF facility. While at SNF he notes that he was not improving and having more pain than "when he went in" and ultimately ended up returning home to [facility]. It is unclear if he was discharged home or left AMA from SNF. He also reports that today once he activated EMS his was approached with an eviction notice from his apartment. He reports no further housing options and he has not family that he "is close with". He endorses needed assistance with placement. He denies fever, chills, palpitations, SOB, ROSSI, CP, abdominal pain, N/V/D, constipation, urinary symptoms or hematuria,decreased appetite, changes in PO intake, light headiness, dizziness, or headaches.     In the ED he was initially hypertensive with improvement over ED course, otherwise HDS and afebrile.  CBC with WBC 8, H/H 13.4/40.6, platelets 466.  Chemistry was , K 4.3, chloride 103, CO2 23, BUN 31, SCr 1.4 (baseline 1-1.6), glucose 147.  LFTs unremarkable.  CRP 81.  CPK 82.  Blood cultures in process.  CT C-spine with no acute process seen. DJD " and bilateral areas of neural foraminal narrowing. CTH with a punctate focus of low attenuation along the medial aspect of the caudate and as well as within the anterior limb of the right internal capsule.  Findings likely reflect that of remote infarct however no previous exams are available for comparison and therefore remain age-indeterminate.  Correlation with current symptomatology recommended. Sequela of chronic microvascular ischemic change and senescent change.  Mild mastoid effusions. R tib/fib XR with DJD of the knee and ankle with spurs on the patella.  No fracture dislocation bone destruction or periosteal reaction seen. R hip and pelvis with severe advanced DJD and impingement change.  There is likely a chronic labral tear.  No acute fracture dislocation bone destruction seen.    The patient was placed in observation to the Hospital Medicine Service for further evaluation and treatment.     Overview/Hospital Course:  Mr Mello was admitted for placement after falling a few days after leaving [facility]. He was weakness to right side of body. He refused MRI lumbar spine.  Right knee XRAY and brace ordered. Strength improved with therapy but he remains a moderate to max assist with standing, ambulating, and transferring from chair. Wound care placed a wound vac to RLE wound. [employer] denied him SNF due to poor therapy participation and for leaving SNF [place]. The recommend retirement with HH PT/OT.     Interval History: Notes reviewed no acute events overnight. Wound care consulted and RLE wound improving. Patient agreeable to retirement care and referrals sent. Chronic R knee swelling.       Review of Systems   Constitutional:  Positive for activity change. Negative for appetite change, chills and fever.   HENT:  Negative for congestion, sore throat and trouble swallowing.    Eyes:  Negative for photophobia and visual disturbance.   Respiratory:  Negative for cough, chest tightness and shortness of breath.     Cardiovascular:  Negative for chest pain, palpitations and leg swelling.   Gastrointestinal:  Negative for abdominal pain, diarrhea and nausea.   Genitourinary:  Negative for dysuria, flank pain and hematuria.   Musculoskeletal:  Positive for arthralgias, gait problem, joint swelling (r knee -chronic) and myalgias. Negative for back pain.   Neurological:  Negative for dizziness, weakness and headaches.   Psychiatric/Behavioral:  Negative for confusion.      Objective:     Vital Signs (Most Recent):  Temp: 97.5 °F (36.4 °C) (12/12/24 1128)  Pulse: 91 (12/12/24 1448)  Resp: 16 (12/12/24 1448)  BP: (!) 146/72 (12/12/24 1448)  SpO2: 95 % (12/12/24 1128) Vital Signs (24h Range):  Temp:  [97.4 °F (36.3 °C)-98.2 °F (36.8 °C)] 97.5 °F (36.4 °C)  Pulse:  [74-97] 91  Resp:  [14-18] 16  SpO2:  [95 %-100 %] 95 %  BP: (119-155)/(70-82) 146/72     Weight: (!) 140.5 kg (309 lb 11.9 oz)  Body mass index is 43.2 kg/m².    Intake/Output Summary (Last 24 hours) at 12/12/2024 1702  Last data filed at 12/12/2024 1633  Gross per 24 hour   Intake 300 ml   Output 600 ml   Net -300 ml         Physical Exam  Vitals reviewed.   Constitutional:       Appearance: Normal appearance. He is normal weight.   HENT:      Head: Normocephalic.      Mouth/Throat:      Mouth: Mucous membranes are moist.      Pharynx: Oropharynx is clear.   Eyes:      Pupils: Pupils are equal, round, and reactive to light.   Cardiovascular:      Rate and Rhythm: Normal rate and regular rhythm.      Pulses: Normal pulses.      Heart sounds: Normal heart sounds.   Pulmonary:      Effort: Pulmonary effort is normal.      Breath sounds: Normal breath sounds.   Abdominal:      General: Bowel sounds are normal.      Palpations: Abdomen is soft.   Musculoskeletal:         General: Normal range of motion.      Cervical back: Normal range of motion.   Skin:     General: Skin is warm and dry.      Comments: Chronic venous stasis with open RLE wound - no drainage (see photo in  "chart)   Neurological:      Mental Status: He is alert and oriented to person, place, and time. Mental status is at baseline.   Psychiatric:         Mood and Affect: Mood normal.             Significant Labs: All pertinent labs within the past 24 hours have been reviewed.  CBC: No results for input(s): "WBC", "HGB", "HCT", "PLT" in the last 48 hours.  CMP: No results for input(s): "NA", "K", "CL", "CO2", "GLU", "BUN", "CREATININE", "CALCIUM", "PROT", "ALBUMIN", "BILITOT", "ALKPHOS", "AST", "ALT", "ANIONGAP", "EGFRNONAA" in the last 48 hours.    Invalid input(s): "ESTGFAFRICA"    Significant Imaging: I have reviewed all pertinent imaging results/findings within the past 24 hours.    Assessment and Plan     * Problem related to discharge planning  Chronic wounds; BLE   Venous stasis ulcers of both lower extremities   Chronic pain   Impaired mobility and activities of daily living   Recurrent falls  -placed in observation due to fall at home and inability to complete ADLS with decreased caregiver support and likely homelessness  -at admission HDS and afebrile  -ED workup detailed above  -PT/OT consulted   -CM/SW to follow with likely nursing home placement   -continue home baclofen and gabapentin  -PRN supportive care for pain   -dose/medication adjustment as appropriate   -Wound Care consulted  -dressing changes per their recommendations  -fall precautions   -MRI lumbar spine ordered--pt declined  - WC placed wound vac today  - Right knee brace and xray ordered    Venous stasis ulcers of both lower extremities  Chronic    Continue to follow recommendations of wound care      Anemia due to stage 3 chronic kidney disease  -chronic   -H/H stable at admission   -trend CBC over course and transfuse if becomes clinically indicated    CKD (chronic kidney disease) stage 3, GFR 30-59 ml/min  -chronic   -SCr 1.4 at admission --1.3 on 12/7  -baseline SCr 1-1.6   -avoid nephrotoxins and hypotension as able, renally dose " medications    HTN (hypertension)  -chronic   -controlled   -continue home amlodipine, losartan, and HCTZ  -dose/medication adjustment as appropriate     Insulin dependent type 2 diabetes mellitus  -chronic   -not controlled   -most recent hemoglobin A1c 6.9   -hold home metformin and begin low-dose SSI  -dose/medication adjustment as appropriate   -monitor accuchecks AC/HS and PRN hypoglycemic protocol       VTE Risk Mitigation (From admission, onward)           Ordered     heparin (porcine) injection 5,000 Units  Every 8 hours         12/06/24 1545     IP VTE HIGH RISK PATIENT  Once         12/06/24 1545     Place sequential compression device  Until discontinued         12/06/24 1545                    Discharge Planning   KELSEY: 12/13/2024     Code Status: Full Code   Medical Readiness for Discharge Date:   Discharge Plan A: New Nursing Home placement - alf care facility   Discharge Delays: None known at this time              Aysha Cruz NP  Department of Hospital Medicine   [facility] - Med Surg ([address])

## 2024-12-13 VITALS
TEMPERATURE: 98 F | SYSTOLIC BLOOD PRESSURE: 134 MMHG | OXYGEN SATURATION: 99 % | BODY MASS INDEX: 43.36 KG/M2 | WEIGHT: 309.75 LBS | DIASTOLIC BLOOD PRESSURE: 85 MMHG | HEIGHT: 71 IN | HEART RATE: 92 BPM | RESPIRATION RATE: 15 BRPM

## 2024-12-13 PROCEDURE — G0378 HOSPITAL OBSERVATION PER HR: HCPCS

## 2024-12-13 PROCEDURE — 25000003 PHARM REV CODE 250: Performed by: NURSE PRACTITIONER

## 2024-12-13 PROCEDURE — 97530 THERAPEUTIC ACTIVITIES: CPT | Mod: CQ

## 2024-12-13 PROCEDURE — 63600175 PHARM REV CODE 636 W HCPCS: Performed by: NURSE PRACTITIONER

## 2024-12-13 PROCEDURE — 96372 THER/PROPH/DIAG INJ SC/IM: CPT | Performed by: NURSE PRACTITIONER

## 2024-12-13 RX ORDER — HYDROCODONE BITARTRATE AND ACETAMINOPHEN 7.5; 325 MG/1; MG/1
1 TABLET ORAL EVERY 8 HOURS PRN
Qty: 12 TABLET | Refills: 0 | Status: SHIPPED | OUTPATIENT
Start: 2024-12-13

## 2024-12-13 RX ORDER — GABAPENTIN 800 MG/1
800 TABLET ORAL 2 TIMES DAILY
Qty: 20 TABLET | Refills: 0 | Status: SHIPPED | OUTPATIENT
Start: 2024-12-13

## 2024-12-13 RX ADMIN — LIDOCAINE 1 PATCH: 50 PATCH CUTANEOUS at 02:12

## 2024-12-13 RX ADMIN — TAMSULOSIN HYDROCHLORIDE 0.4 MG: 0.4 CAPSULE ORAL at 08:12

## 2024-12-13 RX ADMIN — BACLOFEN 10 MG: 10 TABLET ORAL at 08:12

## 2024-12-13 RX ADMIN — HYDROCODONE BITARTRATE AND ACETAMINOPHEN 1 TABLET: 10; 325 TABLET ORAL at 01:12

## 2024-12-13 RX ADMIN — AMMONIUM LACTATE: 120 LOTION TOPICAL at 09:12

## 2024-12-13 RX ADMIN — LOSARTAN POTASSIUM 100 MG: 50 TABLET, FILM COATED ORAL at 08:12

## 2024-12-13 RX ADMIN — ATORVASTATIN CALCIUM 20 MG: 20 TABLET, FILM COATED ORAL at 08:12

## 2024-12-13 RX ADMIN — HYDROCODONE BITARTRATE AND ACETAMINOPHEN 1 TABLET: 10; 325 TABLET ORAL at 02:12

## 2024-12-13 RX ADMIN — ASPIRIN 81 MG: 81 TABLET, COATED ORAL at 08:12

## 2024-12-13 RX ADMIN — GABAPENTIN 800 MG: 400 CAPSULE ORAL at 08:12

## 2024-12-13 RX ADMIN — FLUTICASONE PROPIONATE 100 MCG: 50 SPRAY, METERED NASAL at 09:12

## 2024-12-13 RX ADMIN — HEPARIN SODIUM 5000 UNITS: 5000 INJECTION INTRAVENOUS; SUBCUTANEOUS at 06:12

## 2024-12-13 RX ADMIN — BACLOFEN 10 MG: 10 TABLET ORAL at 02:12

## 2024-12-13 RX ADMIN — AMLODIPINE BESYLATE 10 MG: 5 TABLET ORAL at 08:12

## 2024-12-13 RX ADMIN — HEPARIN SODIUM 5000 UNITS: 5000 INJECTION INTRAVENOUS; SUBCUTANEOUS at 01:12

## 2024-12-13 NOTE — PLAN OF CARE
Patient is refusing to pay admission fee for nursing home. Patient states he can dc to [id number] [address] with Perla ([phone]).     Sw sent out referrals for .

## 2024-12-13 NOTE — PT/OT/SLP PROGRESS
Physical Therapy Treatment    Patient Name:  Riaz Guerra Jr.   MRN:  [id number]    Recommendations:     Discharge Recommendations: Moderate Intensity Therapy  Discharge Equipment Recommendations: to be determined by next level of care, wheelchair, walker, rolling  Barriers to discharge: Inaccessible home and Decreased caregiver support    Assessment:     Riaz Guerra Jr. is a 74 y.o. male admitted with a medical diagnosis of Problem related to discharge planning.  He presents with the following impairments/functional limitations: weakness, gait instability, edema, pain, impaired balance, impaired cardiopulmonary response to activity, impaired endurance, impaired fine motor, impaired functional mobility, impaired joint extensibility, impaired self care skills, impaired skin, decreased coordination, decreased lower extremity function, decreased ROM, decreased safety awareness, decreased upper extremity function.    Sit>stand from chair with rollator and maxA x 2, cueing for full extension  Chair>rollator seat with stand/pivot and maxA x 2; pt twisting body to turn and sit on seat, difficulty mobilizing BLE for proper pivot/turn  Pt req'ing significant assist for all transfers, unable to ambulate  Rec moderate Intensity Therapy with transition to penitentiary care    Rehab Prognosis: Fair; patient would benefit from acute skilled PT services to address these deficits and reach maximum level of function.    Recent Surgery: * No surgery found *      Plan:     During this hospitalization, patient to be seen 5 x/week to address the identified rehab impairments via gait training, therapeutic activities, therapeutic exercises, neuromuscular re-education, wheelchair management/training and progress toward the following goals:    Plan of Care Expires:  01/08/25    Subjective     Chief Complaint: butt hurts  Patient/Family Comments/goals: I'm not going to that NH. I'm going home.  Pain/Comfort:  Pain Rating 1: other  (see comments) (pt reporting pain in R buttock with mobility, unrated)  Location - Side 1: Right  Location 1: gluteal  Pain Addressed 1: Reposition, Distraction, Cessation of Activity  Pain Rating Post-Intervention 1: 0/10      Objective:     Communicated with nurse Rina prior to session.  Patient found up in chair with peripheral IV upon PT entry to room.     General Precautions: Standard, diabetic, fall  Orthopedic Precautions: N/A  Braces: N/A  Respiratory Status: Room air     Functional Mobility:  Transfers:     Sit to Stand:  maximal assistance and of 2 persons with 4 wheeled walker  Chair to rollator seat: maximal assistance and of 2 persons with  4 wheeled walker  using  Stand Pivot      AM-PAC 6 CLICK MOBILITY  Turning over in bed (including adjusting bedclothes, sheets and blankets)?: 2  Sitting down on and standing up from a chair with arms (e.g., wheelchair, bedside commode, etc.): 2  Moving from lying on back to sitting on the side of the bed?: 2  Moving to and from a bed to a chair (including a wheelchair)?: 2  Need to walk in hospital room?: 1  Climbing 3-5 steps with a railing?: 1  Basic Mobility Total Score: 10       Treatment & Education:  Transfers as noted  Gait belt provided to pt    Patient left up in chair with call button in reach..    GOALS:   Multidisciplinary Problems       Physical Therapy Goals          Problem: Physical Therapy    Goal Priority Disciplines Outcome Interventions   Physical Therapy Goal     PT, PT/OT Progressing    Description: Goals to be met by: 25    Patient will increase functional independence with mobility by performin. Sit<>stand with Max A with RW.  2. Gait x 10 ft feet with RW with Mod A.  3. Supine<>sit with Mod A.                           Time Tracking:     PT Received On: 24  PT Start Time: 1105     PT Stop Time: 1115  PT Total Time (min): 10 min     Billable Minutes: Therapeutic Activity 10    Treatment Type: Treatment  PT/PTA: PTA     Number  of PTA visits since last PT visit: 3     12/13/2024

## 2024-12-13 NOTE — PROGRESS NOTES
12/13/24 1505   Discharge Delays   Discharge Delays (!) Ambulance Transport/Facility Transport

## 2024-12-13 NOTE — PLAN OF CARE
Problem: Skin Injury Risk Increased  Goal: Skin Health and Integrity  Outcome: Progressing     Problem: Adult Inpatient Plan of Care  Goal: Plan of Care Review  Outcome: Progressing  Goal: Patient-Specific Goal (Individualized)  Outcome: Progressing  Goal: Absence of Hospital-Acquired Illness or Injury  Outcome: Progressing  Goal: Optimal Comfort and Wellbeing  Outcome: Progressing  Goal: Readiness for Transition of Care  Outcome: Progressing     Problem: Bariatric Environmental Safety  Goal: Safety Maintained with Care  Outcome: Progressing     Problem: Diabetes Comorbidity  Goal: Blood Glucose Level Within Targeted Range  Outcome: Progressing     Problem: Wound  Goal: Optimal Coping  Outcome: Progressing  Goal: Optimal Functional Ability  Outcome: Progressing  Goal: Absence of Infection Signs and Symptoms  Outcome: Progressing  Goal: Improved Oral Intake  Outcome: Progressing  Goal: Optimal Pain Control and Function  Outcome: Progressing  Goal: Skin Health and Integrity  Outcome: Progressing  Goal: Optimal Wound Healing  Outcome: Progressing

## 2024-12-13 NOTE — PROGRESS NOTES
Nurse Rina spoke with patient. Patient agreeable to dressing. Attempted to dress leg wound with Aquacel Ag and pt refused. Attempted to use xeroform gauze and patient refused. Patient asked for ABD and rolled gauze to be applied. Dressing placed. Wound resolving. Few open areas of partial thickness skin loss to posterior calf. Unable to take photo of calf as patient would not allow me to lift his leg. Nursing notified. Wound care signing off.

## 2024-12-13 NOTE — PLAN OF CARE
Case Management Final Discharge Note      Discharge Disposition: Nursing home at [facility] DME ordered / company name: NA    Relevant SDOH / Transition of Care Barriers:  homeless    Primary Caretaker and contact information: Niece     Scheduled followup appointment: PCP at NH    Referrals placed: NA    Transportation: Sw scheduled transportation at 5:30 PM     Patient and family educated on discharge services and updated on DC plan. Bedside RN notified, patient clear to discharge from Case Management Perspective.     [facility] - Med Surg ([address])  Discharge Final Note    Primary Care Provider: Berhane Alvarez MD    Expected Discharge Date: 12/13/2024    Final Discharge Note (most recent)       Final Note - 12/13/24 1626          Final Note    Assessment Type Final Discharge Note     Anticipated Discharge Disposition senior care Nursing Facility     Hospital Resources/Appts/Education Provided Provided patient/caregiver with written discharge plan information;Appointments scheduled and added to AVS        Post-Acute Status    Post-Acute Authorization Placement (P)      Post-Acute Placement Status Set-up Complete/Auth obtained (P)      Patient choice form signed by patient/caregiver List with quality metrics by geographic area provided (P)      Discharge Delays None known at this time (P)

## 2024-12-13 NOTE — PROGRESS NOTES
[facility] - Med Surg ([address])  Wound Care    Patient Name:  Riaz Guerra Jr.   MRN:  [id number]  Date: 12/13/2024  Diagnosis: Problem related to discharge planning    History:     Past Medical History:   Diagnosis Date    Depression     Diabetes mellitus     Gout     High cholesterol     Hypertension        Social History     Socioeconomic History    Marital status: Single   Tobacco Use    Smoking status: Never    Smokeless tobacco: Never   Substance and Sexual Activity    Alcohol use: Not Currently     Comment: occasionally    Drug use: Yes     Types: Marijuana    Sexual activity: Yes     Social Drivers of Health     Financial Resource Strain: Low Risk  (12/10/2024)    Overall Financial Resource Strain (CARDIA)     Difficulty of Paying Living Expenses: Not hard at all   Food Insecurity: No Food Insecurity (12/10/2024)    Hunger Vital Sign     Worried About Running Out of Food in the Last Year: Never true     Ran Out of Food in the Last Year: Never true   Transportation Needs: No Transportation Needs (12/10/2024)    TRANSPORTATION NEEDS     Transportation : No   Physical Activity: Inactive (11/22/2024)    Exercise Vital Sign     Days of Exercise per Week: 0 days     Minutes of Exercise per Session: 0 min   Stress: No Stress Concern Present (12/10/2024)    [employer] [place] [employer] - Occupational Stress Questionnaire     Feeling of Stress : Not at all   Recent Concern: Stress - Stress Concern Present (11/22/2024)    [employer] [place] [employer] - Occupational Stress Questionnaire     Feeling of Stress : To some extent   Housing Stability: Low Risk  (12/10/2024)    Housing Stability Vital Sign     Unable to Pay for Housing in the Last Year: No     Homeless in the Last Year: No       Precautions:     Allergies as of 12/06/2024 - Reviewed 12/06/2024   Allergen Reaction Noted    Lisinopril Other (See Comments) 06/21/2024       WO Assessment Details/Treatment     Wound care notified  that patient removing vac dressing from his right leg.  Upon entering room vac dressing being removed by patient with vac alarming leakage. Wound care nurse removed vac dressing and then attempted to redress wound with standard Aquacel Ag Hydrofiber dressing. Patient refused dressing to be placed to leg. Primary nurse and charge nurse notified. Hospital wound vac brought to [address] [place] [address] room and material management called for pickup.       12/13/2024

## 2024-12-14 LAB
POCT GLUCOSE: 156 MG/DL (ref 70–110)
POCT GLUCOSE: 158 MG/DL (ref 70–110)
POCT GLUCOSE: 174 MG/DL (ref 70–110)

## 2024-12-14 NOTE — ASSESSMENT & PLAN NOTE
Chronic wounds; BLE   Venous stasis ulcers of both lower extremities   Chronic pain   Impaired mobility and activities of daily living   Recurrent falls  -placed in observation due to fall at home and inability to complete ADLS with decreased caregiver support and likely homelessness  -at admission HDS and afebrile  -ED workup detailed above  -PT/OT consulted   -CM/SW to follow with likely nursing home placement   -continue home baclofen and gabapentin  -PRN supportive care for pain   -dose/medication adjustment as appropriate   -Wound Care consulted  -dressing changes per their recommendations  -fall precautions   -MRI lumbar spine ordered--pt declined  - WC placed wound vac today  - Right knee brace and xray orderedNo fracture or dislocation.  No joint effusion.   Ultimately decided to go long-term NH, stable for discharge

## 2024-12-14 NOTE — DISCHARGE SUMMARY
"[facility] Surg ([address] Medicine  Discharge Summary      Patient Name: Riaz Guerra Jr.  MRN: [id number]  NANCY: [id number]  Patient Class: OP- Observation  Admission Date: 12/6/2024  Hospital Length of Stay: 0 days  Discharge Date and Time: 12/13/2024  7:28 PM  Attending Physician: No att. providers found   Discharging Provider: Chiara Poon PA-C  Primary Care Provider: Berhane Alvarez MD    Primary Care Team: Networked reference to record PCT     HPI:   Mr. Guerra is a 74 YOM with PMHx of HTN, HLD, DM type II, CKD III (baseline SCr 1-1.6), anemia of chronic disease, chronic bilateral venous stasis with chronic wounds, debility/weakness, and obesity.     He presents to ED via EMS due to fall at home last night and inability to get up by himself. He notes that since fall he has had diffuse body pain, worse from baseline chronic pain and he was concerned about his chronic BLE wounds. He reports that he was recently admitted to [facility] and discharged to SNF facility. While at SNF he notes that he was not improving and having more pain than "when he went in" and ultimately ended up returning home to [facility]. It is unclear if he was discharged home or left AMA from SNF. He also reports that today once he activated EMS his was approached with an eviction notice from his apartment. He reports no further housing options and he has not family that he "is close with". He endorses needed assistance with placement. He denies fever, chills, palpitations, SOB, ROSSI, CP, abdominal pain, N/V/D, constipation, urinary symptoms or hematuria,decreased appetite, changes in PO intake, light headiness, dizziness, or headaches.     In the ED he was initially hypertensive with improvement over ED course, otherwise HDS and afebrile.  CBC with WBC 8, H/H 13.4/40.6, platelets 466.  Chemistry was , K 4.3, chloride 103, CO2 23, BUN 31, SCr 1.4 (baseline 1-1.6), glucose 147.  LFTs " unremarkable.  CRP 81.  CPK 82.  Blood cultures in process.  CT C-spine with no acute process seen. DJD and bilateral areas of neural foraminal narrowing. CTH with a punctate focus of low attenuation along the medial aspect of the caudate and as well as within the anterior limb of the right internal capsule.  Findings likely reflect that of remote infarct however no previous exams are available for comparison and therefore remain age-indeterminate.  Correlation with current symptomatology recommended. Sequela of chronic microvascular ischemic change and senescent change.  Mild mastoid effusions. R tib/fib XR with DJD of the knee and ankle with spurs on the patella.  No fracture dislocation bone destruction or periosteal reaction seen. R hip and pelvis with severe advanced DJD and impingement change.  There is likely a chronic labral tear.  No acute fracture dislocation bone destruction seen.    The patient was placed in observation to the Hospital Medicine Service for further evaluation and treatment.     * No surgery found *      Hospital Course:   Mr Mello was admitted for placement after falling a few days after leaving [facility]. He was weakness to right side of body. He refused MRI lumbar spine.  Strength improved with therapy but he remains a moderate to max assist with standing, ambulating, and transferring from chair. Wound care placed a wound vac to RLE wound. [employer] denied him SNF due to poor therapy participation and for leaving SNF [facility]. The recommend [facility] with HH PT/OT. Patient was indecisive on dispo, ultimately decided on [facility] NH. Stable for dc with PCP fu, return precautions discussed, no further questions at dc     Goals of Care Treatment Preferences:  Code Status: Full Code      SDOH Screening:  The patient was screened for utility difficulties, food insecurity, transport difficulties, housing insecurity, and interpersonal safety and there were no concerns identified this admission.      Consults:   Consults (From admission, onward)          Status Ordering Provider     Inpatient consult to Registered Dietitian/Nutritionist  Once        Provider:  (Not yet assigned)    Completed COREY EGAN            * Problem related to discharge planning  Chronic wounds; BLE   Venous stasis ulcers of both lower extremities   Chronic pain   Impaired mobility and activities of daily living   Recurrent falls  -placed in observation due to fall at home and inability to complete ADLS with decreased caregiver support and likely homelessness  -at admission HDS and afebrile  -ED workup detailed above  -PT/OT consulted   -CM/SW to follow with likely nursing home placement   -continue home baclofen and gabapentin  -PRN supportive care for pain   -dose/medication adjustment as appropriate   -Wound Care consulted  -dressing changes per their recommendations  -fall precautions   -MRI lumbar spine ordered--pt declined  - WC placed wound vac today  - Right knee brace and xray orderedNo fracture or dislocation.  No joint effusion.   Ultimately decided to go intermediate NH, stable for discharge     Venous stasis ulcers of both lower extremities  Chronic    Continue to follow recommendations of wound care      Anemia due to stage 3 chronic kidney disease  -chronic   -H/H stable at admission   -trend CBC over course and transfuse if becomes clinically indicated    CKD (chronic kidney disease) stage 3, GFR 30-59 ml/min  -chronic   -SCr 1.4 at admission --1.3 on 12/7  -baseline SCr 1-1.6   -avoid nephrotoxins and hypotension as able, renally dose medications    HTN (hypertension)  -chronic   -controlled   -continue home amlodipine, losartan, and HCTZ  -dose/medication adjustment as appropriate     Insulin dependent type 2 diabetes mellitus  -chronic   -not controlled   -most recent hemoglobin A1c 6.9   -hold home metformin and begin low-dose SSI  -dose/medication adjustment as appropriate   -monitor accuchecks AC/HS and PRN  hypoglycemic protocol       Final Active Diagnoses:    Diagnosis Date Noted POA    PRINCIPAL PROBLEM:  Problem related to discharge planning [Z75.8] 12/06/2024 Yes    Chronic wounds; BLE [T14.8XXA] 12/06/2024 Yes    Impaired mobility and activities of daily living [Z74.09, Z78.9] 12/06/2024 Yes    Venous stasis ulcers of both lower extremities [I83.019, I83.029, L97.919, L97.929] 11/21/2024 Yes    Insulin dependent type 2 diabetes mellitus [E11.9, Z79.4] 07/01/2024 Not Applicable    HTN (hypertension) [I10] 07/01/2024 Yes    Chronic pain [G89.29] 07/01/2024 Yes    CKD (chronic kidney disease) stage 3, GFR 30-59 ml/min [N18.30] 07/01/2024 Yes    Anemia due to stage 3 chronic kidney disease [N18.30, D63.1] 07/01/2024 Yes      Problems Resolved During this Admission:       Discharged Condition: stable    Disposition: Home or Self Care    Follow Up:    Patient Instructions:      Ambulatory referral/consult to Pain Clinic   Standing Status: Future   Referral Priority: Routine Referral Type: Consultation   Referral Reason: Specialty Services Required   Requested Specialty: Pain Medicine   Number of Visits Requested: 1     Notify your health care provider if you experience any of the following:  temperature >100.4     Notify your health care provider if you experience any of the following:  severe uncontrolled pain     Notify your health care provider if you experience any of the following:  redness, tenderness, or signs of infection (pain, swelling, redness, odor or green/yellow discharge around incision site)     Notify your health care provider if you experience any of the following:  worsening rash     Notify your health care provider if you experience any of the following:  persistent dizziness, light-headedness, or visual disturbances     Notify your health care provider if you experience any of the following:  increased confusion or weakness     Activity as tolerated       Significant Diagnostic Studies: Radiology:  CT scan:  CT: CT C-spine:  1. There is a punctate focus of low attenuation along the medial aspect of the caudate and as well as within the anterior limb of the right internal capsule.  Findings likely reflect that of remote infarct however no previous exams are available for comparison and therefore remain age-indeterminate.  Correlation with current symptomatology recommended.  2. Sequela of chronic microvascular ischemic change and senescent change.    CT-C spine:  No acute process seen.  DJD and bilateral areas of neural foraminal narrowing.    Pending Diagnostic Studies:       None           Medications:  Reconciled Home Medications:      Medication List        START taking these medications      fluticasone propionate 50 mcg/actuation nasal spray  Commonly known as: FLONASE  2 sprays (100 mcg total) by Each Nostril route once daily.            CONTINUE taking these medications      amLODIPine 10 MG tablet  Commonly known as: NORVASC  Take 1 tablet (10 mg total) by mouth once daily.     aspirin 81 MG EC tablet  Commonly known as: ECOTRIN  Take 81 mg by mouth once daily.     atorvastatin 20 MG tablet  Commonly known as: LIPITOR  Take 20 mg by mouth once daily.     baclofen 10 MG tablet  Commonly known as: LIORESAL  Take 10 mg by mouth 3 (three) times daily.     gabapentin 800 MG tablet  Commonly known as: NEURONTIN  Take 1 tablet (800 mg total) by mouth 2 (two) times daily.     HYDROcodone-acetaminophen 7.5-325 mg per tablet  Commonly known as: NORCO  Take 1 tablet by mouth every 8 (eight) hours as needed for Pain.     losartan-hydrochlorothiazide 100-12.5 mg 100-12.5 mg Tab  Commonly known as: HYZAAR  Take 1 tablet by mouth once daily.     metFORMIN 1000 MG tablet  Commonly known as: GLUCOPHAGE  Take 1,000 mg by mouth 2 (two) times daily with meals.     tamsulosin 0.4 mg Cap  Commonly known as: FLOMAX  Take by mouth once daily.              Indwelling Lines/Drains at time of discharge:   Lines/Drains/Airways        None                   Time spent on the discharge of patient: >45 minutes         Chiara Poon PA-C  Department of Hospital Medicine  [facility] - Med Surg ([address])

## 2024-12-27 NOTE — ASSESSMENT & PLAN NOTE
Chronic, uncontrolled. Latest blood pressure and vitals reviewed-     Temp:  [99.9 °F (37.7 °C)]   Pulse:  []   Resp:  [15-22]   BP: (138-224)/()   SpO2:  [95 %-100 %] .     -Continue Amlodipine, Losartan (as long as K normalizes).   -Can add hydralazine if systolic remains >180.    Spoke with daughter Bri who is upset.  She said her mother is in need of a paracentesis today and weekly.   Patient went to [facility] yesterday to get a para and after 4 hours waiting she went home. She could not wait anymore.

## 2025-01-01 ENCOUNTER — RESULTS FOLLOW-UP (OUTPATIENT)
Dept: HEPATOLOGY | Facility: HOSPITAL | Age: 75
End: 2025-01-01

## 2025-01-01 ENCOUNTER — HOSPITAL ENCOUNTER (EMERGENCY)
Facility: HOSPITAL | Age: 75
End: 2025-05-02
Attending: STUDENT IN AN ORGANIZED HEALTH CARE EDUCATION/TRAINING PROGRAM
Payer: MEDICARE

## 2025-01-01 VITALS
SYSTOLIC BLOOD PRESSURE: 96 MMHG | HEART RATE: 132 BPM | TEMPERATURE: 99 F | WEIGHT: 308 LBS | DIASTOLIC BLOOD PRESSURE: 55 MMHG | BODY MASS INDEX: 42.95 KG/M2 | OXYGEN SATURATION: 91 % | RESPIRATION RATE: 23 BRPM

## 2025-01-01 DIAGNOSIS — N39.0 URINARY TRACT INFECTION WITH HEMATURIA, SITE UNSPECIFIED: ICD-10-CM

## 2025-01-01 DIAGNOSIS — Z13.6 SCREENING FOR CARDIOVASCULAR CONDITION: ICD-10-CM

## 2025-01-01 DIAGNOSIS — I46.9 CARDIAC ARREST WITH PULSELESS ELECTRICAL ACTIVITY: Primary | ICD-10-CM

## 2025-01-01 DIAGNOSIS — N17.9 ACUTE RENAL FAILURE, UNSPECIFIED ACUTE RENAL FAILURE TYPE: ICD-10-CM

## 2025-01-01 DIAGNOSIS — R41.82 ALTERED MENTAL STATUS, UNSPECIFIED ALTERED MENTAL STATUS TYPE: ICD-10-CM

## 2025-01-01 DIAGNOSIS — R31.9 URINARY TRACT INFECTION WITH HEMATURIA, SITE UNSPECIFIED: ICD-10-CM

## 2025-01-01 DIAGNOSIS — R94.31 ABNORMAL EKG: ICD-10-CM

## 2025-01-01 DIAGNOSIS — E87.5 HYPERKALEMIA: ICD-10-CM

## 2025-01-01 DIAGNOSIS — I46.9 CARDIAC ARREST: ICD-10-CM

## 2025-01-01 DIAGNOSIS — J96.01 ACUTE HYPOXEMIC RESPIRATORY FAILURE: ICD-10-CM

## 2025-01-01 LAB
ABSOLUTE EOSINOPHIL (OHS): 0.02 K/UL
ABSOLUTE MONOCYTE (OHS): 0.14 K/UL (ref 0.3–1)
ABSOLUTE NEUTROPHIL COUNT (OHS): 7.9 K/UL (ref 1.8–7.7)
ALBUMIN SERPL BCP-MCNC: 2.9 G/DL (ref 3.5–5.2)
ALP SERPL-CCNC: 109 UNIT/L (ref 40–150)
ALT SERPL W/O P-5'-P-CCNC: 7 UNIT/L (ref 10–44)
ANION GAP (OHS): 22 MMOL/L (ref 8–16)
APTT PPP: 26.7 SECONDS (ref 21–32)
APTT PPP: 27.2 SECONDS (ref 21–32)
AST SERPL-CCNC: 12 UNIT/L (ref 11–45)
BACTERIA #/AREA URNS AUTO: ABNORMAL /HPF
BASOPHILS # BLD AUTO: 0.02 K/UL
BASOPHILS NFR BLD AUTO: 0.2 %
BILIRUB SERPL-MCNC: 0.5 MG/DL (ref 0.1–1)
BILIRUB UR QL STRIP.AUTO: NEGATIVE
BIPAP: 0
BNP SERPL-MCNC: <10 PG/ML (ref 0–99)
BUN SERPL-MCNC: 118 MG/DL (ref 8–23)
CALCIUM SERPL-MCNC: 9.5 MG/DL (ref 8.7–10.5)
CHLORIDE SERPL-SCNC: 108 MMOL/L (ref 95–110)
CLARITY UR: ABNORMAL
CO2 SERPL-SCNC: 10 MMOL/L (ref 23–29)
COLOR UR AUTO: ABNORMAL
CORRECTED TEMPERATURE (PCO2): 41.3 MMHG
CORRECTED TEMPERATURE (PCO2): 42.3 MMHG
CORRECTED TEMPERATURE (PCO2): 47.7 MMHG
CORRECTED TEMPERATURE (PH): 6.96
CORRECTED TEMPERATURE (PH): 7.12
CORRECTED TEMPERATURE (PH): 7.15
CORRECTED TEMPERATURE (PO2): 28.8 MMHG
CORRECTED TEMPERATURE (PO2): 35.2 MMHG
CORRECTED TEMPERATURE (PO2): 74 MMHG
CREAT SERPL-MCNC: 9.1 MG/DL (ref 0.5–1.4)
ERYTHROCYTE [DISTWIDTH] IN BLOOD BY AUTOMATED COUNT: 14.6 % (ref 11.5–14.5)
FIO2: 21 %
GFR SERPLBLD CREATININE-BSD FMLA CKD-EPI: 6 ML/MIN/1.73/M2
GLUCOSE SERPL-MCNC: 71 MG/DL (ref 70–110)
GLUCOSE UR QL STRIP: NEGATIVE
HCT VFR BLD AUTO: 43.5 % (ref 40–54)
HCT VFR BLD CALC: 41.6 %
HCT VFR BLD CALC: 46.3 %
HCT VFR BLD CALC: 47.1 %
HGB BLD-MCNC: 13.7 GM/DL (ref 14–18)
HGB UR QL STRIP: ABNORMAL
HOLD SPECIMEN: NORMAL
IMM GRANULOCYTES # BLD AUTO: 0.03 K/UL (ref 0–0.04)
IMM GRANULOCYTES NFR BLD AUTO: 0.3 % (ref 0–0.5)
INR PPP: 1.1 (ref 0.8–1.2)
KETONES UR QL STRIP: NEGATIVE
LDH SERPL L TO P-CCNC: 3.9 MMOL/L (ref 0.5–2.2)
LDH SERPL L TO P-CCNC: 4.5 MMOL/L (ref 0.5–2.2)
LDH SERPL L TO P-CCNC: 8.5 MMOL/L (ref 0.5–2.2)
LEUKOCYTE ESTERASE UR QL STRIP: ABNORMAL
LYMPHOCYTES # BLD AUTO: 1.35 K/UL (ref 1–4.8)
MCH RBC QN AUTO: 30.1 PG (ref 27–31)
MCHC RBC AUTO-ENTMCNC: 31.5 G/DL (ref 32–36)
MCV RBC AUTO: 96 FL (ref 82–98)
MICROSCOPIC COMMENT: ABNORMAL
NITRITE UR QL STRIP: NEGATIVE
NON-SQ EPI CELLS #/AREA URNS AUTO: 5 /HPF
NUCLEATED RBC (/100WBC) (OHS): 0 /100 WBC
PCO2 BLDA: 41.3 MMHG
PCO2 BLDA: 42.3 MMHG
PCO2 BLDA: 47.7 MMHG
PH SMN: 6.96 [PH]
PH SMN: 7.12 [PH]
PH SMN: 7.15 [PH]
PH UR STRIP: 6 [PH]
PLATELET # BLD AUTO: 315 K/UL (ref 150–450)
PMV BLD AUTO: 10.7 FL (ref 9.2–12.9)
PO2 BLDA: 28.8 MMHG
PO2 BLDA: 35.2 MMHG
PO2 BLDA: 74 MMHG
POC BASE DEFICIT: -14.2 MMOL/L
POC BASE DEFICIT: -15.3 MMOL/L
POC BASE DEFICIT: -21.2 MMOL/L
POC HCO3: 12.5 MMOL/L
POC HCO3: 12.9 MMOL/L
POC HCO3: 9.5 MMOL/L
POC IONIZED CALCIUM: 1.13 MMOL/L (ref 1.06–1.42)
POC IONIZED CALCIUM: 1.15 MMOL/L (ref 1.06–1.42)
POC PERFORMED BY: ABNORMAL
POC TEMPERATURE: 37 C
POTASSIUM BLD-SCNC: 6.1 MMOL/L (ref 3.5–5.1)
POTASSIUM BLD-SCNC: 6.4 MMOL/L (ref 3.5–5.1)
POTASSIUM SERPL-SCNC: 6.7 MMOL/L (ref 3.5–5.1)
PROT SERPL-MCNC: 8.7 GM/DL (ref 6–8.4)
PROT UR QL STRIP: ABNORMAL
PROTHROMBIN TIME: 11.9 SECONDS (ref 9–12.5)
RBC # BLD AUTO: 4.55 M/UL (ref 4.6–6.2)
RBC #/AREA URNS AUTO: 65 /HPF (ref 0–4)
RELATIVE EOSINOPHIL (OHS): 0.2 %
RELATIVE LYMPHOCYTE (OHS): 14.3 % (ref 18–48)
RELATIVE MONOCYTE (OHS): 1.5 % (ref 4–15)
RELATIVE NEUTROPHIL (OHS): 83.5 % (ref 38–73)
SODIUM BLD-SCNC: 140 MMOL/L (ref 136–145)
SODIUM BLD-SCNC: 141 MMOL/L (ref 136–145)
SODIUM SERPL-SCNC: 140 MMOL/L (ref 136–145)
SP GR UR STRIP: 1.02
SPECIMEN SOURCE: ABNORMAL
SQUAMOUS #/AREA URNS AUTO: 18 /HPF
TROPONIN I SERPL HS-MCNC: 7 NG/L
UROBILINOGEN UR STRIP-ACNC: NEGATIVE EU/DL
WBC # BLD AUTO: 9.46 K/UL (ref 3.9–12.7)
WBC #/AREA URNS AUTO: >100 /HPF (ref 0–5)
WBC CLUMPS UR QL AUTO: ABNORMAL

## 2025-01-01 PROCEDURE — 51702 INSERT TEMP BLADDER CATH: CPT

## 2025-01-01 PROCEDURE — 63600175 PHARM REV CODE 636 W HCPCS

## 2025-01-01 PROCEDURE — 87186 SC STD MICRODIL/AGAR DIL: CPT

## 2025-01-01 PROCEDURE — 31500 INSERT EMERGENCY AIRWAY: CPT

## 2025-01-01 PROCEDURE — 82803 BLOOD GASES ANY COMBINATION: CPT | Mod: 91

## 2025-01-01 PROCEDURE — 63600175 PHARM REV CODE 636 W HCPCS: Mod: TB

## 2025-01-01 PROCEDURE — 96375 TX/PRO/DX INJ NEW DRUG ADDON: CPT

## 2025-01-01 PROCEDURE — 93010 ELECTROCARDIOGRAM REPORT: CPT | Mod: ,,, | Performed by: INTERNAL MEDICINE

## 2025-01-01 PROCEDURE — 99291 CRITICAL CARE FIRST HOUR: CPT

## 2025-01-01 PROCEDURE — 93005 ELECTROCARDIOGRAM TRACING: CPT

## 2025-01-01 PROCEDURE — 92950 HEART/LUNG RESUSCITATION CPR: CPT

## 2025-01-01 PROCEDURE — 85610 PROTHROMBIN TIME: CPT

## 2025-01-01 PROCEDURE — 25000003 PHARM REV CODE 250: Performed by: STUDENT IN AN ORGANIZED HEALTH CARE EDUCATION/TRAINING PROGRAM

## 2025-01-01 PROCEDURE — 84132 ASSAY OF SERUM POTASSIUM: CPT

## 2025-01-01 PROCEDURE — 63600175 PHARM REV CODE 636 W HCPCS: Performed by: STUDENT IN AN ORGANIZED HEALTH CARE EDUCATION/TRAINING PROGRAM

## 2025-01-01 PROCEDURE — 85730 THROMBOPLASTIN TIME PARTIAL: CPT

## 2025-01-01 PROCEDURE — 99900035 HC TECH TIME PER 15 MIN (STAT)

## 2025-01-01 PROCEDURE — 93010 ELECTROCARDIOGRAM REPORT: CPT | Mod: 76,,, | Performed by: INTERNAL MEDICINE

## 2025-01-01 PROCEDURE — 85025 COMPLETE CBC W/AUTO DIFF WBC: CPT

## 2025-01-01 PROCEDURE — 96365 THER/PROPH/DIAG IV INF INIT: CPT | Mod: 59

## 2025-01-01 PROCEDURE — 94002 VENT MGMT INPAT INIT DAY: CPT

## 2025-01-01 PROCEDURE — 82330 ASSAY OF CALCIUM: CPT

## 2025-01-01 PROCEDURE — 81001 URINALYSIS AUTO W/SCOPE: CPT

## 2025-01-01 PROCEDURE — 83605 ASSAY OF LACTIC ACID: CPT

## 2025-01-01 PROCEDURE — 83880 ASSAY OF NATRIURETIC PEPTIDE: CPT

## 2025-01-01 PROCEDURE — 84295 ASSAY OF SERUM SODIUM: CPT

## 2025-01-01 PROCEDURE — 87077 CULTURE AEROBIC IDENTIFY: CPT

## 2025-01-01 PROCEDURE — 84484 ASSAY OF TROPONIN QUANT: CPT

## 2025-01-01 PROCEDURE — 80053 COMPREHEN METABOLIC PANEL: CPT

## 2025-01-01 RX ORDER — ROCURONIUM BROMIDE 10 MG/ML
75 INJECTION, SOLUTION INTRAVENOUS ONCE
Status: DISCONTINUED | OUTPATIENT
Start: 2025-01-01 | End: 2025-01-01

## 2025-01-01 RX ORDER — FENTANYL CITRATE-0.9 % NACL/PF 10 MCG/ML
0-250 PLASTIC BAG, INJECTION (ML) INTRAVENOUS CONTINUOUS
Refills: 0 | Status: DISCONTINUED | OUTPATIENT
Start: 2025-01-01 | End: 2025-05-03 | Stop reason: HOSPADM

## 2025-01-01 RX ORDER — AMIODARONE HYDROCHLORIDE 150 MG/3ML
INJECTION, SOLUTION INTRAVENOUS CODE/TRAUMA/SEDATION MEDICATION
Status: COMPLETED | OUTPATIENT
Start: 2025-01-01 | End: 2025-01-01

## 2025-01-01 RX ORDER — EPINEPHRINE 0.1 MG/ML
INJECTION INTRAVENOUS CODE/TRAUMA/SEDATION MEDICATION
Status: COMPLETED | OUTPATIENT
Start: 2025-01-01 | End: 2025-01-01

## 2025-01-01 RX ORDER — CALCIUM CHLORIDE INJECTION 100 MG/ML
INJECTION, SOLUTION INTRAVENOUS CODE/TRAUMA/SEDATION MEDICATION
Status: COMPLETED | OUTPATIENT
Start: 2025-01-01 | End: 2025-01-01

## 2025-01-01 RX ORDER — SODIUM BICARBONATE 1 MEQ/ML
SYRINGE (ML) INTRAVENOUS CODE/TRAUMA/SEDATION MEDICATION
Status: COMPLETED | OUTPATIENT
Start: 2025-01-01 | End: 2025-01-01

## 2025-01-01 RX ORDER — PROPOFOL 10 MG/ML
0-50 INJECTION, EMULSION INTRAVENOUS CONTINUOUS
Status: DISCONTINUED | OUTPATIENT
Start: 2025-01-01 | End: 2025-05-03 | Stop reason: HOSPADM

## 2025-01-01 RX ORDER — ETOMIDATE 2 MG/ML
20 INJECTION INTRAVENOUS
Status: DISCONTINUED | OUTPATIENT
Start: 2025-01-01 | End: 2025-01-01

## 2025-01-01 RX ORDER — HEPARIN SODIUM,PORCINE/D5W 25000/250
0-40 INTRAVENOUS SOLUTION INTRAVENOUS CONTINUOUS
Status: DISCONTINUED | OUTPATIENT
Start: 2025-01-01 | End: 2025-05-03 | Stop reason: HOSPADM

## 2025-01-01 RX ORDER — CALCIUM GLUCONATE 20 MG/ML
1 INJECTION, SOLUTION INTRAVENOUS ONCE
Status: COMPLETED | OUTPATIENT
Start: 2025-01-01 | End: 2025-01-01

## 2025-01-01 RX ORDER — NOREPINEPHRINE BITARTRATE/D5W 4MG/250ML
0-.2 PLASTIC BAG, INJECTION (ML) INTRAVENOUS CONTINUOUS
Status: DISCONTINUED | OUTPATIENT
Start: 2025-01-01 | End: 2025-05-03 | Stop reason: HOSPADM

## 2025-01-01 RX ORDER — NOREPINEPHRINE BITARTRATE/D5W 4MG/250ML
0-.2 PLASTIC BAG, INJECTION (ML) INTRAVENOUS CONTINUOUS
Status: DISCONTINUED | OUTPATIENT
Start: 2025-01-01 | End: 2025-01-01

## 2025-01-01 RX ORDER — LIDOCAINE HYDROCHLORIDE 10 MG/ML
5 INJECTION, SOLUTION EPIDURAL; INFILTRATION; INTRACAUDAL; PERINEURAL
Status: DISCONTINUED | OUTPATIENT
Start: 2025-01-01 | End: 2025-01-01

## 2025-01-01 RX ORDER — ROCURONIUM BROMIDE 10 MG/ML
100 INJECTION, SOLUTION INTRAVENOUS
Status: COMPLETED | OUTPATIENT
Start: 2025-01-01 | End: 2025-01-01

## 2025-01-01 RX ORDER — ETOMIDATE 2 MG/ML
30 INJECTION INTRAVENOUS
Status: COMPLETED | OUTPATIENT
Start: 2025-01-01 | End: 2025-01-01

## 2025-01-01 RX ORDER — PROPOFOL 10 MG/ML
0-50 INJECTION, EMULSION INTRAVENOUS CONTINUOUS
Status: DISCONTINUED | OUTPATIENT
Start: 2025-01-01 | End: 2025-01-01

## 2025-01-01 RX ADMIN — CALCIUM GLUCONATE 1 G: 20 INJECTION, SOLUTION INTRAVENOUS at 08:05

## 2025-01-01 RX ADMIN — EPINEPHRINE 1 MG: 0.1 INJECTION INTRAVENOUS at 09:05

## 2025-01-01 RX ADMIN — NOREPINEPHRINE BITARTRATE 0.02 MCG/KG/MIN: 4 INJECTION, SOLUTION INTRAVENOUS at 08:05

## 2025-01-01 RX ADMIN — ALTEPLASE 50 MG: KIT at 09:05

## 2025-01-01 RX ADMIN — ETOMIDATE 30 MG: 2 INJECTION INTRAVENOUS at 08:05

## 2025-01-01 RX ADMIN — PROPOFOL 10 MCG/KG/MIN: 10 INJECTION, EMULSION INTRAVENOUS at 08:05

## 2025-01-01 RX ADMIN — AMIODARONE HYDROCHLORIDE 300 MG: 50 INJECTION, SOLUTION INTRAVENOUS at 08:05

## 2025-01-01 RX ADMIN — SODIUM BICARBONATE 50 MEQ: 84 INJECTION INTRAVENOUS at 08:05

## 2025-01-01 RX ADMIN — CALCIUM CHLORIDE INJECTION 1 G: 100 INJECTION, SOLUTION INTRAVENOUS at 09:05

## 2025-01-01 RX ADMIN — EPINEPHRINE 1 MG: 0.1 INJECTION INTRAVENOUS at 08:05

## 2025-01-01 RX ADMIN — AMIODARONE HYDROCHLORIDE 1 MG/MIN: 1.8 INJECTION, SOLUTION INTRAVENOUS at 09:05

## 2025-01-01 RX ADMIN — ROCURONIUM BROMIDE 100 MG: 10 INJECTION, SOLUTION INTRAVENOUS at 08:05

## 2025-01-01 RX ADMIN — CALCIUM CHLORIDE INJECTION 1 G: 100 INJECTION, SOLUTION INTRAVENOUS at 08:05

## 2025-01-01 RX ADMIN — AMIODARONE HYDROCHLORIDE 150 MG: 50 INJECTION, SOLUTION INTRAVENOUS at 08:05

## 2025-02-01 ENCOUNTER — HOSPITAL ENCOUNTER (INPATIENT)
Facility: HOSPITAL | Age: 75
LOS: 1 days | Discharge: NURSING, REHAB OR LONG-TERM CARE | DRG: 641 | End: 2025-02-04
Attending: STUDENT IN AN ORGANIZED HEALTH CARE EDUCATION/TRAINING PROGRAM | Admitting: INTERNAL MEDICINE
Payer: MEDICARE

## 2025-02-01 DIAGNOSIS — M25.551 RIGHT HIP PAIN: ICD-10-CM

## 2025-02-01 DIAGNOSIS — R07.9 CHEST PAIN: ICD-10-CM

## 2025-02-01 DIAGNOSIS — E87.5 HYPERKALEMIA: Primary | ICD-10-CM

## 2025-02-01 DIAGNOSIS — R41.3 MEMORY DISORDER: ICD-10-CM

## 2025-02-01 DIAGNOSIS — R10.30 GROIN PAIN: ICD-10-CM

## 2025-02-01 LAB
ALBUMIN SERPL BCP-MCNC: 3.4 G/DL (ref 3.5–5.2)
ALP SERPL-CCNC: 106 U/L (ref 40–150)
ALT SERPL W/O P-5'-P-CCNC: 11 U/L (ref 10–44)
ANION GAP SERPL CALC-SCNC: 11 MMOL/L (ref 8–16)
AST SERPL-CCNC: 16 U/L (ref 10–40)
BACTERIA #/AREA URNS AUTO: ABNORMAL /HPF
BASOPHILS # BLD AUTO: 0.04 K/UL (ref 0–0.2)
BASOPHILS NFR BLD: 0.5 % (ref 0–1.9)
BILIRUB SERPL-MCNC: 0.4 MG/DL (ref 0.1–1)
BILIRUB UR QL STRIP: NEGATIVE
BUN SERPL-MCNC: 74 MG/DL (ref 8–23)
BUN SERPL-MCNC: 81 MG/DL (ref 6–30)
CALCIUM SERPL-MCNC: 10 MG/DL (ref 8.7–10.5)
CHLORIDE SERPL-SCNC: 103 MMOL/L (ref 95–110)
CHLORIDE SERPL-SCNC: 105 MMOL/L (ref 95–110)
CLARITY UR REFRACT.AUTO: CLEAR
CO2 SERPL-SCNC: 18 MMOL/L (ref 23–29)
COLOR UR AUTO: YELLOW
CREAT SERPL-MCNC: 2.1 MG/DL (ref 0.5–1.4)
CREAT SERPL-MCNC: 2.5 MG/DL (ref 0.5–1.4)
CRP SERPL-MCNC: 37.1 MG/L (ref 0–8.2)
DIFFERENTIAL METHOD BLD: ABNORMAL
EOSINOPHIL # BLD AUTO: 0.2 K/UL (ref 0–0.5)
EOSINOPHIL NFR BLD: 2.2 % (ref 0–8)
ERYTHROCYTE [DISTWIDTH] IN BLOOD BY AUTOMATED COUNT: 15 % (ref 11.5–14.5)
ERYTHROCYTE [SEDIMENTATION RATE] IN BLOOD BY PHOTOMETRIC METHOD: 74 MM/HR (ref 0–23)
EST. GFR  (NO RACE VARIABLE): 32.6 ML/MIN/1.73 M^2
GLUCOSE SERPL-MCNC: 91 MG/DL (ref 70–110)
GLUCOSE SERPL-MCNC: 96 MG/DL (ref 70–110)
GLUCOSE UR QL STRIP: NEGATIVE
HCT VFR BLD AUTO: 43.4 % (ref 40–54)
HCT VFR BLD CALC: 46 %PCV (ref 36–54)
HGB BLD-MCNC: 14.1 G/DL (ref 14–18)
HGB UR QL STRIP: NEGATIVE
IMM GRANULOCYTES # BLD AUTO: 0.03 K/UL (ref 0–0.04)
IMM GRANULOCYTES NFR BLD AUTO: 0.4 % (ref 0–0.5)
KETONES UR QL STRIP: NEGATIVE
LEUKOCYTE ESTERASE UR QL STRIP: ABNORMAL
LYMPHOCYTES # BLD AUTO: 1.8 K/UL (ref 1–4.8)
LYMPHOCYTES NFR BLD: 22.3 % (ref 18–48)
MCH RBC QN AUTO: 28.6 PG (ref 27–31)
MCHC RBC AUTO-ENTMCNC: 32.5 G/DL (ref 32–36)
MCV RBC AUTO: 88 FL (ref 82–98)
MICROSCOPIC COMMENT: ABNORMAL
MONOCYTES # BLD AUTO: 0.4 K/UL (ref 0.3–1)
MONOCYTES NFR BLD: 5.3 % (ref 4–15)
NEUTROPHILS # BLD AUTO: 5.6 K/UL (ref 1.8–7.7)
NEUTROPHILS NFR BLD: 69.3 % (ref 38–73)
NITRITE UR QL STRIP: NEGATIVE
NRBC BLD-RTO: 0 /100 WBC
PH UR STRIP: 6 [PH] (ref 5–8)
PLATELET # BLD AUTO: 314 K/UL (ref 150–450)
PMV BLD AUTO: 10.1 FL (ref 9.2–12.9)
POC IONIZED CALCIUM: 1.25 MMOL/L (ref 1.06–1.42)
POC TCO2 (MEASURED): 24 MMOL/L (ref 23–29)
POTASSIUM BLD-SCNC: 6 MMOL/L (ref 3.5–5.1)
POTASSIUM SERPL-SCNC: 5.9 MMOL/L (ref 3.5–5.1)
PROT SERPL-MCNC: 9.6 G/DL (ref 6–8.4)
PROT UR QL STRIP: ABNORMAL
RBC # BLD AUTO: 4.93 M/UL (ref 4.6–6.2)
RBC #/AREA URNS AUTO: 1 /HPF (ref 0–4)
SAMPLE: ABNORMAL
SODIUM BLD-SCNC: 136 MMOL/L (ref 136–145)
SODIUM SERPL-SCNC: 132 MMOL/L (ref 136–145)
SP GR UR STRIP: 1.01 (ref 1–1.03)
SQUAMOUS #/AREA URNS AUTO: 1 /HPF
URN SPEC COLLECT METH UR: ABNORMAL
WBC # BLD AUTO: 8.04 K/UL (ref 3.9–12.7)
WBC #/AREA URNS AUTO: 6 /HPF (ref 0–5)

## 2025-02-01 PROCEDURE — 80053 COMPREHEN METABOLIC PANEL: CPT | Performed by: EMERGENCY MEDICINE

## 2025-02-01 PROCEDURE — 96375 TX/PRO/DX INJ NEW DRUG ADDON: CPT

## 2025-02-01 PROCEDURE — 93010 ELECTROCARDIOGRAM REPORT: CPT | Mod: ,,, | Performed by: INTERNAL MEDICINE

## 2025-02-01 PROCEDURE — 85025 COMPLETE CBC W/AUTO DIFF WBC: CPT | Performed by: EMERGENCY MEDICINE

## 2025-02-01 PROCEDURE — 86140 C-REACTIVE PROTEIN: CPT | Performed by: EMERGENCY MEDICINE

## 2025-02-01 PROCEDURE — 63600175 PHARM REV CODE 636 W HCPCS: Performed by: STUDENT IN AN ORGANIZED HEALTH CARE EDUCATION/TRAINING PROGRAM

## 2025-02-01 PROCEDURE — 80047 BASIC METABLC PNL IONIZED CA: CPT

## 2025-02-01 PROCEDURE — 99285 EMERGENCY DEPT VISIT HI MDM: CPT | Mod: 25

## 2025-02-01 PROCEDURE — 81001 URINALYSIS AUTO W/SCOPE: CPT | Performed by: EMERGENCY MEDICINE

## 2025-02-01 PROCEDURE — 93005 ELECTROCARDIOGRAM TRACING: CPT

## 2025-02-01 PROCEDURE — 85652 RBC SED RATE AUTOMATED: CPT | Performed by: EMERGENCY MEDICINE

## 2025-02-01 RX ORDER — MORPHINE SULFATE 4 MG/ML
4 INJECTION, SOLUTION INTRAMUSCULAR; INTRAVENOUS
Status: COMPLETED | OUTPATIENT
Start: 2025-02-01 | End: 2025-02-01

## 2025-02-01 RX ADMIN — MORPHINE SULFATE 4 MG: 4 INJECTION INTRAVENOUS at 07:02

## 2025-02-02 PROBLEM — N17.9 ACUTE RENAL FAILURE SUPERIMPOSED ON STAGE 3 CHRONIC KIDNEY DISEASE: Status: ACTIVE | Noted: 2024-07-01

## 2025-02-02 PROBLEM — M25.551 RIGHT HIP PAIN: Status: ACTIVE | Noted: 2025-02-02

## 2025-02-02 PROBLEM — I87.8 VENOUS STASIS OF BOTH LOWER EXTREMITIES: Status: ACTIVE | Noted: 2024-11-21

## 2025-02-02 PROBLEM — R53.81 DEBILITY: Status: ACTIVE | Noted: 2025-02-02

## 2025-02-02 PROBLEM — K40.20 BILATERAL INGUINAL HERNIA WITHOUT OBSTRUCTION OR GANGRENE: Status: ACTIVE | Noted: 2025-02-02

## 2025-02-02 LAB
ALBUMIN SERPL BCP-MCNC: 3.2 G/DL (ref 3.5–5.2)
ALP SERPL-CCNC: 98 U/L (ref 40–150)
ALT SERPL W/O P-5'-P-CCNC: 8 U/L (ref 10–44)
ANION GAP SERPL CALC-SCNC: 11 MMOL/L (ref 8–16)
ANION GAP SERPL CALC-SCNC: 9 MMOL/L (ref 8–16)
AST SERPL-CCNC: 10 U/L (ref 10–40)
BASOPHILS # BLD AUTO: 0.06 K/UL (ref 0–0.2)
BASOPHILS NFR BLD: 0.8 % (ref 0–1.9)
BILIRUB SERPL-MCNC: 0.4 MG/DL (ref 0.1–1)
BUN SERPL-MCNC: 66 MG/DL (ref 8–23)
BUN SERPL-MCNC: 70 MG/DL (ref 8–23)
CALCIUM SERPL-MCNC: 10.1 MG/DL (ref 8.7–10.5)
CALCIUM SERPL-MCNC: 10.3 MG/DL (ref 8.7–10.5)
CHLORIDE SERPL-SCNC: 106 MMOL/L (ref 95–110)
CHLORIDE SERPL-SCNC: 108 MMOL/L (ref 95–110)
CO2 SERPL-SCNC: 13 MMOL/L (ref 23–29)
CO2 SERPL-SCNC: 17 MMOL/L (ref 23–29)
CREAT SERPL-MCNC: 1.5 MG/DL (ref 0.5–1.4)
CREAT SERPL-MCNC: 1.6 MG/DL (ref 0.5–1.4)
CREAT UR-MCNC: 89 MG/DL (ref 23–375)
DIFFERENTIAL METHOD BLD: ABNORMAL
EOSINOPHIL # BLD AUTO: 0.2 K/UL (ref 0–0.5)
EOSINOPHIL NFR BLD: 2.7 % (ref 0–8)
ERYTHROCYTE [DISTWIDTH] IN BLOOD BY AUTOMATED COUNT: 14.9 % (ref 11.5–14.5)
EST. GFR  (NO RACE VARIABLE): 45.2 ML/MIN/1.73 M^2
EST. GFR  (NO RACE VARIABLE): 48.9 ML/MIN/1.73 M^2
GLUCOSE SERPL-MCNC: 89 MG/DL (ref 70–110)
GLUCOSE SERPL-MCNC: 97 MG/DL (ref 70–110)
HCT VFR BLD AUTO: 41.6 % (ref 40–54)
HGB BLD-MCNC: 13.6 G/DL (ref 14–18)
IMM GRANULOCYTES # BLD AUTO: 0.02 K/UL (ref 0–0.04)
IMM GRANULOCYTES NFR BLD AUTO: 0.3 % (ref 0–0.5)
LYMPHOCYTES # BLD AUTO: 1.9 K/UL (ref 1–4.8)
LYMPHOCYTES NFR BLD: 24.7 % (ref 18–48)
MAGNESIUM SERPL-MCNC: 1.8 MG/DL (ref 1.6–2.6)
MCH RBC QN AUTO: 28.8 PG (ref 27–31)
MCHC RBC AUTO-ENTMCNC: 32.7 G/DL (ref 32–36)
MCV RBC AUTO: 88 FL (ref 82–98)
MONOCYTES # BLD AUTO: 0.6 K/UL (ref 0.3–1)
MONOCYTES NFR BLD: 8 % (ref 4–15)
NEUTROPHILS # BLD AUTO: 4.8 K/UL (ref 1.8–7.7)
NEUTROPHILS NFR BLD: 63.5 % (ref 38–73)
NRBC BLD-RTO: 0 /100 WBC
OHS QRS DURATION: 78 MS
OHS QRS DURATION: 82 MS
OHS QTC CALCULATION: 383 MS
OHS QTC CALCULATION: 387 MS
PLATELET # BLD AUTO: 283 K/UL (ref 150–450)
PMV BLD AUTO: 10.1 FL (ref 9.2–12.9)
POCT GLUCOSE: 107 MG/DL (ref 70–110)
POCT GLUCOSE: 108 MG/DL (ref 70–110)
POCT GLUCOSE: 133 MG/DL (ref 70–110)
POCT GLUCOSE: 134 MG/DL (ref 70–110)
POCT GLUCOSE: 88 MG/DL (ref 70–110)
POTASSIUM SERPL-SCNC: 5.4 MMOL/L (ref 3.5–5.1)
POTASSIUM SERPL-SCNC: 5.6 MMOL/L (ref 3.5–5.1)
PROT SERPL-MCNC: 8.7 G/DL (ref 6–8.4)
PROT UR-MCNC: 15 MG/DL (ref 0–15)
RBC # BLD AUTO: 4.73 M/UL (ref 4.6–6.2)
SODIUM SERPL-SCNC: 132 MMOL/L (ref 136–145)
SODIUM SERPL-SCNC: 132 MMOL/L (ref 136–145)
SODIUM UR-SCNC: 81 MMOL/L (ref 20–250)
WBC # BLD AUTO: 7.52 K/UL (ref 3.9–12.7)

## 2025-02-02 PROCEDURE — G0378 HOSPITAL OBSERVATION PER HR: HCPCS

## 2025-02-02 PROCEDURE — 96372 THER/PROPH/DIAG INJ SC/IM: CPT | Performed by: NURSE PRACTITIONER

## 2025-02-02 PROCEDURE — 63600175 PHARM REV CODE 636 W HCPCS: Performed by: STUDENT IN AN ORGANIZED HEALTH CARE EDUCATION/TRAINING PROGRAM

## 2025-02-02 PROCEDURE — 84156 ASSAY OF PROTEIN URINE: CPT | Performed by: EMERGENCY MEDICINE

## 2025-02-02 PROCEDURE — 25000003 PHARM REV CODE 250: Performed by: NURSE PRACTITIONER

## 2025-02-02 PROCEDURE — 82962 GLUCOSE BLOOD TEST: CPT

## 2025-02-02 PROCEDURE — 25000003 PHARM REV CODE 250: Performed by: STUDENT IN AN ORGANIZED HEALTH CARE EDUCATION/TRAINING PROGRAM

## 2025-02-02 PROCEDURE — 80048 BASIC METABOLIC PNL TOTAL CA: CPT | Mod: XB | Performed by: NURSE PRACTITIONER

## 2025-02-02 PROCEDURE — 96375 TX/PRO/DX INJ NEW DRUG ADDON: CPT

## 2025-02-02 PROCEDURE — 93010 ELECTROCARDIOGRAM REPORT: CPT | Mod: ,,, | Performed by: INTERNAL MEDICINE

## 2025-02-02 PROCEDURE — 85025 COMPLETE CBC W/AUTO DIFF WBC: CPT | Performed by: NURSE PRACTITIONER

## 2025-02-02 PROCEDURE — 96376 TX/PRO/DX INJ SAME DRUG ADON: CPT

## 2025-02-02 PROCEDURE — 84300 ASSAY OF URINE SODIUM: CPT | Performed by: EMERGENCY MEDICINE

## 2025-02-02 PROCEDURE — 96365 THER/PROPH/DIAG IV INF INIT: CPT

## 2025-02-02 PROCEDURE — 93005 ELECTROCARDIOGRAM TRACING: CPT

## 2025-02-02 PROCEDURE — 83735 ASSAY OF MAGNESIUM: CPT | Performed by: NURSE PRACTITIONER

## 2025-02-02 PROCEDURE — 63600175 PHARM REV CODE 636 W HCPCS: Performed by: NURSE PRACTITIONER

## 2025-02-02 PROCEDURE — 97530 THERAPEUTIC ACTIVITIES: CPT

## 2025-02-02 PROCEDURE — 97161 PT EVAL LOW COMPLEX 20 MIN: CPT

## 2025-02-02 PROCEDURE — 97535 SELF CARE MNGMENT TRAINING: CPT

## 2025-02-02 PROCEDURE — 96361 HYDRATE IV INFUSION ADD-ON: CPT

## 2025-02-02 PROCEDURE — 97165 OT EVAL LOW COMPLEX 30 MIN: CPT

## 2025-02-02 PROCEDURE — 25000003 PHARM REV CODE 250: Performed by: INTERNAL MEDICINE

## 2025-02-02 PROCEDURE — 80053 COMPREHEN METABOLIC PANEL: CPT | Performed by: NURSE PRACTITIONER

## 2025-02-02 PROCEDURE — 25000242 PHARM REV CODE 250 ALT 637 W/ HCPCS: Performed by: NURSE PRACTITIONER

## 2025-02-02 PROCEDURE — 82570 ASSAY OF URINE CREATININE: CPT | Performed by: EMERGENCY MEDICINE

## 2025-02-02 RX ORDER — TAMSULOSIN HYDROCHLORIDE 0.4 MG/1
0.4 CAPSULE ORAL DAILY
Status: DISCONTINUED | OUTPATIENT
Start: 2025-02-02 | End: 2025-02-04 | Stop reason: HOSPADM

## 2025-02-02 RX ORDER — IBUPROFEN 200 MG
16 TABLET ORAL
Status: DISCONTINUED | OUTPATIENT
Start: 2025-02-02 | End: 2025-02-04 | Stop reason: HOSPADM

## 2025-02-02 RX ORDER — MORPHINE SULFATE 2 MG/ML
2 INJECTION, SOLUTION INTRAMUSCULAR; INTRAVENOUS EVERY 4 HOURS PRN
Status: DISCONTINUED | OUTPATIENT
Start: 2025-02-02 | End: 2025-02-04 | Stop reason: HOSPADM

## 2025-02-02 RX ORDER — GLUCAGON 1 MG
1 KIT INJECTION
Status: DISCONTINUED | OUTPATIENT
Start: 2025-02-02 | End: 2025-02-04 | Stop reason: HOSPADM

## 2025-02-02 RX ORDER — HYDROCODONE BITARTRATE AND ACETAMINOPHEN 7.5; 325 MG/1; MG/1
1 TABLET ORAL EVERY 6 HOURS PRN
Status: DISCONTINUED | OUTPATIENT
Start: 2025-02-02 | End: 2025-02-04 | Stop reason: HOSPADM

## 2025-02-02 RX ORDER — TALC
6 POWDER (GRAM) TOPICAL NIGHTLY PRN
Status: DISCONTINUED | OUTPATIENT
Start: 2025-02-02 | End: 2025-02-04 | Stop reason: HOSPADM

## 2025-02-02 RX ORDER — FUROSEMIDE 10 MG/ML
20 INJECTION INTRAMUSCULAR; INTRAVENOUS
Status: COMPLETED | OUTPATIENT
Start: 2025-02-02 | End: 2025-02-02

## 2025-02-02 RX ORDER — AMLODIPINE BESYLATE 10 MG/1
10 TABLET ORAL DAILY
Status: DISCONTINUED | OUTPATIENT
Start: 2025-02-02 | End: 2025-02-04 | Stop reason: HOSPADM

## 2025-02-02 RX ORDER — MORPHINE SULFATE 4 MG/ML
4 INJECTION, SOLUTION INTRAMUSCULAR; INTRAVENOUS
Status: COMPLETED | OUTPATIENT
Start: 2025-02-02 | End: 2025-02-02

## 2025-02-02 RX ORDER — BACLOFEN 10 MG/1
10 TABLET ORAL 3 TIMES DAILY
Status: DISCONTINUED | OUTPATIENT
Start: 2025-02-02 | End: 2025-02-04 | Stop reason: HOSPADM

## 2025-02-02 RX ORDER — SODIUM CHLORIDE 0.9 % (FLUSH) 0.9 %
10 SYRINGE (ML) INJECTION EVERY 12 HOURS PRN
Status: DISCONTINUED | OUTPATIENT
Start: 2025-02-02 | End: 2025-02-04 | Stop reason: HOSPADM

## 2025-02-02 RX ORDER — SERTRALINE HYDROCHLORIDE 25 MG/1
25 TABLET, FILM COATED ORAL DAILY
Status: ON HOLD | COMMUNITY
Start: 2025-01-22 | End: 2025-02-04

## 2025-02-02 RX ORDER — SERTRALINE HYDROCHLORIDE 25 MG/1
25 TABLET, FILM COATED ORAL DAILY
Status: DISCONTINUED | OUTPATIENT
Start: 2025-02-02 | End: 2025-02-04 | Stop reason: HOSPADM

## 2025-02-02 RX ORDER — CHOLECALCIFEROL (VITAMIN D3) 25 MCG
1000 TABLET ORAL DAILY
Status: ON HOLD | COMMUNITY

## 2025-02-02 RX ORDER — FLUTICASONE PROPIONATE 50 MCG
2 SPRAY, SUSPENSION (ML) NASAL DAILY
Status: DISCONTINUED | OUTPATIENT
Start: 2025-02-02 | End: 2025-02-04 | Stop reason: HOSPADM

## 2025-02-02 RX ORDER — ATORVASTATIN CALCIUM 20 MG/1
20 TABLET, FILM COATED ORAL DAILY
Status: DISCONTINUED | OUTPATIENT
Start: 2025-02-02 | End: 2025-02-04 | Stop reason: HOSPADM

## 2025-02-02 RX ORDER — ONDANSETRON HYDROCHLORIDE 2 MG/ML
4 INJECTION, SOLUTION INTRAVENOUS EVERY 8 HOURS PRN
Status: DISCONTINUED | OUTPATIENT
Start: 2025-02-02 | End: 2025-02-04 | Stop reason: HOSPADM

## 2025-02-02 RX ORDER — NALOXONE HCL 0.4 MG/ML
0.02 VIAL (ML) INJECTION
Status: DISCONTINUED | OUTPATIENT
Start: 2025-02-02 | End: 2025-02-04 | Stop reason: HOSPADM

## 2025-02-02 RX ORDER — ASPIRIN 81 MG/1
81 TABLET ORAL DAILY
Status: DISCONTINUED | OUTPATIENT
Start: 2025-02-02 | End: 2025-02-04 | Stop reason: HOSPADM

## 2025-02-02 RX ORDER — HEPARIN SODIUM 5000 [USP'U]/ML
7500 INJECTION, SOLUTION INTRAVENOUS; SUBCUTANEOUS EVERY 8 HOURS
Status: DISCONTINUED | OUTPATIENT
Start: 2025-02-02 | End: 2025-02-03

## 2025-02-02 RX ORDER — INSULIN ASPART 100 [IU]/ML
0-5 INJECTION, SOLUTION INTRAVENOUS; SUBCUTANEOUS
Status: DISCONTINUED | OUTPATIENT
Start: 2025-02-02 | End: 2025-02-04 | Stop reason: HOSPADM

## 2025-02-02 RX ORDER — ACETAMINOPHEN 325 MG/1
650 TABLET ORAL EVERY 6 HOURS PRN
Status: DISCONTINUED | OUTPATIENT
Start: 2025-02-02 | End: 2025-02-04 | Stop reason: HOSPADM

## 2025-02-02 RX ORDER — IBUPROFEN 200 MG
24 TABLET ORAL
Status: DISCONTINUED | OUTPATIENT
Start: 2025-02-02 | End: 2025-02-04 | Stop reason: HOSPADM

## 2025-02-02 RX ORDER — SODIUM CHLORIDE 9 MG/ML
INJECTION, SOLUTION INTRAVENOUS CONTINUOUS
Status: ACTIVE | OUTPATIENT
Start: 2025-02-02 | End: 2025-02-02

## 2025-02-02 RX ORDER — GABAPENTIN 400 MG/1
800 CAPSULE ORAL 2 TIMES DAILY
Status: DISCONTINUED | OUTPATIENT
Start: 2025-02-02 | End: 2025-02-04 | Stop reason: HOSPADM

## 2025-02-02 RX ADMIN — ASPIRIN 81 MG: 81 TABLET, COATED ORAL at 09:02

## 2025-02-02 RX ADMIN — FUROSEMIDE 20 MG: 10 INJECTION, SOLUTION INTRAMUSCULAR; INTRAVENOUS at 01:02

## 2025-02-02 RX ADMIN — SERTRALINE HYDROCHLORIDE 25 MG: 25 TABLET ORAL at 09:02

## 2025-02-02 RX ADMIN — TAMSULOSIN HYDROCHLORIDE 0.4 MG: 0.4 CAPSULE ORAL at 09:02

## 2025-02-02 RX ADMIN — HYDROCODONE BITARTRATE AND ACETAMINOPHEN 1 TABLET: 7.5; 325 TABLET ORAL at 09:02

## 2025-02-02 RX ADMIN — ATORVASTATIN CALCIUM 20 MG: 20 TABLET, FILM COATED ORAL at 09:02

## 2025-02-02 RX ADMIN — GABAPENTIN 800 MG: 300 CAPSULE ORAL at 09:02

## 2025-02-02 RX ADMIN — SODIUM CHLORIDE 1000 ML: 9 INJECTION, SOLUTION INTRAVENOUS at 12:02

## 2025-02-02 RX ADMIN — BACLOFEN 10 MG: 10 TABLET ORAL at 02:02

## 2025-02-02 RX ADMIN — HEPARIN SODIUM 7500 UNITS: 5000 INJECTION INTRAVENOUS; SUBCUTANEOUS at 02:02

## 2025-02-02 RX ADMIN — SODIUM CHLORIDE: 9 INJECTION, SOLUTION INTRAVENOUS at 09:02

## 2025-02-02 RX ADMIN — HEPARIN SODIUM 7500 UNITS: 5000 INJECTION INTRAVENOUS; SUBCUTANEOUS at 06:02

## 2025-02-02 RX ADMIN — HEPARIN SODIUM 7500 UNITS: 5000 INJECTION INTRAVENOUS; SUBCUTANEOUS at 09:02

## 2025-02-02 RX ADMIN — CALCIUM GLUCONATE 1 G: 98 INJECTION, SOLUTION INTRAVENOUS at 02:02

## 2025-02-02 RX ADMIN — BACLOFEN 10 MG: 10 TABLET ORAL at 09:02

## 2025-02-02 RX ADMIN — Medication 6 MG: at 09:02

## 2025-02-02 RX ADMIN — FLUTICASONE PROPIONATE 100 MCG: 50 SPRAY, METERED NASAL at 09:02

## 2025-02-02 RX ADMIN — SODIUM ZIRCONIUM CYCLOSILICATE 10 G: 10 POWDER, FOR SUSPENSION ORAL at 01:02

## 2025-02-02 RX ADMIN — AMLODIPINE BESYLATE 10 MG: 10 TABLET ORAL at 09:02

## 2025-02-02 RX ADMIN — HYDROCODONE BITARTRATE AND ACETAMINOPHEN 1 TABLET: 7.5; 325 TABLET ORAL at 10:02

## 2025-02-02 RX ADMIN — SODIUM CHLORIDE: 9 INJECTION, SOLUTION INTRAVENOUS at 02:02

## 2025-02-02 RX ADMIN — MORPHINE SULFATE 4 MG: 4 INJECTION INTRAVENOUS at 12:02

## 2025-02-02 RX ADMIN — SODIUM ZIRCONIUM CYCLOSILICATE 10 G: 5 POWDER, FOR SUSPENSION ORAL at 06:02

## 2025-02-02 NOTE — PT/OT/SLP EVAL
Occupational Therapy   Evaluation, Co-Treatment  and Discharge Note    Name: Riaz Guerra Jr.  MRN: [id number]  Admitting Diagnosis: Right hip pain  Recent Surgery: * No surgery found *      Recommendations:     Discharge Recommendations: No Therapy Indicated (return to NH)  Discharge Equipment Recommendations: none  Barriers to discharge:       Assessment:     Riaz Guerra Jr. is a 75 y.o. male with a medical diagnosis of Right hip pain. At this time, patient is functioning at their prior level of function and does not require further acute OT services.     Plan:     During this hospitalization, patient does not require further acute OT services.  Please re-consult if situation changes.    Plan of Care Reviewed with: patient    Subjective     Chief Complaint: stiffness in legs   Patient/Family Comments/goals: to get better     Occupational Profile:  Living Environment: Pt lives at [facility] (NH; since Nov 2024).  Previous level of function: Pt had 8 month decline in mobility needing A prior to Nov 2024. Subsequently, pt unfortunately has been a half-way resident at [facility] needing A for all Adls and mobility. Pt uses a brief for toileting, is provided meals in bed, and has opportunity to get OOB to a w/c using a lift device ~1/wk. Pt explained he has not stood in over 8 months.  Roles and Routines: enjoys watching football  Equipment Used at home: hospital bed, wheelchair, lift device (NH equipment)  Assistance upon Discharge: half-way staff     Pain/Comfort:  Pain Rating 1: other (see comments) (leg stiffness not ranked)  Location - Orientation 1: generalized  Location 1: leg  Pain Addressed 1: Reposition, Distraction  Pain Addressed 2: Distraction, Cessation of Activity    Patients cultural, spiritual, Faith conflicts given the current situation: no    Objective:   Co-treatment with PT for maximal pt participation, safety, and activity tolerance     Communicated with: RN prior to  session.  Patient found HOB elevated with telemetry, PureWick (purewick not connected) upon OT entry to room.    General Precautions: Standard, fall  Orthopedic Precautions: N/A  Braces: N/A  Respiratory Status: Room air     Occupational Performance:    Bed Mobility:    Patient completed Rolling/Turning to Left with  maximal assistance and 2 persons  Patient completed Rolling/Turning to Right with maximal assistance and 2 persons  Patient completed Scooting/Bridging with maximal assistance and 2 persons  Patient completed Supine to Sit with maximal assistance and 2 persons  Patient completed Sit to Supine with total assistance and 2 persons    Functional Mobility/Transfers:  Functional Mobility: Pt sat EOB with SBA-CGA ~5 minutes    Activities of Daily Living:  Toileting: total assistance for linen change and replacement of purewick    Cognitive/Visual Perceptual:  Cognitive/Psychosocial Skills:     -       Oriented to: Person, Place, Time, and Situation   -       Follows Commands/attention:Follows multistep  commands  -       Communication: clear/fluent  -       Memory: No Deficits noted  -       Safety awareness/insight to disability: intact   -       Mood/Affect/Coping skills/emotional control: Appropriate to situation    Physical Exam:  Balance:    -       demo good sitting balance   Dominant hand:    -       right  Upper Extremity Range of Motion:     -       Right Upper Extremity: WFL  -       Left Upper Extremity: WFL  Upper Extremity Strength:    -       Right Upper Extremity: WFL  -       Left Upper Extremity: WFL   Strength:    -       Right Upper Extremity: WFL  -       Left Upper Extremity: WFL    AMPAC 6 Click ADL:  AMPAC Total Score: 9    Treatment & Education:  Pt educated on role of occupational therapy, POC, and safety during ADLs and functional mobility. Pt and OT discussed importance of safe, continued mobility to optimize daily living skills. Pt verbalized understanding.     White board  updated during session. Pt given instruction to call for medical staff/nurse for assistance.       Patient left HOB elevated with all lines intact, call button in reach, and RN notified    GOALS:   Multidisciplinary Problems       Occupational Therapy Goals       Not on file                    DME Justifications:  No DME recommended requiring DME justifications    History:     Past Medical History:   Diagnosis Date    Depression     Diabetes mellitus     Gout     High cholesterol     Hypertension        History reviewed. No pertinent surgical history.    Time Tracking:     OT Date of Treatment: 02/02/25  OT Start Time: 1143  OT Stop Time: 1207  OT Total Time (min): 24 min    Billable Minutes:Evaluation 12 min  Self Care/Home Management 12 min    2/2/2025

## 2025-02-02 NOTE — SUBJECTIVE & OBJECTIVE
Past Medical History:   Diagnosis Date    Depression     Diabetes mellitus     Gout     High cholesterol     Hypertension        History reviewed. No pertinent surgical history.    Review of patient's allergies indicates:   Allergen Reactions    Lisinopril Other (See Comments)     cough       No current facility-administered medications on file prior to encounter.     Current Outpatient Medications on File Prior to Encounter   Medication Sig    sertraline (ZOLOFT) 25 MG tablet Take 25 mg by mouth once daily.    amLODIPine (NORVASC) 10 MG tablet Take 1 tablet (10 mg total) by mouth once daily.    aspirin (ECOTRIN) 81 MG EC tablet Take 81 mg by mouth once daily.    atorvastatin (LIPITOR) 20 MG tablet Take 20 mg by mouth once daily.    baclofen (LIORESAL) 10 MG tablet Take 10 mg by mouth 3 (three) times daily.    fluticasone propionate (FLONASE) 50 mcg/actuation nasal spray 2 sprays (100 mcg total) by Each Nostril route once daily.    gabapentin (NEURONTIN) 800 MG tablet Take 1 tablet (800 mg total) by mouth 2 (two) times daily.    HYDROcodone-acetaminophen (NORCO) 7.5-325 mg per tablet Take 1 tablet by mouth every 8 (eight) hours as needed for Pain.    losartan-hydrochlorothiazide 100-12.5 mg (HYZAAR) 100-12.5 mg Tab Take 1 tablet by mouth once daily.    metFORMIN (GLUCOPHAGE) 1000 MG tablet Take 1,000 mg by mouth 2 (two) times daily with meals.    tamsulosin (FLOMAX) 0.4 mg Cap Take by mouth once daily.     Family History       Problem Relation (Age of Onset)    Diabetes Mother, Father    Heart disease Mother          Tobacco Use    Smoking status: Never    Smokeless tobacco: Never   Substance and Sexual Activity    Alcohol use: Not Currently     Comment: occasionally    Drug use: Yes     Types: Marijuana    Sexual activity: Yes     Review of Systems   Constitutional:  Negative for appetite change, chills, diaphoresis, fatigue and fever.   HENT:  Negative for congestion, rhinorrhea and sore throat.    Eyes:  Negative  for photophobia and visual disturbance.   Respiratory:  Negative for cough, shortness of breath and wheezing.    Cardiovascular:  Negative for chest pain, palpitations and leg swelling.   Gastrointestinal:  Negative for abdominal distention, abdominal pain, diarrhea, nausea and vomiting.   Genitourinary:  Negative for dysuria, frequency, hematuria, penile pain and testicular pain.   Musculoskeletal:  Positive for arthralgias, gait problem (chronic) and myalgias. Negative for back pain and neck pain.   Skin:  Negative for color change, pallor, rash and wound.   Neurological:  Positive for weakness and numbness. Negative for syncope, light-headedness and headaches.   Psychiatric/Behavioral:  Negative for confusion and hallucinations. The patient is not nervous/anxious.      Objective:     Vital Signs (Most Recent):  Temp: 98.2 °F (36.8 °C) (02/01/25 2236)  Pulse: 85 (02/01/25 2236)  Resp: 16 (02/02/25 0035)  BP: 136/83 (02/01/25 2236)  SpO2: 98 % (02/01/25 2236) Vital Signs (24h Range):  Temp:  [97.9 °F (36.6 °C)-98.2 °F (36.8 °C)] 98.2 °F (36.8 °C)  Pulse:  [85-98] 85  Resp:  [16-18] 16  SpO2:  [97 %-98 %] 98 %  BP: (136-148)/(82-83) 136/83        There is no height or weight on file to calculate BMI.     Physical Exam  Vitals and nursing note reviewed.   Constitutional:       General: He is not in acute distress.     Appearance: He is obese. He is not toxic-appearing or diaphoretic.   HENT:      Head: Normocephalic and atraumatic.      Nose: Nose normal.      Mouth/Throat:      Mouth: Mucous membranes are moist.   Eyes:      Pupils: Pupils are equal, round, and reactive to light.   Cardiovascular:      Rate and Rhythm: Normal rate and regular rhythm.      Pulses: Normal pulses.   Pulmonary:      Effort: Pulmonary effort is normal. No respiratory distress.      Breath sounds: No wheezing, rhonchi or rales.   Abdominal:      General: Bowel sounds are normal. There is no distension.      Palpations: Abdomen is soft.       Tenderness: There is no abdominal tenderness. There is no guarding.   Musculoskeletal:      Cervical back: Normal range of motion.      Right hip: Tenderness present. Decreased range of motion (2/2 pain).      Right lower leg: No edema.      Left lower leg: No edema.   Skin:     General: Skin is warm and dry.      Capillary Refill: Capillary refill takes 2 to 3 seconds.      Comments: Chronic skin changes to BLE consistent with venous stasis. No wounds noted.   Neurological:      General: No focal deficit present.      Mental Status: He is alert and oriented to person, place, and time.      Sensory: Sensory deficit present.      Motor: No weakness.   Psychiatric:         Mood and Affect: Mood normal.         Behavior: Behavior normal.              CRANIAL NERVES     CN III, IV, VI   Pupils are equal, round, and reactive to light.       Significant Labs: All pertinent labs within the past 24 hours have been reviewed.  CBC:   Recent Labs   Lab 02/01/25 1943 02/01/25 1945   WBC 8.04  --    HGB 14.1  --    HCT 43.4 46     --      CMP:   Recent Labs   Lab 02/01/25 1943   *   K 5.9*      CO2 18*   GLU 91   BUN 74*   CREATININE 2.1*   CALCIUM 10.0   PROT 9.6*   ALBUMIN 3.4*   BILITOT 0.4   ALKPHOS 106   AST 16   ALT 11   ANIONGAP 11     Urine Studies:   Recent Labs   Lab 02/01/25 1929   COLORU Yellow   APPEARANCEUA Clear   PHUR 6.0   SPECGRAV 1.015   PROTEINUA Trace*   GLUCUA Negative   KETONESU Negative   BILIRUBINUA Negative   OCCULTUA Negative   NITRITE Negative   LEUKOCYTESUR Trace*   RBCUA 1   WBCUA 6*   BACTERIA Rare   SQUAMEPITHEL 1       Significant Imaging: I have reviewed all pertinent imaging results/findings within the past 24 hours.  Imaging Results              CT Pelvis Without Contrast (Final result)  Result time 02/01/25 23:56:46      Final result by Billy Lopez MD (02/01/25 23:56:46)                   Impression:      Advanced degenerative or erosive changes in the  bilateral hips, similar to same day radiograph.  Consider orthopedic evaluation or follow-up when clinically appropriate.    No acute displaced fracture.    Bilateral fat containing inguinal hernias.  Recommend correlation with additional clinical history.      Electronically signed by: Billy Lopez MD  Date:    02/01/2025  Time:    23:56               Narrative:    EXAMINATION:  CT PELVIS WITHOUT CONTRAST    CLINICAL HISTORY:  Hernia, complicated;    TECHNIQUE:  CT images of the pelvis obtained without IV contrast.  Axial, coronal, and sagittal reconstructions were created from the source data.    COMPARISON:  Pelvis radiograph, 02/01/2025.    FINDINGS:  No acute displaced fracture.  No dislocation.  Advanced degenerative changes in bilateral hips, with significant sclerosis and lucencies in the bilateral femoral heads and acetabula, likely degenerative or erosive changes.  Correlation clinical findings will be needed to exclude any possibility of superimposed infection.  Small bilateral joint effusions.  Degenerative changes in the spine and sacroiliac joints.    Mild-to-moderate stool burden in the distal colon.  Normal appendix.  Mild aortoiliac atherosclerosis.  Small fat containing umbilical hernias, right side greater than left.  No bowel within the hernias.  No soft tissue gas.  No significant pelvic free fluid.                                       X-Ray Femur Ap/Lat Right (Final result)  Result time 02/01/25 21:35:36      Final result by Demarcus Amaro DO (02/01/25 21:35:36)                   Impression:      No acute osseous abnormality of the pelvis or the right femur.    Degenerative changes as above.      Electronically signed by: Demarcus Amaro  Date:    02/01/2025  Time:    21:35               Narrative:    EXAMINATION:  XR PELVIS ROUTINE AP; XR FEMUR 2 VIEW RIGHT    CLINICAL HISTORY:  right hip pain;  Pain in right hip    TECHNIQUE:  AP view of the pelvis was performed.    AP and lateral  radiographs of the proximal and distal right femur.    COMPARISON:  None.    FINDINGS:  There is diffuse osteopenia.    Pelvis: There is no evidence of an acute fracture or dislocation of the pelvis.  There is severe joint space narrowing of the bilateral femoroacetabular joints with subchondral sclerosis and adjacent marginal osteophytes.  There is degenerative change of the pubic symphysis, the bilateral sacroiliac joints, and the partially visualized lower lumbar spine.    Right femur: There is no acute fracture or dislocation of the right femur.  Alignment is normal.  There are degenerative changes of the knee, most significant in the medial compartment with there is moderate joint space narrowing and marginal osteophytes.  There are vascular calcifications.                                       X-Ray Pelvis Routine AP (Final result)  Result time 02/01/25 21:35:36   Procedure changed from X-Ray Hip 2 or 3 views Right with Pelvis when performed     Final result by Demarcus Amaro DO (02/01/25 21:35:36)                   Impression:      No acute osseous abnormality of the pelvis or the right femur.    Degenerative changes as above.      Electronically signed by: Demarcus Amaro  Date:    02/01/2025  Time:    21:35               Narrative:    EXAMINATION:  XR PELVIS ROUTINE AP; XR FEMUR 2 VIEW RIGHT    CLINICAL HISTORY:  right hip pain;  Pain in right hip    TECHNIQUE:  AP view of the pelvis was performed.    AP and lateral radiographs of the proximal and distal right femur.    COMPARISON:  None.    FINDINGS:  There is diffuse osteopenia.    Pelvis: There is no evidence of an acute fracture or dislocation of the pelvis.  There is severe joint space narrowing of the bilateral femoroacetabular joints with subchondral sclerosis and adjacent marginal osteophytes.  There is degenerative change of the pubic symphysis, the bilateral sacroiliac joints, and the partially visualized lower lumbar spine.    Right femur:  There is no acute fracture or dislocation of the right femur.  Alignment is normal.  There are degenerative changes of the knee, most significant in the medial compartment with there is moderate joint space narrowing and marginal osteophytes.  There are vascular calcifications.

## 2025-02-02 NOTE — ED TRIAGE NOTES
Riaz Salazarkurtis Brian., a 75 y.o. male presents to the ED w/ complaint of R groin pain x3 weeks. Pt reports he was told in the past he may need surgery on the groin. Unsure why. Unsure if he has an inguinal hernia or not. Pt denies dysuria, denies testicular pain. Pt also requesting social work consult because he does not want to return to his current nursing home.    Triage note:  Chief Complaint   Patient presents with    Groin Pain     Arrives via EMS with c/o groin pain x3 weeks coming from [facility], also states does not want to go back [facility] wants to talk with SW and get placed somewhere else      Review of patient's allergies indicates:   Allergen Reactions    Lisinopril Other (See Comments)     cough     Past Medical History:   Diagnosis Date    Depression     Diabetes mellitus     Gout     High cholesterol     Hypertension

## 2025-02-02 NOTE — PT/OT/SLP EVAL
"Physical Therapy Evaluation and Discharge Note    Patient Name:  Riaz Guerra Jr.   MRN:  [id number]    Recommendations:     Discharge Recommendations: No Therapy Indicated (return to NH)  Discharge Equipment Recommendations: none   Barriers to discharge: None    Assessment:     Riaz Guerra Jr. is a 75 y.o. male admitted with a medical diagnosis of Right hip pain. .  At this time, patient is functioning at their prior level of function and does not require further acute PT services.     Recent Surgery: * No surgery found *      Plan:     During this hospitalization, patient does not require further acute PT services.  Please re-consult if situation changes.      Subjective     Chief Complaint: BLE stiffness and pain around groin  Patient/Family Comments/goals: "yesterday I was great but today I have all this stiffness, I don't know how I'm going to move"  Pain/Comfort:  Pain Rating 1:  ("stiffness")  Location - Side 1: Bilateral  Location - Orientation 1: generalized  Location 1: leg  Pain Addressed 1: Reposition, Distraction, Cessation of Activity    Patients cultural, spiritual, Taoism conflicts given the current situation:      Patient History:     Living Environment: Pt admitted from [facility]  Prior Level of Function: primarily bed bound, staff at [facility] would use lift device for transfers. When he is transferred to the w/ with the lift device he is able to self propel. Pt reports he has not been able to amb since before November 2024. Pt reports he using briefs in bed.  DME owned: [facility], hospital bed, life device at NH  Caregiver Assistance: staff at NH    Objective:     Communicated with RN prior to session.  Patient found HOB elevated with telemetry, [employer]ick upon PT entry to room.    General Precautions: Standard, fall    Orthopedic Precautions:N/A   Braces: N/A  Respiratory Status: Room air    Exams:  Gross Motor Coordination:  WFL  RLE ROM: WFL  RLE Strength: grossly " 3-/5  LLE ROM: WFL  LLE Strength: grossly 3-/5    Functional Mobility:    Bed Mobility:   Rolling: to L and R with maximal assistance and of 2 persons  Supine > Sit: maximal assistance and of 2 persons  Sit > Supine: total assistance and of 2 persons    Balance:   Sitting balance: FAIR+: Maintains balance through MINIMAL excursions of active trunk motion  Pt able to sit EOB with SBA    AM-PAC 6 CLICK MOBILITY  Total Score:8       Treatment and Education:  Pt educated on tip to reduce fall risk and safety with mobility and using call button for assistance from nursing staff with OOB mobility.  Pt educated on sitting up in chair throughout most of day  Pt educated on amb 2-3x per day with assistance from staff and increased movement during hospital stay.  All questions answered within the scope of PT.  White board updated accordingly.    AM-PAC 6 CLICK MOBILITY  Total Score:8     Patient left HOB elevated with all lines intact, call button in reach, and RN notified.    GOALS:   Multidisciplinary Problems       Physical Therapy Goals       Not on file                    History:     Past Medical History:   Diagnosis Date    Depression     Diabetes mellitus     Gout     High cholesterol     Hypertension        History reviewed. No pertinent surgical history.    Time Tracking:     PT Received On: 02/02/25  PT Start Time: 1143     PT Stop Time: 1207  PT Total Time (min): 24 min     Billable Minutes: Evaluation 12 and Therapeutic Activity 12      02/02/2025

## 2025-02-02 NOTE — H&P
[facility] Medicine  History & Physical    Patient Name: Riaz Guerra Jr.  MRN: [id number]  Patient Class: OP- Observation  Admission Date: 2/1/2025  Attending Physician: Omari Baez MD   Primary Care Provider: Berhane Alvarez MD         Patient information was obtained from patient, past medical records, and ER records.     Subjective:     Principal Problem:Right hip pain    Chief Complaint:   Chief Complaint   Patient presents with    Groin Pain     Arrives via EMS with c/o groin pain x3 weeks coming from [facility], also states does not want to go back [facility] wants to talk with SW and get placed somewhere else         HPI: Riaz Guerra Jr. Is a 75 y.o. male with PMHx HTN, HLD, DM2, obesity, gout, CKD3, anemia, chronic venous stasis, chronic pain, and debility who presents to the ED from [facility] with complaints of R hip/groin pain. Patient states that it has been ongoing for the past month. He states that his right hip and right groin are exquisitely tender to touch and hurt when his hip is manipulated or he has turned when he is being changed. He is chronically debilitated at baseline and states that he is wheelchair bound. Reports chronic back and right lower leg pain. He denies testicular or penile pain. Denies any urinary complaints. Denies decreased appetite but reports he has been trying to eat less intentionally to lose weight. He denies fever/chills, cough, congestion, chest pain, SOB, N/V/D, or headache.    In the ED, VSS, afebrile. CBC unremarkable. Sed rate 74. CRP 37.1. K+5.9. Na 132. Cr 2.1 (bl~1.3). Bicarb 18. Total protein 9.6. Albumin 3.4. UA non infectious. Xray pelvis and R femur with no acute osseous abnormality of the pelvis or the right femur. Degenerative changes as above. CT pelvis with advanced degenerative or erosive changes in the bilateral hips, similar to same day radiograph. Consider orthopedic evaluation  or follow-up when clinically appropriate. No acute displaced fracture. Bilateral fat containing inguinal hernias. The patient received morphine 2mg x2, 1L NS, lokelma 10g, and 20mg IV lasix.     Past Medical History:   Diagnosis Date    Depression     Diabetes mellitus     Gout     High cholesterol     Hypertension        History reviewed. No pertinent surgical history.    Review of patient's allergies indicates:   Allergen Reactions    Lisinopril Other (See Comments)     cough       No current facility-administered medications on file prior to encounter.     Current Outpatient Medications on File Prior to Encounter   Medication Sig    sertraline (ZOLOFT) 25 MG tablet Take 25 mg by mouth once daily.    amLODIPine (NORVASC) 10 MG tablet Take 1 tablet (10 mg total) by mouth once daily.    aspirin (ECOTRIN) 81 MG EC tablet Take 81 mg by mouth once daily.    atorvastatin (LIPITOR) 20 MG tablet Take 20 mg by mouth once daily.    baclofen (LIORESAL) 10 MG tablet Take 10 mg by mouth 3 (three) times daily.    fluticasone propionate (FLONASE) 50 mcg/actuation nasal spray 2 sprays (100 mcg total) by Each Nostril route once daily.    gabapentin (NEURONTIN) 800 MG tablet Take 1 tablet (800 mg total) by mouth 2 (two) times daily.    HYDROcodone-acetaminophen (NORCO) 7.5-325 mg per tablet Take 1 tablet by mouth every 8 (eight) hours as needed for Pain.    losartan-hydrochlorothiazide 100-12.5 mg (HYZAAR) 100-12.5 mg Tab Take 1 tablet by mouth once daily.    metFORMIN (GLUCOPHAGE) 1000 MG tablet Take 1,000 mg by mouth 2 (two) times daily with meals.    tamsulosin (FLOMAX) 0.4 mg Cap Take by mouth once daily.     Family History       Problem Relation (Age of Onset)    Diabetes Mother, Father    Heart disease Mother          Tobacco Use    Smoking status: Never    Smokeless tobacco: Never   Substance and Sexual Activity    Alcohol use: Not Currently     Comment: occasionally    Drug use: Yes     Types: Marijuana    Sexual activity:  Yes     Review of Systems   Constitutional:  Negative for appetite change, chills, diaphoresis, fatigue and fever.   HENT:  Negative for congestion, rhinorrhea and sore throat.    Eyes:  Negative for photophobia and visual disturbance.   Respiratory:  Negative for cough, shortness of breath and wheezing.    Cardiovascular:  Negative for chest pain, palpitations and leg swelling.   Gastrointestinal:  Negative for abdominal distention, abdominal pain, diarrhea, nausea and vomiting.   Genitourinary:  Negative for dysuria, frequency, hematuria, penile pain and testicular pain.   Musculoskeletal:  Positive for arthralgias, gait problem (chronic) and myalgias. Negative for back pain and neck pain.   Skin:  Negative for color change, pallor, rash and wound.   Neurological:  Positive for weakness and numbness. Negative for syncope, light-headedness and headaches.   Psychiatric/Behavioral:  Negative for confusion and hallucinations. The patient is not nervous/anxious.      Objective:     Vital Signs (Most Recent):  Temp: 98.2 °F (36.8 °C) (02/01/25 2236)  Pulse: 85 (02/01/25 2236)  Resp: 16 (02/02/25 0035)  BP: 136/83 (02/01/25 2236)  SpO2: 98 % (02/01/25 2236) Vital Signs (24h Range):  Temp:  [97.9 °F (36.6 °C)-98.2 °F (36.8 °C)] 98.2 °F (36.8 °C)  Pulse:  [85-98] 85  Resp:  [16-18] 16  SpO2:  [97 %-98 %] 98 %  BP: (136-148)/(82-83) 136/83        There is no height or weight on file to calculate BMI.     Physical Exam  Vitals and nursing note reviewed.   Constitutional:       General: He is not in acute distress.     Appearance: He is obese. He is not toxic-appearing or diaphoretic.   HENT:      Head: Normocephalic and atraumatic.      Nose: Nose normal.      Mouth/Throat:      Mouth: Mucous membranes are moist.   Eyes:      Pupils: Pupils are equal, round, and reactive to light.   Cardiovascular:      Rate and Rhythm: Normal rate and regular rhythm.      Pulses: Normal pulses.   Pulmonary:      Effort: Pulmonary effort is  normal. No respiratory distress.      Breath sounds: No wheezing, rhonchi or rales.   Abdominal:      General: Bowel sounds are normal. There is no distension.      Palpations: Abdomen is soft.      Tenderness: There is no abdominal tenderness. There is no guarding.   Musculoskeletal:      Cervical back: Normal range of motion.      Right hip: Tenderness present. Decreased range of motion (2/2 pain).      Right lower leg: No edema.      Left lower leg: No edema.   Skin:     General: Skin is warm and dry.      Capillary Refill: Capillary refill takes 2 to 3 seconds.      Comments: Chronic skin changes to BLE consistent with venous stasis. No wounds noted.   Neurological:      General: No focal deficit present.      Mental Status: He is alert and oriented to person, place, and time.      Sensory: Sensory deficit present.      Motor: No weakness.   Psychiatric:         Mood and Affect: Mood normal.         Behavior: Behavior normal.              CRANIAL NERVES     CN III, IV, VI   Pupils are equal, round, and reactive to light.       Significant Labs: All pertinent labs within the past 24 hours have been reviewed.  CBC:   Recent Labs   Lab 02/01/25 1943 02/01/25 1945   WBC 8.04  --    HGB 14.1  --    HCT 43.4 46     --      CMP:   Recent Labs   Lab 02/01/25 1943   *   K 5.9*      CO2 18*   GLU 91   BUN 74*   CREATININE 2.1*   CALCIUM 10.0   PROT 9.6*   ALBUMIN 3.4*   BILITOT 0.4   ALKPHOS 106   AST 16   ALT 11   ANIONGAP 11     Urine Studies:   Recent Labs   Lab 02/01/25 1929   COLORU Yellow   APPEARANCEUA Clear   PHUR 6.0   SPECGRAV 1.015   PROTEINUA Trace*   GLUCUA Negative   KETONESU Negative   BILIRUBINUA Negative   OCCULTUA Negative   NITRITE Negative   LEUKOCYTESUR Trace*   RBCUA 1   WBCUA 6*   BACTERIA Rare   SQUAMEPITHEL 1       Significant Imaging: I have reviewed all pertinent imaging results/findings within the past 24 hours.  Imaging Results              CT Pelvis Without Contrast  (Final result)  Result time 02/01/25 23:56:46      Final result by Billy Lopez MD (02/01/25 23:56:46)                   Impression:      Advanced degenerative or erosive changes in the bilateral hips, similar to same day radiograph.  Consider orthopedic evaluation or follow-up when clinically appropriate.    No acute displaced fracture.    Bilateral fat containing inguinal hernias.  Recommend correlation with additional clinical history.      Electronically signed by: Billy Lopez MD  Date:    02/01/2025  Time:    23:56               Narrative:    EXAMINATION:  CT PELVIS WITHOUT CONTRAST    CLINICAL HISTORY:  Hernia, complicated;    TECHNIQUE:  CT images of the pelvis obtained without IV contrast.  Axial, coronal, and sagittal reconstructions were created from the source data.    COMPARISON:  Pelvis radiograph, 02/01/2025.    FINDINGS:  No acute displaced fracture.  No dislocation.  Advanced degenerative changes in bilateral hips, with significant sclerosis and lucencies in the bilateral femoral heads and acetabula, likely degenerative or erosive changes.  Correlation clinical findings will be needed to exclude any possibility of superimposed infection.  Small bilateral joint effusions.  Degenerative changes in the spine and sacroiliac joints.    Mild-to-moderate stool burden in the distal colon.  Normal appendix.  Mild aortoiliac atherosclerosis.  Small fat containing umbilical hernias, right side greater than left.  No bowel within the hernias.  No soft tissue gas.  No significant pelvic free fluid.                                       X-Ray Femur Ap/Lat Right (Final result)  Result time 02/01/25 21:35:36      Final result by Demarcus Amaro DO (02/01/25 21:35:36)                   Impression:      No acute osseous abnormality of the pelvis or the right femur.    Degenerative changes as above.      Electronically signed by: Demarcus Amaro  Date:    02/01/2025  Time:    21:35               Narrative:     EXAMINATION:  XR PELVIS ROUTINE AP; XR FEMUR 2 VIEW RIGHT    CLINICAL HISTORY:  right hip pain;  Pain in right hip    TECHNIQUE:  AP view of the pelvis was performed.    AP and lateral radiographs of the proximal and distal right femur.    COMPARISON:  None.    FINDINGS:  There is diffuse osteopenia.    Pelvis: There is no evidence of an acute fracture or dislocation of the pelvis.  There is severe joint space narrowing of the bilateral femoroacetabular joints with subchondral sclerosis and adjacent marginal osteophytes.  There is degenerative change of the pubic symphysis, the bilateral sacroiliac joints, and the partially visualized lower lumbar spine.    Right femur: There is no acute fracture or dislocation of the right femur.  Alignment is normal.  There are degenerative changes of the knee, most significant in the medial compartment with there is moderate joint space narrowing and marginal osteophytes.  There are vascular calcifications.                                       X-Ray Pelvis Routine AP (Final result)  Result time 02/01/25 21:35:36   Procedure changed from X-Ray Hip 2 or 3 views Right with Pelvis when performed     Final result by Demarcus Amaro DO (02/01/25 21:35:36)                   Impression:      No acute osseous abnormality of the pelvis or the right femur.    Degenerative changes as above.      Electronically signed by: Demarcus Amaro  Date:    02/01/2025  Time:    21:35               Narrative:    EXAMINATION:  XR PELVIS ROUTINE AP; XR FEMUR 2 VIEW RIGHT    CLINICAL HISTORY:  right hip pain;  Pain in right hip    TECHNIQUE:  AP view of the pelvis was performed.    AP and lateral radiographs of the proximal and distal right femur.    COMPARISON:  None.    FINDINGS:  There is diffuse osteopenia.    Pelvis: There is no evidence of an acute fracture or dislocation of the pelvis.  There is severe joint space narrowing of the bilateral femoroacetabular joints with subchondral sclerosis and  adjacent marginal osteophytes.  There is degenerative change of the pubic symphysis, the bilateral sacroiliac joints, and the partially visualized lower lumbar spine.    Right femur: There is no acute fracture or dislocation of the right femur.  Alignment is normal.  There are degenerative changes of the knee, most significant in the medial compartment with there is moderate joint space narrowing and marginal osteophytes.  There are vascular calcifications.                                      Assessment/Plan:     * Right hip pain  Chronic pain   Pt presents with R groin/hip pain ongoing for the past month. He states that his right hip and right groin are exquisitely tender to touch and hurt when his hip is manipulated or he has turned when he is being changed. He is chronically debilitated at baseline and states that he is wheelchair bound. Upon chart review noted to have chronic hip pain for several years. Pain likely secondary to degenerative changes to his right hip.    -Afebrile, no leukocytosis.  -Xray pelvis and R femur with No acute osseous abnormality of the pelvis or the right femur. Degenerative changes as above.  -CT pelvis with advanced degenerative or erosive changes in the bilateral hips, similar to same day radiograph.  Consider orthopedic evaluation or follow-up when clinically appropriate. No acute displaced fracture. Bilateral fat containing inguinal hernias  -Continue home gabapentin and baclofen. PRN norco and morphine for breakthrough pain.  -Pt interested in referral to pain management and orthopedics at discharge. Would also like to continue PT/OT at discharge.    Acute renal failure superimposed on stage 3 chronic kidney disease  KEATON is likely due to pre-renal azotemia due to dehydration. Baseline creatinine is  ~1.3 . Most recent creatinine and eGFR are listed below.  Recent Labs     02/01/25 1943   CREATININE 2.1*   EGFRNORACEVR 32.6*      Plan  - KEATON is stable  - Avoid nephrotoxins and  renally dose meds for GFR listed above  - Monitor urine output, serial BMP, and adjust therapy as needed  - Continue IVF overnight. Hold losartan and hctz.    Hyperkalemia  Hyperkalemia is likely due to KEATON.The patients most recent potassium results are listed below.  Recent Labs     02/01/25 1943   K 5.9*     Plan  - Monitor for arrhythmias with EKG and/or continuous telemetry.   - Treat the hyperkalemia with Potassium Binders, Calcium gluconate, and Furosemide.   - Monitor potassium: Every 12 hours  - The patient's hyperkalemia is stable    Bilateral inguinal hernia without obstruction or gangrene  Noted on CT pelvis. R groin pain seems related to hip pain. No significant tenderness to palpitation over right groin/inguinal area.   -Pt requesting general surgery referral at discharge.    Debility  Patient with Acute on chronic debility due to bed confinement status, age-related physical debility, and other reduced mobility. The patient's latest AMPAC (Activity Measure for Post Acute Care) Score is listed below.    AM-PAC Score - How much help does the patient need for each activity listed       Plan  - Progressive mobility protocol initated  - PT/OT consulted  - Fall precautions in place    Venous stasis of both lower extremities  -Chronic, stable.  -Compression stockings.    Metabolic acidosis, normal anion gap (NAG)  -Bicarb 18 on admit with normal AG likely 2/2 to acute on chronic renal failure. Continue treatment of KEATON as above.  -Monitor on daily labs.    Anemia due to stage 3 chronic kidney disease  Anemia is likely due to chronic disease due to Chronic Kidney Disease. Most recent hemoglobin and hematocrit are listed below.  Recent Labs     02/01/25 1943 02/01/25 1945   HGB 14.1  --    HCT 43.4 46     Plan  - Monitor serial CBC: Daily  - Transfuse PRBC if patient becomes hemodynamically unstable, symptomatic or H/H drops below 7/21.  - Patient has not received any PRBC transfusions to date  - Patient's  anemia is currently stable    HTN (hypertension)  Patient's blood pressure range in the last 24 hours was: BP  Min: 136/83  Max: 148/82.The patient's inpatient anti-hypertensive regimen is listed below:  Current Antihypertensives  amLODIPine tablet 10 mg, Daily, Oral    Plan  - BP is controlled, no changes needed to their regimen    Insulin dependent type 2 diabetes mellitus  Patient's FSGs are controlled on current medication regimen.  Last A1c reviewed-   Lab Results   Component Value Date    HGBA1C 6.9 (H) 11/22/2024     Most recent fingerstick glucose reviewed-   Recent Labs   Lab 02/02/25  0137   POCTGLUCOSE 88     Current correctional scale  Low  Maintain anti-hyperglycemic dose as follows-   Antihyperglycemics (From admission, onward)      Start     Stop Route Frequency Ordered    02/02/25 0222  insulin aspart U-100 pen 0-5 Units         -- SubQ Before meals & nightly PRN 02/02/25 0122          Hold Oral hypoglycemics while patient is in the hospital.      VTE Risk Mitigation (From admission, onward)           Ordered     Place SPARKLE hose  Until discontinued         02/02/25 0702     heparin (porcine) injection 7,500 Units  Every 8 hours         02/02/25 0122     IP VTE HIGH RISK PATIENT  Once         02/02/25 0122     Place sequential compression device  Until discontinued         02/02/25 0122                         On 02/02/2025, patient should be placed in hospital observation services under my care in collaboration with Arina Hussein DO.           Clarissa Degroot NP  Department of Hospital Medicine  [facility] Dept

## 2025-02-02 NOTE — ASSESSMENT & PLAN NOTE
Patient with Acute on chronic debility due to bed confinement status, age-related physical debility, and other reduced mobility. The patient's latest AMPAC (Activity Measure for Post Acute Care) Score is listed below.    AM-PAC Score - How much help does the patient need for each activity listed       Plan  - Progressive mobility protocol initated  - PT/OT consulted  - Fall precautions in place

## 2025-02-02 NOTE — ASSESSMENT & PLAN NOTE
Hyperkalemia is likely due to KEATON.The patients most recent potassium results are listed below.  Recent Labs     02/01/25 1943   K 5.9*     Plan  - Monitor for arrhythmias with EKG and/or continuous telemetry.   - Treat the hyperkalemia with Potassium Binders, Calcium gluconate, and Furosemide.   - Monitor potassium: Every 12 hours  - The patient's hyperkalemia is stable

## 2025-02-02 NOTE — ASSESSMENT & PLAN NOTE
Chronic pain   Pt presents with R groin/hip pain ongoing for the past month. He states that his right hip and right groin are exquisitely tender to touch and hurt when his hip is manipulated or he has turned when he is being changed. He is chronically debilitated at baseline and states that he is wheelchair bound. Upon chart review noted to have chronic hip pain for several years. Pain likely secondary to degenerative changes to his right hip.    -Afebrile, no leukocytosis.  -Xray pelvis and R femur with No acute osseous abnormality of the pelvis or the right femur. Degenerative changes as above.  -CT pelvis with advanced degenerative or erosive changes in the bilateral hips, similar to same day radiograph.  Consider orthopedic evaluation or follow-up when clinically appropriate. No acute displaced fracture. Bilateral fat containing inguinal hernias  -Continue home gabapentin and baclofen. PRN norco and morphine for breakthrough pain.  -Pt interested in referral to pain management and orthopedics at discharge. Would also like to continue PT/OT at discharge.

## 2025-02-02 NOTE — ASSESSMENT & PLAN NOTE
Noted on CT pelvis. R groin pain seems related to hip pain. No significant tenderness to palpitation over right groin/inguinal area.   -Pt requesting general surgery referral at discharge.

## 2025-02-02 NOTE — PLAN OF CARE
02/02/25 1209   Post-Acute Status   Post-Acute Authorization Placement   Post-Acute Placement Status Set-up Complete/Auth obtained   Discharge Delays None known at this time   Discharge Plan   Discharge Plan A Return to nursing home     Pt to return to [facility] as [facility] resident      Mary Beth Sahu, NIDA, MSW, LMSW, RSW   Case Management  [facility]  Email: [email or web address]

## 2025-02-02 NOTE — ED PROVIDER NOTES
Encounter Date: 2/1/2025       History     Chief Complaint   Patient presents with    Groin Pain     Arrives via EMS with c/o groin pain x3 weeks coming from [facility], also states does not want to go back [facility] wants to talk with SW and get placed somewhere else      HPI  The patient is a 75-year-old male with history of diabetes, hypertension, hypercholesterolemia, gout, CKD, anemia of chronic disease, chronic bilateral venous stasis coming from [facility] for right hip and groin pain.  Patient states that it has been ongoing for the past month.  He states that his right hip and right groin are exquisitely tender to touch and hurt when his hip is manipulated or he has turned when he is being changed.  He is chronically debilitated at baseline and states that he does not walk.  He denies any urinary symptoms.  He is still stooling appropriately.  No constipation.  No abdominal pain.  No vomiting.  No fevers or chills.  No testicular or penile pain.  He denies any injuries to the area.  He is also requesting to speak to social work to see if he can help get placed at another nursing facility besides [facility].    Review of patient's allergies indicates:   Allergen Reactions    Lisinopril Other (See Comments)     cough     Past Medical History:   Diagnosis Date    Depression     Diabetes mellitus     Gout     High cholesterol     Hypertension      History reviewed. No pertinent surgical history.  Family History   Problem Relation Name Age of Onset    Heart disease Mother      Diabetes Mother      Diabetes Father       Social History     Tobacco Use    Smoking status: Never    Smokeless tobacco: Never   Substance Use Topics    Alcohol use: Not Currently     Comment: occasionally    Drug use: Yes     Types: Marijuana     Review of Systems  A full ROS was obtained, see HPI for pertinent positives.     Physical Exam     Initial Vitals [02/01/25 1701]   BP Pulse Resp Temp  SpO2   (!) 148/82 98 18 97.9 °F (36.6 °C) 97 %      MAP       --         Physical Exam  Constitutional: No acute distress, well-appearing, nontoxic  HENT: Normocephalic, atraumatic  Neck: Supple, normal range of motion  Respiratory: Non-labored  Cardiovascular: Well perfused, normal rate, regular rhythm  Gastrointestinal: Soft, non-tender, non-distended, no obvious palpable hernia in the groin, no significant groin tenderness it seems more in his right hip but difficult to distinguish  Genitourinary: Testes descended bilaterally, no scrotal swelling, edema or tenderness to palpation, penis uncircumcised, foreskin easily retractable, no penile discharge or tenderness  Integumentary: Warm and dry  Musculoskeletal: No deformity, tender to palpation over the right hip, no overlying skin changes or erythema but he is exquisitely tender when the skin is palpated near the hip, he has decreased range of motion of the hip at baseline from debility  Neurological: Awake and alert  Psychiatric: Cooperative     ED Course   Procedures  Labs Reviewed   COMPREHENSIVE METABOLIC PANEL - Abnormal       Result Value    Sodium 132 (*)     Potassium 5.9 (*)     Chloride 103      CO2 18 (*)     Glucose 91      BUN 74 (*)     Creatinine 2.1 (*)     Calcium 10.0      Total Protein 9.6 (*)     Albumin 3.4 (*)     Total Bilirubin 0.4      Alkaline Phosphatase 106      AST 16      ALT 11      eGFR 32.6 (*)     Anion Gap 11     CBC W/ AUTO DIFFERENTIAL - Abnormal    WBC 8.04      RBC 4.93      Hemoglobin 14.1      Hematocrit 43.4      MCV 88      MCH 28.6      MCHC 32.5      RDW 15.0 (*)     Platelets 314      MPV 10.1      Immature Granulocytes 0.4      Gran # (ANC) 5.6      Immature Grans (Abs) 0.03      Lymph # 1.8      Mono # 0.4      Eos # 0.2      Baso # 0.04      nRBC 0      Gran % 69.3      Lymph % 22.3      Mono % 5.3      Eosinophil % 2.2      Basophil % 0.5      Differential Method Automated     URINALYSIS, REFLEX TO URINE CULTURE -  Abnormal    Specimen UA Urine, Clean Catch      Color, UA Yellow      Appearance, UA Clear      pH, UA 6.0      Specific Gravity, UA 1.015      Protein, UA Trace (*)     Glucose, UA Negative      Ketones, UA Negative      Bilirubin (UA) Negative      Occult Blood UA Negative      Nitrite, UA Negative      Leukocytes, UA Trace (*)     Narrative:     Specimen Source->Urine   URINALYSIS MICROSCOPIC - Abnormal    RBC, UA 1      WBC, UA 6 (*)     Bacteria Rare      Squam Epithel, UA 1      Microscopic Comment SEE COMMENT      Narrative:     Specimen Source->Urine   C-REACTIVE PROTEIN - Abnormal    CRP 37.1 (*)     Narrative:     ADD ON CRP PER DR OUMAR BADILLO/ORDER# [id number]   SEDIMENTATION RATE - Abnormal    Sed Rate 74 (*)     Narrative:     ADD ON CRP PER DR OUMAR BADILLO/ORDER# [id number]    PER TAWNY DE SANTIAGO MD REQUEST ADD ON SEDIMENTATION RATE (ORDER   [id number])  02/01/2025  21:13    ISTAT PROCEDURE - Abnormal    POC Glucose 96      POC BUN 81 (*)     POC Creatinine 2.5 (*)     POC Sodium 136      POC Potassium 6.0 (*)     POC Chloride 105      POC TCO2 (MEASURED) 24      POC Ionized Calcium 1.25      POC Hematocrit 46      Sample PHYLLIS     SEDIMENTATION RATE   C-REACTIVE PROTEIN   ISTAT CHEM8          Imaging Results              CT Pelvis Without Contrast (Final result)  Result time 02/01/25 23:56:46      Final result by Billy Lopez MD (02/01/25 23:56:46)                   Impression:      Advanced degenerative or erosive changes in the bilateral hips, similar to same day radiograph.  Consider orthopedic evaluation or follow-up when clinically appropriate.    No acute displaced fracture.    Bilateral fat containing inguinal hernias.  Recommend correlation with additional clinical history.      Electronically signed by: Billy Lopez MD  Date:    02/01/2025  Time:    23:56               Narrative:    EXAMINATION:  CT PELVIS WITHOUT CONTRAST    CLINICAL HISTORY:  Hernia,  complicated;    TECHNIQUE:  CT images of the pelvis obtained without IV contrast.  Axial, coronal, and sagittal reconstructions were created from the source data.    COMPARISON:  Pelvis radiograph, 02/01/2025.    FINDINGS:  No acute displaced fracture.  No dislocation.  Advanced degenerative changes in bilateral hips, with significant sclerosis and lucencies in the bilateral femoral heads and acetabula, likely degenerative or erosive changes.  Correlation clinical findings will be needed to exclude any possibility of superimposed infection.  Small bilateral joint effusions.  Degenerative changes in the spine and sacroiliac joints.    Mild-to-moderate stool burden in the distal colon.  Normal appendix.  Mild aortoiliac atherosclerosis.  Small fat containing umbilical hernias, right side greater than left.  No bowel within the hernias.  No soft tissue gas.  No significant pelvic free fluid.                                       X-Ray Femur Ap/Lat Right (Final result)  Result time 02/01/25 21:35:36      Final result by Demarcus Amaro DO (02/01/25 21:35:36)                   Impression:      No acute osseous abnormality of the pelvis or the right femur.    Degenerative changes as above.      Electronically signed by: Demarcus Amaro  Date:    02/01/2025  Time:    21:35               Narrative:    EXAMINATION:  XR PELVIS ROUTINE AP; XR FEMUR 2 VIEW RIGHT    CLINICAL HISTORY:  right hip pain;  Pain in right hip    TECHNIQUE:  AP view of the pelvis was performed.    AP and lateral radiographs of the proximal and distal right femur.    COMPARISON:  None.    FINDINGS:  There is diffuse osteopenia.    Pelvis: There is no evidence of an acute fracture or dislocation of the pelvis.  There is severe joint space narrowing of the bilateral femoroacetabular joints with subchondral sclerosis and adjacent marginal osteophytes.  There is degenerative change of the pubic symphysis, the bilateral sacroiliac joints, and the partially  visualized lower lumbar spine.    Right femur: There is no acute fracture or dislocation of the right femur.  Alignment is normal.  There are degenerative changes of the knee, most significant in the medial compartment with there is moderate joint space narrowing and marginal osteophytes.  There are vascular calcifications.                                       X-Ray Pelvis Routine AP (Final result)  Result time 02/01/25 21:35:36   Procedure changed from X-Ray Hip 2 or 3 views Right with Pelvis when performed     Final result by Demarcus Amaro DO (02/01/25 21:35:36)                   Impression:      No acute osseous abnormality of the pelvis or the right femur.    Degenerative changes as above.      Electronically signed by: Demarcus Amaro  Date:    02/01/2025  Time:    21:35               Narrative:    EXAMINATION:  XR PELVIS ROUTINE AP; XR FEMUR 2 VIEW RIGHT    CLINICAL HISTORY:  right hip pain;  Pain in right hip    TECHNIQUE:  AP view of the pelvis was performed.    AP and lateral radiographs of the proximal and distal right femur.    COMPARISON:  None.    FINDINGS:  There is diffuse osteopenia.    Pelvis: There is no evidence of an acute fracture or dislocation of the pelvis.  There is severe joint space narrowing of the bilateral femoroacetabular joints with subchondral sclerosis and adjacent marginal osteophytes.  There is degenerative change of the pubic symphysis, the bilateral sacroiliac joints, and the partially visualized lower lumbar spine.    Right femur: There is no acute fracture or dislocation of the right femur.  Alignment is normal.  There are degenerative changes of the knee, most significant in the medial compartment with there is moderate joint space narrowing and marginal osteophytes.  There are vascular calcifications.                                       Medications   sodium chloride 0.9% bolus 1,000 mL 1,000 mL (1,000 mLs Intravenous New Bag 2/2/25 0032)   morphine injection 4 mg (4 mg  Intravenous Given 2/1/25 1951)   morphine injection 4 mg (4 mg Intravenous Given 2/2/25 0035)     Medical Decision Making  The patient is a 75-year-old male coming from [facility] who presents for right groin pain.  On my evaluation, the pain seems to be more in his proximal femur and hip with less tenderness in the actual groin itself.  I do not palpate any hernias.  He has a normal genital exam.  Symptoms have been ongoing for the past 1 month.  He has been told that he might have to have a groin surgery but he can not tell me what for.  Given his focal tenderness in his right hip, will obtain x-ray imaging to rule out occult fracture although patient denies any injuries.  Will also check basic labs and inflammatory markers to rule out septic arthritis.  Will obtain CT pelvis to further evaluate for hernias even though I do not palpate one in this area.     Labs and imaging reviewed.  X-ray with no occult fracture.  He has no leukocytosis.  Inflammatory markers are elevated but CRP is downtrending from 1 month ago.  I do not suspect septic arthritis.  He does have a new KEATON.  I considered ureteral stone but CT pelvis shows no evidence of hydronephrosis or ureteral stone on my independent interpretation of CT.  Final read still pending.  Patient will require admission to Hospital Medicine for KETAON.  Care transitioned to oncoming staff.    Amount and/or Complexity of Data Reviewed  Labs: ordered.  Radiology: ordered.  ECG/medicine tests: ordered and independent interpretation performed. Decision-making details documented in ED Course.    Risk  Prescription drug management.               ED Course as of 02/02/25 0041   Sat Feb 01, 2025 1943 Will have social work come talked to the patient regarding his current placement at [facility] [NN]   1958 EKG with sinus rhythm, rate 78, no STEMI [NN]   2347 Patient has new KEATON, mild hyperkalemia but no EKG changes. [NN]   2347 No acute fracture on x-ray  imaging.  CRP downtending from prior so do not suspect septic arthritis of the hip. CT read still pending.  [NN]   2357 CT pelvis read still pending at time of admission, but I see no hydronephrosis or ureteral stones.  [NN]      ED Course User Index  [NN] Samantha Ellison MD                           Clinical Impression:  Final diagnoses:  [M25.551] Right hip pain  [R10.30] Groin pain                 Samantha Ellison MD  02/02/25 0041

## 2025-02-02 NOTE — ASSESSMENT & PLAN NOTE
KEATON is likely due to pre-renal azotemia due to dehydration. Baseline creatinine is  ~1.3 . Most recent creatinine and eGFR are listed below.  Recent Labs     02/01/25 1943   CREATININE 2.1*   EGFRNORACEVR 32.6*      Plan  - KEATON is stable  - Avoid nephrotoxins and renally dose meds for GFR listed above  - Monitor urine output, serial BMP, and adjust therapy as needed  - Continue IVF overnight. Hold losartan and hctz.

## 2025-02-02 NOTE — ASSESSMENT & PLAN NOTE
Patient's FSGs are controlled on current medication regimen.  Last A1c reviewed-   Lab Results   Component Value Date    HGBA1C 6.9 (H) 11/22/2024     Most recent fingerstick glucose reviewed-   Recent Labs   Lab 02/02/25  0137   POCTGLUCOSE 88     Current correctional scale  Low  Maintain anti-hyperglycemic dose as follows-   Antihyperglycemics (From admission, onward)      Start     Stop Route Frequency Ordered    02/02/25 0222  insulin aspart U-100 pen 0-5 Units         -- SubQ Before meals & nightly PRN 02/02/25 0122          Hold Oral hypoglycemics while patient is in the hospital.

## 2025-02-02 NOTE — PLAN OF CARE
Patient aao to self confused about time and place needs constant reorienting. Able to verbalize needs. Patient was agitated and aggressive at the beginning of the shift kept attempting to get out of bed without assistance, yelling and pressing call button continuously. Patient also removed two iv's and was resistant to care and refused labs. Patient c/o leg pain, prn norco given and was effective. Patient is pleasant now but still confused removed pure wick and soiled the bed. Bed alarm set, bed changed by two providers, call light in reach, and all safety precautions maintained. Plan of care ongoing.                   Problem: Skin Injury Risk Increased  Goal: Skin Health and Integrity  Outcome: Progressing     Problem: Adult Inpatient Plan of Care  Goal: Plan of Care Review  Outcome: Progressing  Goal: Patient-Specific Goal (Individualized)  Outcome: Progressing  Goal: Absence of Hospital-Acquired Illness or Injury  Outcome: Progressing  Goal: Optimal Comfort and Wellbeing  Outcome: Progressing  Goal: Readiness for Transition of Care  Outcome: Progressing     Problem: Bariatric Environmental Safety  Goal: Safety Maintained with Care  Outcome: Progressing

## 2025-02-02 NOTE — ASSESSMENT & PLAN NOTE
Anemia is likely due to chronic disease due to Chronic Kidney Disease. Most recent hemoglobin and hematocrit are listed below.  Recent Labs     02/01/25 1943 02/01/25 1945   HGB 14.1  --    HCT 43.4 46     Plan  - Monitor serial CBC: Daily  - Transfuse PRBC if patient becomes hemodynamically unstable, symptomatic or H/H drops below 7/21.  - Patient has not received any PRBC transfusions to date  - Patient's anemia is currently stable

## 2025-02-02 NOTE — ASSESSMENT & PLAN NOTE
-Bicarb 18 on admit with normal AG likely 2/2 to acute on chronic renal failure. Continue treatment of KEATON as above.  -Monitor on daily labs.

## 2025-02-02 NOTE — ASSESSMENT & PLAN NOTE
Patient's blood pressure range in the last 24 hours was: BP  Min: 136/83  Max: 148/82.The patient's inpatient anti-hypertensive regimen is listed below:  Current Antihypertensives  amLODIPine tablet 10 mg, Daily, Oral    Plan  - BP is controlled, no changes needed to their regimen

## 2025-02-02 NOTE — ED NOTES
I-STAT Chem-8+ Results:   Value Reference Range   Sodium 136 136-145 mmol/L   Potassium  6 3.5-5.1 mmol/L   Chloride 105  mmol/L   Ionized Calcium 1.25 1.06-1.42 mmol/L   CO2 (measured) 24 23-29 mmol/L   Glucose 96  mg/dL   BUN 81 6-30 mg/dL   Creatinine 2.5 0.5-1.4 mg/dL   Hematocrit 46 36-54%

## 2025-02-02 NOTE — PLAN OF CARE
[facility] - Emergency Dept  Discharge Assessment    Primary Care Provider: Berhane Alvarez MD     Pt stated he has been living at [facility] for 4-bryant months.  SW contacted [facility] [phone] and pt is a [facility] resident of the facility.     SW explained to pt that we do not change NH placements.  Pt expressed understanding.      Pt to return to [facility] when ready    Discharge Assessment (most recent)       BRIEF DISCHARGE ASSESSMENT - 02/02/25 5445          Discharge Planning    Assessment Type Discharge Planning Brief Assessment (P)      Resource/Environmental Concerns none (P)      Support Systems Family members (P)      Assistance Needed full (P)      Current Living Arrangements residential facility (P)      Care Facility Name [facility] (P)      Patient/Family Anticipates Transition to long-term care facility (P)      Patient/Family Anticipated Services at Transition none (P)      DME Needed Upon Discharge  none (P)      Discharge Plan A Return to nursing home (P)      Discharge Plan B Return to Nursing Home (P)                    Discharge Plan A and Plan B have been determined by review of patient's clinical status, future medical and therapeutic needs, and coverage/benefits for post-acute care in coordination with multidisciplinary team members.    Mary Beth Sahu, NIDA, MSW, LMSW, RSW   Case Management  [facility]  Email: [email or web address]

## 2025-02-02 NOTE — PROGRESS NOTES
75 y.o. male with PMHx HTN, HLD, DM2, obesity, gout, CKD3, anemia, chronic venous stasis, chronic pain, and debility who presents to the ED from [facility] with complaints of R hip/groin pain, admitted for KEATON and hyperkalemia. CT pelvis with advanced degenerative or erosive changes in the bilateral hips, similar to same day radiograph. Kidney function improving. Pt confused during rounds. He removed his PIV because he was concerned the fluid was making his legs stiff.     Plan:  Cont to monitor kidney function  Lokelma   Delirium/fall precautions  PT/OT  Will need outpatient ortho referral

## 2025-02-02 NOTE — PHARMACY MED REC
"  Admission Medication History     The home medication history was taken by Fern Ortiz.    You may go to "Admission" then "Reconcile Home Medications" tabs to review and/or act upon these items.     The home medication list has been updated by the Pharmacy department.   Please read ALL comments highlighted in yellow.   Please address this information as you see fit.    Feel free to contact us if you have any questions or require assistance.      Medications listed below were obtained from: Nursing home  Current Outpatient Medications on File Prior to Encounter   Medication Sig    sertraline (ZOLOFT) 25 MG tablet Take 25 mg by mouth once daily.    vitamin D (VITAMIN D3) 1000 units Tab Take 1,000 Units by mouth once daily.    amLODIPine (NORVASC) 10 MG tablet Take 1 tablet (10 mg total) by mouth once daily.    aspirin (ECOTRIN) 81 MG EC tablet Take 81 mg by mouth once daily.    atorvastatin (LIPITOR) 20 MG tablet Take 20 mg by mouth once daily.    baclofen (LIORESAL) 10 MG tablet Take 10 mg by mouth 3 (three) times daily.    fluticasone propionate (FLONASE) 50 mcg/actuation nasal spray 2 sprays (100 mcg total) by Each Nostril route once daily.    gabapentin (NEURONTIN) 800 MG tablet Take 1 tablet (800 mg total) by mouth 2 (two) times daily.    HYDROcodone-acetaminophen (NORCO) 7.5-325 mg per tablet Take 1 tablet by mouth every 8 (eight) hours as needed for Pain.    losartan-hydrochlorothiazide 100-12.5 mg (HYZAAR) 100-12.5 mg Tab Take 1 tablet by mouth once daily.    metFORMIN (GLUCOPHAGE) 1000 MG tablet Take 1,000 mg by mouth 2 (two) times daily with meals.    tamsulosin (FLOMAX) 0.4 mg Cap Take  1 capsule by mouth once daily.       Potential issues to be addressed PRIOR TO DISCHARGE  The listed medications were obtained from another facility ([facility]). The patient may not have been able to fill these prescriptions prior to this admission and may require new scripts upon discharge. "           Fern Ortiz  EXT [id number]                .

## 2025-02-03 PROBLEM — E66.01 SEVERE OBESITY (BMI >= 40): Status: ACTIVE | Noted: 2025-02-03

## 2025-02-03 LAB
ALBUMIN SERPL BCP-MCNC: 3 G/DL (ref 3.5–5.2)
ALP SERPL-CCNC: 92 U/L (ref 40–150)
ALT SERPL W/O P-5'-P-CCNC: 6 U/L (ref 10–44)
ANION GAP SERPL CALC-SCNC: 9 MMOL/L (ref 8–16)
AST SERPL-CCNC: 9 U/L (ref 10–40)
BASOPHILS # BLD AUTO: 0.04 K/UL (ref 0–0.2)
BASOPHILS NFR BLD: 0.5 % (ref 0–1.9)
BILIRUB SERPL-MCNC: 0.4 MG/DL (ref 0.1–1)
BUN SERPL-MCNC: 69 MG/DL (ref 8–23)
CALCIUM SERPL-MCNC: 9.9 MG/DL (ref 8.7–10.5)
CHLORIDE SERPL-SCNC: 106 MMOL/L (ref 95–110)
CO2 SERPL-SCNC: 17 MMOL/L (ref 23–29)
CREAT SERPL-MCNC: 1.6 MG/DL (ref 0.5–1.4)
DIFFERENTIAL METHOD BLD: ABNORMAL
EOSINOPHIL # BLD AUTO: 0.2 K/UL (ref 0–0.5)
EOSINOPHIL NFR BLD: 2.5 % (ref 0–8)
ERYTHROCYTE [DISTWIDTH] IN BLOOD BY AUTOMATED COUNT: 14.9 % (ref 11.5–14.5)
EST. GFR  (NO RACE VARIABLE): 45.2 ML/MIN/1.73 M^2
GLUCOSE SERPL-MCNC: 99 MG/DL (ref 70–110)
HCT VFR BLD AUTO: 41.3 % (ref 40–54)
HGB BLD-MCNC: 13.2 G/DL (ref 14–18)
IMM GRANULOCYTES # BLD AUTO: 0.02 K/UL (ref 0–0.04)
IMM GRANULOCYTES NFR BLD AUTO: 0.3 % (ref 0–0.5)
LYMPHOCYTES # BLD AUTO: 2.1 K/UL (ref 1–4.8)
LYMPHOCYTES NFR BLD: 28.1 % (ref 18–48)
MAGNESIUM SERPL-MCNC: 1.8 MG/DL (ref 1.6–2.6)
MCH RBC QN AUTO: 28.1 PG (ref 27–31)
MCHC RBC AUTO-ENTMCNC: 32 G/DL (ref 32–36)
MCV RBC AUTO: 88 FL (ref 82–98)
MONOCYTES # BLD AUTO: 0.6 K/UL (ref 0.3–1)
MONOCYTES NFR BLD: 8.2 % (ref 4–15)
NEUTROPHILS # BLD AUTO: 4.4 K/UL (ref 1.8–7.7)
NEUTROPHILS NFR BLD: 60.4 % (ref 38–73)
NRBC BLD-RTO: 0 /100 WBC
PLATELET # BLD AUTO: 302 K/UL (ref 150–450)
PMV BLD AUTO: 10.4 FL (ref 9.2–12.9)
POCT GLUCOSE: 112 MG/DL (ref 70–110)
POCT GLUCOSE: 119 MG/DL (ref 70–110)
POCT GLUCOSE: 97 MG/DL (ref 70–110)
POCT GLUCOSE: 99 MG/DL (ref 70–110)
POTASSIUM SERPL-SCNC: 5.6 MMOL/L (ref 3.5–5.1)
PROT SERPL-MCNC: 8.2 G/DL (ref 6–8.4)
RBC # BLD AUTO: 4.7 M/UL (ref 4.6–6.2)
SODIUM SERPL-SCNC: 132 MMOL/L (ref 136–145)
WBC # BLD AUTO: 7.29 K/UL (ref 3.9–12.7)

## 2025-02-03 PROCEDURE — 25000003 PHARM REV CODE 250: Performed by: NURSE PRACTITIONER

## 2025-02-03 PROCEDURE — 80053 COMPREHEN METABOLIC PANEL: CPT | Performed by: NURSE PRACTITIONER

## 2025-02-03 PROCEDURE — 85025 COMPLETE CBC W/AUTO DIFF WBC: CPT | Performed by: NURSE PRACTITIONER

## 2025-02-03 PROCEDURE — 36415 COLL VENOUS BLD VENIPUNCTURE: CPT | Performed by: NURSE PRACTITIONER

## 2025-02-03 PROCEDURE — 83735 ASSAY OF MAGNESIUM: CPT | Performed by: NURSE PRACTITIONER

## 2025-02-03 PROCEDURE — 63600175 PHARM REV CODE 636 W HCPCS: Performed by: NURSE PRACTITIONER

## 2025-02-03 PROCEDURE — 21400001 HC TELEMETRY ROOM

## 2025-02-03 PROCEDURE — 96374 THER/PROPH/DIAG INJ IV PUSH: CPT | Mod: 59

## 2025-02-03 PROCEDURE — 63600175 PHARM REV CODE 636 W HCPCS: Performed by: INTERNAL MEDICINE

## 2025-02-03 PROCEDURE — 25000003 PHARM REV CODE 250: Performed by: INTERNAL MEDICINE

## 2025-02-03 PROCEDURE — 25000242 PHARM REV CODE 250 ALT 637 W/ HCPCS: Performed by: NURSE PRACTITIONER

## 2025-02-03 PROCEDURE — 96372 THER/PROPH/DIAG INJ SC/IM: CPT | Performed by: INTERNAL MEDICINE

## 2025-02-03 PROCEDURE — 96372 THER/PROPH/DIAG INJ SC/IM: CPT | Performed by: NURSE PRACTITIONER

## 2025-02-03 RX ORDER — ENOXAPARIN SODIUM 100 MG/ML
30 INJECTION SUBCUTANEOUS EVERY 24 HOURS
Status: DISCONTINUED | OUTPATIENT
Start: 2025-02-03 | End: 2025-02-03

## 2025-02-03 RX ORDER — AMOXICILLIN 250 MG
1 CAPSULE ORAL 2 TIMES DAILY
Status: DISCONTINUED | OUTPATIENT
Start: 2025-02-03 | End: 2025-02-04 | Stop reason: HOSPADM

## 2025-02-03 RX ORDER — ENOXAPARIN SODIUM 100 MG/ML
40 INJECTION SUBCUTANEOUS EVERY 24 HOURS
Status: DISCONTINUED | OUTPATIENT
Start: 2025-02-03 | End: 2025-02-03

## 2025-02-03 RX ORDER — POLYETHYLENE GLYCOL 3350 17 G/17G
17 POWDER, FOR SOLUTION ORAL DAILY
Status: DISCONTINUED | OUTPATIENT
Start: 2025-02-03 | End: 2025-02-04 | Stop reason: HOSPADM

## 2025-02-03 RX ORDER — SODIUM CHLORIDE 9 MG/ML
INJECTION, SOLUTION INTRAVENOUS CONTINUOUS
Status: ACTIVE | OUTPATIENT
Start: 2025-02-03 | End: 2025-02-03

## 2025-02-03 RX ORDER — ENOXAPARIN SODIUM 100 MG/ML
40 INJECTION SUBCUTANEOUS EVERY 12 HOURS
Status: DISCONTINUED | OUTPATIENT
Start: 2025-02-03 | End: 2025-02-04 | Stop reason: HOSPADM

## 2025-02-03 RX ADMIN — BACLOFEN 10 MG: 10 TABLET ORAL at 03:02

## 2025-02-03 RX ADMIN — SERTRALINE HYDROCHLORIDE 25 MG: 25 TABLET ORAL at 08:02

## 2025-02-03 RX ADMIN — ENOXAPARIN SODIUM 40 MG: 40 INJECTION SUBCUTANEOUS at 08:02

## 2025-02-03 RX ADMIN — HYDROCODONE BITARTRATE AND ACETAMINOPHEN 1 TABLET: 7.5; 325 TABLET ORAL at 08:02

## 2025-02-03 RX ADMIN — POLYETHYLENE GLYCOL 3350 17 G: 17 POWDER, FOR SOLUTION ORAL at 03:02

## 2025-02-03 RX ADMIN — MORPHINE SULFATE 2 MG: 2 INJECTION, SOLUTION INTRAMUSCULAR; INTRAVENOUS at 08:02

## 2025-02-03 RX ADMIN — HEPARIN SODIUM 7500 UNITS: 5000 INJECTION INTRAVENOUS; SUBCUTANEOUS at 05:02

## 2025-02-03 RX ADMIN — GABAPENTIN 800 MG: 300 CAPSULE ORAL at 08:02

## 2025-02-03 RX ADMIN — SENNOSIDES AND DOCUSATE SODIUM 1 TABLET: 50; 8.6 TABLET ORAL at 09:02

## 2025-02-03 RX ADMIN — HYDROCODONE BITARTRATE AND ACETAMINOPHEN 1 TABLET: 7.5; 325 TABLET ORAL at 05:02

## 2025-02-03 RX ADMIN — BACLOFEN 10 MG: 10 TABLET ORAL at 08:02

## 2025-02-03 RX ADMIN — SODIUM ZIRCONIUM CYCLOSILICATE 10 G: 10 POWDER, FOR SUSPENSION ORAL at 09:02

## 2025-02-03 RX ADMIN — AMLODIPINE BESYLATE 10 MG: 10 TABLET ORAL at 08:02

## 2025-02-03 RX ADMIN — SENNOSIDES AND DOCUSATE SODIUM 1 TABLET: 50; 8.6 TABLET ORAL at 08:02

## 2025-02-03 RX ADMIN — SODIUM ZIRCONIUM CYCLOSILICATE 10 G: 10 POWDER, FOR SUSPENSION ORAL at 03:02

## 2025-02-03 RX ADMIN — FLUTICASONE PROPIONATE 100 MCG: 50 SPRAY, METERED NASAL at 08:02

## 2025-02-03 RX ADMIN — ENOXAPARIN SODIUM 40 MG: 40 INJECTION SUBCUTANEOUS at 09:02

## 2025-02-03 RX ADMIN — SODIUM ZIRCONIUM CYCLOSILICATE 10 G: 10 POWDER, FOR SUSPENSION ORAL at 08:02

## 2025-02-03 RX ADMIN — SODIUM CHLORIDE: 9 INJECTION, SOLUTION INTRAVENOUS at 09:02

## 2025-02-03 RX ADMIN — ATORVASTATIN CALCIUM 20 MG: 20 TABLET, FILM COATED ORAL at 08:02

## 2025-02-03 RX ADMIN — ASPIRIN 81 MG: 81 TABLET, COATED ORAL at 08:02

## 2025-02-03 RX ADMIN — TAMSULOSIN HYDROCHLORIDE 0.4 MG: 0.4 CAPSULE ORAL at 08:02

## 2025-02-03 NOTE — CONSULTS
[facility]    Wound Care     Patient Name:  Riaz Guerra Jr.  MRN:  [id number]  Date: 2/3/2025  Diagnosis: Right hip pain     History:  Past Medical History:   Diagnosis Date    Depression     Diabetes mellitus     Gout     High cholesterol     Hypertension      Social History     Socioeconomic History    Marital status: Single   Tobacco Use    Smoking status: Never    Smokeless tobacco: Never   Substance and Sexual Activity    Alcohol use: Not Currently     Comment: occasionally    Drug use: Yes     Types: Marijuana    Sexual activity: Yes     Social Drivers of Health     Financial Resource Strain: Low Risk  (2/3/2025)    Overall Financial Resource Strain (CARDIA)     Difficulty of Paying Living Expenses: Not hard at all   Food Insecurity: No Food Insecurity (2/3/2025)    Hunger Vital Sign     Worried About Running Out of Food in the Last Year: Never true     Ran Out of Food in the Last Year: Never true   Transportation Needs: No Transportation Needs (2/3/2025)    TRANSPORTATION NEEDS     Transportation : No   Physical Activity: Inactive (11/22/2024)    Exercise Vital Sign     Days of Exercise per Week: 0 days     Minutes of Exercise per Session: 0 min   Stress: No Stress Concern Present (2/3/2025)    [employer] [place] [employer] - Occupational Stress Questionnaire     Feeling of Stress : Not at all   Recent Concern: Stress - Stress Concern Present (11/22/2024)    [employer] [place] [employer] - Occupational Stress Questionnaire     Feeling of Stress : To some extent   Housing Stability: Low Risk  (2/3/2025)    Housing Stability Vital Sign     Unable to Pay for Housing in the Last Year: No     Homeless in the Last Year: No     Precautions:  Allergies as of 02/01/2025 - Reviewed 02/01/2025   Allergen Reaction Noted    Lisinopril Other (See Comments) 06/21/2024     [facility] Assessment Details / Treatment:    Patient seen for wound care: New Consult   Chart reviewed for this encounter.    Labs:   WBC (K/uL)   Date Value   02/03/2025 7.29   02/02/2025 7.52     Glucose (mg/dL)   Date Value   02/03/2025 99   02/02/2025 97     Albumin (g/dL)   Date Value   02/03/2025 3.0 (L)   02/02/2025 3.2 (L)     David Score: 12  Nutrition sub-score: 2  Chart review reveals pt has external catheter    Narrative:  Pt seen for WC consultation and agreed to assessment  Chart reviewed for this encounter.   See Flow Sheet for additional documentation and media.    Pt laying in bed, bedside RN present for assessment / turning assistance.   No open wounds noted, pink scar tissue on right alteral LE.   Bedside nurse will re-assess David and order speciality bed if needed.   Waffle / PIP ordered    RECOMMENDATIONS:  Bedside nurse assess for acute changes (purulence, increased redness/swelling, increased drainage, malodor, increased pain, pallor, necrosis) please contact physician on any acute changes.    Discussed POC with patient and primary nurse.   See EMR for orders & patient education.     Discussed nutrition and the role of protein in wound healing with the patient. Instructed patient to optimize protein for wound healing.     Bedside nursing to continue care & monitoring.  Bedside nursing to maintain pressure injury prevention interventions, (PIP).     Recommendations made to primary team for above plan.    Thank you for the consult. Wound Care will sign off.  Please place a new consult if needed.      02/03/25 0700   WOCN Assessment   WOCN Total Time (mins) 30   Visit Date 02/03/25   Visit Time 0700   Consult Type New   WOCN Speciality Wound   Wound At risk for pressure Injury   Intervention chart review;assessed;orders   Teaching complication       LLE     RLE    Right lateral LE    Right heel    Left heel    Left groin    Right groin

## 2025-02-03 NOTE — ASSESSMENT & PLAN NOTE
Patient with Acute on chronic debility due to bed confinement status, age-related physical debility, and other reduced mobility. The patient's latest AMPAC (Activity Measure for Post Acute Care) Score is listed below.    AM-PAC Score - How much help does the patient need for each activity listed  Basic Mobility Total Score: 8  Turning over in bed (including adjusting bedclothes, sheets and blankets)?: A lot  Sitting down on and standing up from a chair with arms (e.g., wheelchair, bedside commode, etc.): Unable  Moving from lying on back to sitting on the side of the bed?: A lot  Moving to and from a bed to a chair (including a wheelchair)?: Unable  Need to walk in hospital room?: Unable  Climbing 3-5 steps with a railing?: Unable    Plan  - Progressive mobility protocol initated  - PT/OT consulted  - Fall precautions in place

## 2025-02-03 NOTE — ASSESSMENT & PLAN NOTE
KEATON is likely due to pre-renal azotemia due to dehydration. Baseline creatinine is  ~1.3 . Most recent creatinine and eGFR are listed below.  Recent Labs     02/02/25  0535 02/02/25 2014 02/03/25  0333   CREATININE 1.6* 1.5* 1.6*   EGFRNORACEVR 45.2* 48.9* 45.2*        Plan  - KEATON is stable  - Avoid nephrotoxins and renally dose meds for GFR listed above  - Monitor urine output, serial BMP, and adjust therapy as needed  - Continue IVF  - Hold losartan and hctz.

## 2025-02-03 NOTE — ASSESSMENT & PLAN NOTE
Patient's blood pressure range in the last 24 hours was: BP  Min: 99/56  Max: 142/92.The patient's inpatient anti-hypertensive regimen is listed below:  Current Antihypertensives  amLODIPine tablet 10 mg, Daily, Oral    Plan  - BP is controlled, no changes needed to their regimen

## 2025-02-03 NOTE — ASSESSMENT & PLAN NOTE
Hyperkalemia is likely due to KEATON.The patients most recent potassium results are listed below.  Recent Labs     02/02/25  0535 02/02/25 2014 02/03/25  0333   K 5.4* 5.6* 5.6*       Plan  - Monitor for arrhythmias with EKG and/or continuous telemetry.   - Treat the hyperkalemia with Potassium Binders, Calcium gluconate, and Furosemide.   - Monitor potassium: Every 12 hours  - The patient's hyperkalemia is stable  - IV hydration and lokelma

## 2025-02-03 NOTE — ASSESSMENT & PLAN NOTE
Anemia is likely due to chronic disease due to Chronic Kidney Disease. Most recent hemoglobin and hematocrit are listed below.  Recent Labs     02/01/25  1943 02/01/25  1945 02/02/25  0535 02/03/25  0333   HGB 14.1  --  13.6* 13.2*   HCT 43.4 46 41.6 41.3       Plan  - Monitor serial CBC: Daily  - Transfuse PRBC if patient becomes hemodynamically unstable, symptomatic or H/H drops below 7/21.  - Patient has not received any PRBC transfusions to date  - Patient's anemia is currently stable

## 2025-02-03 NOTE — ASSESSMENT & PLAN NOTE
Patient's FSGs are controlled on current medication regimen.  Last A1c reviewed-   Lab Results   Component Value Date    HGBA1C 6.9 (H) 11/22/2024     Most recent fingerstick glucose reviewed-   Recent Labs   Lab 02/02/25  1621 02/02/25  1822 02/03/25  0804   POCTGLUCOSE 133* 108 97       Current correctional scale  Low  Maintain anti-hyperglycemic dose as follows-   Antihyperglycemics (From admission, onward)    Start     Stop Route Frequency Ordered    02/02/25 0222  insulin aspart U-100 pen 0-5 Units         -- SubQ Before meals & nightly PRN 02/02/25 0122        Hold Oral hypoglycemics while patient is in the hospital.

## 2025-02-03 NOTE — PROGRESS NOTES
[facility]  Progress Note    Patient Name: Riaz Guerra Jr.  MRN: [id number]  Patient Class: IP- Inpatient   Admission Date: 2/1/2025  Length of Stay: 0 days  Attending Physician: Omari Baez MD  Primary Care Provider: Berhane Alvarez MD        Subjective     Principal Problem:Right hip pain        HPI:  Riaz Guerra Jr. Is a 75 y.o. male with PMHx HTN, HLD, DM2, obesity, gout, CKD3, anemia, chronic venous stasis, chronic pain, and debility who presents to the ED from [facility] with complaints of R hip/groin pain. Patient states that it has been ongoing for the past month. He states that his right hip and right groin are exquisitely tender to touch and hurt when his hip is manipulated or he has turned when he is being changed. He is chronically debilitated at baseline and states that he is wheelchair bound. Reports chronic back and right lower leg pain. He denies testicular or penile pain. Denies any urinary complaints. Denies decreased appetite but reports he has been trying to eat less intentionally to lose weight. He denies fever/chills, cough, congestion, chest pain, SOB, N/V/D, or headache.    In the ED, VSS, afebrile. CBC unremarkable. Sed rate 74. CRP 37.1. K+5.9. Na 132. Cr 2.1 (bl~1.3). Bicarb 18. Total protein 9.6. Albumin 3.4. UA non infectious. Xray pelvis and R femur with no acute osseous abnormality of the pelvis or the right femur. Degenerative changes as above. CT pelvis with advanced degenerative or erosive changes in the bilateral hips, similar to same day radiograph. Consider orthopedic evaluation or follow-up when clinically appropriate. No acute displaced fracture. Bilateral fat containing inguinal hernias. The patient received morphine 2mg x2, 1L NS, lokelma 10g, and 20mg IV lasix.     Overview/Hospital Course:  No notes on file    Interval History: NAEON. Persistent hyperkalemia with worsening Cr.     Review of Systems   Constitutional:   Negative for appetite change, chills, diaphoresis, fatigue and fever.   HENT:  Negative for congestion, rhinorrhea and sore throat.    Eyes:  Negative for photophobia and visual disturbance.   Respiratory:  Negative for cough, shortness of breath and wheezing.    Cardiovascular:  Negative for chest pain, palpitations and leg swelling.   Gastrointestinal:  Negative for abdominal distention, abdominal pain, diarrhea, nausea and vomiting.   Genitourinary:  Negative for dysuria, frequency, hematuria, penile pain and testicular pain.   Musculoskeletal:  Positive for arthralgias, gait problem (chronic) and myalgias. Negative for back pain and neck pain.   Skin:  Negative for color change, pallor, rash and wound.   Neurological:  Positive for weakness and numbness. Negative for syncope, light-headedness and headaches.   Psychiatric/Behavioral:  Negative for confusion and hallucinations. The patient is not nervous/anxious.      Objective:     Vital Signs (Most Recent):  Temp: 98.1 °F (36.7 °C) (02/03/25 1104)  Pulse: 78 (02/03/25 1104)  Resp: 18 (02/03/25 1104)  BP: (!) 99/56 (02/03/25 1104)  SpO2: (!) 93 % (02/03/25 1104) Vital Signs (24h Range):  Temp:  [97.4 °F (36.3 °C)-98.1 °F (36.7 °C)] 98.1 °F (36.7 °C)  Pulse:  [74-91] 78  Resp:  [14-20] 18  SpO2:  [93 %-99 %] 93 %  BP: ()/(56-92) 99/56     Weight: (!) 140 kg (308 lb 10.3 oz)  Body mass index is 43.05 kg/m².    Intake/Output Summary (Last 24 hours) at 2/3/2025 1225  Last data filed at 2/2/2025 1409  Gross per 24 hour   Intake 60 ml   Output 125 ml   Net -65 ml         Physical Exam  Vitals and nursing note reviewed.   Constitutional:       General: He is not in acute distress.     Appearance: He is obese. He is not toxic-appearing or diaphoretic.   HENT:      Head: Normocephalic and atraumatic.      Nose: Nose normal.      Mouth/Throat:      Mouth: Mucous membranes are moist.   Eyes:      Pupils: Pupils are equal, round, and reactive to light.    Cardiovascular:      Rate and Rhythm: Normal rate and regular rhythm.      Pulses: Normal pulses.   Pulmonary:      Effort: Pulmonary effort is normal. No respiratory distress.      Breath sounds: No wheezing, rhonchi or rales.   Abdominal:      General: Bowel sounds are normal. There is no distension.      Palpations: Abdomen is soft.      Tenderness: There is no abdominal tenderness. There is no guarding.   Musculoskeletal:      Cervical back: Normal range of motion.      Right hip: Tenderness present. Decreased range of motion (2/2 pain).      Right lower leg: No edema.      Left lower leg: No edema.   Skin:     General: Skin is warm and dry.      Capillary Refill: Capillary refill takes 2 to 3 seconds.      Comments: Chronic skin changes to BLE consistent with venous stasis. No wounds noted.   Neurological:      General: No focal deficit present.      Mental Status: He is alert and oriented to person, place, and time.      Sensory: Sensory deficit present.      Motor: No weakness.   Psychiatric:         Mood and Affect: Mood normal.         Behavior: Behavior normal.             Significant Labs: All pertinent labs within the past 24 hours have been reviewed.    Significant Imaging: I have reviewed all pertinent imaging results/findings within the past 24 hours.    Assessment and Plan     * Right hip pain  Chronic pain   Pt presents with R groin/hip pain ongoing for the past month. He states that his right hip and right groin are exquisitely tender to touch and hurt when his hip is manipulated or he has turned when he is being changed. He is chronically debilitated at baseline and states that he is wheelchair bound. Upon chart review noted to have chronic hip pain for several years. Pain likely secondary to degenerative changes to his right hip.    -Afebrile, no leukocytosis.  -Xray pelvis and R femur with No acute osseous abnormality of the pelvis or the right femur. Degenerative changes as above.  -CT pelvis  with advanced degenerative or erosive changes in the bilateral hips, similar to same day radiograph.  Consider orthopedic evaluation or follow-up when clinically appropriate. No acute displaced fracture. Bilateral fat containing inguinal hernias  -Continue home gabapentin and baclofen. PRN norco and morphine for breakthrough pain.  -Pt interested in referral to pain management and orthopedics at discharge. Would also like to continue PT/OT at discharge.    Bilateral inguinal hernia without obstruction or gangrene  Noted on CT pelvis. R groin pain seems related to hip pain. No significant tenderness to palpitation over right groin/inguinal area.   -Pt requesting general surgery referral at discharge.    Debility  Patient with Acute on chronic debility due to bed confinement status, age-related physical debility, and other reduced mobility. The patient's latest AMPAC (Activity Measure for Post Acute Care) Score is listed below.    AM-PAC Score - How much help does the patient need for each activity listed  Basic Mobility Total Score: 8  Turning over in bed (including adjusting bedclothes, sheets and blankets)?: A lot  Sitting down on and standing up from a chair with arms (e.g., wheelchair, bedside commode, etc.): Unable  Moving from lying on back to sitting on the side of the bed?: A lot  Moving to and from a bed to a chair (including a wheelchair)?: Unable  Need to walk in hospital room?: Unable  Climbing 3-5 steps with a railing?: Unable    Plan  - Progressive mobility protocol initated  - PT/OT consulted  - Fall precautions in place    Venous stasis of both lower extremities  -Chronic, stable.  -Compression stockings.    Metabolic acidosis, normal anion gap (NAG)  -Bicarb 18 on admit with normal AG likely 2/2 to acute on chronic renal failure. Continue treatment of KEATON as above.  -Monitor on daily labs.    Anemia due to stage 3 chronic kidney disease  Anemia is likely due to chronic disease due to Chronic Kidney  Disease. Most recent hemoglobin and hematocrit are listed below.  Recent Labs     02/01/25  1943 02/01/25  1945 02/02/25  0535 02/03/25  0333   HGB 14.1  --  13.6* 13.2*   HCT 43.4 46 41.6 41.3       Plan  - Monitor serial CBC: Daily  - Transfuse PRBC if patient becomes hemodynamically unstable, symptomatic or H/H drops below 7/21.  - Patient has not received any PRBC transfusions to date  - Patient's anemia is currently stable    Acute renal failure superimposed on stage 3 chronic kidney disease  KEATON is likely due to pre-renal azotemia due to dehydration. Baseline creatinine is  ~1.3 . Most recent creatinine and eGFR are listed below.  Recent Labs     02/02/25 0535 02/02/25 2014 02/03/25 0333   CREATININE 1.6* 1.5* 1.6*   EGFRNORACEVR 45.2* 48.9* 45.2*        Plan  - KEATON is stable  - Avoid nephrotoxins and renally dose meds for GFR listed above  - Monitor urine output, serial BMP, and adjust therapy as needed  - Continue IVF  - Hold losartan and hctz.    Chronic pain        Hyperkalemia  Hyperkalemia is likely due to KEATON.The patients most recent potassium results are listed below.  Recent Labs     02/02/25 0535 02/02/25 2014 02/03/25  0333   K 5.4* 5.6* 5.6*       Plan  - Monitor for arrhythmias with EKG and/or continuous telemetry.   - Treat the hyperkalemia with Potassium Binders, Calcium gluconate, and Furosemide.   - Monitor potassium: Every 12 hours  - The patient's hyperkalemia is stable  - IV hydration and lokelma    HTN (hypertension)  Patient's blood pressure range in the last 24 hours was: BP  Min: 99/56  Max: 142/92.The patient's inpatient anti-hypertensive regimen is listed below:  Current Antihypertensives  amLODIPine tablet 10 mg, Daily, Oral    Plan  - BP is controlled, no changes needed to their regimen    Insulin dependent type 2 diabetes mellitus  Patient's FSGs are controlled on current medication regimen.  Last A1c reviewed-   Lab Results   Component Value Date    HGBA1C 6.9 (H) 11/22/2024      Most recent fingerstick glucose reviewed-   Recent Labs   Lab 02/02/25  1621 02/02/25  1822 02/03/25  0804   POCTGLUCOSE 133* 108 97       Current correctional scale  Low  Maintain anti-hyperglycemic dose as follows-   Antihyperglycemics (From admission, onward)      Start     Stop Route Frequency Ordered    02/02/25 0222  insulin aspart U-100 pen 0-5 Units         -- SubQ Before meals & nightly PRN 02/02/25 0122          Hold Oral hypoglycemics while patient is in the hospital.      VTE Risk Mitigation (From admission, onward)           Ordered     enoxaparin injection 40 mg  Every 12 hours         02/03/25 0854     Place SPARKLE hose  Until discontinued         02/02/25 0702     IP VTE HIGH RISK PATIENT  Once         02/02/25 0122     Place sequential compression device  Until discontinued         02/02/25 0122                    Discharge Planning   KELSEY: 2/4/2025     Code Status: Full Code   Medical Readiness for Discharge Date:   Discharge Plan A: Return to nursing home   Discharge Delays: None known at this time                    Omari Baez MD  Department of Hospital Medicine   [facility]

## 2025-02-03 NOTE — CARE UPDATE
I have reviewed the chart of Riaz Guerra Jr. who is hospitalized for the following:    Active Hospital Problems    Diagnosis    *Right hip pain    Severe obesity (BMI >= 40)    Debility    Bilateral inguinal hernia without obstruction or gangrene    Venous stasis of both lower extremities    Anemia due to stage 3 chronic kidney disease    Chronic pain    HTN (hypertension)    Acute renal failure superimposed on stage 3 chronic kidney disease    Hyperkalemia    Insulin dependent type 2 diabetes mellitus    Metabolic acidosis, normal anion gap (NAG)    Hyponatremia        Emmy Gutierrez PA-C  [facility]

## 2025-02-03 NOTE — SUBJECTIVE & OBJECTIVE
Interval History: NAEON. Persistent hyperkalemia with worsening Cr.     Review of Systems   Constitutional:  Negative for appetite change, chills, diaphoresis, fatigue and fever.   HENT:  Negative for congestion, rhinorrhea and sore throat.    Eyes:  Negative for photophobia and visual disturbance.   Respiratory:  Negative for cough, shortness of breath and wheezing.    Cardiovascular:  Negative for chest pain, palpitations and leg swelling.   Gastrointestinal:  Negative for abdominal distention, abdominal pain, diarrhea, nausea and vomiting.   Genitourinary:  Negative for dysuria, frequency, hematuria, penile pain and testicular pain.   Musculoskeletal:  Positive for arthralgias, gait problem (chronic) and myalgias. Negative for back pain and neck pain.   Skin:  Negative for color change, pallor, rash and wound.   Neurological:  Positive for weakness and numbness. Negative for syncope, light-headedness and headaches.   Psychiatric/Behavioral:  Negative for confusion and hallucinations. The patient is not nervous/anxious.      Objective:     Vital Signs (Most Recent):  Temp: 98.1 °F (36.7 °C) (02/03/25 1104)  Pulse: 78 (02/03/25 1104)  Resp: 18 (02/03/25 1104)  BP: (!) 99/56 (02/03/25 1104)  SpO2: (!) 93 % (02/03/25 1104) Vital Signs (24h Range):  Temp:  [97.4 °F (36.3 °C)-98.1 °F (36.7 °C)] 98.1 °F (36.7 °C)  Pulse:  [74-91] 78  Resp:  [14-20] 18  SpO2:  [93 %-99 %] 93 %  BP: ()/(56-92) 99/56     Weight: (!) 140 kg (308 lb 10.3 oz)  Body mass index is 43.05 kg/m².    Intake/Output Summary (Last 24 hours) at 2/3/2025 1225  Last data filed at 2/2/2025 1409  Gross per 24 hour   Intake 60 ml   Output 125 ml   Net -65 ml         Physical Exam  Vitals and nursing note reviewed.   Constitutional:       General: He is not in acute distress.     Appearance: He is obese. He is not toxic-appearing or diaphoretic.   HENT:      Head: Normocephalic and atraumatic.      Nose: Nose normal.      Mouth/Throat:      Mouth:  Mucous membranes are moist.   Eyes:      Pupils: Pupils are equal, round, and reactive to light.   Cardiovascular:      Rate and Rhythm: Normal rate and regular rhythm.      Pulses: Normal pulses.   Pulmonary:      Effort: Pulmonary effort is normal. No respiratory distress.      Breath sounds: No wheezing, rhonchi or rales.   Abdominal:      General: Bowel sounds are normal. There is no distension.      Palpations: Abdomen is soft.      Tenderness: There is no abdominal tenderness. There is no guarding.   Musculoskeletal:      Cervical back: Normal range of motion.      Right hip: Tenderness present. Decreased range of motion (2/2 pain).      Right lower leg: No edema.      Left lower leg: No edema.   Skin:     General: Skin is warm and dry.      Capillary Refill: Capillary refill takes 2 to 3 seconds.      Comments: Chronic skin changes to BLE consistent with venous stasis. No wounds noted.   Neurological:      General: No focal deficit present.      Mental Status: He is alert and oriented to person, place, and time.      Sensory: Sensory deficit present.      Motor: No weakness.   Psychiatric:         Mood and Affect: Mood normal.         Behavior: Behavior normal.             Significant Labs: All pertinent labs within the past 24 hours have been reviewed.    Significant Imaging: I have reviewed all pertinent imaging results/findings within the past 24 hours.

## 2025-02-03 NOTE — NURSING
Patient had an incontinence episode is soiled and is refusing to be changed. Attempted to clean the patient twice and he refused.

## 2025-02-04 VITALS
HEIGHT: 71 IN | TEMPERATURE: 98 F | SYSTOLIC BLOOD PRESSURE: 132 MMHG | WEIGHT: 308.63 LBS | RESPIRATION RATE: 16 BRPM | OXYGEN SATURATION: 96 % | DIASTOLIC BLOOD PRESSURE: 71 MMHG | HEART RATE: 81 BPM | BODY MASS INDEX: 43.21 KG/M2

## 2025-02-04 LAB
ALBUMIN SERPL BCP-MCNC: 3 G/DL (ref 3.5–5.2)
ALP SERPL-CCNC: 90 U/L (ref 40–150)
ALT SERPL W/O P-5'-P-CCNC: 8 U/L (ref 10–44)
ANION GAP SERPL CALC-SCNC: 9 MMOL/L (ref 8–16)
AST SERPL-CCNC: 11 U/L (ref 10–40)
BASOPHILS # BLD AUTO: 0.06 K/UL (ref 0–0.2)
BASOPHILS NFR BLD: 0.8 % (ref 0–1.9)
BILIRUB SERPL-MCNC: 0.4 MG/DL (ref 0.1–1)
BUN SERPL-MCNC: 63 MG/DL (ref 8–23)
CALCIUM SERPL-MCNC: 9.8 MG/DL (ref 8.7–10.5)
CHLORIDE SERPL-SCNC: 109 MMOL/L (ref 95–110)
CO2 SERPL-SCNC: 17 MMOL/L (ref 23–29)
CREAT SERPL-MCNC: 1.3 MG/DL (ref 0.5–1.4)
DIFFERENTIAL METHOD BLD: ABNORMAL
EOSINOPHIL # BLD AUTO: 0.2 K/UL (ref 0–0.5)
EOSINOPHIL NFR BLD: 2.4 % (ref 0–8)
ERYTHROCYTE [DISTWIDTH] IN BLOOD BY AUTOMATED COUNT: 15.3 % (ref 11.5–14.5)
EST. GFR  (NO RACE VARIABLE): 58 ML/MIN/1.73 M^2
GLUCOSE SERPL-MCNC: 89 MG/DL (ref 70–110)
HCT VFR BLD AUTO: 40 % (ref 40–54)
HGB BLD-MCNC: 12.6 G/DL (ref 14–18)
IMM GRANULOCYTES # BLD AUTO: 0.1 K/UL (ref 0–0.04)
IMM GRANULOCYTES NFR BLD AUTO: 1.3 % (ref 0–0.5)
LYMPHOCYTES # BLD AUTO: 1.8 K/UL (ref 1–4.8)
LYMPHOCYTES NFR BLD: 24.4 % (ref 18–48)
MAGNESIUM SERPL-MCNC: 1.8 MG/DL (ref 1.6–2.6)
MCH RBC QN AUTO: 28.8 PG (ref 27–31)
MCHC RBC AUTO-ENTMCNC: 31.5 G/DL (ref 32–36)
MCV RBC AUTO: 91 FL (ref 82–98)
MONOCYTES # BLD AUTO: 0.7 K/UL (ref 0.3–1)
MONOCYTES NFR BLD: 8.8 % (ref 4–15)
NEUTROPHILS # BLD AUTO: 4.6 K/UL (ref 1.8–7.7)
NEUTROPHILS NFR BLD: 62.3 % (ref 38–73)
NRBC BLD-RTO: 0 /100 WBC
PLATELET # BLD AUTO: 289 K/UL (ref 150–450)
PMV BLD AUTO: 10.5 FL (ref 9.2–12.9)
POCT GLUCOSE: 119 MG/DL (ref 70–110)
POCT GLUCOSE: 155 MG/DL (ref 70–110)
POCT GLUCOSE: 99 MG/DL (ref 70–110)
POTASSIUM SERPL-SCNC: 4.7 MMOL/L (ref 3.5–5.1)
PROT SERPL-MCNC: 8.4 G/DL (ref 6–8.4)
RBC # BLD AUTO: 4.38 M/UL (ref 4.6–6.2)
SODIUM SERPL-SCNC: 135 MMOL/L (ref 136–145)
WBC # BLD AUTO: 7.41 K/UL (ref 3.9–12.7)

## 2025-02-04 PROCEDURE — 63600175 PHARM REV CODE 636 W HCPCS: Performed by: NURSE PRACTITIONER

## 2025-02-04 PROCEDURE — 63600175 PHARM REV CODE 636 W HCPCS: Performed by: INTERNAL MEDICINE

## 2025-02-04 PROCEDURE — 25000003 PHARM REV CODE 250: Performed by: NURSE PRACTITIONER

## 2025-02-04 PROCEDURE — 85025 COMPLETE CBC W/AUTO DIFF WBC: CPT | Performed by: NURSE PRACTITIONER

## 2025-02-04 PROCEDURE — 36415 COLL VENOUS BLD VENIPUNCTURE: CPT | Performed by: NURSE PRACTITIONER

## 2025-02-04 PROCEDURE — 80053 COMPREHEN METABOLIC PANEL: CPT | Performed by: NURSE PRACTITIONER

## 2025-02-04 PROCEDURE — 25000003 PHARM REV CODE 250: Performed by: INTERNAL MEDICINE

## 2025-02-04 PROCEDURE — 83735 ASSAY OF MAGNESIUM: CPT | Performed by: NURSE PRACTITIONER

## 2025-02-04 RX ORDER — POLYETHYLENE GLYCOL 3350 17 G/17G
17 POWDER, FOR SOLUTION ORAL DAILY
Status: ON HOLD
Start: 2025-02-05

## 2025-02-04 RX ORDER — SERTRALINE HYDROCHLORIDE 50 MG/1
50 TABLET, FILM COATED ORAL DAILY
Status: ON HOLD
Start: 2025-02-04

## 2025-02-04 RX ADMIN — MORPHINE SULFATE 2 MG: 2 INJECTION, SOLUTION INTRAMUSCULAR; INTRAVENOUS at 05:02

## 2025-02-04 RX ADMIN — SERTRALINE HYDROCHLORIDE 25 MG: 25 TABLET ORAL at 09:02

## 2025-02-04 RX ADMIN — BACLOFEN 10 MG: 10 TABLET ORAL at 09:02

## 2025-02-04 RX ADMIN — ENOXAPARIN SODIUM 40 MG: 40 INJECTION SUBCUTANEOUS at 09:02

## 2025-02-04 RX ADMIN — TAMSULOSIN HYDROCHLORIDE 0.4 MG: 0.4 CAPSULE ORAL at 09:02

## 2025-02-04 RX ADMIN — HYDROCODONE BITARTRATE AND ACETAMINOPHEN 1 TABLET: 7.5; 325 TABLET ORAL at 12:02

## 2025-02-04 RX ADMIN — SENNOSIDES AND DOCUSATE SODIUM 1 TABLET: 50; 8.6 TABLET ORAL at 09:02

## 2025-02-04 RX ADMIN — AMLODIPINE BESYLATE 10 MG: 10 TABLET ORAL at 09:02

## 2025-02-04 RX ADMIN — ASPIRIN 81 MG: 81 TABLET, COATED ORAL at 09:02

## 2025-02-04 RX ADMIN — BACLOFEN 10 MG: 10 TABLET ORAL at 02:02

## 2025-02-04 RX ADMIN — ATORVASTATIN CALCIUM 20 MG: 20 TABLET, FILM COATED ORAL at 09:02

## 2025-02-04 RX ADMIN — POLYETHYLENE GLYCOL 3350 17 G: 17 POWDER, FOR SOLUTION ORAL at 09:02

## 2025-02-04 RX ADMIN — GABAPENTIN 800 MG: 300 CAPSULE ORAL at 09:02

## 2025-02-04 NOTE — PLAN OF CARE
02/04/25 0921   Post-Acute Status   Post-Acute Authorization Placement   Post-Acute Placement Status Set-up Complete/Auth obtained   Discharge Delays None known at this time   Discharge Plan   Discharge Plan A Return to nursing home   Discharge Plan B New Nursing Home placement - [facility] care facility       Britt spoke with pt's sister Kari and Victoria via telephone. Both sisters expressed that they are unable to care for pt at this time and there is nowhere else for him to go. The family reported that they have exhausted all help for pt and that pt is to return back to [facility].       1:55 PM  Britt spoke with Ms. Webb at [facility] who reported that pt is accepted back into the facility. Ms. Webb asked that pt's ordered be changed to SNF as pt would benefit from Pt/Ot. Britt informed medical team and updated orders was provided to NH for review. Britt awaiting report information.     Discharge Plan A and Plan B have been determined by review of patient's clinical status, future medical and therapeutic needs, and coverage/benefits for post-acute care in coordination with multidisciplinary team members.    BRITT Molina  Case Management  [phone]

## 2025-02-04 NOTE — ASSESSMENT & PLAN NOTE
Anemia is likely due to chronic disease due to Chronic Kidney Disease. Most recent hemoglobin and hematocrit are listed below.  Recent Labs     02/02/25  0535 02/03/25  0333 02/04/25  0856   HGB 13.6* 13.2* 12.6*   HCT 41.6 41.3 40.0       Plan  - Monitor serial CBC: Daily  - Transfuse PRBC if patient becomes hemodynamically unstable, symptomatic or H/H drops below 7/21.  - Patient has not received any PRBC transfusions to date  - Patient's anemia is currently stable

## 2025-02-04 NOTE — PLAN OF CARE
CLARISSA scheduled d/c transportation to [facility] through [facility]. Patient is scheduled to be picked up at 5pm. CLARISSA provided patient's nurse with report number [phone] ask for the nurse for room 321B.  CLARISSA in communication with nurse and medical team and advised of the above information.       Discharge Plan A and Plan B have been determined by review of patient's clinical status, future medical and therapeutic needs, and coverage/benefits for post-acute care in coordination with multidisciplinary team members.    CLARISSA Molina  Case Management  [phone]

## 2025-02-04 NOTE — ASSESSMENT & PLAN NOTE
Hyponatremia is likely due to Dehydration/hypovolemia. The patient's most recent sodium results are listed below.  Recent Labs     02/02/25 2014 02/03/25  0333 02/04/25  0856   * 132* 135*     Plan  - Correct the sodium by 4-6mEq in 24 hours.   - Obtain the following studies: Urine sodium, urine osmolality, serum osmolality.  - Will treat the hyponatremia with IV fluids.  - Monitor sodium Daily.   - Patient hyponatremia is improving

## 2025-02-04 NOTE — ASSESSMENT & PLAN NOTE
KEATON is likely due to pre-renal azotemia due to dehydration. Baseline creatinine is  ~1.3 . Most recent creatinine and eGFR are listed below.  Recent Labs     02/02/25 2014 02/03/25  0333 02/04/25  0856   CREATININE 1.5* 1.6* 1.3   EGFRNORACEVR 48.9* 45.2* 58.0*        Plan  - KEATON is stable  - Avoid nephrotoxins and renally dose meds for GFR listed above  - Monitor urine output, serial BMP, and adjust therapy as needed  - Continue IVF  - Hold losartan and hctz.  - Improved

## 2025-02-04 NOTE — PROGRESS NOTES
Attempted to call report, nurse unavailable at this time.  Message left with contact information and anticipated transport time.

## 2025-02-04 NOTE — ASSESSMENT & PLAN NOTE
Hyperkalemia is likely due to KEATON.The patients most recent potassium results are listed below.  Recent Labs     02/02/25 2014 02/03/25  0333 02/04/25  0856   K 5.6* 5.6* 4.7       Plan  - Monitor for arrhythmias with EKG and/or continuous telemetry.   - Treat the hyperkalemia with Potassium Binders, Calcium gluconate, and Furosemide.   - Monitor potassium: Every 12 hours  - The patient's hyperkalemia is stable  - IV hydration and lokelma  - Improved

## 2025-02-04 NOTE — HOSPITAL COURSE
75 y.o. male with PMHx HTN, HLD, DM2, obesity, gout, CKD3, anemia, chronic venous stasis, chronic pain, and debility who presents to the ED from [facility] with complaints of R hip/groin pain, admitted for KEATON and hyperkalemia. CT pelvis with advanced degenerative or erosive changes in the bilateral hips, similar to same day radiograph. Kidney function and hyperkalemia improved with iv hydration and lokelma. Discharge back to NH with PT/OT. Follow up with podiatry and orthopedics outpatient.

## 2025-02-04 NOTE — ASSESSMENT & PLAN NOTE
Patient's blood pressure range in the last 24 hours was: BP  Min: 117/65  Max: 135/87.The patient's inpatient anti-hypertensive regimen is listed below:  Current Antihypertensives  amLODIPine tablet 10 mg, Daily, Oral    Plan  - BP is controlled, no changes needed to their regimen

## 2025-02-04 NOTE — DISCHARGE SUMMARY
[facility] Medicine  Discharge Summary      Patient Name: Riaz Guerra Jr.  MRN: [id number]  NANCY: [id number]  Patient Class: IP- Inpatient  Admission Date: 2/1/2025  Hospital Length of Stay: 1 days  Discharge Date and Time:  02/04/2025 12:08 PM  Attending Physician: Omari Baez MD   Discharging Provider: Omari Baez MD  Primary Care Provider: Berhane Alvarez MD  [facility] Medicine Team: [facility] Omari Baez MD  Primary Care Team: [facility]    HPI:   Riaz Guerra Jr. Is a 75 y.o. male with PMHx HTN, HLD, DM2, obesity, gout, CKD3, anemia, chronic venous stasis, chronic pain, and debility who presents to the ED from [facility] with complaints of R hip/groin pain. Patient states that it has been ongoing for the past month. He states that his right hip and right groin are exquisitely tender to touch and hurt when his hip is manipulated or he has turned when he is being changed. He is chronically debilitated at baseline and states that he is wheelchair bound. Reports chronic back and right lower leg pain. He denies testicular or penile pain. Denies any urinary complaints. Denies decreased appetite but reports he has been trying to eat less intentionally to lose weight. He denies fever/chills, cough, congestion, chest pain, SOB, N/V/D, or headache.    In the ED, VSS, afebrile. CBC unremarkable. Sed rate 74. CRP 37.1. K+5.9. Na 132. Cr 2.1 (bl~1.3). Bicarb 18. Total protein 9.6. Albumin 3.4. UA non infectious. Xray pelvis and R femur with no acute osseous abnormality of the pelvis or the right femur. Degenerative changes as above. CT pelvis with advanced degenerative or erosive changes in the bilateral hips, similar to same day radiograph. Consider orthopedic evaluation or follow-up when clinically appropriate. No acute displaced fracture. Bilateral fat containing inguinal hernias. The patient received morphine 2mg x2, 1L NS, lokelma 10g, and 20mg IV lasix.      * No surgery found *      Hospital Course:   75 y.o. male with PMHx HTN, HLD, DM2, obesity, gout, CKD3, anemia, chronic venous stasis, chronic pain, and debility who presents to the ED from [facility] with complaints of R hip/groin pain, admitted for KEATON and hyperkalemia. CT pelvis with advanced degenerative or erosive changes in the bilateral hips, similar to same day radiograph. Kidney function and hyperkalemia improved with iv hydration and lokelma. Discharge back to NH with PT/OT. Follow up with podiatry and orthopedics outpatient.     Goals of Care Treatment Preferences:  Code Status: Full Code      SDOH Screening:  The patient was screened for utility difficulties, food insecurity, transport difficulties, housing insecurity, and interpersonal safety and there were no concerns identified this admission.     Consults:   Consults (From admission, onward)          Status Ordering Provider     Inpatient consult to Social Work  Once        Provider:  (Not yet assigned)    SEA Dawn          Physical Exam  Vitals and nursing note reviewed.   Constitutional:       General: He is not in acute distress.     Appearance: He is obese. He is not toxic-appearing or diaphoretic.   HENT:      Head: Normocephalic and atraumatic.      Nose: Nose normal.      Mouth/Throat:      Mouth: Mucous membranes are moist.   Eyes:      Pupils: Pupils are equal, round, and reactive to light.   Cardiovascular:      Rate and Rhythm: Normal rate and regular rhythm.      Pulses: Normal pulses.   Pulmonary:      Effort: Pulmonary effort is normal. No respiratory distress.      Breath sounds: No wheezing, rhonchi or rales.   Abdominal:      General: Bowel sounds are normal. There is no distension.      Palpations: Abdomen is soft.      Tenderness: There is no abdominal tenderness. There is no guarding.   Musculoskeletal:      Cervical back: Normal range of motion.      Right hip: Tenderness present. Decreased range  of motion (2/2 pain).      Right lower leg: No edema.      Left lower leg: No edema.   Skin:     General: Skin is warm and dry.      Capillary Refill: Capillary refill takes 2 to 3 seconds.      Comments: Chronic skin changes to BLE consistent with venous stasis. No wounds noted.   Neurological:      General: No focal deficit present.      Mental Status: He is alert and oriented to person, place, and time.      Sensory: Sensory deficit present.      Motor: No weakness.   Psychiatric:         Mood and Affect: Mood normal.         Behavior: Behavior normal.     * Right hip pain  Chronic pain   Pt presents with R groin/hip pain ongoing for the past month. He states that his right hip and right groin are exquisitely tender to touch and hurt when his hip is manipulated or he has turned when he is being changed. He is chronically debilitated at baseline and states that he is wheelchair bound. Upon chart review noted to have chronic hip pain for several years. Pain likely secondary to degenerative changes to his right hip.    -Afebrile, no leukocytosis.  -Xray pelvis and R femur with No acute osseous abnormality of the pelvis or the right femur. Degenerative changes as above.  -CT pelvis with advanced degenerative or erosive changes in the bilateral hips, similar to same day radiograph.  Consider orthopedic evaluation or follow-up when clinically appropriate. No acute displaced fracture. Bilateral fat containing inguinal hernias  -Continue home gabapentin and baclofen. PRN norco and morphine for breakthrough pain.  -Pt interested in referral to pain management and orthopedics at discharge. Would also like to continue PT/OT at discharge.    Severe obesity (BMI >= 40)  Body mass index is 43.05 kg/m². Morbid obesity complicates all aspects of disease management from diagnostic modalities to treatment. Weight loss encouraged and health benefits explained to patient.         Bilateral inguinal hernia without obstruction or  gangrene  Noted on CT pelvis. R groin pain seems related to hip pain. No significant tenderness to palpitation over right groin/inguinal area.   -Pt requesting general surgery referral at discharge.    Debility  Patient with Acute on chronic debility due to bed confinement status, age-related physical debility, and other reduced mobility. The patient's latest AMPAC (Activity Measure for Post Acute Care) Score is listed below.    AM-PAC Score - How much help does the patient need for each activity listed  Basic Mobility Total Score: 8  Turning over in bed (including adjusting bedclothes, sheets and blankets)?: A lot  Sitting down on and standing up from a chair with arms (e.g., wheelchair, bedside commode, etc.): Unable  Moving from lying on back to sitting on the side of the bed?: A lot  Moving to and from a bed to a chair (including a wheelchair)?: Unable  Need to walk in hospital room?: Unable  Climbing 3-5 steps with a railing?: Unable    Plan  - Progressive mobility protocol initated  - PT/OT consulted  - Fall precautions in place    Venous stasis of both lower extremities  -Chronic, stable.  -Compression stockings.    Metabolic acidosis, normal anion gap (NAG)  -Bicarb 18 on admit with normal AG likely 2/2 to acute on chronic renal failure. Continue treatment of KEATON as above.  -Monitor on daily labs.    Anemia due to stage 3 chronic kidney disease  Anemia is likely due to chronic disease due to Chronic Kidney Disease. Most recent hemoglobin and hematocrit are listed below.  Recent Labs     02/02/25  0535 02/03/25  0333 02/04/25  0856   HGB 13.6* 13.2* 12.6*   HCT 41.6 41.3 40.0       Plan  - Monitor serial CBC: Daily  - Transfuse PRBC if patient becomes hemodynamically unstable, symptomatic or H/H drops below 7/21.  - Patient has not received any PRBC transfusions to date  - Patient's anemia is currently stable    Hyponatremia  Hyponatremia is likely due to Dehydration/hypovolemia. The patient's most recent  sodium results are listed below.  Recent Labs     02/02/25 2014 02/03/25 0333 02/04/25  0856   * 132* 135*     Plan  - Correct the sodium by 4-6mEq in 24 hours.   - Obtain the following studies: Urine sodium, urine osmolality, serum osmolality.  - Will treat the hyponatremia with IV fluids.  - Monitor sodium Daily.   - Patient hyponatremia is improving    Acute renal failure superimposed on stage 3 chronic kidney disease  KEATON is likely due to pre-renal azotemia due to dehydration. Baseline creatinine is  ~1.3 . Most recent creatinine and eGFR are listed below.  Recent Labs     02/02/25 2014 02/03/25 0333 02/04/25  0856   CREATININE 1.5* 1.6* 1.3   EGFRNORACEVR 48.9* 45.2* 58.0*        Plan  - KEATON is stable  - Avoid nephrotoxins and renally dose meds for GFR listed above  - Monitor urine output, serial BMP, and adjust therapy as needed  - Continue IVF  - Hold losartan and hctz.  - Improved    Chronic pain        Hyperkalemia  Hyperkalemia is likely due to KEATON.The patients most recent potassium results are listed below.  Recent Labs     02/02/25 2014 02/03/25 0333 02/04/25  0856   K 5.6* 5.6* 4.7       Plan  - Monitor for arrhythmias with EKG and/or continuous telemetry.   - Treat the hyperkalemia with Potassium Binders, Calcium gluconate, and Furosemide.   - Monitor potassium: Every 12 hours  - The patient's hyperkalemia is stable  - IV hydration and lokelma  - Improved    HTN (hypertension)  Patient's blood pressure range in the last 24 hours was: BP  Min: 117/65  Max: 135/87.The patient's inpatient anti-hypertensive regimen is listed below:  Current Antihypertensives  amLODIPine tablet 10 mg, Daily, Oral    Plan  - BP is controlled, no changes needed to their regimen    Insulin dependent type 2 diabetes mellitus  Patient's FSGs are controlled on current medication regimen.  Last A1c reviewed-   Lab Results   Component Value Date    HGBA1C 6.9 (H) 11/22/2024     Most recent fingerstick glucose reviewed-    Recent Labs   Lab 02/03/25  1223 02/03/25  1624 02/03/25  2041 02/04/25  0757   POCTGLUCOSE 99 112* 119* 99       Current correctional scale  Low  Maintain anti-hyperglycemic dose as follows-   Antihyperglycemics (From admission, onward)      Start     Stop Route Frequency Ordered    02/02/25 0222  insulin aspart U-100 pen 0-5 Units         -- SubQ Before meals & nightly PRN 02/02/25 0122          Hold Oral hypoglycemics while patient is in the hospital.      Final Active Diagnoses:    Diagnosis Date Noted POA    PRINCIPAL PROBLEM:  Right hip pain [M25.551] 02/02/2025 Yes    Severe obesity (BMI >= 40) [E66.01] 02/03/2025 Yes    Debility [R53.81] 02/02/2025 Yes    Bilateral inguinal hernia without obstruction or gangrene [K40.20] 02/02/2025 Yes    Venous stasis of both lower extremities [I87.8] 11/21/2024 Yes    Anemia due to stage 3 chronic kidney disease [N18.30, D63.1] 07/01/2024 Yes    Chronic pain [G89.29] 07/01/2024 Yes    HTN (hypertension) [I10] 07/01/2024 Yes    Acute renal failure superimposed on stage 3 chronic kidney disease [N17.9, N18.30] 07/01/2024 Yes    Hyperkalemia [E87.5] 07/01/2024 Yes    Insulin dependent type 2 diabetes mellitus [E11.9, Z79.4] 07/01/2024 Not Applicable    Metabolic acidosis, normal anion gap (NAG) [E87.20] 07/01/2024 Yes    Hyponatremia [E87.1] 07/01/2024 Yes      Problems Resolved During this Admission:       Discharged Condition: good    Disposition: assisted Nursing Facili*    Follow Up:    Patient Instructions:      Ambulatory referral/consult to Orthopedics   Standing Status: Future   Referral Priority: Routine Referral Type: Consultation   Requested Specialty: Orthopedic Surgery   Number of Visits Requested: 1     Ambulatory referral/consult to Podiatry   Standing Status: Future   Referral Priority: Routine Referral Type: Consultation   Referral Reason: Specialty Services Required   Requested Specialty: Podiatry   Number of Visits Requested: 1     Diet diabetic     Diet  Cardiac     Notify your health care provider if you experience any of the following:  temperature >100.4     Notify your health care provider if you experience any of the following:  persistent nausea and vomiting or diarrhea     Notify your health care provider if you experience any of the following:  difficulty breathing or increased cough     Notify your health care provider if you experience any of the following:  persistent dizziness, light-headedness, or visual disturbances     Notify your health care provider if you experience any of the following:  increased confusion or weakness     Activity as tolerated       Significant Diagnostic Studies: Labs: All labs within the past 24 hours have been reviewed    Pending Diagnostic Studies:       None           Medications:  Reconciled Home Medications:      Medication List        START taking these medications      polyethylene glycol 17 gram Pwpk  Commonly known as: GLYCOLAX  Take 17 g by mouth once daily.  Start taking on: February 5, 2025            CONTINUE taking these medications      amLODIPine 10 MG tablet  Commonly known as: NORVASC  Take 1 tablet (10 mg total) by mouth once daily.     aspirin 81 MG EC tablet  Commonly known as: ECOTRIN  Take 81 mg by mouth once daily.     atorvastatin 20 MG tablet  Commonly known as: LIPITOR  Take 20 mg by mouth once daily.     baclofen 10 MG tablet  Commonly known as: LIORESAL  Take 10 mg by mouth 3 (three) times daily.     fluticasone propionate 50 mcg/actuation nasal spray  Commonly known as: FLONASE  2 sprays (100 mcg total) by Each Nostril route once daily.     gabapentin 800 MG tablet  Commonly known as: NEURONTIN  Take 1 tablet (800 mg total) by mouth 2 (two) times daily.     HYDROcodone-acetaminophen 7.5-325 mg per tablet  Commonly known as: NORCO  Take 1 tablet by mouth every 8 (eight) hours as needed for Pain.     losartan-hydrochlorothiazide 100-12.5 mg 100-12.5 mg Tab  Commonly known as: HYZAAR  Take 1 tablet by  mouth once daily.     metFORMIN 1000 MG tablet  Commonly known as: GLUCOPHAGE  Take 1,000 mg by mouth 2 (two) times daily with meals.     sertraline 25 MG tablet  Commonly known as: ZOLOFT  Take 25 mg by mouth once daily.     tamsulosin 0.4 mg Cap  Commonly known as: FLOMAX  Take by mouth once daily.     vitamin D 1000 units Tab  Commonly known as: VITAMIN D3  Take 1,000 Units by mouth once daily.              Indwelling Lines/Drains at time of discharge:   Lines/Drains/Airways       None                   Time spent on the discharge of patient: 35 minutes         Omari Baez MD  Department of Hospital Medicine  [facility] Surg

## 2025-02-04 NOTE — PLAN OF CARE
[facility]    FACILITY TRANSFER ORDERS      Patient Name: Riaz Guerra Jr.  YOB: 1950    PCP: Berhane Alvarez MD   PCP Address: [address] [place] [address] / [place] [address]  PCP Phone Number: [phone]  PCP Fax: [phone]    Encounter Date: 02/04/2025    Admit to: SNF    Vital Signs:  Routine    Diagnoses:   Active Hospital Problems    Diagnosis  POA    *Right hip pain [M25.551]  Yes    Severe obesity (BMI >= 40) [E66.01]  Yes    Debility [R53.81]  Yes    Bilateral inguinal hernia without obstruction or gangrene [K40.20]  Yes    Venous stasis of both lower extremities [I87.8]  Yes    Anemia due to stage 3 chronic kidney disease [N18.30, D63.1]  Yes    Chronic pain [G89.29]  Yes    HTN (hypertension) [I10]  Yes    Acute renal failure superimposed on stage 3 chronic kidney disease [N17.9, N18.30]  Yes    Hyperkalemia [E87.5]  Yes    Insulin dependent type 2 diabetes mellitus [E11.9, Z79.4]  Not Applicable    Metabolic acidosis, normal anion gap (NAG) [E87.20]  Yes    Hyponatremia [E87.1]  Yes      Resolved Hospital Problems   No resolved problems to display.       Allergies:  Review of patient's allergies indicates:   Allergen Reactions    Lisinopril Other (See Comments)     cough       Diet: cardiac diet and diabetic diet: 2000 calorie    Activities: Activity as tolerated    Goals of Care Treatment Preferences:  Code Status: Full Code      Nursing: n/a     Labs: BMP in 1 week    CONSULTS:    Physical Therapy to evaluate and treat.  and Occupational Therapy to evaluate and treat.    PT/OT 5x/week    MISCELLANEOUS CARE:  Diabetes Care:   SN to perform and educate Diabetic management with blood glucose monitoring:, Fingerstick blood sugar a.m. and p.m., and Report CBG < 60 or > 350 to physician.    WOUND CARE ORDERS  None    Medications: Review discharge medications with patient and family and provide education.         Medication List        START taking these  medications      polyethylene glycol 17 gram Pwpk  Commonly known as: GLYCOLAX  Take 17 g by mouth once daily.  Start taking on: February 5, 2025            CHANGE how you take these medications      sertraline 50 MG tablet  Commonly known as: ZOLOFT  Take 1 tablet (50 mg total) by mouth once daily.  What changed:   medication strength  how much to take            CONTINUE taking these medications      amLODIPine 10 MG tablet  Commonly known as: NORVASC  Take 1 tablet (10 mg total) by mouth once daily.     aspirin 81 MG EC tablet  Commonly known as: ECOTRIN  Take 81 mg by mouth once daily.     atorvastatin 20 MG tablet  Commonly known as: LIPITOR  Take 20 mg by mouth once daily.     baclofen 10 MG tablet  Commonly known as: LIORESAL  Take 10 mg by mouth 3 (three) times daily.     fluticasone propionate 50 mcg/actuation nasal spray  Commonly known as: FLONASE  2 sprays (100 mcg total) by Each Nostril route once daily.     gabapentin 800 MG tablet  Commonly known as: NEURONTIN  Take 1 tablet (800 mg total) by mouth 2 (two) times daily.     HYDROcodone-acetaminophen 7.5-325 mg per tablet  Commonly known as: NORCO  Take 1 tablet by mouth every 8 (eight) hours as needed for Pain.     losartan-hydrochlorothiazide 100-12.5 mg 100-12.5 mg Tab  Commonly known as: HYZAAR  Take 1 tablet by mouth once daily.     metFORMIN 1000 MG tablet  Commonly known as: GLUCOPHAGE  Take 1,000 mg by mouth 2 (two) times daily with meals.     tamsulosin 0.4 mg Cap  Commonly known as: FLOMAX  Take by mouth once daily.     vitamin D 1000 units Tab  Commonly known as: VITAMIN D3  Take 1,000 Units by mouth once daily.                Immunizations Administered as of 2/4/2025       No immunizations on file.            Some patients may experience side effects after vaccination.  These may include fever, headache, muscle or joint aches.  Most symptoms resolve with 24-48 hours and do not require urgent medical evaluation unless they persist for more  than 72 hours or symptoms are concerning for an unrelated medical condition.          _________________________________  Omari Baez MD  02/04/2025

## 2025-02-04 NOTE — ASSESSMENT & PLAN NOTE
Patient's FSGs are controlled on current medication regimen.  Last A1c reviewed-   Lab Results   Component Value Date    HGBA1C 6.9 (H) 11/22/2024     Most recent fingerstick glucose reviewed-   Recent Labs   Lab 02/03/25  1223 02/03/25  1624 02/03/25  2041 02/04/25  0757   POCTGLUCOSE 99 112* 119* 99       Current correctional scale  Low  Maintain anti-hyperglycemic dose as follows-   Antihyperglycemics (From admission, onward)    Start     Stop Route Frequency Ordered    02/02/25 0222  insulin aspart U-100 pen 0-5 Units         -- SubQ Before meals & nightly PRN 02/02/25 0122        Hold Oral hypoglycemics while patient is in the hospital.

## 2025-02-04 NOTE — PLAN OF CARE
Problem: Skin Injury Risk Increased  Goal: Skin Health and Integrity  Outcome: Not Progressing     Problem: Adult Inpatient Plan of Care  Goal: Plan of Care Review  Outcome: Not Progressing  Goal: Patient-Specific Goal (Individualized)  Outcome: Not Progressing  Goal: Absence of Hospital-Acquired Illness or Injury  Outcome: Not Progressing  Goal: Optimal Comfort and Wellbeing  Outcome: Not Progressing  Goal: Readiness for Transition of Care  Outcome: Not Progressing     Problem: Bariatric Environmental Safety  Goal: Safety Maintained with Care  Outcome: Not Progressing     Problem: Diabetes Comorbidity  Goal: Blood Glucose Level Within Targeted Range  Outcome: Not Progressing     Problem: Acute Kidney Injury/Impairment  Goal: Fluid and Electrolyte Balance  Outcome: Not Progressing  Goal: Improved Oral Intake  Outcome: Not Progressing  Goal: Effective Renal Function  Outcome: Not Progressing     Problem: Wound  Goal: Optimal Coping  Outcome: Not Progressing  Goal: Optimal Functional Ability  Outcome: Not Progressing  Goal: Absence of Infection Signs and Symptoms  Outcome: Not Progressing  Goal: Improved Oral Intake  Outcome: Not Progressing  Goal: Optimal Pain Control and Function  Outcome: Not Progressing  Goal: Skin Health and Integrity  Outcome: Not Progressing  Goal: Optimal Wound Healing  Outcome: Not Progressing

## 2025-02-05 NOTE — PLAN OF CARE
APPOINTMENT:    Patient Appointment(s) scheduled with  Berhane Alvarez MD  Thursday Feb 13, 2025  hospital follow up at 8:15 am

## 2025-02-05 NOTE — PLAN OF CARE
[facility] Surg  Discharge Final Note    Primary Care Provider: Berhane Alvarez MD    Expected Discharge Date: 2/4/2025        Pt is a long-term resident at [facility],however NH asked if pt could return to [facility] as SNF as pt could benefit from PT/OT.  Pt was discharged and transported via ambulance stretcher to [facility](SNF).     Discharge Plan A and Plan B have been determined by review of patient's clinical status, future medical and therapeutic needs, and coverage/benefits for post-acute care in coordination with multidisciplinary team members.    Final Discharge Note (most recent)       Final Note - 02/05/25 0904          Final Note    Assessment Type Final Discharge Note (P)      Anticipated Discharge Disposition Skilled Nursing Facility (P)    [facility]    What phone number can be called within the next 1-3 days to see how you are doing after discharge? [phone] (P)      Hospital Resources/Appts/Education Provided Provided patient/caregiver with written discharge plan information;Provided education on problems/symptoms using teachback (P)         Post-Acute Status    Post-Acute Authorization Placement (P)      Post-Acute Placement Status Set-up Complete/Auth obtained (P)    [facility]    Discharge Delays None known at this time (P)                      Important Message from Medicare      CLARISSA Molina  Case Management  [phone]

## 2025-02-23 ENCOUNTER — HOSPITAL ENCOUNTER (EMERGENCY)
Facility: HOSPITAL | Age: 75
Discharge: HOME | End: 2025-02-23
Attending: STUDENT IN AN ORGANIZED HEALTH CARE EDUCATION/TRAINING PROGRAM
Payer: MEDICARE

## 2025-02-23 VITALS
DIASTOLIC BLOOD PRESSURE: 88 MMHG | BODY MASS INDEX: 43.21 KG/M2 | TEMPERATURE: 99 F | RESPIRATION RATE: 18 BRPM | WEIGHT: 308.63 LBS | HEIGHT: 71 IN | SYSTOLIC BLOOD PRESSURE: 132 MMHG | HEART RATE: 90 BPM | OXYGEN SATURATION: 98 %

## 2025-02-23 DIAGNOSIS — Z60.9 SOCIAL PROBLEM: Primary | ICD-10-CM

## 2025-02-23 DIAGNOSIS — M54.50 CHRONIC BILATERAL LOW BACK PAIN WITHOUT SCIATICA: ICD-10-CM

## 2025-02-23 DIAGNOSIS — R53.81 PHYSICAL DEBILITY: ICD-10-CM

## 2025-02-23 DIAGNOSIS — G89.29 CHRONIC BILATERAL LOW BACK PAIN WITHOUT SCIATICA: ICD-10-CM

## 2025-02-23 PROCEDURE — 99283 EMERGENCY DEPT VISIT LOW MDM: CPT

## 2025-02-23 SDOH — SOCIAL DETERMINANTS OF HEALTH (SDOH): PROBLEM RELATED TO SOCIAL ENVIRONMENT, UNSPECIFIED: Z60.9

## 2025-02-23 NOTE — DISCHARGE INSTRUCTIONS
You were seen in the ER today due to agitation at your skilled nursing facility due to you wanting to be placed at a different facility.  Due to it being the weekend, and insurance and case management placement which is takes, you need to speak to your  at the skilled nursing facility in order to be able to switch facilities.  The ER is not able to perform this change for you.    Monitor your blood pressure at the skilled nursing facility, and your medication list with your primary care doctor.  When taking your blood pressure medications along with opioid medication for pain, this may cause your blood pressure to drop lower than it should be.    Thank you for coming to our Emergency Department today. It is important to remember that some problems or medical conditions are difficult to diagnose and may not be found or addressed during your Emergency Department visit.  These conditions often start with non-specific symptoms and can only be diagnosed on follow up visits with your primary care physician or specialist when the symptoms continue or change. Please remember that all medical conditions can change, and we cannot predict how you will be feeling tomorrow or the next day. Return to the ER with any questions/concerns, new/concerning symptoms, worsening or failure to improve.       Be sure to follow up with your primary care doctor and review all labs/imaging/tests that were performed during your ER visit with them. It is very common for us to identify non-emergent incidental findings which must be followed up with your primary care physician.  Some labs/imaging/tests may be outside of the normal range, and require non-emergent follow-up and/or further investigation/treatment/procedures/testing to help diagnose/exclude/prevent complications or other potentially serious medical conditions. Some abnormalities may not have been discussed or addressed during your ER visit. Some lab results may not return  during your ER visit but can be accessible by downloading the free [facility] blair or by visiting [email or web address] . It is important for you to review all labs/imaging/tests which are outside of the normal range with your physician.    An ER visit does not replace a primary care visit, and many screening tests or follow-up tests cannot be ordered by an ER doctor or performed by the ER. Some tests may even require pre-approval.    If you do not have a primary care doctor, you may contact the one listed on your discharge paperwork or you may also call the [facility] Appointment Desk at [phone] , or [employer] at  [phone] to schedule an appointment, or establish care with a primary care doctor or even a specialist and to obtain information about local resources. It is important to your health that you have a primary care doctor.    Please take all medications as directed. We have done our best to select a medication for you that will treat your condition however, all medications may potentially have side-effects and it is impossible to predict which medications may give you side-effects or what those side-effects (if any) those medications may give you.  If you feel that you are having a negative effect or side-effect of any medication you should stop taking those medications immediately and seek medical attention. If you feel that you are having a life-threatening reaction call 911.        Do not drive, swim, climb to height, take a bath, operate heavy machinery, drink alcohol or take potentially sedating medications, sign any legal documents or make any important decisions for 24 hours if you have received any pain medications, sedatives or mood altering drugs during your ER visit or within 24 hours of taking them if they have been prescribed to you.

## 2025-02-23 NOTE — ED TRIAGE NOTES
"Riaz Guerra Jr., a 75 y.o. male presents to the ED via ems from [facility] w/ complaint of BLE 10/10 pain x4 mo. -cp/sob -n/v/d -fever/chills. States he does not feel that he is receiving adequate care at nursing home. Per ems, pt was screaming and throwing items in room at care home. Pmhx htn, dm, gout, depression    Triage note:  Chief Complaint   Patient presents with    Leg Pain     Bilateral leg pain starting 4 months ago 10/10 pain.  "I dont want to go back there"     Review of patient's allergies indicates:   Allergen Reactions    Lisinopril Other (See Comments)     cough     Past Medical History:   Diagnosis Date    Depression     Diabetes mellitus     Gout     High cholesterol     Hypertension      "

## 2025-02-23 NOTE — PLAN OF CARE
"CM updated by provider that pt expressed desire to change nursing homes. Provider stated that pt is medically ready to d/c from the ED. CM presented to bedside - pt very sleepy. CM attempted to engaged in a meaningful conversation about NH placement - pt related no issues w/ NH care to CM but did state that he has pain sometimes and the staff does not "see it". CM informed pt that when MD clears pt medically, he can return to NH via EMS and pt verbalized understanding w/ "okay". Pt was nodding off throughout conversation.    Kimberly Almanza BSN, RN, [facility]  Transitional Care Manager  [phone]  [email or web address]      "

## 2025-02-23 NOTE — ED PROVIDER NOTES
"Encounter Date: 2/23/2025       History     Chief Complaint   Patient presents with    Leg Pain     Bilateral leg pain starting 4 months ago 10/10 pain.  "I dont want to go back there"     HPI    76 yo M w/HTN, HLD, IDDM, CKD, inguinal hernias, chronic venous insufficiency, obesity, chronic hip/back pain, debility, presenting due to ?behavioral issues at [facility].    Patient reports he has been staying at [facility] for a few months 2/2 rehab for chronic back pain, reports no new pain or weakness/numbness. Reports he is here because they "called me crazy" and "don't do anything for me" and that he "never gets out of bed" and he "wants to go to a new facility."     Denies any recent illness, denies headache/neck pain, chest pain, reports normal PO intake yesterday and today, no n/v/diarrhea, no cough, no fever/chills.    Per RN Samantha at [facility], he was given medications including pain pill this AM, patient began yelling about how the nurse and coworker are frequently going into his room to give him pills. Patient became upset, unclear about what, after taking the medications and began throwing things in his room and asking them to call 911. Per nurse, patient has been at his baseline, has not been sick, normal BM, has not been telling nursing staff that he wants to get out of bed so stays in bed all day.      Review of patient's allergies indicates:   Allergen Reactions    Lisinopril Other (See Comments)     cough     Past Medical History:   Diagnosis Date    Depression     Diabetes mellitus     Gout     High cholesterol     Hypertension     PAD (peripheral artery disease) 03/01/2025 2-2025 An arterial duplex suggested moderate stenosis in his right tibial vessels.   Vascular recommend medical therapy.       History reviewed. No pertinent surgical history.  Family History   Problem Relation Name Age of Onset    Heart disease Mother      Diabetes Mother      Diabetes Father       Social " History[1]  Review of Systems    Physical Exam     Initial Vitals [02/23/25 1243]   BP Pulse Resp Temp SpO2   115/69 100 18 98.9 °F (37.2 °C) 100 %      MAP       --         Physical Exam    Constitutional: He appears well-developed and well-nourished.   HENT:   Head: Atraumatic.   Eyes: EOM are normal.   Neck:   Normal range of motion.  Cardiovascular:  Normal rate and regular rhythm.           Pulmonary/Chest: Effort normal.   Abdominal: He exhibits no distension.   Musculoskeletal:         General: No tenderness or edema.      Cervical back: Normal range of motion.      Comments: No focal midline tenderness to T/L spine, bl thoracic and lumbar paraspinal tenderness     Neurological: He is alert and oriented to person, place, and time.   Skin: Skin is warm and dry.   No sacral decub  Chronic lymphedematous changes in bl LE   Psychiatric: He has a normal mood and affect.         ED Course   Procedures  Labs Reviewed - No data to display       Imaging Results    None          Medications - No data to display  Medical Decision Making  74 yo M w/HTN, HLD, IDDM, CKD, inguinal hernias, chronic venous insufficiency, obesity, chronic hip/back pain, debility, presenting due to wanting to switch from [facility] to other SNF.      Differential diagnosis includes but is not limited to: social issue, AMS, low back pain    Denies any complaints at this time other than wanting to switch NH facilities due to reporting that staff leave him in bed all day. Collateral info from staff reporting that patient is not requesting to leave his bed. No sacral decubitus on exam, with benign physical exam, nontender abdomen, normal vitals, with no indication for lab work or imaging at this time given asymptomatic presentation. SW and I discussed with patient in ED that if he wants to switch NH facilities he would have to speak to case management at NH facility.   Denies any back pain at this time or new or worsening leg pain, does not want any pain  medication at this time, reports hydrocodone and gabapentin given this AM at NH facility alleviated his leg pain. No new weakness in LE or sensation deficits to suggest cord compression, no urinary/bowel symptoms.      I discussed with the patient/family the diagnosis, treatment plan, indications for return to the emergency department, as well as for expected follow-up. The patient/family verbalized an understanding. The patient/family is asked if there were any questions or concerns, which were addressed to patient/family satisfaction. Patient/family understands and is agreeable to the plan.                      ED Course as of 03/06/25 1806   Sun Feb 23, 2025   1348 Was given amlodipine 10mg, losartan 12.5, baclofen 10mg, gabapentin 800mg, hydrocodone 7.5mg several hours ago per nursing staff [LF]      ED Course User Index  [LF] Katt Neal MD                           Clinical Impression:  Final diagnoses:  [Z60.9] Social problem (Primary)  [M54.50, G89.29] Chronic bilateral low back pain without sciatica  [R53.81] Physical debility          ED Disposition Condition    Discharge Stable          ED Prescriptions    None       Follow-up Information    None            [1]   Social History  Tobacco Use    Smoking status: Never    Smokeless tobacco: Never   Substance Use Topics    Alcohol use: Not Currently     Comment: occasionally    Drug use: Yes     Types: Marijuana        Katt Neal MD  03/06/25 7779

## 2025-02-26 ENCOUNTER — HOSPITAL ENCOUNTER (INPATIENT)
Facility: HOSPITAL | Age: 75
LOS: 14 days | Discharge: HOME | DRG: 057 | End: 2025-03-12
Attending: STUDENT IN AN ORGANIZED HEALTH CARE EDUCATION/TRAINING PROGRAM | Admitting: STUDENT IN AN ORGANIZED HEALTH CARE EDUCATION/TRAINING PROGRAM
Payer: MEDICARE

## 2025-02-26 DIAGNOSIS — M79.89 PAIN AND SWELLING OF LOWER EXTREMITY: ICD-10-CM

## 2025-02-26 DIAGNOSIS — N17.9 AKI (ACUTE KIDNEY INJURY): ICD-10-CM

## 2025-02-26 DIAGNOSIS — M79.606 PAIN AND SWELLING OF LOWER EXTREMITY: ICD-10-CM

## 2025-02-26 DIAGNOSIS — R41.82 ALTERED MENTAL STATUS: ICD-10-CM

## 2025-02-26 DIAGNOSIS — R06.02 SHORTNESS OF BREATH: ICD-10-CM

## 2025-02-26 DIAGNOSIS — A52.3 NEUROSYPHILIS: ICD-10-CM

## 2025-02-26 DIAGNOSIS — G93.49 UREMIC ENCEPHALOPATHY: ICD-10-CM

## 2025-02-26 DIAGNOSIS — R00.0 TACHYCARDIA: ICD-10-CM

## 2025-02-26 DIAGNOSIS — G93.40 ENCEPHALOPATHY ACUTE: Primary | ICD-10-CM

## 2025-02-26 DIAGNOSIS — N19 UREMIC ENCEPHALOPATHY: ICD-10-CM

## 2025-02-26 DIAGNOSIS — E87.5 HYPERKALEMIA: ICD-10-CM

## 2025-02-26 DIAGNOSIS — A53.9 SERUM POSITIVE FOR TREPONEMA PALLIDUM BY PCR: ICD-10-CM

## 2025-02-26 DIAGNOSIS — R07.9 CHEST PAIN: ICD-10-CM

## 2025-02-26 LAB
ALBUMIN SERPL BCP-MCNC: 3.4 G/DL (ref 3.5–5.2)
ALLENS TEST: ABNORMAL
ALP SERPL-CCNC: 97 U/L (ref 40–150)
ALT SERPL W/O P-5'-P-CCNC: 11 U/L (ref 10–44)
ANION GAP SERPL CALC-SCNC: 10 MMOL/L (ref 8–16)
ANION GAP SERPL CALC-SCNC: 10 MMOL/L (ref 8–16)
ANION GAP SERPL CALC-SCNC: 11 MMOL/L (ref 8–16)
ANION GAP SERPL CALC-SCNC: 9 MMOL/L (ref 8–16)
ANION GAP SERPL CALC-SCNC: 9 MMOL/L (ref 8–16)
AST SERPL-CCNC: 20 U/L (ref 10–40)
BASOPHILS # BLD AUTO: 0.04 K/UL (ref 0–0.2)
BASOPHILS NFR BLD: 0.4 % (ref 0–1.9)
BILIRUB SERPL-MCNC: 0.3 MG/DL (ref 0.1–1)
BILIRUB UR QL STRIP: NEGATIVE
BNP SERPL-MCNC: <10 PG/ML (ref 0–99)
BUN SERPL-MCNC: 102 MG/DL (ref 8–23)
BUN SERPL-MCNC: 106 MG/DL (ref 8–23)
BUN SERPL-MCNC: 110 MG/DL (ref 6–30)
BUN SERPL-MCNC: 132 MG/DL (ref 6–30)
BUN SERPL-MCNC: 98 MG/DL (ref 8–23)
CALCIUM SERPL-MCNC: 9.5 MG/DL (ref 8.7–10.5)
CALCIUM SERPL-MCNC: 9.6 MG/DL (ref 8.7–10.5)
CALCIUM SERPL-MCNC: 9.9 MG/DL (ref 8.7–10.5)
CHLORIDE SERPL-SCNC: 106 MMOL/L (ref 95–110)
CHLORIDE SERPL-SCNC: 107 MMOL/L (ref 95–110)
CHLORIDE SERPL-SCNC: 108 MMOL/L (ref 95–110)
CHLORIDE SERPL-SCNC: 109 MMOL/L (ref 95–110)
CHLORIDE SERPL-SCNC: 111 MMOL/L (ref 95–110)
CK SERPL-CCNC: 50 U/L (ref 20–200)
CLARITY UR REFRACT.AUTO: CLEAR
CO2 SERPL-SCNC: 15 MMOL/L (ref 23–29)
CO2 SERPL-SCNC: 18 MMOL/L (ref 23–29)
COLOR UR AUTO: YELLOW
CREAT SERPL-MCNC: 4.3 MG/DL (ref 0.5–1.4)
CREAT SERPL-MCNC: 4.4 MG/DL (ref 0.5–1.4)
CREAT SERPL-MCNC: 4.5 MG/DL (ref 0.5–1.4)
CREAT SERPL-MCNC: 5.2 MG/DL (ref 0.5–1.4)
CREAT SERPL-MCNC: 5.3 MG/DL (ref 0.5–1.4)
CREAT UR-MCNC: 72 MG/DL (ref 23–375)
CREAT UR-MCNC: 72 MG/DL (ref 23–375)
DIFFERENTIAL METHOD BLD: ABNORMAL
EOSINOPHIL # BLD AUTO: 0.1 K/UL (ref 0–0.5)
EOSINOPHIL NFR BLD: 0.6 % (ref 0–8)
ERYTHROCYTE [DISTWIDTH] IN BLOOD BY AUTOMATED COUNT: 16.6 % (ref 11.5–14.5)
EST. GFR  (NO RACE VARIABLE): 13.1 ML/MIN/1.73 M^2
EST. GFR  (NO RACE VARIABLE): 13.4 ML/MIN/1.73 M^2
EST. GFR  (NO RACE VARIABLE): 13.8 ML/MIN/1.73 M^2
ESTIMATED AVG GLUCOSE: 137 MG/DL (ref 68–131)
GLUCOSE SERPL-MCNC: 82 MG/DL (ref 70–110)
GLUCOSE SERPL-MCNC: 83 MG/DL (ref 70–110)
GLUCOSE SERPL-MCNC: 85 MG/DL (ref 70–110)
GLUCOSE SERPL-MCNC: 85 MG/DL (ref 70–110)
GLUCOSE SERPL-MCNC: 86 MG/DL (ref 70–110)
GLUCOSE SERPL-MCNC: 89 MG/DL (ref 70–110)
GLUCOSE SERPL-MCNC: 90 MG/DL (ref 70–110)
GLUCOSE UR QL STRIP: NEGATIVE
HBA1C MFR BLD: 6.4 % (ref 4–5.6)
HCO3 UR-SCNC: 20.7 MMOL/L (ref 24–28)
HCT VFR BLD AUTO: 41.5 % (ref 40–54)
HCT VFR BLD CALC: 42 %PCV (ref 36–54)
HCT VFR BLD CALC: 42 %PCV (ref 36–54)
HCT VFR BLD CALC: 43 %PCV (ref 36–54)
HGB BLD-MCNC: 13.1 G/DL (ref 14–18)
HGB UR QL STRIP: NEGATIVE
IMM GRANULOCYTES # BLD AUTO: 0.04 K/UL (ref 0–0.04)
IMM GRANULOCYTES NFR BLD AUTO: 0.4 % (ref 0–0.5)
KETONES UR QL STRIP: NEGATIVE
LEUKOCYTE ESTERASE UR QL STRIP: NEGATIVE
LYMPHOCYTES # BLD AUTO: 2.1 K/UL (ref 1–4.8)
LYMPHOCYTES NFR BLD: 21.8 % (ref 18–48)
MAGNESIUM SERPL-MCNC: 2.1 MG/DL (ref 1.6–2.6)
MCH RBC QN AUTO: 28.1 PG (ref 27–31)
MCHC RBC AUTO-ENTMCNC: 31.6 G/DL (ref 32–36)
MCV RBC AUTO: 89 FL (ref 82–98)
MONOCYTES # BLD AUTO: 0.7 K/UL (ref 0.3–1)
MONOCYTES NFR BLD: 7.1 % (ref 4–15)
NEUTROPHILS # BLD AUTO: 6.7 K/UL (ref 1.8–7.7)
NEUTROPHILS NFR BLD: 69.7 % (ref 38–73)
NITRITE UR QL STRIP: NEGATIVE
NRBC BLD-RTO: 0 /100 WBC
OHS QRS DURATION: 84 MS
OHS QTC CALCULATION: 423 MS
PCO2 BLDA: 37.8 MMHG (ref 35–45)
PH SMN: 7.35 [PH] (ref 7.35–7.45)
PH UR STRIP: 5 [PH] (ref 5–8)
PLATELET # BLD AUTO: 340 K/UL (ref 150–450)
PMV BLD AUTO: 10.6 FL (ref 9.2–12.9)
PO2 BLDA: 71 MMHG (ref 40–60)
POC BE: -5 MMOL/L
POC IONIZED CALCIUM: 1.12 MMOL/L (ref 1.06–1.42)
POC IONIZED CALCIUM: 1.15 MMOL/L (ref 1.06–1.42)
POC IONIZED CALCIUM: 1.27 MMOL/L (ref 1.06–1.42)
POC SATURATED O2: 93 % (ref 95–100)
POC TCO2 (MEASURED): 20 MMOL/L (ref 23–27)
POC TCO2 (MEASURED): 20 MMOL/L (ref 23–27)
POC TCO2: 22 MMOL/L (ref 24–29)
POCT GLUCOSE: 110 MG/DL (ref 70–110)
POCT GLUCOSE: 112 MG/DL (ref 70–110)
POCT GLUCOSE: 89 MG/DL (ref 70–110)
POTASSIUM BLD-SCNC: 6.7 MMOL/L (ref 3.5–5.1)
POTASSIUM BLD-SCNC: 7.9 MMOL/L (ref 3.5–5.1)
POTASSIUM BLD-SCNC: 7.9 MMOL/L (ref 3.5–5.1)
POTASSIUM SERPL-SCNC: 6.1 MMOL/L (ref 3.5–5.1)
POTASSIUM SERPL-SCNC: 6.2 MMOL/L (ref 3.5–5.1)
POTASSIUM SERPL-SCNC: 7 MMOL/L (ref 3.5–5.1)
PROT SERPL-MCNC: 8.9 G/DL (ref 6–8.4)
PROT UR QL STRIP: ABNORMAL
PROT UR-MCNC: 42 MG/DL (ref 0–15)
PROT/CREAT UR: 0.58 MG/G{CREAT} (ref 0–0.2)
RBC # BLD AUTO: 4.66 M/UL (ref 4.6–6.2)
SAMPLE: ABNORMAL
SITE: ABNORMAL
SODIUM BLD-SCNC: 134 MMOL/L (ref 136–145)
SODIUM BLD-SCNC: 134 MMOL/L (ref 136–145)
SODIUM BLD-SCNC: 137 MMOL/L (ref 136–145)
SODIUM SERPL-SCNC: 133 MMOL/L (ref 136–145)
SODIUM SERPL-SCNC: 134 MMOL/L (ref 136–145)
SODIUM SERPL-SCNC: 134 MMOL/L (ref 136–145)
SODIUM SERPL-SCNC: 135 MMOL/L (ref 136–145)
SODIUM SERPL-SCNC: 135 MMOL/L (ref 136–145)
SODIUM UR-SCNC: 63 MMOL/L (ref 20–250)
SP GR UR STRIP: 1.02 (ref 1–1.03)
TROPONIN I SERPL DL<=0.01 NG/ML-MCNC: 6 NG/L (ref 0–35)
TSH SERPL DL<=0.005 MIU/L-ACNC: 0.78 UIU/ML (ref 0.4–4)
URN SPEC COLLECT METH UR: ABNORMAL
WBC # BLD AUTO: 9.67 K/UL (ref 3.9–12.7)

## 2025-02-26 PROCEDURE — 84300 ASSAY OF URINE SODIUM: CPT

## 2025-02-26 PROCEDURE — 63600175 PHARM REV CODE 636 W HCPCS

## 2025-02-26 PROCEDURE — 85025 COMPLETE CBC W/AUTO DIFF WBC: CPT | Performed by: PHYSICIAN ASSISTANT

## 2025-02-26 PROCEDURE — 25000003 PHARM REV CODE 250: Performed by: PHYSICIAN ASSISTANT

## 2025-02-26 PROCEDURE — 25000003 PHARM REV CODE 250

## 2025-02-26 PROCEDURE — 83735 ASSAY OF MAGNESIUM: CPT | Performed by: PHYSICIAN ASSISTANT

## 2025-02-26 PROCEDURE — 94640 AIRWAY INHALATION TREATMENT: CPT

## 2025-02-26 PROCEDURE — 80048 BASIC METABOLIC PNL TOTAL CA: CPT | Mod: 91,XB

## 2025-02-26 PROCEDURE — 84484 ASSAY OF TROPONIN QUANT: CPT | Performed by: PHYSICIAN ASSISTANT

## 2025-02-26 PROCEDURE — 80047 BASIC METABLC PNL IONIZED CA: CPT

## 2025-02-26 PROCEDURE — 96375 TX/PRO/DX INJ NEW DRUG ADDON: CPT

## 2025-02-26 PROCEDURE — 81003 URINALYSIS AUTO W/O SCOPE: CPT | Performed by: PHYSICIAN ASSISTANT

## 2025-02-26 PROCEDURE — 82550 ASSAY OF CK (CPK): CPT

## 2025-02-26 PROCEDURE — 99900035 HC TECH TIME PER 15 MIN (STAT)

## 2025-02-26 PROCEDURE — 96366 THER/PROPH/DIAG IV INF ADDON: CPT

## 2025-02-26 PROCEDURE — 83036 HEMOGLOBIN GLYCOSYLATED A1C: CPT

## 2025-02-26 PROCEDURE — 51702 INSERT TEMP BLADDER CATH: CPT

## 2025-02-26 PROCEDURE — 25000242 PHARM REV CODE 250 ALT 637 W/ HCPCS: Performed by: PHYSICIAN ASSISTANT

## 2025-02-26 PROCEDURE — 99223 1ST HOSP IP/OBS HIGH 75: CPT | Mod: GC,,, | Performed by: STUDENT IN AN ORGANIZED HEALTH CARE EDUCATION/TRAINING PROGRAM

## 2025-02-26 PROCEDURE — 51798 US URINE CAPACITY MEASURE: CPT

## 2025-02-26 PROCEDURE — 82803 BLOOD GASES ANY COMBINATION: CPT

## 2025-02-26 PROCEDURE — 80053 COMPREHEN METABOLIC PANEL: CPT | Performed by: PHYSICIAN ASSISTANT

## 2025-02-26 PROCEDURE — 12000002 HC ACUTE/MED SURGE SEMI-PRIVATE ROOM

## 2025-02-26 PROCEDURE — 93005 ELECTROCARDIOGRAM TRACING: CPT

## 2025-02-26 PROCEDURE — 99285 EMERGENCY DEPT VISIT HI MDM: CPT | Mod: 25

## 2025-02-26 PROCEDURE — 84443 ASSAY THYROID STIM HORMONE: CPT | Performed by: PHYSICIAN ASSISTANT

## 2025-02-26 PROCEDURE — 63600175 PHARM REV CODE 636 W HCPCS: Performed by: PHYSICIAN ASSISTANT

## 2025-02-26 PROCEDURE — 96365 THER/PROPH/DIAG IV INF INIT: CPT

## 2025-02-26 PROCEDURE — 96368 THER/DIAG CONCURRENT INF: CPT

## 2025-02-26 PROCEDURE — 82962 GLUCOSE BLOOD TEST: CPT

## 2025-02-26 PROCEDURE — 80048 BASIC METABOLIC PNL TOTAL CA: CPT | Mod: XB | Performed by: STUDENT IN AN ORGANIZED HEALTH CARE EDUCATION/TRAINING PROGRAM

## 2025-02-26 PROCEDURE — 83880 ASSAY OF NATRIURETIC PEPTIDE: CPT | Performed by: PHYSICIAN ASSISTANT

## 2025-02-26 PROCEDURE — 82570 ASSAY OF URINE CREATININE: CPT

## 2025-02-26 PROCEDURE — 93010 ELECTROCARDIOGRAM REPORT: CPT | Mod: ,,, | Performed by: INTERNAL MEDICINE

## 2025-02-26 RX ORDER — NALOXONE HCL 0.4 MG/ML
0.02 VIAL (ML) INJECTION
Status: DISCONTINUED | OUTPATIENT
Start: 2025-02-26 | End: 2025-03-12 | Stop reason: HOSPADM

## 2025-02-26 RX ORDER — OXYCODONE HYDROCHLORIDE 5 MG/1
5 TABLET ORAL EVERY 6 HOURS PRN
Refills: 0 | Status: DISCONTINUED | OUTPATIENT
Start: 2025-02-26 | End: 2025-03-05

## 2025-02-26 RX ORDER — ACETAMINOPHEN 500 MG
1000 TABLET ORAL EVERY 6 HOURS PRN
Status: DISCONTINUED | OUTPATIENT
Start: 2025-02-26 | End: 2025-03-05

## 2025-02-26 RX ORDER — TAMSULOSIN HYDROCHLORIDE 0.4 MG/1
0.4 CAPSULE ORAL DAILY
Status: DISCONTINUED | OUTPATIENT
Start: 2025-02-27 | End: 2025-03-02

## 2025-02-26 RX ORDER — OXYCODONE HYDROCHLORIDE 10 MG/1
10 TABLET ORAL EVERY 6 HOURS PRN
Refills: 0 | Status: DISCONTINUED | OUTPATIENT
Start: 2025-02-26 | End: 2025-03-05

## 2025-02-26 RX ORDER — INSULIN ASPART 100 [IU]/ML
0-5 INJECTION, SOLUTION INTRAVENOUS; SUBCUTANEOUS EVERY 6 HOURS PRN
Status: DISCONTINUED | OUTPATIENT
Start: 2025-02-26 | End: 2025-03-12 | Stop reason: HOSPADM

## 2025-02-26 RX ORDER — SODIUM CHLORIDE 0.9 % (FLUSH) 0.9 %
10 SYRINGE (ML) INJECTION EVERY 12 HOURS PRN
Status: DISCONTINUED | OUTPATIENT
Start: 2025-02-26 | End: 2025-03-12 | Stop reason: HOSPADM

## 2025-02-26 RX ORDER — ALBUTEROL SULFATE 2.5 MG/.5ML
10 SOLUTION RESPIRATORY (INHALATION)
Status: COMPLETED | OUTPATIENT
Start: 2025-02-26 | End: 2025-02-26

## 2025-02-26 RX ORDER — ATORVASTATIN CALCIUM 20 MG/1
20 TABLET, FILM COATED ORAL DAILY
Status: DISCONTINUED | OUTPATIENT
Start: 2025-02-27 | End: 2025-03-12 | Stop reason: HOSPADM

## 2025-02-26 RX ORDER — IBUPROFEN 200 MG
24 TABLET ORAL
Status: DISCONTINUED | OUTPATIENT
Start: 2025-02-26 | End: 2025-03-12 | Stop reason: HOSPADM

## 2025-02-26 RX ORDER — HEPARIN SODIUM 5000 [USP'U]/ML
5000 INJECTION, SOLUTION INTRAVENOUS; SUBCUTANEOUS EVERY 8 HOURS
Status: DISCONTINUED | OUTPATIENT
Start: 2025-02-26 | End: 2025-02-26

## 2025-02-26 RX ORDER — IBUPROFEN 200 MG
16 TABLET ORAL
Status: DISCONTINUED | OUTPATIENT
Start: 2025-02-26 | End: 2025-03-12 | Stop reason: HOSPADM

## 2025-02-26 RX ORDER — GLUCAGON 1 MG
1 KIT INJECTION
Status: DISCONTINUED | OUTPATIENT
Start: 2025-02-26 | End: 2025-03-12 | Stop reason: HOSPADM

## 2025-02-26 RX ORDER — ASPIRIN 81 MG/1
81 TABLET ORAL DAILY
Status: DISCONTINUED | OUTPATIENT
Start: 2025-02-27 | End: 2025-03-06

## 2025-02-26 RX ORDER — CALCIUM GLUCONATE 20 MG/ML
1 INJECTION, SOLUTION INTRAVENOUS
Status: COMPLETED | OUTPATIENT
Start: 2025-02-26 | End: 2025-02-26

## 2025-02-26 RX ORDER — HYDROMORPHONE HYDROCHLORIDE 1 MG/ML
0.5 INJECTION, SOLUTION INTRAMUSCULAR; INTRAVENOUS; SUBCUTANEOUS EVERY 6 HOURS PRN
Status: DISCONTINUED | OUTPATIENT
Start: 2025-02-26 | End: 2025-02-27

## 2025-02-26 RX ADMIN — ALBUTEROL SULFATE 10 MG: 2.5 SOLUTION RESPIRATORY (INHALATION) at 10:02

## 2025-02-26 RX ADMIN — SODIUM ZIRCONIUM CYCLOSILICATE 10 G: 10 POWDER, FOR SUSPENSION ORAL at 10:02

## 2025-02-26 RX ADMIN — HYDROMORPHONE HYDROCHLORIDE 0.5 MG: 1 INJECTION, SOLUTION INTRAMUSCULAR; INTRAVENOUS; SUBCUTANEOUS at 10:02

## 2025-02-26 RX ADMIN — SODIUM BICARBONATE: 84 INJECTION, SOLUTION INTRAVENOUS at 11:02

## 2025-02-26 RX ADMIN — DEXTROSE MONOHYDRATE 250 ML: 100 INJECTION, SOLUTION INTRAVENOUS at 10:02

## 2025-02-26 RX ADMIN — INSULIN HUMAN 5 UNITS: 100 INJECTION, SOLUTION PARENTERAL at 10:02

## 2025-02-26 RX ADMIN — CALCIUM GLUCONATE 1 G: 98 INJECTION, SOLUTION INTRAVENOUS at 07:02

## 2025-02-26 RX ADMIN — ACETAMINOPHEN 1000 MG: 500 TABLET ORAL at 06:02

## 2025-02-26 RX ADMIN — CALCIUM GLUCONATE 1 G: 20 INJECTION, SOLUTION INTRAVENOUS at 10:02

## 2025-02-26 RX ADMIN — SODIUM ZIRCONIUM CYCLOSILICATE 10 G: 10 POWDER, FOR SUSPENSION ORAL at 07:02

## 2025-02-26 RX ADMIN — SODIUM CHLORIDE 500 ML: 9 INJECTION, SOLUTION INTRAVENOUS at 06:02

## 2025-02-26 NOTE — PHARMACY MED REC
"  Admission Medication History     The home medication history was taken by Fern Ortiz.    You may go to "Admission" then "Reconcile Home Medications" tabs to review and/or act upon these items.     The home medication list has been updated by the Pharmacy department.   Please read ALL comments highlighted in yellow.   Please address this information as you see fit.    Feel free to contact us if you have any questions or require assistance.      Medications listed below were obtained from: Nursing home  Current Outpatient Medications on File Prior to Encounter   Medication Sig    amLODIPine (NORVASC) 10 MG tablet Take 1 tablet (10 mg total) by mouth once daily.    aspirin (ECOTRIN) 81 MG EC tablet Take 81 mg by mouth once daily.    atorvastatin (LIPITOR) 20 MG tablet Take 20 mg by mouth once daily.    baclofen (LIORESAL) 10 MG tablet Take 10 mg by mouth 3 (three) times daily.    fluticasone propionate (FLONASE) 50 mcg/actuation nasal spray 2 sprays (100 mcg total) by Each Nostril route once daily.    gabapentin (NEURONTIN) 800 MG tablet Take 1 tablet (800 mg total) by mouth 2 (two) times daily.    HYDROcodone-acetaminophen (NORCO) 7.5-325 mg per tablet Take 1 tablet by mouth every 8 (eight) hours as needed for Pain.    losartan-hydrochlorothiazide 100-12.5 mg (HYZAAR) 100-12.5 mg Tab Take 1 tablet by mouth once daily.    metFORMIN (GLUCOPHAGE) 1000 MG tablet Take 1,000 mg by mouth 2 (two) times daily with meals.    polyethylene glycol (GLYCOLAX) 17 gram PwPk Take 17 g by mouth once daily.    tamsulosin (FLOMAX) 0.4 mg Cap Take by mouth once daily.    vitamin D (VITAMIN D3) 1000 units Tab Take 1,000 Units by mouth once daily.    sertraline (ZOLOFT) 50 MG tablet Take 1 tablet (50 mg total) by mouth once daily. Last filled 2/4/25, #30/30 day supply. Not listed on NH orders. I called [facility] several times to verify if patient is receiving this medication, however the phone call dropped each time.  "         Potential issues to be addressed PRIOR TO DISCHARGE  The listed medications were obtained from another facility ([facility]). The patient may not have been able to fill these prescriptions prior to this admission and may require new scripts upon discharge.             Fern Ortiz  EXT [id number]                .

## 2025-02-26 NOTE — HPI
76 yo man w pmhx significant for HTN HLD IDDM inguinal hernia, chronic venous insufficiency obesity chronic hip/back pain debility presents to ED from [facility] for acute encephalopathy w slurred speech, disorientation. Per ED notes bladder had 400 cc urine and thus david was placed. UA bland other than trace protein. CT showed no acute obstruction (done after david placed) CPK pending. He possibly has CKD2/3a based on previous GFR. Nephrology was consulted for acute kidney injury w severe hyperkalemia.

## 2025-02-26 NOTE — PLAN OF CARE
[facility] - Emergency Dept  Discharge Assessment    Primary Care Provider: Berhane Alvarez MD     SW spoke with patient sister Elinor [phone] to complete brief assessment. Per Elinor pt is a resident of [facility] and will return on d/c.      When SW was speaking with pt, pt had difficulty taking the emesis bag she was providing to patient as he could not grab it with his hands nor see it.  SW asked patient if he was visually impaired and pt stated he could see.  SW noticed patient would not look toward her and could not see the emesis bag when she showed it to him.  Pt sister Elinor stated that pt has diabetes but historically does not have vision issues.  SW informed team of information.      Pt to return to [facility] on d/c      Discharge Assessment (most recent)       BRIEF DISCHARGE ASSESSMENT - 02/26/25 1217          Discharge Planning    Assessment Type Discharge Planning Brief Assessment     Resource/Environmental Concerns none     Support Systems Family members     Assistance Needed full     Equipment Currently Used at Home other (see comments)   NH Resident    Current Living Arrangements residential facility     Care Facility Name [facility]     Patient/Family Anticipates Transition to long-term care facility     Patient/Family Anticipated Services at Transition none     DME Needed Upon Discharge  none     Discharge Plan A Return to nursing home     Discharge Plan B Return to Nursing Home        Physical Activity    On average, how many days per week do you engage in moderate to strenuous exercise (like a brisk walk)? 0 days     On average, how many minutes do you engage in exercise at this level? 0 min        Financial Resource Strain    How hard is it for you to pay for the very basics like food, housing, medical care, and heating? Not very hard        Housing Stability    In the last 12 months, was there a time when you were not able to pay the mortgage or rent on time? No      At any time in the past 12 months, were you homeless or living in a shelter (including now)? No        Transportation Needs    Has the lack of transportation kept you from medical appointments, meetings, work or from getting things needed for daily living? No        Food Insecurity    Within the past 12 months, you worried that your food would run out before you got the money to buy more. Never true (P)      Within the past 12 months, the food you bought just didn't last and you didn't have money to get more. Never true (P)         Stress    Do you feel stress - tense, restless, nervous, or anxious, or unable to sleep at night because your mind is troubled all the time - these days? Only a little (P)         Social Isolation    How often do you feel lonely or isolated from those around you?  Never (P)         Alcohol Use    Q1: How often do you have a drink containing alcohol? Never (P)      Q2: How many drinks containing alcohol do you have on a typical day when you are drinking? Patient does not drink (P)      Q3: How often do you have six or more drinks on one occasion? Never (P)         Utilities    In the past 12 months has the electric, gas, oil, or water company threatened to shut off services in your home? No (P)         Health Literacy    How often do you need to have someone help you when you read instructions, pamphlets, or other written material from your doctor or pharmacy? Often (P)                    Discharge Plan A and Plan B have been determined by review of patient's clinical status, future medical and therapeutic needs, and coverage/benefits for post-acute care in coordination with multidisciplinary team members.    Mary Beth Sahu, NIDA, MSW, LMSW, RSW   Case Management  [facility]  Email: [email or web address]

## 2025-02-26 NOTE — ASSESSMENT & PLAN NOTE
KEATON is likely due to pre-renal azotemia due to dehydration. Baseline creatinine is  1.5 . Most recent creatinine and eGFR are listed below.  Recent Labs     02/26/25  0918   CREATININE 4.5*   EGFRNORACEVR 13.1*      Plan  - KEATON is worsening. Will continue current treatment  - Avoid nephrotoxins and renally dose meds for GFR listed above  - Monitor urine output, serial BMP, and adjust therapy as needed  - Fluid challenge 500 ml bolus over 2 hrs NS  - BMP q4  - nephrology recommendations:                          - Need stat BMP                         - Give another gram of calcium gluconate and 200 mg iv lasix plus diuril 500 mg x1                         - schedule lokelma 10 g tid                         - recommend eval for capacity; if hyperK worsens, he will likely need dialysis.

## 2025-02-26 NOTE — CONSULTS
[facility] - Emergency Dept  Nephrology  Consult Note    Patient Name: Riaz Guerra Jr.  MRN: [id number]  Admission Date: 2/26/2025  Hospital Length of Stay: 0 days  Attending Provider: Delano Posada DO   Primary Care Physician: Berhane Alvarez MD  Principal Problem:<principal problem not specified>    Inpatient consult to Nephrology  Consult performed by: Lobito Dinh MD  Consult ordered by: Gabriel Tate MD  Reason for consult: KEATON        Subjective:     HPI: 76 yo man w pmhx significant for HTN HLD IDDM inguinal hernia, chronic venous insufficiency obesity chronic hip/back pain debility presents to ED from [facility] for acute encephalopathy w slurred speech, disorientation. Per ED notes bladder had 400 cc urine and thus david was placed. UA bland other than trace protein. CT showed no acute obstruction (done after david placed) CPK pending. He possibly has CKD2/3a based on previous GFR. Nephrology was consulted for acute kidney injury w severe hyperkalemia.     Past Medical History:   Diagnosis Date    Depression     Diabetes mellitus     Gout     High cholesterol     Hypertension        History reviewed. No pertinent surgical history.    Review of patient's allergies indicates:   Allergen Reactions    Lisinopril Other (See Comments)     cough     Current Facility-Administered Medications   Medication Frequency    acetaminophen tablet 1,000 mg Q6H PRN    [START ON 2/27/2025] aspirin EC tablet 81 mg Daily    [START ON 2/27/2025] atorvastatin tablet 20 mg Daily    dextrose 50% injection 12.5 g PRN    dextrose 50% injection 25 g PRN    glucagon (human recombinant) injection 1 mg PRN    glucose chewable tablet 16 g PRN    glucose chewable tablet 24 g PRN    heparin (porcine) injection 5,000 Units Q8H    naloxone 0.4 mg/mL injection 0.02 mg PRN    sodium chloride 0.9% bolus 500 mL 500 mL Once    sodium chloride 0.9% flush 10 mL Q12H PRN    [START ON 2/27/2025] tamsulosin 24 hr capsule 0.4 mg Daily      Current Outpatient Medications   Medication    amLODIPine (NORVASC) 10 MG tablet    aspirin (ECOTRIN) 81 MG EC tablet    atorvastatin (LIPITOR) 20 MG tablet    baclofen (LIORESAL) 10 MG tablet    fluticasone propionate (FLONASE) 50 mcg/actuation nasal spray    gabapentin (NEURONTIN) 800 MG tablet    HYDROcodone-acetaminophen (NORCO) 7.5-325 mg per tablet    losartan-hydrochlorothiazide 100-12.5 mg (HYZAAR) 100-12.5 mg Tab    metFORMIN (GLUCOPHAGE) 1000 MG tablet    polyethylene glycol (GLYCOLAX) 17 gram PwPk    tamsulosin (FLOMAX) 0.4 mg Cap    vitamin D (VITAMIN D3) 1000 units Tab    sertraline (ZOLOFT) 50 MG tablet     Family History       Problem Relation (Age of Onset)    Diabetes Mother, Father    Heart disease Mother          Tobacco Use    Smoking status: Never    Smokeless tobacco: Never   Substance and Sexual Activity    Alcohol use: Not Currently     Comment: occasionally    Drug use: Yes     Types: Marijuana    Sexual activity: Yes     Review of Systems   Reason unable to perform ROS: not answering questions.     Objective:     Vital Signs (Most Recent):  Temp: 98 °F (36.7 °C) (02/26/25 1607)  Pulse: 97 (02/26/25 1607)  Resp: 20 (02/26/25 1607)  BP: 125/70 (02/26/25 1607)  SpO2: 96 % (02/26/25 1607) Vital Signs (24h Range):  Temp:  [97.9 °F (36.6 °C)-98 °F (36.7 °C)] 98 °F (36.7 °C)  Pulse:  [] 97  Resp:  [13-20] 20  SpO2:  [95 %-100 %] 96 %  BP: (116-143)/(70-83) 125/70     Weight: (!) 139.8 kg (308 lb 4.8 oz) (02/26/25 0723)  Body mass index is 43 kg/m².  Body surface area is 2.65 meters squared.    No intake/output data recorded.     Physical Exam  Constitutional:       General: He is not in acute distress.  HENT:      Head: Normocephalic.   Cardiovascular:      Rate and Rhythm: Regular rhythm.      Heart sounds: Normal heart sounds.   Pulmonary:      Breath sounds: Normal breath sounds.   Abdominal:      General: Abdomen is flat.   Musculoskeletal:         General: No swelling.   Skin:      General: Skin is warm.   Neurological:      Mental Status: He is alert. He is disoriented.          Significant Labs:  CBC:   Recent Labs   Lab 02/26/25  0918 02/26/25  0925 02/26/25  1005   WBC 9.67  --   --    RBC 4.66  --   --    HGB 13.1*  --   --    HCT 41.5   < > 42     --   --    MCV 89  --   --    MCH 28.1  --   --    MCHC 31.6*  --   --     < > = values in this interval not displayed.     CMP:   Recent Labs   Lab 02/26/25 0918   GLU 82   CALCIUM 9.9   ALBUMIN 3.4*   PROT 8.9*   *   K 7.0*   CO2 15*      *   CREATININE 4.5*   ALKPHOS 97   ALT 11   AST 20   BILITOT 0.3     All labs within the past 24 hours have been reviewed.  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 David in place.  Severe hyperkalemia - no EKG changes. Given calcium gluconate, insulin, d50, albuterol, lokelma  Metabolic acidosis  Insulin dependent DMT2  HLD    -q4h bmp. Awaiting on repeat last BMP was from 9 am, POC at 10 am was 6.7. Need stat BMP now.   -CXR reviewed, no sign pulm edema, on room air.   -please give another gram of calcium gluconate and 200 mg iv lasix plus diuril 500 mg x1.  -schedule lokelma 10 g tid  -recommend to start bicarb drip at 150 cc/hr for metabolic acidosis and fluid resuscitation in obstructive KEATON (david in place now, seems to be draining urine).  -UA reviewed, other than trace protein. CPK pending.   -if hyperK worsens, he will likely need dialysis. He is alert and oriented x2. He is also hallucinating (thought he had a urine cup on him and also thought he had a needle in his arm) and it is difficult to determine if he has capacity to make his own decisions at this time. We attempted to contact both family members listed and neither picked up the phone. Recommend eval for capacity. Hopefully, his K will improve w medications and we will be able to avoid dialysis.   -avoid nephrotoxic medications.    Thank you for your consult. I will  follow-up with patient. Please contact us if you have any additional questions.    Lobito Dinh MD  Nephrology  [facility] - Emergency Dept

## 2025-02-26 NOTE — HPI
Riaz Guerra is a 75-year-old man with a medical history of HTN, HLD, insulin dependent DM, CKD, bilateral inguinal hernias, chronic venous insufficiency, obesity, chronic hip/back pain, and wheelchair dependent. He presented to the ED from [facility] ([facility]) due to altered mental status, labored breathing, and refusing medications. Per phone call to [facility], the patient had normal mentation prior to this admission, but had difficulty adjusting to the facility.     At the ED the patient was A/O x2 to person, and time. Reported pain in bilateral shoulders and hip, feeling cold and thirsty. Discoloration was noted on both lower limbs suggestive of venous stasis. Cole catheter was placed due to greater than 400ml urine seen on bladder scan. Labs were significant for KEATON: Cr 4.5 from base line of 1.3. Potassium 7.0, , CO2 15. CT abdomen pelvis negative for hydronephrosis but noted for bilateral basilar atelectasis and 2mm pulmonary nodule along the fissure on the right as well as tree in bud type nodules along the medial aspect of the left lower lobe concerning for possible developing infectious or inflammatory process. CT head negative for acute intracranial changes.    Patient's sister has been contacted about the patient's location and new condition. Per his sister, patient has not been able to ambulate for the last 9 months and has been at the SNF facility for the last two months.

## 2025-02-26 NOTE — ED NOTES
Assumed care of the patient. Report received from TIMUR Gale. Pt on continuous cardiac monitoring, continuous pulse oximetry, and automatic BP cuff cycling Q30min. Pt in hospital gown, side rails up X2, bed low and locked, and call light is placed within reach. No family/visitors at bedside at this time. Pt denies any complaints or needs.

## 2025-02-26 NOTE — PLAN OF CARE
Patient seen in ED with fellow. Full note to follow.  Patient was very adamant he did not want dialysis even if it would save his life. Unfortunately he earlier in the conversation told me that he would want dialysis. He saw needles in his arms which were not there and he saw urine cups in his bed and there were none. This leads me to believe he did not understand or have capacity to make this decision. We attempted to contact the 2 available next of kin with no response.  Needs emergent medical management of hyperkalemia and monitoring and if not improved I recommend emergent dialysis.

## 2025-02-26 NOTE — ED NOTES
Assumed care for pt after recieving report from nightshift RN. Pt. resting in bed in NAD, RR e/u. Vital signs stable and within desired limits at this time of assessment. Pt. offered bathroom assistance and denies need at this time. Explanation of care/wait provided. Pt verbalizes no needs at this time. Bed in low, locked position with rails up and call bell in reach. Pt's white board updated with today's care team and plan.     Patient identifiers for Riaz Guerra Jr. 75 y.o. male checked and correct.  Chief Complaint   Patient presents with    Altered Mental Status     Altered X1 hour. Pt. Urine has foul odor.      Past Medical History:   Diagnosis Date    Depression     Diabetes mellitus     Gout     High cholesterol     Hypertension      Allergies reported:   Review of patient's allergies indicates:   Allergen Reactions    Lisinopril Other (See Comments)     cough         LOC: Patient is awake, alert, and aware of environment with an appropriate affect. Patient is oriented x 4 and speaking appropriately.  APPEARANCE: Patient resting comfortably and in no acute distress. Patient is clean and well groomed, patient's clothing is properly fastened.  HEENT: WDL  SKIN: The skin is warm and dry. Patient has normal skin turgor and moist mucus membranes.   MUSCULOSKELETAL: Patient is moving all extremities well, no obvious deformities noted. Pulses intact.   RESPIRATORY: Airway is open and patent. Respirations are spontaneous and non-labored with normal effort and rate.  CARDIAC: Patient has a normal rate and rhythm. 88 on cardiac monitor. No peripheral edema noted. Denies chest pain and SOB at at time of assessment.   ABDOMEN: No distention noted. Soft and non-tender upon palpation.  NEUROLOGICAL: pupils 3mm, PERRL. Facial expression is symmetrical. Hand grasps are equal bilaterally. Normal sensation in all extremities when touched with finger.

## 2025-02-26 NOTE — ED PROVIDER NOTES
"Encounter Date: 2/26/2025       History     Chief Complaint   Patient presents with    Altered Mental Status     Altered X1 hour. Pt. Urine has foul odor.      74 yo M hx of HTN, HLD, IDDM, CKD, inguinal hernias, chronic venous insufficiency, obesity, chronic hip/back pain, debility presents to the ED from [facility] for AMS. Patient is oriented to person only. He has no specific complaints. He keeps repeating "they do this to me on purpose." Per night nurse, she woke the patient up around 5:55 this morning. The patient had slurred speech at the time. He had diffuse involuntary shaking movements and was disoriented. He was tachypneic at the time. Vitals were normal and he was not hypoxic. He kept asking the nurse if there was a car in front of him. He didn't know where he was. She checked his blood sugar, which was in the 70s. She gave him orange juice. Blood sugar improved to 140s and his speech became more clear. He is typically oriented x 3. She denies any known falls or trauma. Denies other worsening or alleviating factors.       Review of patient's allergies indicates:   Allergen Reactions    Lisinopril Other (See Comments)     cough     Past Medical History:   Diagnosis Date    Depression     Diabetes mellitus     Gout     High cholesterol     Hypertension      History reviewed. No pertinent surgical history.  Family History   Problem Relation Name Age of Onset    Heart disease Mother      Diabetes Mother      Diabetes Father       Social History[1]  Review of Systems   Unable to perform ROS: Mental status change       Physical Exam     Initial Vitals   BP Pulse Resp Temp SpO2   02/26/25 0723 02/26/25 0723 02/26/25 0723 02/26/25 0725 02/26/25 0723   116/72 88 18 97.9 °F (36.6 °C) 96 %      MAP       --                Physical Exam    Vitals reviewed.  Constitutional: He is not diaphoretic. No distress.   Morbidly obese, NAD    HENT:   Head: Normocephalic and atraumatic.   Dry mucous membranes  "   Eyes: EOM are normal. Pupils are equal, round, and reactive to light.   Neck: Neck supple.   Normal range of motion.  Cardiovascular:  Normal rate, regular rhythm, normal heart sounds and intact distal pulses.     Exam reveals no gallop and no friction rub.       No murmur heard.  Pulmonary/Chest: Breath sounds normal. He has no wheezes. He has no rhonchi. He has no rales.   Abdominal: Abdomen is soft. Bowel sounds are normal. There is no abdominal tenderness. There is no rebound and no guarding.   Musculoskeletal:      Cervical back: Normal range of motion and neck supple.     Neurological: He is alert. GCS score is 15. GCS eye subscore is 4. GCS verbal subscore is 5. GCS motor subscore is 6.   Oriented to person   Does not participate in neuro exam  No obvious cranial nerve deficits   Moving upper and lower extremities spontaneously, diffusely weak cary UE and LE      Skin: Skin is warm and dry. Capillary refill takes less than 2 seconds.   Chronic venous stasis to cary LE    Psychiatric: He has a normal mood and affect. His behavior is normal. Judgment and thought content normal.         ED Course   Procedures  Labs Reviewed   CBC W/ AUTO DIFFERENTIAL - Abnormal       Result Value    WBC 9.67      RBC 4.66      Hemoglobin 13.1 (*)     Hematocrit 41.5      MCV 89      MCH 28.1      MCHC 31.6 (*)     RDW 16.6 (*)     Platelets 340      MPV 10.6      Immature Granulocytes 0.4      Gran # (ANC) 6.7      Immature Grans (Abs) 0.04      Lymph # 2.1      Mono # 0.7      Eos # 0.1      Baso # 0.04      nRBC 0      Gran % 69.7      Lymph % 21.8      Mono % 7.1      Eosinophil % 0.6      Basophil % 0.4      Differential Method Automated     COMPREHENSIVE METABOLIC PANEL - Abnormal    Sodium 134 (*)     Potassium 7.0 (*)     Chloride 108      CO2 15 (*)     Glucose 82       (*)     Creatinine 4.5 (*)     Calcium 9.9      Total Protein 8.9 (*)     Albumin 3.4 (*)     Total Bilirubin 0.3      Alkaline Phosphatase 97       AST 20      ALT 11      eGFR 13.1 (*)     Anion Gap 11     ISTAT PROCEDURE - Abnormal    POC PH 7.346 (*)     POC PCO2 37.8      POC PO2 71 (*)     POC HCO3 20.7 (*)     POC BE -5 (*)     POC SATURATED O2 93      POC Sodium 134 (*)     POC Potassium 7.9 (*)     POC TCO2 22 (*)     POC Ionized Calcium 1.15      POC Hematocrit 42      Sample VENOUS      Site Other      Allens Test N/A     ISTAT PROCEDURE - Abnormal    POC Glucose 89      POC  (*)     POC Creatinine 5.2 (*)     POC Sodium 134 (*)     POC Potassium 7.9 (*)     POC Chloride 111 (*)     POC TCO2 (MEASURED) 20 (*)     POC Ionized Calcium 1.12      POC Hematocrit 43      Sample PHYLLIS     ISTAT PROCEDURE - Abnormal    POC Glucose 90      POC  (*)     POC Creatinine 5.3 (*)     POC Sodium 137      POC Potassium 6.7 (*)     POC Chloride 109      POC TCO2 (MEASURED) 20 (*)     POC Ionized Calcium 1.27      POC Hematocrit 42      Sample PHYLLIS     POCT GLUCOSE - Abnormal    POCT Glucose 112 (*)    TSH    TSH 0.775     MAGNESIUM    Magnesium 2.1     TROPONIN I HIGH SENSITIVITY    Troponin I High Sensitivity 6     B-TYPE NATRIURETIC PEPTIDE    BNP <10     URINALYSIS, REFLEX TO URINE CULTURE   POCT GLUCOSE    POCT Glucose 110     ISTAT CHEM8     EKG Readings: (Independently Interpreted)   Initial Reading: No STEMI. Rhythm: Normal Sinus Rhythm. Heart Rate: 98.     ECG Results              EKG 12-lead (Final result)        Collection Time Result Time QRS Duration OHS QTC Calculation    02/26/25 08:28:17 02/26/25 08:41:20 84 423                     Final result by Interface, Lab In [facility] (02/26/25 08:41:24)                   Narrative:    Test Reason : R41.82,    Vent. Rate :  98 BPM     Atrial Rate :  98 BPM     P-R Int : 176 ms          QRS Dur :  84 ms      QT Int : 332 ms       P-R-T Axes :  69  16  47 degrees    QTcB Int : 423 ms    Normal sinus rhythm  Low voltage QRS  Probable  Inferior infarct (cited on or before 28-Sep-2024)  Abnormal R wave  progression in the precordial leads  Abnormal ECG  When compared with ECG of 02-Feb-2025 02:51,  Criteria for Anterior infarct slightly less evident  T wave inversion no longer evident in Inferior leads  T wave inversion no longer evident in Anterior leads  Nonspecific T wave abnormality now evident in Lateral leads  Confirmed by Mika Valera ([id number]) on 2/26/2025 8:41:19 AM    Referred By:            Confirmed By: Mika Valera                                  Imaging Results              CT Head Without Contrast (Final result)  Result time 02/26/25 09:54:59      Final result by Jeyson Pisano DO (02/26/25 09:54:59)                   Impression:      No acute intracranial findings as detailed above specifically without evidence for acute intracranial hemorrhage or sulcal effacement to suggest large territory recent infarction    Clinical correlation and further evaluation with MRI as warranted if patient compatible.      Electronically signed by: Jeyson Pisano DO  Date:    02/26/2025  Time:    09:54               Narrative:    EXAMINATION:  CT HEAD WITHOUT CONTRAST    CLINICAL HISTORY:  Mental status change, unknown cause;    TECHNIQUE:  Multiple sequential 5 mm axial images of the head without contrast.  Coronal and sagittal reformatted imaging from the axial acquisition.    COMPARISON:  12/06/2024    FINDINGS:  Generalized cerebral volume loss with compensatory enlargement ventricles sulci and cisterns without hydrocephalus.  There is small focus of encephalomalacia right caudate suggestive for remote lacunar-type infarct unchanged    Study is distorted by motion and beam hardening artifacts.  Within limits of the study there is no evidence for acute intracranial hemorrhage or sulcal effacement to suggest large territory recent infarction.  Ill-defined decreased attenuation supratentorial periventricular white matter similar to prior while nonspecific concerning for underlying chronic ischemic change    Remote  fracture deformity left inferior orbital wall again seen.  There is complete opacification left sphenoid sinus similar    No significant new paranasal sinus opacification in the visualized paranasal sinuses.  Continued partial opacification mastoid air cells bilaterally greater on the left.                                       X-Ray Chest AP Portable (Final result)  Result time 02/26/25 08:57:59      Final result by Enoc Felix MD (02/26/25 08:57:59)                   Impression:      See above      Electronically signed by: Enoc Felix MD  Date:    02/26/2025  Time:    08:57               Narrative:    EXAMINATION:  XR CHEST AP PORTABLE    CLINICAL HISTORY:  Shortness of breath    TECHNIQUE:  Single frontal view of the chest was performed.    COMPARISON:  No 12/09/2024 ne    FINDINGS:  Heart size normal.  No significant airspace consolidation or pleural effusion identified.                                    X-Rays:   Independently Interpreted Readings:   Chest X-Ray: Normal heart size.  No infiltrates.  No acute abnormalities.     Medications   calcium gluconate 1 g in NS IVPB (premixed) (0 g Intravenous Stopped 2/26/25 1050)   albuterol sulfate nebulizer solution 10 mg (10 mg Nebulization Given 2/26/25 1021)   dextrose 10% bolus 250 mL 250 mL (0 mLs Intravenous Stopped 2/26/25 1131)   insulin regular injection 5 Units 0.05 mL (5 Units Intravenous Given 2/26/25 1033)   sodium zirconium cyclosilicate packet 10 g (10 g Oral Given 2/26/25 1021)     Medical Decision Making  Emergent evaluation of a 75 y.o. male presenting to the emergency department complaining of AMS that started at 5:55 AM. Patient is afebrile, hemodynamically stable, and non toxic appearing.   Will order labs, UA, imaging.     Differential diagnosis includes but is not limited to CVA, TIA, ICH, metabolic derangement, KEATON, medication reaction.    PCO2 within normal limits.  I-STAT reveals potassium of 7.9.  No EKG changes.  Will repeat  i-STAT.    Repeat i-STAT with creatinine of 5.3 and potassium of 6.7.  Will shift potassium.  Will obtain bladder scan and likely place Cole catheter to ensure patient is not retaining.    No severe hematologic derangements.  CT head without acute abnormality.  Chest x-ray without acute abnormality.  Potassium of 7.0 on CMP. CO2 15. Cr 4.5 - this is significant for KEATON.   The patient does have greater than 400 cc of urine in his bladder.  He has urinary retention.  Cole catheter placed.  This could explain the source of his KEATON.  However, will obtain CT abdomen pelvis without contrast to rule out obstruction.  Will admit to Hospital Medicine for further management.  Patient is agreeable with plan.  All questions answered.  The patient's history, physical exam, and plan of care was discussed with and agreed upon with my supervising physician.         Amount and/or Complexity of Data Reviewed  Labs: ordered. Decision-making details documented in ED Course.  Radiology: ordered.    Risk  OTC drugs.  Prescription drug management.  Decision regarding hospitalization.               ED Course as of 02/26/25 1157   Wed Feb 26, 2025   0852 EKG with sinus rhythm, rate 98, no STEMI [NN]   0942 POC PH(!): 7.346 [JM]   0942 POC PCO2: 37.8 [JM]   0942 POC Potassium(!!): 7.9 [JM]   0947 Will repeat K from ISTAT due to concerns for hemolysis, no EKG changes [NN]   1003 WBC: 9.67 [JM]   1003 Hemoglobin(!): 13.1 [JM]   1003 Hematocrit: 41.5 [JM]   1003 Platelet Count: 340 [JM]      ED Course User Index  [JM] Patience Zuñiga PA-C  [NN] Samantha Ellison MD                           Clinical Impression:  Final diagnoses:  [R06.02] Shortness of breath  [R41.82] Altered mental status  [E87.5] Hyperkalemia  [N17.9] KEATON (acute kidney injury)  [G93.40] Encephalopathy acute (Primary)          ED Disposition Condition    Admit                     [1]   Social History  Tobacco Use    Smoking status: Never    Smokeless tobacco: Never    Substance Use Topics    Alcohol use: Not Currently     Comment: occasionally    Drug use: Yes     Types: Marijuana        Patience Zuñiga [facility]  02/26/25 [id number]

## 2025-02-26 NOTE — ASSESSMENT & PLAN NOTE
- sequential compression device  - wound care for ulcerated leg wounds  - consult PT/OT when encephalopathy resolves

## 2025-02-26 NOTE — ED NOTES
I-STAT Chem-8+ Results:   Value Reference Range   Sodium 137 136-145 mmol/L   Potassium  6.7 3.5-5.1 mmol/L   Chloride 109  mmol/L   Ionized Calcium 1.27 1.06-1.42 mmol/L   CO2 (measured) 20 23-29 mmol/L   Glucose 90  mg/dL    6-30 mg/dL   Creatinine 5.3 0.5-1.4 mg/dL   Hematocrit 42 36-54%    Reviewed by MD Ellison

## 2025-02-26 NOTE — SUBJECTIVE & OBJECTIVE
Past Medical History:   Diagnosis Date    Depression     Diabetes mellitus     Gout     High cholesterol     Hypertension        History reviewed. No pertinent surgical history.    Review of patient's allergies indicates:   Allergen Reactions    Lisinopril Other (See Comments)     cough     Current Facility-Administered Medications   Medication Frequency    acetaminophen tablet 1,000 mg Q6H PRN    [START ON 2/27/2025] aspirin EC tablet 81 mg Daily    [START ON 2/27/2025] atorvastatin tablet 20 mg Daily    dextrose 50% injection 12.5 g PRN    dextrose 50% injection 25 g PRN    glucagon (human recombinant) injection 1 mg PRN    glucose chewable tablet 16 g PRN    glucose chewable tablet 24 g PRN    heparin (porcine) injection 5,000 Units Q8H    naloxone 0.4 mg/mL injection 0.02 mg PRN    sodium chloride 0.9% bolus 500 mL 500 mL Once    sodium chloride 0.9% flush 10 mL Q12H PRN    [START ON 2/27/2025] tamsulosin 24 hr capsule 0.4 mg Daily     Current Outpatient Medications   Medication    amLODIPine (NORVASC) 10 MG tablet    aspirin (ECOTRIN) 81 MG EC tablet    atorvastatin (LIPITOR) 20 MG tablet    baclofen (LIORESAL) 10 MG tablet    fluticasone propionate (FLONASE) 50 mcg/actuation nasal spray    gabapentin (NEURONTIN) 800 MG tablet    HYDROcodone-acetaminophen (NORCO) 7.5-325 mg per tablet    losartan-hydrochlorothiazide 100-12.5 mg (HYZAAR) 100-12.5 mg Tab    metFORMIN (GLUCOPHAGE) 1000 MG tablet    polyethylene glycol (GLYCOLAX) 17 gram PwPk    tamsulosin (FLOMAX) 0.4 mg Cap    vitamin D (VITAMIN D3) 1000 units Tab    sertraline (ZOLOFT) 50 MG tablet     Family History       Problem Relation (Age of Onset)    Diabetes Mother, Father    Heart disease Mother          Tobacco Use    Smoking status: Never    Smokeless tobacco: Never   Substance and Sexual Activity    Alcohol use: Not Currently     Comment: occasionally    Drug use: Yes     Types: Marijuana    Sexual activity: Yes     Review of Systems   Reason unable  to perform ROS: not answering questions.     Objective:     Vital Signs (Most Recent):  Temp: 98 °F (36.7 °C) (02/26/25 1607)  Pulse: 97 (02/26/25 1607)  Resp: 20 (02/26/25 1607)  BP: 125/70 (02/26/25 1607)  SpO2: 96 % (02/26/25 1607) Vital Signs (24h Range):  Temp:  [97.9 °F (36.6 °C)-98 °F (36.7 °C)] 98 °F (36.7 °C)  Pulse:  [] 97  Resp:  [13-20] 20  SpO2:  [95 %-100 %] 96 %  BP: (116-143)/(70-83) 125/70     Weight: (!) 139.8 kg (308 lb 4.8 oz) (02/26/25 0723)  Body mass index is 43 kg/m².  Body surface area is 2.65 meters squared.    No intake/output data recorded.     Physical Exam  Constitutional:       General: He is not in acute distress.  HENT:      Head: Normocephalic.   Cardiovascular:      Rate and Rhythm: Regular rhythm.      Heart sounds: Normal heart sounds.   Pulmonary:      Breath sounds: Normal breath sounds.   Abdominal:      General: Abdomen is flat.   Musculoskeletal:         General: No swelling.   Skin:     General: Skin is warm.   Neurological:      Mental Status: He is alert. He is disoriented.          Significant Labs:  CBC:   Recent Labs   Lab 02/26/25  0918 02/26/25  0925 02/26/25  1005   WBC 9.67  --   --    RBC 4.66  --   --    HGB 13.1*  --   --    HCT 41.5   < > 42     --   --    MCV 89  --   --    MCH 28.1  --   --    MCHC 31.6*  --   --     < > = values in this interval not displayed.     CMP:   Recent Labs   Lab 02/26/25 0918   GLU 82   CALCIUM 9.9   ALBUMIN 3.4*   PROT 8.9*   *   K 7.0*   CO2 15*      *   CREATININE 4.5*   ALKPHOS 97   ALT 11   AST 20   BILITOT 0.3     All labs within the past 24 hours have been reviewed.

## 2025-02-26 NOTE — ASSESSMENT & PLAN NOTE
Patient's FSGs are controlled on current medication regimen.  Last A1c reviewed-   Lab Results   Component Value Date    HGBA1C 6.9 (H) 11/22/2024     Most recent fingerstick glucose reviewed-   Recent Labs   Lab 02/26/25  1031 02/26/25  1105   POCTGLUCOSE 110 112*     Current correctional scale  Low  Maintain anti-hyperglycemic dose as follows-   Antihyperglycemics (From admission, onward)      Start     Stop Route Frequency Ordered    02/26/25 1715  insulin aspart U-100 pen 0-5 Units         -- SubQ Every 6 hours PRN 02/26/25 1615          Hold Oral hypoglycemics while patient is in the hospital.

## 2025-02-26 NOTE — Clinical Note
Diagnosis: Encephalopathy acute [451349]   Future Attending Provider: PRAVIN BARILLAS [[id number]]   Reason for IP Medical Treatment  (Clinical interventions that can only be accomplished in the IP setting? ) :: vamshi, hyperkalemia, encephalopathy   Special Needs:: Fall Risk [15]

## 2025-02-26 NOTE — SUBJECTIVE & OBJECTIVE
Past Medical History:   Diagnosis Date    Depression     Diabetes mellitus     Gout     High cholesterol     Hypertension        History reviewed. No pertinent surgical history.    Review of patient's allergies indicates:   Allergen Reactions    Lisinopril Other (See Comments)     cough       No current facility-administered medications on file prior to encounter.     Current Outpatient Medications on File Prior to Encounter   Medication Sig    amLODIPine (NORVASC) 10 MG tablet Take 1 tablet (10 mg total) by mouth once daily.    aspirin (ECOTRIN) 81 MG EC tablet Take 81 mg by mouth once daily.    atorvastatin (LIPITOR) 20 MG tablet Take 20 mg by mouth once daily.    baclofen (LIORESAL) 10 MG tablet Take 10 mg by mouth 3 (three) times daily.    fluticasone propionate (FLONASE) 50 mcg/actuation nasal spray 2 sprays (100 mcg total) by Each Nostril route once daily.    gabapentin (NEURONTIN) 800 MG tablet Take 1 tablet (800 mg total) by mouth 2 (two) times daily.    HYDROcodone-acetaminophen (NORCO) 7.5-325 mg per tablet Take 1 tablet by mouth every 8 (eight) hours as needed for Pain.    losartan-hydrochlorothiazide 100-12.5 mg (HYZAAR) 100-12.5 mg Tab Take 1 tablet by mouth once daily.    metFORMIN (GLUCOPHAGE) 1000 MG tablet Take 1,000 mg by mouth 2 (two) times daily with meals.    polyethylene glycol (GLYCOLAX) 17 gram PwPk Take 17 g by mouth once daily.    sertraline (ZOLOFT) 50 MG tablet Take 1 tablet (50 mg total) by mouth once daily.    tamsulosin (FLOMAX) 0.4 mg Cap Take by mouth once daily.    vitamin D (VITAMIN D3) 1000 units Tab Take 1,000 Units by mouth once daily.     Family History       Problem Relation (Age of Onset)    Diabetes Mother, Father    Heart disease Mother          Tobacco Use    Smoking status: Never    Smokeless tobacco: Never   Substance and Sexual Activity    Alcohol use: Not Currently     Comment: occasionally    Drug use: Yes     Types: Marijuana    Sexual activity: Yes     Review of  Systems  Objective:     Vital Signs (Most Recent):  Temp: 97.9 °F (36.6 °C) (02/26/25 0725)  Pulse: 92 (02/26/25 1131)  Resp: 13 (02/26/25 1131)  BP: 129/73 (02/26/25 1131)  SpO2: 96 % (02/26/25 1131) Vital Signs (24h Range):  Temp:  [97.9 °F (36.6 °C)] 97.9 °F (36.6 °C)  Pulse:  [] 92  Resp:  [13-20] 13  SpO2:  [95 %-100 %] 96 %  BP: (116-143)/(72-83) 129/73     Weight: (!) 139.8 kg (308 lb 4.8 oz)  Body mass index is 43 kg/m².     Physical Exam  Vitals reviewed.   Constitutional:       Appearance: He is ill-appearing and toxic-appearing. He is not diaphoretic.   HENT:      Head: Normocephalic.      Mouth/Throat:      Mouth: Mucous membranes are dry.      Dentition: Normal dentition.      Pharynx: Oropharynx is clear.      Comments: Dentures left at nursing facility  Eyes:      Pupils: Pupils are equal, round, and reactive to light.   Cardiovascular:      Rate and Rhythm: Normal rate and regular rhythm.   Pulmonary:      Effort: Pulmonary effort is normal. No respiratory distress.      Breath sounds: Normal breath sounds. No wheezing.   Abdominal:      General: Bowel sounds are normal. There is distension.      Palpations: Abdomen is soft.      Tenderness: There is no abdominal tenderness.   Musculoskeletal:      Cervical back: Normal range of motion.      Right lower leg: Swelling present.      Left lower leg: Swelling present.   Skin:     General: Skin is warm and dry.      Coloration: Skin is ashen.      Comments: Ulcerated, stasis dermatitis lower legs    Neurological:      Mental Status: He is disoriented.      Comments: Bed bound              CRANIAL NERVES     CN III, IV, VI   Pupils are equal, round, and reactive to light.       Significant Labs: All pertinent labs within the past 24 hours have been reviewed.  CBC:   Recent Labs   Lab 02/26/25  0918 02/26/25  0925 02/26/25  0927 02/26/25  1005   WBC 9.67  --   --   --    HGB 13.1*  --   --   --    HCT 41.5 43 42 42     --   --   --      CMP:  "  Recent Labs   Lab 02/26/25  0918   *   K 7.0*      CO2 15*   GLU 82   *   CREATININE 4.5*   CALCIUM 9.9   PROT 8.9*   ALBUMIN 3.4*   BILITOT 0.3   ALKPHOS 97   AST 20   ALT 11   ANIONGAP 11     Urine Culture: No results for input(s): "LABURIN" in the last 48 hours.  Urine Studies:   Recent Labs   Lab 02/26/25  1105   COLORU Yellow   APPEARANCEUA Clear   PHUR 5.0   SPECGRAV 1.020   PROTEINUA Trace*   GLUCUA Negative   KETONESU Negative   BILIRUBINUA Negative   OCCULTUA Negative   NITRITE Negative   LEUKOCYTESUR Negative       Significant Imaging: I have reviewed all pertinent imaging results/findings within the past 24 hours.  "

## 2025-02-26 NOTE — ASSESSMENT & PLAN NOTE
Hyperkalemia is likely due to KEATON.The patients most recent potassium results are listed below.  Recent Labs     02/26/25  0918   K 7.0*     Plan  - Monitor for arrhythmias with EKG and/or continuous telemetry.   - Treat the hyperkalemia with Potassium Binders, Calcium gluconate, IV insulin and dextrose, and Nebulized albuterol sulfate.   - Monitor potassium: Every 4 hours  - The patient's hyperkalemia is worsening. Will continue current treatment  - will consider sodium bicarb  - will appreciate nephrology recommendations

## 2025-02-27 PROBLEM — N18.31 ACUTE RENAL FAILURE SUPERIMPOSED ON STAGE 3A CHRONIC KIDNEY DISEASE: Status: ACTIVE | Noted: 2024-07-01

## 2025-02-27 PROBLEM — E11.22 TYPE 2 DIABETES MELLITUS WITH CHRONIC KIDNEY DISEASE, WITHOUT LONG-TERM CURRENT USE OF INSULIN: Status: ACTIVE | Noted: 2024-07-01

## 2025-02-27 PROBLEM — G89.4 CHRONIC PAIN SYNDROME: Status: ACTIVE | Noted: 2024-07-01

## 2025-02-27 PROBLEM — E78.2 MIXED HYPERLIPIDEMIA: Status: ACTIVE | Noted: 2025-02-27

## 2025-02-27 LAB
ANION GAP SERPL CALC-SCNC: 10 MMOL/L (ref 8–16)
ANION GAP SERPL CALC-SCNC: 9 MMOL/L (ref 8–16)
BASOPHILS # BLD AUTO: 0.03 K/UL (ref 0–0.2)
BASOPHILS # BLD AUTO: 0.05 K/UL (ref 0–0.2)
BASOPHILS NFR BLD: 0.5 % (ref 0–1.9)
BASOPHILS NFR BLD: 0.7 % (ref 0–1.9)
BUN SERPL-MCNC: 90 MG/DL (ref 8–23)
BUN SERPL-MCNC: 91 MG/DL (ref 8–23)
BUN SERPL-MCNC: 94 MG/DL (ref 8–23)
BUN SERPL-MCNC: 95 MG/DL (ref 8–23)
CALCIUM SERPL-MCNC: 9.4 MG/DL (ref 8.7–10.5)
CALCIUM SERPL-MCNC: 9.5 MG/DL (ref 8.7–10.5)
CALCIUM SERPL-MCNC: 9.5 MG/DL (ref 8.7–10.5)
CALCIUM SERPL-MCNC: 9.6 MG/DL (ref 8.7–10.5)
CHLORIDE SERPL-SCNC: 100 MMOL/L (ref 95–110)
CHLORIDE SERPL-SCNC: 101 MMOL/L (ref 95–110)
CHLORIDE SERPL-SCNC: 103 MMOL/L (ref 95–110)
CHLORIDE SERPL-SCNC: 104 MMOL/L (ref 95–110)
CO2 SERPL-SCNC: 19 MMOL/L (ref 23–29)
CO2 SERPL-SCNC: 19 MMOL/L (ref 23–29)
CO2 SERPL-SCNC: 22 MMOL/L (ref 23–29)
CO2 SERPL-SCNC: 22 MMOL/L (ref 23–29)
CREAT SERPL-MCNC: 4.2 MG/DL (ref 0.5–1.4)
CREAT SERPL-MCNC: 4.3 MG/DL (ref 0.5–1.4)
DIFFERENTIAL METHOD BLD: ABNORMAL
DIFFERENTIAL METHOD BLD: ABNORMAL
EOSINOPHIL # BLD AUTO: 0.1 K/UL (ref 0–0.5)
EOSINOPHIL # BLD AUTO: 0.1 K/UL (ref 0–0.5)
EOSINOPHIL NFR BLD: 1.5 % (ref 0–8)
EOSINOPHIL NFR BLD: 2 % (ref 0–8)
ERYTHROCYTE [DISTWIDTH] IN BLOOD BY AUTOMATED COUNT: 16.1 % (ref 11.5–14.5)
ERYTHROCYTE [DISTWIDTH] IN BLOOD BY AUTOMATED COUNT: 16.5 % (ref 11.5–14.5)
EST. GFR  (NO RACE VARIABLE): 13.8 ML/MIN/1.73 M^2
EST. GFR  (NO RACE VARIABLE): 14.2 ML/MIN/1.73 M^2
GLUCOSE SERPL-MCNC: 104 MG/DL (ref 70–110)
GLUCOSE SERPL-MCNC: 115 MG/DL (ref 70–110)
GLUCOSE SERPL-MCNC: 94 MG/DL (ref 70–110)
GLUCOSE SERPL-MCNC: 97 MG/DL (ref 70–110)
HCT VFR BLD AUTO: 37.2 % (ref 40–54)
HCT VFR BLD AUTO: 38.7 % (ref 40–54)
HGB BLD-MCNC: 12.2 G/DL (ref 14–18)
HGB BLD-MCNC: 12.9 G/DL (ref 14–18)
IMM GRANULOCYTES # BLD AUTO: 0.03 K/UL (ref 0–0.04)
IMM GRANULOCYTES # BLD AUTO: 0.03 K/UL (ref 0–0.04)
IMM GRANULOCYTES NFR BLD AUTO: 0.4 % (ref 0–0.5)
IMM GRANULOCYTES NFR BLD AUTO: 0.5 % (ref 0–0.5)
LEFT ANT TIBIAL SYS PSV: 49 CM/S
LEFT CFA PSV: 101 CM/S
LEFT EXTERNAL ILIAC PSV: 103 CM/S
LEFT PERONEAL SYS PSV: 72 CM/S
LEFT POPLITEAL PSV: 53 CM/S
LEFT POST TIBIAL SYS PSV: 66 CM/S
LEFT PROFUNDA SYS PSV: 53 CM/S
LEFT SUPER FEMORAL DIST SYS PSV: 73 CM/S
LEFT SUPER FEMORAL MID SYS PSV: 62 CM/S
LEFT SUPER FEMORAL OSTIAL SYS PSV: 69 CM/S
LEFT SUPER FEMORAL PROX SYS PSV: 61 CM/S
LEFT TIB/PER TRUNK SYS PSV: 32 CM/S
LYMPHOCYTES # BLD AUTO: 1.4 K/UL (ref 1–4.8)
LYMPHOCYTES # BLD AUTO: 1.4 K/UL (ref 1–4.8)
LYMPHOCYTES NFR BLD: 19.7 % (ref 18–48)
LYMPHOCYTES NFR BLD: 21.2 % (ref 18–48)
MAGNESIUM SERPL-MCNC: 1.8 MG/DL (ref 1.6–2.6)
MAGNESIUM SERPL-MCNC: 1.9 MG/DL (ref 1.6–2.6)
MCH RBC QN AUTO: 28.4 PG (ref 27–31)
MCH RBC QN AUTO: 29.3 PG (ref 27–31)
MCHC RBC AUTO-ENTMCNC: 32.8 G/DL (ref 32–36)
MCHC RBC AUTO-ENTMCNC: 33.3 G/DL (ref 32–36)
MCV RBC AUTO: 87 FL (ref 82–98)
MCV RBC AUTO: 88 FL (ref 82–98)
MONOCYTES # BLD AUTO: 0.6 K/UL (ref 0.3–1)
MONOCYTES # BLD AUTO: 0.6 K/UL (ref 0.3–1)
MONOCYTES NFR BLD: 8.6 % (ref 4–15)
MONOCYTES NFR BLD: 8.8 % (ref 4–15)
NEUTROPHILS # BLD AUTO: 4.3 K/UL (ref 1.8–7.7)
NEUTROPHILS # BLD AUTO: 5 K/UL (ref 1.8–7.7)
NEUTROPHILS NFR BLD: 67 % (ref 38–73)
NEUTROPHILS NFR BLD: 69.1 % (ref 38–73)
NRBC BLD-RTO: 0 /100 WBC
NRBC BLD-RTO: 0 /100 WBC
PHOSPHATE SERPL-MCNC: 5 MG/DL (ref 2.7–4.5)
PHOSPHATE SERPL-MCNC: 5.2 MG/DL (ref 2.7–4.5)
PLATELET # BLD AUTO: 279 K/UL (ref 150–450)
PLATELET # BLD AUTO: 301 K/UL (ref 150–450)
PMV BLD AUTO: 10.2 FL (ref 9.2–12.9)
PMV BLD AUTO: 10.6 FL (ref 9.2–12.9)
POCT GLUCOSE: 106 MG/DL (ref 70–110)
POTASSIUM SERPL-SCNC: 5.7 MMOL/L (ref 3.5–5.1)
POTASSIUM SERPL-SCNC: 5.7 MMOL/L (ref 3.5–5.1)
POTASSIUM SERPL-SCNC: 5.8 MMOL/L (ref 3.5–5.1)
POTASSIUM SERPL-SCNC: 5.9 MMOL/L (ref 3.5–5.1)
RBC # BLD AUTO: 4.3 M/UL (ref 4.6–6.2)
RBC # BLD AUTO: 4.41 M/UL (ref 4.6–6.2)
RIGHT ANT TIBIAL SYS PSV: 22 CM/S
RIGHT CFA PSV: 94 CM/S
RIGHT EXTERNAL ILLIAC PSV: 57 CM/S
RIGHT PERONEAL SYS PSV: 34 CM/S
RIGHT POPLITEAL PSV: 60 CM/S
RIGHT POST TIBIAL SYS PSV: 105 CM/S
RIGHT PROFUNDA SYS PSV: 72 CM/S
RIGHT SUPER FEMORAL DIST SYS PSV: 85 CM/S
RIGHT SUPER FEMORAL MID SYS PSV: 80 CM/S
RIGHT SUPER FEMORAL OSTIAL SYS PSV: 92 CM/S
RIGHT SUPER FEMORAL PROX SYS PSV: 71 CM/S
RIGHT TIB/PER TRUNK SYS PSV: 58 CM/S
SODIUM SERPL-SCNC: 131 MMOL/L (ref 136–145)
SODIUM SERPL-SCNC: 132 MMOL/L (ref 136–145)
SODIUM SERPL-SCNC: 133 MMOL/L (ref 136–145)
SODIUM SERPL-SCNC: 133 MMOL/L (ref 136–145)
WBC # BLD AUTO: 6.37 K/UL (ref 3.9–12.7)
WBC # BLD AUTO: 7.21 K/UL (ref 3.9–12.7)

## 2025-02-27 PROCEDURE — 25000003 PHARM REV CODE 250

## 2025-02-27 PROCEDURE — 80048 BASIC METABOLIC PNL TOTAL CA: CPT

## 2025-02-27 PROCEDURE — 21400001 HC TELEMETRY ROOM

## 2025-02-27 PROCEDURE — 85025 COMPLETE CBC W/AUTO DIFF WBC: CPT

## 2025-02-27 PROCEDURE — 85025 COMPLETE CBC W/AUTO DIFF WBC: CPT | Mod: 91 | Performed by: INTERNAL MEDICINE

## 2025-02-27 PROCEDURE — 84100 ASSAY OF PHOSPHORUS: CPT

## 2025-02-27 PROCEDURE — 84100 ASSAY OF PHOSPHORUS: CPT | Mod: 91 | Performed by: INTERNAL MEDICINE

## 2025-02-27 PROCEDURE — 83735 ASSAY OF MAGNESIUM: CPT | Mod: 91 | Performed by: INTERNAL MEDICINE

## 2025-02-27 PROCEDURE — 99223 1ST HOSP IP/OBS HIGH 75: CPT | Mod: ,,, | Performed by: SURGERY

## 2025-02-27 PROCEDURE — 25000003 PHARM REV CODE 250: Performed by: INTERNAL MEDICINE

## 2025-02-27 PROCEDURE — 99233 SBSQ HOSP IP/OBS HIGH 50: CPT | Mod: GC,,, | Performed by: STUDENT IN AN ORGANIZED HEALTH CARE EDUCATION/TRAINING PROGRAM

## 2025-02-27 PROCEDURE — 80048 BASIC METABOLIC PNL TOTAL CA: CPT | Mod: 91 | Performed by: INTERNAL MEDICINE

## 2025-02-27 PROCEDURE — 83735 ASSAY OF MAGNESIUM: CPT

## 2025-02-27 PROCEDURE — 36415 COLL VENOUS BLD VENIPUNCTURE: CPT

## 2025-02-27 PROCEDURE — 11000001 HC ACUTE MED/SURG PRIVATE ROOM

## 2025-02-27 PROCEDURE — 80048 BASIC METABOLIC PNL TOTAL CA: CPT | Mod: 91

## 2025-02-27 PROCEDURE — 63600175 PHARM REV CODE 636 W HCPCS

## 2025-02-27 RX ORDER — TALC
6 POWDER (GRAM) TOPICAL NIGHTLY PRN
Status: DISCONTINUED | OUTPATIENT
Start: 2025-02-27 | End: 2025-03-12 | Stop reason: HOSPADM

## 2025-02-27 RX ORDER — HYDROXYZINE HYDROCHLORIDE 25 MG/1
50 TABLET, FILM COATED ORAL 3 TIMES DAILY PRN
Status: DISCONTINUED | OUTPATIENT
Start: 2025-02-27 | End: 2025-02-28

## 2025-02-27 RX ORDER — GABAPENTIN 300 MG/1
300 CAPSULE ORAL NIGHTLY
Status: DISCONTINUED | OUTPATIENT
Start: 2025-02-27 | End: 2025-03-12 | Stop reason: HOSPADM

## 2025-02-27 RX ORDER — HYDROMORPHONE HYDROCHLORIDE 1 MG/ML
0.5 INJECTION, SOLUTION INTRAMUSCULAR; INTRAVENOUS; SUBCUTANEOUS EVERY 6 HOURS PRN
Refills: 0 | Status: DISCONTINUED | OUTPATIENT
Start: 2025-02-27 | End: 2025-03-05

## 2025-02-27 RX ORDER — MUPIROCIN 20 MG/G
OINTMENT TOPICAL 2 TIMES DAILY
Status: DISPENSED | OUTPATIENT
Start: 2025-02-27 | End: 2025-03-04

## 2025-02-27 RX ADMIN — ATORVASTATIN CALCIUM 20 MG: 20 TABLET, FILM COATED ORAL at 08:02

## 2025-02-27 RX ADMIN — TAMSULOSIN HYDROCHLORIDE 0.4 MG: 0.4 CAPSULE ORAL at 08:02

## 2025-02-27 RX ADMIN — SODIUM BICARBONATE: 84 INJECTION, SOLUTION INTRAVENOUS at 03:02

## 2025-02-27 RX ADMIN — FUROSEMIDE 200 MG: 10 INJECTION, SOLUTION INTRAMUSCULAR; INTRAVENOUS at 01:02

## 2025-02-27 RX ADMIN — Medication 6 MG: at 08:02

## 2025-02-27 RX ADMIN — ASPIRIN 81 MG: 81 TABLET, COATED ORAL at 08:02

## 2025-02-27 RX ADMIN — GABAPENTIN 300 MG: 300 CAPSULE ORAL at 08:02

## 2025-02-27 RX ADMIN — ACETAMINOPHEN 1000 MG: 500 TABLET ORAL at 08:02

## 2025-02-27 RX ADMIN — SODIUM BICARBONATE: 84 INJECTION, SOLUTION INTRAVENOUS at 02:02

## 2025-02-27 RX ADMIN — SODIUM ZIRCONIUM CYCLOSILICATE 10 G: 10 POWDER, FOR SUSPENSION ORAL at 08:02

## 2025-02-27 RX ADMIN — Medication 6 MG: at 01:02

## 2025-02-27 RX ADMIN — OXYCODONE HYDROCHLORIDE 10 MG: 10 TABLET ORAL at 01:02

## 2025-02-27 RX ADMIN — HYDROXYZINE HYDROCHLORIDE 50 MG: 25 TABLET, FILM COATED ORAL at 09:02

## 2025-02-27 RX ADMIN — OXYCODONE HYDROCHLORIDE 10 MG: 10 TABLET ORAL at 05:02

## 2025-02-27 RX ADMIN — CHLOROTHIAZIDE SODIUM 500 MG: 500 INJECTION, POWDER, LYOPHILIZED, FOR SOLUTION INTRAVENOUS at 02:02

## 2025-02-27 RX ADMIN — MUPIROCIN: 20 OINTMENT TOPICAL at 11:02

## 2025-02-27 RX ADMIN — SODIUM ZIRCONIUM CYCLOSILICATE 10 G: 10 POWDER, FOR SUSPENSION ORAL at 02:02

## 2025-02-27 NOTE — ED NOTES
Telemetry Verification   Patient placed on Telemetry Box  Verified with War Room  Box # [id number]   Monitor Tech Giuseppe   Rate 81   Rhythm NSR

## 2025-02-27 NOTE — ED NOTES
Report received from TIMUR Bhatt. Assume care of pt. Pt resting comfortably and independently repositioned in stretcher with bed locked in lowest position for safety. NAD noted at this time. Respirations even and unlabored and visible chest rise noted.  Patient has indwelling david. Pt instructed to call if assistance is needed. Pt on continuous cardiac monitoring. Call light within reach. No needs at this time. Will continue to monitor.

## 2025-02-27 NOTE — ASSESSMENT & PLAN NOTE
History of rapid decline over the last 9 months. Spent 2 months at [facility]. Staff there says patient has had difficulty adjusting.     - will consult PT/OT when encephalopathy resolves

## 2025-02-27 NOTE — PLAN OF CARE
02/27/25 1407   Post-Acute Status   Post-Acute Authorization Placement   Post-Acute Placement Status Set-up Complete/Auth obtained   Discharge Delays None known at this time   Discharge Plan   Discharge Plan A Return to nursing home   Discharge Plan B Return to Nursing Home     RADHA called to verify pt status of SNF vs alf at [facility].  RADHA spoke with Tracey in admissions and she verified pt is an senior living resident of [facility]. Pt will return to [facility] upon discharge.  No further needs identified at this time.    Discharge Plan A and Plan B have been determined by review of patient's clinical status, future medical and therapeutic needs, and coverage/benefits for post-acute care in coordination with multidisciplinary team members.     Neha Tobias, MSN   [facility]  [phone]

## 2025-02-27 NOTE — PLAN OF CARE
Problem: Adult Inpatient Plan of Care  Goal: Plan of Care Review  Outcome: Progressing  Goal: Patient-Specific Goal (Individualized)  Outcome: Progressing  Goal: Absence of Hospital-Acquired Illness or Injury  Outcome: Progressing  Goal: Optimal Comfort and Wellbeing  Outcome: Progressing  Goal: Readiness for Transition of Care  Outcome: Progressing     Problem: Bariatric Environmental Safety  Goal: Safety Maintained with Care  Outcome: Progressing     Problem: Skin Injury Risk Increased  Goal: Skin Health and Integrity  Outcome: Progressing     Problem: Infection  Goal: Absence of Infection Signs and Symptoms  Outcome: Progressing     Problem: Electrolyte Imbalance  Goal: Electrolyte Balance  Outcome: Progressing     Problem: Acute Kidney Injury/Impairment  Goal: Fluid and Electrolyte Balance  Outcome: Progressing  Goal: Improved Oral Intake  Outcome: Progressing  Goal: Effective Renal Function  Outcome: Progressing     Problem: Confusion Acute  Goal: Optimal Cognitive Function  Outcome: Progressing     Problem: Urinary Retention  Goal: Effective Urinary Elimination  Outcome: Progressing     Problem: Diabetes Comorbidity  Goal: Blood Glucose Level Within Targeted Range  Outcome: Progressing     Problem: Wound  Goal: Optimal Coping  Outcome: Progressing  Goal: Optimal Functional Ability  Outcome: Progressing  Goal: Absence of Infection Signs and Symptoms  Outcome: Progressing  Goal: Improved Oral Intake  Outcome: Progressing  Goal: Optimal Pain Control and Function  Outcome: Progressing  Goal: Skin Health and Integrity  Outcome: Progressing  Goal: Optimal Wound Healing  Outcome: Progressing

## 2025-02-27 NOTE — SUBJECTIVE & OBJECTIVE
Interval History: Patient refusing labs/general care and making statement of self harm. Psychology consulted for capacity. 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 Vascular surgery consulted.     Review of Systems  Objective:     Vital Signs (Most Recent):  Temp: 97.3 °F (36.3 °C) (02/27/25 1211)  Pulse: 83 (02/27/25 1513)  Resp: 18 (02/27/25 1211)  BP: 125/73 (02/27/25 1211)  SpO2: 100 % (02/27/25 1211) Vital Signs (24h Range):  Temp:  [97.2 °F (36.2 °C)-98.4 °F (36.9 °C)] 97.3 °F (36.3 °C)  Pulse:  [72-97] 83  Resp:  [18-20] 18  SpO2:  [96 %-100 %] 100 %  BP: (114-155)/(68-91) 125/73     Weight: (!) 139.7 kg (308 lb)  Body mass index is 42.96 kg/m².    Intake/Output Summary (Last 24 hours) at 2/27/2025 1546  Last data filed at 2/27/2025 1512  Gross per 24 hour   Intake 550 ml   Output 4550 ml   Net -4000 ml         Physical Exam  Vitals reviewed.   Constitutional:       Appearance: He is ill-appearing and toxic-appearing. He is not diaphoretic.   HENT:      Head: Normocephalic.      Mouth/Throat:      Mouth: Mucous membranes are dry.      Dentition: Normal dentition.      Pharynx: Oropharynx is clear.      Comments: Dentures left at nursing facility  Eyes:      Pupils: Pupils are equal, round, and reactive to light.   Cardiovascular:      Rate and Rhythm: Normal rate and regular rhythm.   Pulmonary:      Effort: Pulmonary effort is normal. No respiratory distress.      Breath sounds: Normal breath sounds. No wheezing.   Abdominal:      General: Bowel sounds are normal. There is distension.      Palpations: Abdomen is soft.      Tenderness: There is no abdominal tenderness.   Musculoskeletal:      Cervical back: Normal range of motion.      Right lower leg: Swelling present.      Left lower leg: Swelling present.   Skin:     General: Skin is warm and dry.      Coloration: Skin is ashen.      Comments: Ulcerated, stasis dermatitis lower legs    Neurological:      Mental Status: He is disoriented.       Comments: Bed bound             Significant Labs: All pertinent labs within the past 24 hours have been reviewed.  CBC:   Recent Labs   Lab 02/26/25  0918 02/26/25  0925 02/26/25  1005 02/27/25  0438 02/27/25  1045   WBC 9.67  --   --  6.37 7.21   HGB 13.1*  --   --  12.2* 12.9*   HCT 41.5   < > 42 37.2* 38.7*     --   --  301 279    < > = values in this interval not displayed.     CMP:   Recent Labs   Lab 02/26/25  0918 02/26/25  1827 02/26/25  1942 02/27/25  0438 02/27/25  1045   *   < > 134* 133*  131* 132*   K 7.0*   < > 6.1* 5.8*  5.7* 5.9*      < > 107 104  103 100   CO2 15*   < > 18* 19*  19* 22*   GLU 82   < > 83 94  97 115*   *   < > 98* 94*  95* 91*   CREATININE 4.5*   < > 4.3* 4.2*  4.2* 4.3*   CALCIUM 9.9   < > 9.6 9.5  9.6 9.5   PROT 8.9*  --   --   --   --    ALBUMIN 3.4*  --   --   --   --    BILITOT 0.3  --   --   --   --    ALKPHOS 97  --   --   --   --    AST 20  --   --   --   --    ALT 11  --   --   --   --    ANIONGAP 11   < > 9 10  9 10    < > = values in this interval not displayed.       Significant Imaging: I have reviewed all pertinent imaging results/findings within the past 24 hours.

## 2025-02-27 NOTE — CARE UPDATE
Unit [facility] Care Support Interaction      I have reviewed the chart of Riaz Guerra  who is hospitalized for Uremic encephalopathy. The patient is currently located in the following unit: [facility]        I have assisted the primary physician in management of the following:      MRSA Decolonization - Mupirocin ordered and CHG ordered    Emmy Gutierrez PA-C  Unit Based [facility]

## 2025-02-27 NOTE — NURSING
Notified Dr. Gabriel Tate and medical team of patient and situation, ISBAR provided, informed MD patient has complaints of 10/10 bilateral lower extremity pain with associated numbness and tingling. MD stated to try administering tylenol ordered first. Also informed provider that patient's labs have been drawn; however, lab is still processing according to personnel. MD and providers voiced understanding and stated to wait to give scheduled lokelma and ca gluconate until potassium results.

## 2025-02-27 NOTE — PROGRESS NOTES
[facility] Medicine  Progress Note    Patient Name: Riaz Guerra Jr.  MRN: [id number]  Patient Class: IP- Inpatient   Admission Date: 2/26/2025  Length of Stay: 1 days  Attending Physician: Alexandre Crespo MD  Primary Care Provider: Berhane Alvarez MD        Subjective     Principal Problem:Uremic encephalopathy        HPI:  Riaz Guerra is a 75-year-old man with a medical history of HTN, HLD, insulin dependent DM, CKD, bilateral inguinal hernias, chronic venous insufficiency, obesity, chronic hip/back pain, and wheelchair dependent. He presented to the ED from [facility] ([facility]) due to altered mental status, labored breathing, and refusing medications. Per phone call to [facility], the patient had normal mentation prior to this admission, but had difficulty adjusting to the facility.     At the ED the patient was A/O x2 to person, and time. Reported pain in bilateral shoulders and hip, feeling cold and thirsty. Discoloration was noted on both lower limbs suggestive of venous stasis. Cole catheter was placed due to greater than 400ml urine seen on bladder scan. Labs were significant for KEATON: Cr 4.5 from base line of 1.3. Potassium 7.0, , CO2 15. CT abdomen pelvis negative for hydronephrosis but noted for bilateral basilar atelectasis and 2mm pulmonary nodule along the fissure on the right as well as tree in bud type nodules along the medial aspect of the left lower lobe concerning for possible developing infectious or inflammatory process. CT head negative for acute intracranial changes.    Patient's sister has been contacted about the patient's location and new condition. Per his sister, patient has not been able to ambulate for the last 9 months and has been at the SNF facility for the last two months.           Overview/Hospital Course:  No notes on file    Interval History: Patient refusing labs/general care and making statement of self harm. Psychology consulted  for capacity. 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 Vascular surgery consulted.     Review of Systems  Objective:     Vital Signs (Most Recent):  Temp: 97.3 °F (36.3 °C) (02/27/25 1211)  Pulse: 83 (02/27/25 1513)  Resp: 18 (02/27/25 1211)  BP: 125/73 (02/27/25 1211)  SpO2: 100 % (02/27/25 1211) Vital Signs (24h Range):  Temp:  [97.2 °F (36.2 °C)-98.4 °F (36.9 °C)] 97.3 °F (36.3 °C)  Pulse:  [72-97] 83  Resp:  [18-20] 18  SpO2:  [96 %-100 %] 100 %  BP: (114-155)/(68-91) 125/73     Weight: (!) 139.7 kg (308 lb)  Body mass index is 42.96 kg/m².    Intake/Output Summary (Last 24 hours) at 2/27/2025 1546  Last data filed at 2/27/2025 1512  Gross per 24 hour   Intake 550 ml   Output 4550 ml   Net -4000 ml         Physical Exam  Vitals reviewed.   Constitutional:       Appearance: He is ill-appearing and toxic-appearing. He is not diaphoretic.   HENT:      Head: Normocephalic.      Mouth/Throat:      Mouth: Mucous membranes are dry.      Dentition: Normal dentition.      Pharynx: Oropharynx is clear.      Comments: Dentures left at nursing facility  Eyes:      Pupils: Pupils are equal, round, and reactive to light.   Cardiovascular:      Rate and Rhythm: Normal rate and regular rhythm.   Pulmonary:      Effort: Pulmonary effort is normal. No respiratory distress.      Breath sounds: Normal breath sounds. No wheezing.   Abdominal:      General: Bowel sounds are normal. There is distension.      Palpations: Abdomen is soft.      Tenderness: There is no abdominal tenderness.   Musculoskeletal:      Cervical back: Normal range of motion.      Right lower leg: Swelling present.      Left lower leg: Swelling present.   Skin:     General: Skin is warm and dry.      Coloration: Skin is ashen.      Comments: Ulcerated, stasis dermatitis lower legs    Neurological:      Mental Status: He is disoriented.      Comments: Bed bound             Significant Labs: All pertinent labs within the past 24 hours have been  reviewed.  CBC:   Recent Labs   Lab 02/26/25  0918 02/26/25  0925 02/26/25  1005 02/27/25  0438 02/27/25  1045   WBC 9.67  --   --  6.37 7.21   HGB 13.1*  --   --  12.2* 12.9*   HCT 41.5   < > 42 37.2* 38.7*     --   --  301 279    < > = values in this interval not displayed.     CMP:   Recent Labs   Lab 02/26/25  0918 02/26/25  1827 02/26/25  1942 02/27/25  0438 02/27/25  1045   *   < > 134* 133*  131* 132*   K 7.0*   < > 6.1* 5.8*  5.7* 5.9*      < > 107 104  103 100   CO2 15*   < > 18* 19*  19* 22*   GLU 82   < > 83 94  97 115*   *   < > 98* 94*  95* 91*   CREATININE 4.5*   < > 4.3* 4.2*  4.2* 4.3*   CALCIUM 9.9   < > 9.6 9.5  9.6 9.5   PROT 8.9*  --   --   --   --    ALBUMIN 3.4*  --   --   --   --    BILITOT 0.3  --   --   --   --    ALKPHOS 97  --   --   --   --    AST 20  --   --   --   --    ALT 11  --   --   --   --    ANIONGAP 11   < > 9 10  9 10    < > = values in this interval not displayed.       Significant Imaging: I have reviewed all pertinent imaging results/findings within the past 24 hours.    Assessment and Plan     * Uremic encephalopathy  See acute renal failure     Right hip pain  Holding gabapentin and baclofen iso renal dysfunction      Impaired mobility and activities of daily living  History of rapid decline over the last 9 months. Spent 2 months at [facility]. Staff there says patient has had difficulty adjusting.     - will consult PT/OT when encephalopathy resolves    Chronic wounds; BLE  - wound care consulted       Urine retention  - david placed  - strict input and out  - will monitor output and BMP following fluid challenge      Venous stasis of both lower extremities  - sequential compression device  - wound care for ulcerated leg wounds  - consult PT/OT when encephalopathy resolves    Metabolic acidosis, normal anion gap (NAG)  - see renal failure and hyperkalemia      Acute renal failure superimposed on stage 3a chronic  kidney disease  KEATON is likely due to pre-renal azotemia due to dehydration. Baseline creatinine is  1.5 . Most recent creatinine and eGFR are listed below.  Recent Labs     02/26/25  0918   CREATININE 4.5*   EGFRNORACEVR 13.1*      Plan  - KEATON is worsening. Will continue current treatment  - Avoid nephrotoxins and renally dose meds for GFR listed above  - Monitor urine output, serial BMP, and adjust therapy as needed  - Fluid challenge 500 ml bolus over 2 hrs NS  - BMP q4  - nephrology recommendations:                          - Need stat BMP                         - Give another gram of calcium gluconate and 200 mg iv lasix plus diuril 500 mg x1                         - schedule lokelma 10 g tid                         - recommend eval for capacity; if hyperK worsens, he will likely need dialysis.    Chronic pain syndrome  - holding pain medications (baclofen and gabapentin) until kidney function is stable    Hyperkalemia  Hyperkalemia is likely due to KEATON.The patients most recent potassium results are listed below.  Recent Labs     02/26/25  0918   K 7.0*     Plan  - Monitor for arrhythmias with EKG and/or continuous telemetry.   - Treat the hyperkalemia with Potassium Binders, Calcium gluconate, IV insulin and dextrose, and Nebulized albuterol sulfate.   - Monitor potassium: Every 4 hours  - The patient's hyperkalemia is worsening. Will continue current treatment  - will consider sodium bicarb  - will appreciate nephrology recommendations             Type 2 diabetes mellitus with chronic kidney disease, without long-term current use of insulin  Patient's FSGs are controlled on current medication regimen.  Last A1c reviewed-   Lab Results   Component Value Date    HGBA1C 6.9 (H) 11/22/2024     Most recent fingerstick glucose reviewed-   Recent Labs   Lab 02/26/25  1031 02/26/25  1105   POCTGLUCOSE 110 112*     Current correctional scale  Low  Maintain anti-hyperglycemic dose as follows-   Antihyperglycemics  (From admission, onward)      Start     Stop Route Frequency Ordered    02/26/25 1715  insulin aspart U-100 pen 0-5 Units         -- SubQ Every 6 hours PRN 02/26/25 1615          Hold Oral hypoglycemics while patient is in the hospital.      VTE Risk Mitigation (From admission, onward)           Ordered     IP VTE HIGH RISK PATIENT  Once         02/26/25 1159     Place sequential compression device  Until discontinued         02/26/25 1159                    Discharge Planning   KELSEY: 3/3/2025     Code Status: Full Code   Medical Readiness for Discharge Date:   Discharge Plan A: Return to nursing home   Discharge Delays: None known at this time            Please place Justification for DME        Gabriel Tate MD  Department of Hospital Medicine   [facility]

## 2025-02-27 NOTE — NURSING
Notified Dr. Gabriel Tate and medical team that patient's repeat potassium is 6.2, along with rest of BMP has resulted. Informed providers Lokelma and calcium gluconate to be given. Providers voiced understanding.

## 2025-02-27 NOTE — PLAN OF CARE
Problem: Adult Inpatient Plan of Care  Goal: Plan of Care Review  Outcome: Progressing  Goal: Patient-Specific Goal (Individualized)  Outcome: Progressing  Goal: Absence of Hospital-Acquired Illness or Injury  Outcome: Progressing  Goal: Optimal Comfort and Wellbeing  Outcome: Progressing  Goal: Readiness for Transition of Care  Outcome: Progressing     Problem: Bariatric Environmental Safety  Goal: Safety Maintained with Care  Outcome: Progressing     Problem: Skin Injury Risk Increased  Goal: Skin Health and Integrity  Outcome: Progressing     Problem: Infection  Goal: Absence of Infection Signs and Symptoms  Outcome: Progressing     Problem: Electrolyte Imbalance  Goal: Electrolyte Balance  Outcome: Progressing     Problem: Acute Kidney Injury/Impairment  Goal: Fluid and Electrolyte Balance  Outcome: Progressing  Goal: Improved Oral Intake  Outcome: Progressing  Goal: Effective Renal Function  Outcome: Progressing     Problem: Confusion Acute  Goal: Optimal Cognitive Function  Outcome: Progressing     Problem: Urinary Retention  Goal: Effective Urinary Elimination  Outcome: Progressing

## 2025-02-27 NOTE — CONSULTS
[facility]  Wound Care    Patient Name:  Riaz Guerra Jr.   MRN:  [id number]  Date: 2/27/2025  Diagnosis: Uremic encephalopathy    History:     Past Medical History:   Diagnosis Date    Depression     Diabetes mellitus     Gout     High cholesterol     Hypertension        Social History[1]    Precautions:     Allergies as of 02/26/2025 - Reviewed 02/26/2025   Allergen Reaction Noted    Lisinopril Other (See Comments) 06/21/2024       WO Assessment Details/Treatment     Patient seen for inpatient wound care consult. Patient is sitting up in bed and agreeable to assessment at this time. Upon assessment, patient with pink scar tissue on bilateral legs. No open wounds or active drainage noted. No acute interventions needed.    Left ankle with healed, scabbed over prior wound. No active drainage noted. Cleanse left ankle wound with vashe for antimicrobial properties and pat dry. Apply mepilex foam border dressing. Change every three days or PRN if soiled.    Sacrum and buttocks with no open wounds or active drainage noted. Blanchable erythema noted indicative of moisture association. Cleanse buttocks with sterile normal saline and pat dry. Apply triad BID and PRN if soiled for moisture barrier.    No other issues or concerns at this time. Waffle mattress overlay ordered for pressure redistribution. Nursing to maintain pressure injury prevention measures. Will follow up 3/6/2025 or sooner if needed.    Reviewed chart for this encounter.  See flowsheet for findings.    Discussed POC with patient and primary nurse.  See EMR for orders and patient education.    Bedside nursing to continue care and monitoring.  Bedside to maintain pressure injury prevention interventions.        02/27/25 0900   WOCN Assessment   WOCN Total Time (mins) 30   Visit Date 02/27/25   Visit Time 0900   Consult Type New   WOCN Speciality Wound   Intervention assessed;changed;applied;chart review;coordination of care;orders   Teaching  on-going        Wound 02/26/25 1800 Other (comment) Right lateral Leg   Date First Assessed/Time First Assessed: 02/26/25 1800   Present on Original Admission: Yes  Primary Wound Type: (c) Other (comment)  Side: Right  Orientation: lateral  Location: Leg   Dressing Appearance Open to air;Dry;Intact;Clean   Drainage Amount None   Drainage Characteristics/Odor No odor   Appearance Triumph        Wound 02/26/25 1800 Other (comment) Left posterior Leg   Date First Assessed/Time First Assessed: 02/26/25 1800   Present on Original Admission: Yes  Primary Wound Type: (c) Other (comment)  Side: Left  Orientation: posterior  Location: Leg   Dressing Appearance Open to air;Dry;Intact;Clean   Drainage Amount None   Drainage Characteristics/Odor No odor   Appearance Intact;Pink        Wound 02/26/25 1800 Non pressure chronic ulcer Left Ankle   Date First Assessed/Time First Assessed: 02/26/25 1800   Present on Original Admission: Yes  Primary Wound Type: Non pressure chronic ulcer  Side: Left  Location: Ankle   Dressing Appearance Open to air;Dry;Intact   Drainage Amount None   Drainage Characteristics/Odor No odor   Appearance Pink   Care Cleansed with:;Antimicrobial agent  (Vashe)   Dressing Foam   Dressing Change Due 02/27/25        Wound 02/26/25 1800 Moisture associated dermatitis Right Buttocks   Date First Assessed/Time First Assessed: 02/26/25 1800   Primary Wound Type: Moisture associated dermatitis  Side: Right  Location: Buttocks   Dressing Appearance Open to air;Dry;Intact;Clean   Drainage Amount None   Drainage Characteristics/Odor No odor   Appearance Intact;Pink   Care Cleansed with:;Sterile normal saline;Applied:;Skin Barrier  (Triad)       Orders placed.   Giuseppe VANESSA, RN, [facility]  02/27/2025         [1]   Social History  Socioeconomic History    Marital status: Single   Tobacco Use    Smoking status: Never    Smokeless tobacco: Never   Substance and Sexual Activity    Alcohol use: Not Currently     Comment:  occasionally    Drug use: Yes     Types: Marijuana    Sexual activity: Yes     Social Drivers of Health     Financial Resource Strain: Low Risk  (2/26/2025)    Overall Financial Resource Strain (CARDIA)     Difficulty of Paying Living Expenses: Not very hard   Food Insecurity: No Food Insecurity (2/26/2025)    Hunger Vital Sign     Worried About Running Out of Food in the Last Year: Never true     Ran Out of Food in the Last Year: Never true   Transportation Needs: No Transportation Needs (2/3/2025)    TRANSPORTATION NEEDS     Transportation : No   Physical Activity: Inactive (2/26/2025)    Exercise Vital Sign     Days of Exercise per Week: 0 days     Minutes of Exercise per Session: 0 min   Stress: No Stress Concern Present (2/26/2025)    [employer] - Occupational Stress Questionnaire     Feeling of Stress : Only a little   Housing Stability: Low Risk  (2/26/2025)    Housing Stability Vital Sign     Unable to Pay for Housing in the Last Year: No     Homeless in the Last Year: No

## 2025-02-27 NOTE — H&P
[facility] [place] [facility]  History & Physical    Patient Name: Riaz Guerra Jr.  MRN: [id number]  Patient Class: IP- Inpatient  Admission Date: 2/26/2025  Attending Physician: Delano Posada DO   Primary Care Provider: Berhane Alvarez MD         Patient information was obtained from relative(s), past medical records, and ER records.     Subjective:     Principal Problem:Acute encephalopathy 2/2 acute renal failure     Chief Complaint:   Chief Complaint   Patient presents with    Altered Mental Status     Altered X1 hour. Pt. Urine has foul odor.         HPI: Riaz Guerra is a 75-year-old man with a medical history of HTN, HLD, insulin dependent DM, CKD, bilateral inguinal hernias, chronic venous insufficiency, obesity, chronic hip/back pain, and recently wheelchair dependent.He presented to the ED from [facility] ([facility]) due to altered mental status, labored breathing, and refusing medications. Per phone call to [facility], the patient had normal mentation prior to this admission, but had difficulty adjusting to the facility.     At the ED the patient was A/O x2 to person, and time. Reported pain in bilateral shoulders and hip, feeling cold and thirsty. Discoloration was noted on both lower limbs suggestive of venous stasis. Cole catheter was placed due to greater than 400ml urine seen on bladder scan. Labs were significant for KEATON: Cr 4.5 from base line of 1.3. Potassium 7.0, , CO2 15. CT abdomen pelvis negative for hydronephrosis but noted for bilateral basilar atelectasis and 2mm pulmonary nodule along the fissure on the right as well as tree in bud type nodules along the medial aspect of the left lower lobe concerning for possible developing infectious or inflammatory process. CT head negative for acute intracranial changes.    Patient's sister has been contacted about the patient's location and new condition. Per his sister, patient has not been able to  ambulate for the last 9 months and has been at the SNF facility for the last two months.           Past Medical History:   Diagnosis Date    Depression     Diabetes mellitus     Gout     High cholesterol     Hypertension        History reviewed. No pertinent surgical history.    Review of patient's allergies indicates:   Allergen Reactions    Lisinopril Other (See Comments)     cough       No current facility-administered medications on file prior to encounter.     Current Outpatient Medications on File Prior to Encounter   Medication Sig    amLODIPine (NORVASC) 10 MG tablet Take 1 tablet (10 mg total) by mouth once daily.    aspirin (ECOTRIN) 81 MG EC tablet Take 81 mg by mouth once daily.    atorvastatin (LIPITOR) 20 MG tablet Take 20 mg by mouth once daily.    baclofen (LIORESAL) 10 MG tablet Take 10 mg by mouth 3 (three) times daily.    fluticasone propionate (FLONASE) 50 mcg/actuation nasal spray 2 sprays (100 mcg total) by Each Nostril route once daily.    gabapentin (NEURONTIN) 800 MG tablet Take 1 tablet (800 mg total) by mouth 2 (two) times daily.    HYDROcodone-acetaminophen (NORCO) 7.5-325 mg per tablet Take 1 tablet by mouth every 8 (eight) hours as needed for Pain.    losartan-hydrochlorothiazide 100-12.5 mg (HYZAAR) 100-12.5 mg Tab Take 1 tablet by mouth once daily.    metFORMIN (GLUCOPHAGE) 1000 MG tablet Take 1,000 mg by mouth 2 (two) times daily with meals.    polyethylene glycol (GLYCOLAX) 17 gram PwPk Take 17 g by mouth once daily.    sertraline (ZOLOFT) 50 MG tablet Take 1 tablet (50 mg total) by mouth once daily.    tamsulosin (FLOMAX) 0.4 mg Cap Take by mouth once daily.    vitamin D (VITAMIN D3) 1000 units Tab Take 1,000 Units by mouth once daily.     Family History       Problem Relation (Age of Onset)    Diabetes Mother, Father    Heart disease Mother          Tobacco Use    Smoking status: Never    Smokeless tobacco: Never   Substance and Sexual Activity    Alcohol use: Not Currently      Comment: occasionally    Drug use: Yes     Types: Marijuana    Sexual activity: Yes     Review of Systems  Objective:     Vital Signs (Most Recent):  Temp: 97.9 °F (36.6 °C) (02/26/25 0725)  Pulse: 92 (02/26/25 1131)  Resp: 13 (02/26/25 1131)  BP: 129/73 (02/26/25 1131)  SpO2: 96 % (02/26/25 1131) Vital Signs (24h Range):  Temp:  [97.9 °F (36.6 °C)] 97.9 °F (36.6 °C)  Pulse:  [] 92  Resp:  [13-20] 13  SpO2:  [95 %-100 %] 96 %  BP: (116-143)/(72-83) 129/73     Weight: (!) 139.8 kg (308 lb 4.8 oz)  Body mass index is 43 kg/m².     Physical Exam  Vitals reviewed.   Constitutional:       Appearance: He is ill-appearing and toxic-appearing. He is not diaphoretic.   HENT:      Head: Normocephalic.      Mouth/Throat:      Mouth: Mucous membranes are dry.      Dentition: Normal dentition.      Pharynx: Oropharynx is clear.      Comments: Dentures left at nursing facility  Eyes:      Pupils: Pupils are equal, round, and reactive to light.   Cardiovascular:      Rate and Rhythm: Normal rate and regular rhythm.   Pulmonary:      Effort: Pulmonary effort is normal. No respiratory distress.      Breath sounds: Normal breath sounds. No wheezing.   Abdominal:      General: Bowel sounds are normal. There is distension.      Palpations: Abdomen is soft.      Tenderness: There is no abdominal tenderness.   Musculoskeletal:      Cervical back: Normal range of motion.      Right lower leg: Swelling present.      Left lower leg: Swelling present.   Skin:     General: Skin is warm and dry.      Coloration: Skin is ashen.      Comments: Ulcerated, stasis dermatitis lower legs    Neurological:      Mental Status: He is disoriented.      Comments: Bed bound              CRANIAL NERVES     CN III, IV, VI   Pupils are equal, round, and reactive to light.       Significant Labs: All pertinent labs within the past 24 hours have been reviewed.  CBC:   Recent Labs   Lab 02/26/25  0918 02/26/25  0925 02/26/25  0927 02/26/25  1005   WBC 9.67   "--   --   --    HGB 13.1*  --   --   --    HCT 41.5 43 42 42     --   --   --      CMP:   Recent Labs   Lab 02/26/25  0918   *   K 7.0*      CO2 15*   GLU 82   *   CREATININE 4.5*   CALCIUM 9.9   PROT 8.9*   ALBUMIN 3.4*   BILITOT 0.3   ALKPHOS 97   AST 20   ALT 11   ANIONGAP 11     Urine Culture: No results for input(s): "LABURIN" in the last 48 hours.  Urine Studies:   Recent Labs   Lab 02/26/25  1105   COLORU Yellow   APPEARANCEUA Clear   PHUR 5.0   SPECGRAV 1.020   PROTEINUA Trace*   GLUCUA Negative   KETONESU Negative   BILIRUBINUA Negative   OCCULTUA Negative   NITRITE Negative   LEUKOCYTESUR Negative       Significant Imaging: I have reviewed all pertinent imaging results/findings within the past 24 hours.  Assessment/Plan:     Uremic encephalopathy  See acute renal failure     Right hip pain  Holding gabapentin and baclofen iso renal dysfunction      Impaired mobility and activities of daily living  History of rapid decline over the last 9 months. Spent 2 months at [facility]. Staff there says patient has had difficulty adjusting.     - will consult PT/OT when encephalopathy resolves    Chronic wounds; BLE  - wound care consulted       Urine retention  - david placed  - strict input and out  - will monitor output and BMP following fluid challenge      Venous stasis of both lower extremities  - sequential compression device  - wound care for ulcerated leg wounds  - consult PT/OT when encephalopathy resolves    Metabolic acidosis, normal anion gap (NAG)  - see renal failure and hyperkalemia      Acute renal failure superimposed on stage 3 chronic kidney disease  KEATON is likely due to pre-renal azotemia due to dehydration. Baseline creatinine is  1.5 . Most recent creatinine and eGFR are listed below.  Recent Labs     02/26/25  0918   CREATININE 4.5*   EGFRNORACEVR 13.1*      Plan  - KEATON is worsening. Will continue current treatment  - Avoid nephrotoxins and " renally dose meds for GFR listed above  - Monitor urine output, serial BMP, and adjust therapy as needed  - Fluid challenge 500 ml bolus over 2 hrs NS  - BMP q4    Chronic pain  - holding pain medications (baclofen and gabapentin) until kidney function is stable    Hyperkalemia  Hyperkalemia is likely due to KEATON.The patients most recent potassium results are listed below.  Recent Labs     02/26/25  0918   K 7.0*     Plan  - Monitor for arrhythmias with EKG and/or continuous telemetry.   - Treat the hyperkalemia with Potassium Binders, Calcium gluconate, IV insulin and dextrose, and Nebulized albuterol sulfate.   - Monitor potassium: Every 4 hours  - The patient's hyperkalemia is worsening. Will continue current treatment  - will consider sodium bicarb  - will appreciate nephrology recommendations             Insulin dependent type 2 diabetes mellitus  Patient's FSGs are controlled on current medication regimen.  Last A1c reviewed-   Lab Results   Component Value Date    HGBA1C 6.9 (H) 11/22/2024     Most recent fingerstick glucose reviewed-   Recent Labs   Lab 02/26/25  1031 02/26/25  1105   POCTGLUCOSE 110 112*     Current correctional scale  Low  Maintain anti-hyperglycemic dose as follows-   Antihyperglycemics (From admission, onward)      Start     Stop Route Frequency Ordered    02/26/25 1715  insulin aspart U-100 pen 0-5 Units         -- SubQ Every 6 hours PRN 02/26/25 1615          Hold Oral hypoglycemics while patient is in the hospital.      VTE Risk Mitigation (From admission, onward)           Ordered     heparin (porcine) injection 5,000 Units  Every 8 hours         02/26/25 1159     IP VTE HIGH RISK PATIENT  Once         02/26/25 1159     Place sequential compression device  Until discontinued         02/26/25 1159                                    Gabriel Tate MD  Department of Hospital Medicine  [facility] - Emergency Dept

## 2025-02-28 LAB
ALBUMIN SERPL BCP-MCNC: 2.9 G/DL (ref 3.5–5.2)
ALP SERPL-CCNC: 91 U/L (ref 40–150)
ALT SERPL W/O P-5'-P-CCNC: 8 U/L (ref 10–44)
ANION GAP SERPL CALC-SCNC: 11 MMOL/L (ref 8–16)
AST SERPL-CCNC: 16 U/L (ref 10–40)
BASOPHILS # BLD AUTO: 0.05 K/UL (ref 0–0.2)
BASOPHILS NFR BLD: 0.7 % (ref 0–1.9)
BILIRUB SERPL-MCNC: 0.4 MG/DL (ref 0.1–1)
BUN SERPL-MCNC: 87 MG/DL (ref 8–23)
CALCIUM SERPL-MCNC: 9.4 MG/DL (ref 8.7–10.5)
CHLORIDE SERPL-SCNC: 99 MMOL/L (ref 95–110)
CO2 SERPL-SCNC: 25 MMOL/L (ref 23–29)
CREAT SERPL-MCNC: 3.9 MG/DL (ref 0.5–1.4)
DIFFERENTIAL METHOD BLD: ABNORMAL
EOSINOPHIL # BLD AUTO: 0.2 K/UL (ref 0–0.5)
EOSINOPHIL NFR BLD: 2 % (ref 0–8)
ERYTHROCYTE [DISTWIDTH] IN BLOOD BY AUTOMATED COUNT: 15.9 % (ref 11.5–14.5)
EST. GFR  (NO RACE VARIABLE): 15.5 ML/MIN/1.73 M^2
GLUCOSE SERPL-MCNC: 93 MG/DL (ref 70–110)
HCT VFR BLD AUTO: 37 % (ref 40–54)
HGB BLD-MCNC: 12.1 G/DL (ref 14–18)
IMM GRANULOCYTES # BLD AUTO: 0.03 K/UL (ref 0–0.04)
IMM GRANULOCYTES NFR BLD AUTO: 0.4 % (ref 0–0.5)
LYMPHOCYTES # BLD AUTO: 1.8 K/UL (ref 1–4.8)
LYMPHOCYTES NFR BLD: 23 % (ref 18–48)
MAGNESIUM SERPL-MCNC: 1.7 MG/DL (ref 1.6–2.6)
MCH RBC QN AUTO: 28.1 PG (ref 27–31)
MCHC RBC AUTO-ENTMCNC: 32.7 G/DL (ref 32–36)
MCV RBC AUTO: 86 FL (ref 82–98)
MONOCYTES # BLD AUTO: 0.6 K/UL (ref 0.3–1)
MONOCYTES NFR BLD: 8.4 % (ref 4–15)
NEUTROPHILS # BLD AUTO: 5 K/UL (ref 1.8–7.7)
NEUTROPHILS NFR BLD: 65.5 % (ref 38–73)
NRBC BLD-RTO: 0 /100 WBC
PHOSPHATE SERPL-MCNC: 5.1 MG/DL (ref 2.7–4.5)
PLATELET # BLD AUTO: 306 K/UL (ref 150–450)
PMV BLD AUTO: 10.7 FL (ref 9.2–12.9)
POTASSIUM SERPL-SCNC: 5 MMOL/L (ref 3.5–5.1)
PROT SERPL-MCNC: 7.6 G/DL (ref 6–8.4)
RBC # BLD AUTO: 4.31 M/UL (ref 4.6–6.2)
SODIUM SERPL-SCNC: 135 MMOL/L (ref 136–145)
WBC # BLD AUTO: 7.62 K/UL (ref 3.9–12.7)

## 2025-02-28 PROCEDURE — 63600175 PHARM REV CODE 636 W HCPCS: Mod: JZ,TB

## 2025-02-28 PROCEDURE — 97110 THERAPEUTIC EXERCISES: CPT

## 2025-02-28 PROCEDURE — 83735 ASSAY OF MAGNESIUM: CPT

## 2025-02-28 PROCEDURE — 63600175 PHARM REV CODE 636 W HCPCS

## 2025-02-28 PROCEDURE — 97530 THERAPEUTIC ACTIVITIES: CPT

## 2025-02-28 PROCEDURE — 21400001 HC TELEMETRY ROOM

## 2025-02-28 PROCEDURE — 80053 COMPREHEN METABOLIC PANEL: CPT

## 2025-02-28 PROCEDURE — 25000003 PHARM REV CODE 250

## 2025-02-28 PROCEDURE — 97162 PT EVAL MOD COMPLEX 30 MIN: CPT

## 2025-02-28 PROCEDURE — 84100 ASSAY OF PHOSPHORUS: CPT

## 2025-02-28 PROCEDURE — 36415 COLL VENOUS BLD VENIPUNCTURE: CPT

## 2025-02-28 PROCEDURE — 11000001 HC ACUTE MED/SURG PRIVATE ROOM

## 2025-02-28 PROCEDURE — 97166 OT EVAL MOD COMPLEX 45 MIN: CPT

## 2025-02-28 PROCEDURE — 99233 SBSQ HOSP IP/OBS HIGH 50: CPT | Mod: GC,,, | Performed by: STUDENT IN AN ORGANIZED HEALTH CARE EDUCATION/TRAINING PROGRAM

## 2025-02-28 PROCEDURE — 25000003 PHARM REV CODE 250: Performed by: INTERNAL MEDICINE

## 2025-02-28 PROCEDURE — 85025 COMPLETE CBC W/AUTO DIFF WBC: CPT

## 2025-02-28 RX ORDER — SODIUM CHLORIDE, SODIUM LACTATE, POTASSIUM CHLORIDE, CALCIUM CHLORIDE 600; 310; 30; 20 MG/100ML; MG/100ML; MG/100ML; MG/100ML
INJECTION, SOLUTION INTRAVENOUS CONTINUOUS
Status: ACTIVE | OUTPATIENT
Start: 2025-02-28 | End: 2025-02-28

## 2025-02-28 RX ORDER — OLANZAPINE 10 MG/2ML
2.5 INJECTION, POWDER, FOR SOLUTION INTRAMUSCULAR ONCE AS NEEDED
Status: COMPLETED | OUTPATIENT
Start: 2025-02-28 | End: 2025-02-28

## 2025-02-28 RX ORDER — METOPROLOL TARTRATE 1 MG/ML
5 INJECTION, SOLUTION INTRAVENOUS
Status: DISCONTINUED | OUTPATIENT
Start: 2025-02-28 | End: 2025-03-05

## 2025-02-28 RX ADMIN — SODIUM ZIRCONIUM CYCLOSILICATE 10 G: 10 POWDER, FOR SUSPENSION ORAL at 08:02

## 2025-02-28 RX ADMIN — MUPIROCIN: 20 OINTMENT TOPICAL at 08:02

## 2025-02-28 RX ADMIN — SODIUM ZIRCONIUM CYCLOSILICATE 10 G: 10 POWDER, FOR SUSPENSION ORAL at 02:02

## 2025-02-28 RX ADMIN — GABAPENTIN 300 MG: 300 CAPSULE ORAL at 08:02

## 2025-02-28 RX ADMIN — ATORVASTATIN CALCIUM 20 MG: 20 TABLET, FILM COATED ORAL at 08:02

## 2025-02-28 RX ADMIN — TAMSULOSIN HYDROCHLORIDE 0.4 MG: 0.4 CAPSULE ORAL at 08:02

## 2025-02-28 RX ADMIN — ASPIRIN 81 MG: 81 TABLET, COATED ORAL at 08:02

## 2025-02-28 RX ADMIN — HYDROMORPHONE HYDROCHLORIDE 0.5 MG: 1 INJECTION, SOLUTION INTRAMUSCULAR; INTRAVENOUS; SUBCUTANEOUS at 02:02

## 2025-02-28 RX ADMIN — SODIUM CHLORIDE, POTASSIUM CHLORIDE, SODIUM LACTATE AND CALCIUM CHLORIDE: 600; 310; 30; 20 INJECTION, SOLUTION INTRAVENOUS at 10:02

## 2025-02-28 RX ADMIN — OLANZAPINE 2.5 MG: 10 INJECTION, POWDER, FOR SOLUTION INTRAMUSCULAR at 03:02

## 2025-02-28 NOTE — PROGRESS NOTES
[facility] Surg  Nephrology  Progress Note    Patient Name: Riaz Guerra Jr.  MRN: [id number]  Admission Date: 2/26/2025  Hospital Length of Stay: 2 days  Attending Provider: Alexandre Crespo MD   Primary Care Physician: Berhane Alvarez MD  Principal Problem:Uremic encephalopathy    Subjective:     HPI: 74 yo man w pmhx significant for HTN HLD IDDM inguinal hernia, chronic venous insufficiency obesity chronic hip/back pain debility presents to ED from [facility] for acute encephalopathy w slurred speech, disorientation. Per ED notes bladder had 400 cc urine and thus david was placed. UA bland other than trace protein. CT showed no acute obstruction (done after david placed) CPK pending. He possibly has CKD2/3a based on previous GFR. Nephrology was consulted for acute kidney injury w severe hyperkalemia.       Objective:     Vital Signs (Most Recent):  Temp: 98 °F (36.7 °C) (02/28/25 0437)  Pulse: 78 (02/28/25 0437)  Resp: 16 (02/28/25 0437)  BP: 111/65 (02/28/25 0437)  SpO2: 95 % (02/28/25 0437) Vital Signs (24h Range):  Temp:  [97.2 °F (36.2 °C)-98 °F (36.7 °C)] 98 °F (36.7 °C)  Pulse:  [73-88] 78  Resp:  [16-18] 16  SpO2:  [90 %-100 %] 95 %  BP: (104-125)/(65-77) 111/65     Weight: (!) 139.7 kg (308 lb) (02/27/25 0249)  Body mass index is 42.96 kg/m².  Body surface area is 2.65 meters squared.    I/O last 3 completed shifts:  In: 550 [IV Piggyback:550]  Out: 4750 [Urine:3750; Emesis/NG output:1000]     Physical Exam  Constitutional:       General: He is not in acute distress.  HENT:      Head: Normocephalic.   Cardiovascular:      Rate and Rhythm: Regular rhythm.      Heart sounds: Normal heart sounds.   Pulmonary:      Breath sounds: Normal breath sounds.   Abdominal:      General: Abdomen is flat.   Musculoskeletal:         General: No swelling.   Skin:     General: Skin is warm.   Neurological:      Mental Status: He is alert. He is disoriented.          Significant Labs:  CBC:   Recent Labs    Lab 02/28/25  0324   WBC 7.62   RBC 4.31*   HGB 12.1*   HCT 37.0*      MCV 86   MCH 28.1   MCHC 32.7     CMP:   Recent Labs   Lab 02/28/25  0324   GLU 93   CALCIUM 9.4   ALBUMIN 2.9*   PROT 7.6   *   K 5.0   CO2 25   CL 99   BUN 87*   CREATININE 3.9*   ALKPHOS 91   ALT 8*   AST 16   BILITOT 0.4     All labs within the past 24 hours have been reviewed.  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 Cole in place.  Severe hyperkalemia - no EKG changes. Given calcium gluconate, insulin, d50, albuterol, lokelma  Metabolic acidosis  Insulin dependent DMT2  HLD    -K stable today at 5. Cont lokelma.  -IO 0/2950. 2 L UOP. Strict io.   -RP US reviewed, no acute changes  -Cr improving to 3.9 today. Single dose of lasix 200 mg iv and diuril 500 mg given on 2/27 at 1 am and 2 am respectively. Bicarb drip dced last night around midnight. Would recommend to continue gentle hydration w balanced crystalloid as tolerated.   -avoid nephrotoxic medications.    Thank you for your consult. I will follow-up with patient. Please contact us if you have any additional questions.    Lobito Dinh MD  Nephrology  [facility]

## 2025-02-28 NOTE — ASSESSMENT & PLAN NOTE
Hyperkalemia is likely due to KEATON.The patients most recent potassium results are listed below.  Recent Labs     02/27/25  1045 02/27/25  1654 02/28/25  0324   K 5.9* 5.7* 5.0       Plan  - Monitor for arrhythmias with EKG and/or continuous telemetry.   - Treat the hyperkalemia with Potassium Binders, Calcium gluconate, IV insulin and dextrose, and Nebulized albuterol sulfate.   - Monitor potassium: Every 4 hours  - The patient's hyperkalemia is worsening. Will continue current treatment  - will consider sodium bicarb acidosis improved, currently on LR 1 L  - Nephrology following

## 2025-02-28 NOTE — ASSESSMENT & PLAN NOTE
History of rapid decline over the last 9 months. Spent 2 months at [facility]. Staff there says patient has had difficulty adjusting.     - will consult PT/OT when encephalopathy resolves

## 2025-02-28 NOTE — PROGRESS NOTES
[facility] Surg  Nephrology  Progress Note    Patient Name: Riaz Guerra Jr.  MRN: [id number]  Admission Date: 2/26/2025  Hospital Length of Stay: 1 days  Attending Provider: Alexandre Crespo MD   Primary Care Physician: Berhane Alvarez MD  Principal Problem:Uremic encephalopathy    Subjective:     HPI: 76 yo man w pmhx significant for HTN HLD IDDM inguinal hernia, chronic venous insufficiency obesity chronic hip/back pain debility presents to ED from [facility] for acute encephalopathy w slurred speech, disorientation. Per ED notes bladder had 400 cc urine and thus david was placed. UA bland other than trace protein. CT showed no acute obstruction (done after david placed) CPK pending. He possibly has CKD2/3a based on previous GFR. Nephrology was consulted for acute kidney injury w severe hyperkalemia.     Interval History:     NAEON, alert, oriented to place and person, not to time afebrile vital signs are stable, blood pressure 119/69, potassium 5.7, currently on Lokelma 10 g t.i.d., BUN 95, serum creatinine 4.2 down from 4.3, urine output 3000 cc,  Status post 200 mg Lasix and 500 mg Diuril    Review of patient's allergies indicates:   Allergen Reactions    Lisinopril Other (See Comments)     cough     Current Facility-Administered Medications   Medication Frequency    acetaminophen tablet 1,000 mg Q6H PRN    aspirin EC tablet 81 mg Daily    atorvastatin tablet 20 mg Daily    dextrose 50% injection 12.5 g PRN    dextrose 50% injection 25 g PRN    gabapentin capsule 300 mg QHS    glucagon (human recombinant) injection 1 mg PRN    glucose chewable tablet 16 g PRN    glucose chewable tablet 24 g PRN    HYDROmorphone injection 0.5 mg Q6H PRN    insulin aspart U-100 pen 0-5 Units Q6H PRN    melatonin tablet 6 mg Nightly PRN    mupirocin 2 % ointment BID    naloxone 0.4 mg/mL injection 0.02 mg PRN    oxyCODONE immediate release tablet 5 mg Q6H PRN    oxyCODONE immediate release tablet Tab 10 mg Q6H  PRN    sodium bicarbonate 150 mEq in D5W 1,000 mL infusion Continuous    sodium chloride 0.9% flush 10 mL Q12H PRN    sodium zirconium cyclosilicate packet 10 g TID    tamsulosin 24 hr capsule 0.4 mg Daily       Objective:     Vital Signs (Most Recent):  Temp: 97.3 °F (36.3 °C) (02/27/25 1627)  Pulse: 84 (02/27/25 1627)  Resp: 18 (02/27/25 1748)  BP: 104/67 (02/27/25 1627)  SpO2: 97 % (02/27/25 1627) Vital Signs (24h Range):  Temp:  [97.2 °F (36.2 °C)-98.4 °F (36.9 °C)] 97.3 °F (36.3 °C)  Pulse:  [72-84] 84  Resp:  [18] 18  SpO2:  [96 %-100 %] 97 %  BP: (104-155)/(67-91) 104/67     Weight: (!) 139.7 kg (308 lb) (02/27/25 0249)  Body mass index is 42.96 kg/m².  Body surface area is 2.65 meters squared.    I/O last 3 completed shifts:  In: 550 [IV Piggyback:550]  Out: 3000 [Urine:3000]     Physical Exam  Constitutional:       General: He is not in acute distress.     Appearance: He is ill-appearing. He is not toxic-appearing.   HENT:      Head: Normocephalic and atraumatic.   Eyes:      Extraocular Movements: Extraocular movements intact.      Pupils: Pupils are equal, round, and reactive to light.   Cardiovascular:      Rate and Rhythm: Normal rate.      Heart sounds: No murmur heard.  Pulmonary:      Effort: No respiratory distress.      Breath sounds: No wheezing or rales.   Abdominal:      General: There is distension.      Tenderness: There is no abdominal tenderness. There is no guarding.   Musculoskeletal:      Right lower leg: No edema.      Left lower leg: No edema.   Skin:     Coloration: Skin is pale. Skin is not jaundiced.   Neurological:      Mental Status: He is oriented to person, place, and time.          Significant Labs:  ABGs:   Recent Labs   Lab 02/26/25  0927   PH 7.346*   PCO2 37.8   HCO3 20.7*   POCSATURATED 93   BE -5*     CBC:   Recent Labs   Lab 02/27/25  1045   WBC 7.21   RBC 4.41*   HGB 12.9*   HCT 38.7*      MCV 88   MCH 29.3   MCHC 33.3     CMP:   Recent Labs   Lab 02/26/25  0918  "02/26/25  1827 02/27/25  1654   GLU 82   < > 104   CALCIUM 9.9   < > 9.4   ALBUMIN 3.4*  --   --    PROT 8.9*  --   --    *   < > 133*   K 7.0*   < > 5.7*   CO2 15*   < > 22*      < > 101   *   < > 90*   CREATININE 4.5*   < > 4.2*   ALKPHOS 97  --   --    ALT 11  --   --    AST 20  --   --    BILITOT 0.3  --   --     < > = values in this interval not displayed.     LFTs:   Recent Labs   Lab 02/26/25  0918   ALT 11   AST 20   ALKPHOS 97   BILITOT 0.3   PROT 8.9*   ALBUMIN 3.4*     Recent Labs   Lab 02/26/25  0918   TSH 0.775     Recent Labs   Lab 02/26/25  1105   COLORU Yellow   SPECGRAV 1.020   PHUR 5.0   PROTEINUA Trace*   NITRITE Negative   LEUKOCYTESUR Negative       Assessment/Plan:     Renal/  Acute renal failure superimposed on stage 3a chronic kidney disease  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 Cole in place.  Severe hyperkalemia - no EKG changes. Given calcium gluconate, insulin, D50, albuterol, lokelma  Metabolic acidosis  Insulin dependent DMT2  HLD     Hyperkalemia     Status post 200 mg IV Lasix, 500 mg Diuril  Hyperkalemia, 5.7, on scheduled Lokelma 10 g t.i.d.  Renal diet/low potassium diet  CXR reviewed, no sign pulm edema, on room air  Patient is alert and oriented to place and person not to time  Could possibly related to uremic encephalopathy, BUN 95, serum creatinine 4.2 baseline at 1  Nephrology still recommend hemodialysis for metabolic clearance,   Recommend eval for capacity  We attempted to contact both family members listed, one number is offline, the other number is no answer;   UA reviewed, other than trace protein.  Urine protein creatinine ratio 0.58  Retroperitoneal ultrasound showed "No evidence of hydronephrosis bilaterally or other significant sonographic abnormality.     KEATON  Acute Kidney Injury nonoliguric  Creatinine   Date Value Ref Range Status   02/27/2025 4.2 (H) 0.5 - 1.4 mg/dL Final   02/27/2025 4.3 (H) 0.5 - 1.4 mg/dL Final "   02/27/2025 4.2 (H) 0.5 - 1.4 mg/dL Final   02/27/2025 4.2 (H) 0.5 - 1.4 mg/dL Final   02/26/2025 4.3 (H) 0.5 - 1.4 mg/dL Final   02/26/2025 4.4 (H) 0.5 - 1.4 mg/dL Final   02/26/2025 4.4 (H) 0.5 - 1.4 mg/dL Final   02/26/2025 4.4 (H) 0.5 - 1.4 mg/dL Final   02/26/2025 4.5 (H) 0.5 - 1.4 mg/dL Final   02/04/2025 1.3 0.5 - 1.4 mg/dL Final     Baseline Cr 1.0  Acute kidney injury thought to be 2/2 obstructive nephropathy  Indication for RRT yes  Avoid nephrotoxins, keep MAP > 65, strict I/O, trend renal serum creatinine, electrolytes and UO.         Thank you for your consult. I will follow-up with patient. Please contact us if you have any additional questions.    Geovanna Messer MD  Nephrology  [facility] Surg

## 2025-02-28 NOTE — SUBJECTIVE & OBJECTIVE
Interval History:     NAEON, alert, oriented to place and person, not to time afebrile vital signs are stable, blood pressure 119/69, potassium 5.7, currently on Lokelma 10 g t.i.d., BUN 95, serum creatinine 4.2 down from 4.3, urine output 3000 cc,  Status post 200 mg Lasix and 500 mg Diuril    Review of patient's allergies indicates:   Allergen Reactions    Lisinopril Other (See Comments)     cough     Current Facility-Administered Medications   Medication Frequency    acetaminophen tablet 1,000 mg Q6H PRN    aspirin EC tablet 81 mg Daily    atorvastatin tablet 20 mg Daily    dextrose 50% injection 12.5 g PRN    dextrose 50% injection 25 g PRN    gabapentin capsule 300 mg QHS    glucagon (human recombinant) injection 1 mg PRN    glucose chewable tablet 16 g PRN    glucose chewable tablet 24 g PRN    HYDROmorphone injection 0.5 mg Q6H PRN    insulin aspart U-100 pen 0-5 Units Q6H PRN    melatonin tablet 6 mg Nightly PRN    mupirocin 2 % ointment BID    naloxone 0.4 mg/mL injection 0.02 mg PRN    oxyCODONE immediate release tablet 5 mg Q6H PRN    oxyCODONE immediate release tablet Tab 10 mg Q6H PRN    sodium bicarbonate 150 mEq in D5W 1,000 mL infusion Continuous    sodium chloride 0.9% flush 10 mL Q12H PRN    sodium zirconium cyclosilicate packet 10 g TID    tamsulosin 24 hr capsule 0.4 mg Daily       Objective:     Vital Signs (Most Recent):  Temp: 97.3 °F (36.3 °C) (02/27/25 1627)  Pulse: 84 (02/27/25 1627)  Resp: 18 (02/27/25 1748)  BP: 104/67 (02/27/25 1627)  SpO2: 97 % (02/27/25 1627) Vital Signs (24h Range):  Temp:  [97.2 °F (36.2 °C)-98.4 °F (36.9 °C)] 97.3 °F (36.3 °C)  Pulse:  [72-84] 84  Resp:  [18] 18  SpO2:  [96 %-100 %] 97 %  BP: (104-155)/(67-91) 104/67     Weight: (!) 139.7 kg (308 lb) (02/27/25 0249)  Body mass index is 42.96 kg/m².  Body surface area is 2.65 meters squared.    I/O last 3 completed shifts:  In: 550 [IV Piggyback:550]  Out: 3000 [Urine:3000]     Physical Exam  Constitutional:        General: He is not in acute distress.     Appearance: He is ill-appearing. He is not toxic-appearing.   HENT:      Head: Normocephalic and atraumatic.   Eyes:      Extraocular Movements: Extraocular movements intact.      Pupils: Pupils are equal, round, and reactive to light.   Cardiovascular:      Rate and Rhythm: Normal rate.      Heart sounds: No murmur heard.  Pulmonary:      Effort: No respiratory distress.      Breath sounds: No wheezing or rales.   Abdominal:      General: There is distension.      Tenderness: There is no abdominal tenderness. There is no guarding.   Musculoskeletal:      Right lower leg: No edema.      Left lower leg: No edema.   Skin:     Coloration: Skin is pale. Skin is not jaundiced.   Neurological:      Mental Status: He is oriented to person, place, and time.          Significant Labs:  ABGs:   Recent Labs   Lab 02/26/25  0927   PH 7.346*   PCO2 37.8   HCO3 20.7*   POCSATURATED 93   BE -5*     CBC:   Recent Labs   Lab 02/27/25  1045   WBC 7.21   RBC 4.41*   HGB 12.9*   HCT 38.7*      MCV 88   MCH 29.3   MCHC 33.3     CMP:   Recent Labs   Lab 02/26/25  0918 02/26/25  1827 02/27/25  1654   GLU 82   < > 104   CALCIUM 9.9   < > 9.4   ALBUMIN 3.4*  --   --    PROT 8.9*  --   --    *   < > 133*   K 7.0*   < > 5.7*   CO2 15*   < > 22*      < > 101   *   < > 90*   CREATININE 4.5*   < > 4.2*   ALKPHOS 97  --   --    ALT 11  --   --    AST 20  --   --    BILITOT 0.3  --   --     < > = values in this interval not displayed.     LFTs:   Recent Labs   Lab 02/26/25  0918   ALT 11   AST 20   ALKPHOS 97   BILITOT 0.3   PROT 8.9*   ALBUMIN 3.4*     Recent Labs   Lab 02/26/25 0918   TSH 0.775     Recent Labs   Lab 02/26/25  1105   COLORU Yellow   SPECGRAV 1.020   PHUR 5.0   PROTEINUA Trace*   NITRITE Negative   LEUKOCYTESUR Negative

## 2025-02-28 NOTE — PLAN OF CARE
Problem: Occupational Therapy  Goal: Occupational Therapy Goal  Outcome: Adequate for Care Transition     Pt demonstrates poor potential to participate in occupational therapy in acute setting. Discharge OT services.

## 2025-02-28 NOTE — ASSESSMENT & PLAN NOTE
In summary, this patient is a 75-year-old male nursing home resident who is nonambulatory and history of insulin-dependent diabetes and chronic venous insufficiency admitted to the medicine service with altered mental status.  An arterial duplex suggested moderate stenosis in his right tibial vessels.  While he is complaining of bilateral lower extremity pain he is also complaining of back, hip, and upper extremity pain.  I do not believe this is ischemic rest pain and in the absence of tissue loss, no indication for surgical intervention at this time.     Continue w/ medical therapy in form of preventative wound care, ASA/statin, off loading.

## 2025-02-28 NOTE — CONSULTS
"[facility]  Vascular Surgery  Consult Note    Inpatient consult to Vascular Surgery  Consult performed by: Riaz Ibarra MD  Consult ordered by: Leny Lewis MD        Subjective:     Chief Complaint/Reason for Admission: FTT    History of Present Illness: This patient is a 75-year-old male with history of hypertension, hyperlipidemia, insulin-dependent diabetes, chronic kidney disease, chronic venous insufficiency, obesity, chronic hip and back pain, nonambulatory (1 year) admitted to [facility] medicine from [facility] due to altered mental status, labored breathing, and refusing medications.  In the emergency department, the patient reported chief complaint was bilateral shoulder and hip pain.     During his hospital admission, complaint of leg pain prompted an arterial duplex which noted "50-75% stenosis of right anterior tibial artery." Vascular surgery was consulted for aforementioned findings. Of note, psychology also consulted for " ... Refusing labs/general care ... Making statements of self harm." Wound care has also been consulted and noted " ... patient with pink scar tissue on bilateral legs. No open wounds or active drainage noted. No acute interventions needed."     At bedside, patient HPI and ROS limited 2/2 mental status. Very concerned with being left alone. C/o bilateral leg, shoulder, and arm pain. He is maintained on ASA and statin. Negative venous duplex during this admission.     Review of Systems   Unable to perform ROS: Mental status change     Objective:     Vital Signs (Most Recent):  Temp: 98 °F (36.7 °C) (02/28/25 0437)  Pulse: 78 (02/28/25 0437)  Resp: 16 (02/28/25 0437)  BP: 111/65 (02/28/25 0437)  SpO2: 95 % (02/28/25 0437) Vital Signs (24h Range):  Temp:  [97.2 °F (36.2 °C)-98 °F (36.7 °C)] 98 °F (36.7 °C)  Pulse:  [73-88] 78  Resp:  [16-18] 16  SpO2:  [90 %-100 %] 95 %  BP: (104-125)/(65-77) 111/65     Weight: (!) 139.7 kg (308 lb)  Body mass index is 42.96 kg/m².      Physical " Exam  Vitals and nursing note reviewed.   Constitutional:       General: He is not in acute distress.     Appearance: He is ill-appearing.   HENT:      Mouth/Throat:      Mouth: Mucous membranes are dry.   Cardiovascular:      Rate and Rhythm: Normal rate.      Comments:   Palpable femorals bilaterally  Non palpable pedal pulses  Chronic venous discoloration, 1+ pitting edema  No wounds on right leg  Pulmonary:      Effort: Pulmonary effort is normal.   Musculoskeletal:      Cervical back: Neck supple.   Skin:     Capillary Refill: Capillary refill takes less than 2 seconds.   Neurological:      General: No focal deficit present.      Sensory: No sensory deficit.      Motor: No weakness.         Significant Labs:  All pertinent labs from the last 24 hours have been reviewed.    Significant Diagnostics:  I have reviewed all pertinent imaging results/findings within the past 24 hours.    US Measurements - Left Lower Arterial Duplex    Peak Sys Sesar   Left External Iliac  cm/s         Left CFA  cm/s         Left profunda sys PSV 53 cm/s         Left super femoral ostial sys PSV 69 cm/s         Left super femoral prox sys PSV 61 cm/s         Left super femoral mid sys PSV 62 cm/s         Left super femoral dist sys PSV 73 cm/s         Left popliteal PSV 53 cm/s         Left tib/per trunk sys PSV 32 cm/s         Left peroneal sys PSV 72 cm/s         Left ant tibial sys PSV 49 cm/s         Left post tibial sys PSV 66 cm/s               US Measurements - Right Lower Arterial Duplex    Peak Sys Sesar   Right External Illiac PSV 57 cm/s         Right CFA PSV 94 cm/s         Right profunda sys PSV 72 cm/s         Right super femoral ostial sys PSV 92 cm/s         Right super femoral prox sys PSV 71 cm/s         Right super femoral mid sys PSV 80 cm/s         Right super femoral dist sys PSV 85 cm/s         Right popliteal PSV 60 cm/s         Right tib/per trunk sys PSV 58 cm/s         Right peroneal sys PSV 34 cm/s          Right ant tibial sys PSV 22 cm/s         Right post tibial sys  cm/s             Assessment/Plan:     Chronic pain syndrome  In summary, this patient is a 75-year-old male nursing home resident who is nonambulatory and history of insulin-dependent diabetes and chronic venous insufficiency admitted to the medicine service with altered mental status.  An arterial duplex suggested moderate stenosis in his right tibial vessels.  While he is complaining of bilateral lower extremity pain he is also complaining of back, hip, and upper extremity pain.  I do not believe this is ischemic rest pain and in the absence of tissue loss, no indication for surgical intervention at this time.     Continue w/ medical therapy in form of preventative wound care, ASA/statin, off loading.         Thank you for your consult.     Riaz Ibarra MD  Vascular Surgery Fellow  [facility]    Vascular Attending  Agree with above      Taz Heredia MD DFSVS FACS   Vascular/Endovascular Surgery

## 2025-02-28 NOTE — HPI
"This patient is a 75-year-old male with history of hypertension, hyperlipidemia, insulin-dependent diabetes, chronic kidney disease, chronic venous insufficiency, obesity, chronic hip and back pain, nonambulatory (1 year) admitted to hospital medicine from [facility] due to altered mental status, labored breathing, and refusing medications.  In the emergency department, the patient reported chief complaint was bilateral shoulder and hip pain.     During his hospital admission, complaint of leg pain prompted an arterial duplex which noted "50-75% stenosis of right anterior tibial artery." Vascular surgery was consulted for aforementioned findings. Of note, psychology also consulted for " ... Refusing labs/general care ... Making statements of self harm." Wound care has also been consulted and noted " ... patient with pink scar tissue on bilateral legs. No open wounds or active drainage noted. No acute interventions needed."     At bedside, patient HPI and ROS limited 2/2 mental status. Very concerned with being left alone. C/o bilateral leg, shoulder, and arm pain. He is maintained on ASA and statin. Negative venous duplex during this admission.   "

## 2025-02-28 NOTE — SUBJECTIVE & OBJECTIVE
Objective:     Vital Signs (Most Recent):  Temp: 98 °F (36.7 °C) (02/28/25 0437)  Pulse: 78 (02/28/25 0437)  Resp: 16 (02/28/25 0437)  BP: 111/65 (02/28/25 0437)  SpO2: 95 % (02/28/25 0437) Vital Signs (24h Range):  Temp:  [97.2 °F (36.2 °C)-98 °F (36.7 °C)] 98 °F (36.7 °C)  Pulse:  [73-88] 78  Resp:  [16-18] 16  SpO2:  [90 %-100 %] 95 %  BP: (104-125)/(65-77) 111/65     Weight: (!) 139.7 kg (308 lb) (02/27/25 0249)  Body mass index is 42.96 kg/m².  Body surface area is 2.65 meters squared.    I/O last 3 completed shifts:  In: 550 [IV Piggyback:550]  Out: 4750 [Urine:3750; Emesis/NG output:1000]     Physical Exam  Constitutional:       General: He is not in acute distress.  HENT:      Head: Normocephalic.   Cardiovascular:      Rate and Rhythm: Regular rhythm.      Heart sounds: Normal heart sounds.   Pulmonary:      Breath sounds: Normal breath sounds.   Abdominal:      General: Abdomen is flat.   Musculoskeletal:         General: No swelling.   Skin:     General: Skin is warm.   Neurological:      Mental Status: He is alert. He is disoriented.          Significant Labs:  CBC:   Recent Labs   Lab 02/28/25  0324   WBC 7.62   RBC 4.31*   HGB 12.1*   HCT 37.0*      MCV 86   MCH 28.1   MCHC 32.7     CMP:   Recent Labs   Lab 02/28/25  0324   GLU 93   CALCIUM 9.4   ALBUMIN 2.9*   PROT 7.6   *   K 5.0   CO2 25   CL 99   BUN 87*   CREATININE 3.9*   ALKPHOS 91   ALT 8*   AST 16   BILITOT 0.4     All labs within the past 24 hours have been reviewed.

## 2025-02-28 NOTE — SUBJECTIVE & OBJECTIVE
Interval History: NAEON, VSS, electrolytes with in normal range. KEATON improving.    Review of Systems  Objective:     Vital Signs (Most Recent):  Temp: 97.4 °F (36.3 °C) (02/28/25 1158)  Pulse: 108 (02/28/25 1158)  Resp: 18 (02/28/25 1158)  BP: (!) 157/94 (02/28/25 1158)  SpO2: 100 % (02/28/25 1158) Vital Signs (24h Range):  Temp:  [97.3 °F (36.3 °C)-98.1 °F (36.7 °C)] 97.4 °F (36.3 °C)  Pulse:  [] 108  Resp:  [16-18] 18  SpO2:  [90 %-100 %] 100 %  BP: (104-157)/(65-94) 157/94     Weight: (!) 139.7 kg (308 lb)  Body mass index is 42.96 kg/m².    Intake/Output Summary (Last 24 hours) at 2/28/2025 1209  Last data filed at 2/28/2025 1033  Gross per 24 hour   Intake --   Output 3200 ml   Net -3200 ml         Physical Exam      Constitutional:       Appearance: He is ill-appearing and toxic-appearing. He is not diaphoretic.   HENT:      Head: Normocephalic.      Mouth/Throat:      Mouth: Mucous membranes are dry.      Dentition: Normal dentition.      Pharynx: Oropharynx is clear.      Comments: Dentures left at nursing facility  Eyes:      Pupils: Pupils are equal, round, and reactive to light.   Cardiovascular:      Rate and Rhythm: Normal rate and regular rhythm.   Pulmonary:      Effort: Pulmonary effort is normal. No respiratory distress.      Breath sounds: Normal breath sounds. No wheezing.   Abdominal:      General: Bowel sounds are normal. There is distension.      Palpations: Abdomen is soft.      Tenderness: There is no abdominal tenderness.   Musculoskeletal:      Cervical back: Normal range of motion.      Right lower leg: Swelling present.      Left lower leg: Swelling present.   Skin:     General: Skin is warm and dry.      Coloration: Skin is ashen.      Comments: Ulcerated, stasis dermatitis lower legs    Neurological:      Mental Status: He is disoriented.      Comments: Bed bound   Significant Labs: All pertinent labs within the past 24 hours have been reviewed.    Significant Imaging: I have  reviewed all pertinent imaging results/findings within the past 24 hours.

## 2025-02-28 NOTE — ASSESSMENT & PLAN NOTE
"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 Cole in place.  Severe hyperkalemia - no EKG changes. Given calcium gluconate, insulin, D50, albuterol, lokelma  Metabolic acidosis  Insulin dependent DMT2  HLD     Hyperkalemia     Status post 200 mg IV Lasix, 500 mg Diuril  Hyperkalemia, 5.7, on scheduled Lokelma 10 g t.i.d.  Renal diet/low potassium diet  CXR reviewed, no sign pulm edema, on room air  Patient is alert and oriented to place and person not to time  Could possibly related to uremic encephalopathy, BUN 95, serum creatinine 4.2 baseline at 1  Nephrology still recommend hemodialysis for metabolic clearance,   Recommend eval for capacity  We attempted to contact both family members listed and neither picked up the phone.  UA reviewed, other than trace protein.  Urine protein creatinine ratio 0.58  Retroperitoneal ultrasound showed "No evidence of hydronephrosis bilaterally or other significant sonographic abnormality.     KEATON  Acute Kidney Injury nonoliguric  Creatinine   Date Value Ref Range Status   02/27/2025 4.2 (H) 0.5 - 1.4 mg/dL Final   02/27/2025 4.3 (H) 0.5 - 1.4 mg/dL Final   02/27/2025 4.2 (H) 0.5 - 1.4 mg/dL Final   02/27/2025 4.2 (H) 0.5 - 1.4 mg/dL Final   02/26/2025 4.3 (H) 0.5 - 1.4 mg/dL Final   02/26/2025 4.4 (H) 0.5 - 1.4 mg/dL Final   02/26/2025 4.4 (H) 0.5 - 1.4 mg/dL Final   02/26/2025 4.4 (H) 0.5 - 1.4 mg/dL Final   02/26/2025 4.5 (H) 0.5 - 1.4 mg/dL Final   02/04/2025 1.3 0.5 - 1.4 mg/dL Final     Baseline Cr 1.0  Acute kidney injury thought to be 2/2 obstructive nephropathy  Indication for RRT yes  Avoid nephrotoxins, keep MAP > 65, strict I/O, trend renal serum creatinine, electrolytes and UO.   "

## 2025-02-28 NOTE — ASSESSMENT & PLAN NOTE
"Patient's FSGs are controlled on current medication regimen.  Last A1c reviewed-   Lab Results   Component Value Date    HGBA1C 6.4 (H) 02/26/2025     Most recent fingerstick glucose reviewed-   No results for input(s): "POCTGLUCOSE" in the last 24 hours.    Current correctional scale  Low  Maintain anti-hyperglycemic dose as follows-   Antihyperglycemics (From admission, onward)    Start     Stop Route Frequency Ordered    02/26/25 1715  insulin aspart U-100 pen 0-5 Units         -- SubQ Every 6 hours PRN 02/26/25 1615        Hold Oral hypoglycemics while patient is in the hospital.  "

## 2025-02-28 NOTE — PROGRESS NOTES
[facility] Medicine  Progress Note    Patient Name: Riaz Guerra Jr.  MRN: [id number]  Patient Class: IP- Inpatient   Admission Date: 2/26/2025  Length of Stay: 2 days  Attending Physician: Alexandre Crespo MD  Primary Care Provider: Berhane Alvarez MD        Subjective     Principal Problem:Uremic encephalopathy        HPI:  Riaz Guerra is a 75-year-old man with a medical history of HTN, HLD, insulin dependent DM, CKD, bilateral inguinal hernias, chronic venous insufficiency, obesity, chronic hip/back pain, and wheelchair dependent. He presented to the ED from [facility] ([facility]) due to altered mental status, labored breathing, and refusing medications. Per phone call to [facility], the patient had normal mentation prior to this admission, but had difficulty adjusting to the facility.     At the ED the patient was A/O x2 to person, and time. Reported pain in bilateral shoulders and hip, feeling cold and thirsty. Discoloration was noted on both lower limbs suggestive of venous stasis. Cole catheter was placed due to greater than 400ml urine seen on bladder scan. Labs were significant for KEATON: Cr 4.5 from base line of 1.3. Potassium 7.0, , CO2 15. CT abdomen pelvis negative for hydronephrosis but noted for bilateral basilar atelectasis and 2mm pulmonary nodule along the fissure on the right as well as tree in bud type nodules along the medial aspect of the left lower lobe concerning for possible developing infectious or inflammatory process. CT head negative for acute intracranial changes.    Patient's sister has been contacted about the patient's location and new condition. Per his sister, patient has not been able to ambulate for the last 9 months and has been at the SNF facility for the last two months.           Overview/Hospital Course:  Patinet admitted with MAS, and hyperkalemia on presentation. Potassium was shifted and was given lokelma and calcium gluconate.  Nephrology was consulted. Recommended RRT if electrolyte derangement don't improve. Vascular surgery was consulted for leg pain as arterial doppler showed 50-75% stenosis of right anterior tibial, do not recommend any acute intervention at this time. Continue ASA/statin. Psych was consulted as there is concern that patient lacks decision capacity as he had been refusing labs and treatment occasionally.    Interval History: NAEON, VSS, electrolytes with in normal range. KEATON improving. Vascular surgery was consulted for leg pain as arterial doppler showed 50-75% stenosis of right anterior tibial, do not recommend any acute intervention at this time. Continue ASA/statin. Psych was consulted as there is concern that patient lacks decision capacity as he had been refusing labs and treatment occasionally. Continue pain management.    Review of Systems  Objective:     Vital Signs (Most Recent):  Temp: 97.4 °F (36.3 °C) (02/28/25 1158)  Pulse: 108 (02/28/25 1158)  Resp: 18 (02/28/25 1158)  BP: (!) 157/94 (02/28/25 1158)  SpO2: 100 % (02/28/25 1158) Vital Signs (24h Range):  Temp:  [97.3 °F (36.3 °C)-98.1 °F (36.7 °C)] 97.4 °F (36.3 °C)  Pulse:  [] 108  Resp:  [16-18] 18  SpO2:  [90 %-100 %] 100 %  BP: (104-157)/(65-94) 157/94     Weight: (!) 139.7 kg (308 lb)  Body mass index is 42.96 kg/m².    Intake/Output Summary (Last 24 hours) at 2/28/2025 1209  Last data filed at 2/28/2025 1033  Gross per 24 hour   Intake --   Output 3200 ml   Net -3200 ml         Physical Exam      Constitutional:       Appearance: He is ill-appearing and toxic-appearing. He is not diaphoretic.   HENT:      Head: Normocephalic.      Mouth/Throat:      Mouth: Mucous membranes are dry.      Dentition: Normal dentition.      Pharynx: Oropharynx is clear.      Comments: Dentures left at nursing facility  Eyes:      Pupils: Pupils are equal, round, and reactive to light.   Cardiovascular:      Rate and Rhythm: Normal rate and regular rhythm.    Pulmonary:      Effort: Pulmonary effort is normal. No respiratory distress.      Breath sounds: Normal breath sounds. No wheezing.   Abdominal:      General: Bowel sounds are normal. There is distension.      Palpations: Abdomen is soft.      Tenderness: There is no abdominal tenderness.   Musculoskeletal:      Cervical back: Normal range of motion.      Right lower leg: Swelling present.      Left lower leg: Swelling present.   Skin:     General: Skin is warm and dry.      Coloration: Skin is ashen.      Comments: Ulcerated, stasis dermatitis lower legs    Neurological:      Mental Status: He is disoriented.      Comments: Bed bound   Significant Labs: All pertinent labs within the past 24 hours have been reviewed.    Significant Imaging: I have reviewed all pertinent imaging results/findings within the past 24 hours.    Assessment and Plan     * Uremic encephalopathy  See acute renal failure     Right hip pain  Continue gabapentin and PRN oxycodone and dilaudid      Impaired mobility and activities of daily living  History of rapid decline over the last 9 months. Spent 2 months at [facility]. Staff there says patient has had difficulty adjusting.     - will consult PT/OT when encephalopathy resolves    Chronic wounds; BLE  - wound care consulted       Urine retention  - david placed  - strict input and out  - will monitor output and BMP following fluid challenge      Venous stasis of both lower extremities  - sequential compression device  - wound care for ulcerated leg wounds  - consult PT/OT when encephalopathy resolves    Metabolic acidosis, normal anion gap (NAG)  - see renal failure and hyperkalemia      Acute renal failure superimposed on stage 3a chronic kidney disease  KEATON is likely due to pre-renal azotemia due to dehydration. Baseline creatinine is  1.5 . Most recent creatinine and eGFR are listed below.  Recent Labs     02/27/25  1045 02/27/25  1654 02/28/25  0324   CREATININE  "4.3* 4.2* 3.9*   EGFRNORACEVR 13.8* 14.2* 15.5*        Plan  - KEATON is worsening. Will continue current treatment  - Avoid nephrotoxins and renally dose meds for GFR listed above  - Monitor urine output, serial BMP, and adjust therapy as needed  - Receiving 1 L LR  - BMP q4  - nephrology recommendations:      - Serial BMP     - schedule lokelma 10 g tid     - recommend eval for capacity; if hyperK worsens, he will likely need dialysis.    Chronic pain syndrome  Continue gabapentin    Hyperkalemia  Hyperkalemia is likely due to KEATON.The patients most recent potassium results are listed below.  Recent Labs     02/27/25  1045 02/27/25  1654 02/28/25  0324   K 5.9* 5.7* 5.0       Plan  - Monitor for arrhythmias with EKG and/or continuous telemetry.   - Treat the hyperkalemia with Potassium Binders, Calcium gluconate, IV insulin and dextrose, and Nebulized albuterol sulfate.   - Monitor potassium: Every 4 hours  - The patient's hyperkalemia is worsening. Will continue current treatment  - will consider sodium bicarb acidosis improved, currently on LR 1 L  - Nephrology following            Type 2 diabetes mellitus with chronic kidney disease, without long-term current use of insulin  Patient's FSGs are controlled on current medication regimen.  Last A1c reviewed-   Lab Results   Component Value Date    HGBA1C 6.4 (H) 02/26/2025     Most recent fingerstick glucose reviewed-   No results for input(s): "POCTGLUCOSE" in the last 24 hours.    Current correctional scale  Low  Maintain anti-hyperglycemic dose as follows-   Antihyperglycemics (From admission, onward)      Start     Stop Route Frequency Ordered    02/26/25 1715  insulin aspart U-100 pen 0-5 Units         -- SubQ Every 6 hours PRN 02/26/25 1615          Hold Oral hypoglycemics while patient is in the hospital.      VTE Risk Mitigation (From admission, onward)           Ordered     IP VTE HIGH RISK PATIENT  Once         02/26/25 1159     Place sequential compression " device  Until discontinued         02/26/25 1159                    Discharge Planning   KELSEY: 3/3/2025     Code Status: Full Code   Medical Readiness for Discharge Date:   Discharge Plan A: Return to nursing home   Discharge Delays: None known at this time            Please place Justification for DME        Leny Lewis MD  Department of Hospital Medicine   [facility]

## 2025-02-28 NOTE — HOSPITAL COURSE
Patient admitted to hospital medicine for altered mental status KEATON, and hyperkalemia on presentation. Potassium was shifted and was given lokelma and calcium gluconate. Nephrology was consulted and recommendations followed, sodium bicarb, lasix + diuril, and lokelma TID. Hyperkalemia resolved. Patient's mentation waxing and waning. Psychiatry consulted. 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 FTA reactive. Started on IV PCN. LP 3/7. CSF results show some evidence for viral meningitis with lymphocytic predominance. VDLR negative. ID recommended empiric acyclovir given due to concerns for viral meningitis, however, workup returned negative and antivirals were discontinued. Patient to be discharged to SNF with IV Penicillin with infectious disease outpatient follow-up. He will also finish his macrobid for UTI.    Vitals:    03/12/25 0442 03/12/25 0802 03/12/25 0816 03/12/25 0933   BP: (!) 144/93  (!) 143/93    Pulse: 89  95    Resp: 18 14 18 16   Temp: 97.3 °F (36.3 °C)  97.4 °F (36.3 °C)    TempSrc: Oral  Oral    SpO2: 100%  97%    Weight:       Height:         Physical Exam  Vitals and nursing note reviewed.   Constitutional:       General: He is not in acute distress.     Appearance: He is normal weight. He is not toxic-appearing.   HENT:      Head: Normocephalic and atraumatic.   Eyes:      Extraocular Movements: Extraocular movements intact.      Pupils: Pupils are equal, round, and reactive to light.   Cardiovascular:      Rate and Rhythm: Normal rate and regular rhythm.   Pulmonary:      Effort: Pulmonary effort is normal. No respiratory distress.      Breath sounds: No stridor.   Abdominal:      General: Abdomen is flat. There is no distension.   Musculoskeletal:      Cervical back: Normal range of motion and neck supple.   Skin:     General: Skin is warm and dry.   Neurological:      General: No focal deficit present.      Mental Status: He is alert. He is disoriented.      GCS: GCS eye subscore is 4.  GCS verbal subscore is 5. GCS motor subscore is 6.      Comments: Oriented to place and year, not situation.   Psychiatric:         Mood and Affect: Mood normal.         Behavior: Behavior normal.

## 2025-02-28 NOTE — PT/OT/SLP EVAL
"Physical Therapy Co-Evaluation and Discharge Note  Co-eval performed to appropriately and safely assess patient's strength and endurance while facilitating functional tasks in addition to accommodating for patient's activity/pain tolerance.    Patient Name:  Riaz Guerra Jr.   MRN:  [id number]    Recommendations:     Discharge Recommendations: No Therapy Indicated  Discharge Equipment Recommendations: none   Barriers to discharge: None    Assessment:     Riaz Guerra Jr. is a 75 y.o. male admitted with a medical diagnosis of Uremic encephalopathy. .  At this time, patient is functioning at their prior level of function and does not require further acute PT services.     Recent Surgery: * No surgery found *      Plan:     During this hospitalization, patient does not require further acute PT services.  Please re-consult if situation changes.      Subjective     Chief Complaint: pain in LEs  Patient/Family Comments/goals: pt reports that he is primarily bed bound and that they haven't used the alex lift in 4 months  Pain/Comfort:  Pain Rating 1: 7/10  Location - Side 1: Bilateral  Location 1: leg (above knees)  Pain Addressed 1: Reposition, Distraction, Cessation of Activity  Pain Rating Post-Intervention 1: other (see comments) (not rated)    Patients cultural, spiritual, Yazdanism conflicts given the current situation: no    Living Environment: per PT eval from prior admit on 2/2/25; pt questionable historian   "Living Environment: Pt admitted from [facility]  Prior Level of Function: primarily bed bound, staff at [facility] would use lift device for transfers. When he is transferred to the w/c with the lift device he is able to self propel. Pt reports he has not been able to amb since before November 2024. Pt reports he using briefs in bed.  DME owned: w/c, hospital bed, life device at NH  Caregiver Assistance: staff at NH"    Objective:     Communicated with RN prior to session.  Patient " VIDEO VISIT NOTE    Goldie Boyle is an established patient of Kayla Iverson MD.  She is currently located at her home.  Identity of patient confirmed by requesting home address, phone number, and date of birth.  The patient consents to interaction with Kayla Iverson MD and Montrell Huynh(Medical Scribe) for the purpose of assessment, diagnosis and initiation of treatment recommendations via video visit?  YES  Visit done through the video today due to COVID-19.  Patient made aware that there is billing associated with this visit.   Total time with patient: 30 minutes.    PREOPERATIVE MEDICAL CONSULTATION      DATE  12/1/2020      DATE OF SURGERY  12/3/2020       REQUESTING PHYSICIAN  Jeremías Iverson *      PRIMARY CARE PHYSICIAN  Kayla Iverson MD      CODE STATUS  Full Code       CHIEF COMPLAINT  Chief Complaint   Patient presents with   • Video Visit     Pre Op     Left 4th toe bone fragment removal: Patient is a 39 year old female, who is being evaluated preoperatively at the request of Jeremías Iverson * for:  Left 4th toe bone fragment removal. Goldie has never had surgery before.     • The surgery/procedure is to be performed at: [facility].    • Planned anesthesia is monitor anesthesia care.   • The patient has the following known anesthesia issues: family history of problems with anesthesia, none and patient has not had surgery before.    • Patient has a bleeding risk of: no recent abnormal bleeding, no remote history of abnormal bleeding and no use of Ca-channel blockers.    • Use of blood thinners: None.    • Patient does not have objections to receiving blood products if needed.    Goldie denies any exertional symptoms of chest pain, shortness of breath or angina.  No palpitations, dizziness or irregular heartbeat.  Patient is completely asymptomatic.      Patient doesn't have history of asthma, COPD (chronic obstructive pulmonary disease), or sleep  apnea.  No history of chronic respiratory illnesses and appears to be stable from this standpoint.      Never had a deep vein thrombosis/pulmonary embolism/congestive heart failure/myocardial infarction/cerebrovascular accident during a previous perioperative period.    Goldie denies any history of bleeding tendency, diabetes, heart disease, hypertension, epilepsy or seizures, pulmonary disease, genitourinary disease, ear or nose or throat disease, liver disease or kidney disease.      Patient denies history of adverse reaction to anesthesia in self or family.  Patient states she is able to climb 1 flight of stairs, attaining at least 4 MET's (Metabolic Equivalent of Task) of activity.  Allergies as below.      Goldie reports no alcohol  use, no cigarettes/tobacco use per day and no denies any illicit drug use. She is scheduled for her Covid-19 testing to be done at 2:40PM today.     Headaches/neck pain: Goldie is wondering if she can be referred to Neurology in order to be evaluated for Botox injections. She has been working with PT for her neck for a long time and she continues to get some issues. 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 Migraines are very random. She has tried numerous remedies for this issue. She has not seen her Neurologist for over a year. She had been getting cortisone injections prior to her Guillain syndrome at [facility]. She is not interested in getting these injections again. Goldie has no further concerns or issues at this time.     MEDICATIONS  Current Outpatient Medications   Medication Sig Dispense Refill   • Cholecalciferol (VITAMIN D3) 5000 units capsule Take 5,000 Units by mouth daily.     • MAGNESIUM CITRATE PO      • COLLAGEN PO Take by mouth daily.     • dexmethylphenidate (FOCALIN XR) 20 MG 24 hr capsule Take 20 mg by mouth every morning.     • Lido-Capsaicin-Men-Methyl Sal (MEDI-PATCH-LIDOCAINE) 0.5-0.035-5-20 % Patch Apply (1) patch  found HOB elevated with peripheral IV, telemetry, david catheter, bed alarm upon PT entry to room.    General Precautions: Standard, fall    Orthopedic Precautions:N/A   Braces: N/A  Respiratory Status: Room air    Exams:  Cognitive Exam:  Patient is oriented to Person and Time (month only)  Gross Motor Coordination:  decreased  Postural Exam:  Patient presented with the following abnormalities:    -       Rounded shoulders  -       Forward head  RLE ROM: WFL  RLE Strength: hip flexion 2/5, knee extension 2/5, knee flexion 2/5, DF 2/5  LLE ROM: WFL  LLE Strength: hip flexion 2/5, knee extension 2/5, knee flexion 2/5, DF 2/5    Functional Mobility:  Bed Mobility:     Scooting: total assistance and of 2 persons  Supine to Sit: total assistance and of 2 persons  Sit to Supine: total assistance and of 2 persons  Transfers:     Sit to Stand: pt not standing at baseline and demonstrates poor trunk control  Balance:   Static Sitting: total A with 1-2 brief moments of SBA with set up and BUE support  Dynamic Sitting: total Ax2    AM-PAC 6 CLICK MOBILITY  Total Score:6       Treatment and Education:  Patient educated on role of therapy and goals of session.  Patient educated on calling for assistance.   Communication board up to date.  All questions answered within PT scope of practice.    AM-PAC 6 CLICK MOBILITY  Total Score:6     Patient left HOB elevated with all lines intact, call button in reach, and RN notified.    GOALS:   Multidisciplinary Problems       Physical Therapy Goals       Not on file              Multidisciplinary Problems (Resolved)          Problem: Physical Therapy    Goal Priority Disciplines Outcome Interventions   Physical Therapy Goal   (Resolved)     PT, PT/OT Met                        DME Justifications:  No DME recommended requiring DME justifications    History:     Past Medical History:   Diagnosis Date    Depression     Diabetes mellitus     Gout     High cholesterol     Hypertension         History reviewed. No pertinent surgical history.    Time Tracking:     PT Received On: 02/28/25  PT Start Time: 1137     PT Stop Time: 1201  PT Total Time (min): 24 min     Billable Minutes: Evaluation 12 and Therapeutic Activity 12      02/28/2025   to area with pain.  Patch may be worn up to 12 hours in a 24 hour period.     • Multiple Vitamins-Minerals (MULTIVITAMIN ADULT PO) Take 1 tablet by mouth.     • Omega-3 Fatty Acids (FISH OIL PO)      • gabapentin (NEURONTIN) 300 MG capsule Increase by 1 cap q3days up to 3 caps PO  capsule 5   • lisdexamfetamine (VYVANSE) 50 MG capsule Take 70 mg by mouth every morning. Indications: Patient takes in afternoon      • QUEtiapine (SEROQUEL) 25 MG tablet Take 300 mg by mouth daily.      • Vortioxetine HBr (TRINTELLIX) 20 MG Tab Take 20 mg by mouth daily.       No current facility-administered medications for this visit.        Other over-the-counter herbs, mineral or supplements utilized:  See med list.    ALLERGIES  Previous adverse reaction to anesthesia:  None  ALLERGIES:   Allergen Reactions   • Flu Virus Vaccine Other (See Comments)     Patient had guillain-barre        SURGICAL/ANESTHESIA HISTORY     []  YES    [x]  NO     []  UNKNOWN   History of problematic/difficult intubations.  []  YES    [x]  NO     []  UNKNOWN   History of prior anesthesia reactions.  []  YES    [x]  NO     []  UNKNOWN   Family history of anesthesia reactions.  []  YES    [x]  NO     []  UNKNOWN   History of bleeding or clotting disorders.  []  YES    [x]  NO     []  UNKNOWN   Family history of bleeding/clotting disorders.  []  YES    [x]  NO     []  UNKNOWN   Past history of blood transfusions.  []  YES    [x]  NO     []  UNKNOWN   History of exposure/treatment for hepatitis.  []  YES    [x]  NO     []  UNKNOWN   History of exposure to or treatment for TB (tuberculosis).  []  YES    [x]  NO     []  UNKNOWN   History of ARC or AIDS-related complex.    HISTORIES  Past Medical History:   Diagnosis Date   • Eating disorder 11/18/2016   • ETOH abuse 7/25/2016   • JENNI (generalized anxiety disorder) 7/25/2016   • Insomnia 7/25/2016   • Migraine with aura and without status migrainosus, not intractable 7/25/2016   • Moderate episode of  recurrent major depressive disorder (CMS/[facility]) 7/25/2016   • Osteopenia 5/30/2017       Past Surgical History:   Procedure Laterality Date   • Pap,thin prep w hpv(inc 76241)  06/18/2014    care everywhere- pap with neg hpv       Family History   Problem Relation Age of Onset   • Diabetes Paternal Grandfather    • Diabetes Paternal Grandmother    • Cancer, Colon Paternal Grandmother    • Hypertension Maternal Grandfather    • Other Maternal Grandfather 37        Myocardial infarction that led to death at age 37, history of alcohol use   • Hypertension Maternal Uncle    • Other Maternal Aunt         Epilepsy   • Other Father 55        Pancreatitis due to EtOH abuse   • Alcohol Abuse Father    • Thyroid Mother          hypothyroidism   • Myocardial Infarction Brother 37      No known family history of adverse reaction to anesthesia or bleeding diathesis      REVIEW OF SYSTEMS  Constitutional:  Denies weight loss, generalized fatigue, chills, or night sweats.  Eyes:  Denies change in visual acuity, denies diplopia, denies photophobia.   HENT:  Denies hearing loss, tinnitus, chronic nasal congestion, sore throat, or change in voice.  Respiratory:  Denies shortness of breath, chronic cough, sputum production, or hemoptysis.  Cardiovascular:  Denies chest pain, palpitations, dyspnea on exertion, or claudication symptoms.  Gastrointestinal:  Denies difficulty swallowing, abdominal pain, diarrhea, constipation, melena, or changes in bowel habits  Genitourinary:  Denies dysuria, hematuria, frequency, urinary incontinence, hesitancy, or weak stream.  Musculoskeletal: Positive for thoracic and cervical pain.  Integument:  Denies rash or changes in moles, no lesions seen.  Neurologic:  Denies focal weakness or sensory changes. Positive for migraines.  Endocrine:  Denies polyuria or polydipsia, denies temperature intolerance.   Lymphatic:  Denies swollen glands.   Psychiatric:  Denies changes in mood, anxiety, insomnia, concerns  about excess alcohol consumption or illicit drug use.       PHYSICAL EXAMINATION  There were no vitals taken for this visit.   Wt Readings from Last 4 Encounters:   11/09/20 60.3 kg   08/10/20 60.3 kg   08/06/20 59.4 kg   08/04/20 60.3 kg     Constitutional:  Well-developed, well-nourished, no acute distress, non-toxic appearance.  Psychiatric:  Speech and behavior appropriate.       EKG  Done on 12/1/2020 shows: No evidence of hypertrophy, no blocks. No significant Q waves, no ST changes, no T wave inversion. No evidence of ischemia.  Results for orders placed or performed during the hospital encounter of 11/28/17   Electrocardiogram 12-Lead   Result Value Ref Range    Ventricular Rate EKG/Min (BPM) 135     Atrial Rate (BPM) 135     CO-Interval (MSEC) 114     QRS-Interval (MSEC) 70     QT-Interval (MSEC) 290     QTc 435     P Axis (Degrees) 77     R Axis (Degrees) 81     T Axis (Degrees) 58     REPORT TEXT       .  Sinus tachycardia  Otherwise normal ECG  When compared with ECG of  07-DEC-2016 12:34,  Vent. rate  has increased  BY  64 BPM  Confirmed by NURY COLINDRES, NICK ([id number]),  Roxana Jett ([id number]) on 11/30/2017 9:19:38 AM       ASSESSMENT/PLAN  Pre-op exam    Medication management    Migraine with aura and without status migrainosus, not intractable     1. Pre-op exam  - All of Goldie's questions regarding their upcoming procedure were answered. They were advised to direct all further questions or concerns to Dr. Iverson's office for verification. Patient has no history of reaction to anesthesia events or problems with intubation. Patient has no history of blood clots or clotting disorders; no history of family blood clotting disorders. Patient has no history of Hepatitis C, blood transfusions, AIDS, ARC, or TB. No allergy to latex.     - EKG was performed today with no abnormal results. Normal sinus rate and rhythm.    - Labs and chest x-ray have been ordered for Goldie to have done prior to leaving  the clinic today. Reasoning(pre-op exam) for why labs/x-ray were ordered was discussed. We will notify the patient of lab results when they are finalized.    - Covid-19 testing is will be ordered by the surgeon's office/procederalist's office (Dr. Iverson). Surgeon's office will also be responsible for communicating processes for pre-procedure quarantine and isolation to patient.    - This is a 39 year old female who is medically stable for this planned procedure, according to the ACC/AHS guidelines, the patient has no cardiopulmonary symptoms to contraindicate surgery.    - The benefits of the procedure outweigh the risks.    - Chronic medical conditions reviewed with patient and stable at this time.    - The anesthesia plan will be determined by the anesthesiologist in consultation with the surgeon.  - Medication management reviewed including avoidance of NSAID's (nonsteroidal anti-inflammatory drugs), ASA (aspirin), over-the-counter herbs/supplements/vitamins, tobacco and alcohol for the 7 days preoperatively, and to be held until cleared by surgeon after procedure.    - All other chronic medications are to be continued unless an alternative plan was advised.   - Take all medication up to the night before the surgery.    - Hold all medications the morning of the surgery.    - Medications could be resumed postoperatively at the discretion of the surgeon.  - General guidelines for pre-surgery instructions were given to pt with the AVS.     - Emotional/Behavioral/Social Status:  Stable    - The patient is stable to undergo this surgery.    - Thank you for allowing me to participate in the care of this patient.  Please feel free to call with any questions.    2. Medication management  - Patient was advised to stop Ibuprofen, Aleve, and Advil 7 days prior to surgery due to increased bleeding effects. Goldie medication list was evaluated by me which medications they can and cannot take prior to surgery. She was  advised to drink plenty of fluids after surgery. This will help flush anesthesia as well as prevent constipation due to opioids. They will start stool softeners 1-2 days prior to surgery and continue 3-4 days post-medication use in order to prevent constipation.     3. Migraine with aura and without status migrainosus, not intractable  - Patient is currently taking Gabapentin 300mg TID for this diagnosis. Migraines continues to be problematic for Goldie. I recommended she follow up with her Neurologist, Dr. Emery, in order to discuss her more frequent migraines and/or seek out a second opinion regarding her thoracic/cervical neck pain which could be contributing to her migraines. Patient is agreeable to recommendation.    HEALTH MAINTENANCE:   - Education provided for health maintenance topics due. Orders done appropriately. Discussed any that patient refused. Patient will call the office if they change their mind regarding any refused. Flu shot was recommended and education was provided regarding getting the flu shot. Goldie is up to date with her health maintenance recommendations.    PLAN:   - The patient is agreeable to the plan as stated above and all questions/concerns were answered.  I let the patient know to call the office if they have any questions or concerns.  We will see Goldie back in as needed.     A copy of this consultation will be sent to the referring physician, Jeremías Iverson *.    All the above plans and medication(s) side effect profile were discussed with the patient in details, questions were answered to the patient's satisfaction.  Patient verbalized understanding and was agreeable to the above plan.     FOLLOW UP   Return if symptoms worsen or fail to improve.    LABS  No visits with results within 1 Week(s) from this visit.   Latest known visit with results is:   Lab Services on 09/14/2020   Component Date Value Ref Range Status   • C-Reactive Protein 09/14/2020 <0.3  <=1.0  mg/dL Final   • Rheumatoid Factor 09/14/2020 <10  <=14 Units/mL Final   • Cyclic Citrullinated Peptide Antib* 09/14/2020 4  <=19 Units Final   • RBC Sedimentation Rate 09/14/2020 12  0 - 20 mm/hr Final   • Double Stranded DNA Ab, IgG 09/14/2020 <1  <=9 IUnits/mL Final   • SSA Ab, IgG 09/14/2020 <0.2  <1.0 AI Final   • Chromatin Ab, IgG 09/14/2020 <0.2  <1.0 AI Final   • RPP Ab, IgG 09/14/2020 <0.2  <1.0 AI Final   • SSB  Ab, IgG 09/14/2020 <0.2  <1.0 AI Final   • SM Ab, IgG 09/14/2020 <0.2  <1.0 AI Final   • RNP Ab, IgG 09/14/2020 <0.2  <1.0 AI Final   • SHARIFA 1 Ab, IgG  09/14/2020 <0.2  <1.0 AI Final   • SCL70 Ab, IgG  09/14/2020 <0.2  <1.0 AI Final   • Centromere Ab, IgG 09/14/2020 <0.2  <1.0 AI Final   • SM/RNP Ab, IgG 09/14/2020 <0.2  <1.0 AI Final   • TSH 09/14/2020 2.031  0.350 - 5.000 mcUnits/mL Final   • T3, Free 09/14/2020 2.2  2.2 - 4.0 pg/mL Final   • T4, Free 09/14/2020 0.8  0.8 - 1.5 ng/dL Final   • Vitamin D, 25-Hydroxy 09/14/2020 49.9  30.0 - 100.0 ng/mL Final        On 11/30/2020, Montrell YOUNG scribed the services personally performed by Kayla Iverson MD.    The documentation recorded by the scribe accurately and completely reflects the service(s) I personally performed and the decisions made by me.

## 2025-02-28 NOTE — ASSESSMENT & PLAN NOTE
KEATON is likely due to pre-renal azotemia due to dehydration. Baseline creatinine is  1.5 . Most recent creatinine and eGFR are listed below.  Recent Labs     02/27/25  1045 02/27/25  1654 02/28/25  0324   CREATININE 4.3* 4.2* 3.9*   EGFRNORACEVR 13.8* 14.2* 15.5*        Plan  - KEATON is worsening. Will continue current treatment  - Avoid nephrotoxins and renally dose meds for GFR listed above  - Monitor urine output, serial BMP, and adjust therapy as needed  - Receiving 1 L LR  - BMP q4  - nephrology recommendations:      - Serial BMP     - schedule lokelma 10 g tid     - recommend eval for capacity; if hyperK worsens, he will likely need dialysis.

## 2025-02-28 NOTE — SUBJECTIVE & OBJECTIVE
Prescriptions Prior to Admission[1]    Review of patient's allergies indicates:   Allergen Reactions    Lisinopril Other (See Comments)     cough       Past Medical History:   Diagnosis Date    Depression     Diabetes mellitus     Gout     High cholesterol     Hypertension      History reviewed. No pertinent surgical history.  Family History       Problem Relation (Age of Onset)    Diabetes Mother, Father    Heart disease Mother          Tobacco Use    Smoking status: Never    Smokeless tobacco: Never   Substance and Sexual Activity    Alcohol use: Not Currently     Comment: occasionally    Drug use: Yes     Types: Marijuana    Sexual activity: Yes     Review of Systems   Unable to perform ROS: Mental status change     Objective:     Vital Signs (Most Recent):  Temp: 98 °F (36.7 °C) (02/28/25 0437)  Pulse: 78 (02/28/25 0437)  Resp: 16 (02/28/25 0437)  BP: 111/65 (02/28/25 0437)  SpO2: 95 % (02/28/25 0437) Vital Signs (24h Range):  Temp:  [97.2 °F (36.2 °C)-98 °F (36.7 °C)] 98 °F (36.7 °C)  Pulse:  [73-88] 78  Resp:  [16-18] 16  SpO2:  [90 %-100 %] 95 %  BP: (104-125)/(65-77) 111/65     Weight: (!) 139.7 kg (308 lb)  Body mass index is 42.96 kg/m².      Physical Exam  Vitals and nursing note reviewed.   Constitutional:       General: He is not in acute distress.     Appearance: He is ill-appearing.   HENT:      Mouth/Throat:      Mouth: Mucous membranes are dry.   Cardiovascular:      Rate and Rhythm: Normal rate.      Comments:   Palpable femorals bilaterally  Non palpable pedal pulses  Chronic venous discoloration, 1+ pitting edema  No wounds on right leg  Pulmonary:      Effort: Pulmonary effort is normal.   Musculoskeletal:      Cervical back: Neck supple.   Skin:     Capillary Refill: Capillary refill takes less than 2 seconds.   Neurological:      General: No focal deficit present.      Sensory: No sensory deficit.      Motor: No weakness.          Significant Labs:  All pertinent labs from the last 24 hours  have been reviewed.    Significant Diagnostics:  I have reviewed all pertinent imaging results/findings within the past 24 hours.       [1]   Medications Prior to Admission   Medication Sig Dispense Refill Last Dose/Taking    amLODIPine (NORVASC) 10 MG tablet Take 1 tablet (10 mg total) by mouth once daily.   Taking    aspirin (ECOTRIN) 81 MG EC tablet Take 81 mg by mouth once daily.   Taking    atorvastatin (LIPITOR) 20 MG tablet Take 20 mg by mouth once daily.   Taking    baclofen (LIORESAL) 10 MG tablet Take 10 mg by mouth 3 (three) times daily.   Taking    fluticasone propionate (FLONASE) 50 mcg/actuation nasal spray 2 sprays (100 mcg total) by Each Nostril route once daily. 11.1 mL 0 Taking    gabapentin (NEURONTIN) 800 MG tablet Take 1 tablet (800 mg total) by mouth 2 (two) times daily. 20 tablet 0 Taking    HYDROcodone-acetaminophen (NORCO) 7.5-325 mg per tablet Take 1 tablet by mouth every 8 (eight) hours as needed for Pain. 12 tablet 0 Taking As Needed    losartan-hydrochlorothiazide 100-12.5 mg (HYZAAR) 100-12.5 mg Tab Take 1 tablet by mouth once daily.   Taking    metFORMIN (GLUCOPHAGE) 1000 MG tablet Take 1,000 mg by mouth 2 (two) times daily with meals.   Taking    polyethylene glycol (GLYCOLAX) 17 gram PwPk Take 17 g by mouth once daily.   Taking    tamsulosin (FLOMAX) 0.4 mg Cap Take by mouth once daily.   Taking    vitamin D (VITAMIN D3) 1000 units Tab Take 1,000 Units by mouth once daily.   Taking    sertraline (ZOLOFT) 50 MG tablet Take 1 tablet (50 mg total) by mouth once daily.   Unknown

## 2025-02-28 NOTE — PT/OT/SLP EVAL
Occupational Therapy Co-Evaluation,Treatment, and Discharge Summary    Name: Riaz Guerra Jr.  MRN: [id number]  Admitting Diagnosis: Uremic encephalopathy  Recent Surgery: * No surgery found *      Pt was co-treated with Physical Therapy to assess abilities and/or deficits for appropriate skilled interventions due to medical complexity, low endurance, and for increased safety.    Recommendations:     Discharge Recommendations: No Therapy Indicated  Level of Assistance Recommended: 24 hours significant assistance  Discharge Equipment Recommendations: lift device  Barriers to discharge: None    Assessment:     Riaz Guerra Jr. is a 75 y.o. male with a medical diagnosis of Uremic encephalopathy. He presents with performance deficits affecting function including weakness, impaired cognition, impaired endurance, impaired self care skills, impaired functional mobility, impaired balance, pain, decreased safety awareness, decreased ROM, impaired joint extensibility, impaired coordination, decreased upper extremity function, decreased lower extremity function, impaired skin. Pt has a poor previous baseline prior to this admission where  he required total assist for ADL care and primarily stayed bedbound in the nursing home setting. His cognitive status is impaired and demonstrated poor potential to show therapeutic participation and carryover in the acute setting.     Patient Discharged from acute Occupational Therapy on 2/28/2025 .  Please refer to this note dated for functional status.    Reasons for Discontinuation of Therapy Services  Patient is unable to continue work toward goals because of medical or psychosocial complications. and Therapist determines that the patient will no longer benefit from therapy services.      Plan:     Patient to be seen today to assess for functional ADL participation and or deficits in acute setting.   Plan of Care Expires: 02/28/25  Plan of Care Reviewed with:  "patient    Subjective     Chief Complaint: see below. "I can't stand up now"  Patient Comments/Goals: "I'll do better tomorrow"  Pain/Comfort:  Pain Rating 1: 7/10  Location - Side 1: Bilateral  Location 1: leg  Pain Addressed 1: Reposition, Distraction, Cessation of Activity    Patients cultural, spiritual, Restorationist conflicts given the current situation: no    Social History:  Living Environment: Patient lives in a nursing home with  accessibility for bathrooms.  Prior Level of Function: Prior to admission, patient  dependent on 24/7 care and was bedbound. He rarely gets out of the bed per his report. "They used the lift one time 4 months ago and never came back"   Chart review included that Pt was nonambulatory x1 year.   Roles and Routines: Patient was not driving and retired prior to admission.  Equipment Used at Home: hospital bed, wheelchair  Assistance Upon Discharge: facility staff    Objective:     Communicated with Ameya avelar prior to session. Patient found supine with Other (comments, waffle mattress), peripheral IV, telemetry, david catheter, bed alarm upon OT entry to room.    General Precautions: Standard, fall   Orthopedic Precautions: N/A   Braces: N/A    Respiratory Status: Room air    Occupational Performance    Gait belt applied - No    Bed Mobility:   Supine to sit from right side of bed with total assistance and of 2 persons  Sit to Supine with total assistance and of 2 persons on R side of bed    Functional Mobility/Transfers:  Deferred for Pt's and staff's safety. Pt was too confused and weak. Had poor trunk control at EOB to attempt standing.     Activities of Daily Living:  Lower Body Dressing: dependence    Cognitive/Visual Perceptual:  Cognitive/Psychosocial Skills:    -     Oriented to: Person and month  -     Follows Commands/attention: Easily distracted  -     Communication: dysarthria  -     Memory: Impaired STM and Impaired LTM  -     Safety awareness/insight to disability: " impaired  -     Mood/Affect/Coping skills/emotional control: Pleasant, Guarded, and fearful  Visual/Perceptual:    -     grossly fair visual screening at bedside.     Physical Exam:  Balance:    -     Sitting: total assistance with posterior leaning. SBA for brief moment after max setup and B Ues support.   Sensation:    -       Intact  Motor Planning: impaired. Pt attempted to reach R UE for bed rail but unable to follow through movement.   Dominant hand: Right  Upper Extremity Range of Motion:     -       Right Upper Extremity: WFL PROM, <90* AROM  -       Left Upper Extremity: 80% PROM, <90* AROM  Upper Extremity Strength:    -       Right Upper Extremity: 2/5  -       Left Upper Extremity: 2-/5   Strength:    -       Right Upper Extremity: 3/5  -       Left Upper Extremity: 3/5  Fine Motor Coordination:    -       Impaired  Gross motor coordination:   impaired hand eye coordination to reach with bed rail.       AMPAC 6 Click ADL:  AMPAC Total Score: 9    Treatment & Education:  Therapist provided facilitation and instruction of proper body mechanics, energy conservation, and fall prevention strategies during tasks listed above  Pt educated on the following topics:  OT 's plan of care and purpose of visit, ADLs, transfer training, bed mobility, body mechanics, modifications/compensatory strategies, sequencing, safety precautions, fall prevention, and to call for assistance with call button  Understanding was limited due to cognitive status or lethargy      Patient not clear to transfer with RN/PCT, medi-chair transfer via drawsheet only.    Patient left HOB elevated with all lines intact, call button in reach, bed alarm on, and PCT present.    GOALS:   Multidisciplinary Problems       Occupational Therapy Goals       Not on file              Multidisciplinary Problems (Resolved)          Problem: Occupational Therapy    Goal Priority Disciplines Outcome Interventions   Occupational Therapy Goal   (Resolved)      OT, PT/OT Met                        DME Justifications:  No DME recommended requiring DME justifications    History:     Past Medical History:   Diagnosis Date    Depression     Diabetes mellitus     Gout     High cholesterol     Hypertension        History reviewed. No pertinent surgical history.    Time Tracking:     OT Date of Treatment: 02/28/25  OT Start Time: 1137  OT Stop Time: 1201  OT Total Time (min): 24 min    Billable Minutes: Evaluation 10 and Therapeutic Exercise 14    2/28/2025

## 2025-03-01 PROBLEM — E87.5 HYPERKALEMIA: Status: RESOLVED | Noted: 2024-07-01 | Resolved: 2025-03-01

## 2025-03-01 PROBLEM — N19 UREMIC ENCEPHALOPATHY: Status: RESOLVED | Noted: 2025-02-26 | Resolved: 2025-03-01

## 2025-03-01 PROBLEM — I73.9 PAD (PERIPHERAL ARTERY DISEASE): Status: ACTIVE | Noted: 2025-03-01

## 2025-03-01 PROBLEM — G93.49 UREMIC ENCEPHALOPATHY: Status: RESOLVED | Noted: 2025-02-26 | Resolved: 2025-03-01

## 2025-03-01 LAB
ALBUMIN SERPL BCP-MCNC: 2.7 G/DL (ref 3.5–5.2)
ALP SERPL-CCNC: 90 U/L (ref 40–150)
ALT SERPL W/O P-5'-P-CCNC: 7 U/L (ref 10–44)
ANION GAP SERPL CALC-SCNC: 13 MMOL/L (ref 8–16)
AST SERPL-CCNC: 16 U/L (ref 10–40)
BASOPHILS # BLD AUTO: 0.04 K/UL (ref 0–0.2)
BASOPHILS NFR BLD: 0.5 % (ref 0–1.9)
BILIRUB SERPL-MCNC: 0.5 MG/DL (ref 0.1–1)
BUN SERPL-MCNC: 70 MG/DL (ref 8–23)
CALCIUM SERPL-MCNC: 9.2 MG/DL (ref 8.7–10.5)
CHLORIDE SERPL-SCNC: 101 MMOL/L (ref 95–110)
CO2 SERPL-SCNC: 23 MMOL/L (ref 23–29)
CREAT SERPL-MCNC: 3.4 MG/DL (ref 0.5–1.4)
DIFFERENTIAL METHOD BLD: ABNORMAL
EOSINOPHIL # BLD AUTO: 0.1 K/UL (ref 0–0.5)
EOSINOPHIL NFR BLD: 1 % (ref 0–8)
ERYTHROCYTE [DISTWIDTH] IN BLOOD BY AUTOMATED COUNT: 16.2 % (ref 11.5–14.5)
EST. GFR  (NO RACE VARIABLE): 18.3 ML/MIN/1.73 M^2
GLUCOSE SERPL-MCNC: 82 MG/DL (ref 70–110)
HCT VFR BLD AUTO: 36.9 % (ref 40–54)
HGB BLD-MCNC: 12.1 G/DL (ref 14–18)
IMM GRANULOCYTES # BLD AUTO: 0.03 K/UL (ref 0–0.04)
IMM GRANULOCYTES NFR BLD AUTO: 0.4 % (ref 0–0.5)
LYMPHOCYTES # BLD AUTO: 1.8 K/UL (ref 1–4.8)
LYMPHOCYTES NFR BLD: 22.1 % (ref 18–48)
MAGNESIUM SERPL-MCNC: 1.7 MG/DL (ref 1.6–2.6)
MCH RBC QN AUTO: 28.5 PG (ref 27–31)
MCHC RBC AUTO-ENTMCNC: 32.8 G/DL (ref 32–36)
MCV RBC AUTO: 87 FL (ref 82–98)
MONOCYTES # BLD AUTO: 0.7 K/UL (ref 0.3–1)
MONOCYTES NFR BLD: 8.8 % (ref 4–15)
NEUTROPHILS # BLD AUTO: 5.5 K/UL (ref 1.8–7.7)
NEUTROPHILS NFR BLD: 67.2 % (ref 38–73)
NRBC BLD-RTO: 0 /100 WBC
PHOSPHATE SERPL-MCNC: 4.7 MG/DL (ref 2.7–4.5)
PLATELET # BLD AUTO: 308 K/UL (ref 150–450)
PMV BLD AUTO: 10.6 FL (ref 9.2–12.9)
POCT GLUCOSE: 219 MG/DL (ref 70–110)
POTASSIUM SERPL-SCNC: 4.6 MMOL/L (ref 3.5–5.1)
PROT SERPL-MCNC: 7.3 G/DL (ref 6–8.4)
RBC # BLD AUTO: 4.24 M/UL (ref 4.6–6.2)
SODIUM SERPL-SCNC: 137 MMOL/L (ref 136–145)
WBC # BLD AUTO: 8.15 K/UL (ref 3.9–12.7)

## 2025-03-01 PROCEDURE — 80053 COMPREHEN METABOLIC PANEL: CPT

## 2025-03-01 PROCEDURE — 83735 ASSAY OF MAGNESIUM: CPT

## 2025-03-01 PROCEDURE — 36415 COLL VENOUS BLD VENIPUNCTURE: CPT

## 2025-03-01 PROCEDURE — 63600175 PHARM REV CODE 636 W HCPCS

## 2025-03-01 PROCEDURE — 21400001 HC TELEMETRY ROOM

## 2025-03-01 PROCEDURE — 25000003 PHARM REV CODE 250

## 2025-03-01 PROCEDURE — 25000003 PHARM REV CODE 250: Performed by: INTERNAL MEDICINE

## 2025-03-01 PROCEDURE — 85025 COMPLETE CBC W/AUTO DIFF WBC: CPT

## 2025-03-01 PROCEDURE — 11000001 HC ACUTE MED/SURG PRIVATE ROOM

## 2025-03-01 PROCEDURE — 84100 ASSAY OF PHOSPHORUS: CPT

## 2025-03-01 RX ORDER — SODIUM CHLORIDE, SODIUM LACTATE, POTASSIUM CHLORIDE, CALCIUM CHLORIDE 600; 310; 30; 20 MG/100ML; MG/100ML; MG/100ML; MG/100ML
INJECTION, SOLUTION INTRAVENOUS CONTINUOUS
Status: ACTIVE | OUTPATIENT
Start: 2025-03-01 | End: 2025-03-01

## 2025-03-01 RX ADMIN — SODIUM CHLORIDE, POTASSIUM CHLORIDE, SODIUM LACTATE AND CALCIUM CHLORIDE: 600; 310; 30; 20 INJECTION, SOLUTION INTRAVENOUS at 11:03

## 2025-03-01 RX ADMIN — ATORVASTATIN CALCIUM 20 MG: 20 TABLET, FILM COATED ORAL at 09:03

## 2025-03-01 RX ADMIN — GABAPENTIN 300 MG: 300 CAPSULE ORAL at 08:03

## 2025-03-01 RX ADMIN — SODIUM ZIRCONIUM CYCLOSILICATE 10 G: 10 POWDER, FOR SUSPENSION ORAL at 08:03

## 2025-03-01 RX ADMIN — MUPIROCIN: 20 OINTMENT TOPICAL at 08:03

## 2025-03-01 RX ADMIN — SODIUM ZIRCONIUM CYCLOSILICATE 10 G: 10 POWDER, FOR SUSPENSION ORAL at 04:03

## 2025-03-01 RX ADMIN — MUPIROCIN: 20 OINTMENT TOPICAL at 09:03

## 2025-03-01 RX ADMIN — ASPIRIN 81 MG: 81 TABLET, COATED ORAL at 09:03

## 2025-03-01 RX ADMIN — SODIUM ZIRCONIUM CYCLOSILICATE 10 G: 10 POWDER, FOR SUSPENSION ORAL at 09:03

## 2025-03-01 RX ADMIN — TAMSULOSIN HYDROCHLORIDE 0.4 MG: 0.4 CAPSULE ORAL at 09:03

## 2025-03-01 NOTE — SUBJECTIVE & OBJECTIVE
Interval History: NAEO. Mentation improved at AM exam. Gently fluids ordered for hydration per nephro reccs. PT/OT without new reccs or inpatient therapy needs. Patient does not wish to return to [facility].     Review of Systems  Objective:     Vital Signs (Most Recent):  Temp: 98 °F (36.7 °C) (03/01/25 1142)  Pulse: 98 (03/01/25 1142)  Resp: 18 (03/01/25 1142)  BP: 112/70 (03/01/25 1142)  SpO2: 99 % (03/01/25 1142) Vital Signs (24h Range):  Temp:  [97.6 °F (36.4 °C)-99.1 °F (37.3 °C)] 98 °F (36.7 °C)  Pulse:  [] 98  Resp:  [18] 18  SpO2:  [96 %-99 %] 99 %  BP: ()/(63-89) 112/70     Weight: (!) 139.7 kg (308 lb)  Body mass index is 42.96 kg/m².  No intake or output data in the 24 hours ending 03/01/25 1200      Physical Exam  Constitutional:       General: He is not in acute distress.  HENT:      Head: Normocephalic.      Mouth/Throat:      Mouth: Mucous membranes are moist.   Eyes:      Pupils: Pupils are equal, round, and reactive to light.   Cardiovascular:      Rate and Rhythm: Regular rhythm.      Heart sounds: Normal heart sounds.   Pulmonary:      Breath sounds: Normal breath sounds.   Abdominal:      General: Abdomen is flat.   Musculoskeletal:         General: No swelling.      Comments: Severe venous stasis dermatitis   Skin:     General: Skin is warm.   Neurological:      Mental Status: He is alert and oriented to person, place, and time. Mental status is at baseline.   Psychiatric:         Mood and Affect: Mood normal.             Significant Labs: All pertinent labs within the past 24 hours have been reviewed.  CBC:   Recent Labs   Lab 02/28/25  0324 03/01/25  0508   WBC 7.62 8.15   HGB 12.1* 12.1*   HCT 37.0* 36.9*    308     CMP:   Recent Labs   Lab 02/27/25  1654 02/28/25  0324 03/01/25  0508   * 135* 137   K 5.7* 5.0 4.6    99 101   CO2 22* 25 23    93 82   BUN 90* 87* 70*   CREATININE 4.2* 3.9* 3.4*   CALCIUM 9.4 9.4 9.2   PROT  --  7.6 7.3   ALBUMIN   --  2.9* 2.7*   BILITOT  --  0.4 0.5   ALKPHOS  --  91 90   AST  --  16 16   ALT  --  8* 7*   ANIONGAP 10 11 13       Significant Imaging: I have reviewed all pertinent imaging results/findings within the past 24 hours.

## 2025-03-01 NOTE — PLAN OF CARE
Problem: Adult Inpatient Plan of Care  Goal: Plan of Care Review  Outcome: Progressing  Goal: Patient-Specific Goal (Individualized)  Outcome: Progressing  Goal: Absence of Hospital-Acquired Illness or Injury  Outcome: Progressing  Goal: Optimal Comfort and Wellbeing  Outcome: Progressing  Goal: Readiness for Transition of Care  Outcome: Progressing     Problem: Bariatric Environmental Safety  Goal: Safety Maintained with Care  Outcome: Progressing     Problem: Skin Injury Risk Increased  Goal: Skin Health and Integrity  Outcome: Progressing     Problem: Infection  Goal: Absence of Infection Signs and Symptoms  Outcome: Progressing     Problem: Electrolyte Imbalance  Goal: Electrolyte Balance  Outcome: Progressing     Problem: Acute Kidney Injury/Impairment  Goal: Fluid and Electrolyte Balance  Outcome: Progressing  Goal: Improved Oral Intake  Outcome: Progressing  Goal: Effective Renal Function  Outcome: Progressing     Problem: Confusion Acute  Goal: Optimal Cognitive Function  Outcome: Progressing     Problem: Urinary Retention  Goal: Effective Urinary Elimination  Outcome: Progressing     Problem: Diabetes Comorbidity  Goal: Blood Glucose Level Within Targeted Range  Outcome: Progressing     Problem: Wound  Goal: Optimal Coping  Outcome: Progressing  Goal: Optimal Functional Ability  Outcome: Progressing  Goal: Absence of Infection Signs and Symptoms  Outcome: Progressing  Goal: Improved Oral Intake  Outcome: Progressing  Goal: Optimal Pain Control and Function  Outcome: Progressing  Goal: Skin Health and Integrity  Outcome: Progressing  Goal: Optimal Wound Healing  Outcome: Progressing     Problem: Fall Injury Risk  Goal: Absence of Fall and Fall-Related Injury  Outcome: Progressing

## 2025-03-01 NOTE — ASSESSMENT & PLAN NOTE
History of rapid decline over the last 9 months. Spent 2 months at [facility]. Staff there says patient has had difficulty adjusting.     - Physical therapy says that at this time, patient is functioning at their prior level of function and does not require further acute PT services.   - Occupational therapy: Patient is unable to continue work toward goals because of medical or psychosocial complications. and Therapist determines that the patient will no longer benefit from therapy services.   - will re consult considering patient's dramatic improvement in mentation

## 2025-03-01 NOTE — ASSESSMENT & PLAN NOTE
Hyperkalemia is likely due to KEATON.The patients most recent potassium results are listed below.  Recent Labs     02/27/25  1654 02/28/25  0324 03/01/25  0508   K 5.7* 5.0 4.6       Plan  - Monitor for arrhythmias with EKG and/or continuous telemetry.   - Treat the hyperkalemia with Potassium Binders, Calcium gluconate, IV insulin and dextrose, and Nebulized albuterol sulfate.   - Monitor potassium: Every 4 hours  - The patient's hyperkalemia is worsening. Will continue current treatment  - will consider sodium bicarb acidosis improved, currently on LR 1 L  - Nephrology following

## 2025-03-01 NOTE — PROGRESS NOTES
[facility] Medicine  Progress Note    Patient Name: Riaz Guerra Jr.  MRN: [id number]  Patient Class: IP- Inpatient   Admission Date: 2/26/2025  Length of Stay: 3 days  Attending Physician: Alexandre Crespo MD  Primary Care Provider: Berhane Alvarez MD        Subjective     Principal Problem:Uremic encephalopathy        HPI:  Riaz Guerra is a 75-year-old man with a medical history of HTN, HLD, insulin dependent DM, CKD, bilateral inguinal hernias, chronic venous insufficiency, obesity, chronic hip/back pain, and wheelchair dependent. He presented to the ED from [facility] ([facility]) due to altered mental status, labored breathing, and refusing medications. Per phone call to [facility], the patient had normal mentation prior to this admission, but had difficulty adjusting to the facility.     At the ED the patient was A/O x2 to person, and time. Reported pain in bilateral shoulders and hip, feeling cold and thirsty. Discoloration was noted on both lower limbs suggestive of venous stasis. Cole catheter was placed due to greater than 400ml urine seen on bladder scan. Labs were significant for KEATON: Cr 4.5 from base line of 1.3. Potassium 7.0, , CO2 15. CT abdomen pelvis negative for hydronephrosis but noted for bilateral basilar atelectasis and 2mm pulmonary nodule along the fissure on the right as well as tree in bud type nodules along the medial aspect of the left lower lobe concerning for possible developing infectious or inflammatory process. CT head negative for acute intracranial changes.    Patient's sister has been contacted about the patient's location and new condition. Per his sister, patient has not been able to ambulate for the last 9 months and has been at the SNF facility for the last two months.           Overview/Hospital Course:  Patinet admitted with MAS, and hyperkalemia on presentation. Potassium was shifted and was given lokelma and calcium gluconate.  Nephrology was consulted and recommendations followed, sodium bicarb, lasix + diuril, and lokelma TID. Hyperkalemia resolved. Patient's mentation improved. PT/OT not recommending further therapy. Patient does not like care at [facility] and does not wish to return there.     Interval History: NAEO. Mentation improved at AM exam. Gently fluids ordered for hydration per nephro reccs. PT/OT without new reccs or inpatient therapy needs. Patient does not wish to return to [facility].     Review of Systems  Objective:     Vital Signs (Most Recent):  Temp: 98 °F (36.7 °C) (03/01/25 1142)  Pulse: 98 (03/01/25 1142)  Resp: 18 (03/01/25 1142)  BP: 112/70 (03/01/25 1142)  SpO2: 99 % (03/01/25 1142) Vital Signs (24h Range):  Temp:  [97.6 °F (36.4 °C)-99.1 °F (37.3 °C)] 98 °F (36.7 °C)  Pulse:  [] 98  Resp:  [18] 18  SpO2:  [96 %-99 %] 99 %  BP: ()/(63-89) 112/70     Weight: (!) 139.7 kg (308 lb)  Body mass index is 42.96 kg/m².  No intake or output data in the 24 hours ending 03/01/25 1200      Physical Exam  Constitutional:       General: He is not in acute distress.  HENT:      Head: Normocephalic.      Mouth/Throat:      Mouth: Mucous membranes are moist.   Eyes:      Pupils: Pupils are equal, round, and reactive to light.   Cardiovascular:      Rate and Rhythm: Regular rhythm.      Heart sounds: Normal heart sounds.   Pulmonary:      Breath sounds: Normal breath sounds.   Abdominal:      General: Abdomen is flat.   Musculoskeletal:         General: No swelling.      Comments: Severe venous stasis dermatitis   Skin:     General: Skin is warm.   Neurological:      Mental Status: He is alert and oriented to person, place, and time. Mental status is at baseline.   Psychiatric:         Mood and Affect: Mood normal.             Significant Labs: All pertinent labs within the past 24 hours have been reviewed.  CBC:   Recent Labs   Lab 02/28/25  0324 03/01/25  0508   WBC 7.62 8.15   HGB 12.1* 12.1*    HCT 37.0* 36.9*    308     CMP:   Recent Labs   Lab 02/27/25  1654 02/28/25  0324 03/01/25  0508   * 135* 137   K 5.7* 5.0 4.6    99 101   CO2 22* 25 23    93 82   BUN 90* 87* 70*   CREATININE 4.2* 3.9* 3.4*   CALCIUM 9.4 9.4 9.2   PROT  --  7.6 7.3   ALBUMIN  --  2.9* 2.7*   BILITOT  --  0.4 0.5   ALKPHOS  --  91 90   AST  --  16 16   ALT  --  8* 7*   ANIONGAP 10 11 13       Significant Imaging: I have reviewed all pertinent imaging results/findings within the past 24 hours.    Assessment and Plan     Right hip pain  Continue gabapentin and PRN oxycodone and dilaudid      Impaired mobility and activities of daily living  History of rapid decline over the last 9 months. Spent 2 months at [facility]. Staff there says patient has had difficulty adjusting.     - Physical therapy says that at this time, patient is functioning at their prior level of function and does not require further acute PT services.   - Occupational therapy: Patient is unable to continue work toward goals because of medical or psychosocial complications. and Therapist determines that the patient will no longer benefit from therapy services.   - will re consult considering patient's dramatic improvement in mentation     Chronic wounds; BLE  - wound care consulted       Urine retention  - david placed  - strict input and out  - will monitor output and BMP following fluid challenge      Venous stasis of both lower extremities  - sequential compression device  - wound care for ulcerated leg wounds  - consult PT/OT when encephalopathy resolves    Metabolic acidosis, normal anion gap (NAG)  - see renal failure and hyperkalemia      Acute renal failure superimposed on stage 3a chronic kidney disease  KEATON is likely due to pre-renal azotemia due to dehydration. Baseline creatinine is  1.5 . Most recent creatinine and eGFR are listed below.  Recent Labs     02/27/25  1045 02/27/25  1654 02/28/25 0324  "  CREATININE 4.3* 4.2* 3.9*   EGFRNORACEVR 13.8* 14.2* 15.5*        Plan  - KEATON is worsening. Will continue current treatment  - Avoid nephrotoxins and renally dose meds for GFR listed above  - Monitor urine output, serial BMP, and adjust therapy as needed  - Receiving 1 L LR  - BMP q4  - nephrology recommendations:      - Serial BMP     - schedule lokelma 10 g tid     - recommend eval for capacity; if hyperK worsens, he will likely need dialysis.    Chronic pain syndrome  Continue gabapentin    Type 2 diabetes mellitus with chronic kidney disease, without long-term current use of insulin  Patient's FSGs are controlled on current medication regimen.  Last A1c reviewed-   Lab Results   Component Value Date    HGBA1C 6.4 (H) 02/26/2025     Most recent fingerstick glucose reviewed-   No results for input(s): "POCTGLUCOSE" in the last 24 hours.    Current correctional scale  Low  Maintain anti-hyperglycemic dose as follows-   Antihyperglycemics (From admission, onward)      Start     Stop Route Frequency Ordered    02/26/25 1715  insulin aspart U-100 pen 0-5 Units         -- SubQ Every 6 hours PRN 02/26/25 1615          Hold Oral hypoglycemics while patient is in the hospital.      VTE Risk Mitigation (From admission, onward)           Ordered     IP VTE HIGH RISK PATIENT  Once         02/26/25 1159     Place sequential compression device  Until discontinued         02/26/25 1159                    Discharge Planning   KELSEY: 3/3/2025     Code Status: Full Code   Medical Readiness for Discharge Date:   Discharge Plan A: Return to nursing home   Discharge Delays: None known at this time            Please place Justification for DME        Gabriel Tate MD  Department of Hospital Medicine   [facility] - Med Surg    "

## 2025-03-02 PROBLEM — F39 MOOD DISORDER: Status: ACTIVE | Noted: 2025-03-02

## 2025-03-02 PROBLEM — F32.A DEPRESSION: Status: ACTIVE | Noted: 2025-03-02

## 2025-03-02 LAB
ALBUMIN SERPL BCP-MCNC: 2.7 G/DL (ref 3.5–5.2)
ALP SERPL-CCNC: 91 U/L (ref 40–150)
ALT SERPL W/O P-5'-P-CCNC: 8 U/L (ref 10–44)
ANION GAP SERPL CALC-SCNC: 11 MMOL/L (ref 8–16)
AST SERPL-CCNC: 15 U/L (ref 10–40)
BASOPHILS # BLD AUTO: 0.05 K/UL (ref 0–0.2)
BASOPHILS NFR BLD: 0.4 % (ref 0–1.9)
BILIRUB SERPL-MCNC: 0.6 MG/DL (ref 0.1–1)
BUN SERPL-MCNC: 54 MG/DL (ref 8–23)
CALCIUM SERPL-MCNC: 9.1 MG/DL (ref 8.7–10.5)
CHLORIDE SERPL-SCNC: 102 MMOL/L (ref 95–110)
CO2 SERPL-SCNC: 24 MMOL/L (ref 23–29)
CREAT SERPL-MCNC: 2.6 MG/DL (ref 0.5–1.4)
DIFFERENTIAL METHOD BLD: ABNORMAL
EOSINOPHIL # BLD AUTO: 0.1 K/UL (ref 0–0.5)
EOSINOPHIL NFR BLD: 1.2 % (ref 0–8)
ERYTHROCYTE [DISTWIDTH] IN BLOOD BY AUTOMATED COUNT: 15.9 % (ref 11.5–14.5)
EST. GFR  (NO RACE VARIABLE): 25.2 ML/MIN/1.73 M^2
GLUCOSE SERPL-MCNC: 103 MG/DL (ref 70–110)
HCT VFR BLD AUTO: 36.4 % (ref 40–54)
HGB BLD-MCNC: 12.2 G/DL (ref 14–18)
IMM GRANULOCYTES # BLD AUTO: 0.03 K/UL (ref 0–0.04)
IMM GRANULOCYTES NFR BLD AUTO: 0.3 % (ref 0–0.5)
LYMPHOCYTES # BLD AUTO: 1.8 K/UL (ref 1–4.8)
LYMPHOCYTES NFR BLD: 16.2 % (ref 18–48)
MAGNESIUM SERPL-MCNC: 1.6 MG/DL (ref 1.6–2.6)
MCH RBC QN AUTO: 29.1 PG (ref 27–31)
MCHC RBC AUTO-ENTMCNC: 33.5 G/DL (ref 32–36)
MCV RBC AUTO: 87 FL (ref 82–98)
MONOCYTES # BLD AUTO: 0.8 K/UL (ref 0.3–1)
MONOCYTES NFR BLD: 7 % (ref 4–15)
NEUTROPHILS # BLD AUTO: 8.4 K/UL (ref 1.8–7.7)
NEUTROPHILS NFR BLD: 74.9 % (ref 38–73)
NRBC BLD-RTO: 0 /100 WBC
PHOSPHATE SERPL-MCNC: 4 MG/DL (ref 2.7–4.5)
PLATELET # BLD AUTO: 290 K/UL (ref 150–450)
PMV BLD AUTO: 10.7 FL (ref 9.2–12.9)
POCT GLUCOSE: 108 MG/DL (ref 70–110)
POCT GLUCOSE: 118 MG/DL (ref 70–110)
POCT GLUCOSE: 183 MG/DL (ref 70–110)
POTASSIUM SERPL-SCNC: 3.7 MMOL/L (ref 3.5–5.1)
PROT SERPL-MCNC: 7.6 G/DL (ref 6–8.4)
RBC # BLD AUTO: 4.19 M/UL (ref 4.6–6.2)
SODIUM SERPL-SCNC: 137 MMOL/L (ref 136–145)
TREPONEMA PALLIDUM IGG+IGM AB [PRESENCE] IN SERUM OR PLASMA BY IMMUNOASSAY: REACTIVE
WBC # BLD AUTO: 11.19 K/UL (ref 3.9–12.7)

## 2025-03-02 PROCEDURE — 86780 TREPONEMA PALLIDUM: CPT

## 2025-03-02 PROCEDURE — 85025 COMPLETE CBC W/AUTO DIFF WBC: CPT

## 2025-03-02 PROCEDURE — 11000001 HC ACUTE MED/SURG PRIVATE ROOM

## 2025-03-02 PROCEDURE — 86592 SYPHILIS TEST NON-TREP QUAL: CPT

## 2025-03-02 PROCEDURE — 86593 SYPHILIS TEST NON-TREP QUANT: CPT

## 2025-03-02 PROCEDURE — 63600175 PHARM REV CODE 636 W HCPCS

## 2025-03-02 PROCEDURE — 25000003 PHARM REV CODE 250

## 2025-03-02 PROCEDURE — 80053 COMPREHEN METABOLIC PANEL: CPT

## 2025-03-02 PROCEDURE — 36415 COLL VENOUS BLD VENIPUNCTURE: CPT

## 2025-03-02 PROCEDURE — 25000003 PHARM REV CODE 250: Performed by: INTERNAL MEDICINE

## 2025-03-02 PROCEDURE — 21400001 HC TELEMETRY ROOM

## 2025-03-02 PROCEDURE — 83735 ASSAY OF MAGNESIUM: CPT

## 2025-03-02 PROCEDURE — 84100 ASSAY OF PHOSPHORUS: CPT

## 2025-03-02 RX ORDER — MAGNESIUM SULFATE HEPTAHYDRATE 40 MG/ML
2 INJECTION, SOLUTION INTRAVENOUS ONCE
Status: COMPLETED | OUTPATIENT
Start: 2025-03-02 | End: 2025-03-02

## 2025-03-02 RX ORDER — BUPROPION HYDROCHLORIDE 150 MG/1
300 TABLET ORAL DAILY
Status: DISCONTINUED | OUTPATIENT
Start: 2025-03-03 | End: 2025-03-04

## 2025-03-02 RX ORDER — BUPROPION HYDROCHLORIDE 150 MG/1
150 TABLET ORAL DAILY
Status: DISCONTINUED | OUTPATIENT
Start: 2025-03-03 | End: 2025-03-02

## 2025-03-02 RX ORDER — TAMSULOSIN HYDROCHLORIDE 0.4 MG/1
0.4 CAPSULE ORAL ONCE
Status: COMPLETED | OUTPATIENT
Start: 2025-03-02 | End: 2025-03-02

## 2025-03-02 RX ORDER — POTASSIUM CHLORIDE 20 MEQ/1
40 TABLET, EXTENDED RELEASE ORAL ONCE
Status: DISCONTINUED | OUTPATIENT
Start: 2025-03-02 | End: 2025-03-02

## 2025-03-02 RX ORDER — TAMSULOSIN HYDROCHLORIDE 0.4 MG/1
0.8 CAPSULE ORAL NIGHTLY
Status: DISCONTINUED | OUTPATIENT
Start: 2025-03-03 | End: 2025-03-12 | Stop reason: HOSPADM

## 2025-03-02 RX ORDER — SODIUM CHLORIDE, SODIUM LACTATE, POTASSIUM CHLORIDE, CALCIUM CHLORIDE 600; 310; 30; 20 MG/100ML; MG/100ML; MG/100ML; MG/100ML
INJECTION, SOLUTION INTRAVENOUS CONTINUOUS
Status: ACTIVE | OUTPATIENT
Start: 2025-03-02 | End: 2025-03-02

## 2025-03-02 RX ADMIN — OXYCODONE HYDROCHLORIDE 10 MG: 10 TABLET ORAL at 04:03

## 2025-03-02 RX ADMIN — MAGNESIUM SULFATE HEPTAHYDRATE 2 G: 40 INJECTION, SOLUTION INTRAVENOUS at 01:03

## 2025-03-02 RX ADMIN — MUPIROCIN: 20 OINTMENT TOPICAL at 09:03

## 2025-03-02 RX ADMIN — TAMSULOSIN HYDROCHLORIDE 0.4 MG: 0.4 CAPSULE ORAL at 08:03

## 2025-03-02 RX ADMIN — TAMSULOSIN HYDROCHLORIDE 0.4 MG: 0.4 CAPSULE ORAL at 09:03

## 2025-03-02 RX ADMIN — OXYCODONE 5 MG: 5 TABLET ORAL at 12:03

## 2025-03-02 RX ADMIN — MUPIROCIN: 20 OINTMENT TOPICAL at 08:03

## 2025-03-02 RX ADMIN — GABAPENTIN 300 MG: 300 CAPSULE ORAL at 09:03

## 2025-03-02 RX ADMIN — ACETAMINOPHEN 1000 MG: 500 TABLET ORAL at 08:03

## 2025-03-02 RX ADMIN — SODIUM CHLORIDE, POTASSIUM CHLORIDE, SODIUM LACTATE AND CALCIUM CHLORIDE: 600; 310; 30; 20 INJECTION, SOLUTION INTRAVENOUS at 12:03

## 2025-03-02 RX ADMIN — SODIUM ZIRCONIUM CYCLOSILICATE 10 G: 10 POWDER, FOR SUSPENSION ORAL at 08:03

## 2025-03-02 RX ADMIN — OXYCODONE HYDROCHLORIDE 10 MG: 10 TABLET ORAL at 07:03

## 2025-03-02 RX ADMIN — ASPIRIN 81 MG: 81 TABLET, COATED ORAL at 08:03

## 2025-03-02 RX ADMIN — ATORVASTATIN CALCIUM 20 MG: 20 TABLET, FILM COATED ORAL at 08:03

## 2025-03-02 NOTE — PROGRESS NOTES
[facility] Medicine  Progress Note    Patient Name: Riaz Guerra Jr.  MRN: [id number]  Patient Class: IP- Inpatient   Admission Date: 2/26/2025  Length of Stay: 4 days  Attending Physician: Alexandre Crespo MD  Primary Care Provider: Berhane Alvarez MD        Subjective     Principal Problem:Uremic encephalopathy        HPI:  Riaz Guerra is a 75-year-old man with a medical history of HTN, HLD, insulin dependent DM, CKD, bilateral inguinal hernias, chronic venous insufficiency, obesity, chronic hip/back pain, and wheelchair dependent. He presented to the ED from [facility] ([facility]) due to altered mental status, labored breathing, and refusing medications. Per phone call to [facility], the patient had normal mentation prior to this admission, but had difficulty adjusting to the facility.     At the ED the patient was A/O x2 to person, and time. Reported pain in bilateral shoulders and hip, feeling cold and thirsty. Discoloration was noted on both lower limbs suggestive of venous stasis. Cole catheter was placed due to greater than 400ml urine seen on bladder scan. Labs were significant for KEATON: Cr 4.5 from base line of 1.3. Potassium 7.0, , CO2 15. CT abdomen pelvis negative for hydronephrosis but noted for bilateral basilar atelectasis and 2mm pulmonary nodule along the fissure on the right as well as tree in bud type nodules along the medial aspect of the left lower lobe concerning for possible developing infectious or inflammatory process. CT head negative for acute intracranial changes.    Patient's sister has been contacted about the patient's location and new condition. Per his sister, patient has not been able to ambulate for the last 9 months and has been at the SNF facility for the last two months.           Overview/Hospital Course:  Patinet admitted with MAS, and hyperkalemia on presentation. Potassium was shifted and was given lokelma and calcium gluconate.  Nephrology was consulted and recommendations followed, sodium bicarb, lasix + diuril, and lokelma TID. Hyperkalemia resolved. Patient's mentation improved. PT/OT not recommending further therapy. Patient does not like care at [facility] and does not wish to return there.     Interval History: Patient declined participation in PT/OT and says he can get himself stronger. He desires to walk again.     Review of Systems  Objective:     Vital Signs (Most Recent):  Temp: 98.6 °F (37 °C) (03/02/25 0728)  Pulse: 98 (03/02/25 0728)  Resp: 18 (03/02/25 0751)  BP: 121/74 (03/02/25 0728)  SpO2: 100 % (03/02/25 0728) Vital Signs (24h Range):  Temp:  [97.2 °F (36.2 °C)-99.1 °F (37.3 °C)] 98.6 °F (37 °C)  Pulse:  [] 98  Resp:  [18] 18  SpO2:  [95 %-100 %] 100 %  BP: (111-145)/(70-84) 121/74     Weight: (!) 139.7 kg (308 lb)  Body mass index is 42.96 kg/m².    Intake/Output Summary (Last 24 hours) at 3/2/2025 1110  Last data filed at 3/2/2025 1009  Gross per 24 hour   Intake --   Output 350 ml   Net -350 ml         Physical Exam  Vitals reviewed.   Constitutional:       General: He is not in acute distress.  HENT:      Head: Normocephalic.      Mouth/Throat:      Mouth: Mucous membranes are moist.   Eyes:      Pupils: Pupils are equal, round, and reactive to light.   Cardiovascular:      Rate and Rhythm: Regular rhythm.      Heart sounds: Normal heart sounds.   Pulmonary:      Breath sounds: Normal breath sounds.   Abdominal:      General: Abdomen is flat.   Musculoskeletal:         General: No swelling.      Comments: Severe venous stasis dermatitis   Skin:     General: Skin is warm.   Neurological:      Mental Status: He is alert and oriented to person, place, and time. Mental status is at baseline.   Psychiatric:         Mood and Affect: Mood normal.             Significant Labs: All pertinent labs within the past 24 hours have been reviewed.  CBC:   Recent Labs   Lab 03/01/25  0508 03/02/25  0859   WBC 8.15  11.19   HGB 12.1* 12.2*   HCT 36.9* 36.4*    290     CMP:   Recent Labs   Lab 03/01/25  0508 03/02/25  0859    137   K 4.6 3.7    102   CO2 23 24   GLU 82 103   BUN 70* 54*   CREATININE 3.4* 2.6*   CALCIUM 9.2 9.1   PROT 7.3 7.6   ALBUMIN 2.7* 2.7*   BILITOT 0.5 0.6   ALKPHOS 90 91   AST 16 15   ALT 7* 8*   ANIONGAP 13 11       Significant Imaging: I have reviewed all pertinent imaging results/findings within the past 24 hours.    Assessment and Plan     * Uremic encephalopathy  See acute renal failure     Depression  Patient has persistent depression which is unknown and is currently uncontrolled. Will Begin anti-depressant medications. We will not consult psychiatry at this time. Patient does not display psychosis at this time. Continue to monitor closely and adjust plan of care as needed.    - wellbutrin 150 mg daily      Right hip pain  Continue gabapentin and PRN oxycodone and dilaudid      Impaired mobility and activities of daily living  History of rapid decline over the last 9 months. Spent 2 months at [facility]. Staff there says patient has had difficulty adjusting.     - Physical therapy says that at this time, patient is functioning at their prior level of function and does not require further acute PT services.   - Occupational therapy: Patient is unable to continue work toward goals because of medical or psychosocial complications. and Therapist determines that the patient will no longer benefit from therapy services.   - will re consult considering patient's dramatic improvement in mentation     Chronic wounds; BLE  - wound care consulted       Urine retention  - david placed  - strict input and out  - will monitor output and BMP following fluid challenge      Venous stasis of both lower extremities  - sequential compression device  - wound care for ulcerated leg wounds  - consult PT/OT when encephalopathy resolves    Metabolic acidosis, normal anion gap  "(NAG)  - see renal failure and hyperkalemia      Acute renal failure superimposed on stage 3a chronic kidney disease  KEATON is likely due to pre-renal azotemia due to dehydration. Baseline creatinine is  1.5 . Most recent creatinine and eGFR are listed below.  Recent Labs     02/28/25  0324 03/01/25  0508 03/02/25  0859   CREATININE 3.9* 3.4* 2.6*   EGFRNORACEVR 15.5* 18.3* 25.2*        Plan  - KEATON is improving  - Avoid nephrotoxins and renally dose meds for GFR listed above  - Monitor urine output, serial BMP, and adjust therapy as needed  - Receiving 1 L LR  - BMP q4  - nephrology recommendations:      - Serial BMP     - schedule lokelma 10 g tid     - recommend eval for capacity; if hyperK worsens, he will likely need dialysis.    Chronic pain syndrome  Continue gabapentin    Type 2 diabetes mellitus with chronic kidney disease, without long-term current use of insulin  Patient's FSGs are controlled on current medication regimen.  Last A1c reviewed-   Lab Results   Component Value Date    HGBA1C 6.4 (H) 02/26/2025     Most recent fingerstick glucose reviewed-   No results for input(s): "POCTGLUCOSE" in the last 24 hours.    Current correctional scale  Low  Maintain anti-hyperglycemic dose as follows-   Antihyperglycemics (From admission, onward)      Start     Stop Route Frequency Ordered    02/26/25 1715  insulin aspart U-100 pen 0-5 Units         -- SubQ Every 6 hours PRN 02/26/25 1615          Hold Oral hypoglycemics while patient is in the hospital.      VTE Risk Mitigation (From admission, onward)           Ordered     IP VTE HIGH RISK PATIENT  Once         02/26/25 1159     Place sequential compression device  Until discontinued         02/26/25 1159                    Discharge Planning   KELSEY: 3/5/2025     Code Status: Full Code   Medical Readiness for Discharge Date:   Discharge Plan A: Return to nursing home   Discharge Delays: None known at this time                    Gabriel Tate MD  Department of " [facility] Medicine   [facility] Surg

## 2025-03-02 NOTE — ASSESSMENT & PLAN NOTE
KEATON is likely due to pre-renal azotemia due to dehydration. Baseline creatinine is  1.5 . Most recent creatinine and eGFR are listed below.  Recent Labs     02/28/25  0324 03/01/25  0508 03/02/25  0859   CREATININE 3.9* 3.4* 2.6*   EGFRNORACEVR 15.5* 18.3* 25.2*        Plan  - KEATON is improving  - Avoid nephrotoxins and renally dose meds for GFR listed above  - Monitor urine output, serial BMP, and adjust therapy as needed  - Receiving 1 L LR  - BMP q4  - nephrology recommendations:      - Serial BMP     - schedule lokelma 10 g tid     - recommend eval for capacity; if hyperK worsens, he will likely need dialysis.

## 2025-03-02 NOTE — NURSING
Patient IV access site is due to be changed. Patient refused to allow me to change his site.  Ramiro tried to place another IV. Patient refused.

## 2025-03-02 NOTE — ASSESSMENT & PLAN NOTE
Hyperkalemia is likely due to KEATON.The patients most recent potassium results are listed below.  Recent Labs     02/28/25  0324 03/01/25  0508 03/02/25  0859   K 5.0 4.6 3.7       Plan  - Monitor for arrhythmias with EKG and/or continuous telemetry.   - Treat the hyperkalemia with Potassium Binders, Calcium gluconate, IV insulin and dextrose, and Nebulized albuterol sulfate.   - Monitor potassium: Every 4 hours  - The patient's hyperkalemia is worsening. Will continue current treatment  - will consider sodium bicarb acidosis improved, currently on LR 1 L  - Nephrology following

## 2025-03-02 NOTE — SUBJECTIVE & OBJECTIVE
Interval History: Patient declined participation in PT/OT and says he can get himself stronger. He desires to walk again.     Review of Systems  Objective:     Vital Signs (Most Recent):  Temp: 98.6 °F (37 °C) (03/02/25 0728)  Pulse: 98 (03/02/25 0728)  Resp: 18 (03/02/25 0751)  BP: 121/74 (03/02/25 0728)  SpO2: 100 % (03/02/25 0728) Vital Signs (24h Range):  Temp:  [97.2 °F (36.2 °C)-99.1 °F (37.3 °C)] 98.6 °F (37 °C)  Pulse:  [] 98  Resp:  [18] 18  SpO2:  [95 %-100 %] 100 %  BP: (111-145)/(70-84) 121/74     Weight: (!) 139.7 kg (308 lb)  Body mass index is 42.96 kg/m².    Intake/Output Summary (Last 24 hours) at 3/2/2025 1110  Last data filed at 3/2/2025 1009  Gross per 24 hour   Intake --   Output 350 ml   Net -350 ml         Physical Exam  Vitals reviewed.   Constitutional:       General: He is not in acute distress.  HENT:      Head: Normocephalic.      Mouth/Throat:      Mouth: Mucous membranes are moist.   Eyes:      Pupils: Pupils are equal, round, and reactive to light.   Cardiovascular:      Rate and Rhythm: Regular rhythm.      Heart sounds: Normal heart sounds.   Pulmonary:      Breath sounds: Normal breath sounds.   Abdominal:      General: Abdomen is flat.   Musculoskeletal:         General: No swelling.      Comments: Severe venous stasis dermatitis   Skin:     General: Skin is warm.   Neurological:      Mental Status: He is alert and oriented to person, place, and time. Mental status is at baseline.   Psychiatric:         Mood and Affect: Mood normal.             Significant Labs: All pertinent labs within the past 24 hours have been reviewed.  CBC:   Recent Labs   Lab 03/01/25  0508 03/02/25  0859   WBC 8.15 11.19   HGB 12.1* 12.2*   HCT 36.9* 36.4*    290     CMP:   Recent Labs   Lab 03/01/25  0508 03/02/25  0859    137   K 4.6 3.7    102   CO2 23 24   GLU 82 103   BUN 70* 54*   CREATININE 3.4* 2.6*   CALCIUM 9.2 9.1   PROT 7.3 7.6   ALBUMIN 2.7* 2.7*   BILITOT 0.5 0.6    ALKPHOS 90 91   AST 16 15   ALT 7* 8*   ANIONGAP 13 11       Significant Imaging: I have reviewed all pertinent imaging results/findings within the past 24 hours.

## 2025-03-02 NOTE — ASSESSMENT & PLAN NOTE
Patient has persistent depression which is unknown and is currently uncontrolled. Will Begin anti-depressant medications. We will not consult psychiatry at this time. Patient does not display psychosis at this time. Continue to monitor closely and adjust plan of care as needed.    - wellbutrin 150 mg daily

## 2025-03-03 PROBLEM — R41.82 AMS (ALTERED MENTAL STATUS): Status: ACTIVE | Noted: 2025-03-03

## 2025-03-03 PROBLEM — A53.9 SERUM POSITIVE FOR TREPONEMA PALLIDUM BY PCR: Status: ACTIVE | Noted: 2025-03-03

## 2025-03-03 LAB
ALBUMIN SERPL BCP-MCNC: 2.7 G/DL (ref 3.5–5.2)
ALP SERPL-CCNC: 92 U/L (ref 40–150)
ALT SERPL W/O P-5'-P-CCNC: 8 U/L (ref 10–44)
ANION GAP SERPL CALC-SCNC: 11 MMOL/L (ref 8–16)
AST SERPL-CCNC: 13 U/L (ref 10–40)
BASOPHILS # BLD AUTO: 0.04 K/UL (ref 0–0.2)
BASOPHILS NFR BLD: 0.4 % (ref 0–1.9)
BILIRUB SERPL-MCNC: 0.6 MG/DL (ref 0.1–1)
BUN SERPL-MCNC: 50 MG/DL (ref 8–23)
CALCIUM SERPL-MCNC: 9.4 MG/DL (ref 8.7–10.5)
CHLORIDE SERPL-SCNC: 102 MMOL/L (ref 95–110)
CO2 SERPL-SCNC: 24 MMOL/L (ref 23–29)
CREAT SERPL-MCNC: 2.2 MG/DL (ref 0.5–1.4)
DIFFERENTIAL METHOD BLD: ABNORMAL
EOSINOPHIL # BLD AUTO: 0.1 K/UL (ref 0–0.5)
EOSINOPHIL NFR BLD: 0.5 % (ref 0–8)
ERYTHROCYTE [DISTWIDTH] IN BLOOD BY AUTOMATED COUNT: 15.7 % (ref 11.5–14.5)
EST. GFR  (NO RACE VARIABLE): 30.9 ML/MIN/1.73 M^2
GLUCOSE SERPL-MCNC: 107 MG/DL (ref 70–110)
HCT VFR BLD AUTO: 39.2 % (ref 40–54)
HGB BLD-MCNC: 12.6 G/DL (ref 14–18)
IMM GRANULOCYTES # BLD AUTO: 0.03 K/UL (ref 0–0.04)
IMM GRANULOCYTES NFR BLD AUTO: 0.3 % (ref 0–0.5)
LYMPHOCYTES # BLD AUTO: 1.2 K/UL (ref 1–4.8)
LYMPHOCYTES NFR BLD: 11.5 % (ref 18–48)
MAGNESIUM SERPL-MCNC: 1.8 MG/DL (ref 1.6–2.6)
MCH RBC QN AUTO: 28.2 PG (ref 27–31)
MCHC RBC AUTO-ENTMCNC: 32.1 G/DL (ref 32–36)
MCV RBC AUTO: 88 FL (ref 82–98)
MONOCYTES # BLD AUTO: 0.6 K/UL (ref 0.3–1)
MONOCYTES NFR BLD: 6.2 % (ref 4–15)
NEUTROPHILS # BLD AUTO: 8.4 K/UL (ref 1.8–7.7)
NEUTROPHILS NFR BLD: 81.1 % (ref 38–73)
NRBC BLD-RTO: 0 /100 WBC
PHOSPHATE SERPL-MCNC: 3.8 MG/DL (ref 2.7–4.5)
PLATELET # BLD AUTO: 278 K/UL (ref 150–450)
PMV BLD AUTO: 10.4 FL (ref 9.2–12.9)
POCT GLUCOSE: 135 MG/DL (ref 70–110)
POTASSIUM SERPL-SCNC: 3.5 MMOL/L (ref 3.5–5.1)
PROT SERPL-MCNC: 7.9 G/DL (ref 6–8.4)
RBC # BLD AUTO: 4.47 M/UL (ref 4.6–6.2)
RPR SER QL: NORMAL
SODIUM SERPL-SCNC: 137 MMOL/L (ref 136–145)
WBC # BLD AUTO: 10.38 K/UL (ref 3.9–12.7)

## 2025-03-03 PROCEDURE — 99223 1ST HOSP IP/OBS HIGH 75: CPT | Mod: ,,,

## 2025-03-03 PROCEDURE — G0545 PR VISIT INHERENT TO INPT OR OBS CARE, INFECTIOUS DISEASE: HCPCS | Mod: ,,,

## 2025-03-03 PROCEDURE — 84100 ASSAY OF PHOSPHORUS: CPT

## 2025-03-03 PROCEDURE — 63600175 PHARM REV CODE 636 W HCPCS

## 2025-03-03 PROCEDURE — 21400001 HC TELEMETRY ROOM

## 2025-03-03 PROCEDURE — 87536 HIV-1 QUANT&REVRSE TRNSCRPJ: CPT

## 2025-03-03 PROCEDURE — 25000003 PHARM REV CODE 250

## 2025-03-03 PROCEDURE — 85025 COMPLETE CBC W/AUTO DIFF WBC: CPT

## 2025-03-03 PROCEDURE — 83735 ASSAY OF MAGNESIUM: CPT

## 2025-03-03 PROCEDURE — 11000001 HC ACUTE MED/SURG PRIVATE ROOM

## 2025-03-03 PROCEDURE — 25000003 PHARM REV CODE 250: Performed by: INTERNAL MEDICINE

## 2025-03-03 PROCEDURE — 36415 COLL VENOUS BLD VENIPUNCTURE: CPT

## 2025-03-03 PROCEDURE — 80053 COMPREHEN METABOLIC PANEL: CPT

## 2025-03-03 RX ORDER — OXYCODONE HYDROCHLORIDE 10 MG/1
10 TABLET ORAL EVERY 6 HOURS PRN
Qty: 28 TABLET | Refills: 0 | OUTPATIENT
Start: 2025-03-03 | End: 2025-03-10

## 2025-03-03 RX ORDER — BUPROPION HYDROCHLORIDE 300 MG/1
300 TABLET ORAL DAILY
Qty: 30 TABLET | Refills: 11 | OUTPATIENT
Start: 2025-03-03 | End: 2026-03-03

## 2025-03-03 RX ORDER — QUETIAPINE FUMARATE 25 MG/1
100 TABLET, FILM COATED ORAL NIGHTLY
Status: DISCONTINUED | OUTPATIENT
Start: 2025-03-03 | End: 2025-03-12 | Stop reason: HOSPADM

## 2025-03-03 RX ORDER — OLANZAPINE 10 MG/2ML
2.5 INJECTION, POWDER, FOR SOLUTION INTRAMUSCULAR
Status: DISCONTINUED | OUTPATIENT
Start: 2025-03-03 | End: 2025-03-05

## 2025-03-03 RX ADMIN — MUPIROCIN: 20 OINTMENT TOPICAL at 09:03

## 2025-03-03 RX ADMIN — GABAPENTIN 300 MG: 300 CAPSULE ORAL at 09:03

## 2025-03-03 RX ADMIN — ATORVASTATIN CALCIUM 20 MG: 20 TABLET, FILM COATED ORAL at 09:03

## 2025-03-03 RX ADMIN — TAMSULOSIN HYDROCHLORIDE 0.8 MG: 0.4 CAPSULE ORAL at 09:03

## 2025-03-03 RX ADMIN — BUPROPION HYDROCHLORIDE 300 MG: 150 TABLET, EXTENDED RELEASE ORAL at 09:03

## 2025-03-03 RX ADMIN — ASPIRIN 81 MG: 81 TABLET, COATED ORAL at 09:03

## 2025-03-03 RX ADMIN — OXYCODONE HYDROCHLORIDE 10 MG: 10 TABLET ORAL at 09:03

## 2025-03-03 RX ADMIN — QUETIAPINE FUMARATE 100 MG: 25 TABLET ORAL at 09:03

## 2025-03-03 RX ADMIN — OLANZAPINE 2.5 MG: 10 INJECTION, POWDER, FOR SOLUTION INTRAMUSCULAR at 08:03

## 2025-03-03 NOTE — SUBJECTIVE & OBJECTIVE
Past Medical History:   Diagnosis Date    Depression     Diabetes mellitus     Gout     High cholesterol     Hypertension     PAD (peripheral artery disease) 03/01/2025 2-2025 An arterial duplex suggested moderate stenosis in his right tibial vessels.   Vascular recommend medical therapy.         History reviewed. No pertinent surgical history.    Review of patient's allergies indicates:   Allergen Reactions    Lisinopril Other (See Comments)     cough       Medications:  Medications Prior to Admission   Medication Sig    amLODIPine (NORVASC) 10 MG tablet Take 1 tablet (10 mg total) by mouth once daily.    aspirin (ECOTRIN) 81 MG EC tablet Take 81 mg by mouth once daily.    atorvastatin (LIPITOR) 20 MG tablet Take 20 mg by mouth once daily.    baclofen (LIORESAL) 10 MG tablet Take 10 mg by mouth 3 (three) times daily.    fluticasone propionate (FLONASE) 50 mcg/actuation nasal spray 2 sprays (100 mcg total) by Each Nostril route once daily.    gabapentin (NEURONTIN) 800 MG tablet Take 1 tablet (800 mg total) by mouth 2 (two) times daily.    HYDROcodone-acetaminophen (NORCO) 7.5-325 mg per tablet Take 1 tablet by mouth every 8 (eight) hours as needed for Pain.    losartan-hydrochlorothiazide 100-12.5 mg (HYZAAR) 100-12.5 mg Tab Take 1 tablet by mouth once daily.    metFORMIN (GLUCOPHAGE) 1000 MG tablet Take 1,000 mg by mouth 2 (two) times daily with meals.    polyethylene glycol (GLYCOLAX) 17 gram PwPk Take 17 g by mouth once daily.    tamsulosin (FLOMAX) 0.4 mg Cap Take by mouth once daily.    vitamin D (VITAMIN D3) 1000 units Tab Take 1,000 Units by mouth once daily.    sertraline (ZOLOFT) 50 MG tablet Take 1 tablet (50 mg total) by mouth once daily.     Antibiotics (From admission, onward)      Start     Stop Route Frequency Ordered    02/27/25 1215  mupirocin 2 % ointment  (DECOLONIZATION PROTOCOL ORDERS)         03/04/25 0859 Nasl 2 times daily 02/27/25 1105          Antifungals (From admission, onward)       None          Antivirals (From admission, onward)      None             Immunization History   Administered Date(s) Administered    PPD Test 11/26/2024, 12/09/2024       Family History       Problem Relation (Age of Onset)    Diabetes Mother, Father    Heart disease Mother          Social History     Socioeconomic History    Marital status: Single   Tobacco Use    Smoking status: Never    Smokeless tobacco: Never   Substance and Sexual Activity    Alcohol use: Not Currently     Comment: occasionally    Drug use: Yes     Types: Marijuana    Sexual activity: Yes     Social Drivers of Health     Financial Resource Strain: Low Risk  (2/26/2025)    Overall Financial Resource Strain (CARDIA)     Difficulty of Paying Living Expenses: Not very hard   Food Insecurity: No Food Insecurity (2/26/2025)    Hunger Vital Sign     Worried About Running Out of Food in the Last Year: Never true     Ran Out of Food in the Last Year: Never true   Transportation Needs: No Transportation Needs (2/3/2025)    TRANSPORTATION NEEDS     Transportation : No   Physical Activity: Inactive (2/26/2025)    Exercise Vital Sign     Days of Exercise per Week: 0 days     Minutes of Exercise per Session: 0 min   Stress: No Stress Concern Present (2/26/2025)    [employer] - Occupational Stress Questionnaire     Feeling of Stress : Only a little   Housing Stability: Low Risk  (2/26/2025)    Housing Stability Vital Sign     Unable to Pay for Housing in the Last Year: No     Homeless in the Last Year: No     Review of Systems   Constitutional:  Negative for chills and fever.   Eyes:  Negative for pain and visual disturbance.   Gastrointestinal:  Negative for abdominal pain.   Genitourinary:  Negative for dysuria and genital sores.   Musculoskeletal:  Negative for neck pain.   Neurological:  Negative for weakness.     Objective:     Vital Signs (Most Recent):  Temp: 98.1 °F (36.7 °C) (03/03/25 1238)  Pulse: 94 (03/03/25  1238)  Resp: 18 (03/03/25 1238)  BP: (!) 162/97 (03/03/25 1238)  SpO2: 99 % (03/03/25 1238) Vital Signs (24h Range):  Temp:  [97.9 °F (36.6 °C)-98.3 °F (36.8 °C)] 98.1 °F (36.7 °C)  Pulse:  [] 94  Resp:  [16-18] 18  SpO2:  [96 %-99 %] 99 %  BP: (115-162)/(68-97) 162/97     Weight: (!) 139.7 kg (308 lb)  Body mass index is 42.96 kg/m².    Estimated Creatinine Clearance: 42.8 mL/min (A) (based on SCr of 2.2 mg/dL (H)).     Physical Exam  Vitals and nursing note reviewed.   Constitutional:       General: He is not in acute distress.     Appearance: He is not ill-appearing, toxic-appearing or diaphoretic.   HENT:      Head: Normocephalic and atraumatic.      Nose: Nose normal.   Eyes:      Conjunctiva/sclera: Conjunctivae normal.   Cardiovascular:      Rate and Rhythm: Normal rate and regular rhythm.      Pulses: Normal pulses.      Heart sounds: No murmur heard.  Pulmonary:      Effort: Pulmonary effort is normal. No respiratory distress.      Breath sounds: Normal breath sounds.   Skin:     Findings: No lesion or rash.   Neurological:      Mental Status: He is alert.      Comments: Oriented to person and place   Psychiatric:         Mood and Affect: Mood normal.         Behavior: Behavior normal.          Significant Labs: Blood Culture:   Recent Labs   Lab 11/21/24  1801 12/06/24  1110 12/06/24  1116   LABBLOO No growth after 5 days.  No growth after 5 days. No growth after 5 days. No growth after 5 days.     CBC:   Recent Labs   Lab 03/02/25  0859 03/03/25  0532   WBC 11.19 10.38   HGB 12.2* 12.6*   HCT 36.4* 39.2*    278     CMP:   Recent Labs   Lab 03/02/25  0859 03/03/25  0531    137   K 3.7 3.5    102   CO2 24 24    107   BUN 54* 50*   CREATININE 2.6* 2.2*   CALCIUM 9.1 9.4   PROT 7.6 7.9   ALBUMIN 2.7* 2.7*   BILITOT 0.6 0.6   ALKPHOS 91 92   AST 15 13   ALT 8* 8*   ANIONGAP 11 11       Significant Imaging: I have reviewed all pertinent imaging results/findings within the past  24 hours.

## 2025-03-03 NOTE — CONSULTS
"[facility]  Infectious Disease  Consult Note    Patient Name: Riaz Guerra Jr.  MRN: [id number]  Admission Date: 2/26/2025  Hospital Length of Stay: 5 days  Attending Physician: Alexandre Crespo MD  Primary Care Provider: Berhane Alvarez MD     Isolation Status: No active isolations    Patient information was obtained from patient, past medical records, and ER records.      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 I have also reviewed CBC, CMP/BMP,  cultures and imaging with my interpretation as documented.      75-year-old male with history of hypertension, hyperlipidemia, insulin-dependent diabetes, CKD, chronic venous insufficiency, obesity, bilateral inguinal hernias, chronic hip and back pain, and non-ambulatory about a year (wheelchair dependent). Admitted from SNF for AMS (baseline oriented x3). On admit, labs with hyperkalemia (now resolved), and with KEATON, improving. CT head negative for acute intracranial changes. Patient's mentation waxing and waning. During work up for AMS, Treponema IgG and IgM reactive. RPR negative. FTA pending. HIV pending. ID consulted for "persistent AMS post acute renal failure, now stable labs, found to have reactive IgG and IgM treponema, pending RPR and chlamydia + gonorrhea." Concern for neurosyphilis. Unknown hx of syphilis or prior treatment.      Recommendations / Plan:  Reactive Treponema IgG/IgM, negative RPR, and FTA pending.   F/u FTA. If FTA positive then would treat for neurosyphilis.  If obtaining LP please send CSF for cell count, glucose, protein and VDRL       -- Discussed with ID staff and primary team   -- ID will continue to follow w/ further recs.            Thank you for your consult. I will follow-up with " "patient. Please contact us if you have any additional questions.    Marisol Mena PA-C  Infectious Disease  [facility]    Subjective:     Principal Problem: Uremic encephalopathy    HPI: 75-year-old male with history of hypertension, hyperlipidemia, insulin-dependent diabetes, CKD, chronic venous insufficiency, obesity, bilateral inguinal hernias, chronic hip and back pain, and non-ambulatory about a year (wheelchair dependent). Admitted from SNF for AMS and refusing meds (baseline oriented x3). On admit, labs with hyperkalemia (now resolved), and with KEATON, improving. Nephrology consulted. Patient's mentation waxing and waning. 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 During work up for AMS, Treponema IgG and IgM reactive. RPR negative. FTA pending. CT head negative for acute intracranial changes. HIV pending. ID consulted for "persistent AMS post acute renal failure, now stable labs, found to have reactive IgG and IgM treponema, pending RPR and chlamydia + gonorrhea."    Patient oriented to person and place on my exam. Patient is poor historian. He cannot recall if he has ever been tested or treated for syphilis in the past. Denies IVDU. Denies hx of HIV. Denies any complaints of rash, genital sores/lesions, vision changes, eye pain/redness, or neck pain.    Past Medical History:   Diagnosis Date    Depression     Diabetes mellitus     Gout     High cholesterol     Hypertension     PAD (peripheral artery disease) 03/01/2025 2-2025 An arterial duplex suggested moderate stenosis in his right tibial vessels.   Vascular recommend medical therapy.         History reviewed. No pertinent surgical history.    Review of patient's allergies indicates:   Allergen Reactions    Lisinopril Other (See Comments)     cough       Medications:  Medications Prior to Admission   Medication Sig    amLODIPine (NORVASC) 10 MG tablet Take 1 tablet (10 mg total) " by mouth once daily.    aspirin (ECOTRIN) 81 MG EC tablet Take 81 mg by mouth once daily.    atorvastatin (LIPITOR) 20 MG tablet Take 20 mg by mouth once daily.    baclofen (LIORESAL) 10 MG tablet Take 10 mg by mouth 3 (three) times daily.    fluticasone propionate (FLONASE) 50 mcg/actuation nasal spray 2 sprays (100 mcg total) by Each Nostril route once daily.    gabapentin (NEURONTIN) 800 MG tablet Take 1 tablet (800 mg total) by mouth 2 (two) times daily.    HYDROcodone-acetaminophen (NORCO) 7.5-325 mg per tablet Take 1 tablet by mouth every 8 (eight) hours as needed for Pain.    losartan-hydrochlorothiazide 100-12.5 mg (HYZAAR) 100-12.5 mg Tab Take 1 tablet by mouth once daily.    metFORMIN (GLUCOPHAGE) 1000 MG tablet Take 1,000 mg by mouth 2 (two) times daily with meals.    polyethylene glycol (GLYCOLAX) 17 gram PwPk Take 17 g by mouth once daily.    tamsulosin (FLOMAX) 0.4 mg Cap Take by mouth once daily.    vitamin D (VITAMIN D3) 1000 units Tab Take 1,000 Units by mouth once daily.    sertraline (ZOLOFT) 50 MG tablet Take 1 tablet (50 mg total) by mouth once daily.     Antibiotics (From admission, onward)      Start     Stop Route Frequency Ordered    02/27/25 1215  mupirocin 2 % ointment  (DECOLONIZATION PROTOCOL ORDERS)         03/04/25 0859 Nasl 2 times daily 02/27/25 1105          Antifungals (From admission, onward)      None          Antivirals (From admission, onward)      None             Immunization History   Administered Date(s) Administered    PPD Test 11/26/2024, 12/09/2024       Family History       Problem Relation (Age of Onset)    Diabetes Mother, Father    Heart disease Mother          Social History     Socioeconomic History    Marital status: Single   Tobacco Use    Smoking status: Never    Smokeless tobacco: Never   Substance and Sexual Activity    Alcohol use: Not Currently     Comment: occasionally    Drug use: Yes     Types: Marijuana    Sexual activity: Yes     Social Drivers of  Health     Financial Resource Strain: Low Risk  (2/26/2025)    Overall Financial Resource Strain (CARDIA)     Difficulty of Paying Living Expenses: Not very hard   Food Insecurity: No Food Insecurity (2/26/2025)    Hunger Vital Sign     Worried About Running Out of Food in the Last Year: Never true     Ran Out of Food in the Last Year: Never true   Transportation Needs: No Transportation Needs (2/3/2025)    TRANSPORTATION NEEDS     Transportation : No   Physical Activity: Inactive (2/26/2025)    Exercise Vital Sign     Days of Exercise per Week: 0 days     Minutes of Exercise per Session: 0 min   Stress: No Stress Concern Present (2/26/2025)    [employer] - Occupational Stress Questionnaire     Feeling of Stress : Only a little   Housing Stability: Low Risk  (2/26/2025)    Housing Stability Vital Sign     Unable to Pay for Housing in the Last Year: No     Homeless in the Last Year: No     Review of Systems   Constitutional:  Negative for chills and fever.   Eyes:  Negative for pain and visual disturbance.   Gastrointestinal:  Negative for abdominal pain.   Genitourinary:  Negative for dysuria and genital sores.   Musculoskeletal:  Negative for neck pain.   Neurological:  Negative for weakness.     Objective:     Vital Signs (Most Recent):  Temp: 98.1 °F (36.7 °C) (03/03/25 1238)  Pulse: 94 (03/03/25 1238)  Resp: 18 (03/03/25 1238)  BP: (!) 162/97 (03/03/25 1238)  SpO2: 99 % (03/03/25 1238) Vital Signs (24h Range):  Temp:  [97.9 °F (36.6 °C)-98.3 °F (36.8 °C)] 98.1 °F (36.7 °C)  Pulse:  [] 94  Resp:  [16-18] 18  SpO2:  [96 %-99 %] 99 %  BP: (115-162)/(68-97) 162/97     Weight: (!) 139.7 kg (308 lb)  Body mass index is 42.96 kg/m².    Estimated Creatinine Clearance: 42.8 mL/min (A) (based on SCr of 2.2 mg/dL (H)).     Physical Exam  Vitals and nursing note reviewed.   Constitutional:       General: He is not in acute distress.     Appearance: He is not ill-appearing, toxic-appearing  or diaphoretic.   HENT:      Head: Normocephalic and atraumatic.      Nose: Nose normal.   Eyes:      Conjunctiva/sclera: Conjunctivae normal.   Cardiovascular:      Rate and Rhythm: Normal rate and regular rhythm.      Pulses: Normal pulses.      Heart sounds: No murmur heard.  Pulmonary:      Effort: Pulmonary effort is normal. No respiratory distress.      Breath sounds: Normal breath sounds.   Skin:     Findings: No lesion or rash.   Neurological:      Mental Status: He is alert.      Comments: Oriented to person and place   Psychiatric:         Mood and Affect: Mood normal.         Behavior: Behavior normal.          Significant Labs: Blood Culture:   Recent Labs   Lab 11/21/24  1801 12/06/24  1110 12/06/24  1116   LABBLOO No growth after 5 days.  No growth after 5 days. No growth after 5 days. No growth after 5 days.     CBC:   Recent Labs   Lab 03/02/25  0859 03/03/25  0532   WBC 11.19 10.38   HGB 12.2* 12.6*   HCT 36.4* 39.2*    278     CMP:   Recent Labs   Lab 03/02/25  0859 03/03/25  0531    137   K 3.7 3.5    102   CO2 24 24    107   BUN 54* 50*   CREATININE 2.6* 2.2*   CALCIUM 9.1 9.4   PROT 7.6 7.9   ALBUMIN 2.7* 2.7*   BILITOT 0.6 0.6   ALKPHOS 91 92   AST 15 13   ALT 8* 8*   ANIONGAP 11 11       Significant Imaging: I have reviewed all pertinent imaging results/findings within the past 24 hours.

## 2025-03-03 NOTE — CONSULTS
Patient seen in the morning and the afternoon. Not an ideal candidate for bedside LP, AOx0 on our assessment, pt also expressing not wanting to have an LP. Expressed being unsure if he would tolerate positioning required for LP. Recommend LP be done under sedation.

## 2025-03-03 NOTE — ASSESSMENT & PLAN NOTE
- psychiatry consult follow recommendations  - patient found to have reactive treponema IgG, IgM, RPR pending  - ID consulted, will follow recommendations

## 2025-03-03 NOTE — CLINICAL REVIEW
Nephrology Chart Review      Intake/Output Summary (Last 24 hours) at 3/3/2025 0817  Last data filed at 3/2/2025 1533  Gross per 24 hour   Intake --   Output 600 ml   Net -600 ml       Vitals:    03/02/25 2022 03/03/25 0038 03/03/25 0243 03/03/25 0612   BP: (!) 140/94   124/81   BP Location: Right arm   Right arm   Patient Position: Sitting   Lying   Pulse: 95 101 97 94   Resp: 18 18  18   Temp: 98.3 °F (36.8 °C) 98.3 °F (36.8 °C)  98.2 °F (36.8 °C)   TempSrc: Oral Oral  Oral   SpO2: 97% 98%  97%   Weight:       Height:           Recent Labs   Lab 03/01/25  0508 03/02/25  0859 03/03/25  0531    137 137   K 4.6 3.7 3.5    102 102   CO2 23 24 24   BUN 70* 54* 50*   CREATININE 3.4* 2.6* 2.2*   CALCIUM 9.2 9.1 9.4   PHOS 4.7* 4.0 3.8   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 Cole in place.  Severe hyperkalemia - no EKG changes. Given calcium gluconate, insulin, d50, albuterol, lokelma  Metabolic acidosis  Insulin dependent DMT2  HLD     -KEATON secondary to urinary obstruction. Cole in place. K stable at 3.5. Cr down to 2.2. Nearing BL.   -follow up outpatient w nephrology and urology w joann. Repeat labs in 1-2 weeks.     No other related issues identified. Please call Nephrology as needed; We will sign off.

## 2025-03-03 NOTE — ASSESSMENT & PLAN NOTE
"I have reviewed hospital notes from  HM service and other specialty providers. I have also reviewed CBC, CMP/BMP,  cultures and imaging with my interpretation as documented.      75-year-old male with history of hypertension, hyperlipidemia, insulin-dependent diabetes, CKD, chronic venous insufficiency, obesity, bilateral inguinal hernias, chronic hip and back pain, and non-ambulatory about a year (wheelchair dependent). Admitted from SNF for AMS (baseline oriented x3). On admit, labs with hyperkalemia (now resolved), and with KEATON, improving. CT head negative for acute intracranial changes. Patient's mentation waxing and waning. During work up for AMS, Treponema IgG and IgM reactive. RPR negative. FTA pending. HIV pending. ID consulted for "persistent AMS post acute renal failure, now stable labs, found to have reactive IgG and IgM treponema, pending RPR and chlamydia + gonorrhea." Concern for neurosyphilis.      Recommendations / Plan:  With reactive Treponema IgG/IgM, negative RPR, FTA pending.   F/u FTA. If FTA positive then would treat for neurosyphilis.  If obtaining LP please send CSF for cell count, glucose, protein and VDRL       -- Discussed with ID staff and primary team   -- ID will continue to follow w/ further recs.      "

## 2025-03-03 NOTE — SUBJECTIVE & OBJECTIVE
Objective:     Vital Signs (Most Recent):  Temp: 98.2 °F (36.8 °C) (03/03/25 0612)  Pulse: 94 (03/03/25 0612)  Resp: 18 (03/03/25 0612)  BP: 124/81 (03/03/25 0612)  SpO2: 97 % (03/03/25 0612) Vital Signs (24h Range):  Temp:  [98.1 °F (36.7 °C)-98.3 °F (36.8 °C)] 98.2 °F (36.8 °C)  Pulse:  [] 94  Resp:  [16-18] 18  SpO2:  [95 %-98 %] 97 %  BP: (115-140)/(68-94) 124/81     Weight: (!) 139.7 kg (308 lb) (02/27/25 0249)  Body mass index is 42.96 kg/m².  Body surface area is 2.65 meters squared.    I/O last 3 completed shifts:  In: -   Out: 800 [Urine:800]     Physical Exam  Constitutional:       General: He is not in acute distress.  HENT:      Head: Normocephalic.   Cardiovascular:      Rate and Rhythm: Regular rhythm.      Heart sounds: Normal heart sounds.   Pulmonary:      Breath sounds: Normal breath sounds.   Abdominal:      General: Abdomen is flat.   Musculoskeletal:         General: No swelling.   Skin:     General: Skin is warm.   Neurological:      Mental Status: He is alert. He is disoriented.          Significant Labs:  CBC:   Recent Labs   Lab 03/03/25  0532   WBC 10.38   RBC 4.47*   HGB 12.6*   HCT 39.2*      MCV 88   MCH 28.2   MCHC 32.1     CMP:   Recent Labs   Lab 03/03/25  0531      CALCIUM 9.4   ALBUMIN 2.7*   PROT 7.9      K 3.5   CO2 24      BUN 50*   CREATININE 2.2*   ALKPHOS 92   ALT 8*   AST 13   BILITOT 0.6     All labs within the past 24 hours have been reviewed.

## 2025-03-03 NOTE — CONSULTS
"CONSULTATION LIAISON PSYCHIATRY INITIAL EVALUATION    Patient Name: Riaz Guerra Jr.  MRN: [id number]  Patient Class: IP- Inpatient  Admission Date: 2/26/2025  Attending Physician: Alexandre Crespo MD        Capacity Evaluation    History of Present Illness     SUBJECTIVE:   Riaz Guerra Jr. is a 75 y.o. male unknown past psych hx and a medical history of HTN, HLD, insulin dependent DM, CKD, bilateral inguinal hernias, chronic venous insufficiency, obesity, chronic hip/back pain, and wheelchair dependent who presented to the ED from [facility] ([facility]) due to altered mental status, labored breathing, and refusing medications. Admitted to the hospital for uremic encephalopathy. Found to have reactive Treponemal IgG, IgM w/ RPR pending. Concern for neurosyphilis, patient refusing LP.     Psychiatry consulted for "Patient appears to not have the capacity to make any decision. He's hallucinating. Syphilis came back positive as well. "    Per [facility] H&P:   Per phone call to [facility], the patient had normal mentation prior to this admission, but had difficulty adjusting to the facility. CT head negative for acute intracranial changes.  Patient's sister has been contacted about the patient's location and new condition. Per his sister, patient has not been able to ambulate for the last 9 months and has been at the [facility] facility for the last two months.     Prior to entering room, patient heard yelling at nurse "I'm not in a good mood". While he calmed down for interview, he was reluctant to participate, stating he didn't need psych because he wasn't crazy and demanding to know who sent us. Unable to ask orientation, but patient speaking nonsensically about the [employer] and how we all need to be "packin", taken to mean firearms given patient's hand gestures. When asked why he was in the hospital, he reported that his right elbow had been injured by the nursing staff here. Reports he " "has been living at [facility] for the past 8 years and they treat him okay there. Asked if he understood the concerns about him possibly having a brain infection. He replied that he would use topical ointment for his back pain. Attempts to discuss LP indications and consequences of foregoing treatment were met with a verbal agitation to which the patient said , with a smile, "This conversation is over".     Psychiatric Review of Systems:  sleep: no  appetite: no  weight: no  energy/anergy: unable to assess    interest/pleasure/anhedonia: unable to assess   somatic symptoms: unable to assess   libido: unable to assess   anxiety/panic: unable to assess   guilty/hopelessness: unable to assess   concentration: unable to assess   S.I.B.s/risky behavior: unable to assess     Medical Review Of Systems:  Review of systems not obtained due to patient factors altered mental status .    Psychiatric History:  Previous Medication Trials: unable to assess   Previous Psychiatric Hospitalizations: unable to assess   Previous suicide attempts: unable to assess   Current/active homicidal ideation/plan/intent: unable to assess   History of threats/arrests associated with violent conduct - unable to assess   Access to firearms/lethal weapons - unable to assess   Family Psychiatric History: unable to assess   Outpatient Psychiatrist: unable to assess   Outpatient Therapist: unable to assess     Social History:  Marital Status: unable to assess   Children: unable to assess    Employment Status: unable to assess   Education: unable to assess   Special Ed: unable to assess   Housing Status: unable to assess   Developmental History: unable to assess   History of Abuse: unable to assess     Substance Abuse History:  Recreational Drugs: unable to assess   Use of Alcohol: unable to assess   Rehab History: unable to assess   Tobacco Use: unable to assess   Use of Caffeine: unable to assess   Use of OTC: unable to assess   Is the patient " "aware of the biomedical complications associated with substance abuse and mental illness? unable to assess   Legal consequences of chemical use: unable to assess     Legal History:  Past Charges/Incarcerations: unable to assess   Pending Charges: unable to assess     Psychosocial Factors:  Stressors: unable to assess .   Functioning Relationships: unable to assess       Mental Status Exam     CAM-ICU:  Acute change and/or fluctuating course of mental status: Yes  Inattention (SAVEAHAART):  unable to assess  "Squeeze my hand, only when you hear, the letter 'A'."  Altered Level of Consciousness: No  Disorganized Thinking (Errors >1/6): Unable to assess  "Will a stone float on water?"  "Are there fish in the sea?"  "Does one pound weigh more than two?"  "Can you use a hammer to pound a nail?"  Command(s):  "Hold up 2 fingers."  "Now do the same thing with the other hand."    Score: 1+2 AND, either 3 or 4 present = Positive CAM-ICU    Appearance: lying in bed, obese   Level of Consciousness: alert, awake   Grooming:  appropriate to situation   Psychomotor Behavior: reluctant to participate, uncooperative   Speech: loud, spontaneous   Language: english, fluid   Orientation:   Unable to assess   Attention Span/Concentration: Easily distracted   Memory: Unable to assess   Mood: "unable to ilicit"   Affect: irritable   Thought Process: illogical, disorganized    Associations: normal and logical, illogical   Thought Content: Unable to assess   Fund of Knowledge: adequate to education level   Insight: limited   Judgment:  limited     Capacity Evaluation     Capacity question:  Does the patient possess the ability to make an informed decision, regarding the proposed treatment/option for suspected neurosyphilis care?    Capacity for a given decision requires:  Understanding - the ability to state the meaning of the relevant information (eg, diagnosis, risks and benefits of a treatment or procedure, indications, and options of " care).  The patient must be able to understand the proposed treatment, and/or options for his care.  Appreciation - the ability to explain how information (eg, diagnosis, benefit, and risk) applies to oneself.  The patient must be able to appreciate his own medical situation, and abstract, as to how the treatments/proposed options for care, apply to his medical situation.  Reasoning - the ability to compare information and infer consequences of choice.  The patient must be able to understand the consequences of his medical decision, in a reasonable manner, supported by the facts, and his own values, in a consistent fashion.  Expressing a choice - the ability to state a decision.  The patient must demonstrate the ability to communicate a choice, clearly and consistently.    Assessment of capacity:  The patient understands the clinical condition relation to this evaluation: No.  The patient understands the indication and nature of/reasoning behind the proposed treatment(s) and/or options for his care: No.  The patient understands and appreciates the risks and benefits of the proposed treatment(s) and/or options for his care: No.  The patient understands and appreciates the risks and benefits of refusing the proposed treatment(s) and/or options for his care: No.  The patient is in not in agreement with the assessment and does not to treatment.  The patient has evidence of impaired insight and/or judgment: Yes.    Recommendations     Based upon my evaluation of Riaz Guerra Jr. regarding his medical decision-making capacity for LP/ neurosyphilis workup, I found with reasonable certainty that Riaz Guerra Jr. does not have capacity to make medical decisions on his behalf. Further, given his current state of delirium, it is my professional opinion that he does not have the capacity to make any medical decisions at this time. As such, a surrogate medical decision maker should be identified  Psychiatry will  continue to follow     Case discussed with: Dr. Carole Shah MD  [facility], PGY-2      --------------------------------------------------------------------------------------------------------------------------------------------------------------------------------------------------------------------------------------    CONTINUED OBJECTIVE clinical data & findings reviewed and noted for above decision making    Current Medications:   Scheduled Meds:    aspirin  81 mg Oral Daily    atorvastatin  20 mg Oral Daily    buPROPion  300 mg Oral Daily    gabapentin  300 mg Oral QHS    mupirocin   Nasal BID    tamsulosin  0.8 mg Oral QHS     PRN Meds:   Current Facility-Administered Medications:     acetaminophen, 1,000 mg, Oral, Q6H PRN    dextrose 50%, 12.5 g, Intravenous, PRN    dextrose 50%, 25 g, Intravenous, PRN    glucagon (human recombinant), 1 mg, Intramuscular, PRN    glucose, 16 g, Oral, PRN    glucose, 24 g, Oral, PRN    HYDROmorphone, 0.5 mg, Intravenous, Q6H PRN    insulin aspart U-100, 0-5 Units, Subcutaneous, Q6H PRN    melatonin, 6 mg, Oral, Nightly PRN    metoprolol, 5 mg, Intravenous, PRN    naloxone, 0.02 mg, Intravenous, PRN    oxyCODONE, 5 mg, Oral, Q6H PRN    oxyCODONE, 10 mg, Oral, Q6H PRN    sodium chloride 0.9%, 10 mL, Intravenous, Q12H PRN    Allergies:   Review of patient's allergies indicates:   Allergen Reactions    Lisinopril Other (See Comments)     cough       Vitals  Vitals:    03/03/25 1238   BP: (!) 162/97   Pulse: 94   Resp: 18   Temp: 98.1 °F (36.7 °C)       Labs/Imaging/Studies:  Recent Results (from the past 24 hours)   Treponema Pallidium Antibodies IgG, IgM    Collection Time: 03/02/25  4:08 PM   Result Value Ref Range    Treponema Pallidum Antibodies (IgG, IgM) Reactive (A) Nonreactive   RPR    Collection Time: 03/02/25  4:08 PM   Result Value Ref Range    RPR Non-reactive Non-reactive   POCT glucose    Collection Time: 03/02/25  5:32 PM   Result Value Ref  Range    POCT Glucose 183 (H) 70 - 110 mg/dL   Magnesium    Collection Time: 03/03/25  5:31 AM   Result Value Ref Range    Magnesium 1.8 1.6 - 2.6 mg/dL   Phosphorus    Collection Time: 03/03/25  5:31 AM   Result Value Ref Range    Phosphorus 3.8 2.7 - 4.5 mg/dL   Comprehensive Metabolic Panel    Collection Time: 03/03/25  5:31 AM   Result Value Ref Range    Sodium 137 136 - 145 mmol/L    Potassium 3.5 3.5 - 5.1 mmol/L    Chloride 102 95 - 110 mmol/L    CO2 24 23 - 29 mmol/L    Glucose 107 70 - 110 mg/dL    BUN 50 (H) 8 - 23 mg/dL    Creatinine 2.2 (H) 0.5 - 1.4 mg/dL    Calcium 9.4 8.7 - 10.5 mg/dL    Total Protein 7.9 6.0 - 8.4 g/dL    Albumin 2.7 (L) 3.5 - 5.2 g/dL    Total Bilirubin 0.6 0.1 - 1.0 mg/dL    Alkaline Phosphatase 92 40 - 150 U/L    AST 13 10 - 40 U/L    ALT 8 (L) 10 - 44 U/L    eGFR 30.9 (A) >60 mL/min/1.73 m^2    Anion Gap 11 8 - 16 mmol/L   CBC Auto Differential    Collection Time: 03/03/25  5:32 AM   Result Value Ref Range    WBC 10.38 3.90 - 12.70 K/uL    RBC 4.47 (L) 4.60 - 6.20 M/uL    Hemoglobin 12.6 (L) 14.0 - 18.0 g/dL    Hematocrit 39.2 (L) 40.0 - 54.0 %    MCV 88 82 - 98 fL    MCH 28.2 27.0 - 31.0 pg    MCHC 32.1 32.0 - 36.0 g/dL    RDW 15.7 (H) 11.5 - 14.5 %    Platelets 278 150 - 450 K/uL    MPV 10.4 9.2 - 12.9 fL    Immature Granulocytes 0.3 0.0 - 0.5 %    Gran # (ANC) 8.4 (H) 1.8 - 7.7 K/uL    Immature Grans (Abs) 0.03 0.00 - 0.04 K/uL    Lymph # 1.2 1.0 - 4.8 K/uL    Mono # 0.6 0.3 - 1.0 K/uL    Eos # 0.1 0.0 - 0.5 K/uL    Baso # 0.04 0.00 - 0.20 K/uL    nRBC 0 0 /100 WBC    Gran % 81.1 (H) 38.0 - 73.0 %    Lymph % 11.5 (L) 18.0 - 48.0 %    Mono % 6.2 4.0 - 15.0 %    Eosinophil % 0.5 0.0 - 8.0 %    Basophil % 0.4 0.0 - 1.9 %    Differential Method Automated      Imaging Results              US Retroperitoneal Complete (Final result)  Result time 02/26/25 23:15:08      Final result by Nila Garcia MD (02/26/25 23:15:08)                   Impression:      No evidence of  hydronephrosis bilaterally or other significant sonographic abnormality.      Electronically signed by: Nila Garcia MD  Date:    02/26/2025  Time:    23:15               Narrative:    EXAMINATION:  US RETROPERITONEAL COMPLETE    CLINICAL HISTORY:  acute renal failure;    TECHNIQUE:  Ultrasound of the kidneys and urinary bladder was performed including color flow and Doppler evaluation of the kidneys.    COMPARISON:  11/22/2024    FINDINGS:  Right kidney: The right kidney measures 12.7 cm. No cortical thinning. No loss of corticomedullary distinction. Resistive index measures 0.72.  No renal stone. No hydronephrosis.    Left kidney: The left kidney measures 12.8 cm. No cortical thinning. No loss of corticomedullary distinction. Resistive index measures 0.70.  No renal stone. No hydronephrosis.    The bladder is decompressed around a Cole catheter, limiting assessment.                                       US Lower Extremity Veins Bilateral (Final result)  Result time 02/26/25 22:02:54      Final result by Billy Lopez MD (02/26/25 22:02:54)                   Impression:      No evidence of deep venous thrombosis in either lower extremity.      Electronically signed by: Billy Lopez MD  Date:    02/26/2025  Time:    22:02               Narrative:    EXAMINATION:  US LOWER EXTREMITY VEINS BILATERAL    CLINICAL HISTORY:  pain in the legs;    TECHNIQUE:  Duplex and color flow Doppler and dynamic compression was performed of the bilateral lower extremity veins was performed.    COMPARISON:  11/22/2024.    FINDINGS:  Right thigh veins: The common femoral, femoral, popliteal, upper greater saphenous, and deep femoral veins are patent and free of thrombus. The veins are normally compressible and have normal phasic flow and augmentation response.    Right calf veins: The visualized calf veins are patent.    Left thigh veins: The common femoral, femoral, popliteal, upper greater saphenous, and deep femoral veins  are patent and free of thrombus. The veins are normally compressible and have normal phasic flow and augmentation response.    Left calf veins: The visualized calf veins are patent.    Miscellaneous: Mild soft tissue edema.  No organized fluid collection.                                       CT Abdomen Pelvis  Without Contrast (Final result)  Result time 02/26/25 12:33:55      Final result by Ry Doshi MD (02/26/25 12:33:55)                   Impression:      1. Tree-in-bud type nodules within the bilateral lower lobes, concerning for developing infectious or inflammatory process.  Correlation and follow-up is advised.  2. Bilateral perinephric fat stranding, nonspecific noting there may be rim wall thickening of the urinary bladder.  Correlation with urinalysis recommended to exclude changes of infection.  3. Bilateral adrenal nodules, some of which measure attenuation suggesting adenoma, others are nonspecific.  4. The gallbladder is distended noting there may be layering sludge.  No secondary findings to suggest acute cholecystitis.  5. Moderate stool in the colon may reflect constipation.  6. Please see above for several additional findings.      Electronically signed by: Ry Doshi MD  Date:    02/26/2025  Time:    12:33               Narrative:    EXAMINATION:  CT ABDOMEN PELVIS WITHOUT CONTRAST    CLINICAL HISTORY:  Flank pain, kidney stone suspected;    TECHNIQUE:  Low dose axial images, sagittal and coronal reformations were obtained from the lung bases to the pubic symphysis.  Oral contrast was not administered.    COMPARISON:  CT pelvis 02/01/2025    FINDINGS:  Images of the lower thorax are remarkable for a 2 mm pulmonary nodule along the fissure on the right.  There is bilateral basilar dependent atelectasis.  There are a few scattered tree-in-bud type nodules along the medial aspect of the right lower lobe and to a lesser degree the medial aspect of the left lower lobe concerning for  possible developing infectious or inflammatory process.    Allowing for motion artifact, the liver, spleen, and pancreas are unremarkable.  There are bilateral adrenal nodules, largest contain fat within the left adrenal gland suggesting adenoma.  Others are too small for characterization or measure attenuation higher than would be expected for adenoma.  There is high attenuating material within the stomach without wall thickening.  The gallbladder is distended noting there may be internal sludge.  No significant abdominal lymphadenopathy.    There is bilateral perinephric fat stranding.  There is no hydronephrosis or nephrolithiasis.  The bilateral ureters are unremarkable without calculi seen.  The urinary bladder is decompressed around a Cole catheter noting there may be residual wall thickening.  The prostate is not enlarged.    The large bowel is grossly unremarkable noting moderate stool throughout.  The terminal ileum and appendix are unremarkable.  The small bowel is grossly unremarkable.  There are a few scattered shotty periaortic, pericaval, and mesenteric lymph nodes.  There is atherosclerotic calcification of the aorta and its branches.  There are bilateral fat containing inguinal hernias.    There are advanced degenerative changes of the bilateral femoroacetabular joints.  There are degenerative changes of the pubic symphysis, sacroiliac joints, and spine.  Prominent degenerative change noted involving T7-T8.  No significant inguinal lymphadenopathy.                                       CT Head Without Contrast (Final result)  Result time 02/26/25 09:54:59      Final result by Jeyson Pisano DO (02/26/25 09:54:59)                   Impression:      No acute intracranial findings as detailed above specifically without evidence for acute intracranial hemorrhage or sulcal effacement to suggest large territory recent infarction    Clinical correlation and further evaluation with MRI as warranted if  patient compatible.      Electronically signed by: Jeyson Pisano DO  Date:    02/26/2025  Time:    09:54               Narrative:    EXAMINATION:  CT HEAD WITHOUT CONTRAST    CLINICAL HISTORY:  Mental status change, unknown cause;    TECHNIQUE:  Multiple sequential 5 mm axial images of the head without contrast.  Coronal and sagittal reformatted imaging from the axial acquisition.    COMPARISON:  12/06/2024    FINDINGS:  Generalized cerebral volume loss with compensatory enlargement ventricles sulci and cisterns without hydrocephalus.  There is small focus of encephalomalacia right caudate suggestive for remote lacunar-type infarct unchanged    Study is distorted by motion and beam hardening artifacts.  Within limits of the study there is no evidence for acute intracranial hemorrhage or sulcal effacement to suggest large territory recent infarction.  Ill-defined decreased attenuation supratentorial periventricular white matter similar to prior while nonspecific concerning for underlying chronic ischemic change    Remote fracture deformity left inferior orbital wall again seen.  There is complete opacification left sphenoid sinus similar    No significant new paranasal sinus opacification in the visualized paranasal sinuses.  Continued partial opacification mastoid air cells bilaterally greater on the left.                                       X-Ray Chest AP Portable (Final result)  Result time 02/26/25 08:57:59      Final result by Enoc Felix MD (02/26/25 08:57:59)                   Impression:      See above      Electronically signed by: Enoc Felix MD  Date:    02/26/2025  Time:    08:57               Narrative:    EXAMINATION:  XR CHEST AP PORTABLE    CLINICAL HISTORY:  Shortness of breath    TECHNIQUE:  Single frontal view of the chest was performed.    COMPARISON:  No 12/09/2024 ne    FINDINGS:  Heart size normal.  No significant airspace consolidation or pleural effusion identified.

## 2025-03-03 NOTE — NURSING
Patient continues to remove IVs and is refusing for me to place another one. Patient is altered and noncompliant

## 2025-03-03 NOTE — PLAN OF CARE
[facility]  Discharge Reassessment    Primary Care Provider: Berhane Alvarez MD    Expected Discharge Date: 3/5/2025    Reassessment (most recent)       Discharge Reassessment - 03/03/25 1724          Discharge Reassessment    Assessment Type Discharge Planning Reassessment     Did the patient's condition or plan change since previous assessment? Yes     Discharge Plan discussed with: Patient (P)      Communicated KELSEY with patient/caregiver Yes (P)      Discharge Plan A Return to nursing home (P)      Discharge Plan B Return to Nursing Home (P)      DME Needed Upon Discharge  none (P)      Transition of Care Barriers None (P)      Why the patient remains in the hospital Requires continued medical care (P)         Post-Acute Status    Discharge Delays None known at this time (P)                    CM spoke to medical team regarding this pts transfer back to [facility] where he is a [facility].  At this time, pt is not medically ready will need additional testing  prior discharge.  Will cont to coordinate care until discharge.  Will cont to coordinate care until discharge.    Discharge Plan A and Plan B have been determined by review of patient's clinical status, future medical and therapeutic needs, and coverage/benefits for post-acute care in coordination with multidisciplinary team members.     Neha Tobias, MSN   [facility]  [phone]

## 2025-03-03 NOTE — ASSESSMENT & PLAN NOTE
KEATON is likely due to pre-renal azotemia due to dehydration. Baseline creatinine is  1.5 . Most recent creatinine and eGFR are listed below.  Recent Labs     03/01/25  0508 03/02/25  0859 03/03/25  0531   CREATININE 3.4* 2.6* 2.2*   EGFRNORACEVR 18.3* 25.2* 30.9*      Plan  - KEATON is improving  - Avoid nephrotoxins and renally dose meds for GFR listed above  - Monitor urine output, serial BMP, and adjust therapy as needed  - nephrology recommendations:      - KEATON secondary to urinary obstruction      - f/u outpatient nephrology and urology

## 2025-03-03 NOTE — PROGRESS NOTES
[facility] Medicine  Progress Note    Patient Name: Riaz Guerra Jr.  MRN: [id number]  Patient Class: IP- Inpatient   Admission Date: 2/26/2025  Length of Stay: 5 days  Attending Physician: Alexandre Crespo MD  Primary Care Provider: Berhane Alvarez MD        Subjective     Principal Problem:Uremic encephalopathy        HPI:  Riaz Guerra is a 75-year-old man with a medical history of HTN, HLD, insulin dependent DM, CKD, bilateral inguinal hernias, chronic venous insufficiency, obesity, chronic hip/back pain, and wheelchair dependent. He presented to the ED from [facility] ([facility]) due to altered mental status, labored breathing, and refusing medications. Per phone call to [facility], the patient had normal mentation prior to this admission, but had difficulty adjusting to the facility.     At the ED the patient was A/O x2 to person, and time. Reported pain in bilateral shoulders and hip, feeling cold and thirsty. Discoloration was noted on both lower limbs suggestive of venous stasis. Cole catheter was placed due to greater than 400ml urine seen on bladder scan. Labs were significant for KEATON: Cr 4.5 from base line of 1.3. Potassium 7.0, , CO2 15. CT abdomen pelvis negative for hydronephrosis but noted for bilateral basilar atelectasis and 2mm pulmonary nodule along the fissure on the right as well as tree in bud type nodules along the medial aspect of the left lower lobe concerning for possible developing infectious or inflammatory process. CT head negative for acute intracranial changes.    Patient's sister has been contacted about the patient's location and new condition. Per his sister, patient has not been able to ambulate for the last 9 months and has been at the SNF facility for the last two months.           Overview/Hospital Course:  Patinet admitted with MAS, and hyperkalemia on presentation. Potassium was shifted and was given lokelma and calcium gluconate.  Nephrology was consulted and recommendations followed, sodium bicarb, lasix + diuril, and lokelma TID. Hyperkalemia resolved. Patient's mentation waxing and waning. Psychiatry consulted. 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  PT/OT not recommending further therapy. Patient does not like care at [facility] and does not wish to return there.     Interval History: Patient mentation waxing and waning, speaking nonsensical. Treponema IgG, IgM reactive. RPR pending. Psychiatry and ID consulted. Planning for PEC and LP.     Review of Systems  Objective:     Vital Signs (Most Recent):  Temp: 98.1 °F (36.7 °C) (03/03/25 1238)  Pulse: 94 (03/03/25 1238)  Resp: 18 (03/03/25 1238)  BP: (!) 162/97 (03/03/25 1238)  SpO2: 99 % (03/03/25 1238) Vital Signs (24h Range):  Temp:  [97.9 °F (36.6 °C)-98.3 °F (36.8 °C)] 98.1 °F (36.7 °C)  Pulse:  [] 94  Resp:  [16-18] 18  SpO2:  [96 %-99 %] 99 %  BP: (115-162)/(68-97) 162/97     Weight: (!) 139.7 kg (308 lb)  Body mass index is 42.96 kg/m².    Intake/Output Summary (Last 24 hours) at 3/3/2025 1335  Last data filed at 3/2/2025 1533  Gross per 24 hour   Intake --   Output 450 ml   Net -450 ml         Physical Exam  Vitals reviewed.   Constitutional:       General: He is not in acute distress.  HENT:      Head: Normocephalic.      Mouth/Throat:      Mouth: Mucous membranes are moist.   Eyes:      Pupils: Pupils are equal, round, and reactive to light.   Cardiovascular:      Rate and Rhythm: Regular rhythm.      Heart sounds: Normal heart sounds.   Pulmonary:      Breath sounds: Normal breath sounds.   Abdominal:      General: Abdomen is flat.   Musculoskeletal:         General: No swelling.      Comments: Severe venous stasis dermatitis   Skin:     General: Skin is warm.   Neurological:      Mental Status: He is alert and oriented to person, place, and time. Mental status is at baseline.   Psychiatric:         Mood and Affect: Mood normal.             Significant Labs:  All pertinent labs within the past 24 hours have been reviewed.  CBC:   Recent Labs   Lab 03/02/25  0859 03/03/25  0532   WBC 11.19 10.38   HGB 12.2* 12.6*   HCT 36.4* 39.2*    278     CMP:   Recent Labs   Lab 03/02/25  0859 03/03/25  0531    137   K 3.7 3.5    102   CO2 24 24    107   BUN 54* 50*   CREATININE 2.6* 2.2*   CALCIUM 9.1 9.4   PROT 7.6 7.9   ALBUMIN 2.7* 2.7*   BILITOT 0.6 0.6   ALKPHOS 91 92   AST 15 13   ALT 8* 8*   ANIONGAP 11 11       Significant Imaging: I have reviewed all pertinent imaging results/findings within the past 24 hours.    Assessment and Plan     * Uremic encephalopathy  See acute renal failure       AMS (altered mental status)  - psychiatry consult follow recommendations  - patient found to have reactive treponema IgG, IgM, RPR pending  - ID consulted, will follow recommendations      Mood disorder  Patient has persistent depression which is unknown and is currently uncontrolled. Will Begin anti-depressant medications. We will not consult psychiatry at this time. Patient does not display psychosis at this time. Continue to monitor closely and adjust plan of care as needed.    - wellbutrin 150 mg daily      Right hip pain  Continue gabapentin and PRN oxycodone and dilaudid      Impaired mobility and activities of daily living  History of rapid decline over the last 9 months. Spent 2 months at [facility]. Staff there says patient has had difficulty adjusting.     - Physical therapy says that at this time, patient is functioning at their prior level of function and does not require further acute PT services.   - Occupational therapy: Patient is unable to continue work toward goals because of medical or psychosocial complications. and Therapist determines that the patient will no longer benefit from therapy services.   - will re consult considering patient's dramatic improvement in mentation     Chronic wounds; BLE  - wound care consulted  "      Urine retention  - david placed  - strict input and out  - will monitor output and BMP following fluid challenge      Venous stasis of both lower extremities  - sequential compression device  - wound care for ulcerated leg wounds  - consult PT/OT when encephalopathy resolves    Metabolic acidosis, normal anion gap (NAG)  - see renal failure and hyperkalemia      Acute renal failure superimposed on stage 3a chronic kidney disease  KEATON is likely due to pre-renal azotemia due to dehydration. Baseline creatinine is  1.5 . Most recent creatinine and eGFR are listed below.  Recent Labs     03/01/25  0508 03/02/25  0859 03/03/25  0531   CREATININE 3.4* 2.6* 2.2*   EGFRNORACEVR 18.3* 25.2* 30.9*      Plan  - KEATON is improving  - Avoid nephrotoxins and renally dose meds for GFR listed above  - Monitor urine output, serial BMP, and adjust therapy as needed  - nephrology recommendations:      - KEATON secondary to urinary obstruction      - f/u outpatient nephrology and urology     Chronic pain syndrome  Continue gabapentin    Type 2 diabetes mellitus with chronic kidney disease, without long-term current use of insulin  Patient's FSGs are controlled on current medication regimen.  Last A1c reviewed-   Lab Results   Component Value Date    HGBA1C 6.4 (H) 02/26/2025     Most recent fingerstick glucose reviewed-   No results for input(s): "POCTGLUCOSE" in the last 24 hours.    Current correctional scale  Low  Maintain anti-hyperglycemic dose as follows-   Antihyperglycemics (From admission, onward)      Start     Stop Route Frequency Ordered    02/26/25 1715  insulin aspart U-100 pen 0-5 Units         -- SubQ Every 6 hours PRN 02/26/25 1615          Hold Oral hypoglycemics while patient is in the hospital.      VTE Risk Mitigation (From admission, onward)           Ordered     IP VTE HIGH RISK PATIENT  Once         02/26/25 1159     Place sequential compression device  Until discontinued         02/26/25 1159              "       Discharge Planning   KELSEY: 3/5/2025     Code Status: Full Code   Medical Readiness for Discharge Date: 3/3/2025  Discharge Plan A: Return to nursing home   Discharge Delays: None known at this time                    Gabriel Tate MD  Department of Hospital Medicine   [facility]

## 2025-03-03 NOTE — SUBJECTIVE & OBJECTIVE
Interval History: Patient mentation waxing and waning, speaking nonsensical. Treponema IgG, IgM reactive. RPR pending. Psychiatry and ID consulted. Planning for PEC and LP.     Review of Systems  Objective:     Vital Signs (Most Recent):  Temp: 98.1 °F (36.7 °C) (03/03/25 1238)  Pulse: 94 (03/03/25 1238)  Resp: 18 (03/03/25 1238)  BP: (!) 162/97 (03/03/25 1238)  SpO2: 99 % (03/03/25 1238) Vital Signs (24h Range):  Temp:  [97.9 °F (36.6 °C)-98.3 °F (36.8 °C)] 98.1 °F (36.7 °C)  Pulse:  [] 94  Resp:  [16-18] 18  SpO2:  [96 %-99 %] 99 %  BP: (115-162)/(68-97) 162/97     Weight: (!) 139.7 kg (308 lb)  Body mass index is 42.96 kg/m².    Intake/Output Summary (Last 24 hours) at 3/3/2025 1335  Last data filed at 3/2/2025 1533  Gross per 24 hour   Intake --   Output 450 ml   Net -450 ml         Physical Exam  Vitals reviewed.   Constitutional:       General: He is not in acute distress.  HENT:      Head: Normocephalic.      Mouth/Throat:      Mouth: Mucous membranes are moist.   Eyes:      Pupils: Pupils are equal, round, and reactive to light.   Cardiovascular:      Rate and Rhythm: Regular rhythm.      Heart sounds: Normal heart sounds.   Pulmonary:      Breath sounds: Normal breath sounds.   Abdominal:      General: Abdomen is flat.   Musculoskeletal:         General: No swelling.      Comments: Severe venous stasis dermatitis   Skin:     General: Skin is warm.   Neurological:      Mental Status: He is alert and oriented to person, place, and time. Mental status is at baseline.   Psychiatric:         Mood and Affect: Mood normal.             Significant Labs: All pertinent labs within the past 24 hours have been reviewed.  CBC:   Recent Labs   Lab 03/02/25  0859 03/03/25  0532   WBC 11.19 10.38   HGB 12.2* 12.6*   HCT 36.4* 39.2*    278     CMP:   Recent Labs   Lab 03/02/25  0859 03/03/25  0531    137   K 3.7 3.5    102   CO2 24 24    107   BUN 54* 50*   CREATININE 2.6* 2.2*   CALCIUM 9.1  9.4   PROT 7.6 7.9   ALBUMIN 2.7* 2.7*   BILITOT 0.6 0.6   ALKPHOS 91 92   AST 15 13   ALT 8* 8*   ANIONGAP 11 11       Significant Imaging: I have reviewed all pertinent imaging results/findings within the past 24 hours.

## 2025-03-03 NOTE — NURSING
Patient is refusing all care. He continues to refuse to let me place a new IV in him. He refuses to let the techs change and clean him as well.

## 2025-03-03 NOTE — HPI
"75-year-old male with history of hypertension, hyperlipidemia, insulin-dependent diabetes, CKD, chronic venous insufficiency, obesity, bilateral inguinal hernias, chronic hip and back pain, and non-ambulatory about a year (wheelchair dependent). Admitted from SNF for AMS and refusing meds (baseline oriented x3). On admit, labs with hyperkalemia (now resolved), and with KEATON, improving. Nephrology consulted. Patient's mentation waxing and waning. 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 During work up for AMS, Treponema IgG and IgM reactive. RPR negative. FTA pending. CT head negative for acute intracranial changes. HIV pending. ID consulted for "persistent AMS post acute renal failure, now stable labs, found to have reactive IgG and IgM treponema, pending RPR and chlamydia + gonorrhea."    Patient oriented to person and place on my exam. Patient is poor historian. He cannot recall if he has ever been tested or treated for syphilis in the past. Denies IVDU. Denies hx of HIV. Denies any complaints of rash, genital sores/lesions, vision changes, eye pain/redness, or neck pain.  "

## 2025-03-04 LAB
ALBUMIN SERPL BCP-MCNC: 2.6 G/DL (ref 3.5–5.2)
ALP SERPL-CCNC: 87 U/L (ref 40–150)
ALT SERPL W/O P-5'-P-CCNC: 6 U/L (ref 10–44)
ANION GAP SERPL CALC-SCNC: 12 MMOL/L (ref 8–16)
AST SERPL-CCNC: 13 U/L (ref 10–40)
BASOPHILS # BLD AUTO: 0.04 K/UL (ref 0–0.2)
BASOPHILS NFR BLD: 0.3 % (ref 0–1.9)
BILIRUB SERPL-MCNC: 0.5 MG/DL (ref 0.1–1)
BUN SERPL-MCNC: 46 MG/DL (ref 8–23)
CALCIUM SERPL-MCNC: 9.2 MG/DL (ref 8.7–10.5)
CHLORIDE SERPL-SCNC: 103 MMOL/L (ref 95–110)
CO2 SERPL-SCNC: 24 MMOL/L (ref 23–29)
CREAT SERPL-MCNC: 2.1 MG/DL (ref 0.5–1.4)
CRP SERPL-MCNC: 168.9 MG/L (ref 0–8.2)
DIFFERENTIAL METHOD BLD: ABNORMAL
EOSINOPHIL # BLD AUTO: 0.1 K/UL (ref 0–0.5)
EOSINOPHIL NFR BLD: 0.8 % (ref 0–8)
ERYTHROCYTE [DISTWIDTH] IN BLOOD BY AUTOMATED COUNT: 15.7 % (ref 11.5–14.5)
ERYTHROCYTE [SEDIMENTATION RATE] IN BLOOD BY PHOTOMETRIC METHOD: 109 MM/HR (ref 0–23)
EST. GFR  (NO RACE VARIABLE): 32.6 ML/MIN/1.73 M^2
GLUCOSE SERPL-MCNC: 119 MG/DL (ref 70–110)
HCT VFR BLD AUTO: 35.3 % (ref 40–54)
HGB BLD-MCNC: 11.3 G/DL (ref 14–18)
IMM GRANULOCYTES # BLD AUTO: 0.06 K/UL (ref 0–0.04)
IMM GRANULOCYTES NFR BLD AUTO: 0.5 % (ref 0–0.5)
LYMPHOCYTES # BLD AUTO: 1.1 K/UL (ref 1–4.8)
LYMPHOCYTES NFR BLD: 9.5 % (ref 18–48)
MAGNESIUM SERPL-MCNC: 1.8 MG/DL (ref 1.6–2.6)
MCH RBC QN AUTO: 28.5 PG (ref 27–31)
MCHC RBC AUTO-ENTMCNC: 32 G/DL (ref 32–36)
MCV RBC AUTO: 89 FL (ref 82–98)
MONOCYTES # BLD AUTO: 0.7 K/UL (ref 0.3–1)
MONOCYTES NFR BLD: 6.3 % (ref 4–15)
NEUTROPHILS # BLD AUTO: 9.6 K/UL (ref 1.8–7.7)
NEUTROPHILS NFR BLD: 82.6 % (ref 38–73)
NRBC BLD-RTO: 0 /100 WBC
PHOSPHATE SERPL-MCNC: 4.6 MG/DL (ref 2.7–4.5)
PLATELET # BLD AUTO: 268 K/UL (ref 150–450)
PMV BLD AUTO: 10.9 FL (ref 9.2–12.9)
POCT GLUCOSE: 124 MG/DL (ref 70–110)
POCT GLUCOSE: 168 MG/DL (ref 70–110)
POTASSIUM SERPL-SCNC: 3.3 MMOL/L (ref 3.5–5.1)
PROT SERPL-MCNC: 7.6 G/DL (ref 6–8.4)
RBC # BLD AUTO: 3.97 M/UL (ref 4.6–6.2)
RHEUMATOID FACT SERPL-ACNC: <13 IU/ML (ref 0–15)
SODIUM SERPL-SCNC: 139 MMOL/L (ref 136–145)
WBC # BLD AUTO: 11.59 K/UL (ref 3.9–12.7)

## 2025-03-04 PROCEDURE — 83735 ASSAY OF MAGNESIUM: CPT

## 2025-03-04 PROCEDURE — 86235 NUCLEAR ANTIGEN ANTIBODY: CPT | Mod: 59

## 2025-03-04 PROCEDURE — 85652 RBC SED RATE AUTOMATED: CPT

## 2025-03-04 PROCEDURE — 63600175 PHARM REV CODE 636 W HCPCS

## 2025-03-04 PROCEDURE — 25000003 PHARM REV CODE 250: Performed by: INTERNAL MEDICINE

## 2025-03-04 PROCEDURE — 25000003 PHARM REV CODE 250

## 2025-03-04 PROCEDURE — 36415 COLL VENOUS BLD VENIPUNCTURE: CPT

## 2025-03-04 PROCEDURE — 85025 COMPLETE CBC W/AUTO DIFF WBC: CPT

## 2025-03-04 PROCEDURE — 11000001 HC ACUTE MED/SURG PRIVATE ROOM

## 2025-03-04 PROCEDURE — 84100 ASSAY OF PHOSPHORUS: CPT

## 2025-03-04 PROCEDURE — 86039 ANTINUCLEAR ANTIBODIES (ANA): CPT

## 2025-03-04 PROCEDURE — 86038 ANTINUCLEAR ANTIBODIES: CPT

## 2025-03-04 PROCEDURE — 86431 RHEUMATOID FACTOR QUANT: CPT

## 2025-03-04 PROCEDURE — 21400001 HC TELEMETRY ROOM

## 2025-03-04 PROCEDURE — 80053 COMPREHEN METABOLIC PANEL: CPT

## 2025-03-04 PROCEDURE — 86140 C-REACTIVE PROTEIN: CPT

## 2025-03-04 RX ORDER — POTASSIUM CHLORIDE 20 MEQ/1
40 TABLET, EXTENDED RELEASE ORAL
Status: COMPLETED | OUTPATIENT
Start: 2025-03-04 | End: 2025-03-04

## 2025-03-04 RX ADMIN — OLANZAPINE 2.5 MG: 10 INJECTION, POWDER, FOR SOLUTION INTRAMUSCULAR at 12:03

## 2025-03-04 RX ADMIN — BUPROPION HYDROCHLORIDE 300 MG: 150 TABLET, EXTENDED RELEASE ORAL at 09:03

## 2025-03-04 RX ADMIN — ACETAMINOPHEN 1000 MG: 500 TABLET ORAL at 05:03

## 2025-03-04 RX ADMIN — ASPIRIN 81 MG: 81 TABLET, COATED ORAL at 09:03

## 2025-03-04 RX ADMIN — OXYCODONE 5 MG: 5 TABLET ORAL at 09:03

## 2025-03-04 RX ADMIN — Medication 6 MG: at 09:03

## 2025-03-04 RX ADMIN — Medication 6 MG: at 12:03

## 2025-03-04 RX ADMIN — ATORVASTATIN CALCIUM 20 MG: 20 TABLET, FILM COATED ORAL at 09:03

## 2025-03-04 RX ADMIN — POTASSIUM CHLORIDE 40 MEQ: 1500 TABLET, EXTENDED RELEASE ORAL at 02:03

## 2025-03-04 RX ADMIN — QUETIAPINE FUMARATE 100 MG: 25 TABLET ORAL at 09:03

## 2025-03-04 RX ADMIN — POTASSIUM CHLORIDE 40 MEQ: 1500 TABLET, EXTENDED RELEASE ORAL at 12:03

## 2025-03-04 RX ADMIN — TAMSULOSIN HYDROCHLORIDE 0.8 MG: 0.4 CAPSULE ORAL at 09:03

## 2025-03-04 RX ADMIN — GABAPENTIN 300 MG: 300 CAPSULE ORAL at 09:03

## 2025-03-04 NOTE — ASSESSMENT & PLAN NOTE
- psychiatry consult follow recommendations  - patient found to have reactive treponema IgG, IgM, RPR pending  - ID consulted, will follow recommendations  - inability to make medical decisions, PEC placed   PROGRESS NOTE       Date of Service: 2023   CAMMY, BABY GIRL JUAN M (Jose) MRN: [id number] PAC: [id number]         Physical Exam DOL: 14   GA: 31 wks 4 d   CGA: 33 wks 4 d   BW: 1440   Weight: 1580   Change 24h: 25   Change 7d: 215   Place of Service: NICU   Bed Type: Incubator      Intensive Cardiac and respiratory monitoring, continuous and/or frequent vital   sign monitoring      Vitals / Measurements:   T: 36.5   HR: 147   RR: 61   BP: 57/38 (43)   SpO2: 98   Length: 41.5 (Change 24 hrs: --)   OFC: 29 (Change 24 hrs: --)      Head/Neck: Anterior fontanel is flat, open, and soft. Suture lines are open. Low   flow nasal cannula in place.  Gastric tube to gravity with no output      Chest: Clear, equal breath sounds with good air movement.  Scant SC/IC   retractions consistent with degree of prematurity.      Heart: First and second sounds are normal. No murmur is detected. Brachial and   femoral pulses 2+. Brisk capillary refill.      Abdomen: Soft, non-tender, and non-distended.  Active bowel sounds.      Genitalia: Normal external female genitalia are present.      Extremities: No deformities noted. Normal range of motion for all extremities.   PICC infusing in right arm without sign of complications.      Neurologic: Normal tone and activity for age.      Skin: Pink and well perfused. No rashes, petechiae, or other lesions are noted.   +jaundice undertones.         Procedures   Peripherally Inserted Central Line (PICC),   2023,   13,   NICU,   XXX,   XXX   Comment: CEASAR Ennis-26g trimmed to 15cm and inserted 11.5 cm in right basilic   vein.  Tip T6         Medication   Active Medications:   Caffeine Citrate, Start Date: 2023, Duration: 15   Comment: 5mg/kg q day changed to PO 10/20. Back to IV on 10/21.      Zosyn, Start Date: 2023, End Date: 2023, Duration: 3   Comment: 100mg/kg IV q 12 hours         Lab Culture   Active Culture:   Type: Blood   Date Done: 2023   Result: No  Growth   Status: Active         Respiratory Support:   Type: Nasal Cannula FiO2: 1 Flow (lpm): 0.04    Start Date: 2023   Duration: 4         FEN   Daily Weight (g): 1580   Dry Weight (g): 1580   Weight Gain Over 7 Days (g): 133      Prior Intake   Prior IV (Total IV Fluid: 150 mL/kg/d; 84 kcal/kg/d; GIR: 9.7 mg/kg/min )      Fluid: SMOF 2.1 g/kg   mL/hr: 0.7   hr/d: 24   mL/d: 16.9   mL/kg/d: 11   kcal/kg/d: 21      Fluid: TPN D10 AA 4 g/kg   mL/hr: 9.2   hr/d: 24   mL/d: 220   mL/kg/d: 139   kcal/kg/d: 63      Output    Totals (25 mL/d; 16 mL/kg/d; 0.7 mL/kg/hr)    Net Intake / Output (+212 mL/d; +134 mL/kg/d; +5.6 mL/kg/hr)      Last Stool Date: 2023      Output  Type: Urine   Hours: 24   Total mL: 15   mL/kg/d: 9.5   mL/kg/hr: 0.4      Output  Type: Replogle   Hours: 24   Total mL: 10   mL/kg/d: 6.3   mL/kg/hr: 0.3      Planned Intake   Planned IV (Total IV Fluid: 87 mL/kg/d; 59 kcal/kg/d; GIR: 5.2 mg/kg/min )      Fluid: SMOF 2.5 g/kg   mL/hr: 0.8   hr/d: 24   mL/d: 19.8   mL/kg/d: 12   kcal/kg/d: 25      Fluid: TPN D10 AA 2 g/kg   mL/hr: 4.9   hr/d: 24   mL/d: 118.5   mL/kg/d: 75   kcal/kg/d: 34      Planned Enteral (Total Enteral: 66 mL/kg/d; 44 kcal/kg/d; )      Enteral: 20 kcal/oz BM   Route: NG/PO   mL/Feed: 13   Feed/d: 8   mL/d: 104   mL/kg/d: 66   kcal/kg/d: 44         Diagnoses   System: FEN/GI   Diagnosis: Nutritional Support   starting 2023      Blood in stool <= 28d (P54.1)   starting 2023      History: Initial glucose in 50s.  TPN started on admission.  Feedings started on   the evening of 10/9 with BM. 10/19 changed to 24 kcal.         Blood in stool x1 on 10/20-normal abd xray, normal CBC and CRP.  Placed NPO.    Possible pneumatosis noted on 10/21 in left lower quadrant, some bubbly areas on   right side.  Replogle to low intermittent suction. BC sent and started on zosyn.   CBC on 10/21 with no left shift, normal platelet count, normal ANC, 8% eos.  No   pneumatosis  noted on follow up abd xray on 10/21 at 1400.   Gastric tube to gravity on 10/22.      Consent for donor BM signed.      Assessment: Weight up 25 grams.   On cTPN/SMOF via PICC.  NPO since 10/20 due to   blood in stool. Gastric tube to gravity with no output. Normal abd exam with no   distention or tenderness, active bowel sounds.  Abd xray without pneumatosis.   Stool this AM without blood.      Plan: Restart plain breastmilk feeds at 13 mL q3h.    Adjust cTPN/SMOF via PICC per labs and clinical condition.   Follow lytes and glucoses as indicated.     Lactation support-discussed dairy free diet with mother on 10/21.   When feeding restarted fortify with elemental formula powder instead of HMF due   to elevated eos with blood in stool.      System: Respiratory   Diagnosis: Respiratory Insufficiency - onset <= 28d (P28.89)   starting 2023      History: On room air on admission.  Initial CXR well expanded with fairly clear   lung fields.  Placed on bubble CPAP +5, 21% on 10/9 for apnea. 10/13 Weaned to   HFNC. 10/18 weaned to RA. Restarted on NC due to desats,  10/20.      Assessment: Comfortable WOB on LFNC 40-60 cc.      Plan: Follow gases and CXRs as indicated.   Follow WOB and O2 saturations on LFNC.      System: Apnea-Bradycardia   Diagnosis: At risk for Apnea   starting 2023      History: This is a 31 wks premature infant at risk for Apnea of Prematurity.   Loaded with caffeine on admission.  Apnea x2 on 10/9 and placed on bubble CPAP   +5, 21%.  Last event on 10/22.      Assessment: No new events.      Plan: Daily caffeine-IV. Continuous monitoring and oximetry.   Respiratory support as indicated.      System: Infectious Disease   Diagnosis: Infectious Screen <= 28D (P00.2)   starting 2023      History: Delivered for maternal indications. Blood culture obtained was   negative.  Initial CBC with ANC 1260 and platelet count 109K, no left shift.    Platelet count 197K on 10/14.  ANC 4320 on  10/11.      Blood in stool on 10/20.  Normal abd xray with normal CBC and CRP.  Placed NPO.    Possible pneumatosis noted by radiology on abd xray done on am of 10/21 LLQ with   some bubbly areas on right side (?stool).  Blood culture ordered and started on   zosyn.  CBC on 10/21 with no left shift, normal platelet count and normal ANC.    No pneumatosis seen on follow up abd xray 10/21 at 1400.      Assessment: Infant appears well on exam with no abd distention or tenderness.    No pneumatosis seen on abd xray this AM.   Blood culture from 10/21 with NGSF.      Plan: Follow blood culture done on 10/21.   Discontinue Zosyn   Follow CBCs as indicated.   Follow abd xrays as indicated.      System: Neurology   Diagnosis: At risk for Intraventricular Hemorrhage   starting 2023      History: Based on Gestational Age of 31 weeks, infant has relatively low risk   for clinically relevant IVH.      Plan: Repeat cranial US at 36 weeks to r/o PVL      Neuroimaging   Date: 2023 Type: Cranial Ultrasound   Grade-L: No Bleed Grade-R: No Bleed       System: Gestation   Diagnosis: Prematurity 3486-4149 gm (P07.14)   starting 2023      History: This is a 31 wks and 1200 grams premature infant.      Plan: PT/OT during NICU stay.      System: Hematology   Diagnosis: Thrombocytopenia (<=28d) (P61.0)   starting 2023      Anemia of Prematurity (P61.2)   starting 2023   Comment: At risk for.      History: Mother with HELLP. Initial platelet count 109K. Platelets trending up,   197 on 10/14.      Assessment: 10/21:  Hct 43%.  Platelet count 323K.  Concerns for pneumatosis   with blood in stool on 10/20. Hct 37.7% on 10/22 with platelet count of 320K.      Plan: Follow.      System: Hyperbilirubinemia   Diagnosis: At risk for Hyperbilirubinemia   starting 2023      History: MBT A+. Phototherapy 10/12-->10/13.  Phototherapy 10/15-->10/16.      Assessment: T. bili 8.1 this am-small increase. LL threshold  10.6      Plan: Follow bili with labs.   Follow d. bili at least weekly while on TPN.      System: Ophthalmology   Diagnosis: At risk for Retinopathy of Prematurity   starting 2023      History: Based on Gestational Age of 31 weeks and weight of 1440 grams infant   meets criteria for screening.      Assessment: At risk for Retinopathy of Prematurity.      Plan: Ophthalmology referral for retinopathy screening- ordered 11/7      System: Psychosocial Intervention   Diagnosis: Psychosocial Intervention   starting 2023      History: Parent . First babies. Mother is audiologist at [employer]. Consents   signed with Dr Juarez. Admission conference 10/12 with parents, aunt and MGM by Dr Juarez.   Parents updated by NNP at bedside on 10/20 regarding blood in stool and plan.    All of their questions were answered.   Dr. Candelaria updated parents at bedside on the evening of 10/20.   Parents updated at bedside on 10/21 by NNP regarding possible pneumatosis on abd   xray and plan including plan for blood culture, zosyn, gastric tube to low   suction.  Discussed elevated eos and daily free diet with mother.      Assessment: Mother updated at bedside this AM.      Plan: Support family.   Keep updated.      System: Central Vascular Access   Diagnosis: Central Vascular Access   starting 2023      History: Needed for nutrition.  Consent signed. 10/11 PICC placed. 26 gauge   Argon First PICC placed in right antecubital basilic vein. PICC tip at T6 in   SVC.  CXR on 10/19 showed tip T4 mid SVC.   10/20: PICC across midline and repositioning/flush with follow up xray showing   tip CA junction.  Tip CA junction on 10/21 am xray.      Assessment: Remains on TPN.  PICC in good placement on Xray this AM      Plan: Assess daily for need to continue PICC.    Weekly CXR for PICC tip position (due Monday)         Attestation      Service performed by Advanced Practitioner with general supervision by Dr. Andrade (not contacted  but available if needed).      Authenticated by: RIA PRYOR   Date/Time: 2023 10:20

## 2025-03-04 NOTE — SUBJECTIVE & OBJECTIVE
Interval History: Patient resting comfortably with sitter at bedside. Overnight, combative and violent towards staff and other patients. Zyprexa provided for agitation. IR consulted for LP. Awaiting FTA.     Review of Systems  Objective:     Vital Signs (Most Recent):  Temp: 97.8 °F (36.6 °C) (03/04/25 0811)  Pulse: 87 (03/04/25 0811)  Resp: 18 (03/04/25 0811)  BP: 106/65 (03/04/25 0811)  SpO2: (!) 93 % (03/04/25 0811) Vital Signs (24h Range):  Temp:  [97.6 °F (36.4 °C)-98.1 °F (36.7 °C)] 97.8 °F (36.6 °C)  Pulse:  [] 87  Resp:  [18] 18  SpO2:  [93 %-99 %] 93 %  BP: ()/(64-97) 106/65     Weight: (!) 139.7 kg (308 lb)  Body mass index is 42.96 kg/m².    Intake/Output Summary (Last 24 hours) at 3/4/2025 1132  Last data filed at 3/4/2025 0830  Gross per 24 hour   Intake 120 ml   Output 500 ml   Net -380 ml         Physical Exam  Vitals reviewed.   Constitutional:       General: He is not in acute distress.  HENT:      Head: Normocephalic.      Mouth/Throat:      Mouth: Mucous membranes are moist.   Eyes:      Pupils: Pupils are equal, round, and reactive to light.   Cardiovascular:      Rate and Rhythm: Regular rhythm.      Heart sounds: Normal heart sounds.   Pulmonary:      Breath sounds: Normal breath sounds.   Abdominal:      General: Abdomen is flat.   Musculoskeletal:         General: No swelling.      Comments: Severe venous stasis dermatitis   Skin:     General: Skin is warm.   Neurological:      Mental Status: He is alert and oriented to person, place, and time. Mental status is at baseline.   Psychiatric:         Mood and Affect: Mood normal.             Significant Labs: All pertinent labs within the past 24 hours have been reviewed.  CBC:   Recent Labs   Lab 03/03/25  0532 03/04/25  0724   WBC 10.38 11.59   HGB 12.6* 11.3*   HCT 39.2* 35.3*    268     CMP:   Recent Labs   Lab 03/03/25  0531 03/04/25  0724    139   K 3.5 3.3*    103   CO2 24 24    119*   BUN 50* 46*    CREATININE 2.2* 2.1*   CALCIUM 9.4 9.2   PROT 7.9 7.6   ALBUMIN 2.7* 2.6*   BILITOT 0.6 0.5   ALKPHOS 92 87   AST 13 13   ALT 8* 6*   ANIONGAP 11 12       Significant Imaging: I have reviewed all pertinent imaging results/findings within the past 24 hours.

## 2025-03-04 NOTE — ASSESSMENT & PLAN NOTE
Patient has persistent depression which is unknown and is currently uncontrolled. Will Begin anti-depressant medications. We will not consult psychiatry at this time. Patient does not display psychosis at this time. Continue to monitor closely and adjust plan of care as needed.    - wellbutrin 150 mg daily discontinued  - started quetiapine

## 2025-03-04 NOTE — PROGRESS NOTES
[facility]  Progress Note    Patient Name: Riaz Guerra Jr.  MRN: [id number]  Patient Class: IP- Inpatient   Admission Date: 2/26/2025  Length of Stay: 6 days  Attending Physician: Alexandre Crespo MD  Primary Care Provider: Berhane Alvarez MD        Subjective     Principal Problem:Uremic encephalopathy        HPI:  Riaz Guerra is a 75-year-old man with a medical history of HTN, HLD, insulin dependent DM, CKD, bilateral inguinal hernias, chronic venous insufficiency, obesity, chronic hip/back pain, and wheelchair dependent. He presented to the ED from [facility] ([facility]) due to altered mental status, labored breathing, and refusing medications. Per phone call to [facility], the patient had normal mentation prior to this admission, but had difficulty adjusting to the facility.     At the ED the patient was A/O x2 to person, and time. Reported pain in bilateral shoulders and hip, feeling cold and thirsty. Discoloration was noted on both lower limbs suggestive of venous stasis. Cole catheter was placed due to greater than 400ml urine seen on bladder scan. Labs were significant for KEATON: Cr 4.5 from base line of 1.3. Potassium 7.0, , CO2 15. CT abdomen pelvis negative for hydronephrosis but noted for bilateral basilar atelectasis and 2mm pulmonary nodule along the fissure on the right as well as tree in bud type nodules along the medial aspect of the left lower lobe concerning for possible developing infectious or inflammatory process. CT head negative for acute intracranial changes.    Patient's sister has been contacted about the patient's location and new condition. Per his sister, patient has not been able to ambulate for the last 9 months and has been at the SNF facility for the last two months.           Overview/Hospital Course:  Patinet admitted with MAS, and hyperkalemia on presentation. Potassium was shifted and was given lokelma and calcium gluconate.  Nephrology was consulted and recommendations followed, sodium bicarb, lasix + diuril, and lokelma TID. Hyperkalemia resolved. Patient's mentation waxing and waning. Psychiatry consulted. 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  PT/OT not recommending further therapy. Patient does not like care at [facility] and does not wish to return there.     Interval History: Patient resting comfortably with sitter at bedside. Overnight, combative and violent towards staff and other patients. Zyprexa provided for agitation. IR consulted for LP. Awaiting FTA. Inability to make medical decision. PEC ordered.     Review of Systems  Objective:     Vital Signs (Most Recent):  Temp: 97.8 °F (36.6 °C) (03/04/25 0811)  Pulse: 87 (03/04/25 0811)  Resp: 18 (03/04/25 0811)  BP: 106/65 (03/04/25 0811)  SpO2: (!) 93 % (03/04/25 0811) Vital Signs (24h Range):  Temp:  [97.6 °F (36.4 °C)-98.1 °F (36.7 °C)] 97.8 °F (36.6 °C)  Pulse:  [] 87  Resp:  [18] 18  SpO2:  [93 %-99 %] 93 %  BP: ()/(64-97) 106/65     Weight: (!) 139.7 kg (308 lb)  Body mass index is 42.96 kg/m².    Intake/Output Summary (Last 24 hours) at 3/4/2025 1132  Last data filed at 3/4/2025 0830  Gross per 24 hour   Intake 120 ml   Output 500 ml   Net -380 ml         Physical Exam  Vitals reviewed.   Constitutional:       General: He is not in acute distress.  HENT:      Head: Normocephalic.      Mouth/Throat:      Mouth: Mucous membranes are moist.   Eyes:      Pupils: Pupils are equal, round, and reactive to light.   Cardiovascular:      Rate and Rhythm: Regular rhythm.      Heart sounds: Normal heart sounds.   Pulmonary:      Breath sounds: Normal breath sounds.   Abdominal:      General: Abdomen is flat.   Musculoskeletal:         General: No swelling.      Comments: Severe venous stasis dermatitis   Skin:     General: Skin is warm.   Neurological:      Mental Status: He is alert and oriented to person, place, and time. Mental status is at baseline.    Psychiatric:         Mood and Affect: Mood normal.             Significant Labs: All pertinent labs within the past 24 hours have been reviewed.  CBC:   Recent Labs   Lab 03/03/25  0532 03/04/25  0724   WBC 10.38 11.59   HGB 12.6* 11.3*   HCT 39.2* 35.3*    268     CMP:   Recent Labs   Lab 03/03/25  0531 03/04/25  0724    139   K 3.5 3.3*    103   CO2 24 24    119*   BUN 50* 46*   CREATININE 2.2* 2.1*   CALCIUM 9.4 9.2   PROT 7.9 7.6   ALBUMIN 2.7* 2.6*   BILITOT 0.6 0.5   ALKPHOS 92 87   AST 13 13   ALT 8* 6*   ANIONGAP 11 12       Significant Imaging: I have reviewed all pertinent imaging results/findings within the past 24 hours.    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 Will Begin anti-depressant medications. We will not consult psychiatry at this time. Patient does not display psychosis at this time. Continue to monitor closely and adjust plan of care as needed.    - wellbutrin 150 mg daily discontinued  - started quetiapine       Right hip pain  Continue gabapentin and PRN oxycodone and dilaudid      Impaired mobility and activities of daily living  History of rapid decline over the last 9 months. Spent 2 months at [facility]. Staff there says patient has had difficulty adjusting.     - Physical therapy says that at this time, patient is functioning at their prior level of function and does not require further  "acute PT services.   - Occupational therapy: Patient is unable to continue work toward goals because of medical or psychosocial complications. and Therapist determines that the patient will no longer benefit from therapy services.   - will re consult considering patient's dramatic improvement in mentation     Chronic wounds; BLE  - wound care consulted       Urine retention  - david placed  - strict input and out  - will monitor output and BMP following fluid challenge      Venous stasis of both lower extremities  - sequential compression device  - wound care for ulcerated leg wounds  - consult PT/OT when encephalopathy resolves    Metabolic acidosis, normal anion gap (NAG)  - see renal failure and hyperkalemia      Acute renal failure superimposed on stage 3a chronic kidney disease  KEATON is likely due to pre-renal azotemia due to dehydration. Baseline creatinine is  1.5 . Most recent creatinine and eGFR are listed below.  Recent Labs     03/01/25  0508 03/02/25  0859 03/03/25  0531   CREATININE 3.4* 2.6* 2.2*   EGFRNORACEVR 18.3* 25.2* 30.9*      Plan  - KEATON is improving  - Avoid nephrotoxins and renally dose meds for GFR listed above  - Monitor urine output, serial BMP, and adjust therapy as needed  - nephrology recommendations:      - KEATON secondary to urinary obstruction      - f/u outpatient nephrology and urology     Chronic pain syndrome  Continue gabapentin    Type 2 diabetes mellitus with chronic kidney disease, without long-term current use of insulin  Patient's FSGs are controlled on current medication regimen.  Last A1c reviewed-   Lab Results   Component Value Date    HGBA1C 6.4 (H) 02/26/2025     Most recent fingerstick glucose reviewed-   No results for input(s): "POCTGLUCOSE" in the last 24 hours.    Current correctional scale  Low  Maintain anti-hyperglycemic dose as follows-   Antihyperglycemics (From admission, onward)      Start     Stop Route Frequency Ordered    02/26/25 0343  insulin aspart " U-100 pen 0-5 Units         -- SubQ Every 6 hours PRN 02/26/25 1615          Hold Oral hypoglycemics while patient is in the hospital.      VTE Risk Mitigation (From admission, onward)           Ordered     IP VTE HIGH RISK PATIENT  Once         02/26/25 1159     Place sequential compression device  Until discontinued         02/26/25 1159                    Discharge Planning   KELSEY: 3/6/2025     Code Status: Full Code   Medical Readiness for Discharge Date: 3/3/2025  Discharge Plan A: Return to nursing home   Discharge Delays: None known at this time                    Gabriel Tate MD  Department of Hospital Medicine   [facility]

## 2025-03-04 NOTE — NURSING
Trell SCHREIBER MD. Concerning pt refusal of external catheter to have urine collected, tele and tele monitor.

## 2025-03-04 NOTE — ASSESSMENT & PLAN NOTE
- FTA pending  - lumbar puncture with anesthesia consulted IR and anesthesiology  - CSF for VDRL, cell count, glucose, and protein

## 2025-03-04 NOTE — PLAN OF CARE
Problem: Confusion Acute  Goal: Optimal Cognitive Function  Outcome: Progressing     Problem: Diabetes Comorbidity  Goal: Blood Glucose Level Within Targeted Range  Outcome: Progressing       Pt has removed tele monitor several times throughout the night. Refused vitals signs on last night. Required two doses of prn zyprexa and one dose of melatonin. D/t combative behavior, trying to get out of bed, yelling disturbing other patients. Pt declined to have male external catheter placed back on him saying no I don't want it there and removed it. Unable to collect urine, and place iv d/t refusal of care. Fall risk monitor in place. Bed alarm activated. Call light with reach. Bed in low locked position. Plan of care ongoing.

## 2025-03-05 PROBLEM — R70.0 ESR RAISED: Status: ACTIVE | Noted: 2025-03-05

## 2025-03-05 PROBLEM — R79.82 CRP ELEVATED: Status: ACTIVE | Noted: 2025-03-05

## 2025-03-05 LAB
ALBUMIN SERPL BCP-MCNC: 2.4 G/DL (ref 3.5–5.2)
ALP SERPL-CCNC: 77 U/L (ref 40–150)
ALT SERPL W/O P-5'-P-CCNC: 7 U/L (ref 10–44)
ANA PATTERN 1: NORMAL
ANA SER QL IF: POSITIVE
ANA TITR SER IF: NORMAL {TITER}
ANION GAP SERPL CALC-SCNC: 10 MMOL/L (ref 8–16)
AST SERPL-CCNC: 12 U/L (ref 10–40)
BACTERIA #/AREA URNS AUTO: ABNORMAL /HPF
BASOPHILS # BLD AUTO: 0.04 K/UL (ref 0–0.2)
BASOPHILS NFR BLD: 0.5 % (ref 0–1.9)
BILIRUB SERPL-MCNC: 0.4 MG/DL (ref 0.1–1)
BILIRUB UR QL STRIP: NEGATIVE
BUN SERPL-MCNC: 50 MG/DL (ref 8–23)
CALCIUM SERPL-MCNC: 8.7 MG/DL (ref 8.7–10.5)
CHLORIDE SERPL-SCNC: 103 MMOL/L (ref 95–110)
CLARITY UR REFRACT.AUTO: ABNORMAL
CO2 SERPL-SCNC: 22 MMOL/L (ref 23–29)
COLOR UR AUTO: YELLOW
CREAT SERPL-MCNC: 2.4 MG/DL (ref 0.5–1.4)
DIFFERENTIAL METHOD BLD: ABNORMAL
EOSINOPHIL # BLD AUTO: 0.2 K/UL (ref 0–0.5)
EOSINOPHIL NFR BLD: 2.5 % (ref 0–8)
ERYTHROCYTE [DISTWIDTH] IN BLOOD BY AUTOMATED COUNT: 15.8 % (ref 11.5–14.5)
EST. GFR  (NO RACE VARIABLE): 27.8 ML/MIN/1.73 M^2
GLUCOSE SERPL-MCNC: 89 MG/DL (ref 70–110)
GLUCOSE UR QL STRIP: NEGATIVE
HCT VFR BLD AUTO: 32.2 % (ref 40–54)
HGB BLD-MCNC: 10.2 G/DL (ref 14–18)
HGB UR QL STRIP: ABNORMAL
HIV1 RNA # SERPL NAA+PROBE: NOT DETECTED COPIES/ML
HIV1 RNA SERPL QL NAA+PROBE: NOT DETECTED
HYALINE CASTS UR QL AUTO: 0 /LPF
IMM GRANULOCYTES # BLD AUTO: 0.03 K/UL (ref 0–0.04)
IMM GRANULOCYTES NFR BLD AUTO: 0.4 % (ref 0–0.5)
KETONES UR QL STRIP: NEGATIVE
LEUKOCYTE ESTERASE UR QL STRIP: ABNORMAL
LYMPHOCYTES # BLD AUTO: 2.2 K/UL (ref 1–4.8)
LYMPHOCYTES NFR BLD: 25.8 % (ref 18–48)
MAGNESIUM SERPL-MCNC: 1.9 MG/DL (ref 1.6–2.6)
MCH RBC QN AUTO: 28.3 PG (ref 27–31)
MCHC RBC AUTO-ENTMCNC: 31.7 G/DL (ref 32–36)
MCV RBC AUTO: 89 FL (ref 82–98)
MICROSCOPIC COMMENT: ABNORMAL
MONOCYTES # BLD AUTO: 0.7 K/UL (ref 0.3–1)
MONOCYTES NFR BLD: 7.7 % (ref 4–15)
NEUTROPHILS # BLD AUTO: 5.4 K/UL (ref 1.8–7.7)
NEUTROPHILS NFR BLD: 63.1 % (ref 38–73)
NITRITE UR QL STRIP: NEGATIVE
NRBC BLD-RTO: 0 /100 WBC
PH UR STRIP: 6 [PH] (ref 5–8)
PHOSPHATE SERPL-MCNC: 4.3 MG/DL (ref 2.7–4.5)
PLATELET # BLD AUTO: 262 K/UL (ref 150–450)
PMV BLD AUTO: 11 FL (ref 9.2–12.9)
POCT GLUCOSE: 100 MG/DL (ref 70–110)
POCT GLUCOSE: 141 MG/DL (ref 70–110)
POCT GLUCOSE: 146 MG/DL (ref 70–110)
POCT GLUCOSE: 92 MG/DL (ref 70–110)
POTASSIUM SERPL-SCNC: 4 MMOL/L (ref 3.5–5.1)
PROT SERPL-MCNC: 6.7 G/DL (ref 6–8.4)
PROT UR QL STRIP: ABNORMAL
RBC # BLD AUTO: 3.6 M/UL (ref 4.6–6.2)
RBC #/AREA URNS AUTO: 18 /HPF (ref 0–4)
SODIUM SERPL-SCNC: 135 MMOL/L (ref 136–145)
SP GR UR STRIP: 1.01 (ref 1–1.03)
SQUAMOUS #/AREA URNS AUTO: 2 /HPF
T PALLIDUM AB SER QL IF: REACTIVE
URN SPEC COLLECT METH UR: ABNORMAL
WBC # BLD AUTO: 8.54 K/UL (ref 3.9–12.7)
WBC #/AREA URNS AUTO: >100 /HPF (ref 0–5)
WBC CLUMPS UR QL AUTO: ABNORMAL

## 2025-03-05 PROCEDURE — 87088 URINE BACTERIA CULTURE: CPT

## 2025-03-05 PROCEDURE — 25000003 PHARM REV CODE 250

## 2025-03-05 PROCEDURE — 36415 COLL VENOUS BLD VENIPUNCTURE: CPT

## 2025-03-05 PROCEDURE — 83735 ASSAY OF MAGNESIUM: CPT

## 2025-03-05 PROCEDURE — 85025 COMPLETE CBC W/AUTO DIFF WBC: CPT

## 2025-03-05 PROCEDURE — 99232 SBSQ HOSP IP/OBS MODERATE 35: CPT | Mod: GC,,, | Performed by: STUDENT IN AN ORGANIZED HEALTH CARE EDUCATION/TRAINING PROGRAM

## 2025-03-05 PROCEDURE — G0545 PR VISIT INHERENT TO INPT OR OBS CARE, INFECTIOUS DISEASE: HCPCS | Mod: ,,, | Performed by: NURSE PRACTITIONER

## 2025-03-05 PROCEDURE — 80053 COMPREHEN METABOLIC PANEL: CPT

## 2025-03-05 PROCEDURE — 99233 SBSQ HOSP IP/OBS HIGH 50: CPT | Mod: ,,, | Performed by: NURSE PRACTITIONER

## 2025-03-05 PROCEDURE — 25000003 PHARM REV CODE 250: Performed by: INTERNAL MEDICINE

## 2025-03-05 PROCEDURE — 11000001 HC ACUTE MED/SURG PRIVATE ROOM

## 2025-03-05 PROCEDURE — 21400001 HC TELEMETRY ROOM

## 2025-03-05 PROCEDURE — 87086 URINE CULTURE/COLONY COUNT: CPT

## 2025-03-05 PROCEDURE — 87186 SC STD MICRODIL/AGAR DIL: CPT

## 2025-03-05 PROCEDURE — 84100 ASSAY OF PHOSPHORUS: CPT

## 2025-03-05 PROCEDURE — 81001 URINALYSIS AUTO W/SCOPE: CPT

## 2025-03-05 RX ORDER — OXYCODONE HYDROCHLORIDE 5 MG/1
5 TABLET ORAL EVERY 6 HOURS PRN
Refills: 0 | Status: DISCONTINUED | OUTPATIENT
Start: 2025-03-05 | End: 2025-03-12 | Stop reason: HOSPADM

## 2025-03-05 RX ORDER — OXYCODONE HYDROCHLORIDE 10 MG/1
10 TABLET ORAL EVERY 6 HOURS PRN
Refills: 0 | Status: DISCONTINUED | OUTPATIENT
Start: 2025-03-05 | End: 2025-03-12 | Stop reason: HOSPADM

## 2025-03-05 RX ORDER — HYDROMORPHONE HYDROCHLORIDE 1 MG/ML
0.5 INJECTION, SOLUTION INTRAMUSCULAR; INTRAVENOUS; SUBCUTANEOUS EVERY 6 HOURS PRN
Refills: 0 | Status: DISCONTINUED | OUTPATIENT
Start: 2025-03-05 | End: 2025-03-12 | Stop reason: HOSPADM

## 2025-03-05 RX ORDER — ACETAMINOPHEN 500 MG
1000 TABLET ORAL EVERY 6 HOURS PRN
Status: DISCONTINUED | OUTPATIENT
Start: 2025-03-05 | End: 2025-03-12 | Stop reason: HOSPADM

## 2025-03-05 RX ORDER — SODIUM CHLORIDE 9 MG/ML
INJECTION, SOLUTION INTRAVENOUS CONTINUOUS
Status: DISCONTINUED | OUTPATIENT
Start: 2025-03-05 | End: 2025-03-05

## 2025-03-05 RX ORDER — OLANZAPINE 10 MG/2ML
2.5 INJECTION, POWDER, FOR SOLUTION INTRAMUSCULAR EVERY 8 HOURS PRN
Status: DISCONTINUED | OUTPATIENT
Start: 2025-03-05 | End: 2025-03-12 | Stop reason: HOSPADM

## 2025-03-05 RX ORDER — SODIUM CHLORIDE 9 MG/ML
INJECTION, SOLUTION INTRAVENOUS CONTINUOUS
Status: ACTIVE | OUTPATIENT
Start: 2025-03-05 | End: 2025-03-05

## 2025-03-05 RX ADMIN — GABAPENTIN 300 MG: 300 CAPSULE ORAL at 08:03

## 2025-03-05 RX ADMIN — TAMSULOSIN HYDROCHLORIDE 0.8 MG: 0.4 CAPSULE ORAL at 08:03

## 2025-03-05 RX ADMIN — ATORVASTATIN CALCIUM 20 MG: 20 TABLET, FILM COATED ORAL at 08:03

## 2025-03-05 RX ADMIN — OXYCODONE HYDROCHLORIDE 10 MG: 10 TABLET ORAL at 03:03

## 2025-03-05 RX ADMIN — SODIUM CHLORIDE: 9 INJECTION, SOLUTION INTRAVENOUS at 11:03

## 2025-03-05 RX ADMIN — QUETIAPINE FUMARATE 100 MG: 25 TABLET ORAL at 08:03

## 2025-03-05 RX ADMIN — ASPIRIN 81 MG: 81 TABLET, COATED ORAL at 08:03

## 2025-03-05 NOTE — DISCHARGE INSTRUCTIONS
Please reach out to [facility] to schedule an appointment at [phone] if necessary

## 2025-03-05 NOTE — PLAN OF CARE
Problem: Bariatric Environmental Safety  Goal: Safety Maintained with Care  Outcome: Progressing     Problem: Skin Injury Risk Increased  Goal: Skin Health and Integrity  Outcome: Progressing     Problem: Diabetes Comorbidity  Goal: Blood Glucose Level Within Targeted Range  Outcome: Progressing     Problem: Fall Injury Risk  Goal: Absence of Fall and Fall-Related Injury  Outcome: Progressing             Pt cooperative with staff. Sitter at bedside. C/o pain earlier in shift prn pain medication given and effective. Pt tolerated wound care to ble and to rt buttocks. Urine collected. Bed in low and locked position. Plan of care ongoing.

## 2025-03-05 NOTE — ASSESSMENT & PLAN NOTE
Patient's FSGs are controlled on current medication regimen.  Last A1c reviewed-   Lab Results   Component Value Date    HGBA1C 6.4 (H) 02/26/2025     Most recent fingerstick glucose reviewed-   Recent Labs   Lab 03/04/25  1605 03/04/25  2042 03/05/25  0813   POCTGLUCOSE 168* 124* 100       Current correctional scale  Low  Maintain anti-hyperglycemic dose as follows-   Antihyperglycemics (From admission, onward)      Start     Stop Route Frequency Ordered    02/26/25 1715  insulin aspart U-100 pen 0-5 Units         -- SubQ Every 6 hours PRN 02/26/25 1615          Hold Oral hypoglycemics while patient is in the hospital.

## 2025-03-05 NOTE — ASSESSMENT & PLAN NOTE
Anemia is likely due to . Most recent hemoglobin and hematocrit are listed below.  Recent Labs     03/03/25  0532 03/04/25  0724 03/05/25  0326   HGB 12.6* 11.3* 10.2*   HCT 39.2* 35.3* 32.2*     Plan  - Monitor serial CBC:   - Transfuse PRBC if patient becomes hemodynamically unstable, symptomatic or H/H drops below 7/21.  - Patient   - Patient's anemia is currently

## 2025-03-05 NOTE — ASSESSMENT & PLAN NOTE
Patient with Acute on chronic debility due to other reduced mobility. The patient's latest AMPAC (Activity Measure for Post Acute Care) Score is listed below.    AM-PAC Score - How much help does the patient need for each activity listed  Basic Mobility Total Score: 6  Turning over in bed (including adjusting bedclothes, sheets and blankets)?: Unable  Sitting down on and standing up from a chair with arms (e.g., wheelchair, bedside commode, etc.): Unable  Moving from lying on back to sitting on the side of the bed?: Unable  Moving to and from a bed to a chair (including a wheelchair)?: Unable  Need to walk in hospital room?: Unable  Climbing 3-5 steps with a railing?: Unable    Plan  - PT/OT consulted

## 2025-03-05 NOTE — ASSESSMENT & PLAN NOTE
History of rapid decline over the last 9 months. Spent 2 months at [facility]. Staff there says patient has had difficulty adjusting.     - Physical therapy says that at this time, patient is functioning at their prior level of function and does not require further acute PT services.   - Occupational therapy: Patient is unable to continue work toward goals because of medical or psychosocial complications. and Therapist determines that the patient will no longer benefit from therapy services.   - will re consult considering patient's dramatic improvement in mentation

## 2025-03-05 NOTE — CONSULTS
VASCULAR ACCESS NOTE       Bed:648/648 A    20G x 1.75IN PIV placed in Left Forearm by OPHELIA using Ultrasound Guidance.    Indication: PVA  Attempts: 1    Sylvester Stewart RN

## 2025-03-05 NOTE — ASSESSMENT & PLAN NOTE
KEATON is likely due to pre-renal azotemia due to dehydration. Baseline creatinine is 1.5. Most recent creatinine and eGFR are listed below.  Recent Labs     03/03/25  0531 03/04/25  0724 03/05/25  0326   CREATININE 2.2* 2.1* 2.4*   EGFRNORACEVR 30.9* 32.6* 27.8*      Plan  - KEATON is improving  - Avoid nephrotoxins and renally dose meds for GFR listed above  - Monitor urine output, serial BMP, and adjust therapy as needed  - nephrology recommendations:      - KEATON secondary to urinary obstruction      - f/u outpatient nephrology and urology

## 2025-03-05 NOTE — ASSESSMENT & PLAN NOTE
History of rapid decline over the last 9 months. Spent 2 months at [facility]. Staff there says patient has had difficulty adjusting.     - Physical therapy says that at this time, patient is functioning at their prior level of function and does not require further acute PT services.   - Occupational therapy: Patient is unable to continue work toward goals because of medical or psychosocial complications. and Therapist determines that the patient will no longer benefit from therapy services.   - will re consult considering patient's dramatic improvement in mentation

## 2025-03-05 NOTE — ASSESSMENT & PLAN NOTE
Anemia is likely due to chronic disease due to Chronic Kidney Disease. Most recent hemoglobin and hematocrit are listed below.  Recent Labs     03/03/25  0532 03/04/25  0724 03/05/25  0326   HGB 12.6* 11.3* 10.2*   HCT 39.2* 35.3* 32.2*     Plan  - Monitor serial CBC: Daily  - Transfuse PRBC if patient becomes hemodynamically unstable, symptomatic or H/H drops below 7/21.  - Patient has not received any PRBC transfusions to date  - Patient's anemia is currently stable

## 2025-03-05 NOTE — ASSESSMENT & PLAN NOTE
- FTA and GEO pending  - lumbar puncture with anesthesia today  - CSF for VDRL, cell count, glucose, and protein  -ESR , CRP elevated.

## 2025-03-05 NOTE — ASSESSMENT & PLAN NOTE
KEATON is likely due to pre-renal azotemia due to dehydration. Baseline creatinine is 1.5. Most recent creatinine and eGFR are listed below.  Recent Labs     03/03/25  0531 03/04/25  0724 03/05/25  0326   CREATININE 2.2* 2.1* 2.4*   EGFRNORACEVR 30.9* 32.6* 27.8*      Plan  - KEATON is improving  - Avoid nephrotoxins and renally dose meds for GFR listed above  - Monitor urine output, serial BMP, and adjust therapy as needed  - nephrology recommendations:      - KEATON secondary to urinary obstruction mild elevation in creatinine, received fluids today     - f/u outpatient nephrology and urology

## 2025-03-05 NOTE — PROGRESS NOTES
[facility] Medicine  Progress Note    Patient Name: Riaz Guerra Jr.  MRN: [id number]  Patient Class: IP- Inpatient   Admission Date: 2/26/2025  Length of Stay: 7 days  Attending Physician: Alexandre Crespo MD  Primary Care Provider: Berhane Alvarez MD        Subjective     Principal Problem:Uremic encephalopathy        HPI:  Riaz Guerra is a 75-year-old man with a medical history of HTN, HLD, insulin dependent DM, CKD, bilateral inguinal hernias, chronic venous insufficiency, obesity, chronic hip/back pain, and wheelchair dependent. He presented to the ED from [facility] ([facility]) due to altered mental status, labored breathing, and refusing medications. Per phone call to [facility], the patient had normal mentation prior to this admission, but had difficulty adjusting to the facility.     At the ED the patient was A/O x2 to person, and time. Reported pain in bilateral shoulders and hip, feeling cold and thirsty. Discoloration was noted on both lower limbs suggestive of venous stasis. Cole catheter was placed due to greater than 400ml urine seen on bladder scan. Labs were significant for KEATON: Cr 4.5 from base line of 1.3. Potassium 7.0, , CO2 15. CT abdomen pelvis negative for hydronephrosis but noted for bilateral basilar atelectasis and 2mm pulmonary nodule along the fissure on the right as well as tree in bud type nodules along the medial aspect of the left lower lobe concerning for possible developing infectious or inflammatory process. CT head negative for acute intracranial changes.    Patient's sister has been contacted about the patient's location and new condition. Per his sister, patient has not been able to ambulate for the last 9 months and has been at the SNF facility for the last two months.           Overview/Hospital Course:  Patinet admitted with MAS, and hyperkalemia on presentation. Potassium was shifted and was given lokelma and calcium gluconate.  Nephrology was consulted and recommendations followed, sodium bicarb, lasix + diuril, and lokelma TID. Hyperkalemia resolved. Patient's mentation waxing and waning. Psychiatry consulted. 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  PT/OT not recommending further therapy. Patient does not like care at [facility] and does not wish to return there. LP possibly today (3/5), elevated ESR, CRP. RF normal.    Interval History:  LP possibly later today (3/5) case request placed with anesthesia, elevated ESR, CRP. RF normal. GEO, FTA pending. Patient feels better today. Mentation improved. Creatinine was slightly elevated, received fluids.    Review of Systems  Objective:     Vital Signs (Most Recent):  Temp: 97.8 °F (36.6 °C) (03/05/25 0816)  Pulse: 77 (03/05/25 0816)  Resp: 18 (03/05/25 0816)  BP: 138/82 (03/05/25 0816)  SpO2: 98 % (03/05/25 0816) Vital Signs (24h Range):  Temp:  [97.5 °F (36.4 °C)-97.9 °F (36.6 °C)] 97.8 °F (36.6 °C)  Pulse:  [77-94] 77  Resp:  [16-20] 18  SpO2:  [97 %-100 %] 98 %  BP: ()/(58-82) 138/82     Weight: (!) 139.7 kg (308 lb)  Body mass index is 42.96 kg/m².    Intake/Output Summary (Last 24 hours) at 3/5/2025 1136  Last data filed at 3/4/2025 2149  Gross per 24 hour   Intake 822 ml   Output 300 ml   Net 522 ml      Assessment & Plan  Uremic encephalopathy  See acute renal failure     Type 2 diabetes mellitus with chronic kidney disease, without long-term current use of insulin  Patient's FSGs are controlled on current medication regimen.  Last A1c reviewed-   Lab Results   Component Value Date    HGBA1C 6.4 (H) 02/26/2025     Most recent fingerstick glucose reviewed-   Recent Labs   Lab 03/04/25  1605 03/04/25  2042 03/05/25  0813   POCTGLUCOSE 168* 124* 100       Current correctional scale  Low  Maintain anti-hyperglycemic dose as follows-   Antihyperglycemics (From admission, onward)      Start     Stop Route Frequency Ordered    02/26/25 5526  insulin aspart U-100 pen 0-5  Units         -- SubQ Every 6 hours PRN 02/26/25 1615          Hold Oral hypoglycemics while patient is in the hospital.  Chronic pain syndrome  Continue gabapentin  Acute renal failure superimposed on stage 3a chronic kidney disease  KEATON is likely due to pre-renal azotemia due to dehydration. Baseline creatinine is  1.5 . Most recent creatinine and eGFR are listed below.  Recent Labs     03/03/25  0531 03/04/25  0724 03/05/25  0326   CREATININE 2.2* 2.1* 2.4*   EGFRNORACEVR 30.9* 32.6* 27.8*      Plan  - KEATON is improving  - Avoid nephrotoxins and renally dose meds for GFR listed above  - Monitor urine output, serial BMP, and adjust therapy as needed  - nephrology recommendations:      - KEATON secondary to urinary obstruction      - f/u outpatient nephrology and urology   Anemia due to stage 3 chronic kidney disease  Anemia is likely due to chronic disease due to Chronic Kidney Disease. Most recent hemoglobin and hematocrit are listed below.  Recent Labs     03/03/25  0532 03/04/25  0724 03/05/25  0326   HGB 12.6* 11.3* 10.2*   HCT 39.2* 35.3* 32.2*     Plan  - Monitor serial CBC: Daily  - Transfuse PRBC if patient becomes hemodynamically unstable, symptomatic or H/H drops below 7/21.  - Patient has not received any PRBC transfusions to date  - Patient's anemia is currently stable    Metabolic acidosis, normal anion gap (NAG)  - see renal failure and hyperkalemia    Venous stasis of both lower extremities  - sequential compression device  - wound care for ulcerated leg wounds  - consult PT/OT when encephalopathy resolves  Urine retention  - david placed  - strict input and out  - will monitor output and BMP following fluid challenge    Chronic wounds; BLE  - wound care consulted     Impaired mobility and activities of daily living  History of rapid decline over the last 9 months. Spent 2 months at [facility]. Staff there says patient has had difficulty adjusting.     - Physical therapy says that  at this time, patient is functioning at their prior level of function and does not require further acute PT services.   - Occupational therapy: Patient is unable to continue work toward goals because of medical or psychosocial complications. and Therapist determines that the patient will no longer benefit from therapy services.   - will re consult considering patient's dramatic improvement in mentation   Debility  Patient with Acute on chronic debility due to other reduced mobility. The patient's latest AMPAC (Activity Measure for Post Acute Care) Score is listed below.    AM-PAC Score - How much help does the patient need for each activity listed  Basic Mobility Total Score: 6  Turning over in bed (including adjusting bedclothes, sheets and blankets)?: Unable  Sitting down on and standing up from a chair with arms (e.g., wheelchair, bedside commode, etc.): Unable  Moving from lying on back to sitting on the side of the bed?: Unable  Moving to and from a bed to a chair (including a wheelchair)?: Unable  Need to walk in hospital room?: Unable  Climbing 3-5 steps with a railing?: Unable    Plan  - PT/OT consulted          Right hip pain  Continue gabapentin and PRN oxycodone and dilaudid    Severe obesity (BMI >= 40)  Body mass index is 42.96 kg/m². Morbid obesity complicates all aspects of disease management from diagnostic modalities to treatment. Weight loss encouraged and health benefits explained to patient.       Mixed hyperlipidemia      PAD (peripheral artery disease)      Mood disorder  Patient has persistent depression which is unknown and is currently uncontrolled. Will Begin anti-depressant medications. We will not consult psychiatry at this time. Patient does not display psychosis at this time. Continue to monitor closely and adjust plan of care as needed.    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   Constitutional:       General: He is not in acute distress. Obese   HENT:      Head: Normocephalic.      Mouth/Throat:      Mouth: Mucous membranes are moist.   Eyes:      Pupils: Pupils are equal, round, and reactive to light.   Cardiovascular:      Rate and Rhythm: Regular rhythm.      Heart sounds: Normal heart sounds.   Pulmonary:      Breath sounds: Normal breath sounds.   Abdominal:      General: Abdomen is flat.   Musculoskeletal:         General: No swelling.      Comments: Severe venous stasis dermatitis   Skin:     General: Skin is warm.   Neurological:      Mental Status: He is alert and oriented to person, place, and time. Mental status is at baseline.   Psychiatric:         Mood and Affect: Mood normal.        Significant Labs: All pertinent labs within the past 24 hours have been reviewed.    Significant Imaging: I have reviewed all pertinent imaging results/findings within the past 24 hours.    Assessment and Plan     Assessment & Plan  Uremic encephalopathy  See acute renal failure     Type 2 diabetes mellitus with chronic kidney disease, without long-term current use of insulin  Patient's FSGs are controlled on current medication regimen.  Last A1c reviewed-   Lab Results   Component Value Date    HGBA1C 6.4 (H) 02/26/2025     Most recent fingerstick glucose reviewed-   Recent Labs   Lab 03/04/25  1605 03/04/25  2042 03/05/25  0813   POCTGLUCOSE 168* 124* 100       Current correctional scale  Low  Maintain anti-hyperglycemic dose as follows-   Antihyperglycemics (From admission, onward)      Start     Stop Route Frequency Ordered    02/26/25 1715  insulin aspart U-100 pen 0-5 Units         -- SubQ Every 6 hours PRN 02/26/25 1615          Hold Oral hypoglycemics while patient is in the hospital.  Chronic pain syndrome  Continue  gabapentin  Acute renal failure superimposed on stage 3a chronic kidney disease  KEATON is likely due to pre-renal azotemia due to dehydration. Baseline creatinine is  1.5 . Most recent creatinine and eGFR are listed below.  Recent Labs     03/03/25  0531 03/04/25  0724 03/05/25  0326   CREATININE 2.2* 2.1* 2.4*   EGFRNORACEVR 30.9* 32.6* 27.8*      Plan  - KEATON is improving  - Avoid nephrotoxins and renally dose meds for GFR listed above  - Monitor urine output, serial BMP, and adjust therapy as needed  - nephrology recommendations:      - KEATON secondary to urinary obstruction mild elevation in creatinine, received fluids today     - f/u outpatient nephrology and urology   Anemia due to stage 3 chronic kidney disease  Anemia is likely due to . Most recent hemoglobin and hematocrit are listed below.  Recent Labs     03/03/25  0532 03/04/25  0724 03/05/25  0326   HGB 12.6* 11.3* 10.2*   HCT 39.2* 35.3* 32.2*     Plan  - Monitor serial CBC:   - Transfuse PRBC if patient becomes hemodynamically unstable, symptomatic or H/H drops below 7/21.  - Patient   - Patient's anemia is currently     Metabolic acidosis, normal anion gap (NAG)  - see renal failure and hyperkalemia    Venous stasis of both lower extremities  - sequential compression device  - wound care for ulcerated leg wounds  - consult PT/OT when encephalopathy resolves  Urine retention  - david placed  - strict input and out  - will monitor output and BMP following fluid challenge    Chronic wounds; BLE  - wound care consulted     Impaired mobility and activities of daily living  History of rapid decline over the last 9 months. Spent 2 months at [facility]. Staff there says patient has had difficulty adjusting.     - Physical therapy says that at this time, patient is functioning at their prior level of function and does not require further acute PT services.   - Occupational therapy: Patient is unable to continue work toward goals because of  medical or psychosocial complications. and Therapist determines that the patient will no longer benefit from therapy services.   - will re consult considering patient's dramatic improvement in mentation   Debility  Patient with  debility due to . The patient's latest AMPAC (Activity Measure for Post Acute Care) Score is listed below.    AM-PAC Score - How much help does the patient need for each activity listed  Basic Mobility Total Score: 6  Turning over in bed (including adjusting bedclothes, sheets and blankets)?: Unable  Sitting down on and standing up from a chair with arms (e.g., wheelchair, bedside commode, etc.): Unable  Moving from lying on back to sitting on the side of the bed?: Unable  Moving to and from a bed to a chair (including a wheelchair)?: Unable  Need to walk in hospital room?: Unable  Climbing 3-5 steps with a railing?: Unable    Plan    - PT/OT, will reassess needs before discharge        Right hip pain  Continue gabapentin and PRN oxycodone and dilaudid    Severe obesity (BMI >= 40)  Body mass index is 42.96 kg/m². Morbid obesity complicates all aspects of disease management from diagnostic modalities to treatment. Weight loss encouraged and health benefits explained to patient.       Mixed hyperlipidemia      PAD (peripheral artery disease)      Mood disorder  Patient has persistent depression which is unknown and is currently uncontrolled. Will Begin anti-depressant medications. We will not consult psychiatry at this time. Patient does not display psychosis at this time. Continue to monitor closely and adjust plan of care as needed.    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   ESR raised      CRP elevated      VTE Risk Mitigation (From admission, onward)           Ordered     IP VTE HIGH  RISK PATIENT  Once         02/26/25 1159     Place sequential compression device  Until discontinued         02/26/25 1159                    Discharge Planning   KELSEY: 3/7/2025     Code Status: Full Code   Medical Readiness for Discharge Date:   Discharge Plan A: Return to nursing home   Discharge Delays: None known at this time                    Leny Lewis MD  Department of Hospital Medicine   [facility]

## 2025-03-05 NOTE — ASSESSMENT & PLAN NOTE
Patient with  debility due to . The patient's latest AMPAC (Activity Measure for Post Acute Care) Score is listed below.    AM-PAC Score - How much help does the patient need for each activity listed  Basic Mobility Total Score: 6  Turning over in bed (including adjusting bedclothes, sheets and blankets)?: Unable  Sitting down on and standing up from a chair with arms (e.g., wheelchair, bedside commode, etc.): Unable  Moving from lying on back to sitting on the side of the bed?: Unable  Moving to and from a bed to a chair (including a wheelchair)?: Unable  Need to walk in hospital room?: Unable  Climbing 3-5 steps with a railing?: Unable    Plan    - PT/OT, will reassess needs before discharge

## 2025-03-05 NOTE — CONSULTS
Interventional Radiology  Consult/History & Physical Note    Consult Requested By: Gabriel Tate MD  Reason for Consult: Lumbar Puncture     SUBJECTIVE:     Chief Complaint:  Encephalopathy    History of Present Illness:  Riaz Guerra Jr. is a 75 y.o. male with a PMHx of HTN, HLD, insulin dependent DM, CKD, bilateral inguinal hernias, chronic venous insufficiency, obesity, and chronic hip/back pain who was admitted on 2/26 for encephalopathy and AMS. Interventional Radiology has been consulted for fluoroscopic lumbar puncture. Pt is afebrile and hemodynamically stable. He denies a history of DEA requiring nightly CPAP or difficulty breathing when lying flat. He does take 81mg aspirin daily. He has been NPO since midnight 3/5.    Review of Systems   Constitutional:  Negative for fever and malaise/fatigue.   HENT:  Negative for ear pain and hearing loss.    Eyes:  Negative for photophobia.   Respiratory:  Negative for shortness of breath.    Cardiovascular:  Negative for chest pain and palpitations.   Gastrointestinal:  Negative for abdominal pain.   Musculoskeletal:  Positive for joint pain (R hip).   Neurological:  Negative for weakness and headaches.   Psychiatric/Behavioral:  Negative for hallucinations and suicidal ideas.        Scheduled Meds:   aspirin  81 mg Oral Daily    atorvastatin  20 mg Oral Daily    gabapentin  300 mg Oral QHS    QUEtiapine  100 mg Oral QHS    tamsulosin  0.8 mg Oral QHS     Continuous Infusions:   0.9% NaCl   Intravenous Continuous         PRN Meds:  Current Facility-Administered Medications:     acetaminophen, 1,000 mg, Oral, Q6H PRN    dextrose 50%, 12.5 g, Intravenous, PRN    dextrose 50%, 25 g, Intravenous, PRN    glucagon (human recombinant), 1 mg, Intramuscular, PRN    glucose, 16 g, Oral, PRN    glucose, 24 g, Oral, PRN    HYDROmorphone, 0.5 mg, Intravenous, Q6H PRN    insulin aspart U-100, 0-5 Units, Subcutaneous, Q6H PRN    melatonin, 6 mg, Oral, Nightly PRN     metoprolol, 5 mg, Intravenous, PRN    naloxone, 0.02 mg, Intravenous, PRN    OLANZapine, 2.5 mg, Intramuscular, PRN    oxyCODONE, 5 mg, Oral, Q6H PRN    oxyCODONE, 10 mg, Oral, Q6H PRN    sodium chloride 0.9%, 10 mL, Intravenous, Q12H PRN    Review of patient's allergies indicates:   Allergen Reactions    Lisinopril Other (See Comments)     cough       Past Medical History:   Diagnosis Date    Depression     Diabetes mellitus     Gout     High cholesterol     Hypertension     PAD (peripheral artery disease) 03/01/2025 2-2025 An arterial duplex suggested moderate stenosis in his right tibial vessels.   Vascular recommend medical therapy.       History reviewed. No pertinent surgical history.  Family History   Problem Relation Name Age of Onset    Heart disease Mother      Diabetes Mother      Diabetes Father       Social History[1]    OBJECTIVE:     Vital Signs (Most Recent)  Temp: 97.8 °F (36.6 °C) (03/05/25 0816)  Pulse: 77 (03/05/25 0816)  Resp: 18 (03/05/25 0816)  BP: 138/82 (03/05/25 0816)  SpO2: 98 % (03/05/25 0816)    Physical Exam:  Physical Exam  Vitals and nursing note reviewed.   Constitutional:       General: He is not in acute distress.  HENT:      Head: Normocephalic and atraumatic.      Nose: Nose normal.      Mouth/Throat:      Mouth: Mucous membranes are dry.   Eyes:      Conjunctiva/sclera: Conjunctivae normal.   Cardiovascular:      Rate and Rhythm: Normal rate.   Pulmonary:      Effort: Pulmonary effort is normal. No respiratory distress.   Musculoskeletal:         General: No signs of injury.   Skin:     General: Skin is warm and dry.   Neurological:      Mental Status: He is alert.      Comments: A&O to person and place   Psychiatric:         Behavior: Behavior is agitated.         Laboratory  I have reviewed all pertinent lab results within the past 24 hours.  CBC:   Recent Labs   Lab 03/05/25  0326   WBC 8.54   RBC 3.60*   HGB 10.2*   HCT 32.2*      MCV 89   MCH 28.3   MCHC 31.7*      CMP:   Recent Labs   Lab 03/05/25  0326   GLU 89   CALCIUM 8.7   ALBUMIN 2.4*   PROT 6.7   *   K 4.0   CO2 22*      BUN 50*   CREATININE 2.4*   ALKPHOS 77   ALT 7*   AST 12   BILITOT 0.4       ASA/Mallampati  ASA: per anesthesia   Mallampati: per anesthesia    Imaging:  Recent imaging studies including  CT abdomen pelvis  on 2/26 which was independently reviewed by myself.     ASSESSMENT/PLAN:     Assessment:  75 y.o. male with a PMHx of HTN, HLD, insulin dependent DM, CKD, bilateral inguinal hernias, chronic venous insufficiency, obesity, and chronic hip/back pain who has been referred to IR for fluoroscopic lumbar punctuer under anesthesia. The procedure was discussed in great detail with the patient including thorough explanations of the potential risks and benefits. Risks include but are not limited to infection, hemorrhage, spinal headache, damage to surrounding structures and need for additional procedures. The patient is a candidate for fluoroscopic lumbar puncture  under anesthesia . Plan discussed with ordering physician and pt who verbalized understanding of the plan and would like to proceed. Pt is currently PEC'd, will contact family member for consent.      Plan:  Will proceed with fluoroscopic lumbar puncture under anesthesiaon either 3/5 or 3/6 pending on anesthesia scheduling.   Please keep pt currently NPO pending procedure this afternoon, or start NPO at midnight tonight pending procedure tomorrow..   Anticoagulation history reviewed. No need to hold aspirin.   Coagulation labs reviewed.Plt count 262 on 3/5.  Thank you for the consult.    Time spent during patient care today was 75-89 minutes. This includes time spent before the visit reviewing the chart, discussing case with staff physician and ordering provider, time spent during the face to face patient visit, and time spent after the visit on documentation. Time excludes procedure time.     Johny Deleon PA-C  Interventional  Radiology         [1]   Social History  Tobacco Use    Smoking status: Never    Smokeless tobacco: Never   Substance Use Topics    Alcohol use: Not Currently     Comment: occasionally    Drug use: Yes     Types: Marijuana

## 2025-03-05 NOTE — ASSESSMENT & PLAN NOTE
Body mass index is 42.96 kg/m². Morbid obesity complicates all aspects of disease management from diagnostic modalities to treatment. Weight loss encouraged and health benefits explained to patient.

## 2025-03-05 NOTE — SUBJECTIVE & OBJECTIVE
Interval History:  LP possibly later today (3/5) case request placed with anesthesia, elevated ESR, CRP. RF normal. GEO, FTA pending. Patient feels better today. Mentation improved. Creatinine was slightly elevated, received fluids.    Review of Systems  Objective:     Vital Signs (Most Recent):  Temp: 97.8 °F (36.6 °C) (03/05/25 0816)  Pulse: 77 (03/05/25 0816)  Resp: 18 (03/05/25 0816)  BP: 138/82 (03/05/25 0816)  SpO2: 98 % (03/05/25 0816) Vital Signs (24h Range):  Temp:  [97.5 °F (36.4 °C)-97.9 °F (36.6 °C)] 97.8 °F (36.6 °C)  Pulse:  [77-94] 77  Resp:  [16-20] 18  SpO2:  [97 %-100 %] 98 %  BP: ()/(58-82) 138/82     Weight: (!) 139.7 kg (308 lb)  Body mass index is 42.96 kg/m².    Intake/Output Summary (Last 24 hours) at 3/5/2025 1136  Last data filed at 3/4/2025 2149  Gross per 24 hour   Intake 822 ml   Output 300 ml   Net 522 ml      Assessment & Plan  Uremic encephalopathy  See acute renal failure     Type 2 diabetes mellitus with chronic kidney disease, without long-term current use of insulin  Patient's FSGs are controlled on current medication regimen.  Last A1c reviewed-   Lab Results   Component Value Date    HGBA1C 6.4 (H) 02/26/2025     Most recent fingerstick glucose reviewed-   Recent Labs   Lab 03/04/25  1605 03/04/25  2042 03/05/25  0813   POCTGLUCOSE 168* 124* 100       Current correctional scale  Low  Maintain anti-hyperglycemic dose as follows-   Antihyperglycemics (From admission, onward)      Start     Stop Route Frequency Ordered    02/26/25 1715  insulin aspart U-100 pen 0-5 Units         -- SubQ Every 6 hours PRN 02/26/25 1615          Hold Oral hypoglycemics while patient is in the hospital.  Chronic pain syndrome  Continue gabapentin  Acute renal failure superimposed on stage 3a chronic kidney disease  KEATON is likely due to pre-renal azotemia due to dehydration. Baseline creatinine is  1.5 . Most recent creatinine and eGFR are listed below.  Recent Labs     03/03/25  0531  03/04/25  0724 03/05/25  0326   CREATININE 2.2* 2.1* 2.4*   EGFRNORACEVR 30.9* 32.6* 27.8*      Plan  - KEATON is improving  - Avoid nephrotoxins and renally dose meds for GFR listed above  - Monitor urine output, serial BMP, and adjust therapy as needed  - nephrology recommendations:      - KEATON secondary to urinary obstruction      - f/u outpatient nephrology and urology   Anemia due to stage 3 chronic kidney disease  Anemia is likely due to chronic disease due to Chronic Kidney Disease. Most recent hemoglobin and hematocrit are listed below.  Recent Labs     03/03/25  0532 03/04/25  0724 03/05/25  0326   HGB 12.6* 11.3* 10.2*   HCT 39.2* 35.3* 32.2*     Plan  - Monitor serial CBC: Daily  - Transfuse PRBC if patient becomes hemodynamically unstable, symptomatic or H/H drops below 7/21.  - Patient has not received any PRBC transfusions to date  - Patient's anemia is currently stable    Metabolic acidosis, normal anion gap (NAG)  - see renal failure and hyperkalemia    Venous stasis of both lower extremities  - sequential compression device  - wound care for ulcerated leg wounds  - consult PT/OT when encephalopathy resolves  Urine retention  - david placed  - strict input and out  - will monitor output and BMP following fluid challenge    Chronic wounds; BLE  - wound care consulted     Impaired mobility and activities of daily living  History of rapid decline over the last 9 months. Spent 2 months at [facility]. Staff there says patient has had difficulty adjusting.     - Physical therapy says that at this time, patient is functioning at their prior level of function and does not require further acute PT services.   - Occupational therapy: Patient is unable to continue work toward goals because of medical or psychosocial complications. and Therapist determines that the patient will no longer benefit from therapy services.   - will re consult considering patient's dramatic improvement in mentation    Debility  Patient with Acute on chronic debility due to other reduced mobility. The patient's latest AMPAC (Activity Measure for Post Acute Care) Score is listed below.    AM-PAC Score - How much help does the patient need for each activity listed  Basic Mobility Total Score: 6  Turning over in bed (including adjusting bedclothes, sheets and blankets)?: Unable  Sitting down on and standing up from a chair with arms (e.g., wheelchair, bedside commode, etc.): Unable  Moving from lying on back to sitting on the side of the bed?: Unable  Moving to and from a bed to a chair (including a wheelchair)?: Unable  Need to walk in hospital room?: Unable  Climbing 3-5 steps with a railing?: Unable    Plan  - PT/OT consulted      {Tip - This text will disappear - AMPAC Score is a measure of mobility, each answer listed above gives a numerical score which is added up to give their total score the scoring is as follows - Unable (1), A Lot (2), A Little (3), None (4) - Max Score is 24 and Min Score is 6:[id number]}    Right hip pain  Continue gabapentin and PRN oxycodone and dilaudid    Severe obesity (BMI >= 40)  Body mass index is 42.96 kg/m². Morbid obesity complicates all aspects of disease management from diagnostic modalities to treatment. Weight loss encouraged and health benefits explained to patient.       Mixed hyperlipidemia      PAD (peripheral artery disease)      Mood disorder  Patient has persistent depression which is unknown and is currently uncontrolled. Will Begin anti-depressant medications. We will not consult psychiatry at this time. Patient does not display psychosis at this time. Continue to monitor closely and adjust plan of care as needed.    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   Constitutional:       General: He is not in acute distress. Obese   HENT:      Head: Normocephalic.      Mouth/Throat:      Mouth: Mucous membranes are moist.   Eyes:      Pupils: Pupils are equal, round, and reactive to light.   Cardiovascular:      Rate and Rhythm: Regular rhythm.      Heart sounds: Normal heart sounds.   Pulmonary:      Breath sounds: Normal breath sounds.   Abdominal:      General: Abdomen is flat.   Musculoskeletal:         General: No swelling.      Comments: Severe venous stasis dermatitis   Skin:     General: Skin is warm.   Neurological:      Mental Status: He is alert and oriented to person, place, and time. Mental status is at baseline.   Psychiatric:         Mood and Affect: Mood normal.        Significant Labs: All pertinent labs within the past 24 hours have been reviewed.    Significant Imaging: I have reviewed all pertinent imaging results/findings within the past 24 hours.

## 2025-03-05 NOTE — PROGRESS NOTES
"CONSULTATION LIAISON PSYCHIATRY PROGRESS NOTE    Patient Name: Riaz Guerra Jr.  MRN: [id number]  Patient Class: IP- Inpatient  Admission Date: 2/26/2025  Attending Physician: Alexandre Crespo MD      SUBJECTIVE:   Riaz Guerra Jr. is a 75 y.o. male unknown past psych hx and a medical history of HTN, HLD, insulin dependent DM, CKD, bilateral inguinal hernias, chronic venous insufficiency, obesity, chronic hip/back pain, and wheelchair dependent who presented to the ED from [facility] ([facility]) due to altered mental status, labored breathing, and refusing medications. Admitted to the hospital for uremic encephalopathy. Found to have reactive Treponemal IgG, IgM w/ RPR pending. Concern for neurosyphilis, patient refusing LP.      Psychiatry consulted for "Patient appears to not have the capacity to make any decision. He's hallucinating. Syphilis came back positive as well. "    Interval History: Wellbutrin 300 mg discontinued and Seroquel 100 mg nightly started by primary team. Documented behavioral disturbances overnight. PRN Zyprexa 2.5 mg given 3/3 and 3/4 for non-redirectable agitation.     Today, patient is calm and cooperative w/ improved mentation. Oriented to person, place, month, year, and president. States he has been doing good since he has been able to get sleep. He says he hasn't eaten anything because he has a procedure coming up. Unable to describe the procedure to me but states it is required so his team can "figure out what's going on". He admits to being anxious about the procedure. States he feels like himself and does not feel like he is confused.        OBJECTIVE:    Mental Status Exam:  General Appearance: appears stated age, well developed and nourished, adequately groomed and appropriately dressed, in no acute distress  Behavior: cooperative, under good behavioral control  Involuntary Movements and Motor Activity: no abnormal involuntary movements noted; no tics, no " "tremors, no akathisia, no dystonia, no evidence of tardive dyskinesia; no psychomotor agitation or retardation  Gait and Station: unable to assess - patient lying down or seated  Speech and Language: intact; normal rate, rhythm, volume, tone, and pitch; conversational, spontaneous, and coherent; speaks and understands English proficiently and fluently; repeats words and phrases, no word finding difficulties are noted  Mood: "okay/ anxious"  Affect: euthymic  Thought Process and Associations: linear, no loosening of associations  Perceptual Disturbances: denies hallucinations  Thought Content and Perceptions:: no suicidal or homicidal ideation, no auditory or visual hallucinations, no paranoid ideation, no ideas of reference, no evidence of delusions or psychosis  Sensorium and Orientation: oriented fully (to person, place, and time)  Recent and Remote Memory: impaired  Attention and Concentration: attentive to conversation  Fund of Knowledge: grossly intact  Insight: impaired due to AMS  Judgment: impaired due to AMS    CAM ICU positive? Not assessed       ASSESSMENT & RECOMMENDATIONS   Delirium due to unspecified medical condition (F05)    CAPACITY EVALUATION     Definition of Capacity for a given decision requires that the patient:  Understands the proposed treatment and/or the proposed options for his care.  Appreciates his own medical situation and can abstract how the treatments/proposed options for care apply to his medical situation.  Understands the consequences of his medical decisions in a reasonable and factual manner supported by the facts and his own values in a consistent fashion.  Demonstrates the ability to communicate a choice clearly and consistently.    Capacity for:  Refusing LP associated with Neurosyphilis work up    Assessment of capacity for the above reason for Riaz Guerra Jr.  :  This patient understands the clinical condition relation to the evaluation:  No.  This patient " understands the nature of the proposed assessments and/or treatments:  No.  This patient understands the risks and benefits of the proposed assessments and/or treatments:  No.  This patient understands the risks and benefits of refusing the proposed assessments and/or treatments:  No.  This patient is in agreement with the assessment and has provided fluctuating responses in regard to consenting to treatment.  This patient has evidence of impaired insight and/or judgement:  Yes.    Based upon my evaluation of Riaz Guerra  regarding his decision-making capacity for LP (life sustaining treatment decisions/refusing safe discharge/refusing proposed plan of care), I found with reasonable certainty that he does not have capacity to make medical decisions on his behalf at the time of this evaluation.  (Please contact next of kin to make medical decisions on pts behalf if pt does not have capacity)      Scheduled Medication(s):  Okay to continue Seroquel 100 mg nightly for insomnia. However please discontinue prior to discharge if patient was not on this medication prior to arrival.      PRN Medication(s):  Okay to continue Zyprexa 2.5 mg PO/ IM prn for non-redirectable agitation       Other Recommendations (labs, imaging, further consults, etc.):  Obtain ECG to monitor Qtc w/ antipsychotics; last Qtc 423 2/26   Continue neurosyphilis work up. Primary pursuing LP w/ IR 3/5 or 3/6       Delirium Behavior Management  PLEASE utilize PRN meds first for agitation. Minimize use of PHYSICAL restraints OR have periods of being out of physical restraints if possible.  Keep window shades open and room lit during day and room dim at night in order to promote normal sleep-wake cycles  Encourage family at bedside. [place] patient often to situation, location, date.  Continue to Limit or Discontinue use of Narcotics, Benzos and Anti-cholinergic medications as they may worsen delirium.  Continue medical workup for causative  etiology of Delirium.     Risk Assessment / Legal Status  Patient does not meet criteria for PEC or involuntary inpatient psychiatric admission at this time. Recommend to rescind PEC if one was placed. Patient is not currently an imminent danger to self or others and is not gravely disabled due to a psychiatric illness. Can PEC if patient tries to leave AMA    Follow-up:  Will sign off. Final recommendations as stated above. Patient can follow-up with outpatient psychiatry. If the patient does not have an outpatient psychiatrist, resources for outpatient clinics will be provided in patient's discharge instructions.     Disposition:   Defer to primary team.    Please contact ON CALL psychiatry service (24/7) for any acute issues that may arise.    Dr. Moisés MENCHACA Psychiatry  [facility]  3/5/2025 9:36 AM        --------------------------------------------------------------------------------------------------------------------------------------------------------------------------------------------------------------------------------------    CONTINUED OBJECTIVE clinical data & findings reviewed and noted for above decision making    Current Medications:   Scheduled Meds:    aspirin  81 mg Oral Daily    atorvastatin  20 mg Oral Daily    gabapentin  300 mg Oral QHS    QUEtiapine  100 mg Oral QHS    tamsulosin  0.8 mg Oral QHS     PRN Meds:   Current Facility-Administered Medications:     acetaminophen, 1,000 mg, Oral, Q6H PRN    dextrose 50%, 12.5 g, Intravenous, PRN    dextrose 50%, 25 g, Intravenous, PRN    glucagon (human recombinant), 1 mg, Intramuscular, PRN    glucose, 16 g, Oral, PRN    glucose, 24 g, Oral, PRN    HYDROmorphone, 0.5 mg, Intravenous, Q6H PRN    insulin aspart U-100, 0-5 Units, Subcutaneous, Q6H PRN    melatonin, 6 mg, Oral, Nightly PRN    metoprolol, 5 mg, Intravenous, PRN    naloxone, 0.02 mg, Intravenous, PRN    OLANZapine, 2.5 mg, Intramuscular, PRN    oxyCODONE, 5 mg,  Oral, Q6H PRN    oxyCODONE, 10 mg, Oral, Q6H PRN    sodium chloride 0.9%, 10 mL, Intravenous, Q12H PRN    Allergies:   Review of patient's allergies indicates:   Allergen Reactions    Lisinopril Other (See Comments)     cough       Vitals  Vitals:    03/05/25 0816   BP: 138/82   Pulse: 77   Resp: 18   Temp: 97.8 °F (36.6 °C)       Labs/Imaging/Studies:  Recent Results (from the past 24 hours)   C-Reactive Protein    Collection Time: 03/04/25  3:22 PM   Result Value Ref Range    .9 (H) 0.0 - 8.2 mg/L   Sedimentation rate    Collection Time: 03/04/25  3:22 PM   Result Value Ref Range    Sed Rate 109 (H) 0 - 23 mm/Hr   Rheumatoid factor    Collection Time: 03/04/25  3:22 PM   Result Value Ref Range    Rheumatoid Factor <13.0 0.0 - 15.0 IU/mL   POCT glucose    Collection Time: 03/04/25  4:05 PM   Result Value Ref Range    POCT Glucose 168 (H) 70 - 110 mg/dL   POCT glucose    Collection Time: 03/04/25  8:42 PM   Result Value Ref Range    POCT Glucose 124 (H) 70 - 110 mg/dL   CBC Auto Differential    Collection Time: 03/05/25  3:26 AM   Result Value Ref Range    WBC 8.54 3.90 - 12.70 K/uL    RBC 3.60 (L) 4.60 - 6.20 M/uL    Hemoglobin 10.2 (L) 14.0 - 18.0 g/dL    Hematocrit 32.2 (L) 40.0 - 54.0 %    MCV 89 82 - 98 fL    MCH 28.3 27.0 - 31.0 pg    MCHC 31.7 (L) 32.0 - 36.0 g/dL    RDW 15.8 (H) 11.5 - 14.5 %    Platelets 262 150 - 450 K/uL    MPV 11.0 9.2 - 12.9 fL    Immature Granulocytes 0.4 0.0 - 0.5 %    Gran # (ANC) 5.4 1.8 - 7.7 K/uL    Immature Grans (Abs) 0.03 0.00 - 0.04 K/uL    Lymph # 2.2 1.0 - 4.8 K/uL    Mono # 0.7 0.3 - 1.0 K/uL    Eos # 0.2 0.0 - 0.5 K/uL    Baso # 0.04 0.00 - 0.20 K/uL    nRBC 0 0 /100 WBC    Gran % 63.1 38.0 - 73.0 %    Lymph % 25.8 18.0 - 48.0 %    Mono % 7.7 4.0 - 15.0 %    Eosinophil % 2.5 0.0 - 8.0 %    Basophil % 0.5 0.0 - 1.9 %    Differential Method Automated    Magnesium    Collection Time: 03/05/25  3:26 AM   Result Value Ref Range    Magnesium 1.9 1.6 - 2.6 mg/dL    Phosphorus    Collection Time: 03/05/25  3:26 AM   Result Value Ref Range    Phosphorus 4.3 2.7 - 4.5 mg/dL   Comprehensive Metabolic Panel    Collection Time: 03/05/25  3:26 AM   Result Value Ref Range    Sodium 135 (L) 136 - 145 mmol/L    Potassium 4.0 3.5 - 5.1 mmol/L    Chloride 103 95 - 110 mmol/L    CO2 22 (L) 23 - 29 mmol/L    Glucose 89 70 - 110 mg/dL    BUN 50 (H) 8 - 23 mg/dL    Creatinine 2.4 (H) 0.5 - 1.4 mg/dL    Calcium 8.7 8.7 - 10.5 mg/dL    Total Protein 6.7 6.0 - 8.4 g/dL    Albumin 2.4 (L) 3.5 - 5.2 g/dL    Total Bilirubin 0.4 0.1 - 1.0 mg/dL    Alkaline Phosphatase 77 40 - 150 U/L    AST 12 10 - 40 U/L    ALT 7 (L) 10 - 44 U/L    eGFR 27.8 (A) >60 mL/min/1.73 m^2    Anion Gap 10 8 - 16 mmol/L   Urinalysis, Reflex to Urine Culture Urine, Clean Catch    Collection Time: 03/05/25  7:34 AM    Specimen: Urine   Result Value Ref Range    Specimen UA Urine, Clean Catch     Color, UA Yellow Yellow, Straw, Samantha    Appearance, UA Cloudy (A) Clear    pH, UA 6.0 5.0 - 8.0    Specific Gravity, UA 1.015 1.005 - 1.030    Protein, UA 1+ (A) Negative    Glucose, UA Negative Negative    Ketones, UA Negative Negative    Bilirubin (UA) Negative Negative    Occult Blood UA 2+ (A) Negative    Nitrite, UA Negative Negative    Leukocytes, UA 3+ (A) Negative   Urinalysis Microscopic    Collection Time: 03/05/25  7:34 AM   Result Value Ref Range    RBC, UA 18 (H) 0 - 4 /hpf    WBC, UA >100 (H) 0 - 5 /hpf    WBC Clumps, UA Many (A) None-Rare    Bacteria Moderate (A) None-Occ /hpf    Squam Epithel, UA 2 /hpf    Hyaline Casts, UA 0 0-1/lpf /lpf    Microscopic Comment SEE COMMENT    POCT glucose    Collection Time: 03/05/25  8:13 AM   Result Value Ref Range    POCT Glucose 100 70 - 110 mg/dL     Imaging Results              US Retroperitoneal Complete (Final result)  Result time 02/26/25 23:15:08      Final result by Nila Garcia MD (02/26/25 23:15:08)                   Impression:      No evidence of hydronephrosis  bilaterally or other significant sonographic abnormality.      Electronically signed by: Nila Garcia MD  Date:    02/26/2025  Time:    23:15               Narrative:    EXAMINATION:  US RETROPERITONEAL COMPLETE    CLINICAL HISTORY:  acute renal failure;    TECHNIQUE:  Ultrasound of the kidneys and urinary bladder was performed including color flow and Doppler evaluation of the kidneys.    COMPARISON:  11/22/2024    FINDINGS:  Right kidney: The right kidney measures 12.7 cm. No cortical thinning. No loss of corticomedullary distinction. Resistive index measures 0.72.  No renal stone. No hydronephrosis.    Left kidney: The left kidney measures 12.8 cm. No cortical thinning. No loss of corticomedullary distinction. Resistive index measures 0.70.  No renal stone. No hydronephrosis.    The bladder is decompressed around a Cole catheter, limiting assessment.                                       US Lower Extremity Veins Bilateral (Final result)  Result time 02/26/25 22:02:54      Final result by Billy Lopez MD (02/26/25 22:02:54)                   Impression:      No evidence of deep venous thrombosis in either lower extremity.      Electronically signed by: Billy Lopez MD  Date:    02/26/2025  Time:    22:02               Narrative:    EXAMINATION:  US LOWER EXTREMITY VEINS BILATERAL    CLINICAL HISTORY:  pain in the legs;    TECHNIQUE:  Duplex and color flow Doppler and dynamic compression was performed of the bilateral lower extremity veins was performed.    COMPARISON:  11/22/2024.    FINDINGS:  Right thigh veins: The common femoral, femoral, popliteal, upper greater saphenous, and deep femoral veins are patent and free of thrombus. The veins are normally compressible and have normal phasic flow and augmentation response.    Right calf veins: The visualized calf veins are patent.    Left thigh veins: The common femoral, femoral, popliteal, upper greater saphenous, and deep femoral veins are patent and  free of thrombus. The veins are normally compressible and have normal phasic flow and augmentation response.    Left calf veins: The visualized calf veins are patent.    Miscellaneous: Mild soft tissue edema.  No organized fluid collection.                                       CT Abdomen Pelvis  Without Contrast (Final result)  Result time 02/26/25 12:33:55      Final result by Ry Doshi MD (02/26/25 12:33:55)                   Impression:      1. Tree-in-bud type nodules within the bilateral lower lobes, concerning for developing infectious or inflammatory process.  Correlation and follow-up is advised.  2. Bilateral perinephric fat stranding, nonspecific noting there may be rim wall thickening of the urinary bladder.  Correlation with urinalysis recommended to exclude changes of infection.  3. Bilateral adrenal nodules, some of which measure attenuation suggesting adenoma, others are nonspecific.  4. The gallbladder is distended noting there may be layering sludge.  No secondary findings to suggest acute cholecystitis.  5. Moderate stool in the colon may reflect constipation.  6. Please see above for several additional findings.      Electronically signed by: Ry Doshi MD  Date:    02/26/2025  Time:    12:33               Narrative:    EXAMINATION:  CT ABDOMEN PELVIS WITHOUT CONTRAST    CLINICAL HISTORY:  Flank pain, kidney stone suspected;    TECHNIQUE:  Low dose axial images, sagittal and coronal reformations were obtained from the lung bases to the pubic symphysis.  Oral contrast was not administered.    COMPARISON:  CT pelvis 02/01/2025    FINDINGS:  Images of the lower thorax are remarkable for a 2 mm pulmonary nodule along the fissure on the right.  There is bilateral basilar dependent atelectasis.  There are a few scattered tree-in-bud type nodules along the medial aspect of the right lower lobe and to a lesser degree the medial aspect of the left lower lobe concerning for possible  developing infectious or inflammatory process.    Allowing for motion artifact, the liver, spleen, and pancreas are unremarkable.  There are bilateral adrenal nodules, largest contain fat within the left adrenal gland suggesting adenoma.  Others are too small for characterization or measure attenuation higher than would be expected for adenoma.  There is high attenuating material within the stomach without wall thickening.  The gallbladder is distended noting there may be internal sludge.  No significant abdominal lymphadenopathy.    There is bilateral perinephric fat stranding.  There is no hydronephrosis or nephrolithiasis.  The bilateral ureters are unremarkable without calculi seen.  The urinary bladder is decompressed around a Cole catheter noting there may be residual wall thickening.  The prostate is not enlarged.    The large bowel is grossly unremarkable noting moderate stool throughout.  The terminal ileum and appendix are unremarkable.  The small bowel is grossly unremarkable.  There are a few scattered shotty periaortic, pericaval, and mesenteric lymph nodes.  There is atherosclerotic calcification of the aorta and its branches.  There are bilateral fat containing inguinal hernias.    There are advanced degenerative changes of the bilateral femoroacetabular joints.  There are degenerative changes of the pubic symphysis, sacroiliac joints, and spine.  Prominent degenerative change noted involving T7-T8.  No significant inguinal lymphadenopathy.                                       CT Head Without Contrast (Final result)  Result time 02/26/25 09:54:59      Final result by Jeyson Pisano DO (02/26/25 09:54:59)                   Impression:      No acute intracranial findings as detailed above specifically without evidence for acute intracranial hemorrhage or sulcal effacement to suggest large territory recent infarction    Clinical correlation and further evaluation with MRI as warranted if patient  compatible.      Electronically signed by: Jeyson Pisano DO  Date:    02/26/2025  Time:    09:54               Narrative:    EXAMINATION:  CT HEAD WITHOUT CONTRAST    CLINICAL HISTORY:  Mental status change, unknown cause;    TECHNIQUE:  Multiple sequential 5 mm axial images of the head without contrast.  Coronal and sagittal reformatted imaging from the axial acquisition.    COMPARISON:  12/06/2024    FINDINGS:  Generalized cerebral volume loss with compensatory enlargement ventricles sulci and cisterns without hydrocephalus.  There is small focus of encephalomalacia right caudate suggestive for remote lacunar-type infarct unchanged    Study is distorted by motion and beam hardening artifacts.  Within limits of the study there is no evidence for acute intracranial hemorrhage or sulcal effacement to suggest large territory recent infarction.  Ill-defined decreased attenuation supratentorial periventricular white matter similar to prior while nonspecific concerning for underlying chronic ischemic change    Remote fracture deformity left inferior orbital wall again seen.  There is complete opacification left sphenoid sinus similar    No significant new paranasal sinus opacification in the visualized paranasal sinuses.  Continued partial opacification mastoid air cells bilaterally greater on the left.                                       X-Ray Chest AP Portable (Final result)  Result time 02/26/25 08:57:59      Final result by Enoc Felix MD (02/26/25 08:57:59)                   Impression:      See above      Electronically signed by: Enoc Felix MD  Date:    02/26/2025  Time:    08:57               Narrative:    EXAMINATION:  XR CHEST AP PORTABLE    CLINICAL HISTORY:  Shortness of breath    TECHNIQUE:  Single frontal view of the chest was performed.    COMPARISON:  No 12/09/2024 ne    FINDINGS:  Heart size normal.  No significant airspace consolidation or pleural effusion identified.

## 2025-03-06 PROBLEM — A52.3 NEUROSYPHILIS IN ADULT: Status: ACTIVE | Noted: 2025-03-06

## 2025-03-06 LAB
ALBUMIN SERPL BCP-MCNC: 2.6 G/DL (ref 3.5–5.2)
ALP SERPL-CCNC: 91 U/L (ref 40–150)
ALT SERPL W/O P-5'-P-CCNC: 6 U/L (ref 10–44)
ANION GAP SERPL CALC-SCNC: 10 MMOL/L (ref 8–16)
AST SERPL-CCNC: 12 U/L (ref 10–40)
BASOPHILS # BLD AUTO: 0.05 K/UL (ref 0–0.2)
BASOPHILS NFR BLD: 0.5 % (ref 0–1.9)
BILIRUB SERPL-MCNC: 0.6 MG/DL (ref 0.1–1)
BUN SERPL-MCNC: 36 MG/DL (ref 8–23)
CALCIUM SERPL-MCNC: 9.4 MG/DL (ref 8.7–10.5)
CHLORIDE SERPL-SCNC: 107 MMOL/L (ref 95–110)
CO2 SERPL-SCNC: 20 MMOL/L (ref 23–29)
CREAT SERPL-MCNC: 1.6 MG/DL (ref 0.5–1.4)
DIFFERENTIAL METHOD BLD: ABNORMAL
EOSINOPHIL # BLD AUTO: 0.2 K/UL (ref 0–0.5)
EOSINOPHIL NFR BLD: 1.5 % (ref 0–8)
ERYTHROCYTE [DISTWIDTH] IN BLOOD BY AUTOMATED COUNT: 15.2 % (ref 11.5–14.5)
EST. GFR  (NO RACE VARIABLE): 45.2 ML/MIN/1.73 M^2
GLUCOSE SERPL-MCNC: 106 MG/DL (ref 70–110)
HCT VFR BLD AUTO: 36.6 % (ref 40–54)
HGB BLD-MCNC: 11.6 G/DL (ref 14–18)
IMM GRANULOCYTES # BLD AUTO: 0.05 K/UL (ref 0–0.04)
IMM GRANULOCYTES NFR BLD AUTO: 0.5 % (ref 0–0.5)
LYMPHOCYTES # BLD AUTO: 1.3 K/UL (ref 1–4.8)
LYMPHOCYTES NFR BLD: 12.9 % (ref 18–48)
MAGNESIUM SERPL-MCNC: 1.7 MG/DL (ref 1.6–2.6)
MCH RBC QN AUTO: 28.1 PG (ref 27–31)
MCHC RBC AUTO-ENTMCNC: 31.7 G/DL (ref 32–36)
MCV RBC AUTO: 89 FL (ref 82–98)
MONOCYTES # BLD AUTO: 0.7 K/UL (ref 0.3–1)
MONOCYTES NFR BLD: 6.3 % (ref 4–15)
NEUTROPHILS # BLD AUTO: 8.1 K/UL (ref 1.8–7.7)
NEUTROPHILS NFR BLD: 78.3 % (ref 38–73)
NRBC BLD-RTO: 0 /100 WBC
PHOSPHATE SERPL-MCNC: 2.8 MG/DL (ref 2.7–4.5)
PLATELET # BLD AUTO: 313 K/UL (ref 150–450)
PMV BLD AUTO: 10.2 FL (ref 9.2–12.9)
POCT GLUCOSE: 113 MG/DL (ref 70–110)
POCT GLUCOSE: 114 MG/DL (ref 70–110)
POCT GLUCOSE: 129 MG/DL (ref 70–110)
POCT GLUCOSE: 147 MG/DL (ref 70–110)
POTASSIUM SERPL-SCNC: 4.1 MMOL/L (ref 3.5–5.1)
PROT SERPL-MCNC: 7.7 G/DL (ref 6–8.4)
RBC # BLD AUTO: 4.13 M/UL (ref 4.6–6.2)
SODIUM SERPL-SCNC: 137 MMOL/L (ref 136–145)
WBC # BLD AUTO: 10.33 K/UL (ref 3.9–12.7)

## 2025-03-06 PROCEDURE — G0545 PR VISIT INHERENT TO INPT OR OBS CARE, INFECTIOUS DISEASE: HCPCS | Mod: ,,,

## 2025-03-06 PROCEDURE — 25000003 PHARM REV CODE 250

## 2025-03-06 PROCEDURE — 36415 COLL VENOUS BLD VENIPUNCTURE: CPT

## 2025-03-06 PROCEDURE — 11000001 HC ACUTE MED/SURG PRIVATE ROOM

## 2025-03-06 PROCEDURE — 83735 ASSAY OF MAGNESIUM: CPT

## 2025-03-06 PROCEDURE — 25000003 PHARM REV CODE 250: Performed by: INTERNAL MEDICINE

## 2025-03-06 PROCEDURE — 99233 SBSQ HOSP IP/OBS HIGH 50: CPT | Mod: ,,,

## 2025-03-06 PROCEDURE — 63600175 PHARM REV CODE 636 W HCPCS

## 2025-03-06 PROCEDURE — 85025 COMPLETE CBC W/AUTO DIFF WBC: CPT

## 2025-03-06 PROCEDURE — 80053 COMPREHEN METABOLIC PANEL: CPT

## 2025-03-06 PROCEDURE — 84100 ASSAY OF PHOSPHORUS: CPT

## 2025-03-06 RX ORDER — SODIUM CHLORIDE 9 MG/ML
INJECTION, SOLUTION INTRAVENOUS CONTINUOUS
Status: DISCONTINUED | OUTPATIENT
Start: 2025-03-06 | End: 2025-03-06

## 2025-03-06 RX ORDER — CEFTRIAXONE 2 G/1
2 INJECTION, POWDER, FOR SOLUTION INTRAMUSCULAR; INTRAVENOUS
Status: DISCONTINUED | OUTPATIENT
Start: 2025-03-06 | End: 2025-03-06

## 2025-03-06 RX ADMIN — OXYCODONE HYDROCHLORIDE 10 MG: 10 TABLET ORAL at 01:03

## 2025-03-06 RX ADMIN — ATORVASTATIN CALCIUM 20 MG: 20 TABLET, FILM COATED ORAL at 08:03

## 2025-03-06 RX ADMIN — DEXTROSE MONOHYDRATE 24 MILLION UNITS: 50 INJECTION, SOLUTION INTRAVENOUS at 05:03

## 2025-03-06 RX ADMIN — TAMSULOSIN HYDROCHLORIDE 0.8 MG: 0.4 CAPSULE ORAL at 08:03

## 2025-03-06 RX ADMIN — OXYCODONE HYDROCHLORIDE 10 MG: 10 TABLET ORAL at 08:03

## 2025-03-06 RX ADMIN — QUETIAPINE FUMARATE 100 MG: 25 TABLET ORAL at 08:03

## 2025-03-06 RX ADMIN — ASPIRIN 81 MG: 81 TABLET, COATED ORAL at 08:03

## 2025-03-06 RX ADMIN — GABAPENTIN 300 MG: 300 CAPSULE ORAL at 08:03

## 2025-03-06 NOTE — PLAN OF CARE
[facility]  Discharge Reassessment    Primary Care Provider: Berhane Alvarez MD    Expected Discharge Date: 3/7/2025    Reassessment (most recent)       Discharge Reassessment - 03/06/25 1314          Discharge Reassessment    Assessment Type Discharge Planning Reassessment (P)      Did the patient's condition or plan change since previous assessment? Yes (P)      Discharge Plan discussed with: Patient (P)      Communicated KELSEY with patient/caregiver Yes (P)      Discharge Plan A Return to nursing home (P)      Discharge Plan B Return to Nursing Home (P)      DME Needed Upon Discharge  none (P)      Transition of Care Barriers Mental illness (P)      Why the patient remains in the hospital Requires continued medical care (P)         Post-Acute Status    Discharge Delays None known at this time (P)                    CM to review chart, discharge disposition unchanged at this time, pt will return to [facility] upon discharge when medically ready . Currently, pt is PEC and pending procedure. Will cont to coordinate care until discharge.     Discharge Plan A and Plan B have been determined by review of patient's clinical status, future medical and therapeutic needs, and coverage/benefits for post-acute care in coordination with multidisciplinary team members.     Neha Tobias, MSN   [facility]  [phone]

## 2025-03-06 NOTE — SUBJECTIVE & OBJECTIVE
Interval History: Afebrile. WBC wnl. Only complaining of bilat knee pain, chronic for patient.     Review of Systems   Constitutional:  Negative for appetite change, chills and fever.   Eyes:  Negative for pain and visual disturbance.   Respiratory:  Negative for shortness of breath.    Cardiovascular:  Positive for leg swelling.   Gastrointestinal:  Negative for abdominal pain and nausea.   Genitourinary:  Negative for dysuria, genital sores and penile discharge.   Musculoskeletal:  Positive for arthralgias (knee pain) and gait problem (reported difficulty with ambulation). Negative for neck pain.   Skin:  Negative for rash.   Neurological:  Negative for weakness.   Psychiatric/Behavioral:  Negative for confusion. The patient is not nervous/anxious.      Objective:     Vital Signs (Most Recent):  Temp: 98.2 °F (36.8 °C) (03/06/25 1201)  Pulse: 110 (03/06/25 1201)  Resp: 18 (03/06/25 1308)  BP: (!) 164/95 (03/06/25 1201)  SpO2: 95 % (03/06/25 1201) Vital Signs (24h Range):  Temp:  [98.2 °F (36.8 °C)-99.8 °F (37.7 °C)] 98.2 °F (36.8 °C)  Pulse:  [] 110  Resp:  [18] 18  SpO2:  [95 %-100 %] 95 %  BP: (129-164)/(81-95) 164/95     Weight: (!) 139.7 kg (308 lb)  Body mass index is 42.96 kg/m².    Estimated Creatinine Clearance: 58.8 mL/min (A) (based on SCr of 1.6 mg/dL (H)).     Physical Exam  Vitals and nursing note reviewed.   Constitutional:       General: He is not in acute distress.     Appearance: Normal appearance. He is not ill-appearing, toxic-appearing or diaphoretic.   HENT:      Head: Normocephalic and atraumatic.      Mouth/Throat:      Mouth: Mucous membranes are moist.   Eyes:      Conjunctiva/sclera: Conjunctivae normal.   Cardiovascular:      Rate and Rhythm: Normal rate.   Pulmonary:      Effort: Pulmonary effort is normal. No respiratory distress.   Musculoskeletal:      Right lower leg: Edema present.      Left lower leg: Edema present.   Skin:     Findings: No rash.      Comments: Chronic skin  changes bilateral lower extremities.    Dressings in place bilateral lower legs - c/d/I.    Neurological:      Mental Status: He is alert and oriented to person, place, and time.   Psychiatric:         Mood and Affect: Mood normal.         Behavior: Behavior normal.          Significant Labs: Blood Culture:   Recent Labs   Lab 11/21/24  1801 12/06/24  1110 12/06/24  1116   LABBLOO No growth after 5 days.  No growth after 5 days. No growth after 5 days. No growth after 5 days.     CBC:   Recent Labs   Lab 03/05/25  0326 03/06/25  1139   WBC 8.54 10.33   HGB 10.2* 11.6*   HCT 32.2* 36.6*    313     CMP:   Recent Labs   Lab 03/05/25  0326 03/06/25  1139   * 137   K 4.0 4.1    107   CO2 22* 20*   GLU 89 106   BUN 50* 36*   CREATININE 2.4* 1.6*   CALCIUM 8.7 9.4   PROT 6.7 7.7   ALBUMIN 2.4* 2.6*   BILITOT 0.4 0.6   ALKPHOS 77 91   AST 12 12   ALT 7* 6*   ANIONGAP 10 10     Microbiology Results (last 7 days)       Procedure Component Value Units Date/Time    CSF culture [[id number]]     Order Status: No result Specimen: CSF (Spinal Fluid)     Gram stain [[id number]]     Order Status: No result Specimen: CSF (Spinal Fluid)     Urine culture [[id number]] Collected: 03/05/25 0734    Order Status: No result Specimen: Urine Updated: 03/05/25 0953            Significant Imaging: I have reviewed all pertinent imaging results/findings within the past 24 hours.

## 2025-03-06 NOTE — SUBJECTIVE & OBJECTIVE
Interval History: Mentation improved.  Pleasant, cooperative, alert and oriented to person/place/year.  FTA pending.  LP under sedation pending.     Review of Systems   Constitutional:  Negative for appetite change, chills and fever.   Eyes:  Negative for pain and visual disturbance.   Respiratory:  Negative for shortness of breath.    Cardiovascular:  Positive for leg swelling.   Gastrointestinal:  Negative for abdominal pain and nausea.   Genitourinary:  Negative for dysuria, genital sores and penile discharge.   Musculoskeletal:  Positive for gait problem (reported difficulty with ambulation). Negative for neck pain.   Skin:  Negative for rash.   Neurological:  Negative for weakness.   Psychiatric/Behavioral:  Negative for confusion. The patient is not nervous/anxious.      Objective:     Vital Signs (Most Recent):  Temp: 98.2 °F (36.8 °C) (03/05/25 1610)  Pulse: 99 (03/05/25 1610)  Resp: 18 (03/05/25 1610)  BP: 137/82 (03/05/25 1610)  SpO2: 100 % (03/05/25 1610) Vital Signs (24h Range):  Temp:  [97.8 °F (36.6 °C)-98.2 °F (36.8 °C)] 98.2 °F (36.8 °C)  Pulse:  [77-99] 99  Resp:  [16-18] 18  SpO2:  [97 %-100 %] 100 %  BP: (106-138)/(58-85) 137/82     Weight: (!) 139.7 kg (308 lb)  Body mass index is 42.96 kg/m².    Estimated Creatinine Clearance: 39.2 mL/min (A) (based on SCr of 2.4 mg/dL (H)).     Physical Exam  Vitals and nursing note reviewed.   Constitutional:       General: He is not in acute distress.     Appearance: He is not ill-appearing, toxic-appearing or diaphoretic.   HENT:      Head: Normocephalic and atraumatic.      Mouth/Throat:      Mouth: Mucous membranes are moist.   Eyes:      General: No scleral icterus.     Conjunctiva/sclera: Conjunctivae normal.   Cardiovascular:      Rate and Rhythm: Normal rate and regular rhythm.   Pulmonary:      Effort: Pulmonary effort is normal. No respiratory distress.   Abdominal:      Palpations: Abdomen is soft.      Tenderness: There is no abdominal tenderness.  "  Musculoskeletal:      Cervical back: Normal range of motion.      Right lower leg: Edema present.      Left lower leg: Edema present.   Lymphadenopathy:      Cervical: No cervical adenopathy.   Skin:     Findings: No lesion or rash.      Comments: Chronic skin changes bilateral lower extremities.    Dressings in place bilateral lower legs - c/d/I.    Neurological:      Mental Status: He is alert and oriented to person, place, and time.      Comments: Oriented to person and place   Psychiatric:         Mood and Affect: Mood normal.         Behavior: Behavior normal.          Significant Labs: Blood Culture:   Recent Labs   Lab 11/21/24  1801 12/06/24  1110 12/06/24  1116   LABBLOO No growth after 5 days.  No growth after 5 days. No growth after 5 days. No growth after 5 days.     CBC:   Recent Labs   Lab 03/04/25 0724 03/05/25 0326   WBC 11.59 8.54   HGB 11.3* 10.2*   HCT 35.3* 32.2*    262     CMP:   Recent Labs   Lab 03/04/25 0724 03/05/25 0326    135*   K 3.3* 4.0    103   CO2 24 22*   * 89   BUN 46* 50*   CREATININE 2.1* 2.4*   CALCIUM 9.2 8.7   PROT 7.6 6.7   ALBUMIN 2.6* 2.4*   BILITOT 0.5 0.4   ALKPHOS 87 77   AST 13 12   ALT 6* 7*   ANIONGAP 12 10     Microbiology Results (last 7 days)       Procedure Component Value Units Date/Time    Urine culture [[id number]] Collected: 03/05/25 0734    Order Status: No result Specimen: Urine Updated: 03/05/25 0935          Urine Culture: No results for input(s): "LABURIN" in the last 4320 hours.  Urine Studies:   Recent Labs   Lab 03/05/25 0734   COLORU Yellow   APPEARANCEUA Cloudy*   PHUR 6.0   SPECGRAV 1.015   PROTEINUA 1+*   GLUCUA Negative   KETONESU Negative   BILIRUBINUA Negative   OCCULTUA 2+*   NITRITE Negative   LEUKOCYTESUR 3+*   RBCUA 18*   WBCUA >100*   BACTERIA Moderate*   SQUAMEPITHEL 2   HYALINECASTS 0     Wound Culture: No results for input(s): "LABAERO" in the last 4320 hours.    Significant Imaging: I have reviewed all " pertinent imaging results/findings within the past 24 hours.

## 2025-03-06 NOTE — PLAN OF CARE
Recommendations     1. Continue renal non-dialysis diet as tolerated     2. Recommend boost glucose control BID   3. Encourage good intake    4. RD to monitor weight, labs, intake     Goals:   1. % nutritional needs met with diet     2. Maintain weight during admission  Nutrition Goal Status: new  Communication of RD Recs: other (comment) (POC)     Nutrition Discharge Planning     Nutrition Discharge Planning: Therapeutic diet (comments), Oral supplement regimen (comments)  Therapeutic diet (comments): diabetic renal diet  Oral supplement regimen (comments): boost gluocse control

## 2025-03-06 NOTE — ASSESSMENT & PLAN NOTE
KEATON is likely due to pre-renal azotemia due to dehydration. Baseline creatinine is 1.5. Most recent creatinine and eGFR are listed below.  Recent Labs     03/04/25  0724 03/05/25  0326 03/06/25  1139   CREATININE 2.1* 2.4* 1.6*   EGFRNORACEVR 32.6* 27.8* 45.2*      Plan  - KEATON is improving  - Avoid nephrotoxins and renally dose meds for GFR listed above  - Monitor urine output, serial BMP, and adjust therapy as needed  - nephrology recommendations:      - KEATON secondary to urinary obstruction mild elevation in creatinine, received fluids today     - f/u outpatient nephrology and urology

## 2025-03-06 NOTE — PROGRESS NOTES
"[facility] Surg  Infectious Disease  Progress Note    Patient Name: Riaz Guerra Jr.  MRN: [id number]  Admission Date: 2/26/2025  Length of Stay: 8 days  Attending Physician: Delano Posada DO  Primary Care Provider: Berhane Alvarez MD    Isolation Status: No active isolations  Assessment/Plan:      ID  Serum positive for Treponema pallidum Antibody  Neurosyphilis  I have reviewed hospital notes from   service and other specialty providers. I have also reviewed CBC, CMP/BMP,  cultures and imaging with my interpretation as documented.      75-year-old male with history of hypertension, hyperlipidemia, insulin-dependent diabetes, CKD, chronic venous insufficiency, obesity, bilateral inguinal hernias, chronic hip and back pain, and non-ambulatory about a year (wheelchair dependent). Admitted from SNF for AMS (baseline oriented x3). On admit, labs with hyperkalemia (now resolved), and with KEATON, improving. CT head negative for acute intracranial changes. During work up for AMS, Treponema IgG and IgM reactive. RPR negative. FTA and HIV pending. ID consulted for "persistent AMS post acute renal failure, now stable labs, found to have reactive IgG and IgM treponema, pending RPR and chlamydia + gonorrhea." Concern for neurosyphilis.     Mentation improved. Called [facility] for syphilis treatment history.  Per their records, patient was treated in late 1991/early 1992 with 3 doses of bicillin.  VDRL at that time was 1:16.  No apparent follow up titers after that.      Today, FTA positive. HIV negative. GC/chlamydia still pending. Pending LP under sedation tomorrow or Monday.    Recommendations / Plan:  History of prior syphilis treatment very remotely, but no follow up titers.  On this admit, RPR negative, but FTA positive.   Plan on proceeding with IV PCN treatment for neurosyphilis. Mentation improved. Can defer LP at this time, as to not delay PCN treatment. LP will not alter treatment plan, and even if " "LP is negative,  would still continue with PCN treatment. If patient worsening after PCN treatment, can consider LP at that time.  Plan on completing treatment for neurosyphilis with Penicillin G 24 million units continuous IV infusion x10 days. Consider SNF/LTAC placement for completion of IV abx.  Pt will need ID clinic follow up. ID to schedule       -- Data reviewed and plan discussed with ID staff, Dr. Churchill          Anticipated Disposition: TBD    Thank you for your consult. I will sign off. Please contact us if you have any additional questions.    Marisol Mena PA-C  Infectious Disease  [facility] - Med Surg    Subjective:     Principal Problem:Uremic encephalopathy    HPI: 75-year-old male with history of hypertension, hyperlipidemia, insulin-dependent diabetes, CKD, chronic venous insufficiency, obesity, bilateral inguinal hernias, chronic hip and back pain, and non-ambulatory about a year (wheelchair dependent). Admitted from SNF for AMS and refusing meds (baseline oriented x3). On admit, labs with hyperkalemia (now resolved), and with KEATON, improving. Nephrology consulted. Patient's mentation waxing and waning. 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 During work up for AMS, Treponema IgG and IgM reactive. RPR negative. FTA pending. CT head negative for acute intracranial changes. HIV pending. ID consulted for "persistent AMS post acute renal failure, now stable labs, found to have reactive IgG and IgM treponema, pending RPR and chlamydia + gonorrhea."    Patient oriented to person and place on my exam. Patient is poor historian. He cannot recall if he has ever been tested or treated for syphilis in the past. Denies IVDU. Denies hx of HIV. Denies any complaints of rash, genital sores/lesions, vision changes, eye pain/redness, or neck pain.  Interval History: Afebrile. WBC wnl. Only complaining of bilat knee pain, chronic for patient. "     Review of Systems   Constitutional:  Negative for appetite change, chills and fever.   Eyes:  Negative for pain and visual disturbance.   Respiratory:  Negative for shortness of breath.    Cardiovascular:  Positive for leg swelling.   Gastrointestinal:  Negative for abdominal pain and nausea.   Genitourinary:  Negative for dysuria, genital sores and penile discharge.   Musculoskeletal:  Positive for arthralgias (knee pain) and gait problem (reported difficulty with ambulation). Negative for neck pain.   Skin:  Negative for rash.   Neurological:  Negative for weakness.   Psychiatric/Behavioral:  Negative for confusion. The patient is not nervous/anxious.      Objective:     Vital Signs (Most Recent):  Temp: 98.2 °F (36.8 °C) (03/06/25 1201)  Pulse: 110 (03/06/25 1201)  Resp: 18 (03/06/25 1308)  BP: (!) 164/95 (03/06/25 1201)  SpO2: 95 % (03/06/25 1201) Vital Signs (24h Range):  Temp:  [98.2 °F (36.8 °C)-99.8 °F (37.7 °C)] 98.2 °F (36.8 °C)  Pulse:  [] 110  Resp:  [18] 18  SpO2:  [95 %-100 %] 95 %  BP: (129-164)/(81-95) 164/95     Weight: (!) 139.7 kg (308 lb)  Body mass index is 42.96 kg/m².    Estimated Creatinine Clearance: 58.8 mL/min (A) (based on SCr of 1.6 mg/dL (H)).     Physical Exam  Vitals and nursing note reviewed.   Constitutional:       General: He is not in acute distress.     Appearance: Normal appearance. He is not ill-appearing, toxic-appearing or diaphoretic.   HENT:      Head: Normocephalic and atraumatic.      Mouth/Throat:      Mouth: Mucous membranes are moist.   Eyes:      Conjunctiva/sclera: Conjunctivae normal.   Cardiovascular:      Rate and Rhythm: Normal rate.   Pulmonary:      Effort: Pulmonary effort is normal. No respiratory distress.   Musculoskeletal:      Right lower leg: Edema present.      Left lower leg: Edema present.   Skin:     Findings: No rash.      Comments: Chronic skin changes bilateral lower extremities.    Dressings in place bilateral lower legs - c/d/I.     Neurological:      Mental Status: He is alert and oriented to person, place, and time.   Psychiatric:         Mood and Affect: Mood normal.         Behavior: Behavior normal.          Significant Labs: Blood Culture:   Recent Labs   Lab 11/21/24  1801 12/06/24  1110 12/06/24  1116   LABBLOO No growth after 5 days.  No growth after 5 days. No growth after 5 days. No growth after 5 days.     CBC:   Recent Labs   Lab 03/05/25  0326 03/06/25  1139   WBC 8.54 10.33   HGB 10.2* 11.6*   HCT 32.2* 36.6*    313     CMP:   Recent Labs   Lab 03/05/25  0326 03/06/25  1139   * 137   K 4.0 4.1    107   CO2 22* 20*   GLU 89 106   BUN 50* 36*   CREATININE 2.4* 1.6*   CALCIUM 8.7 9.4   PROT 6.7 7.7   ALBUMIN 2.4* 2.6*   BILITOT 0.4 0.6   ALKPHOS 77 91   AST 12 12   ALT 7* 6*   ANIONGAP 10 10     Microbiology Results (last 7 days)       Procedure Component Value Units Date/Time    CSF culture [[id number]]     Order Status: No result Specimen: CSF (Spinal Fluid)     Gram stain [[id number]]     Order Status: No result Specimen: CSF (Spinal Fluid)     Urine culture [[id number]] Collected: 03/05/25 0734    Order Status: No result Specimen: Urine Updated: 03/05/25 0907            Significant Imaging: I have reviewed all pertinent imaging results/findings within the past 24 hours.

## 2025-03-06 NOTE — PROGRESS NOTES
"[facility] Surg  Infectious Disease  Progress Note    Patient Name: Riaz Guerra Jr.  MRN: [id number]  Admission Date: 2/26/2025  Length of Stay: 7 days  Attending Physician: Alexandre Crespo MD  Primary Care Provider: Berhane Alvarez MD    Isolation Status: No active isolations  Assessment/Plan:      ID  Serum positive for Treponema pallidum Antibody  I have reviewed hospital notes from   service and other specialty providers. I have also reviewed CBC, CMP/BMP,  cultures and imaging with my interpretation as documented.      75-year-old male with history of hypertension, hyperlipidemia, insulin-dependent diabetes, CKD, chronic venous insufficiency, obesity, bilateral inguinal hernias, chronic hip and back pain, and non-ambulatory about a year (wheelchair dependent). Admitted from SNF for AMS (baseline oriented x3). On admit, labs with hyperkalemia (now resolved), and with KEATON, improving. CT head negative for acute intracranial changes.  During work up for AMS, Treponema IgG and IgM reactive. RPR negative. FTA pending. HIV pending. ID consulted for "persistent AMS post acute renal failure, now stable labs, found to have reactive IgG and IgM treponema, pending RPR and chlamydia + gonorrhea." Concern for neurosyphilis.    Mentation improved today.  Alert and oriented.  Pending LP under sedation.  FTA, HIV RNA, gc/chlamydia still pending.    I called [facility] for syphilis treatment history.  Per their records, patient was treated in late 1991/early 1992 with 3 doses of bicillin.  VDRL at that time was 1:16.  No apparent follow up titers after that.  Suspect FTA will be positive.       Recommendations / Plan:  History of prior treatment very remotely, but no follow up titers.  FTA pending.  Anticipate will be positive.  Pending LP today or tomorrow.  Anticipate proceeding with IV PCN for neurosyphilis after LP studies obtained.   Will follow up tomorrow.     Data reviewed and plan discussed with ID staff, " "Dr. Churchill      Thank you.   Please Secure Chat for any questions or concerns.  JAGJIT Quintanilla, ANP-C  Infectious Disease   Subjective:     Principal Problem:Uremic encephalopathy    HPI: 75-year-old male with history of hypertension, hyperlipidemia, insulin-dependent diabetes, CKD, chronic venous insufficiency, obesity, bilateral inguinal hernias, chronic hip and back pain, and non-ambulatory about a year (wheelchair dependent). Admitted from SNF for AMS and refusing meds (baseline oriented x3). On admit, labs with hyperkalemia (now resolved), and with KEATON, improving. Nephrology consulted. Patient's mentation waxing and waning. 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 During work up for AMS, Treponema IgG and IgM reactive. RPR negative. FTA pending. CT head negative for acute intracranial changes. HIV pending. ID consulted for "persistent AMS post acute renal failure, now stable labs, found to have reactive IgG and IgM treponema, pending RPR and chlamydia + gonorrhea."    Patient oriented to person and place on my exam. Patient is poor historian. He cannot recall if he has ever been tested or treated for syphilis in the past. Denies IVDU. Denies hx of HIV. Denies any complaints of rash, genital sores/lesions, vision changes, eye pain/redness, or neck pain.  Interval History: Mentation improved.  Pleasant, cooperative, alert and oriented to person/place/year.  FTA pending.  LP under sedation pending.     Review of Systems   Constitutional:  Negative for appetite change, chills and fever.   Eyes:  Negative for pain and visual disturbance.   Respiratory:  Negative for shortness of breath.    Cardiovascular:  Positive for leg swelling.   Gastrointestinal:  Negative for abdominal pain and nausea.   Genitourinary:  Negative for dysuria, genital sores and penile discharge.   Musculoskeletal:  Positive for gait problem (reported difficulty with ambulation). " Negative for neck pain.   Skin:  Negative for rash.   Neurological:  Negative for weakness.   Psychiatric/Behavioral:  Negative for confusion. The patient is not nervous/anxious.      Objective:     Vital Signs (Most Recent):  Temp: 98.2 °F (36.8 °C) (03/05/25 1610)  Pulse: 99 (03/05/25 1610)  Resp: 18 (03/05/25 1610)  BP: 137/82 (03/05/25 1610)  SpO2: 100 % (03/05/25 1610) Vital Signs (24h Range):  Temp:  [97.8 °F (36.6 °C)-98.2 °F (36.8 °C)] 98.2 °F (36.8 °C)  Pulse:  [77-99] 99  Resp:  [16-18] 18  SpO2:  [97 %-100 %] 100 %  BP: (106-138)/(58-85) 137/82     Weight: (!) 139.7 kg (308 lb)  Body mass index is 42.96 kg/m².    Estimated Creatinine Clearance: 39.2 mL/min (A) (based on SCr of 2.4 mg/dL (H)).     Physical Exam  Vitals and nursing note reviewed.   Constitutional:       General: He is not in acute distress.     Appearance: He is not ill-appearing, toxic-appearing or diaphoretic.   HENT:      Head: Normocephalic and atraumatic.      Mouth/Throat:      Mouth: Mucous membranes are moist.   Eyes:      General: No scleral icterus.     Conjunctiva/sclera: Conjunctivae normal.   Cardiovascular:      Rate and Rhythm: Normal rate and regular rhythm.   Pulmonary:      Effort: Pulmonary effort is normal. No respiratory distress.   Abdominal:      Palpations: Abdomen is soft.      Tenderness: There is no abdominal tenderness.   Musculoskeletal:      Cervical back: Normal range of motion.      Right lower leg: Edema present.      Left lower leg: Edema present.   Lymphadenopathy:      Cervical: No cervical adenopathy.   Skin:     Findings: No lesion or rash.      Comments: Chronic skin changes bilateral lower extremities.    Dressings in place bilateral lower legs - c/d/I.    Neurological:      Mental Status: He is alert and oriented to person, place, and time.      Comments: Oriented to person and place   Psychiatric:         Mood and Affect: Mood normal.         Behavior: Behavior normal.          Significant Labs:  "Blood Culture:   Recent Labs   Lab 11/21/24  1801 12/06/24  1110 12/06/24  1116   LABBLOO No growth after 5 days.  No growth after 5 days. No growth after 5 days. No growth after 5 days.     CBC:   Recent Labs   Lab 03/04/25  0724 03/05/25  0326   WBC 11.59 8.54   HGB 11.3* 10.2*   HCT 35.3* 32.2*    262     CMP:   Recent Labs   Lab 03/04/25  0724 03/05/25  0326    135*   K 3.3* 4.0    103   CO2 24 22*   * 89   BUN 46* 50*   CREATININE 2.1* 2.4*   CALCIUM 9.2 8.7   PROT 7.6 6.7   ALBUMIN 2.6* 2.4*   BILITOT 0.5 0.4   ALKPHOS 87 77   AST 13 12   ALT 6* 7*   ANIONGAP 12 10     Microbiology Results (last 7 days)       Procedure Component Value Units Date/Time    Urine culture [[id number]] Collected: 03/05/25 0734    Order Status: No result Specimen: Urine Updated: 03/05/25 0935          Urine Culture: No results for input(s): "LABURIN" in the last 4320 hours.  Urine Studies:   Recent Labs   Lab 03/05/25 0734   COLORU Yellow   APPEARANCEUA Cloudy*   PHUR 6.0   SPECGRAV 1.015   PROTEINUA 1+*   GLUCUA Negative   KETONESU Negative   BILIRUBINUA Negative   OCCULTUA 2+*   NITRITE Negative   LEUKOCYTESUR 3+*   RBCUA 18*   WBCUA >100*   BACTERIA Moderate*   SQUAMEPITHEL 2   HYALINECASTS 0     Wound Culture: No results for input(s): "LABAERO" in the last 4320 hours.    Significant Imaging: I have reviewed all pertinent imaging results/findings within the past 24 hours.  "

## 2025-03-06 NOTE — ASSESSMENT & PLAN NOTE
"I have reviewed hospital notes from  HM service and other specialty providers. I have also reviewed CBC, CMP/BMP,  cultures and imaging with my interpretation as documented.      75-year-old male with history of hypertension, hyperlipidemia, insulin-dependent diabetes, CKD, chronic venous insufficiency, obesity, bilateral inguinal hernias, chronic hip and back pain, and non-ambulatory about a year (wheelchair dependent). Admitted from SNF for AMS (baseline oriented x3). On admit, labs with hyperkalemia (now resolved), and with KEATON, improving. CT head negative for acute intracranial changes.  During work up for AMS, Treponema IgG and IgM reactive. RPR negative. FTA pending. HIV pending. ID consulted for "persistent AMS post acute renal failure, now stable labs, found to have reactive IgG and IgM treponema, pending RPR and chlamydia + gonorrhea." Concern for neurosyphilis.    Mentation improved today.  Alert and oriented.  Pending LP under sedation.  FTA, HIV RNA, gc/chlamydia still pending.    [facility] for syphilis treatment history.  Per their records, patient was treated in late 1991/early 1992 with 3 doses of bicillin.  VDRL at that time was 1:16.  No apparent follow up titers after that.  Suspect FTA will be positive.       Recommendations / Plan:  History of prior treatment very remotely, but no follow up titers.  FTA pending.  Anticipate will be positive.  Pending LP today or tomorrow.  Anticipate proceeding with IV PCN for neurosyphilis after LP studies obtained.   Will follow up tomorrow.     Data reviewed and plan discussed with ID staff, Dr. Churchill    "

## 2025-03-06 NOTE — ASSESSMENT & PLAN NOTE
Anemia is likely due to . Most recent hemoglobin and hematocrit are listed below.  Recent Labs     03/04/25  0724 03/05/25  0326 03/06/25  1139   HGB 11.3* 10.2* 11.6*   HCT 35.3* 32.2* 36.6*     Plan  - Monitor serial CBC:   - Transfuse PRBC if patient becomes hemodynamically unstable, symptomatic or H/H drops below 7/21.  - Patient   - Patient's anemia is currently

## 2025-03-06 NOTE — ASSESSMENT & PLAN NOTE
History of rapid decline over the last 9 months. Spent 2 months at [facility]. Staff there says patient has had difficulty adjusting.     - Physical therapy says that at this time, patient is functioning at their prior level of function and does not require further acute PT services.   - Occupational therapy: Patient is unable to continue work toward goals because of medical or psychosocial complications. and Therapist determines that the patient will no longer benefit from therapy services.   - will re consult considering patient's dramatic improvement in mentation

## 2025-03-06 NOTE — PROGRESS NOTES
[facility]  Progress Note    Patient Name: Riaz Guerra Jr.  MRN: [id number]  Patient Class: IP- Inpatient   Admission Date: 2/26/2025  Length of Stay: 8 days  Attending Physician: Delano Posada DO  Primary Care Provider: Berhane Alvarez MD        Subjective     Principal Problem:Uremic encephalopathy        HPI:  Riaz Guerra is a 75-year-old man with a medical history of HTN, HLD, insulin dependent DM, CKD, bilateral inguinal hernias, chronic venous insufficiency, obesity, chronic hip/back pain, and wheelchair dependent. He presented to the ED from [facility] ([facility]) due to altered mental status, labored breathing, and refusing medications. Per phone call to [facility], the patient had normal mentation prior to this admission, but had difficulty adjusting to the facility.     At the ED the patient was A/O x2 to person, and time. Reported pain in bilateral shoulders and hip, feeling cold and thirsty. Discoloration was noted on both lower limbs suggestive of venous stasis. Cole catheter was placed due to greater than 400ml urine seen on bladder scan. Labs were significant for KEATON: Cr 4.5 from base line of 1.3. Potassium 7.0, , CO2 15. CT abdomen pelvis negative for hydronephrosis but noted for bilateral basilar atelectasis and 2mm pulmonary nodule along the fissure on the right as well as tree in bud type nodules along the medial aspect of the left lower lobe concerning for possible developing infectious or inflammatory process. CT head negative for acute intracranial changes.    Patient's sister has been contacted about the patient's location and new condition. Per his sister, patient has not been able to ambulate for the last 9 months and has been at the SNF facility for the last two months.           Overview/Hospital Course:  Patinet admitted with MAS, and hyperkalemia on presentation. Potassium was shifted and was given lokelma and calcium gluconate.  Nephrology was consulted and recommendations followed, sodium bicarb, lasix + diuril, and lokelma TID. Hyperkalemia resolved. Patient's mentation waxing and waning. Psychiatry consulted. 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  PT/OT not recommending further therapy. Patient does not like care at [facility] and does not wish to return there. LP possibly today (3/5), elevated ESR, CRP. RF normal.    Interval History: NAEO. Lumbar puncture scheduled for 9 AM 3/7 with anesthesiology. Pending     Review of Systems  Objective:     Vital Signs (Most Recent):  Temp: 98.2 °F (36.8 °C) (03/06/25 1201)  Pulse: 110 (03/06/25 1201)  Resp: 18 (03/06/25 1308)  BP: (!) 164/95 (03/06/25 1201)  SpO2: 95 % (03/06/25 1201) Vital Signs (24h Range):  Temp:  [98.2 °F (36.8 °C)-99.8 °F (37.7 °C)] 98.2 °F (36.8 °C)  Pulse:  [] 110  Resp:  [18] 18  SpO2:  [95 %-100 %] 95 %  BP: (129-164)/(81-95) 164/95     Weight: (!) 139.7 kg (308 lb)  Body mass index is 42.96 kg/m².    Intake/Output Summary (Last 24 hours) at 3/6/2025 1328  Last data filed at 3/6/2025 0444  Gross per 24 hour   Intake 784 ml   Output 900 ml   Net -116 ml         Physical Exam  Vitals reviewed.   Constitutional:       General: He is not in acute distress.  HENT:      Head: Normocephalic.      Mouth/Throat:      Mouth: Mucous membranes are moist.   Eyes:      Pupils: Pupils are equal, round, and reactive to light.   Cardiovascular:      Rate and Rhythm: Regular rhythm.      Heart sounds: Normal heart sounds.   Pulmonary:      Breath sounds: Normal breath sounds.   Abdominal:      General: Abdomen is flat.   Musculoskeletal:         General: No swelling.      Comments: Severe venous stasis dermatitis   Skin:     General: Skin is warm.   Neurological:      Mental Status: He is alert and oriented to person, place, and time. Mental status is at baseline.   Psychiatric:         Mood and Affect: Mood normal.               Significant Labs: All pertinent labs  within the past 24 hours have been reviewed.  CBC:   Recent Labs   Lab 03/05/25  0326 03/06/25  1139   WBC 8.54 10.33   HGB 10.2* 11.6*   HCT 32.2* 36.6*    313     CMP:   Recent Labs   Lab 03/05/25  0326 03/06/25  1139   * 137   K 4.0 4.1    107   CO2 22* 20*   GLU 89 106   BUN 50* 36*   CREATININE 2.4* 1.6*   CALCIUM 8.7 9.4   PROT 6.7 7.7   ALBUMIN 2.4* 2.6*   BILITOT 0.4 0.6   ALKPHOS 77 91   AST 12 12   ALT 7* 6*   ANIONGAP 10 10       Significant Imaging: I have reviewed all pertinent imaging results/findings within the past 24 hours.    Assessment and Plan     Assessment & Plan  Uremic encephalopathy  See acute renal failure     Type 2 diabetes mellitus with chronic kidney disease, without long-term current use of insulin  Patient's FSGs are controlled on current medication regimen.  Last A1c reviewed-   Lab Results   Component Value Date    HGBA1C 6.4 (H) 02/26/2025     Most recent fingerstick glucose reviewed-   Recent Labs   Lab 03/05/25  1609 03/05/25  2127 03/06/25  0805 03/06/25  1201   POCTGLUCOSE 141* 146* 113* 114*       Current correctional scale  Low  Maintain anti-hyperglycemic dose as follows-   Antihyperglycemics (From admission, onward)      Start     Stop Route Frequency Ordered    02/26/25 1715  insulin aspart U-100 pen 0-5 Units         -- SubQ Every 6 hours PRN 02/26/25 1615          Hold Oral hypoglycemics while patient is in the hospital.  Chronic pain syndrome  Continue gabapentin  Acute renal failure superimposed on stage 3a chronic kidney disease  KEATON is likely due to pre-renal azotemia due to dehydration. Baseline creatinine is  1.5 . Most recent creatinine and eGFR are listed below.  Recent Labs     03/04/25  0724 03/05/25  0326 03/06/25  1139   CREATININE 2.1* 2.4* 1.6*   EGFRNORACEVR 32.6* 27.8* 45.2*      Plan  - KEATON is improving  - Avoid nephrotoxins and renally dose meds for GFR listed above  - Monitor urine output, serial BMP, and adjust therapy as needed  -  nephrology recommendations:      - KEATON secondary to urinary obstruction mild elevation in creatinine, received fluids today     - f/u outpatient nephrology and urology   Anemia due to stage 3 chronic kidney disease  Anemia is likely due to . Most recent hemoglobin and hematocrit are listed below.  Recent Labs     03/04/25  0724 03/05/25  0326 03/06/25  1139   HGB 11.3* 10.2* 11.6*   HCT 35.3* 32.2* 36.6*     Plan  - Monitor serial CBC:   - Transfuse PRBC if patient becomes hemodynamically unstable, symptomatic or H/H drops below 7/21.  - Patient   - Patient's anemia is currently     Metabolic acidosis, normal anion gap (NAG)  - see renal failure and hyperkalemia    Venous stasis of both lower extremities  - sequential compression device  - wound care for ulcerated leg wounds  - consult PT/OT when encephalopathy resolves  Urine retention  - david placed  - strict input and out  - will monitor output and BMP following fluid challenge    Chronic wounds; BLE  - wound care consulted     Impaired mobility and activities of daily living  History of rapid decline over the last 9 months. Spent 2 months at [facility]. Staff there says patient has had difficulty adjusting.     - Physical therapy says that at this time, patient is functioning at their prior level of function and does not require further acute PT services.   - Occupational therapy: Patient is unable to continue work toward goals because of medical or psychosocial complications. and Therapist determines that the patient will no longer benefit from therapy services.   - will re consult considering patient's dramatic improvement in mentation   Debility  Patient with  debility due to . The patient's latest AMPAC (Activity Measure for Post Acute Care) Score is listed below.    AM-PAC Score - How much help does the patient need for each activity listed  Basic Mobility Total Score: 6  Turning over in bed (including adjusting bedclothes, sheets  and blankets)?: Unable  Sitting down on and standing up from a chair with arms (e.g., wheelchair, bedside commode, etc.): Unable  Moving from lying on back to sitting on the side of the bed?: Unable  Moving to and from a bed to a chair (including a wheelchair)?: Unable  Need to walk in hospital room?: Unable  Climbing 3-5 steps with a railing?: Unable    Plan    - PT/OT, will reassess needs before discharge        Right hip pain  Continue gabapentin and PRN oxycodone and dilaudid    Severe obesity (BMI >= 40)  Body mass index is 42.96 kg/m². Morbid obesity complicates all aspects of disease management from diagnostic modalities to treatment. Weight loss encouraged and health benefits explained to patient.       Mixed hyperlipidemia      PAD (peripheral artery disease)      Mood disorder  Patient has persistent depression which is unknown and is currently uncontrolled. Will Begin anti-depressant medications. We will not consult psychiatry at this time. Patient does not display psychosis at this time. Continue to monitor closely and adjust plan of care as needed.    - wellbutrin 150 mg daily discontinued  - started quetiapine     Serum positive for Treponema pallidum Antibody  - FTA and GEO pending  - lumbar puncture with anesthesia today  - CSF for VDRL, cell count, glucose, and protein  -ESR , CRP elevated.   ESR raised      CRP elevated      VTE Risk Mitigation (From admission, onward)           Ordered     IP VTE HIGH RISK PATIENT  Once         02/26/25 1159     Place sequential compression device  Until discontinued         02/26/25 1159                    Discharge Planning   KELSEY: 3/7/2025     Code Status: Full Code   Medical Readiness for Discharge Date:   Discharge Plan A: Return to nursing home   Discharge Delays: None known at this time                    Gabriel Tate MD  Department of Hospital Medicine   [facility]

## 2025-03-06 NOTE — PROGRESS NOTES
"[facility] - Med Surg  Adult Nutrition  Progress Note    SUMMARY       Recommendations    1. Continue renal non-dialysis diet as tolerated     2. Recommend boost glucose control BID   3. Encourage good intake    4. RD to monitor weight, labs, intake    Goals:   1. % nutritional needs met with diet     2. Maintain weight during admission  Nutrition Goal Status: new  Communication of RD Recs: other (comment) (POC)    Nutrition Discharge Planning    Nutrition Discharge Planning: Therapeutic diet (comments), Oral supplement regimen (comments)  Therapeutic diet (comments): diabetic renal diet  Oral supplement regimen (comments): boost gluocse control    Assessment and Plan    Nutrition Problem  Inadequate energy intake    Related to (etiology):   Poor intake    Signs and Symptoms (as evidenced by):   Pt consuming < 75% of meals     Interventions/Recommendations (treatment strategy):  Collaboration with other providers   ONS    Nutrition Diagnosis Status:   New     Malnutrition Assessment    NFPE at follow up.     Reason for Assessment    Reason For Assessment: length of stay  Diagnosis: other (see comments) (uremic encephalopathy)  General Information Comments: Pt admitted with uremic encephalopathy. PMHx: HTN, HLD, DM 2, gout, high cholesterol, CKD 3, PAD. No surgical hx. Wound: right leg, left leg, ulcer on left ankle, stage 2 pressure injury on buttocks. No GI s/s - fecal incontinence - BM: 3/5. Pt having fair intake - PO: 25-50%. Wt fluctuations.    Nutrition/Diet History    Spiritual, Cultural Beliefs, Rastafari Practices, Values that Affect Care: no  Food Allergies: NKFA    Nutrition Related Social Determinants of Health: SDOH: None Identified    Food Insecurity: No Food Insecurity (2/26/2025)    Hunger Vital Sign     Worried About Running Out of Food in the Last Year: Never true     Ran Out of Food in the Last Year: Never true     Anthropometrics    Height: 5' 11" (180.3 cm)  Height (inches): 71 in  Height " Method: Measured  Weight: (!) 139.7 kg (308 lb)  Weight (lb): (!) 308 lb  Weight Method: Standard Scale  Ideal Body Weight (IBW), Male: 172 lb  % Ideal Body Weight, Male (lb): 179.07 %  BMI (Calculated): 43  BMI Grade: greater than 40 - morbid obesity    Lab/Procedures/Meds    Pertinent Labs Reviewed: reviewed  Pertinent Labs Comments: H/H 11.6/36.6 low, BUN 36 high, creatinine 1.6 high, GFR 45.2 low, albumin 2.6 low, .9 low  Pertinent Medications Reviewed: reviewed  Pertinent Medications Comments: aspirin, atorvastatin, ceftriaxone, gabapentin, quetiapine    Estimated/Assessed Needs    Weight Used For Calorie Calculations: 78 kg (172 lb) (IBW)  Energy Calorie Requirements (kcal): 8072-5085 kcal  Energy Need Method: Kcal/kg (25-35 kcal/kg)  Protein Requirements: 47-63 g/pro (0.6-0.8 g/kg)  Weight Used For Protein Calculations: 78 kg (172 lb) (IBW)        RDA Method (mL): 1950  CHO Requirement: 244-341 g    Nutrition Prescription Ordered    Current Diet Order: NPO    Evaluation of Received Nutrient/Fluid Intake    Energy Calories Required: not meeting needs  Protein Required: not meeting needs  Tolerance: tolerating  % Intake of Estimated Energy Needs: 25 - 50 %  % Meal Intake: NPO    Nutrition Risk    Level of Risk/Frequency of Follow-up: moderate     Monitor and Evaluation    Monitor and Evaluation: Energy intake, Food and beverage intake, Protein intake, Carbohydrate intake, Diet order, Food and nutrition knowledge, Weight, Electrolyte and renal panel, Gastrointestinal profile, Glucose/endocrine profile, Inflammatory profile, Lipid profile, Nutrition focused physical findings     Nutrition Follow-Up    RD Follow-up?: Yes

## 2025-03-06 NOTE — SUBJECTIVE & OBJECTIVE
Interval History: NAEO. Lumbar puncture scheduled for 9 AM 3/7 with anesthesiology. Pending     Review of Systems  Objective:     Vital Signs (Most Recent):  Temp: 98.2 °F (36.8 °C) (03/06/25 1201)  Pulse: 110 (03/06/25 1201)  Resp: 18 (03/06/25 1308)  BP: (!) 164/95 (03/06/25 1201)  SpO2: 95 % (03/06/25 1201) Vital Signs (24h Range):  Temp:  [98.2 °F (36.8 °C)-99.8 °F (37.7 °C)] 98.2 °F (36.8 °C)  Pulse:  [] 110  Resp:  [18] 18  SpO2:  [95 %-100 %] 95 %  BP: (129-164)/(81-95) 164/95     Weight: (!) 139.7 kg (308 lb)  Body mass index is 42.96 kg/m².    Intake/Output Summary (Last 24 hours) at 3/6/2025 1328  Last data filed at 3/6/2025 0444  Gross per 24 hour   Intake 784 ml   Output 900 ml   Net -116 ml         Physical Exam  Vitals reviewed.   Constitutional:       General: He is not in acute distress.  HENT:      Head: Normocephalic.      Mouth/Throat:      Mouth: Mucous membranes are moist.   Eyes:      Pupils: Pupils are equal, round, and reactive to light.   Cardiovascular:      Rate and Rhythm: Regular rhythm.      Heart sounds: Normal heart sounds.   Pulmonary:      Breath sounds: Normal breath sounds.   Abdominal:      General: Abdomen is flat.   Musculoskeletal:         General: No swelling.      Comments: Severe venous stasis dermatitis   Skin:     General: Skin is warm.   Neurological:      Mental Status: He is alert and oriented to person, place, and time. Mental status is at baseline.   Psychiatric:         Mood and Affect: Mood normal.               Significant Labs: All pertinent labs within the past 24 hours have been reviewed.  CBC:   Recent Labs   Lab 03/05/25  0326 03/06/25  1139   WBC 8.54 10.33   HGB 10.2* 11.6*   HCT 32.2* 36.6*    313     CMP:   Recent Labs   Lab 03/05/25  0326 03/06/25  1139   * 137   K 4.0 4.1    107   CO2 22* 20*   GLU 89 106   BUN 50* 36*   CREATININE 2.4* 1.6*   CALCIUM 8.7 9.4   PROT 6.7 7.7   ALBUMIN 2.4* 2.6*   BILITOT 0.4 0.6   ALKPHOS 77 91    AST 12 12   ALT 7* 6*   ANIONGAP 10 10       Significant Imaging: I have reviewed all pertinent imaging results/findings within the past 24 hours.

## 2025-03-06 NOTE — ASSESSMENT & PLAN NOTE
"I have reviewed hospital notes from  HM service and other specialty providers. I have also reviewed CBC, CMP/BMP,  cultures and imaging with my interpretation as documented.      75-year-old male with history of hypertension, hyperlipidemia, insulin-dependent diabetes, CKD, chronic venous insufficiency, obesity, bilateral inguinal hernias, chronic hip and back pain, and non-ambulatory about a year (wheelchair dependent). Admitted from SNF for AMS (baseline oriented x3). On admit, labs with hyperkalemia (now resolved), and with KEATON, improving. CT head negative for acute intracranial changes. During work up for AMS, Treponema IgG and IgM reactive. RPR negative. FTA and HIV pending. ID consulted for "persistent AMS post acute renal failure, now stable labs, found to have reactive IgG and IgM treponema, pending RPR and chlamydia + gonorrhea." Concern for neurosyphilis.     Mentation improved. [facility] for [facility]philis treatment history.  Per their records, patient was treated in late 1991/early 1992 with 3 doses of bicillin.  VDRL at that time was 1:16.  No apparent follow up titers after that.      Today, FTA positive. HIV negative. GC/chlamydia still pending. Pending LP under sedation tomorrow or Monday.    Recommendations / Plan:  History of prior syphilis treatment very remotely, but no follow up titers.  On this admit, RPR negative, but FTA positive.   Plan on proceeding with IV PCN treatment for neurosyphilis. Mentation improved. Can defer LP at this time, as to not delay PCN treatment. LP will not alter treatment plan, and even if LP is negative,  would still continue with PCN treatment. If patient worsening after PCN treatment, can consider LP at that time.  Plan on completing treatment for neurosyphilis with Penicillin G 24 million units continuous IV infusion x10 days. Consider SNF/LTAC placement for completion of IV abx.  Pt will need ID clinic follow up. ID to schedule       -- Data reviewed and " plan discussed with ID staff, Dr. Churchill

## 2025-03-06 NOTE — PLAN OF CARE
Patient is AAOx4. Vital signs stable. No falls throughout shift. IV antibiotics infusing.  Problem: Adult Inpatient Plan of Care  Goal: Plan of Care Review  Outcome: Progressing  Goal: Patient-Specific Goal (Individualized)  Outcome: Progressing  Goal: Absence of Hospital-Acquired Illness or Injury  Outcome: Progressing  Goal: Optimal Comfort and Wellbeing  Outcome: Progressing  Goal: Readiness for Transition of Care  Outcome: Progressing     Problem: Bariatric Environmental Safety  Goal: Safety Maintained with Care  Outcome: Progressing     Problem: Skin Injury Risk Increased  Goal: Skin Health and Integrity  Outcome: Progressing     Problem: Infection  Goal: Absence of Infection Signs and Symptoms  Outcome: Progressing     Problem: Electrolyte Imbalance  Goal: Electrolyte Balance  Outcome: Progressing     Problem: Acute Kidney Injury/Impairment  Goal: Fluid and Electrolyte Balance  Outcome: Progressing  Goal: Improved Oral Intake  Outcome: Progressing  Goal: Effective Renal Function  Outcome: Progressing     Problem: Confusion Acute  Goal: Optimal Cognitive Function  Outcome: Progressing     Problem: Urinary Retention  Goal: Effective Urinary Elimination  Outcome: Progressing     Problem: Diabetes Comorbidity  Goal: Blood Glucose Level Within Targeted Range  Outcome: Progressing     Problem: Wound  Goal: Optimal Coping  Outcome: Progressing  Goal: Optimal Functional Ability  Outcome: Progressing  Goal: Absence of Infection Signs and Symptoms  Outcome: Progressing  Goal: Improved Oral Intake  Outcome: Progressing  Goal: Optimal Pain Control and Function  Outcome: Progressing  Goal: Skin Health and Integrity  Outcome: Progressing  Goal: Optimal Wound Healing  Outcome: Progressing     Problem: Fall Injury Risk  Goal: Absence of Fall and Fall-Related Injury  Outcome: Progressing

## 2025-03-06 NOTE — ASSESSMENT & PLAN NOTE
Patient's FSGs are controlled on current medication regimen.  Last A1c reviewed-   Lab Results   Component Value Date    HGBA1C 6.4 (H) 02/26/2025     Most recent fingerstick glucose reviewed-   Recent Labs   Lab 03/05/25  1609 03/05/25  2127 03/06/25  0805 03/06/25  1201   POCTGLUCOSE 141* 146* 113* 114*       Current correctional scale  Low  Maintain anti-hyperglycemic dose as follows-   Antihyperglycemics (From admission, onward)      Start     Stop Route Frequency Ordered    02/26/25 1715  insulin aspart U-100 pen 0-5 Units         -- SubQ Every 6 hours PRN 02/26/25 1615          Hold Oral hypoglycemics while patient is in the hospital.

## 2025-03-07 ENCOUNTER — ANESTHESIA (OUTPATIENT)
Dept: ENDOSCOPY | Facility: HOSPITAL | Age: 75
DRG: 057 | End: 2025-03-07
Payer: MEDICARE

## 2025-03-07 ENCOUNTER — ANESTHESIA EVENT (OUTPATIENT)
Dept: ENDOSCOPY | Facility: HOSPITAL | Age: 75
DRG: 057 | End: 2025-03-07
Payer: MEDICARE

## 2025-03-07 ENCOUNTER — TELEPHONE (OUTPATIENT)
Dept: INFECTIOUS DISEASES | Facility: CLINIC | Age: 75
End: 2025-03-07
Payer: MEDICARE

## 2025-03-07 LAB
ALBUMIN SERPL BCP-MCNC: 2.6 G/DL (ref 3.5–5.2)
ALP SERPL-CCNC: 90 U/L (ref 40–150)
ALT SERPL W/O P-5'-P-CCNC: 5 U/L (ref 10–44)
ANION GAP SERPL CALC-SCNC: 12 MMOL/L (ref 8–16)
ANTI SM ANTIBODY: 0.08 RATIO (ref 0–0.99)
ANTI SM/RNP ANTIBODY: 0.12 RATIO (ref 0–0.99)
ANTI-SM INTERPRETATION: NEGATIVE
ANTI-SM/RNP INTERPRETATION: NEGATIVE
ANTI-SSA ANTIBODY: 0.07 RATIO (ref 0–0.99)
ANTI-SSA INTERPRETATION: NEGATIVE
ANTI-SSB ANTIBODY: 0.08 RATIO (ref 0–0.99)
ANTI-SSB INTERPRETATION: NEGATIVE
AST SERPL-CCNC: 15 U/L (ref 10–40)
BASOPHILS # BLD AUTO: 0.04 K/UL (ref 0–0.2)
BASOPHILS NFR BLD: 0.4 % (ref 0–1.9)
BILIRUB SERPL-MCNC: 0.6 MG/DL (ref 0.1–1)
BUN SERPL-MCNC: 31 MG/DL (ref 8–23)
CALCIUM SERPL-MCNC: 9.5 MG/DL (ref 8.7–10.5)
CHLORIDE SERPL-SCNC: 106 MMOL/L (ref 95–110)
CLARITY CSF: CLEAR
CO2 SERPL-SCNC: 21 MMOL/L (ref 23–29)
COLOR CSF: ABNORMAL
CREAT SERPL-MCNC: 1.5 MG/DL (ref 0.5–1.4)
CSF TUBE NUMBER: 2
CSF TUBE NUMBER: 3
DIFFERENTIAL METHOD BLD: ABNORMAL
DSDNA AB SER-ACNC: NORMAL [IU]/ML
EOSINOPHIL # BLD AUTO: 0.2 K/UL (ref 0–0.5)
EOSINOPHIL NFR BLD: 1.7 % (ref 0–8)
ERYTHROCYTE [DISTWIDTH] IN BLOOD BY AUTOMATED COUNT: 15.4 % (ref 11.5–14.5)
EST. GFR  (NO RACE VARIABLE): 48.9 ML/MIN/1.73 M^2
GLUCOSE CSF-MCNC: 76 MG/DL (ref 40–70)
GLUCOSE SERPL-MCNC: 107 MG/DL (ref 70–110)
HCT VFR BLD AUTO: 35.3 % (ref 40–54)
HGB BLD-MCNC: 11.6 G/DL (ref 14–18)
IMM GRANULOCYTES # BLD AUTO: 0.04 K/UL (ref 0–0.04)
IMM GRANULOCYTES NFR BLD AUTO: 0.4 % (ref 0–0.5)
LYMPHOCYTES # BLD AUTO: 1.6 K/UL (ref 1–4.8)
LYMPHOCYTES NFR BLD: 17 % (ref 18–48)
LYMPHOCYTES NFR CSF MANUAL: 79 % (ref 40–80)
MAGNESIUM SERPL-MCNC: 1.7 MG/DL (ref 1.6–2.6)
MCH RBC QN AUTO: 28.7 PG (ref 27–31)
MCHC RBC AUTO-ENTMCNC: 32.9 G/DL (ref 32–36)
MCV RBC AUTO: 87 FL (ref 82–98)
MONOCYTES # BLD AUTO: 0.6 K/UL (ref 0.3–1)
MONOCYTES NFR BLD: 6.8 % (ref 4–15)
MONOS+MACROS NFR CSF MANUAL: 4 % (ref 15–45)
NEUTROPHILS # BLD AUTO: 6.9 K/UL (ref 1.8–7.7)
NEUTROPHILS NFR BLD: 73.7 % (ref 38–73)
NEUTROPHILS NFR CSF MANUAL: 17 % (ref 0–6)
NRBC BLD-RTO: 0 /100 WBC
PHOSPHATE SERPL-MCNC: 3.2 MG/DL (ref 2.7–4.5)
PLATELET # BLD AUTO: 309 K/UL (ref 150–450)
PMV BLD AUTO: 10.4 FL (ref 9.2–12.9)
POCT GLUCOSE: 110 MG/DL (ref 70–110)
POCT GLUCOSE: 118 MG/DL (ref 70–110)
POCT GLUCOSE: 120 MG/DL (ref 70–110)
POCT GLUCOSE: 143 MG/DL (ref 70–110)
POTASSIUM SERPL-SCNC: 3.7 MMOL/L (ref 3.5–5.1)
PROT CSF-MCNC: 46 MG/DL (ref 15–40)
PROT SERPL-MCNC: 7.7 G/DL (ref 6–8.4)
RBC # BLD AUTO: 4.04 M/UL (ref 4.6–6.2)
RBC # CSF: 540 /CU MM
SODIUM SERPL-SCNC: 139 MMOL/L (ref 136–145)
SPECIMEN VOL CSF: 1.7 ML
WBC # BLD AUTO: 9.4 K/UL (ref 3.9–12.7)
WBC # CSF: 6 /CU MM (ref 0–5)

## 2025-03-07 PROCEDURE — 25000003 PHARM REV CODE 250: Performed by: NURSE ANESTHETIST, CERTIFIED REGISTERED

## 2025-03-07 PROCEDURE — 63600175 PHARM REV CODE 636 W HCPCS

## 2025-03-07 PROCEDURE — 89051 BODY FLUID CELL COUNT: CPT

## 2025-03-07 PROCEDURE — 82962 GLUCOSE BLOOD TEST: CPT

## 2025-03-07 PROCEDURE — 84157 ASSAY OF PROTEIN OTHER: CPT

## 2025-03-07 PROCEDURE — D9220A PRA ANESTHESIA: Mod: CRNA,,, | Performed by: NURSE ANESTHETIST, CERTIFIED REGISTERED

## 2025-03-07 PROCEDURE — 11000001 HC ACUTE MED/SURG PRIVATE ROOM

## 2025-03-07 PROCEDURE — D9220A PRA ANESTHESIA: Mod: ANES,,, | Performed by: ANESTHESIOLOGY

## 2025-03-07 PROCEDURE — 25000003 PHARM REV CODE 250

## 2025-03-07 PROCEDURE — 71000033 HC RECOVERY, INTIAL HOUR

## 2025-03-07 PROCEDURE — 63600175 PHARM REV CODE 636 W HCPCS: Performed by: NURSE ANESTHETIST, CERTIFIED REGISTERED

## 2025-03-07 PROCEDURE — 85025 COMPLETE CBC W/AUTO DIFF WBC: CPT

## 2025-03-07 PROCEDURE — 83735 ASSAY OF MAGNESIUM: CPT

## 2025-03-07 PROCEDURE — 84100 ASSAY OF PHOSPHORUS: CPT

## 2025-03-07 PROCEDURE — 87070 CULTURE OTHR SPECIMN AEROBIC: CPT

## 2025-03-07 PROCEDURE — 009U3ZX DRAINAGE OF SPINAL CANAL, PERCUTANEOUS APPROACH, DIAGNOSTIC: ICD-10-PCS | Performed by: RADIOLOGY

## 2025-03-07 PROCEDURE — 37000009 HC ANESTHESIA EA ADD 15 MINS

## 2025-03-07 PROCEDURE — 21400001 HC TELEMETRY ROOM

## 2025-03-07 PROCEDURE — 86592 SYPHILIS TEST NON-TREP QUAL: CPT

## 2025-03-07 PROCEDURE — 25000003 PHARM REV CODE 250: Performed by: INTERNAL MEDICINE

## 2025-03-07 PROCEDURE — 82945 GLUCOSE OTHER FLUID: CPT

## 2025-03-07 PROCEDURE — 36415 COLL VENOUS BLD VENIPUNCTURE: CPT

## 2025-03-07 PROCEDURE — 80053 COMPREHEN METABOLIC PANEL: CPT

## 2025-03-07 PROCEDURE — 87205 SMEAR GRAM STAIN: CPT

## 2025-03-07 PROCEDURE — 37000008 HC ANESTHESIA 1ST 15 MINUTES

## 2025-03-07 RX ORDER — PROPOFOL 10 MG/ML
VIAL (ML) INTRAVENOUS CONTINUOUS PRN
Status: DISCONTINUED | OUTPATIENT
Start: 2025-03-07 | End: 2025-03-07

## 2025-03-07 RX ORDER — DEXMEDETOMIDINE HYDROCHLORIDE 100 UG/ML
INJECTION, SOLUTION INTRAVENOUS
Status: DISCONTINUED | OUTPATIENT
Start: 2025-03-07 | End: 2025-03-07

## 2025-03-07 RX ORDER — ENOXAPARIN SODIUM 100 MG/ML
40 INJECTION SUBCUTANEOUS EVERY 12 HOURS
Status: DISCONTINUED | OUTPATIENT
Start: 2025-03-08 | End: 2025-03-12 | Stop reason: HOSPADM

## 2025-03-07 RX ORDER — PHENYLEPHRINE HCL IN 0.9% NACL 1 MG/10 ML
SYRINGE (ML) INTRAVENOUS
Status: DISCONTINUED | OUTPATIENT
Start: 2025-03-07 | End: 2025-03-07

## 2025-03-07 RX ORDER — POLYETHYLENE GLYCOL 3350 17 G/17G
17 POWDER, FOR SOLUTION ORAL DAILY
Status: DISCONTINUED | OUTPATIENT
Start: 2025-03-07 | End: 2025-03-12 | Stop reason: HOSPADM

## 2025-03-07 RX ORDER — AMOXICILLIN 250 MG
1 CAPSULE ORAL DAILY PRN
Status: DISCONTINUED | OUTPATIENT
Start: 2025-03-07 | End: 2025-03-12 | Stop reason: HOSPADM

## 2025-03-07 RX ORDER — GLUCAGON 1 MG
1 KIT INJECTION
Status: DISCONTINUED | OUTPATIENT
Start: 2025-03-07 | End: 2025-03-07 | Stop reason: HOSPADM

## 2025-03-07 RX ORDER — LOSARTAN POTASSIUM AND HYDROCHLOROTHIAZIDE 12.5; 1 MG/1; MG/1
1 TABLET ORAL DAILY
Status: DISCONTINUED | OUTPATIENT
Start: 2025-03-07 | End: 2025-03-12 | Stop reason: HOSPADM

## 2025-03-07 RX ORDER — SODIUM CHLORIDE 0.9 % (FLUSH) 0.9 %
3 SYRINGE (ML) INJECTION
Status: DISCONTINUED | OUTPATIENT
Start: 2025-03-07 | End: 2025-03-07 | Stop reason: HOSPADM

## 2025-03-07 RX ORDER — LIDOCAINE HYDROCHLORIDE 10 MG/ML
1 INJECTION, SOLUTION EPIDURAL; INFILTRATION; INTRACAUDAL; PERINEURAL ONCE
Status: CANCELLED | OUTPATIENT
Start: 2025-03-07 | End: 2025-03-07

## 2025-03-07 RX ADMIN — ATORVASTATIN CALCIUM 20 MG: 20 TABLET, FILM COATED ORAL at 12:03

## 2025-03-07 RX ADMIN — LOSARTAN POTASSIUM AND HYDROCHLOROTHIAZIDE 1 TABLET: 100; 12.5 TABLET, FILM COATED ORAL at 12:03

## 2025-03-07 RX ADMIN — OXYCODONE HYDROCHLORIDE 10 MG: 10 TABLET ORAL at 12:03

## 2025-03-07 RX ADMIN — Medication 150 MCG: at 10:03

## 2025-03-07 RX ADMIN — DEXTROSE MONOHYDRATE 24 MILLION UNITS: 50 INJECTION, SOLUTION INTRAVENOUS at 01:03

## 2025-03-07 RX ADMIN — DEXMEDETOMIDINE 20 MCG: 100 INJECTION, SOLUTION, CONCENTRATE INTRAVENOUS at 10:03

## 2025-03-07 RX ADMIN — QUETIAPINE FUMARATE 100 MG: 25 TABLET ORAL at 08:03

## 2025-03-07 RX ADMIN — PROPOFOL 100 MCG/KG/MIN: 10 INJECTION, EMULSION INTRAVENOUS at 10:03

## 2025-03-07 RX ADMIN — Medication 200 MCG: at 10:03

## 2025-03-07 RX ADMIN — TAMSULOSIN HYDROCHLORIDE 0.8 MG: 0.4 CAPSULE ORAL at 08:03

## 2025-03-07 RX ADMIN — OXYCODONE HYDROCHLORIDE 10 MG: 10 TABLET ORAL at 06:03

## 2025-03-07 RX ADMIN — GABAPENTIN 300 MG: 300 CAPSULE ORAL at 08:03

## 2025-03-07 RX ADMIN — SODIUM CHLORIDE 1000 ML: 9 INJECTION, SOLUTION INTRAVENOUS at 05:03

## 2025-03-07 RX ADMIN — DEXMEDETOMIDINE 8 MCG: 100 INJECTION, SOLUTION, CONCENTRATE INTRAVENOUS at 10:03

## 2025-03-07 RX ADMIN — SODIUM CHLORIDE: 0.9 INJECTION, SOLUTION INTRAVENOUS at 10:03

## 2025-03-07 RX ADMIN — Medication 250 MCG: at 10:03

## 2025-03-07 NOTE — PLAN OF CARE
Went over plan of care - all questions answered. Pt continues to be confused.. meds per md order. No falls or injuries

## 2025-03-07 NOTE — PROCEDURES
Radiology Post-Procedure Note    Pre Op Diagnosis: Encephalopathy    Post Op Diagnosis: Same    Procedure: lumbar puncture    Procedure performed by: Johny Deleon PA-C    Written Informed Consent Obtained: Yes, pt's sister via telephone    Specimen Removed: 11 mL CSF    Estimated Blood Loss: Minimal    Findings: Following written informed consent and sterile prep and drape, a 22 gauge spinal needle was inserted at L3 - L4 intralaminar space under fluoroscopic surveillance. Opening pressure was measured as less than 12 cm H2O.  11 mL clear CSF removed and sent to the lab for further analysis.  There were no complications.    Patient tolerated procedure well.  See report for further details    Johny Deleon PA-C  Interventional Radiology

## 2025-03-07 NOTE — TELEPHONE ENCOUNTER
Scheduled pt penicillin injection 03/14 and f/u with Marisol 03/18, also mailed letter reminders.     -------Marisol Mena, Soha Block  Pt need clinic f/u in 2 weeks after completing penicillin treatment for neurosyphilis.

## 2025-03-07 NOTE — NURSING TRANSFER
Nursing Transfer Note      3/7/2025   11:45 AM    Nurse giving handoff:TIMUR Morrison PACU  Nurse receiving handoff:TIMUR Machado MSU    Reason patient is being transferred: post anesthesia    Transfer To: 648    Transfer via stretcher    Transported by PCT    Transfer Vital Signs:  Blood Pressure:122/81  Heart Rate:88  O2:96%-- RA  Temperature:97.9  Respirations:17      Medicines sent: n/a    Any special needs or follow-up needed: n/a    Patient belongings transferred with patient: No    Chart send with patient: Yes    Patient reassessed at: 3/7/25@ 1146

## 2025-03-07 NOTE — H&P
Inpatient Radiology Pre-procedure Note    History of Present Illness:  Riaz Guerra Jr. is a 75 y.o. male who presents for lumbar puncture for encephalopathy workup.  Admission H&P reviewed.  Past Medical History:   Diagnosis Date    Depression     Diabetes mellitus     Gout     High cholesterol     Hypertension     PAD (peripheral artery disease) 03/01/2025 2-2025 An arterial duplex suggested moderate stenosis in his right tibial vessels.   Vascular recommend medical therapy.       History reviewed. No pertinent surgical history.    Review of Systems:   As documented in primary team H&P    Home Meds:   Prior to Admission medications    Medication Sig Start Date End Date Taking? Authorizing Provider   amLODIPine (NORVASC) 10 MG tablet Take 1 tablet (10 mg total) by mouth once daily. 11/26/24 2/26/25 Yes Melissa Desouza MD   aspirin (ECOTRIN) 81 MG EC tablet Take 81 mg by mouth once daily.   Yes Provider, Historical   atorvastatin (LIPITOR) 20 MG tablet Take 20 mg by mouth once daily. 9/23/22  Yes Provider, Historical   baclofen (LIORESAL) 10 MG tablet Take 10 mg by mouth 3 (three) times daily. 7/8/22  Yes Provider, Historical   fluticasone propionate (FLONASE) 50 mcg/actuation nasal spray 2 sprays (100 mcg total) by Each Nostril route once daily. 12/10/24  Yes Collette Sahu DNP   gabapentin (NEURONTIN) 800 MG tablet Take 1 tablet (800 mg total) by mouth 2 (two) times daily. 12/13/24  Yes Chiara Poon PA-C   HYDROcodone-acetaminophen (NORCO) 7.5-325 mg per tablet Take 1 tablet by mouth every 8 (eight) hours as needed for Pain. 12/13/24  Yes Chiara Poon PA-C   losartan-hydrochlorothiazide 100-12.5 mg (HYZAAR) 100-12.5 mg Tab Take 1 tablet by mouth once daily. 11/21/24  Yes Provider, Historical   metFORMIN (GLUCOPHAGE) 1000 MG tablet Take 1,000 mg by mouth 2 (two) times daily with meals.   Yes Provider, Historical   polyethylene glycol (GLYCOLAX) 17 gram PwPk Take 17 g by mouth once  "daily. 2/5/25  Yes Hand, Omari FRANK MD   tamsulosin (FLOMAX) 0.4 mg Cap Take by mouth once daily.   Yes Provider, Historical   vitamin D (VITAMIN D3) 1000 units Tab Take 1,000 Units by mouth once daily.   Yes Provider, Historical   sertraline (ZOLOFT) 50 MG tablet Take 1 tablet (50 mg total) by mouth once daily. 2/4/25   Omari Baez MD     Scheduled Meds:    atorvastatin  20 mg Oral Daily    [START ON 3/8/2025] enoxparin  40 mg Subcutaneous Q12H (prophylaxis, 0900/2100)    gabapentin  300 mg Oral QHS    losartan-hydrochlorothiazide 100-12.5 mg  1 tablet Oral Daily    penicillin G potassium 24 Million Units in D5W 500 mL CONTINUOUS INFUSION  24 Million Units Intravenous Q24H    polyethylene glycol  17 g Oral Daily    QUEtiapine  100 mg Oral QHS    tamsulosin  0.8 mg Oral QHS     Continuous Infusions:   PRN Meds:  Current Facility-Administered Medications:     acetaminophen, 1,000 mg, Oral, Q6H PRN    dextrose 50%, 12.5 g, Intravenous, PRN    dextrose 50%, 25 g, Intravenous, PRN    glucagon (human recombinant), 1 mg, Intramuscular, PRN    glucose, 16 g, Oral, PRN    glucose, 24 g, Oral, PRN    HYDROmorphone, 0.5 mg, Intravenous, Q6H PRN    insulin aspart U-100, 0-5 Units, Subcutaneous, Q6H PRN    melatonin, 6 mg, Oral, Nightly PRN    naloxone, 0.02 mg, Intravenous, PRN    OLANZapine, 2.5 mg, Intramuscular, Q8H PRN    oxyCODONE, 5 mg, Oral, Q6H PRN    oxyCODONE, 10 mg, Oral, Q6H PRN    senna-docusate 8.6-50 mg, 1 tablet, Oral, Daily PRN    sodium chloride 0.9%, 10 mL, Intravenous, Q12H PRN  Anticoagulants/Antiplatelets: None    Allergies:   Review of patient's allergies indicates:   Allergen Reactions    Lisinopril Other (See Comments)     cough     Sedation Hx: have not been any systemic reactions    Labs:  No results for input(s): "INR", "PT", "PTT" in the last 168 hours.    Recent Labs   Lab 03/07/25  0352   WBC 9.40   HGB 11.6*   HCT 35.3*   MCV 87         Recent Labs   Lab 03/07/25  0352    "      K 3.7      CO2 21*   BUN 31*   CREATININE 1.5*   CALCIUM 9.5   MG 1.7   ALT 5*   AST 15   ALBUMIN 2.6*   BILITOT 0.6         Vitals:  Temp: 98.1 °F (36.7 °C) (03/07/25 0905)  Pulse: 101 (03/07/25 0905)  Resp: 18 (03/07/25 0905)  BP: (!) 163/98 (03/07/25 0905)  SpO2: 96 % (03/07/25 0905)     Physical Exam:  ASA: Per Anesthesia  Mallampati: Per anesthesia    General: no acute distress  Mental Status: alert and oriented to person and place  HEENT: normocephalic, atraumatic  Chest: unlabored breathing  Heart: regular heart rate  Abdomen: nondistended  Extremity: moves all extremities    Plan: lumbar puncture  Sedation Plan: per anesthesia     Johny Deleon PA-C  Interventional Radiology

## 2025-03-07 NOTE — ASSESSMENT & PLAN NOTE
Patient's FSGs are controlled on current medication regimen.  Last A1c reviewed-   Lab Results   Component Value Date    HGBA1C 6.4 (H) 02/26/2025     Most recent fingerstick glucose reviewed-   Recent Labs   Lab 03/06/25  1647 03/06/25  2114 03/07/25  0747 03/07/25  1112   POCTGLUCOSE 129* 147* 143* 118*       Current correctional scale  Low  Maintain anti-hyperglycemic dose as follows-   Antihyperglycemics (From admission, onward)      Start     Stop Route Frequency Ordered    02/26/25 1715  insulin aspart U-100 pen 0-5 Units         -- SubQ Every 6 hours PRN 02/26/25 1615          Hold Oral hypoglycemics while patient is in the hospital.

## 2025-03-07 NOTE — TRANSFER OF CARE
"Anesthesia Transfer of Care Note    Patient: Riaz Guerra Jr.    Procedure(s) Performed: Procedure(s) (LRB):  Lumbar Puncture (N/A)    Patient location: PACU    Anesthesia Type: general    Transport from OR: Transported from OR on room air with adequate spontaneous ventilation    Post pain: adequate analgesia    Post assessment: no apparent anesthetic complications    Post vital signs: stable    Level of consciousness: awake and alert    Nausea/Vomiting: no nausea/vomiting    Complications: none    Transfer of care protocol was followed    Last vitals: Visit Vitals  BP (!) 163/98 (BP Location: Right arm, Patient Position: Lying)   Pulse 101   Temp 36.7 °C (98.1 °F) (Oral)   Resp 18   Ht 5' 11" (1.803 m)   Wt (!) 139.7 kg (307 lb 15.7 oz)   SpO2 96%   BMI 42.95 kg/m²     "

## 2025-03-07 NOTE — PROGRESS NOTES
[facility] - Surgery ([address] [place] [address] Medicine  Progress Note    Patient Name: Riaz Guerra Jr.  MRN: [id number]  Patient Class: IP- Inpatient   Admission Date: 2/26/2025  Length of Stay: 9 days  Attending Physician: Delano Posada DO  Primary Care Provider: Berhane Alvarez MD        Subjective     Principal Problem:Uremic encephalopathy        HPI:  Riaz Guerra is a 75-year-old man with a medical history of HTN, HLD, insulin dependent DM, CKD, bilateral inguinal hernias, chronic venous insufficiency, obesity, chronic hip/back pain, and wheelchair dependent. He presented to the ED from [facility] ([facility]) due to altered mental status, labored breathing, and refusing medications. Per phone call to [facility], the patient had normal mentation prior to this admission, but had difficulty adjusting to the facility.     At the ED the patient was A/O x2 to person, and time. Reported pain in bilateral shoulders and hip, feeling cold and thirsty. Discoloration was noted on both lower limbs suggestive of venous stasis. Cole catheter was placed due to greater than 400ml urine seen on bladder scan. Labs were significant for KEATON: Cr 4.5 from base line of 1.3. Potassium 7.0, , CO2 15. CT abdomen pelvis negative for hydronephrosis but noted for bilateral basilar atelectasis and 2mm pulmonary nodule along the fissure on the right as well as tree in bud type nodules along the medial aspect of the left lower lobe concerning for possible developing infectious or inflammatory process. CT head negative for acute intracranial changes.    Patient's sister has been contacted about the patient's location and new condition. Per his sister, patient has not been able to ambulate for the last 9 months and has been at the SNF facility for the last two months.           Overview/Hospital Course:  Patinet admitted with MAS, and hyperkalemia on presentation. Potassium was shifted and was given lokelma and calcium  gluconate. Nephrology was consulted and recommendations followed, sodium bicarb, lasix + diuril, and lokelma TID. Hyperkalemia resolved. Patient's mentation waxing and waning. Psychiatry consulted. 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 FTA reactive. Started on IV PCN. LP possibly today (3/7), elevated ESR, CRP. RF normal.    Interval History: Patient doing well on exam. NAEO. LP this AM.     Review of Systems  Objective:     Vital Signs (Most Recent):  Temp: 98.1 °F (36.7 °C) (03/07/25 0905)  Pulse: 101 (03/07/25 0905)  Resp: 18 (03/07/25 0905)  BP: (!) 163/98 (03/07/25 0905)  SpO2: 96 % (03/07/25 0905) Vital Signs (24h Range):  Temp:  [97.2 °F (36.2 °C)-98.9 °F (37.2 °C)] 98.1 °F (36.7 °C)  Pulse:  [] 101  Resp:  [18] 18  SpO2:  [95 %-98 %] 96 %  BP: (152-167)/() 163/98     Weight: (!) 139.7 kg (307 lb 15.7 oz)  Body mass index is 42.95 kg/m².    Intake/Output Summary (Last 24 hours) at 3/7/2025 1040  Last data filed at 3/7/2025 0445  Gross per 24 hour   Intake 489.6 ml   Output 1025 ml   Net -535.4 ml         Physical Exam  Vitals reviewed.   Constitutional:       General: He is not in acute distress.  HENT:      Head: Normocephalic.      Mouth/Throat:      Mouth: Mucous membranes are moist.   Eyes:      Pupils: Pupils are equal, round, and reactive to light.   Cardiovascular:      Rate and Rhythm: Regular rhythm.      Heart sounds: Normal heart sounds.   Pulmonary:      Breath sounds: Normal breath sounds.   Abdominal:      General: Abdomen is flat.   Musculoskeletal:         General: No swelling.      Comments: Chronic skin changes related to venous insufficiency   Skin:     General: Skin is warm.   Neurological:      Mental Status: He is alert and oriented to person, place, and time. Mental status is at baseline.   Psychiatric:         Mood and Affect: Mood normal.               Significant Labs: All pertinent labs within the past 24 hours have been reviewed.  CBC:   Recent Labs   Lab  03/06/25  1139 03/07/25  0352   WBC 10.33 9.40   HGB 11.6* 11.6*   HCT 36.6* 35.3*    309     CMP:   Recent Labs   Lab 03/06/25  1139 03/07/25  0352    139   K 4.1 3.7    106   CO2 20* 21*    107   BUN 36* 31*   CREATININE 1.6* 1.5*   CALCIUM 9.4 9.5   PROT 7.7 7.7   ALBUMIN 2.6* 2.6*   BILITOT 0.6 0.6   ALKPHOS 91 90   AST 12 15   ALT 6* 5*   ANIONGAP 10 12       Significant Imaging: I have reviewed all pertinent imaging results/findings within the past 24 hours.      Assessment & Plan  Uremic encephalopathy  See acute renal failure     Type 2 diabetes mellitus with chronic kidney disease, without long-term current use of insulin  Patient's FSGs are controlled on current medication regimen.  Last A1c reviewed-   Lab Results   Component Value Date    HGBA1C 6.4 (H) 02/26/2025     Most recent fingerstick glucose reviewed-   Recent Labs   Lab 03/06/25  1647 03/06/25  2114 03/07/25  0747 03/07/25  1112   POCTGLUCOSE 129* 147* 143* 118*       Current correctional scale  Low  Maintain anti-hyperglycemic dose as follows-   Antihyperglycemics (From admission, onward)      Start     Stop Route Frequency Ordered    02/26/25 1715  insulin aspart U-100 pen 0-5 Units         -- SubQ Every 6 hours PRN 02/26/25 1615          Hold Oral hypoglycemics while patient is in the hospital.  Chronic pain syndrome  Continue gabapentin  Acute renal failure superimposed on stage 3a chronic kidney disease  KEATON is likely due to pre-renal azotemia due to dehydration. Baseline creatinine is  1.5 . Most recent creatinine and eGFR are listed below.  Recent Labs     03/05/25  0326 03/06/25  1139 03/07/25  0352   CREATININE 2.4* 1.6* 1.5*   EGFRNORACEVR 27.8* 45.2* 48.9*      Plan  - KEATON is improving  - Avoid nephrotoxins and renally dose meds for GFR listed above  - Monitor urine output, serial BMP, and adjust therapy as needed  - nephrology recommendations:      - KEATON secondary to urinary obstruction mild elevation in  creatinine, received fluids today     - f/u outpatient nephrology and urology   Anemia due to stage 3 chronic kidney disease  Anemia is likely due to . Most recent hemoglobin and hematocrit are listed below.  Recent Labs     03/05/25  0326 03/06/25  1139 03/07/25  0352   HGB 10.2* 11.6* 11.6*   HCT 32.2* 36.6* 35.3*     Plan  - Monitor serial CBC:   - Transfuse PRBC if patient becomes hemodynamically unstable, symptomatic or H/H drops below 7/21.  - Patient   - Patient's anemia is currently     Metabolic acidosis, normal anion gap (NAG)  - see renal failure and hyperkalemia    Venous stasis of both lower extremities  - sequential compression device  - wound care for ulcerated leg wounds  - consult PT/OT when encephalopathy resolves  Urine retention  - david placed  - strict input and out  - will monitor output and BMP following fluid challenge    Chronic wounds; BLE  - wound care consulted     Impaired mobility and activities of daily living  History of rapid decline over the last 9 months. Spent 2 months at [facility]. Staff there says patient has had difficulty adjusting.     - Physical therapy says that at this time, patient is functioning at their prior level of function and does not require further acute PT services.   - Occupational therapy: Patient is unable to continue work toward goals because of medical or psychosocial complications. and Therapist determines that the patient will no longer benefit from therapy services.   - will re consult considering patient's dramatic improvement in mentation   Debility  Patient with  debility due to . The patient's latest AMPAC (Activity Measure for Post Acute Care) Score is listed below.    AM-PAC Score - How much help does the patient need for each activity listed  Basic Mobility Total Score: 6  Turning over in bed (including adjusting bedclothes, sheets and blankets)?: Unable  Sitting down on and standing up from a chair with arms (e.g.,  wheelchair, bedside commode, etc.): Unable  Moving from lying on back to sitting on the side of the bed?: Unable  Moving to and from a bed to a chair (including a wheelchair)?: Unable  Need to walk in hospital room?: Unable  Climbing 3-5 steps with a railing?: Unable    Plan    - PT/OT, will reassess needs before discharge        Right hip pain  Continue gabapentin and PRN oxycodone and dilaudid    Severe obesity (BMI >= 40)  Body mass index is 42.95 kg/m². Morbid obesity complicates all aspects of disease management from diagnostic modalities to treatment. Weight loss encouraged and health benefits explained to patient.       Mixed hyperlipidemia      PAD (peripheral artery disease)      Mood disorder  Patient has persistent depression which is unknown and is currently uncontrolled. Will Begin anti-depressant medications. We will not consult psychiatry at this time. Patient does not display psychosis at this time. Continue to monitor closely and adjust plan of care as needed.    - wellbutrin 150 mg daily discontinued  - started quetiapine     Serum positive for Treponema pallidum Antibody  - FTA and GEO pending  - lumbar puncture with anesthesia today  - CSF for VDRL, cell count, glucose, and protein  -ESR , CRP elevated.   ESR raised      CRP elevated      Neurosyphilis      VTE Risk Mitigation (From admission, onward)           Ordered     enoxaparin injection 40 mg  Every 12 hours         03/07/25 0931     IP VTE HIGH RISK PATIENT  Once         02/26/25 1159     Place sequential compression device  Until discontinued         02/26/25 1159                    Discharge Planning   KELSEY: 3/10/2025     Code Status: Full Code   Medical Readiness for Discharge Date:   Discharge Plan A: Return to nursing home   Discharge Delays: None known at this time            Please place Justification for DME        Gabriel Tate MD  Department of Hospital Medicine   [facility] - Surgery (2nd Fl)

## 2025-03-07 NOTE — ASSESSMENT & PLAN NOTE
Anemia is likely due to . Most recent hemoglobin and hematocrit are listed below.  Recent Labs     03/05/25  0326 03/06/25  1139 03/07/25  0352   HGB 10.2* 11.6* 11.6*   HCT 32.2* 36.6* 35.3*     Plan  - Monitor serial CBC:   - Transfuse PRBC if patient becomes hemodynamically unstable, symptomatic or H/H drops below 7/21.  - Patient   - Patient's anemia is currently

## 2025-03-07 NOTE — ASSESSMENT & PLAN NOTE
KEATON is likely due to pre-renal azotemia due to dehydration. Baseline creatinine is 1.5. Most recent creatinine and eGFR are listed below.  Recent Labs     03/05/25  0326 03/06/25  1139 03/07/25  0352   CREATININE 2.4* 1.6* 1.5*   EGFRNORACEVR 27.8* 45.2* 48.9*      Plan  - KEATON is improving  - Avoid nephrotoxins and renally dose meds for GFR listed above  - Monitor urine output, serial BMP, and adjust therapy as needed  - nephrology recommendations:      - KEATON secondary to urinary obstruction mild elevation in creatinine, received fluids today     - f/u outpatient nephrology and urology

## 2025-03-07 NOTE — ASSESSMENT & PLAN NOTE
Body mass index is 42.95 kg/m². Morbid obesity complicates all aspects of disease management from diagnostic modalities to treatment. Weight loss encouraged and health benefits explained to patient.

## 2025-03-07 NOTE — ANESTHESIA PREPROCEDURE EVALUATION
"[facility]  Anesthesia Pre-Operative Evaluation     Patient Name: Riaz Guerra Jr.  YOB: 1950  MRN: [id number]  [facility]: [id number]       Admit Date: 2/26/2025   Admit Team: [facility] 2  Hospital Day: 10  Date of Procedure: 3/7/2025  Anesthesia: General Procedure: Procedure(s) (LRB):  Lumbar Puncture (N/A)  Pre-Operative Diagnosis: Neurosyphilis [A52.3]  Proceduralist:Surgeons and Role:     * Surgeon, Sadia - Primary  Code Status: Full Code   Advanced Directive: <no information>  Isolation Precautions: No active isolations  Capacity: Full capacity     SUBJECTIVE:   Riaz Guerra Jr. is a 75 y.o. male who  has a past medical history of Depression, Diabetes mellitus, Gout, High cholesterol, Hypertension, and PAD (peripheral artery disease) (03/01/2025).  75-year-old male with history of hypertension, hyperlipidemia, insulin-dependent diabetes, CKD, chronic venous insufficiency, obesity, bilateral inguinal hernias, chronic hip and back pain, and non-ambulatory about a year (wheelchair dependent). Admitted from [facility] for AMS and refusing meds (baseline oriented x3). On admit, labs with hyperkalemia (now resolved), and with KEATON, improving. Nephrology consulted. Patient's mentation waxing and waning. 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 During work up for AMS, Treponema IgG and IgM reactive. RPR negative. FTA pending. CT head negative for acute intracranial changes. HIV pending. ID consulted for "persistent AMS post acute renal failure, now stable labs, found to have reactive IgG and IgM treponema, pending RPR and chlamydia + gonorrhea."    Patient oriented to person and place on my exam. Patient is poor historian. He cannot recall if he has ever been tested or treated for syphilis in the past. Denies IVDU. Denies hx of HIV. Denies any complaints of rash, genital sores/lesions, vision changes, eye pain/redness, or neck " pain.    Hospital LOS: 9 days  ICU LOS: Patient does not have an ICU stay during this admission.    he has a current medication list which includes the following long-term medication(s): amlodipine, aspirin, atorvastatin, baclofen, gabapentin, losartan-hydrochlorothiazide 100-12.5 mg, metformin, tamsulosin, and sertraline.   Current Outpatient Medications   Medication Instructions    amLODIPine (NORVASC) 10 mg, Oral, Daily    aspirin (ECOTRIN) 81 mg, Daily    atorvastatin (LIPITOR) 20 mg, Daily    baclofen (LIORESAL) 10 mg, 3 times daily    fluticasone propionate (FLONASE) 100 mcg, Each Nostril, Daily    gabapentin (NEURONTIN) 800 mg, Oral, 2 times daily    HYDROcodone-acetaminophen (NORCO) 7.5-325 mg per tablet 1 tablet, Oral, Every 8 hours PRN    losartan-hydrochlorothiazide 100-12.5 mg (HYZAAR) 100-12.5 mg Tab 1 tablet, Daily    metFORMIN (GLUCOPHAGE) 1,000 mg, 2 times daily with meals    polyethylene glycol (GLYCOLAX) 17 g, Oral, Daily    sertraline (ZOLOFT) 50 mg, Oral, Daily    tamsulosin (FLOMAX) 0.4 mg Cap Daily    vitamin D (VITAMIN D3) 1,000 Units, Daily     ALLERGIES:     Review of patient's allergies indicates:   Allergen Reactions    Lisinopril Other (See Comments)     cough     LDA:   AIRWAY:         [unfilled]     Lines/Drains/Airways       Peripheral Intravenous Line  Duration                  Peripheral IV - Single Lumen 03/07/25 0812 22 G Posterior;Right Hand <1 day                   Anesthesia Evaluation      Airway   Mallampati: II  TM distance: Normal  Neck ROM: Normal ROM  Dental    (+) Intact    Pulmonary    Cardiovascular   Exercise tolerance: poor  (+) hypertension well controlled    Neuro/Psych    (+) psychiatric history    GI/Hepatic/Renal    (+) chronic renal disease CKD    Endo/Other    (+) diabetes mellitus type 2 well controlled  Abdominal                    MEDICATIONS:     Current Outpatient Medications on File Prior to Encounter   Medication Sig Dispense Refill Last Dose/Taking     amLODIPine (NORVASC) 10 MG tablet Take 1 tablet (10 mg total) by mouth once daily.   Taking    aspirin (ECOTRIN) 81 MG EC tablet Take 81 mg by mouth once daily.   Taking    atorvastatin (LIPITOR) 20 MG tablet Take 20 mg by mouth once daily.   Taking    baclofen (LIORESAL) 10 MG tablet Take 10 mg by mouth 3 (three) times daily.   Taking    fluticasone propionate (FLONASE) 50 mcg/actuation nasal spray 2 sprays (100 mcg total) by Each Nostril route once daily. 11.1 mL 0 Taking    gabapentin (NEURONTIN) 800 MG tablet Take 1 tablet (800 mg total) by mouth 2 (two) times daily. 20 tablet 0 Taking    HYDROcodone-acetaminophen (NORCO) 7.5-325 mg per tablet Take 1 tablet by mouth every 8 (eight) hours as needed for Pain. 12 tablet 0 Taking As Needed    losartan-hydrochlorothiazide 100-12.5 mg (HYZAAR) 100-12.5 mg Tab Take 1 tablet by mouth once daily.   Taking    metFORMIN (GLUCOPHAGE) 1000 MG tablet Take 1,000 mg by mouth 2 (two) times daily with meals.   Taking    polyethylene glycol (GLYCOLAX) 17 gram PwPk Take 17 g by mouth once daily.   Taking    tamsulosin (FLOMAX) 0.4 mg Cap Take by mouth once daily.   Taking    vitamin D (VITAMIN D3) 1000 units Tab Take 1,000 Units by mouth once daily.   Taking    sertraline (ZOLOFT) 50 MG tablet Take 1 tablet (50 mg total) by mouth once daily.   Unknown      Inpatient Medications:  Antibiotics (From admission, onward)      Start     Stop Route Frequency Ordered    03/06/25 1630  penicillin G potassium 24 Million Units in D5W 500 mL CONTINUOUS INFUSION         03/16/25 1629 IV Every 24 hours (non-standard times) 03/06/25 1521    02/27/25 1215  mupirocin 2 % ointment  (DECOLONIZATION PROTOCOL ORDERS)         03/04/25 0859 Nasl 2 times daily 02/27/25 1105          VTE Risk Mitigation (From admission, onward)           Ordered     IP VTE HIGH RISK PATIENT  Once         02/26/25 1159     Place sequential compression device  Until discontinued         02/26/25 1159                    atorvastatin  20 mg Oral Daily    gabapentin  300 mg Oral QHS    losartan-hydrochlorothiazide 100-12.5 mg  1 tablet Oral Daily    penicillin G potassium 24 Million Units in D5W 500 mL CONTINUOUS INFUSION  24 Million Units Intravenous Q24H    QUEtiapine  100 mg Oral QHS    tamsulosin  0.8 mg Oral QHS       Current Medications[1]       History:     Active Hospital Problems    Diagnosis  POA    *Uremic encephalopathy [G93.49, N19]  Yes    Neurosyphilis [A52.3]  Yes    ESR raised [R70.0]  Yes    CRP elevated [R79.82]  Yes    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]  Yes    Mood disorder [F39]  Yes    PAD (peripheral artery disease) [I73.9]  Yes     2-2025 An arterial duplex suggested moderate stenosis in his right tibial vessels.   Vascular recommend medical therapy.      Mixed hyperlipidemia [E78.2]  Yes    Severe obesity (BMI >= 40) [E66.01]  Yes    Debility [R53.81]  Yes    Right hip pain [M25.551]  Yes    Impaired mobility and activities of daily living [Z74.09, Z78.9]  Yes    Chronic wounds; BLE [T14.8XXA]  Yes    Urine retention [R33.9]  Yes    Venous stasis of both lower extremities [I87.8]  Yes    Type 2 diabetes mellitus with chronic kidney disease, without long-term current use of insulin [E11.22]  Yes    Chronic pain syndrome [G89.4]  Yes    Metabolic acidosis, normal anion gap (NAG) [E87.20]  Yes    Acute renal failure superimposed on stage 3a chronic kidney disease [N17.9, N18.31]  Yes    Anemia due to stage 3 chronic kidney disease [N18.30, D63.1]  Yes      Resolved Hospital Problems    Diagnosis Date Resolved POA    Hyperkalemia [E87.5] 03/01/2025 Yes     Surgical History:    has no past surgical history on file.   Social History:    reports being sexually active.  reports that he has never smoked. He has never used smokeless tobacco. He reports that he does not currently use alcohol. He reports current drug use. Drug: Marijuana.    Vitals:    03/06/25 2350 03/07/25 0541 03/07/25 0746  "03/07/25 0905   BP: (!) 163/93 (!) 167/96 (!) 152/88 (!) 163/98   BP Location: Right arm Right arm  Right arm   Patient Position: Lying Lying  Lying   Pulse: 96 108 106 101   Resp: 18 18 18 18   Temp: 36.4 °C (97.6 °F) 36.2 °C (97.2 °F) 36.7 °C (98 °F) 36.7 °C (98.1 °F)   TempSrc: Oral Oral  Oral   SpO2: 95% 98% 95% 96%   Weight:    (!) 139.7 kg (307 lb 15.7 oz)   Height:    5' 11" (1.803 m)     Vital Signs Range (Last 24H):  Temp:  [36.2 °C (97.2 °F)-37.2 °C (98.9 °F)]   Pulse:  []   Resp:  [18]   BP: (152-167)/()   SpO2:  [95 %-98 %]     Body mass index is 42.95 kg/m².  Wt Readings from Last 4 Encounters:   03/07/25 (!) 139.7 kg (307 lb 15.7 oz)   02/23/25 (!) 140 kg (308 lb 9.6 oz)   02/03/25 (!) 140 kg (308 lb 10.3 oz)   12/09/24 (!) 140.5 kg (309 lb 11.9 oz)        Intake/Output - Last 3 Shifts         03/05 0700 03/06 0659 03/06 0700 03/07 0659 03/07 0700  03/08 0659    P.O. 864 240     I.V. (mL/kg) 100 (0.7) 249.6 (1.8)     Total Intake(mL/kg) 964 (6.9) 489.6 (3.5)     Urine (mL/kg/hr) 1500 (0.4) 1025 (0.3)     Stool 0      Total Output 1500 1025     Net -536 -535.4            Urine Occurrence  1 x     Stool Occurrence 0 x            Lab Results   Component Value Date    WBC 9.40 03/07/2025    HGB 11.6 (L) 03/07/2025    HCT 35.3 (L) 03/07/2025     03/07/2025     03/07/2025    K 3.7 03/07/2025     03/07/2025    CREATININE 1.5 (H) 03/07/2025    BUN 31 (H) 03/07/2025    CO2 21 (L) 03/07/2025     03/07/2025    CALCIUM 9.5 03/07/2025    MG 1.7 03/07/2025    PHOS 3.2 03/07/2025    ALKPHOS 90 03/07/2025    ALT 5 (L) 03/07/2025    AST 15 03/07/2025    ALBUMIN 2.6 (L) 03/07/2025    HGBA1C 6.4 (H) 02/26/2025    CPK 50 02/26/2025    TROPONINI 0.013 11/21/2024    BNP <10 02/26/2025     Recent Results (from the past 12 hours)   CBC Auto Differential    Collection Time: 03/07/25  3:52 AM   Result Value Ref Range    WBC 9.40 3.90 - 12.70 K/uL    RBC 4.04 (L) 4.60 - 6.20 M/uL    " Hemoglobin 11.6 (L) 14.0 - 18.0 g/dL    Hematocrit 35.3 (L) 40.0 - 54.0 %    MCV 87 82 - 98 fL    MCH 28.7 27.0 - 31.0 pg    MCHC 32.9 32.0 - 36.0 g/dL    RDW 15.4 (H) 11.5 - 14.5 %    Platelets 309 150 - 450 K/uL    MPV 10.4 9.2 - 12.9 fL    Immature Granulocytes 0.4 0.0 - 0.5 %    Gran # (ANC) 6.9 1.8 - 7.7 K/uL    Immature Grans (Abs) 0.04 0.00 - 0.04 K/uL    Lymph # 1.6 1.0 - 4.8 K/uL    Mono # 0.6 0.3 - 1.0 K/uL    Eos # 0.2 0.0 - 0.5 K/uL    Baso # 0.04 0.00 - 0.20 K/uL    nRBC 0 0 /100 WBC    Gran % 73.7 (H) 38.0 - 73.0 %    Lymph % 17.0 (L) 18.0 - 48.0 %    Mono % 6.8 4.0 - 15.0 %    Eosinophil % 1.7 0.0 - 8.0 %    Basophil % 0.4 0.0 - 1.9 %    Differential Method Automated    Magnesium    Collection Time: 03/07/25  3:52 AM   Result Value Ref Range    Magnesium 1.7 1.6 - 2.6 mg/dL   Phosphorus    Collection Time: 03/07/25  3:52 AM   Result Value Ref Range    Phosphorus 3.2 2.7 - 4.5 mg/dL   Comprehensive Metabolic Panel    Collection Time: 03/07/25  3:52 AM   Result Value Ref Range    Sodium 139 136 - 145 mmol/L    Potassium 3.7 3.5 - 5.1 mmol/L    Chloride 106 95 - 110 mmol/L    CO2 21 (L) 23 - 29 mmol/L    Glucose 107 70 - 110 mg/dL    BUN 31 (H) 8 - 23 mg/dL    Creatinine 1.5 (H) 0.5 - 1.4 mg/dL    Calcium 9.5 8.7 - 10.5 mg/dL    Total Protein 7.7 6.0 - 8.4 g/dL    Albumin 2.6 (L) 3.5 - 5.2 g/dL    Total Bilirubin 0.6 0.1 - 1.0 mg/dL    Alkaline Phosphatase 90 40 - 150 U/L    AST 15 10 - 40 U/L    ALT 5 (L) 10 - 44 U/L    eGFR 48.9 (A) >60 mL/min/1.73 m^2    Anion Gap 12 8 - 16 mmol/L   POCT glucose    Collection Time: 03/07/25  7:47 AM   Result Value Ref Range    POCT Glucose 143 (H) 70 - 110 mg/dL     Recent Labs   Lab 03/05/25  0326 03/06/25  1139 03/07/25  0352   WBC 8.54 10.33 9.40   HGB 10.2* 11.6* 11.6*   HCT 32.2* 36.6* 35.3*    313 309   * 137 139   K 4.0 4.1 3.7   CREATININE 2.4* 1.6* 1.5*   GLU 89 106 107     No LMP for male patient.    EKG:   Results for orders placed or performed  during the hospital encounter of 02/26/25   EKG 12-lead    Collection Time: 02/26/25  8:28 AM   Result Value Ref Range    QRS Duration 84 ms    [facility] QTC Calculation 423 ms    Narrative    Test Reason : R41.82,    Vent. Rate :  98 BPM     Atrial Rate :  98 BPM     P-R Int : 176 ms          QRS Dur :  84 ms      QT Int : 332 ms       P-R-T Axes :  69  16  47 degrees    QTcB Int : 423 ms    Normal sinus rhythm  Low voltage QRS  Probable  Inferior infarct (cited on or before 28-Sep-2024)  Abnormal R wave progression in the precordial leads  Abnormal ECG  When compared with ECG of 02-Feb-2025 02:51,  Criteria for Anterior infarct slightly less evident  T wave inversion no longer evident in Inferior leads  T wave inversion no longer evident in Anterior leads  Nonspecific T wave abnormality now evident in Lateral leads  Confirmed by Mika Valera ([id number]) on 2/26/2025 8:41:19 AM    Referred By:            Confirmed By: Mika Valera     TTE:  Results for orders placed during the hospital encounter of 11/21/24    Echo    Interpretation Summary    Left Ventricle: The left ventricle is normal in size. Mildly increased wall thickness. There is normal systolic function with a visually estimated ejection fraction of 55 - 60%.    Right Ventricle: Normal right ventricular cavity size. Wall thickness is normal. Systolic function is normal.    IVC/SVC: Normal venous pressure at 3 mmHg.    ARSALAN:  No results found for this or any previous visit.    Stress Test:  No results found for this or any previous visit.    No results found for this or any previous visit.    LHC:  No results found for this or any previous visit.    Cardiac Device Check   No results found for this or any previous visit.    No results found for this or any previous visit.                                                                                                               03/07/2025  Raiz Salazarkurtis Jordan is a 75 y.o., male.      Pre-op Assessment    I have  reviewed the Patient Summary Reports.       I have reviewed the Medications.     Review of Systems  Anesthesia Hx:  No problems with previous Anesthesia   History of prior surgery of interest to airway management or planning:             Social:  No Alcohol Use, Non-Smoker       Hematology/Oncology:       -- Anemia:                                  Cardiovascular:  Exercise tolerance: poor   Hypertension, well controlled               PAD                     Hypertension         Renal/:  Chronic Renal Disease, CKD   Stage 3     Kidney Function/Disease             Endocrine:  Diabetes, well controlled, type 2    Diabetes                    Obesity / BMI > 30  Psych:  Psychiatric History   Chronic pain syndrome               Physical Exam  General: Well nourished, Cooperative, Alert and Oriented    Airway:  Mallampati: II   Mouth Opening: Normal  TM Distance: Normal  Tongue: Normal  Neck ROM: Normal ROM    Dental:  Intact        Anesthesia Plan  Type of Anesthesia, risks & benefits discussed:    Anesthesia Type: Gen Natural Airway, Gen ETT  Intra-op Monitoring Plan: Standard ASA Monitors  Post Op Pain Control Plan: multimodal analgesia and IV/PO Opioids PRN  Induction:  IV  Airway Plan: , Post-Induction  Informed Consent: Informed consent signed with the Patient and all parties understand the risks and agree with anesthesia plan.  All questions answered.   ASA Score: 3    Ready For Surgery From Anesthesia Perspective.     .           [1]   Current Facility-Administered Medications   Medication Dose Route Frequency Provider Last Rate Last Admin    acetaminophen tablet 1,000 mg  1,000 mg Oral Q6H PRN Leny Lewis MD        atorvastatin tablet 20 mg  20 mg Oral Daily Gabriel Tate MD   20 mg at 03/06/25 0820    dextrose 50% injection 12.5 g  12.5 g Intravenous PRN Gabriel Tate MD        dextrose 50% injection 25 g  25 g Intravenous PRN Gabriel Tate MD        gabapentin capsule 300 mg  300 mg Oral QHS Alexandre Crespo,  MD   300 mg at 03/06/25 2051    glucagon (human recombinant) injection 1 mg  1 mg Intramuscular PRN Gabriel Tate MD        glucose chewable tablet 16 g  16 g Oral PRN Gabriel Tate MD        glucose chewable tablet 24 g  24 g Oral PRN Gabriel Tate MD        HYDROmorphone injection 0.5 mg  0.5 mg Intravenous Q6H PRN Leny Lewis MD        insulin aspart U-100 pen 0-5 Units  0-5 Units Subcutaneous Q6H PRN Gabriel Tate MD        losartan-hydrochlorothiazide 100-12.5 mg per tablet 1 tablet  1 tablet Oral Daily Gabriel Tate MD        melatonin tablet 6 mg  6 mg Oral Nightly PRN Marni Schneider MD   6 mg at 03/04/25 2140    naloxone 0.4 mg/mL injection 0.02 mg  0.02 mg Intravenous PRN Gabriel Tate MD        OLANZapine injection 2.5 mg  2.5 mg Intramuscular Q8H PRN Leny Lewis MD        oxyCODONE immediate release tablet 5 mg  5 mg Oral Q6H PRN Leny Lewis MD        oxyCODONE immediate release tablet Tab 10 mg  10 mg Oral Q6H PRN Leny Lewis MD   10 mg at 03/06/25 2056    penicillin G potassium 24 Million Units in D5W 500 mL CONTINUOUS INFUSION  24 Million Units Intravenous Q24H Gabriel Tate MD 20.8 mL/hr at 03/06/25 1706 24 Million Units at 03/06/25 1706    QUEtiapine tablet 100 mg  100 mg Oral Alexandre Day MD   100 mg at 03/06/25 2051    sodium chloride 0.9% flush 10 mL  10 mL Intravenous Q12H PRN Gabriel Tate MD        tamsulosin 24 hr capsule 0.8 mg  0.8 mg Oral Alexandre Day MD   0.8 mg at 03/06/25 2051

## 2025-03-07 NOTE — SUBJECTIVE & OBJECTIVE
Interval History: Patient doing well on exam. STUART AMADOR this AM.     Review of Systems  Objective:     Vital Signs (Most Recent):  Temp: 98.1 °F (36.7 °C) (03/07/25 0905)  Pulse: 101 (03/07/25 0905)  Resp: 18 (03/07/25 0905)  BP: (!) 163/98 (03/07/25 0905)  SpO2: 96 % (03/07/25 0905) Vital Signs (24h Range):  Temp:  [97.2 °F (36.2 °C)-98.9 °F (37.2 °C)] 98.1 °F (36.7 °C)  Pulse:  [] 101  Resp:  [18] 18  SpO2:  [95 %-98 %] 96 %  BP: (152-167)/() 163/98     Weight: (!) 139.7 kg (307 lb 15.7 oz)  Body mass index is 42.95 kg/m².    Intake/Output Summary (Last 24 hours) at 3/7/2025 1040  Last data filed at 3/7/2025 0445  Gross per 24 hour   Intake 489.6 ml   Output 1025 ml   Net -535.4 ml         Physical Exam  Vitals reviewed.   Constitutional:       General: He is not in acute distress.  HENT:      Head: Normocephalic.      Mouth/Throat:      Mouth: Mucous membranes are moist.   Eyes:      Pupils: Pupils are equal, round, and reactive to light.   Cardiovascular:      Rate and Rhythm: Regular rhythm.      Heart sounds: Normal heart sounds.   Pulmonary:      Breath sounds: Normal breath sounds.   Abdominal:      General: Abdomen is flat.   Musculoskeletal:         General: No swelling.      Comments: Chronic skin changes related to venous insufficiency   Skin:     General: Skin is warm.   Neurological:      Mental Status: He is alert and oriented to person, place, and time. Mental status is at baseline.   Psychiatric:         Mood and Affect: Mood normal.               Significant Labs: All pertinent labs within the past 24 hours have been reviewed.  CBC:   Recent Labs   Lab 03/06/25  1139 03/07/25  0352   WBC 10.33 9.40   HGB 11.6* 11.6*   HCT 36.6* 35.3*    309     CMP:   Recent Labs   Lab 03/06/25  1139 03/07/25  0352    139   K 4.1 3.7    106   CO2 20* 21*    107   BUN 36* 31*   CREATININE 1.6* 1.5*   CALCIUM 9.4 9.5   PROT 7.7 7.7   ALBUMIN 2.6* 2.6*   BILITOT 0.6 0.6   ALKPHOS  91 90   AST 12 15   ALT 6* 5*   ANIONGAP 10 12       Significant Imaging: I have reviewed all pertinent imaging results/findings within the past 24 hours.

## 2025-03-08 PROBLEM — N30.00 ACUTE CYSTITIS WITHOUT HEMATURIA: Status: ACTIVE | Noted: 2025-03-08

## 2025-03-08 PROBLEM — R83.9 CSF ABNORMAL: Status: ACTIVE | Noted: 2025-03-08

## 2025-03-08 LAB
ALBUMIN SERPL BCP-MCNC: 2.7 G/DL (ref 3.5–5.2)
ALP SERPL-CCNC: 91 U/L (ref 40–150)
ALT SERPL W/O P-5'-P-CCNC: 5 U/L (ref 10–44)
ANION GAP SERPL CALC-SCNC: 11 MMOL/L (ref 8–16)
AST SERPL-CCNC: 18 U/L (ref 10–40)
BACTERIA UR CULT: ABNORMAL
BASOPHILS # BLD AUTO: 0.06 K/UL (ref 0–0.2)
BASOPHILS NFR BLD: 0.7 % (ref 0–1.9)
BILIRUB SERPL-MCNC: 0.7 MG/DL (ref 0.1–1)
BUN SERPL-MCNC: 24 MG/DL (ref 8–23)
CALCIUM SERPL-MCNC: 9.4 MG/DL (ref 8.7–10.5)
CHLORIDE SERPL-SCNC: 107 MMOL/L (ref 95–110)
CO2 SERPL-SCNC: 20 MMOL/L (ref 23–29)
CREAT SERPL-MCNC: 1.3 MG/DL (ref 0.5–1.4)
DIFFERENTIAL METHOD BLD: ABNORMAL
EOSINOPHIL # BLD AUTO: 0.2 K/UL (ref 0–0.5)
EOSINOPHIL NFR BLD: 2 % (ref 0–8)
ERYTHROCYTE [DISTWIDTH] IN BLOOD BY AUTOMATED COUNT: 15.3 % (ref 11.5–14.5)
EST. GFR  (NO RACE VARIABLE): 58 ML/MIN/1.73 M^2
GLUCOSE SERPL-MCNC: 110 MG/DL (ref 70–110)
HCT VFR BLD AUTO: 35.9 % (ref 40–54)
HGB BLD-MCNC: 11.7 G/DL (ref 14–18)
IMM GRANULOCYTES # BLD AUTO: 0.03 K/UL (ref 0–0.04)
IMM GRANULOCYTES NFR BLD AUTO: 0.3 % (ref 0–0.5)
LYMPHOCYTES # BLD AUTO: 1.9 K/UL (ref 1–4.8)
LYMPHOCYTES NFR BLD: 21.4 % (ref 18–48)
MAGNESIUM SERPL-MCNC: 1.5 MG/DL (ref 1.6–2.6)
MCH RBC QN AUTO: 28.9 PG (ref 27–31)
MCHC RBC AUTO-ENTMCNC: 32.6 G/DL (ref 32–36)
MCV RBC AUTO: 89 FL (ref 82–98)
MONOCYTES # BLD AUTO: 0.7 K/UL (ref 0.3–1)
MONOCYTES NFR BLD: 7.5 % (ref 4–15)
NEUTROPHILS # BLD AUTO: 6.2 K/UL (ref 1.8–7.7)
NEUTROPHILS NFR BLD: 68.1 % (ref 38–73)
NRBC BLD-RTO: 0 /100 WBC
PHOSPHATE SERPL-MCNC: 3.2 MG/DL (ref 2.7–4.5)
PLATELET # BLD AUTO: 305 K/UL (ref 150–450)
PMV BLD AUTO: 10.4 FL (ref 9.2–12.9)
POCT GLUCOSE: 129 MG/DL (ref 70–110)
POCT GLUCOSE: 134 MG/DL (ref 70–110)
POCT GLUCOSE: 155 MG/DL (ref 70–110)
POCT GLUCOSE: 203 MG/DL (ref 70–110)
POTASSIUM SERPL-SCNC: 3.6 MMOL/L (ref 3.5–5.1)
PROT SERPL-MCNC: 7.9 G/DL (ref 6–8.4)
RBC # BLD AUTO: 4.05 M/UL (ref 4.6–6.2)
SODIUM SERPL-SCNC: 138 MMOL/L (ref 136–145)
WBC # BLD AUTO: 9.07 K/UL (ref 3.9–12.7)

## 2025-03-08 PROCEDURE — 83735 ASSAY OF MAGNESIUM: CPT

## 2025-03-08 PROCEDURE — 63600175 PHARM REV CODE 636 W HCPCS

## 2025-03-08 PROCEDURE — 25000003 PHARM REV CODE 250: Performed by: INTERNAL MEDICINE

## 2025-03-08 PROCEDURE — 87799 DETECT AGENT NOS DNA QUANT: CPT

## 2025-03-08 PROCEDURE — 87798 DETECT AGENT NOS DNA AMP: CPT

## 2025-03-08 PROCEDURE — 87529 HSV DNA AMP PROBE: CPT

## 2025-03-08 PROCEDURE — 84100 ASSAY OF PHOSPHORUS: CPT

## 2025-03-08 PROCEDURE — 97168 OT RE-EVAL EST PLAN CARE: CPT

## 2025-03-08 PROCEDURE — 21400001 HC TELEMETRY ROOM

## 2025-03-08 PROCEDURE — 11000001 HC ACUTE MED/SURG PRIVATE ROOM

## 2025-03-08 PROCEDURE — 25000003 PHARM REV CODE 250

## 2025-03-08 PROCEDURE — 80053 COMPREHEN METABOLIC PANEL: CPT

## 2025-03-08 PROCEDURE — 97165 OT EVAL LOW COMPLEX 30 MIN: CPT

## 2025-03-08 PROCEDURE — 36415 COLL VENOUS BLD VENIPUNCTURE: CPT

## 2025-03-08 PROCEDURE — 97164 PT RE-EVAL EST PLAN CARE: CPT

## 2025-03-08 PROCEDURE — 85025 COMPLETE CBC W/AUTO DIFF WBC: CPT

## 2025-03-08 RX ORDER — CEFEPIME HYDROCHLORIDE 1 G/1
1 INJECTION, POWDER, FOR SOLUTION INTRAMUSCULAR; INTRAVENOUS
Status: DISCONTINUED | OUTPATIENT
Start: 2025-03-08 | End: 2025-03-09

## 2025-03-08 RX ORDER — CEFEPIME HYDROCHLORIDE 1 G/1
1 INJECTION, POWDER, FOR SOLUTION INTRAMUSCULAR; INTRAVENOUS
Status: DISCONTINUED | OUTPATIENT
Start: 2025-03-08 | End: 2025-03-08

## 2025-03-08 RX ORDER — AMLODIPINE BESYLATE 10 MG/1
10 TABLET ORAL DAILY
Status: DISCONTINUED | OUTPATIENT
Start: 2025-03-08 | End: 2025-03-12 | Stop reason: HOSPADM

## 2025-03-08 RX ORDER — MAGNESIUM SULFATE HEPTAHYDRATE 40 MG/ML
2 INJECTION, SOLUTION INTRAVENOUS ONCE
Status: COMPLETED | OUTPATIENT
Start: 2025-03-08 | End: 2025-03-08

## 2025-03-08 RX ORDER — POTASSIUM CHLORIDE 20 MEQ/1
40 TABLET, EXTENDED RELEASE ORAL ONCE
Status: COMPLETED | OUTPATIENT
Start: 2025-03-08 | End: 2025-03-08

## 2025-03-08 RX ADMIN — POTASSIUM CHLORIDE 40 MEQ: 1500 TABLET, EXTENDED RELEASE ORAL at 09:03

## 2025-03-08 RX ADMIN — TAMSULOSIN HYDROCHLORIDE 0.8 MG: 0.4 CAPSULE ORAL at 09:03

## 2025-03-08 RX ADMIN — LOSARTAN POTASSIUM AND HYDROCHLOROTHIAZIDE 1 TABLET: 100; 12.5 TABLET, FILM COATED ORAL at 09:03

## 2025-03-08 RX ADMIN — OXYCODONE 5 MG: 5 TABLET ORAL at 09:03

## 2025-03-08 RX ADMIN — ENOXAPARIN SODIUM 40 MG: 40 INJECTION SUBCUTANEOUS at 09:03

## 2025-03-08 RX ADMIN — GABAPENTIN 300 MG: 300 CAPSULE ORAL at 09:03

## 2025-03-08 RX ADMIN — QUETIAPINE FUMARATE 100 MG: 25 TABLET ORAL at 09:03

## 2025-03-08 RX ADMIN — MAGNESIUM SULFATE HEPTAHYDRATE 2 G: 40 INJECTION, SOLUTION INTRAVENOUS at 09:03

## 2025-03-08 RX ADMIN — ATORVASTATIN CALCIUM 20 MG: 20 TABLET, FILM COATED ORAL at 09:03

## 2025-03-08 RX ADMIN — DEXTROSE MONOHYDRATE 24 MILLION UNITS: 50 INJECTION, SOLUTION INTRAVENOUS at 04:03

## 2025-03-08 RX ADMIN — CEFEPIME 1 G: 1 INJECTION, POWDER, FOR SOLUTION INTRAMUSCULAR; INTRAVENOUS at 04:03

## 2025-03-08 RX ADMIN — CEFEPIME 1 G: 1 INJECTION, POWDER, FOR SOLUTION INTRAMUSCULAR; INTRAVENOUS at 10:03

## 2025-03-08 RX ADMIN — AMLODIPINE BESYLATE 10 MG: 10 TABLET ORAL at 09:03

## 2025-03-08 NOTE — ASSESSMENT & PLAN NOTE
Anemia is likely due to . Most recent hemoglobin and hematocrit are listed below.  Recent Labs     03/06/25  1139 03/07/25  0352 03/08/25  0438   HGB 11.6* 11.6* 11.7*   HCT 36.6* 35.3* 35.9*     Plan  - Monitor serial CBC:   - Transfuse PRBC if patient becomes hemodynamically unstable, symptomatic or H/H drops below 7/21.  - Patient   - Patient's anemia is currently

## 2025-03-08 NOTE — ASSESSMENT & PLAN NOTE
CSF showed borderline slightly elevated Glucose 76, Protein 46. VDRL in process. RBC in CSF could be from procedure. Lymphocyte predominance seen on the cell count differential suggesting aseptic meningitis. CSF culture shows NGTD.       - ordered additional CSF labs (EBV, CMV, VZV, HSV)   - ID made aware and will discuss with their team any further action.

## 2025-03-08 NOTE — ASSESSMENT & PLAN NOTE
Antibiotics (From admission, onward)      Start     Stop Route Frequency Ordered    03/08/25 1000  ceFEPIme injection 1 g         -- IV Every 8 hours (non-standard times) 03/08/25 0851    03/06/25 1630  penicillin G potassium 24 Million Units in D5W 500 mL CONTINUOUS INFUSION         03/16/25 1629 IV Every 24 hours (non-standard times) 03/06/25 1521    02/27/25 1215  mupirocin 2 % ointment  (DECOLONIZATION PROTOCOL ORDERS)         03/04/25 0859 Nasl 2 times daily 02/27/25 1105

## 2025-03-08 NOTE — PLAN OF CARE
Problem: Adult Inpatient Plan of Care  Goal: Plan of Care Review  Outcome: Progressing  Goal: Patient-Specific Goal (Individualized)  Outcome: Progressing  Goal: Absence of Hospital-Acquired Illness or Injury  Outcome: Progressing  Goal: Optimal Comfort and Wellbeing  Outcome: Progressing  Goal: Readiness for Transition of Care  Outcome: Progressing     Problem: Bariatric Environmental Safety  Goal: Safety Maintained with Care  Outcome: Progressing     Problem: Skin Injury Risk Increased  Goal: Skin Health and Integrity  Outcome: Progressing     Problem: Infection  Goal: Absence of Infection Signs and Symptoms  Outcome: Progressing     Problem: Electrolyte Imbalance  Goal: Electrolyte Balance  Outcome: Progressing     Problem: Acute Kidney Injury/Impairment  Goal: Fluid and Electrolyte Balance  Outcome: Progressing  Goal: Improved Oral Intake  Outcome: Progressing  Goal: Effective Renal Function  Outcome: Progressing     Problem: Urinary Retention  Goal: Effective Urinary Elimination  Outcome: Progressing     Problem: Diabetes Comorbidity  Goal: Blood Glucose Level Within Targeted Range  Outcome: Progressing     Problem: Wound  Goal: Optimal Coping  Outcome: Progressing  Goal: Optimal Functional Ability  Outcome: Progressing  Goal: Absence of Infection Signs and Symptoms  Outcome: Progressing  Goal: Improved Oral Intake  Outcome: Progressing  Goal: Optimal Pain Control and Function  Outcome: Progressing  Goal: Skin Health and Integrity  Outcome: Progressing  Goal: Optimal Wound Healing  Outcome: Progressing     Problem: Fall Injury Risk  Goal: Absence of Fall and Fall-Related Injury  Outcome: Progressing

## 2025-03-08 NOTE — PROGRESS NOTES
[facility] Medicine  Progress Note    Patient Name: Riaz Guerra Jr.  MRN: [id number]  Patient Class: IP- Inpatient   Admission Date: 2/26/2025  Length of Stay: 10 days  Attending Physician: Delano Posada DO  Primary Care Provider: Berhane Alvarez MD        Subjective     Principal Problem:Uremic encephalopathy        HPI:  Riaz Guerra is a 75-year-old man with a medical history of HTN, HLD, insulin dependent DM, CKD, bilateral inguinal hernias, chronic venous insufficiency, obesity, chronic hip/back pain, and wheelchair dependent. He presented to the ED from [facility] ([facility]) due to altered mental status, labored breathing, and refusing medications. Per phone call to [facility], the patient had normal mentation prior to this admission, but had difficulty adjusting to the facility.     At the ED the patient was A/O x2 to person, and time. Reported pain in bilateral shoulders and hip, feeling cold and thirsty. Discoloration was noted on both lower limbs suggestive of venous stasis. Cole catheter was placed due to greater than 400ml urine seen on bladder scan. Labs were significant for KEATON: Cr 4.5 from base line of 1.3. Potassium 7.0, , CO2 15. CT abdomen pelvis negative for hydronephrosis but noted for bilateral basilar atelectasis and 2mm pulmonary nodule along the fissure on the right as well as tree in bud type nodules along the medial aspect of the left lower lobe concerning for possible developing infectious or inflammatory process. CT head negative for acute intracranial changes.    Patient's sister has been contacted about the patient's location and new condition. Per his sister, patient has not been able to ambulate for the last 9 months and has been at the SNF facility for the last two months.           Overview/Hospital Course:  Patinet admitted with MAS, and hyperkalemia on presentation. Potassium was shifted and was given lokelma and calcium gluconate.  Nephrology was consulted and recommendations followed, sodium bicarb, lasix + diuril, and lokelma TID. Hyperkalemia resolved. Patient's mentation waxing and waning. Psychiatry consulted. 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 FTA reactive. Started on IV PCN. LP 3/7. CSF results show some evidence for viral meningitis v under treated bacterial meningitis.     Interval History: Unchanged mental status. Patient is stable and alert and oriented to place, time, and situation.     Review of Systems  Objective:     Vital Signs (Most Recent):  Temp: 97.8 °F (36.6 °C) (03/08/25 0716)  Pulse: 94 (03/08/25 0716)  Resp: 18 (03/07/25 1945)  BP: (!) 138/96 (03/08/25 0716)  SpO2: 96 % (03/08/25 0716) Vital Signs (24h Range):  Temp:  [97 °F (36.1 °C)-98.1 °F (36.7 °C)] 97.8 °F (36.6 °C)  Pulse:  [] 94  Resp:  [17-22] 18  SpO2:  [95 %-99 %] 96 %  BP: (108-174)/(79-98) 138/96     Weight: (!) 139.7 kg (307 lb 15.7 oz)  Body mass index is 42.95 kg/m².    Intake/Output Summary (Last 24 hours) at 3/8/2025 0851  Last data filed at 3/7/2025 1114  Gross per 24 hour   Intake 500 ml   Output --   Net 500 ml         Physical Exam  Vitals reviewed.   Constitutional:       General: He is not in acute distress.  HENT:      Head: Normocephalic.      Mouth/Throat:      Mouth: Mucous membranes are moist.   Eyes:      Pupils: Pupils are equal, round, and reactive to light.   Cardiovascular:      Rate and Rhythm: Regular rhythm.      Heart sounds: Normal heart sounds.   Pulmonary:      Breath sounds: Normal breath sounds.   Abdominal:      General: Abdomen is flat.   Musculoskeletal:         General: No swelling.      Comments: Chronic skin changes related to venous insufficiency   Skin:     General: Skin is warm.   Neurological:      Mental Status: He is alert and oriented to person, place, and time. Mental status is at baseline.   Psychiatric:         Mood and Affect: Mood normal.               Significant Labs: All pertinent labs within  the past 24 hours have been reviewed.  CBC:   Recent Labs   Lab 03/06/25  1139 03/07/25  0352 03/08/25  0438   WBC 10.33 9.40 9.07   HGB 11.6* 11.6* 11.7*   HCT 36.6* 35.3* 35.9*    309 305     CMP:   Recent Labs   Lab 03/06/25  1139 03/07/25  0352 03/08/25  0438    139 138   K 4.1 3.7 3.6    106 107   CO2 20* 21* 20*    107 110   BUN 36* 31* 24*   CREATININE 1.6* 1.5* 1.3   CALCIUM 9.4 9.5 9.4   PROT 7.7 7.7 7.9   ALBUMIN 2.6* 2.6* 2.7*   BILITOT 0.6 0.6 0.7   ALKPHOS 91 90 91   AST 12 15 18   ALT 6* 5* 5*   ANIONGAP 10 12 11     Urine culture growing gram negative rods.    Significant Imaging: I have reviewed all pertinent imaging results/findings within the past 24 hours.      Assessment & Plan  Uremic encephalopathy  See acute renal failure     Type 2 diabetes mellitus with chronic kidney disease, without long-term current use of insulin  Patient's FSGs are controlled on current medication regimen.  Last A1c reviewed-   Lab Results   Component Value Date    HGBA1C 6.4 (H) 02/26/2025     Most recent fingerstick glucose reviewed-   Recent Labs   Lab 03/07/25  1217 03/07/25  1552 03/08/25  0715   POCTGLUCOSE 120* 110 129*       Current correctional scale  Low  Maintain anti-hyperglycemic dose as follows-   Antihyperglycemics (From admission, onward)      Start     Stop Route Frequency Ordered    02/26/25 1715  insulin aspart U-100 pen 0-5 Units         -- SubQ Every 6 hours PRN 02/26/25 1615          Hold Oral hypoglycemics while patient is in the hospital.  Chronic pain syndrome  Continue gabapentin  Acute renal failure superimposed on stage 3a chronic kidney disease  KEATON is likely due to pre-renal azotemia due to dehydration. Baseline creatinine is  1.5 . Most recent creatinine and eGFR are listed below.  Recent Labs     03/06/25  1139 03/07/25  0352 03/08/25  0438   CREATININE 1.6* 1.5* 1.3   EGFRNORACEVR 45.2* 48.9* 58.0*      Plan  - KEATON is improving  - Avoid nephrotoxins and renally  dose meds for GFR listed above  - Monitor urine output, serial BMP, and adjust therapy as needed  - nephrology recommendations:      - KEATON secondary to urinary obstruction mild elevation in creatinine, received fluids today     - f/u outpatient nephrology and urology   Anemia due to stage 3 chronic kidney disease  Anemia is likely due to . Most recent hemoglobin and hematocrit are listed below.  Recent Labs     03/06/25  1139 03/07/25  0352 03/08/25  0438   HGB 11.6* 11.6* 11.7*   HCT 36.6* 35.3* 35.9*     Plan  - Monitor serial CBC:   - Transfuse PRBC if patient becomes hemodynamically unstable, symptomatic or H/H drops below 7/21.  - Patient   - Patient's anemia is currently     Metabolic acidosis, normal anion gap (NAG)  - see renal failure and hyperkalemia    Venous stasis of both lower extremities  - sequential compression device  - wound care for ulcerated leg wounds  - consult PT/OT when encephalopathy resolves  Urine retention  - david placed  - strict input and out  - will monitor output and BMP following fluid challenge    Chronic wounds; BLE  - wound care consulted     Impaired mobility and activities of daily living  History of rapid decline over the last 9 months. Spent 2 months at [facility]. Staff there says patient has had difficulty adjusting.     - Physical therapy says that at this time, patient is functioning at their prior level of function and does not require further acute PT services.   - Occupational therapy: Patient is unable to continue work toward goals because of medical or psychosocial complications. and Therapist determines that the patient will no longer benefit from therapy services.   - will re consult considering patient's dramatic improvement in mentation   Debility  Patient with  debility due to . The patient's latest AMPAC (Activity Measure for Post Acute Care) Score is listed below.    AM-PAC Score - How much help does the patient need for each activity  listed  Basic Mobility Total Score: 6  Turning over in bed (including adjusting bedclothes, sheets and blankets)?: Unable  Sitting down on and standing up from a chair with arms (e.g., wheelchair, bedside commode, etc.): Unable  Moving from lying on back to sitting on the side of the bed?: Unable  Moving to and from a bed to a chair (including a wheelchair)?: Unable  Need to walk in hospital room?: Unable  Climbing 3-5 steps with a railing?: Unable    Plan    - PT/OT, will reassess needs before discharge        Right hip pain  Continue gabapentin and PRN oxycodone and dilaudid    Severe obesity (BMI >= 40)  Body mass index is 42.95 kg/m². Morbid obesity complicates all aspects of disease management from diagnostic modalities to treatment. Weight loss encouraged and health benefits explained to patient.       Mixed hyperlipidemia  - continue home atorvastatin     PAD (peripheral artery disease)  75-year-old male nursing home resident who is nonambulatory and history of insulin-dependent diabetes and chronic venous insufficiency admitted to the medicine service with altered mental status. An arterial duplex suggested moderate stenosis in his right tibial vessels.  While he is complaining of bilateral lower extremity pain he is also complaining of back, hip, and upper extremity pain.      -Continue w/ medical therapy in form of preventative wound care, ASA/statin, off loading  - Vascular surgeons do not believe it is ischemic rest pain and in the absence of tissue loss, no indication for surgical intervention.     Mood disorder  Patient has persistent depression which is unknown and is currently uncontrolled. Will Begin anti-depressant medications. We will not consult psychiatry at this time. Patient does not display psychosis at this time. Continue to monitor closely and adjust plan of care as needed.    - wellbutrin 150 mg daily discontinued  - started quetiapine     Serum positive for Treponema pallidum Antibody  -  GEO and FTA reactive   - CSF for VDRL pending (see CSF abnormal)  -ESR , CRP elevated  - IV penicillin for 10 days total   ESR raised      CRP elevated      Neurosyphilis  Antibiotics (From admission, onward)      Start     Stop Route Frequency Ordered    03/08/25 1000  ceFEPIme injection 1 g         -- IV Every 8 hours (non-standard times) 03/08/25 0851    03/06/25 1630  penicillin G potassium 24 Million Units in D5W 500 mL CONTINUOUS INFUSION         03/16/25 1629 IV Every 24 hours (non-standard times) 03/06/25 1521    02/27/25 1215  mupirocin 2 % ointment  (DECOLONIZATION PROTOCOL ORDERS)         03/04/25 0859 Nasl 2 times daily 02/27/25 1105           CSF abnormal  CSF showed borderline slightly elevated Glucose 76, Protein 46. VDRL in process. RBC in CSF could be from procedure. Lymphocyte predominance seen on the cell count differential suggesting aseptic meningitis. CSF culture shows NGTD.       - ordered additional CSF labs (EBV, CMV, VZV, HSV)   - ID made aware and will discuss with their team any further action.   Acute cystitis without hematuria  Urine culture growing gram negative rods. Sensitivities pending.    - currently on cefepime   - will deescalate as necessary pending culture sensitivities      VTE Risk Mitigation (From admission, onward)           Ordered     enoxaparin injection 40 mg  Every 12 hours         03/07/25 0931     IP VTE HIGH RISK PATIENT  Once         02/26/25 1159     Place sequential compression device  Until discontinued         02/26/25 1159                    Discharge Planning   KELSEY: 3/10/2025     Code Status: Full Code   Medical Readiness for Discharge Date:   Discharge Plan A: Return to nursing home   Discharge Delays: None known at this time            Please place Justification for DME        Gabriel Tate MD  Department of Hospital Medicine   [facility]

## 2025-03-08 NOTE — ASSESSMENT & PLAN NOTE
75-year-old male nursing home resident who is nonambulatory and history of insulin-dependent diabetes and chronic venous insufficiency admitted to the medicine service with altered mental status. An arterial duplex suggested moderate stenosis in his right tibial vessels.  While he is complaining of bilateral lower extremity pain he is also complaining of back, hip, and upper extremity pain.      -Continue w/ medical therapy in form of preventative wound care, ASA/statin, off loading  - Vascular surgeons do not believe it is ischemic rest pain and in the absence of tissue loss, no indication for surgical intervention.

## 2025-03-08 NOTE — ASSESSMENT & PLAN NOTE
Patient's FSGs are controlled on current medication regimen.  Last A1c reviewed-   Lab Results   Component Value Date    HGBA1C 6.4 (H) 02/26/2025     Most recent fingerstick glucose reviewed-   Recent Labs   Lab 03/07/25  1217 03/07/25  1552 03/08/25  0715   POCTGLUCOSE 120* 110 129*       Current correctional scale  Low  Maintain anti-hyperglycemic dose as follows-   Antihyperglycemics (From admission, onward)      Start     Stop Route Frequency Ordered    02/26/25 1715  insulin aspart U-100 pen 0-5 Units         -- SubQ Every 6 hours PRN 02/26/25 1615          Hold Oral hypoglycemics while patient is in the hospital.

## 2025-03-08 NOTE — ASSESSMENT & PLAN NOTE
Urine culture growing gram negative rods. Sensitivities pending.    - currently on cefepime   - will deescalate as necessary pending culture sensitivities

## 2025-03-08 NOTE — PLAN OF CARE
Went over plan of care - all questions answered. Pt remains confused. Meds per md order. No falls or injuries. 1 large bm

## 2025-03-08 NOTE — PT/OT/SLP EVAL
Occupational Therapy  Co- ReEvaluation and Discharge Note    Name: Riaz Guerra Jr.  MRN: [id number]  Admitting Diagnosis: Uremic encephalopathy  Recent Surgery: Procedure(s) (LRB):  Lumbar Puncture (N/A) 1 Day Post-Op    Recommendations:     Discharge Recommendations: No Therapy Indicated  Discharge Equipment Recommendations: none  Barriers to discharge:  None    Assessment:     Riaz Guerra Jr. is a 75 y.o. male with a medical diagnosis of Uremic encephalopathy. At this time, patient is functioning at their prior level of function and does not require further acute OT services.     Since initial eval 2/28, pt's dx was updated with findings from team. Given greater understanding of etiology and prognosis, team re-consulted for evaluation to ensure pt has full understanding of his dx and impact on function. Pt was A&Ox4 and cooperative, but conclusively deferred therapeutic intervention with novel information that was provided. He accepts explained impacts upon his long term care needs and is appreciative of opportunity. He is educated on basic bed exercises and encouraged to advocate for his alex tsf >< w/c to engage in natural environment.     OT statement on initial eval stands: Pt has a poor previous baseline prior to this admission where he required total assist for ADL care and primarily stayed bedbound in the nursing home setting. His cognitive status is impaired and demonstrated poor potential to show therapeutic participation and carryover in the acute setting.   Plan:     During this hospitalization, patient does not require further acute OT services.  Please re-consult if situation changes.    Plan of Care Reviewed with: patient    Subjective     Chief Complaint: B LE pain  Patient/Family Comments/goals: To reduce pain    Occupational Profile: per initial eval 2/28  Living Environment: Patient lives in a nursing home with  accessibility for bathrooms.  Prior Level of Function: Prior to  "admission, patient  dependent on 24/7 care and was bedbound. He rarely gets out of the bed per his report. "They used the lift one time 4 months ago and never came back"   Chart review included that Pt was nonambulatory x1 year.   Roles and Routines: Patient was not driving and retired prior to admission.  Equipment Used at Home: hospital bed, alex lift, wheelchair  Assistance Upon Discharge: facility staff    Pain/Comfort:  Pain Rating 1: 8/10  Location - Side 1: Bilateral  Location - Orientation 1: generalized  Location 1: leg  Pain Addressed 1: Cessation of Activity, Reposition, Distraction  Pain Rating Post-Intervention 1: 8/10    Patients cultural, spiritual, Druze conflicts given the current situation: no    Objective:     Communicated with: Nsg prior to session.  Patient found HOB elevated with bed alarm, david catheter, peripheral IV, telemetry upon OT entry to room.    General Precautions: Standard, fall  Orthopedic Precautions: N/A  Braces: N/A  Respiratory Status: Room air     Occupational Performance:    Bed Mobility:    Pt defers all functional mobility.     Activities of Daily Living:  Pt observed using UE for naturalistic tasks (managing covers, wiping face). He reports weakness in Ues limiting IND participation in ADLs requiring totalA at baseline.    Cognitive/Visual Perceptual:  Cognitive/Psychosocial Skills:     -       Oriented to: Person, Place, Time, and Situation   -       Follows Commands/attention:Easily distracted and Follows one-step commands  -       Safety awareness/insight to disability: impaired     Physical Exam:  Postural examination/scapula alignment:    -       Rounded shoulders  -       Forward head  -       Posterior pelvic tilt  Skin integrity: compromised B LE wounds  Edema:  Observed in B LE and hands  Dominant hand:    -       Right  Upper Extremity Range of Motion:     -       Right Upper Extremity: Full passive, 90deg active  -       Left Upper Extremity: Near full " passive, 90deg active  Upper Extremity Strength:    -       Right Upper Extremity: Deficits: lacking functional strength  -       Left Upper Extremity: Deficits: lacking functional strength  Fine Motor Coordination:    -       Impaired       AMPAC 6 Click ADL:  AMPAC Total Score: 9    Treatment & Education:  Education on basic bed exercises and encouraged to advocate for alex tsf to w/c in natural environment for greater occupational participation and social engagement.  -Pt provided education on medical dispositions impact on long term prognosis for performance of ADLs and mobility.   -Provided education regarding role of OT, POC, & discharge recommendations with pt verbalizing understanding.      Pt had no further questions & when asked whether there were any concerns pt reported none.     Patient left HOB elevated with all lines intact, call button in reach, and bed alarm on    GOALS:   Multidisciplinary Problems       Occupational Therapy Goals       Not on file              Multidisciplinary Problems (Resolved)          Problem: Occupational Therapy    Goal Priority Disciplines Outcome Interventions   Occupational Therapy Goal   (Resolved)     OT, PT/OT Met                        DME Justifications:  No DME recommended requiring DME justifications    History:     Past Medical History:   Diagnosis Date    Depression     Diabetes mellitus     Gout     High cholesterol     Hypertension     PAD (peripheral artery disease) 03/01/2025 2-2025 An arterial duplex suggested moderate stenosis in his right tibial vessels.   Vascular recommend medical therapy.           Past Surgical History:   Procedure Laterality Date    LUMBAR PUNCTURE N/A 3/7/2025    Procedure: Lumbar Puncture;  Surgeon: Sadia Santiago;  Location: [facility];  Service: Anesthesiology;  Laterality: N/A;       Time Tracking:     OT Date of Treatment: 03/08/25  OT Start Time: 1022  OT Stop Time: 1030  OT Total Time (min): 8 min    Billable  Minutes:Re-eval 8    3/8/2025

## 2025-03-08 NOTE — ASSESSMENT & PLAN NOTE
- GEO and FTA reactive   - CSF for VDRL pending (see CSF abnormal)  -ESR , CRP elevated  - IV penicillin for 10 days total

## 2025-03-08 NOTE — PT/OT/SLP EVAL
"Physical Therapy Co Re-Evaluation and Discharge Note    Patient Name:  Riaz Guerra Jr.   MRN:  [id number]    Recommendations:     Discharge Recommendations: No Therapy Indicated  Discharge Equipment Recommendations: none   Barriers to discharge: None    Co-eval performed to appropriately and safely assess patient's strength and endurance while facilitating functional tasks in addition to accommodating for patient's activity/pain tolerance.    Assessment:     Riaz Guerra Jr. is a 75 y.o. male admitted with a medical diagnosis of Uremic encephalopathy. At this time, patient is functioning at their prior level of function and does not require further acute PT services.     Pt's dx updated since initial evaluation 2/28. PT/OT returned to discuss how his diagnosis impacts his mobility within PT scope of practice and determine POC. Pt was A&Ox4 and pleasant, but chose to decline therapeutic intervention despite extensive education on mobility prognosis without intervention acutely or post-acutely. Pt educated on bed level exercises and encouraged to advocate for alex transfers to wheelchair at NH to maximize independence in mobility, improve quality of life, and assist in pain management.    Recent Surgery: Procedure(s) (LRB):  Lumbar Puncture (N/A) 1 Day Post-Op    Plan:     During this hospitalization, patient does not require further acute PT services.  Please re-consult if situation changes.      Subjective     Chief Complaint: BLE pain  Patient/Family Comments/goals: "Please do not move my legs"  Pain/Comfort:  Pain Rating 1: 8/10  Location - Side 1: Bilateral  Location - Orientation 1: generalized  Location 1: leg  Pain Addressed 1: Cessation of Activity, Distraction, Reposition  Pain Rating Post-Intervention 1: 8/10    Patients cultural, spiritual, Cheondoism conflicts given the current situation: no    Living Environment:  Per initial eval 2/28, Patient lives in a nursing home with accessibility for " "bathrooms.  Prior Level of Function: Prior to admission, patient  dependent on 24/7 care and was bedbound. He rarely gets out of the bed per his report. "They used the lift one time 4 months ago and never came back"   Chart review included that Pt was nonambulatory x1 year.   Roles and Routines: Patient was not driving and retired prior to admission.  Equipment Used at Home: hospital bed, alex lift, wheelchair  Assistance Upon Discharge: facility staff    Objective:     Communicated with RN prior to session.  Patient found supine with peripheral IV, david catheter, telemetry upon PT entry to room.    General Precautions: Standard, fall    Orthopedic Precautions:N/A   Braces: N/A  Respiratory Status: Room air    Exams:  Cognitive Exam:  Patient is oriented to Person, Place, Time, and Situation  Skin Integrity/Edema:      -       Skin integrity: Compromised BLE wounds  RLE ROM: NT per pt request. Ankle AROM WFL. No other AROM noted.  RLE Strength: Unable to test 2/2 pt request to not move LEs  LLE ROM: NT per pt request. Ankle AROM WFL. No other AROM noted.  LLE Strength: Unable to test 2/2 pt request to not move LEs    Functional Mobility: Pt declining all functional mobility despite encouragement. Provided opportunity to sit EoB. Pt declined.     AM-PAC 6 CLICK MOBILITY  Total Score:24       Treatment and Education:  Educated on how his diagnosis impacts his mobility within PT scope of practice  Educated on bed level exercises and encouraged to advocate for alex transfers to wheelchair at NH to maximize independence in mobility, improve quality of life, and assist in pain management.    Pt denied any questions or needs.     AM-PAC 6 CLICK MOBILITY  Total Score:24     Patient left HOB elevated with all lines intact, call button in reach, and RN notified.    GOALS:   Multidisciplinary Problems       Physical Therapy Goals       Not on file              Multidisciplinary Problems (Resolved)          Problem: Physical " Therapy    Goal Priority Disciplines Outcome Interventions   Physical Therapy Goal   (Resolved)     PT, PT/OT Met                        DME Justifications:  No DME recommended requiring DME justifications    History:     Past Medical History:   Diagnosis Date    Depression     Diabetes mellitus     Gout     High cholesterol     Hypertension     PAD (peripheral artery disease) 03/01/2025 2-2025 An arterial duplex suggested moderate stenosis in his right tibial vessels.   Vascular recommend medical therapy.         Past Surgical History:   Procedure Laterality Date    LUMBAR PUNCTURE N/A 3/7/2025    Procedure: Lumbar Puncture;  Surgeon: Sadia Santiago;  Location: [facility];  Service: Anesthesiology;  Laterality: N/A;       Time Tracking:     PT Received On: 03/08/25  PT Start Time: 1021     PT Stop Time: 1029  PT Total Time (min): 8 min     Billable Minutes: Re-eval 8      03/08/2025

## 2025-03-08 NOTE — ASSESSMENT & PLAN NOTE
KEATON is likely due to pre-renal azotemia due to dehydration. Baseline creatinine is 1.5. Most recent creatinine and eGFR are listed below.  Recent Labs     03/06/25  1139 03/07/25  0352 03/08/25  0438   CREATININE 1.6* 1.5* 1.3   EGFRNORACEVR 45.2* 48.9* 58.0*      Plan  - KEATON is improving  - Avoid nephrotoxins and renally dose meds for GFR listed above  - Monitor urine output, serial BMP, and adjust therapy as needed  - nephrology recommendations:      - KEATON secondary to urinary obstruction mild elevation in creatinine, received fluids today     - f/u outpatient nephrology and urology

## 2025-03-08 NOTE — ASSESSMENT & PLAN NOTE
History of rapid decline over the last 9 months. Spent 2 months at [facility]. Staff there says patient has had difficulty adjusting.     - Physical therapy says that at this time, patient is functioning at their prior level of function and does not require further acute PT services.   - Occupational therapy: Patient is unable to continue work toward goals because of medical or psychosocial complications. and Therapist determines that the patient will no longer benefit from therapy services.   - will re consult considering patient's dramatic improvement in mentation

## 2025-03-09 PROBLEM — N18.31 ACUTE RENAL FAILURE SUPERIMPOSED ON STAGE 3A CHRONIC KIDNEY DISEASE: Status: RESOLVED | Noted: 2024-07-01 | Resolved: 2025-03-09

## 2025-03-09 PROBLEM — N17.9 ACUTE RENAL FAILURE SUPERIMPOSED ON STAGE 3A CHRONIC KIDNEY DISEASE: Status: RESOLVED | Noted: 2024-07-01 | Resolved: 2025-03-09

## 2025-03-09 LAB
POCT GLUCOSE: 122 MG/DL (ref 70–110)
POCT GLUCOSE: 149 MG/DL (ref 70–110)
POCT GLUCOSE: 197 MG/DL (ref 70–110)

## 2025-03-09 PROCEDURE — 87483 CNS DNA AMP PROBE TYPE 12-25: CPT

## 2025-03-09 PROCEDURE — 21400001 HC TELEMETRY ROOM

## 2025-03-09 PROCEDURE — 63600175 PHARM REV CODE 636 W HCPCS

## 2025-03-09 PROCEDURE — 25000003 PHARM REV CODE 250

## 2025-03-09 PROCEDURE — 11000001 HC ACUTE MED/SURG PRIVATE ROOM

## 2025-03-09 PROCEDURE — 25000003 PHARM REV CODE 250: Performed by: INTERNAL MEDICINE

## 2025-03-09 RX ORDER — CEFPODOXIME PROXETIL 100 MG/1
100 TABLET, FILM COATED ORAL EVERY 12 HOURS
Status: DISCONTINUED | OUTPATIENT
Start: 2025-03-09 | End: 2025-03-09

## 2025-03-09 RX ORDER — CEFTRIAXONE 1 G/1
1 INJECTION, POWDER, FOR SOLUTION INTRAMUSCULAR; INTRAVENOUS
Status: DISCONTINUED | OUTPATIENT
Start: 2025-03-09 | End: 2025-03-09

## 2025-03-09 RX ORDER — NITROFURANTOIN 25; 75 MG/1; MG/1
100 CAPSULE ORAL EVERY 12 HOURS
Status: DISCONTINUED | OUTPATIENT
Start: 2025-03-09 | End: 2025-03-12 | Stop reason: HOSPADM

## 2025-03-09 RX ADMIN — ACYCLOVIR SODIUM 750 MG: 50 INJECTION, SOLUTION INTRAVENOUS at 03:03

## 2025-03-09 RX ADMIN — HYDROMORPHONE HYDROCHLORIDE 0.5 MG: 1 INJECTION, SOLUTION INTRAMUSCULAR; INTRAVENOUS; SUBCUTANEOUS at 10:03

## 2025-03-09 RX ADMIN — ACYCLOVIR SODIUM 750 MG: 50 INJECTION, SOLUTION INTRAVENOUS at 11:03

## 2025-03-09 RX ADMIN — NITROFURANTOIN MONOHYDRATE/MACROCRYSTALS 100 MG: 25; 75 CAPSULE ORAL at 10:03

## 2025-03-09 RX ADMIN — ATORVASTATIN CALCIUM 20 MG: 20 TABLET, FILM COATED ORAL at 09:03

## 2025-03-09 RX ADMIN — TAMSULOSIN HYDROCHLORIDE 0.8 MG: 0.4 CAPSULE ORAL at 09:03

## 2025-03-09 RX ADMIN — LOSARTAN POTASSIUM AND HYDROCHLOROTHIAZIDE 1 TABLET: 100; 12.5 TABLET, FILM COATED ORAL at 09:03

## 2025-03-09 RX ADMIN — CEFEPIME 1 G: 1 INJECTION, POWDER, FOR SOLUTION INTRAMUSCULAR; INTRAVENOUS at 03:03

## 2025-03-09 RX ADMIN — HYDROMORPHONE HYDROCHLORIDE 0.5 MG: 1 INJECTION, SOLUTION INTRAMUSCULAR; INTRAVENOUS; SUBCUTANEOUS at 04:03

## 2025-03-09 RX ADMIN — DEXTROSE MONOHYDRATE 24 MILLION UNITS: 50 INJECTION, SOLUTION INTRAVENOUS at 04:03

## 2025-03-09 RX ADMIN — ENOXAPARIN SODIUM 40 MG: 40 INJECTION SUBCUTANEOUS at 09:03

## 2025-03-09 RX ADMIN — ENOXAPARIN SODIUM 40 MG: 40 INJECTION SUBCUTANEOUS at 10:03

## 2025-03-09 RX ADMIN — GABAPENTIN 300 MG: 300 CAPSULE ORAL at 09:03

## 2025-03-09 RX ADMIN — NITROFURANTOIN MONOHYDRATE/MACROCRYSTALS 100 MG: 25; 75 CAPSULE ORAL at 09:03

## 2025-03-09 RX ADMIN — AMLODIPINE BESYLATE 10 MG: 10 TABLET ORAL at 09:03

## 2025-03-09 RX ADMIN — QUETIAPINE FUMARATE 100 MG: 25 TABLET ORAL at 09:03

## 2025-03-09 RX ADMIN — OXYCODONE HYDROCHLORIDE 10 MG: 10 TABLET ORAL at 10:03

## 2025-03-09 RX ADMIN — OXYCODONE HYDROCHLORIDE 10 MG: 10 TABLET ORAL at 06:03

## 2025-03-09 NOTE — PLAN OF CARE
Went over plan of care - all questions answered. Meds per md order. Pt remains confused x 3. No falls or injuries.

## 2025-03-09 NOTE — ASSESSMENT & PLAN NOTE
History of rapid decline over the last 9 months. Spent 2 months at [facility]. Staff there says patient has had difficulty adjusting.     - Physical therapy says that at this time, patient is functioning at their prior level of function and does not require further acute PT services.   - Occupational therapy: Patient is unable to continue work toward goals because of medical or psychosocial complications. and Therapist determines that the patient will no longer benefit from therapy services.   - will re consult considering patient's dramatic improvement in mentation

## 2025-03-09 NOTE — SUBJECTIVE & OBJECTIVE
"Interval History: NAEO. See Hospital Course    Review of Systems  Objective:     Vital Signs (Most Recent):  Temp: 98.1 °F (36.7 °C) (03/09/25 0804)  Pulse: 91 (03/09/25 0804)  Resp: 18 (03/09/25 1037)  BP: (!) 161/94 (03/09/25 0804)  SpO2: 97 % (03/09/25 0804) Vital Signs (24h Range):  Temp:  [97.5 °F (36.4 °C)-98.3 °F (36.8 °C)] 98.1 °F (36.7 °C)  Pulse:  [87-96] 91  Resp:  [15-18] 18  SpO2:  [95 %-98 %] 97 %  BP: (121-165)/(80-96) 161/94     Weight: (!) 139.7 kg (307 lb 15.7 oz)  Body mass index is 42.95 kg/m².    Intake/Output Summary (Last 24 hours) at 3/9/2025 1112  Last data filed at 3/9/2025 0619  Gross per 24 hour   Intake --   Output 900 ml   Net -900 ml         Physical Exam  Vitals reviewed.   Constitutional:       General: He is not in acute distress.  HENT:      Head: Normocephalic.      Mouth/Throat:      Mouth: Mucous membranes are moist.   Eyes:      Pupils: Pupils are equal, round, and reactive to light.   Cardiovascular:      Rate and Rhythm: Regular rhythm.      Heart sounds: Normal heart sounds.   Pulmonary:      Breath sounds: Normal breath sounds.   Abdominal:      General: Abdomen is flat.   Musculoskeletal:         General: No swelling.      Comments: Chronic skin changes related to venous insufficiency   Skin:     General: Skin is warm.   Neurological:      Mental Status: He is alert and oriented to person, place, and time. Mental status is at baseline.   Psychiatric:         Mood and Affect: Mood normal.               Significant Labs: All pertinent labs within the past 24 hours have been reviewed.  CBC:   Recent Labs   Lab 03/08/25  0438   WBC 9.07   HGB 11.7*   HCT 35.9*        CMP:   Recent Labs   Lab 03/08/25  0438      K 3.6      CO2 20*      BUN 24*   CREATININE 1.3   CALCIUM 9.4   PROT 7.9   ALBUMIN 2.7*   BILITOT 0.7   ALKPHOS 91   AST 18   ALT 5*   ANIONGAP 11     Urine Culture: No results for input(s): "LABURIN" in the last 48 hours.    Significant " Imaging: I have reviewed all pertinent imaging results/findings within the past 24 hours.

## 2025-03-09 NOTE — ASSESSMENT & PLAN NOTE
KEATON is likely due to pre-renal azotemia due to dehydration. Baseline creatinine is 1.5. Most recent creatinine and eGFR are listed below.  Recent Labs     03/06/25  1139 03/07/25  0352 03/08/25  0438   CREATININE 1.6* 1.5* 1.3   EGFRNORACEVR 45.2* 48.9* 58.0*      Plan  - KEATON is resolved  - Avoid nephrotoxins and renally dose meds for GFR listed above  - Monitor urine output, serial BMP, and adjust therapy as needed  - nephrology recommendations:      - KEATON secondary to urinary obstruction mild elevation in creatinine, received fluids today     - f/u outpatient nephrology and urology

## 2025-03-09 NOTE — ASSESSMENT & PLAN NOTE
Patient with  debility due to . The patient's latest AMPAC (Activity Measure for Post Acute Care) Score is listed below.    AM-PAC Score - How much help does the patient need for each activity listed  Basic Mobility Total Score: 24  Turning over in bed (including adjusting bedclothes, sheets and blankets)?: None  Sitting down on and standing up from a chair with arms (e.g., wheelchair, bedside commode, etc.): None  Moving from lying on back to sitting on the side of the bed?: None  Moving to and from a bed to a chair (including a wheelchair)?: None  Need to walk in hospital room?: None  Climbing 3-5 steps with a railing?: None    Plan    - PT/OT, will reassess needs before discharge

## 2025-03-09 NOTE — PROGRESS NOTES
[facility] Medicine  Progress Note    Patient Name: Riaz Guerra Jr.  MRN: [id number]  Patient Class: IP- Inpatient   Admission Date: 2/26/2025  Length of Stay: 11 days  Attending Physician: Delano Posada DO  Primary Care Provider: Berhane Alvarez MD        Subjective     Principal Problem:Uremic encephalopathy        HPI:  Riaz Guerra is a 75-year-old man with a medical history of HTN, HLD, insulin dependent DM, CKD, bilateral inguinal hernias, chronic venous insufficiency, obesity, chronic hip/back pain, and wheelchair dependent. He presented to the ED from [facility] () due to altered mental status, labored breathing, and refusing medications. Per phone call to , the patient had normal mentation prior to this admission, but had difficulty adjusting to the facility.     At the ED the patient was A/O x2 to person, and time. Reported pain in bilateral shoulders and hip, feeling cold and thirsty. Discoloration was noted on both lower limbs suggestive of venous stasis. Cole catheter was placed due to greater than 400ml urine seen on bladder scan. Labs were significant for KEATON: Cr 4.5 from base line of 1.3. Potassium 7.0, , CO2 15. CT abdomen pelvis negative for hydronephrosis but noted for bilateral basilar atelectasis and 2mm pulmonary nodule along the fissure on the right as well as tree in bud type nodules along the medial aspect of the left lower lobe concerning for possible developing infectious or inflammatory process. CT head negative for acute intracranial changes.    Patient's sister has been contacted about the patient's location and new condition. Per his sister, patient has not been able to ambulate for the last 9 months and has been at the SNF facility for the last two months.           Overview/Hospital Course:  Patinet admitted with MAS, and hyperkalemia on presentation. Potassium was shifted and was given lokelma and calcium gluconate.  Nephrology was consulted and recommendations followed, sodium bicarb, lasix + diuril, and lokelma TID. Hyperkalemia resolved. Patient's mentation waxing and waning. Psychiatry consulted. 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 FTA reactive. Started on IV PCN. LP 3/7. CSF results show some evidence for viral meningitis v under treated bacterial meningitis, will consider empiric acyclovir.     Interval History: NAEO. See Hospital Course    Review of Systems  Objective:     Vital Signs (Most Recent):  Temp: 98.1 °F (36.7 °C) (03/09/25 0804)  Pulse: 91 (03/09/25 0804)  Resp: 18 (03/09/25 1037)  BP: (!) 161/94 (03/09/25 0804)  SpO2: 97 % (03/09/25 0804) Vital Signs (24h Range):  Temp:  [97.5 °F (36.4 °C)-98.3 °F (36.8 °C)] 98.1 °F (36.7 °C)  Pulse:  [87-96] 91  Resp:  [15-18] 18  SpO2:  [95 %-98 %] 97 %  BP: (121-165)/(80-96) 161/94     Weight: (!) 139.7 kg (307 lb 15.7 oz)  Body mass index is 42.95 kg/m².    Intake/Output Summary (Last 24 hours) at 3/9/2025 1112  Last data filed at 3/9/2025 0619  Gross per 24 hour   Intake --   Output 900 ml   Net -900 ml         Physical Exam  Vitals reviewed.   Constitutional:       General: He is not in acute distress.  HENT:      Head: Normocephalic.      Mouth/Throat:      Mouth: Mucous membranes are moist.   Eyes:      Pupils: Pupils are equal, round, and reactive to light.   Cardiovascular:      Rate and Rhythm: Regular rhythm.      Heart sounds: Normal heart sounds.   Pulmonary:      Breath sounds: Normal breath sounds.   Abdominal:      General: Abdomen is flat.   Musculoskeletal:         General: No swelling.      Comments: Chronic skin changes related to venous insufficiency   Skin:     General: Skin is warm.   Neurological:      Mental Status: He is alert and oriented to person, place, and time. Mental status is at baseline.   Psychiatric:         Mood and Affect: Mood normal.               Significant Labs: All pertinent labs within the past 24 hours have been  "reviewed.  CBC:   Recent Labs   Lab 03/08/25  0438   WBC 9.07   HGB 11.7*   HCT 35.9*        CMP:   Recent Labs   Lab 03/08/25  0438      K 3.6      CO2 20*      BUN 24*   CREATININE 1.3   CALCIUM 9.4   PROT 7.9   ALBUMIN 2.7*   BILITOT 0.7   ALKPHOS 91   AST 18   ALT 5*   ANIONGAP 11     Urine Culture: No results for input(s): "LABURIN" in the last 48 hours.    Significant Imaging: I have reviewed all pertinent imaging results/findings within the past 24 hours.      Assessment & Plan  Uremic encephalopathy  See acute renal failure     Type 2 diabetes mellitus with chronic kidney disease, without long-term current use of insulin  Patient's FSGs are controlled on current medication regimen.  Last A1c reviewed-   Lab Results   Component Value Date    HGBA1C 6.4 (H) 02/26/2025     Most recent fingerstick glucose reviewed-   Recent Labs   Lab 03/08/25  1206 03/08/25  1602 03/08/25  2116 03/09/25  0753   POCTGLUCOSE 203* 134* 155* 122*       Current correctional scale  Low  Maintain anti-hyperglycemic dose as follows-   Antihyperglycemics (From admission, onward)      Start     Stop Route Frequency Ordered    02/26/25 1715  insulin aspart U-100 pen 0-5 Units         -- SubQ Every 6 hours PRN 02/26/25 1615          Hold Oral hypoglycemics while patient is in the hospital.  Chronic pain syndrome  Continue gabapentin  Acute renal failure superimposed on stage 3a chronic kidney disease (Resolved: 3/9/2025)  KEATON is likely due to pre-renal azotemia due to dehydration. Baseline creatinine is  1.5 . Most recent creatinine and eGFR are listed below.  Recent Labs     03/06/25  1139 03/07/25  0352 03/08/25  0438   CREATININE 1.6* 1.5* 1.3   EGFRNORACEVR 45.2* 48.9* 58.0*      Plan  - KEATON is resolved  - Avoid nephrotoxins and renally dose meds for GFR listed above  - Monitor urine output, serial BMP, and adjust therapy as needed  - nephrology recommendations:      - KEATON secondary to urinary obstruction mild " elevation in creatinine, received fluids today     - f/u outpatient nephrology and urology   Anemia due to stage 3 chronic kidney disease  Anemia is likely due to . Most recent hemoglobin and hematocrit are listed below.  Recent Labs     03/06/25  1139 03/07/25  0352 03/08/25  0438   HGB 11.6* 11.6* 11.7*   HCT 36.6* 35.3* 35.9*     Plan  - Monitor serial CBC:   - Transfuse PRBC if patient becomes hemodynamically unstable, symptomatic or H/H drops below 7/21.  - Patient   - Patient's anemia is currently     Metabolic acidosis, normal anion gap (NAG)  - see renal failure and hyperkalemia    Venous stasis of both lower extremities  - sequential compression device  - wound care for ulcerated leg wounds  - consult PT/OT when encephalopathy resolves  Urine retention  - david placed  - strict input and out  - will monitor output and BMP following fluid challenge    Chronic wounds; BLE  - wound care consulted     Impaired mobility and activities of daily living  History of rapid decline over the last 9 months. Spent 2 months at [facility]. Staff there says patient has had difficulty adjusting.     - Physical therapy says that at this time, patient is functioning at their prior level of function and does not require further acute PT services.   - Occupational therapy: Patient is unable to continue work toward goals because of medical or psychosocial complications. and Therapist determines that the patient will no longer benefit from therapy services.   - will re consult considering patient's dramatic improvement in mentation   Debility  Patient with  debility due to . The patient's latest AMPAC (Activity Measure for Post Acute Care) Score is listed below.    AM-PAC Score - How much help does the patient need for each activity listed  Basic Mobility Total Score: 24  Turning over in bed (including adjusting bedclothes, sheets and blankets)?: None  Sitting down on and standing up from a chair with arms  (e.g., wheelchair, bedside commode, etc.): None  Moving from lying on back to sitting on the side of the bed?: None  Moving to and from a bed to a chair (including a wheelchair)?: None  Need to walk in hospital room?: None  Climbing 3-5 steps with a railing?: None    Plan    - PT/OT, will reassess needs before discharge        Right hip pain  Continue gabapentin and PRN oxycodone and dilaudid    Severe obesity (BMI >= 40)  Body mass index is 42.95 kg/m². Morbid obesity complicates all aspects of disease management from diagnostic modalities to treatment. Weight loss encouraged and health benefits explained to patient.       Mixed hyperlipidemia  - continue home atorvastatin     PAD (peripheral artery disease)  75-year-old male nursing home resident who is nonambulatory and history of insulin-dependent diabetes and chronic venous insufficiency admitted to the medicine service with altered mental status. An arterial duplex suggested moderate stenosis in his right tibial vessels.  While he is complaining of bilateral lower extremity pain he is also complaining of back, hip, and upper extremity pain.      -Continue w/ medical therapy in form of preventative wound care, ASA/statin, off loading  - Vascular surgeons do not believe it is ischemic rest pain and in the absence of tissue loss, no indication for surgical intervention.     Mood disorder  Patient has persistent depression which is unknown and is currently uncontrolled. Will Begin anti-depressant medications. We will not consult psychiatry at this time. Patient does not display psychosis at this time. Continue to monitor closely and adjust plan of care as needed.    - wellbutrin 150 mg daily discontinued  - started quetiapine     Serum positive for Treponema pallidum Antibody  - GEO and FTA reactive   - CSF for VDRL pending (see CSF abnormal)  -ESR , CRP elevated  - IV penicillin for 10 days total   ESR raised      CRP elevated      Neurosyphilis  Antibiotics  (From admission, onward)      Start     Stop Route Frequency Ordered    03/09/25 0900  nitrofurantoin (macrocrystal-monohydrate) 100 MG capsule 100 mg         03/13/25 0859 Oral Every 12 hours 03/09/25 0724    03/06/25 1630  penicillin G potassium 24 Million Units in D5W 500 mL CONTINUOUS INFUSION         03/16/25 1629 IV Every 24 hours (non-standard times) 03/06/25 1521    02/27/25 1215  mupirocin 2 % ointment  (DECOLONIZATION PROTOCOL ORDERS)         03/04/25 0859 Nasl 2 times daily 02/27/25 1105           CSF abnormal  CSF showed borderline slightly elevated Glucose 76, Protein 46. VDRL in process. RBC in CSF could be from procedure. Lymphocyte predominance seen on the cell count differential suggesting aseptic meningitis. CSF culture shows NGTD.       - ordered additional CSF labs (EBV, CMV, VZV, HSV)   - ID made aware and will discuss with their team any further action.   Acute cystitis without hematuria  Urine culture growing gram negative rods. Sensitivities pending.    - currently on cefepime   - will deescalate as necessary pending culture sensitivities      VTE Risk Mitigation (From admission, onward)           Ordered     enoxaparin injection 40 mg  Every 12 hours         03/07/25 0931     IP VTE HIGH RISK PATIENT  Once         02/26/25 1159     Place sequential compression device  Until discontinued         02/26/25 1159                    Discharge Planning   KELSEY: 3/10/2025     Code Status: Full Code   Medical Readiness for Discharge Date:   Discharge Plan A: Return to nursing home   Discharge Delays: None known at this time                    Gabriel Tate MD  Department of Hospital Medicine   [facility]

## 2025-03-09 NOTE — ASSESSMENT & PLAN NOTE
Patient's FSGs are controlled on current medication regimen.  Last A1c reviewed-   Lab Results   Component Value Date    HGBA1C 6.4 (H) 02/26/2025     Most recent fingerstick glucose reviewed-   Recent Labs   Lab 03/08/25  1206 03/08/25  1602 03/08/25  2116 03/09/25  0753   POCTGLUCOSE 203* 134* 155* 122*       Current correctional scale  Low  Maintain anti-hyperglycemic dose as follows-   Antihyperglycemics (From admission, onward)      Start     Stop Route Frequency Ordered    02/26/25 1715  insulin aspart U-100 pen 0-5 Units         -- SubQ Every 6 hours PRN 02/26/25 1615          Hold Oral hypoglycemics while patient is in the hospital.

## 2025-03-09 NOTE — ASSESSMENT & PLAN NOTE
Antibiotics (From admission, onward)      Start     Stop Route Frequency Ordered    03/09/25 0900  nitrofurantoin (macrocrystal-monohydrate) 100 MG capsule 100 mg         03/13/25 0859 Oral Every 12 hours 03/09/25 0724    03/06/25 1630  penicillin G potassium 24 Million Units in D5W 500 mL CONTINUOUS INFUSION         03/16/25 1629 IV Every 24 hours (non-standard times) 03/06/25 1521    02/27/25 1215  mupirocin 2 % ointment  (DECOLONIZATION PROTOCOL ORDERS)         03/04/25 0859 Nasl 2 times daily 02/27/25 1105

## 2025-03-10 LAB
ALBUMIN SERPL BCP-MCNC: 2.6 G/DL (ref 3.5–5.2)
ALP SERPL-CCNC: 94 U/L (ref 40–150)
ALT SERPL W/O P-5'-P-CCNC: 10 U/L (ref 10–44)
ANION GAP SERPL CALC-SCNC: 8 MMOL/L (ref 8–16)
AST SERPL-CCNC: 16 U/L (ref 10–40)
BASOPHILS # BLD AUTO: 0.05 K/UL (ref 0–0.2)
BASOPHILS NFR BLD: 0.6 % (ref 0–1.9)
BILIRUB SERPL-MCNC: 0.5 MG/DL (ref 0.1–1)
BUN SERPL-MCNC: 18 MG/DL (ref 8–23)
C GATTII+NEOFOR DNA CSF QL NAA+NON-PROBE: NOT DETECTED
CALCIUM SERPL-MCNC: 9.4 MG/DL (ref 8.7–10.5)
CHLORIDE SERPL-SCNC: 103 MMOL/L (ref 95–110)
CMV DNA CSF QL NAA+NON-PROBE: NOT DETECTED
CO2 SERPL-SCNC: 21 MMOL/L (ref 23–29)
CREAT SERPL-MCNC: 1.1 MG/DL (ref 0.5–1.4)
DIFFERENTIAL METHOD BLD: ABNORMAL
E COLI K1 DNA CSF QL NAA+NON-PROBE: NOT DETECTED
EOSINOPHIL # BLD AUTO: 0.2 K/UL (ref 0–0.5)
EOSINOPHIL NFR BLD: 2 % (ref 0–8)
ERYTHROCYTE [DISTWIDTH] IN BLOOD BY AUTOMATED COUNT: 14.8 % (ref 11.5–14.5)
EST. GFR  (NO RACE VARIABLE): >60 ML/MIN/1.73 M^2
EV RNA CSF QL NAA+NON-PROBE: NOT DETECTED
GLUCOSE SERPL-MCNC: 108 MG/DL (ref 70–110)
GP B STREP DNA CSF QL NAA+NON-PROBE: NOT DETECTED
HAEM INFLU DNA CSF QL NAA+NON-PROBE: NOT DETECTED
HAEM INFLU DNA CSF QL NAA+NON-PROBE: NOT DETECTED
HCT VFR BLD AUTO: 36.6 % (ref 40–54)
HGB BLD-MCNC: 11.9 G/DL (ref 14–18)
HHV6 DNA CSF QL NAA+NON-PROBE: NOT DETECTED
HSV1 DNA CSF QL NAA+NON-PROBE: NOT DETECTED
HSV2 DNA CSF QL NAA+NON-PROBE: NOT DETECTED
IMM GRANULOCYTES # BLD AUTO: 0.03 K/UL (ref 0–0.04)
IMM GRANULOCYTES NFR BLD AUTO: 0.4 % (ref 0–0.5)
LYMPHOCYTES # BLD AUTO: 1.7 K/UL (ref 1–4.8)
LYMPHOCYTES NFR BLD: 21.3 % (ref 18–48)
MAGNESIUM SERPL-MCNC: 1.5 MG/DL (ref 1.6–2.6)
MCH RBC QN AUTO: 28.3 PG (ref 27–31)
MCHC RBC AUTO-ENTMCNC: 32.5 G/DL (ref 32–36)
MCV RBC AUTO: 87 FL (ref 82–98)
MONOCYTES # BLD AUTO: 0.6 K/UL (ref 0.3–1)
MONOCYTES NFR BLD: 7.6 % (ref 4–15)
N MEN DNA CSF QL NAA+NON-PROBE: NOT DETECTED
NEUTROPHILS # BLD AUTO: 5.4 K/UL (ref 1.8–7.7)
NEUTROPHILS NFR BLD: 68.1 % (ref 38–73)
NRBC BLD-RTO: 0 /100 WBC
OHS QRS DURATION: 72 MS
OHS QTC CALCULATION: 451 MS
PARECHOVIRUS A RNA CSF QL NAA+NON-PROBE: NOT DETECTED
PHOSPHATE SERPL-MCNC: 3.2 MG/DL (ref 2.7–4.5)
PLATELET # BLD AUTO: 332 K/UL (ref 150–450)
PMV BLD AUTO: 9.7 FL (ref 9.2–12.9)
POCT GLUCOSE: 120 MG/DL (ref 70–110)
POCT GLUCOSE: 127 MG/DL (ref 70–110)
POCT GLUCOSE: 134 MG/DL (ref 70–110)
POCT GLUCOSE: 134 MG/DL (ref 70–110)
POTASSIUM SERPL-SCNC: 3.7 MMOL/L (ref 3.5–5.1)
PROT SERPL-MCNC: 7.6 G/DL (ref 6–8.4)
RBC # BLD AUTO: 4.2 M/UL (ref 4.6–6.2)
S PNEUM DNA CSF QL NAA+NON-PROBE: NOT DETECTED
SODIUM SERPL-SCNC: 132 MMOL/L (ref 136–145)
VDRL CSF QL: NEGATIVE
VZV DNA CSF QL NAA+NON-PROBE: NOT DETECTED
WBC # BLD AUTO: 7.88 K/UL (ref 3.9–12.7)

## 2025-03-10 PROCEDURE — 63600175 PHARM REV CODE 636 W HCPCS

## 2025-03-10 PROCEDURE — 25000003 PHARM REV CODE 250

## 2025-03-10 PROCEDURE — 80053 COMPREHEN METABOLIC PANEL: CPT

## 2025-03-10 PROCEDURE — 93010 ELECTROCARDIOGRAM REPORT: CPT | Mod: ,,, | Performed by: INTERNAL MEDICINE

## 2025-03-10 PROCEDURE — 21400001 HC TELEMETRY ROOM

## 2025-03-10 PROCEDURE — 25000003 PHARM REV CODE 250: Performed by: INTERNAL MEDICINE

## 2025-03-10 PROCEDURE — 36415 COLL VENOUS BLD VENIPUNCTURE: CPT

## 2025-03-10 PROCEDURE — 93005 ELECTROCARDIOGRAM TRACING: CPT

## 2025-03-10 PROCEDURE — 83735 ASSAY OF MAGNESIUM: CPT

## 2025-03-10 PROCEDURE — 85025 COMPLETE CBC W/AUTO DIFF WBC: CPT

## 2025-03-10 PROCEDURE — 84100 ASSAY OF PHOSPHORUS: CPT

## 2025-03-10 RX ORDER — LANOLIN ALCOHOL/MO/W.PET/CERES
800 CREAM (GRAM) TOPICAL EVERY 4 HOURS
Status: DISCONTINUED | OUTPATIENT
Start: 2025-03-10 | End: 2025-03-10

## 2025-03-10 RX ORDER — NITROFURANTOIN 25; 75 MG/1; MG/1
100 CAPSULE ORAL EVERY 12 HOURS
Qty: 10 CAPSULE | Refills: 0 | Status: SHIPPED | OUTPATIENT
Start: 2025-03-10 | End: 2025-03-11

## 2025-03-10 RX ORDER — MAGNESIUM SULFATE HEPTAHYDRATE 40 MG/ML
2 INJECTION, SOLUTION INTRAVENOUS
Status: COMPLETED | OUTPATIENT
Start: 2025-03-10 | End: 2025-03-10

## 2025-03-10 RX ORDER — VALACYCLOVIR HYDROCHLORIDE 1 G/1
1000 TABLET, FILM COATED ORAL EVERY 8 HOURS
Qty: 30 TABLET | Refills: 0 | Status: SHIPPED | OUTPATIENT
Start: 2025-03-10 | End: 2025-03-11

## 2025-03-10 RX ADMIN — DEXTROSE MONOHYDRATE 24 MILLION UNITS: 50 INJECTION, SOLUTION INTRAVENOUS at 03:03

## 2025-03-10 RX ADMIN — AMLODIPINE BESYLATE 10 MG: 10 TABLET ORAL at 08:03

## 2025-03-10 RX ADMIN — POLYETHYLENE GLYCOL 3350 17 G: 17 POWDER, FOR SOLUTION ORAL at 08:03

## 2025-03-10 RX ADMIN — ENOXAPARIN SODIUM 40 MG: 40 INJECTION SUBCUTANEOUS at 08:03

## 2025-03-10 RX ADMIN — ACYCLOVIR SODIUM 750 MG: 50 INJECTION, SOLUTION INTRAVENOUS at 07:03

## 2025-03-10 RX ADMIN — OXYCODONE HYDROCHLORIDE 10 MG: 10 TABLET ORAL at 05:03

## 2025-03-10 RX ADMIN — OLANZAPINE 2.5 MG: 10 INJECTION, POWDER, FOR SOLUTION INTRAMUSCULAR at 11:03

## 2025-03-10 RX ADMIN — MAGNESIUM SULFATE HEPTAHYDRATE 2 G: 40 INJECTION, SOLUTION INTRAVENOUS at 11:03

## 2025-03-10 RX ADMIN — HYDROMORPHONE HYDROCHLORIDE 0.5 MG: 1 INJECTION, SOLUTION INTRAMUSCULAR; INTRAVENOUS; SUBCUTANEOUS at 08:03

## 2025-03-10 RX ADMIN — LOSARTAN POTASSIUM AND HYDROCHLOROTHIAZIDE 1 TABLET: 100; 12.5 TABLET, FILM COATED ORAL at 08:03

## 2025-03-10 RX ADMIN — OXYCODONE HYDROCHLORIDE 10 MG: 10 TABLET ORAL at 11:03

## 2025-03-10 RX ADMIN — ACYCLOVIR SODIUM 750 MG: 50 INJECTION, SOLUTION INTRAVENOUS at 02:03

## 2025-03-10 RX ADMIN — ENOXAPARIN SODIUM 40 MG: 40 INJECTION SUBCUTANEOUS at 09:03

## 2025-03-10 RX ADMIN — QUETIAPINE FUMARATE 100 MG: 25 TABLET ORAL at 09:03

## 2025-03-10 RX ADMIN — NITROFURANTOIN MONOHYDRATE/MACROCRYSTALS 100 MG: 25; 75 CAPSULE ORAL at 09:03

## 2025-03-10 RX ADMIN — NITROFURANTOIN MONOHYDRATE/MACROCRYSTALS 100 MG: 25; 75 CAPSULE ORAL at 08:03

## 2025-03-10 RX ADMIN — HYDROMORPHONE HYDROCHLORIDE 0.5 MG: 1 INJECTION, SOLUTION INTRAMUSCULAR; INTRAVENOUS; SUBCUTANEOUS at 12:03

## 2025-03-10 RX ADMIN — HYDROMORPHONE HYDROCHLORIDE 0.5 MG: 1 INJECTION, SOLUTION INTRAMUSCULAR; INTRAVENOUS; SUBCUTANEOUS at 06:03

## 2025-03-10 RX ADMIN — MAGNESIUM SULFATE HEPTAHYDRATE 2 G: 40 INJECTION, SOLUTION INTRAVENOUS at 10:03

## 2025-03-10 RX ADMIN — OLANZAPINE 2.5 MG: 10 INJECTION, POWDER, FOR SOLUTION INTRAMUSCULAR at 12:03

## 2025-03-10 RX ADMIN — OXYCODONE HYDROCHLORIDE 10 MG: 10 TABLET ORAL at 04:03

## 2025-03-10 RX ADMIN — GABAPENTIN 300 MG: 300 CAPSULE ORAL at 09:03

## 2025-03-10 RX ADMIN — TAMSULOSIN HYDROCHLORIDE 0.8 MG: 0.4 CAPSULE ORAL at 09:03

## 2025-03-10 RX ADMIN — ATORVASTATIN CALCIUM 20 MG: 20 TABLET, FILM COATED ORAL at 08:03

## 2025-03-10 NOTE — ASSESSMENT & PLAN NOTE
Anemia is likely due to . Most recent hemoglobin and hematocrit are listed below.  Recent Labs     03/08/25  0438 03/10/25  0526   HGB 11.7* 11.9*   HCT 35.9* 36.6*     Plan  - Monitor serial CBC:   - Transfuse PRBC if patient becomes hemodynamically unstable, symptomatic or H/H drops below 7/21.  - Patient   - Patient's anemia is currently

## 2025-03-10 NOTE — ASSESSMENT & PLAN NOTE
Patient's FSGs are controlled on current medication regimen.  Last A1c reviewed-   Lab Results   Component Value Date    HGBA1C 6.4 (H) 02/26/2025     Most recent fingerstick glucose reviewed-   Recent Labs   Lab 03/09/25  1645 03/10/25  0832 03/10/25  1219   POCTGLUCOSE 149* 120* 134*       Current correctional scale  Low  Maintain anti-hyperglycemic dose as follows-   Antihyperglycemics (From admission, onward)      Start     Stop Route Frequency Ordered    02/26/25 1715  insulin aspart U-100 pen 0-5 Units         -- SubQ Every 6 hours PRN 02/26/25 1615          Hold Oral hypoglycemics while patient is in the hospital.

## 2025-03-10 NOTE — ASSESSMENT & PLAN NOTE
Antibiotics (From admission, onward)      Start     Stop Route Frequency Ordered    03/09/25 0900  nitrofurantoin (macrocrystal-monohydrate) 100 MG capsule 100 mg         03/13/25 0859 Oral Every 12 hours 03/09/25 0724    03/06/25 1630  penicillin G potassium 24 Million Units in D5W 500 mL CONTINUOUS INFUSION         03/16/25 1629 IV Every 24 hours (non-standard times) 03/06/25 1521    02/27/25 1215  mupirocin 2 % ointment  (DECOLONIZATION PROTOCOL ORDERS)         03/04/25 0859 Nasl 2 times daily 02/27/25 1105             Patient being treated for neurosyphilis with IV PCN. In addition patient is on empirical abx for viral meningitis/encephalitis. VDLR negative.     Plan  - continue IV pcn for 10 day course  - on IV acyclovir    - transition to PO valacyclovir upon discharge

## 2025-03-10 NOTE — ASSESSMENT & PLAN NOTE
History of rapid decline over the last 9 months. Spent 2 months at [facility]. Staff there says patient has had difficulty adjusting.     - Physical therapy says that at this time, patient is functioning at their prior level of function and does not require further acute PT services.   - Occupational therapy: Patient is unable to continue work toward goals because of medical or psychosocial complications. and Therapist determines that the patient will no longer benefit from therapy services.   - will re consult considering patient's dramatic improvement in mentation

## 2025-03-10 NOTE — NURSING
Pt remains confused and loud, refusing care. Hospital med on call made aware, has zyprexa ordered, will give.

## 2025-03-10 NOTE — ASSESSMENT & PLAN NOTE
Antibiotics (From admission, onward)      Start     Stop Route Frequency Ordered    03/09/25 0900  nitrofurantoin (macrocrystal-monohydrate) 100 MG capsule 100 mg         03/13/25 0859 Oral Every 12 hours 03/09/25 0724    03/06/25 1630  penicillin G potassium 24 Million Units in D5W 500 mL CONTINUOUS INFUSION         03/16/25 1629 IV Every 24 hours (non-standard times) 03/06/25 1521    02/27/25 1215  mupirocin 2 % ointment  (DECOLONIZATION PROTOCOL ORDERS)         03/04/25 0859 Nasl 2 times daily 02/27/25 1105             Patient being treated for neurosyphilis with IV PCN. In addition patient is on empirical abx for viral meningitis/encephalitis    Plan  - continue IV pcn for 10 day course  - on IV acyclovir    - transition to PO valacyclovir upon discharge

## 2025-03-10 NOTE — PROGRESS NOTES
[facility]  Progress Note    Patient Name: Riaz Guerra Jr.  MRN: [id number]  Patient Class: IP- Inpatient   Admission Date: 2/26/2025  Length of Stay: 12 days  Attending Physician: Delano Posada DO  Primary Care Provider: Berhane Alvarez MD        Subjective     Principal Problem:Neurosyphilis        HPI:  Riaz Guerra is a 75-year-old man with a medical history of HTN, HLD, insulin dependent DM, CKD, bilateral inguinal hernias, chronic venous insufficiency, obesity, chronic hip/back pain, and wheelchair dependent. He presented to the ED from [facility] ([facility]) due to altered mental status, labored breathing, and refusing medications. Per phone call to [facility], the patient had normal mentation prior to this admission, but had difficulty adjusting to the facility.     At the ED the patient was A/O x2 to person, and time. Reported pain in bilateral shoulders and hip, feeling cold and thirsty. Discoloration was noted on both lower limbs suggestive of venous stasis. Cole catheter was placed due to greater than 400ml urine seen on bladder scan. Labs were significant for KEATON: Cr 4.5 from base line of 1.3. Potassium 7.0, , CO2 15. CT abdomen pelvis negative for hydronephrosis but noted for bilateral basilar atelectasis and 2mm pulmonary nodule along the fissure on the right as well as tree in bud type nodules along the medial aspect of the left lower lobe concerning for possible developing infectious or inflammatory process. CT head negative for acute intracranial changes.    Patient's sister has been contacted about the patient's location and new condition. Per his sister, patient has not been able to ambulate for the last 9 months and has been at the SNF facility for the last two months.           Overview/Hospital Course:  Patient admitted to hospital medicine for altered mental status KEATON, and hyperkalemia on presentation. Potassium was shifted and was  given lokelma and calcium gluconate. Nephrology was consulted and recommendations followed, sodium bicarb, lasix + diuril, and lokelma TID. Hyperkalemia resolved. Patient's mentation waxing and waning. Psychiatry consulted. 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 FTA reactive. Started on IV PCN. LP 3/7. CSF results show some evidence for viral meningitis with lymphocytic predominance. Infectious disease agreed with starting empiric acyclovir pending HSV results and can be transitioned to PO Valacyclovir 1g q8h for a 10 day course.         Interval History: NAEON. Elevated HR notable on physical exam. No complaints of chest pain, SOB, or pain. EKG sinus tachycardia. Otherwise patient mentation about the same     Review of Systems   Reason unable to perform ROS: AMS.     Objective:     Vital Signs (Most Recent):  Temp: 99 °F (37.2 °C) (03/10/25 1219)  Pulse: 102 (03/10/25 1219)  Resp: 16 (03/10/25 1248)  BP: 123/84 (03/10/25 1219)  SpO2: 100 % (03/10/25 1219) Vital Signs (24h Range):  Temp:  [97.7 °F (36.5 °C)-99 °F (37.2 °C)] 99 °F (37.2 °C)  Pulse:  [100-117] 102  Resp:  [14-20] 16  SpO2:  [93 %-100 %] 100 %  BP: (119-147)/(76-97) 123/84     Weight: (!) 139.7 kg (307 lb 15.7 oz)  Body mass index is 42.95 kg/m².    Intake/Output Summary (Last 24 hours) at 3/10/2025 1347  Last data filed at 3/10/2025 0504  Gross per 24 hour   Intake 930 ml   Output 900 ml   Net 30 ml         Physical Exam  Vitals reviewed.   Constitutional:       General: He is not in acute distress.  HENT:      Head: Normocephalic.      Mouth/Throat:      Mouth: Mucous membranes are moist.   Eyes:      Pupils: Pupils are equal, round, and reactive to light.   Cardiovascular:      Rate and Rhythm: Regular rhythm. Tachycardia present.      Heart sounds: Normal heart sounds.   Pulmonary:      Breath sounds: Normal breath sounds.   Abdominal:      General: Abdomen is flat.   Musculoskeletal:         General: No swelling.      Comments: Chronic skin  changes related to venous insufficiency   Skin:     General: Skin is warm.   Neurological:      Mental Status: He is alert and oriented to person, place, and time. Mental status is at baseline.   Psychiatric:         Mood and Affect: Mood normal.               Significant Labs: All pertinent labs within the past 24 hours have been reviewed.  CBC:   Recent Labs   Lab 03/10/25  0526   WBC 7.88   HGB 11.9*   HCT 36.6*        CMP:   Recent Labs   Lab 03/10/25  0526   *   K 3.7      CO2 21*      BUN 18   CREATININE 1.1   CALCIUM 9.4   PROT 7.6   ALBUMIN 2.6*   BILITOT 0.5   ALKPHOS 94   AST 16   ALT 10   ANIONGAP 8       Significant Imaging: I have reviewed all pertinent imaging results/findings within the past 24 hours.      Assessment & Plan  Uremic encephalopathy  See acute renal failure     Type 2 diabetes mellitus with chronic kidney disease, without long-term current use of insulin  Patient's FSGs are controlled on current medication regimen.  Last A1c reviewed-   Lab Results   Component Value Date    HGBA1C 6.4 (H) 02/26/2025     Most recent fingerstick glucose reviewed-   Recent Labs   Lab 03/09/25  1645 03/10/25  0832 03/10/25  1219   POCTGLUCOSE 149* 120* 134*       Current correctional scale  Low  Maintain anti-hyperglycemic dose as follows-   Antihyperglycemics (From admission, onward)      Start     Stop Route Frequency Ordered    02/26/25 1715  insulin aspart U-100 pen 0-5 Units         -- SubQ Every 6 hours PRN 02/26/25 1615          Hold Oral hypoglycemics while patient is in the hospital.  Chronic pain syndrome  Continue gabapentin  Anemia due to stage 3 chronic kidney disease  Anemia is likely due to . Most recent hemoglobin and hematocrit are listed below.  Recent Labs     03/08/25  0438 03/10/25  0526   HGB 11.7* 11.9*   HCT 35.9* 36.6*     Plan  - Monitor serial CBC:   - Transfuse PRBC if patient becomes hemodynamically unstable, symptomatic or H/H drops below 7/21.  -  Patient   - Patient's anemia is currently     Metabolic acidosis, normal anion gap (NAG)  - see renal failure and hyperkalemia    Venous stasis of both lower extremities  - sequential compression device  - wound care for ulcerated leg wounds  - consult PT/OT when encephalopathy resolves  Urine retention  - david placed  - strict input and out  - good urine output     Chronic wounds; BLE  - wound care consulted     Impaired mobility and activities of daily living  History of rapid decline over the last 9 months. Spent 2 months at [facility]. Staff there says patient has had difficulty adjusting.     - Physical therapy says that at this time, patient is functioning at their prior level of function and does not require further acute PT services.   - Occupational therapy: Patient is unable to continue work toward goals because of medical or psychosocial complications. and Therapist determines that the patient will no longer benefit from therapy services.   - will re consult considering patient's dramatic improvement in mentation   Debility  Patient with  debility due to . The patient's latest AMPAC (Activity Measure for Post Acute Care) Score is listed below.    AM-PAC Score - How much help does the patient need for each activity listed  Basic Mobility Total Score: 24  Turning over in bed (including adjusting bedclothes, sheets and blankets)?: None  Sitting down on and standing up from a chair with arms (e.g., wheelchair, bedside commode, etc.): None  Moving from lying on back to sitting on the side of the bed?: None  Moving to and from a bed to a chair (including a wheelchair)?: None  Need to walk in hospital room?: None  Climbing 3-5 steps with a railing?: None    Plan    - PT/OT, will reassess needs before discharge        Right hip pain  Continue gabapentin and PRN oxycodone and dilaudid    Severe obesity (BMI >= 40)  Body mass index is 42.95 kg/m². Morbid obesity complicates all aspects of  disease management from diagnostic modalities to treatment. Weight loss encouraged and health benefits explained to patient.       Mixed hyperlipidemia  - continue home atorvastatin     PAD (peripheral artery disease)  75-year-old male nursing home resident who is nonambulatory and history of insulin-dependent diabetes and chronic venous insufficiency admitted to the medicine service with altered mental status. An arterial duplex suggested moderate stenosis in his right tibial vessels.  While he is complaining of bilateral lower extremity pain he is also complaining of back, hip, and upper extremity pain.      -Continue w/ medical therapy in form of preventative wound care, ASA/statin, off loading  - Vascular surgeons do not believe it is ischemic rest pain and in the absence of tissue loss, no indication for surgical intervention.     Mood disorder  Patient has persistent depression which is unknown and is currently uncontrolled. Will Begin anti-depressant medications. We will not consult psychiatry at this time. Patient does not display psychosis at this time. Continue to monitor closely and adjust plan of care as needed.    - wellbutrin 150 mg daily discontinued  - started quetiapine     Serum positive for Treponema pallidum Antibody  - GEO and FTA reactive   - CSF for VDRL pending (see CSF abnormal)  -ESR , CRP elevated  - IV penicillin for 10 days total   ESR raised      CRP elevated      Neurosyphilis  Antibiotics (From admission, onward)      Start     Stop Route Frequency Ordered    03/09/25 0900  nitrofurantoin (macrocrystal-monohydrate) 100 MG capsule 100 mg         03/13/25 0859 Oral Every 12 hours 03/09/25 0724    03/06/25 1630  penicillin G potassium 24 Million Units in D5W 500 mL CONTINUOUS INFUSION         03/16/25 1629 IV Every 24 hours (non-standard times) 03/06/25 1521    02/27/25 1215  mupirocin 2 % ointment  (DECOLONIZATION PROTOCOL ORDERS)         03/04/25 0859 Nasl 2 times daily 02/27/25 1105              Patient being treated for neurosyphilis with IV PCN. In addition patient is on empirical abx for viral meningitis/encephalitis    Plan  - continue IV pcn for 10 day course  - on IV acyclovir    - transition to PO valacyclovir upon discharge   CSF abnormal  CSF showed borderline slightly elevated Glucose 76, Protein 46. VDRL in process. RBC in CSF could be from procedure. Lymphocyte predominance seen on the cell count differential suggesting aseptic meningitis. CSF culture shows NGTD.       - ordered additional CSF labs (EBV, CMV, VZV, HSV)   - ID made aware and will discuss with their team any further action.   Acute cystitis without hematuria  Urine culture growing gram negative rods. Sensitivities pending.    - currently on cefepime   - will deescalate as necessary pending culture sensitivities      VTE Risk Mitigation (From admission, onward)           Ordered     enoxaparin injection 40 mg  Every 12 hours         03/07/25 0931     IP VTE HIGH RISK PATIENT  Once         02/26/25 1159     Place sequential compression device  Until discontinued         02/26/25 1159                    Discharge Planning   KELSEY: 3/12/2025     Code Status: Full Code   Medical Readiness for Discharge Date:   Discharge Plan A: Return to nursing home   Discharge Delays: None known at this time                    Frank Thomas MD  Department of Hospital Medicine   [facility]

## 2025-03-10 NOTE — PLAN OF CARE
Problem: Adult Inpatient Plan of Care  Goal: Plan of Care Review  Outcome: Progressing  Flowsheets (Taken 3/10/2025 0501)  Plan of Care Reviewed With: patient     Problem: Confusion Acute  Goal: Optimal Cognitive Function  Outcome: Not Progressing  Intervention: Minimize Contributing Factors  Flowsheets (Taken 3/10/2025 0501)  Reorientation Measures:   clock in view   reorientation provided  Sensory Stimulation Regulation:   quiet environment promoted   care clustered   lighting decreased   television on  Communication Support Strategies:   extra time allowed for response   nonconfrontational techniques utilized

## 2025-03-10 NOTE — SUBJECTIVE & OBJECTIVE
Interval History: NAEON. Elevated HR notable on physical exam. No complaints of chest pain, SOB, or pain. EKG sinus tachycardia. Otherwise patient mentation about the same     Review of Systems   Reason unable to perform ROS: AMS.     Objective:     Vital Signs (Most Recent):  Temp: 99 °F (37.2 °C) (03/10/25 1219)  Pulse: 102 (03/10/25 1219)  Resp: 16 (03/10/25 1248)  BP: 123/84 (03/10/25 1219)  SpO2: 100 % (03/10/25 1219) Vital Signs (24h Range):  Temp:  [97.7 °F (36.5 °C)-99 °F (37.2 °C)] 99 °F (37.2 °C)  Pulse:  [100-117] 102  Resp:  [14-20] 16  SpO2:  [93 %-100 %] 100 %  BP: (119-147)/(76-97) 123/84     Weight: (!) 139.7 kg (307 lb 15.7 oz)  Body mass index is 42.95 kg/m².    Intake/Output Summary (Last 24 hours) at 3/10/2025 1347  Last data filed at 3/10/2025 0504  Gross per 24 hour   Intake 930 ml   Output 900 ml   Net 30 ml         Physical Exam  Vitals reviewed.   Constitutional:       General: He is not in acute distress.  HENT:      Head: Normocephalic.      Mouth/Throat:      Mouth: Mucous membranes are moist.   Eyes:      Pupils: Pupils are equal, round, and reactive to light.   Cardiovascular:      Rate and Rhythm: Regular rhythm. Tachycardia present.      Heart sounds: Normal heart sounds.   Pulmonary:      Breath sounds: Normal breath sounds.   Abdominal:      General: Abdomen is flat.   Musculoskeletal:         General: No swelling.      Comments: Chronic skin changes related to venous insufficiency   Skin:     General: Skin is warm.   Neurological:      Mental Status: He is alert and oriented to person, place, and time. Mental status is at baseline.   Psychiatric:         Mood and Affect: Mood normal.               Significant Labs: All pertinent labs within the past 24 hours have been reviewed.  CBC:   Recent Labs   Lab 03/10/25  0526   WBC 7.88   HGB 11.9*   HCT 36.6*        CMP:   Recent Labs   Lab 03/10/25  0526   *   K 3.7      CO2 21*      BUN 18   CREATININE 1.1    CALCIUM 9.4   PROT 7.6   ALBUMIN 2.6*   BILITOT 0.5   ALKPHOS 94   AST 16   ALT 10   ANIONGAP 8       Significant Imaging: I have reviewed all pertinent imaging results/findings within the past 24 hours.

## 2025-03-10 NOTE — NURSING
Pt is confused, is refusing to be changed, has been wet since I came on this shift. Gets very loud and combative when I attempt to change him. Does not want to be touched. Charge nurse tried to help and does the same thing to him. He needs another iv because of iv meds he's on  and is refusing. I will interrupt the continuous antibiotic to give the other 1 hour iv med. Was med with dilaudid for c/o pain. Will continue to monitor.

## 2025-03-10 NOTE — PLAN OF CARE
NURSING HOME ORDERS    03/11/2025  [facility] - MED SURG  [address] [id number]  Dept: [phone]  Loc: [phone]     Admit to Nursing Home:  Skilled Nursing Facility    Diagnoses:  Active Hospital Problems    Diagnosis  POA    *Neurosyphilis [A52.3]  Yes    CSF abnormal [R83.9]  No    Acute cystitis without hematuria [N30.00]  Yes    ESR raised [R70.0]  Yes    CRP elevated [R79.82]  Yes    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]  Yes    Mood disorder [F39]  Yes    PAD (peripheral artery disease) [I73.9]  Yes     2-2025 An arterial duplex suggested moderate stenosis in his right tibial vessels.   Vascular recommend medical therapy.      Mixed hyperlipidemia [E78.2]  Yes    Uremic encephalopathy [G93.49, N19]  Yes    Severe obesity (BMI >= 40) [E66.01]  Yes    Debility [R53.81]  Yes    Right hip pain [M25.551]  Yes    Impaired mobility and activities of daily living [Z74.09, Z78.9]  Yes    Chronic wounds; BLE [T14.8XXA]  Yes    Urine retention [R33.9]  Yes    Venous stasis of both lower extremities [I87.8]  Yes    Type 2 diabetes mellitus with chronic kidney disease, without long-term current use of insulin [E11.22]  Yes    Chronic pain syndrome [G89.4]  Yes    Metabolic acidosis, normal anion gap (NAG) [E87.20]  Yes    Anemia due to stage 3 chronic kidney disease [N18.30, D63.1]  Yes      Resolved Hospital Problems    Diagnosis Date Resolved POA    Hyperkalemia [E87.5] 03/01/2025 Yes    Acute renal failure superimposed on stage 3a chronic kidney disease [N17.9, N18.31] 03/09/2025 Yes       Patient is homebound due to:  Neurosyphilis    Allergies:  Review of patient's allergies indicates:   Allergen Reactions    Lisinopril Other (See Comments)     cough       Vitals:  Routine    Diet: regular diet    Activities:   Activity as tolerated    Goals of Care Treatment Preferences:  Code Status: Full Code      Labs:  N/A    Nursing Precautions:  Fall  and Pressure ulcer prevention    Consults:   PT to evaluate and treat- 3 times a week, OT to evaluate and treat- 3 times a week, and Wound Care     Miscellaneous Care:                    Diabetes Care:  SN to perform and educate Diabetic management with blood glucose monitoring:      Medications: Discontinue all previous medication orders, if any. See new list below.     Medication List        START taking these medications      D5W SolP 500 mL with penicillin G potassium 5 million unit SolR 24 Million Units  Inject 24 Million Units into the vein daily as a continuous infusion starting on 3/12 at 1700pm for 5 doses ending on 3/17/25.     nitrofurantoin (macrocrystal-monohydrate) 100 MG capsule  Commonly known as: MACROBID  Take 1 capsule (100 mg total) by mouth every 12 (twelve) hours. for 3 days            CONTINUE taking these medications      amLODIPine 10 MG tablet  Commonly known as: NORVASC  Take 1 tablet (10 mg total) by mouth once daily.     aspirin 81 MG EC tablet  Commonly known as: ECOTRIN  Take 81 mg by mouth once daily.     atorvastatin 20 MG tablet  Commonly known as: LIPITOR  Take 20 mg by mouth once daily.     baclofen 10 MG tablet  Commonly known as: LIORESAL  Take 10 mg by mouth 3 (three) times daily.     fluticasone propionate 50 mcg/actuation nasal spray  Commonly known as: FLONASE  2 sprays (100 mcg total) by Each Nostril route once daily.     gabapentin 800 MG tablet  Commonly known as: NEURONTIN  Take 1 tablet (800 mg total) by mouth 2 (two) times daily.     HYDROcodone-acetaminophen 7.5-325 mg per tablet  Commonly known as: NORCO  Take 1 tablet by mouth every 8 (eight) hours as needed for Pain.     losartan-hydrochlorothiazide 100-12.5 mg 100-12.5 mg Tab  Commonly known as: HYZAAR  Take 1 tablet by mouth once daily.     metFORMIN 1000 MG tablet  Commonly known as: GLUCOPHAGE  Take 1,000 mg by mouth 2 (two) times daily with meals.     polyethylene glycol 17 gram Pwpk  Commonly known as:  GLYCOLAX  Take 17 g by mouth once daily.     tamsulosin 0.4 mg Cap  Commonly known as: FLOMAX  Take by mouth once daily.     vitamin D 1000 units Tab  Commonly known as: VITAMIN D3  Take 1,000 Units by mouth once daily.                Immunizations Administered as of 3/11/2025       No immunizations on file.            This patient has had both covid vaccinations    Some patients may experience side effects after vaccination.  These may include fever, headache, muscle or joint aches.  Most symptoms resolve with 24-48 hours and do not require urgent medical evaluation unless they persist for more than 72 hours or symptoms are concerning for an unrelated medical condition.          _________________________________  Delano Posada DO  03/11/2025

## 2025-03-10 NOTE — PLAN OF CARE
"  Problem: Adult Inpatient Plan of Care  Goal: Plan of Care Review  Outcome: Progressing  Goal: Patient-Specific Goal (Individualized)  Outcome: Progressing  Goal: Absence of Hospital-Acquired Illness or Injury  Outcome: Progressing  Goal: Optimal Comfort and Wellbeing  Outcome: Progressing  Goal: Readiness for Transition of Care  Outcome: Progressing     Problem: Bariatric Environmental Safety  Goal: Safety Maintained with Care  Outcome: Progressing     Problem: Skin Injury Risk Increased  Goal: Skin Health and Integrity  Outcome: Progressing     Problem: Infection  Goal: Absence of Infection Signs and Symptoms  Outcome: Progressing     Problem: Urinary Retention  Goal: Effective Urinary Elimination  Outcome: Progressing     Problem: Diabetes Comorbidity  Goal: Blood Glucose Level Within Targeted Range  Outcome: Progressing     Problem: Fall Injury Risk  Goal: Absence of Fall and Fall-Related Injury  Outcome: Progressing  Patient required routine redirection over the course of the shift.  Patient fixated on "finding my papers".  Routinely medicated for pain.  Did not require prn behavioral medications today.      "

## 2025-03-11 PROBLEM — N19 UREMIC ENCEPHALOPATHY: Status: RESOLVED | Noted: 2025-02-26 | Resolved: 2025-03-11

## 2025-03-11 PROBLEM — G93.49 UREMIC ENCEPHALOPATHY: Status: RESOLVED | Noted: 2025-02-26 | Resolved: 2025-03-11

## 2025-03-11 LAB
HSV1, PCR, CSF: NEGATIVE
HSV2, PCR, CSF: NEGATIVE
POCT GLUCOSE: 152 MG/DL (ref 70–110)
POCT GLUCOSE: 162 MG/DL (ref 70–110)
POCT GLUCOSE: 259 MG/DL (ref 70–110)

## 2025-03-11 PROCEDURE — 21400001 HC TELEMETRY ROOM

## 2025-03-11 PROCEDURE — 02HV33Z INSERTION OF INFUSION DEVICE INTO SUPERIOR VENA CAVA, PERCUTANEOUS APPROACH: ICD-10-PCS | Performed by: STUDENT IN AN ORGANIZED HEALTH CARE EDUCATION/TRAINING PROGRAM

## 2025-03-11 PROCEDURE — 63600175 PHARM REV CODE 636 W HCPCS

## 2025-03-11 PROCEDURE — 25000003 PHARM REV CODE 250

## 2025-03-11 PROCEDURE — 76937 US GUIDE VASCULAR ACCESS: CPT

## 2025-03-11 PROCEDURE — 36573 INSJ PICC RS&I 5 YR+: CPT

## 2025-03-11 PROCEDURE — C1751 CATH, INF, PER/CENT/MIDLINE: HCPCS

## 2025-03-11 PROCEDURE — 25000003 PHARM REV CODE 250: Performed by: INTERNAL MEDICINE

## 2025-03-11 PROCEDURE — 63600175 PHARM REV CODE 636 W HCPCS: Mod: JZ,TB

## 2025-03-11 RX ORDER — HALOPERIDOL LACTATE 5 MG/ML
5 INJECTION, SOLUTION INTRAMUSCULAR ONCE
Status: DISCONTINUED | OUTPATIENT
Start: 2025-03-11 | End: 2025-03-11

## 2025-03-11 RX ORDER — NITROFURANTOIN 25; 75 MG/1; MG/1
100 CAPSULE ORAL EVERY 12 HOURS
Start: 2025-03-11 | End: 2025-03-14

## 2025-03-11 RX ORDER — SODIUM CHLORIDE 0.9 % (FLUSH) 0.9 %
10 SYRINGE (ML) INJECTION EVERY 12 HOURS PRN
Status: DISCONTINUED | OUTPATIENT
Start: 2025-03-11 | End: 2025-03-12 | Stop reason: HOSPADM

## 2025-03-11 RX ORDER — VALACYCLOVIR HYDROCHLORIDE 1 G/1
1000 TABLET, FILM COATED ORAL EVERY 8 HOURS
Start: 2025-03-11 | End: 2025-03-11 | Stop reason: HOSPADM

## 2025-03-11 RX ORDER — HALOPERIDOL LACTATE 5 MG/ML
5 INJECTION, SOLUTION INTRAMUSCULAR EVERY 6 HOURS PRN
Status: DISCONTINUED | OUTPATIENT
Start: 2025-03-11 | End: 2025-03-12 | Stop reason: HOSPADM

## 2025-03-11 RX ADMIN — ENOXAPARIN SODIUM 40 MG: 40 INJECTION SUBCUTANEOUS at 09:03

## 2025-03-11 RX ADMIN — OXYCODONE HYDROCHLORIDE 10 MG: 10 TABLET ORAL at 12:03

## 2025-03-11 RX ADMIN — ACYCLOVIR SODIUM 750 MG: 50 INJECTION, SOLUTION INTRAVENOUS at 07:03

## 2025-03-11 RX ADMIN — AMLODIPINE BESYLATE 10 MG: 10 TABLET ORAL at 08:03

## 2025-03-11 RX ADMIN — LOSARTAN POTASSIUM AND HYDROCHLOROTHIAZIDE 1 TABLET: 100; 12.5 TABLET, FILM COATED ORAL at 08:03

## 2025-03-11 RX ADMIN — NITROFURANTOIN MONOHYDRATE/MACROCRYSTALS 100 MG: 25; 75 CAPSULE ORAL at 08:03

## 2025-03-11 RX ADMIN — POLYETHYLENE GLYCOL 3350 17 G: 17 POWDER, FOR SOLUTION ORAL at 08:03

## 2025-03-11 RX ADMIN — ATORVASTATIN CALCIUM 20 MG: 20 TABLET, FILM COATED ORAL at 08:03

## 2025-03-11 RX ADMIN — TAMSULOSIN HYDROCHLORIDE 0.8 MG: 0.4 CAPSULE ORAL at 09:03

## 2025-03-11 RX ADMIN — GABAPENTIN 300 MG: 300 CAPSULE ORAL at 09:03

## 2025-03-11 RX ADMIN — DEXTROSE MONOHYDRATE 24 MILLION UNITS: 50 INJECTION, SOLUTION INTRAVENOUS at 03:03

## 2025-03-11 RX ADMIN — HYDROMORPHONE HYDROCHLORIDE 0.5 MG: 1 INJECTION, SOLUTION INTRAMUSCULAR; INTRAVENOUS; SUBCUTANEOUS at 08:03

## 2025-03-11 RX ADMIN — HYDROMORPHONE HYDROCHLORIDE 0.5 MG: 1 INJECTION, SOLUTION INTRAMUSCULAR; INTRAVENOUS; SUBCUTANEOUS at 01:03

## 2025-03-11 RX ADMIN — HYDROMORPHONE HYDROCHLORIDE 0.5 MG: 1 INJECTION, SOLUTION INTRAMUSCULAR; INTRAVENOUS; SUBCUTANEOUS at 06:03

## 2025-03-11 RX ADMIN — NITROFURANTOIN MONOHYDRATE/MACROCRYSTALS 100 MG: 25; 75 CAPSULE ORAL at 09:03

## 2025-03-11 RX ADMIN — OXYCODONE HYDROCHLORIDE 10 MG: 10 TABLET ORAL at 07:03

## 2025-03-11 RX ADMIN — INSULIN ASPART 3 UNITS: 100 INJECTION, SOLUTION INTRAVENOUS; SUBCUTANEOUS at 03:03

## 2025-03-11 RX ADMIN — OXYCODONE HYDROCHLORIDE 10 MG: 10 TABLET ORAL at 05:03

## 2025-03-11 RX ADMIN — QUETIAPINE FUMARATE 100 MG: 25 TABLET ORAL at 09:03

## 2025-03-11 RX ADMIN — ENOXAPARIN SODIUM 40 MG: 40 INJECTION SUBCUTANEOUS at 08:03

## 2025-03-11 RX ADMIN — Medication 6 MG: at 08:03

## 2025-03-11 RX ADMIN — HALOPERIDOL LACTATE 5 MG: 5 INJECTION, SOLUTION INTRAMUSCULAR at 01:03

## 2025-03-11 NOTE — CONSULTS
[facility] VASCULAR ACCESS NOTE       Bed:648/648 A    Double Lumen PICC Inserted to Right Brachial vein using Ultrasound Guidance and Modified Seldinger Technique.    Vessel image recorded and saved to EMR.    Indication: IV ABX    Length: 40 cm  A/C: 38 cm  Exposed: 0 cm    Lot #: [id number]  Attempts: 1       Lizeth Alvarez LPN

## 2025-03-11 NOTE — SUBJECTIVE & OBJECTIVE
Interval History: NAEO    Review of Systems   Unable to perform ROS: Mental status change   Psychiatric/Behavioral:  Positive for confusion.      Objective:     Vital Signs (Most Recent):  Temp: 98.4 °F (36.9 °C) (03/11/25 1128)  Pulse: (!) 112 (03/11/25 1128)  Resp: 18 (03/11/25 1128)  BP: (!) 148/96 (03/11/25 1128)  SpO2: 96 % (03/11/25 1128) Vital Signs (24h Range):  Temp:  [98 °F (36.7 °C)-99 °F (37.2 °C)] 98.4 °F (36.9 °C)  Pulse:  [102-117] 112  Resp:  [14-20] 18  SpO2:  [96 %-100 %] 96 %  BP: (123-162)/(62-98) 148/96     Weight: (!) 139.7 kg (307 lb 15.7 oz)  Body mass index is 42.95 kg/m².    Intake/Output Summary (Last 24 hours) at 3/11/2025 1156  Last data filed at 3/10/2025 2250  Gross per 24 hour   Intake 100 ml   Output --   Net 100 ml         Physical Exam  Vitals and nursing note reviewed.   Constitutional:       General: He is not in acute distress.     Appearance: He is normal weight. He is not toxic-appearing.   HENT:      Head: Normocephalic and atraumatic.   Eyes:      Extraocular Movements: Extraocular movements intact.      Pupils: Pupils are equal, round, and reactive to light.   Cardiovascular:      Rate and Rhythm: Normal rate and regular rhythm.   Pulmonary:      Effort: Pulmonary effort is normal. No respiratory distress.      Breath sounds: No stridor.   Abdominal:      General: Abdomen is flat. There is no distension.   Musculoskeletal:      Cervical back: Normal range of motion and neck supple.   Skin:     General: Skin is warm and dry.   Neurological:      General: No focal deficit present.      Mental Status: He is alert. He is disoriented.      GCS: GCS eye subscore is 4. GCS verbal subscore is 5. GCS motor subscore is 6.      Comments: Oriented to place and year, not situation.   Psychiatric:         Mood and Affect: Mood normal.         Behavior: Behavior normal.               Significant Labs: All pertinent labs within the past 24 hours have been reviewed.    Significant Imaging:  I have reviewed all pertinent imaging results/findings within the past 24 hours.

## 2025-03-11 NOTE — ASSESSMENT & PLAN NOTE
Anemia is likely due to . Most recent hemoglobin and hematocrit are listed below.  Recent Labs     03/10/25  0526   HGB 11.9*   HCT 36.6*     Plan  - Monitor serial CBC:   - Transfuse PRBC if patient becomes hemodynamically unstable, symptomatic or H/H drops below 7/21.  - Patient   - Patient's anemia is currently

## 2025-03-11 NOTE — PLAN OF CARE
Problem: Adult Inpatient Plan of Care  Goal: Readiness for Transition of Care  Outcome: Progressing  Patient approved to return to [facility] but facility is not able to obtain IV antibiotics until tomorrow.  D/C postponed until facility can get antibiotics.

## 2025-03-11 NOTE — DISCHARGE SUMMARY
[facility] Medicine  Discharge Summary      Patient Name: Riaz Guerra Jr.  MRN: [id number]  NANCY: [id number]  Patient Class: IP- Inpatient  Admission Date: 2/26/2025  Hospital Length of Stay: 13 days  Discharge Date and Time:  03/11/2025 11:59 AM  Attending Physician: Delano Posada DO   Discharging Provider: Sammie Chambers DO  Primary Care Provider: Berhane Alvarez MD  [facility] Medicine Team: [facility] 2 Sammie Chambers DO  Primary Care Team: [facility] 2    HPI:   Riaz Guerra is a 75-year-old man with a medical history of HTN, HLD, insulin dependent DM, CKD, bilateral inguinal hernias, chronic venous insufficiency, obesity, chronic hip/back pain, and wheelchair dependent. He presented to the ED from [facility] () due to altered mental status, labored breathing, and refusing medications. Per phone call to , the patient had normal mentation prior to this admission, but had difficulty adjusting to the facility.     At the ED the patient was A/O x2 to person, and time. Reported pain in bilateral shoulders and hip, feeling cold and thirsty. Discoloration was noted on both lower limbs suggestive of venous stasis. Cole catheter was placed due to greater than 400ml urine seen on bladder scan. Labs were significant for KEATON: Cr 4.5 from base line of 1.3. Potassium 7.0, , CO2 15. CT abdomen pelvis negative for hydronephrosis but noted for bilateral basilar atelectasis and 2mm pulmonary nodule along the fissure on the right as well as tree in bud type nodules along the medial aspect of the left lower lobe concerning for possible developing infectious or inflammatory process. CT head negative for acute intracranial changes.    Patient's sister has been contacted about the patient's location and new condition. Per his sister, patient has not been able to ambulate for the last 9 months and has been at the SNF facility for the last two months.            Procedure(s) (LRB):  Lumbar Puncture (N/A)      Hospital Course:   Patient admitted to hospital medicine for altered mental status KEATON, and hyperkalemia on presentation. Potassium was shifted and was given lokelma and calcium gluconate. Nephrology was consulted and recommendations followed, sodium bicarb, lasix + diuril, and lokelma TID. Hyperkalemia resolved. Patient's mentation waxing and waning. Psychiatry consulted. 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 FTA reactive. Started on IV PCN. LP 3/7. CSF results show some evidence for viral meningitis with lymphocytic predominance. VDLR negative. Infectious disease agreed with starting empiric acyclovir pending HSV results and can be transitioned to PO Valacyclovir 1g q8h for a 10 day course. Viral meningitis workup negative. Patient to be discharged to SNF with IV Penicillin with infectious disease outpatient follow-up.      Interval History: NAEO    Review of Systems   Unable to perform ROS: Mental status change   Psychiatric/Behavioral:  Positive for confusion.      Objective:     Vital Signs (Most Recent):  Temp: 98.4 °F (36.9 °C) (03/11/25 1128)  Pulse: (!) 112 (03/11/25 1128)  Resp: 18 (03/11/25 1128)  BP: (!) 148/96 (03/11/25 1128)  SpO2: 96 % (03/11/25 1128) Vital Signs (24h Range):  Temp:  [98 °F (36.7 °C)-99 °F (37.2 °C)] 98.4 °F (36.9 °C)  Pulse:  [102-117] 112  Resp:  [14-20] 18  SpO2:  [96 %-100 %] 96 %  BP: (123-162)/(62-98) 148/96     Weight: (!) 139.7 kg (307 lb 15.7 oz)  Body mass index is 42.95 kg/m².    Intake/Output Summary (Last 24 hours) at 3/11/2025 1156  Last data filed at 3/10/2025 2250  Gross per 24 hour   Intake 100 ml   Output --   Net 100 ml         Physical Exam  Vitals and nursing note reviewed.   Constitutional:       General: He is not in acute distress.     Appearance: He is normal weight. He is not toxic-appearing.   HENT:      Head: Normocephalic and atraumatic.   Eyes:      Extraocular Movements: Extraocular  "movements intact.      Pupils: Pupils are equal, round, and reactive to light.   Cardiovascular:      Rate and Rhythm: Normal rate and regular rhythm.   Pulmonary:      Effort: Pulmonary effort is normal. No respiratory distress.      Breath sounds: No stridor.   Abdominal:      General: Abdomen is flat. There is no distension.   Musculoskeletal:      Cervical back: Normal range of motion and neck supple.   Skin:     General: Skin is warm and dry.   Neurological:      General: No focal deficit present.      Mental Status: He is alert. He is disoriented.      GCS: GCS eye subscore is 4. GCS verbal subscore is 5. GCS motor subscore is 6.      Comments: Oriented to place and year, not situation.   Psychiatric:         Mood and Affect: Mood normal.         Behavior: Behavior normal.               Significant Labs: All pertinent labs within the past 24 hours have been reviewed.    Significant Imaging: I have reviewed all pertinent imaging results/findings within the past 24 hours.     Goals of Care Treatment Preferences:  Code Status: Full Code      SDOH Screening:  The patient was screened for utility difficulties, food insecurity, transport difficulties, housing insecurity, and interpersonal safety and there were no concerns identified this admission.     Consults:   Consults (From admission, onward)          Status Ordering Provider     Inpatient consult to Midline team  Once        Provider:  (Not yet assigned)    Acknowledged PRAVIN BARILLAS     Inpatient consult to Midline team  Once        Provider:  (Not yet assigned)    Acknowledged KEV BIRMINGHAM     Inpatient consult to PICC team ([facility])  Once        Provider:  (Not yet assigned)    Completed PRAVIN BARILLAS     Inpatient consult to Midline team  Once        Provider:  (Not yet assigned)    Completed MITESH STEWARD     Inpatient consult to Interventional Radiology  Once        Provider:  (Not yet assigned)   Placed in "And" Linked Group    Completed BRIAN, " SUZANNE     Inpatient consult to Anesthesiology  Once        Provider:  (Not yet assigned)    Acknowledged SUZANNE BRIAN     Inpatient consult to Psychiatry  Once        Provider:  (Not yet assigned)    Completed NETTA TRIPLETT     Inpatient consult to Hospital Medicine-General  Once        Provider:  (Not yet assigned)    Completed SUZANNE BRIAN     Inpatient consult to Infectious Diseases  Once        Provider:  (Not yet assigned)    Completed SUZANNE BRIAN     Inpatient consult to Vascular Surgery  Once        Provider:  (Not yet assigned)    Completed NETTA TRIPLETT     Inpatient consult to Nephrology  Once        Provider:  (Not yet assigned)    Completed SUZANNE BRIAN            Assessment & Plan  Uremic encephalopathy (Resolved: 3/11/2025)  See acute renal failure     Type 2 diabetes mellitus with chronic kidney disease, without long-term current use of insulin  Patient's FSGs are controlled on current medication regimen.  Last A1c reviewed-   Lab Results   Component Value Date    HGBA1C 6.4 (H) 02/26/2025     Most recent fingerstick glucose reviewed-   Recent Labs   Lab 03/10/25  1659 03/10/25  2118 03/11/25  0742 03/11/25  1129   POCTGLUCOSE 134* 127* 162* 152*       Current correctional scale  Low  Maintain anti-hyperglycemic dose as follows-   Antihyperglycemics (From admission, onward)      Start     Stop Route Frequency Ordered    02/26/25 1715  insulin aspart U-100 pen 0-5 Units         -- SubQ Every 6 hours PRN 02/26/25 1615          Hold Oral hypoglycemics while patient is in the hospital.  Chronic pain syndrome  Continue gabapentin  Anemia due to stage 3 chronic kidney disease  Anemia is likely due to . Most recent hemoglobin and hematocrit are listed below.  Recent Labs     03/10/25  0526   HGB 11.9*   HCT 36.6*     Plan  - Monitor serial CBC:   - Transfuse PRBC if patient becomes hemodynamically unstable, symptomatic or H/H drops below 7/21.  - Patient   - Patient's anemia is currently     Metabolic  acidosis, normal anion gap (NAG)  - see renal failure and hyperkalemia    Venous stasis of both lower extremities  - sequential compression device  - wound care for ulcerated leg wounds  - consult PT/OT when encephalopathy resolves  Urine retention  - david placed  - strict input and out  - good urine output     Chronic wounds; BLE  - wound care consulted     Impaired mobility and activities of daily living  History of rapid decline over the last 9 months. Spent 2 months at [facility]. Staff there says patient has had difficulty adjusting.     - Physical therapy says that at this time, patient is functioning at their prior level of function and does not require further acute PT services.   - Occupational therapy: Patient is unable to continue work toward goals because of medical or psychosocial complications. and Therapist determines that the patient will no longer benefit from therapy services.   - will re consult considering patient's dramatic improvement in mentation   Debility  Patient with  debility due to . The patient's latest AMPAC (Activity Measure for Post Acute Care) Score is listed below.    AM-PAC Score - How much help does the patient need for each activity listed  Basic Mobility Total Score: 24  Turning over in bed (including adjusting bedclothes, sheets and blankets)?: None  Sitting down on and standing up from a chair with arms (e.g., wheelchair, bedside commode, etc.): None  Moving from lying on back to sitting on the side of the bed?: None  Moving to and from a bed to a chair (including a wheelchair)?: None  Need to walk in hospital room?: None  Climbing 3-5 steps with a railing?: None    Plan    - PT/OT, will reassess needs before discharge        Right hip pain  Continue gabapentin and PRN oxycodone and dilaudid    Severe obesity (BMI >= 40)  Body mass index is 42.95 kg/m². Morbid obesity complicates all aspects of disease management from diagnostic modalities to  treatment. Weight loss encouraged and health benefits explained to patient.       Mixed hyperlipidemia  - continue home atorvastatin     PAD (peripheral artery disease)  75-year-old male nursing home resident who is nonambulatory and history of insulin-dependent diabetes and chronic venous insufficiency admitted to the medicine service with altered mental status. An arterial duplex suggested moderate stenosis in his right tibial vessels.  While he is complaining of bilateral lower extremity pain he is also complaining of back, hip, and upper extremity pain.      -Continue w/ medical therapy in form of preventative wound care, ASA/statin, off loading  - Vascular surgeons do not believe it is ischemic rest pain and in the absence of tissue loss, no indication for surgical intervention.     Mood disorder  Patient has persistent depression which is unknown and is currently uncontrolled. Will Begin anti-depressant medications. We will not consult psychiatry at this time. Patient does not display psychosis at this time. Continue to monitor closely and adjust plan of care as needed.    - wellbutrin 150 mg daily discontinued  - started quetiapine     Serum positive for Treponema pallidum Antibody  - GEO and FTA reactive   - CSF for VDRL pending (see CSF abnormal)  -ESR , CRP elevated  - IV penicillin for 10 days total   ESR raised      CRP elevated      Neurosyphilis  Antibiotics (From admission, onward)      Start     Stop Route Frequency Ordered    03/09/25 0900  nitrofurantoin (macrocrystal-monohydrate) 100 MG capsule 100 mg         03/13/25 0859 Oral Every 12 hours 03/09/25 0724    03/06/25 1630  penicillin G potassium 24 Million Units in D5W 500 mL CONTINUOUS INFUSION         03/16/25 1629 IV Every 24 hours (non-standard times) 03/06/25 1521    02/27/25 1215  mupirocin 2 % ointment  (DECOLONIZATION PROTOCOL ORDERS)         03/04/25 0859 Nasl 2 times daily 02/27/25 1105             Patient being treated for  neurosyphilis with IV PCN. In addition patient is on empirical abx for viral meningitis/encephalitis. VDLR negative.     Plan  - continue IV pcn for 10 day course  - on IV acyclovir    - transition to PO valacyclovir upon discharge   CSF abnormal  CSF showed borderline slightly elevated Glucose 76, Protein 46. VDRL in process. RBC in CSF could be from procedure. Lymphocyte predominance seen on the cell count differential suggesting aseptic meningitis. CSF culture shows NGTD.       - ordered additional CSF labs (EBV, CMV, VZV, HSV)   - ID made aware and will discuss with their team any further action.   Acute cystitis without hematuria  Urine culture growing gram negative rods. Sensitivities resulted.    - will finish course on Macrobid  Final Active Diagnoses:    Diagnosis Date Noted POA    PRINCIPAL PROBLEM:  Neurosyphilis [A52.3] 03/06/2025 Yes    CSF abnormal [R83.9] 03/08/2025 No    Acute cystitis without hematuria [N30.00] 03/08/2025 Yes    ESR raised [R70.0] 03/05/2025 Yes    CRP elevated [R79.82] 03/05/2025 Yes    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] 03/03/2025 Yes    Mood disorder [F39] 03/02/2025 Yes    PAD (peripheral artery disease) [I73.9] 03/01/2025 Yes    Mixed hyperlipidemia [E78.2] 02/27/2025 Yes    Uremic encephalopathy [G93.49, N19] 02/26/2025 Yes    Severe obesity (BMI >= 40) [E66.01] 02/03/2025 Yes    Debility [R53.81] 02/02/2025 Yes    Right hip pain [M25.551] 02/02/2025 Yes    Impaired mobility and activities of daily living [Z74.09, Z78.9] 12/06/2024 Yes    Chronic wounds; BLE [T14.8XXA] 12/06/2024 Yes    Urine retention [R33.9] 11/22/2024 Yes    Venous stasis of both lower extremities [I87.8] 11/21/2024 Yes    Type 2 diabetes mellitus with chronic kidney disease, without long-term current use of insulin [E11.22] 07/01/2024 Yes    Chronic pain syndrome [G89.4] 07/01/2024 Yes    Metabolic acidosis, normal anion gap (NAG) [E87.20] 07/01/2024 Yes    Anemia due to stage 3 chronic  kidney disease [N18.30, D63.1] 07/01/2024 Yes      Problems Resolved During this Admission:    Diagnosis Date Noted Date Resolved POA    Hyperkalemia [E87.5] 07/01/2024 03/01/2025 Yes    Acute renal failure superimposed on stage 3a chronic kidney disease [N17.9, N18.31] 07/01/2024 03/09/2025 Yes       Discharged Condition: stable    Disposition: residential Nursing Facili*    Follow Up:   Contact information for follow-up providers       Berhane Alvarez MD Follow up in 3 day(s).    Specialty: Family Medicine  [facility] information:  [address] [facility] [facility] [address]  [phone]                       Contact information for after-discharge care       Destination       [facility] .    Service: Nursing Home  Contact information:  [address] [facility] [address]  [phone]                                 Patient Instructions:      Ambulatory referral/consult to Infectious Disease   Standing Status: Future   Referral Priority: Routine Referral Type: Consultation   Referral Reason: Specialty Services Required   Requested Specialty: Infectious Diseases   Number of Visits Requested: 1     Ambulatory referral/consult to Infectious Disease   Standing Status: Future   Referral Priority: Routine Referral Type: Consultation   Referral Reason: Specialty Services Required   Requested Specialty: Infectious Diseases   Number of Visits Requested: 1       Significant Diagnostic Studies: N/A    Pending Diagnostic Studies:       Procedure Component Value Units Date/Time    C. trachomatis/N. gonorrhoeae by AMP DNA [facility]; Urine [[id number]] Collected: 03/02/25 1546    Order Status: Sent Lab Status: In process Updated: 03/02/25 1547    Specimen: Genital from Urine     CBC Auto Differential [[id number]]     Order Status: Sent Lab Status: No result     Specimen: Blood     Comprehensive Metabolic Panel [[id number]]     Order Status: Sent Lab Status: No result     Specimen: Blood      Freeze and Hold, [facility] [[id number]] Collected: 03/07/25 1040    Order Status: Sent Lab Status: No result     Specimen: CSF (Spinal Fluid) from Cerebrospinal fluid (CSF)     Magnesium [[id number]]     Order Status: Sent Lab Status: No result     Specimen: Blood     Phosphorus [[id number]]     Order Status: Sent Lab Status: No result     Specimen: Blood            Medications:  Reconciled Home Medications:      Medication List        START taking these medications      D5W SolP 500 mL with penicillin G potassium 5 million unit SolR 24 Million Units  Inject 24 Million Units into the vein every 4 (four) hours. for 4 days     nitrofurantoin (macrocrystal-monohydrate) 100 MG capsule  Commonly known as: MACROBID  Take 1 capsule (100 mg total) by mouth every 12 (twelve) hours. for 3 days            CONTINUE taking these medications      amLODIPine 10 MG tablet  Commonly known as: NORVASC  Take 1 tablet (10 mg total) by mouth once daily.     aspirin 81 MG EC tablet  Commonly known as: ECOTRIN  Take 81 mg by mouth once daily.     atorvastatin 20 MG tablet  Commonly known as: LIPITOR  Take 20 mg by mouth once daily.     baclofen 10 MG tablet  Commonly known as: LIORESAL  Take 10 mg by mouth 3 (three) times daily.     fluticasone propionate 50 mcg/actuation nasal spray  Commonly known as: FLONASE  2 sprays (100 mcg total) by Each Nostril route once daily.     gabapentin 800 MG tablet  Commonly known as: NEURONTIN  Take 1 tablet (800 mg total) by mouth 2 (two) times daily.     HYDROcodone-acetaminophen 7.5-325 mg per tablet  Commonly known as: NORCO  Take 1 tablet by mouth every 8 (eight) hours as needed for Pain.     losartan-hydrochlorothiazide 100-12.5 mg 100-12.5 mg Tab  Commonly known as: HYZAAR  Take 1 tablet by mouth once daily.     metFORMIN 1000 MG tablet  Commonly known as: GLUCOPHAGE  Take 1,000 mg by mouth 2 (two) times daily with meals.     polyethylene glycol 17 gram Pwpk  Commonly known as: GLYCOLAX  Take 17 g by  mouth once daily.     tamsulosin 0.4 mg Cap  Commonly known as: FLOMAX  Take by mouth once daily.     vitamin D 1000 units Tab  Commonly known as: VITAMIN D3  Take 1,000 Units by mouth once daily.              Indwelling Lines/Drains at time of discharge:   Lines/Drains/Airways       None                   Time spent on the discharge of patient: 45 minutes         Sammie Chambers DO  Department of Hospital Medicine  [facility]

## 2025-03-11 NOTE — NURSING
Iv remains out at  present. Pt very uncooperative and becomes combative when approached. Got order for haldol Im and received it. It did calm pt down and was able to sleep, but as soon as we put the turnaquet on his arm to restart the iv he would wake up and start yelling to leave him alone and pull away. We tried several times when we thought he would stay asleep.he also refused nsg care and lab work this am. Hospital med on call notified of above. She came to try. She needed the us machine but all we had was the vein finder, she went to find one, so far she hasn't returned, she said sign out at 0600 and was gonna try to do it before then if she could.

## 2025-03-11 NOTE — ASSESSMENT & PLAN NOTE
History of rapid decline over the last 9 months. Spent 2 months at [facility]. Staff there says patient has had difficulty adjusting.     - Physical therapy says that at this time, patient is functioning at their prior level of function and does not require further acute PT services.   - Occupational therapy: Patient is unable to continue work toward goals because of medical or psychosocial complications. and Therapist determines that the patient will no longer benefit from therapy services.   - will re consult considering patient's dramatic improvement in mentation

## 2025-03-11 NOTE — PROCEDURES
"Riaz Guerra Jr. is a 75 y.o. male patient.    Temp: 98.4 °F (36.9 °C) (03/11/25 1128)  Pulse: (!) 112 (03/11/25 1128)  Resp: 14 (03/11/25 1313)  BP: (!) 148/96 (03/11/25 1128)  SpO2: 96 % (03/11/25 1128)  Weight: (!) 139.7 kg (307 lb 15.7 oz) (03/07/25 0905)  Height: 5' 11" (180.3 cm) (03/07/25 0905)    PICC  Date/Time: 3/11/2025 3:00 PM  Location procedure was performed: [facility] PICC LINE PLACEMENT  Performed by: Mallory Alvarez, RN  Assisting provider: Lizeth Alvarez LPN  Consent Done: Yes  Time out: Immediately prior to procedure a time out was called to verify the correct patient, procedure, equipment, support staff and site/side marked as required  Indications: med administration  Anesthesia: local infiltration  Local anesthetic: lidocaine 1% without epinephrine  Anesthetic Total (mL): 3  Preparation: skin prepped with ChloraPrep  Skin prep agent dried: skin prep agent completely dried prior to procedure  Sterile barriers: all five maximum sterile barriers used - cap, mask, sterile gown, sterile gloves, and large sterile sheet  Hand hygiene: hand hygiene performed prior to central venous catheter insertion  Location details: right brachial  Catheter type: double lumen  Catheter size: 5 Fr  Catheter Length: 40cm    Ultrasound guidance: yes  Vessel Caliber: medium and patent, compressibility normal  Vascular Doppler: not done  Needle advanced into vessel with real time Ultrasound guidance.  Guidewire confirmed in vessel.  Image recorded and saved.  Sterile sheath used.  no esophageal manometryNumber of attempts: 1  Post-procedure: blood return through all ports, sterile dressing applied and chlorhexidine patch  Technical procedures used: 3CG  Specimens: No  Implants: No  Assessment: placement verified by x-ray  Complications: none          Name Lizeth Alvarez LPN   3/11/2025    "

## 2025-03-11 NOTE — PT/OT/SLP PROGRESS
Occupational Therapy      Patient Name:  Riaz Guerra .   MRN:  [id number]    Patient not seen today secondary to Other (Comment) (Spoke with consult team regarding OT/PT orders. At this time, pt is near functional baseline requiring 24/7 A for ADLs and dependent with mobility [facility]. Orders discontinued.).     Bonny Wiggins, OT  3/11/2025

## 2025-03-11 NOTE — ASSESSMENT & PLAN NOTE
Patient's FSGs are controlled on current medication regimen.  Last A1c reviewed-   Lab Results   Component Value Date    HGBA1C 6.4 (H) 02/26/2025     Most recent fingerstick glucose reviewed-   Recent Labs   Lab 03/10/25  1659 03/10/25  2118 03/11/25  0742 03/11/25  1129   POCTGLUCOSE 134* 127* 162* 152*       Current correctional scale  Low  Maintain anti-hyperglycemic dose as follows-   Antihyperglycemics (From admission, onward)      Start     Stop Route Frequency Ordered    02/26/25 1715  insulin aspart U-100 pen 0-5 Units         -- SubQ Every 6 hours PRN 02/26/25 1615          Hold Oral hypoglycemics while patient is in the hospital.

## 2025-03-11 NOTE — PROGRESS NOTES
Physical Therapy Missed Treatment Note      Patient Name:  Riaz Guerra Jr.   MRN:  [id number]  Admitting Diagnosis:  Neurosyphilis   Recent Surgery: Procedure(s) (LRB):  Lumbar Puncture (N/A) 4 Days Post-Op  Admit Date: 2/26/2025  Length of Stay: 13 days    Patient not evaluated today due to having a collaborative conversation with MD and PT orders discontinued at this time. [facility] with [facility], was dependent for bed mobility and ADLs at baseline. Please see PT yessi 3/8 for further info.     3/11/2025

## 2025-03-11 NOTE — ASSESSMENT & PLAN NOTE
Urine culture growing gram negative rods. Sensitivities resulted.    - will finish course on Macrobid

## 2025-03-11 NOTE — NURSING
Iv found out, stated he put it in his pocket. Pt very confused. I attempted x2 to restart, unsuccessful, he refused anymore, talking non sense. I had a male nurse sohail to try, he let him try and then became combative refusing. Unable to orient him. Zyprexa 2.5 mg Im given. Will try again later.

## 2025-03-12 VITALS
OXYGEN SATURATION: 97 % | WEIGHT: 308 LBS | HEART RATE: 101 BPM | TEMPERATURE: 97 F | DIASTOLIC BLOOD PRESSURE: 90 MMHG | RESPIRATION RATE: 16 BRPM | BODY MASS INDEX: 43.12 KG/M2 | SYSTOLIC BLOOD PRESSURE: 143 MMHG | HEIGHT: 71 IN

## 2025-03-12 LAB
ALBUMIN SERPL BCP-MCNC: 2.6 G/DL (ref 3.5–5.2)
ALP SERPL-CCNC: 96 U/L (ref 40–150)
ALT SERPL W/O P-5'-P-CCNC: 14 U/L (ref 10–44)
ANION GAP SERPL CALC-SCNC: 9 MMOL/L (ref 8–16)
AST SERPL-CCNC: 22 U/L (ref 10–40)
BACTERIA CSF CULT: NO GROWTH
BASOPHILS # BLD AUTO: 0.07 K/UL (ref 0–0.2)
BASOPHILS NFR BLD: 1 % (ref 0–1.9)
BILIRUB SERPL-MCNC: 0.5 MG/DL (ref 0.1–1)
BUN SERPL-MCNC: 16 MG/DL (ref 8–23)
CALCIUM SERPL-MCNC: 9.5 MG/DL (ref 8.7–10.5)
CHLORIDE SERPL-SCNC: 103 MMOL/L (ref 95–110)
CO2 SERPL-SCNC: 23 MMOL/L (ref 23–29)
CREAT SERPL-MCNC: 1.1 MG/DL (ref 0.5–1.4)
DIFFERENTIAL METHOD BLD: ABNORMAL
EBV DNA BY PCR: NORMAL
EOSINOPHIL # BLD AUTO: 0.2 K/UL (ref 0–0.5)
EOSINOPHIL NFR BLD: 3 % (ref 0–8)
ERYTHROCYTE [DISTWIDTH] IN BLOOD BY AUTOMATED COUNT: 15 % (ref 11.5–14.5)
EST. GFR  (NO RACE VARIABLE): >60 ML/MIN/1.73 M^2
GLUCOSE SERPL-MCNC: 152 MG/DL (ref 70–110)
GRAM STN SPEC: NORMAL
HCT VFR BLD AUTO: 36.4 % (ref 40–54)
HGB BLD-MCNC: 11.9 G/DL (ref 14–18)
IMM GRANULOCYTES # BLD AUTO: 0.03 K/UL (ref 0–0.04)
IMM GRANULOCYTES NFR BLD AUTO: 0.4 % (ref 0–0.5)
LYMPHOCYTES # BLD AUTO: 1.9 K/UL (ref 1–4.8)
LYMPHOCYTES NFR BLD: 25.4 % (ref 18–48)
MAGNESIUM SERPL-MCNC: 1.7 MG/DL (ref 1.6–2.6)
MCH RBC QN AUTO: 28.4 PG (ref 27–31)
MCHC RBC AUTO-ENTMCNC: 32.7 G/DL (ref 32–36)
MCV RBC AUTO: 87 FL (ref 82–98)
MONOCYTES # BLD AUTO: 0.6 K/UL (ref 0.3–1)
MONOCYTES NFR BLD: 8.2 % (ref 4–15)
NEUTROPHILS # BLD AUTO: 4.5 K/UL (ref 1.8–7.7)
NEUTROPHILS NFR BLD: 62 % (ref 38–73)
NRBC BLD-RTO: 0 /100 WBC
PHOSPHATE SERPL-MCNC: 3.3 MG/DL (ref 2.7–4.5)
PLATELET # BLD AUTO: 402 K/UL (ref 150–450)
PMV BLD AUTO: 9.9 FL (ref 9.2–12.9)
POCT GLUCOSE: 118 MG/DL (ref 70–110)
POTASSIUM SERPL-SCNC: 3.8 MMOL/L (ref 3.5–5.1)
PROT SERPL-MCNC: 7.8 G/DL (ref 6–8.4)
RBC # BLD AUTO: 4.19 M/UL (ref 4.6–6.2)
SODIUM SERPL-SCNC: 135 MMOL/L (ref 136–145)
SPECIMEN SOURCE: NORMAL
VARICELLA ZOSTER BY PCR RESULT: NEGATIVE
WBC # BLD AUTO: 7.28 K/UL (ref 3.9–12.7)

## 2025-03-12 PROCEDURE — 25000003 PHARM REV CODE 250

## 2025-03-12 PROCEDURE — 63600175 PHARM REV CODE 636 W HCPCS

## 2025-03-12 PROCEDURE — 36415 COLL VENOUS BLD VENIPUNCTURE: CPT

## 2025-03-12 PROCEDURE — 83735 ASSAY OF MAGNESIUM: CPT

## 2025-03-12 PROCEDURE — 63600175 PHARM REV CODE 636 W HCPCS: Mod: JZ,TB

## 2025-03-12 PROCEDURE — 85025 COMPLETE CBC W/AUTO DIFF WBC: CPT

## 2025-03-12 PROCEDURE — 84100 ASSAY OF PHOSPHORUS: CPT

## 2025-03-12 PROCEDURE — 80053 COMPREHEN METABOLIC PANEL: CPT

## 2025-03-12 RX ORDER — METHOCARBAMOL 500 MG/1
500 TABLET, FILM COATED ORAL 3 TIMES DAILY
Status: DISCONTINUED | OUTPATIENT
Start: 2025-03-12 | End: 2025-03-12 | Stop reason: HOSPADM

## 2025-03-12 RX ADMIN — METHOCARBAMOL 500 MG: 500 TABLET ORAL at 10:03

## 2025-03-12 RX ADMIN — OXYCODONE HYDROCHLORIDE 10 MG: 10 TABLET ORAL at 02:03

## 2025-03-12 RX ADMIN — NITROFURANTOIN MONOHYDRATE/MACROCRYSTALS 100 MG: 25; 75 CAPSULE ORAL at 09:03

## 2025-03-12 RX ADMIN — DEXTROSE MONOHYDRATE 24 MILLION UNITS: 50 INJECTION, SOLUTION INTRAVENOUS at 03:03

## 2025-03-12 RX ADMIN — POLYETHYLENE GLYCOL 3350 17 G: 17 POWDER, FOR SOLUTION ORAL at 09:03

## 2025-03-12 RX ADMIN — OXYCODONE HYDROCHLORIDE 10 MG: 10 TABLET ORAL at 08:03

## 2025-03-12 RX ADMIN — LOSARTAN POTASSIUM AND HYDROCHLOROTHIAZIDE 1 TABLET: 100; 12.5 TABLET, FILM COATED ORAL at 09:03

## 2025-03-12 RX ADMIN — AMLODIPINE BESYLATE 10 MG: 10 TABLET ORAL at 09:03

## 2025-03-12 RX ADMIN — HYDROMORPHONE HYDROCHLORIDE 0.5 MG: 1 INJECTION, SOLUTION INTRAMUSCULAR; INTRAVENOUS; SUBCUTANEOUS at 06:03

## 2025-03-12 RX ADMIN — HYDROMORPHONE HYDROCHLORIDE 0.5 MG: 1 INJECTION, SOLUTION INTRAMUSCULAR; INTRAVENOUS; SUBCUTANEOUS at 03:03

## 2025-03-12 RX ADMIN — ATORVASTATIN CALCIUM 20 MG: 20 TABLET, FILM COATED ORAL at 09:03

## 2025-03-12 RX ADMIN — HYDROMORPHONE HYDROCHLORIDE 0.5 MG: 1 INJECTION, SOLUTION INTRAMUSCULAR; INTRAVENOUS; SUBCUTANEOUS at 09:03

## 2025-03-12 RX ADMIN — ENOXAPARIN SODIUM 40 MG: 40 INJECTION SUBCUTANEOUS at 09:03

## 2025-03-12 RX ADMIN — METHOCARBAMOL 500 MG: 500 TABLET ORAL at 02:03

## 2025-03-12 NOTE — ASSESSMENT & PLAN NOTE
Antibiotics (From admission, onward)      Start     Stop Route Frequency Ordered    03/09/25 0900  nitrofurantoin (macrocrystal-monohydrate) 100 MG capsule 100 mg         03/13/25 0859 Oral Every 12 hours 03/09/25 0724    03/06/25 1630  penicillin G potassium 24 Million Units in D5W 500 mL CONTINUOUS INFUSION         03/16/25 1629 IV Every 24 hours (non-standard times) 03/06/25 1521    02/27/25 1215  mupirocin 2 % ointment  (DECOLONIZATION PROTOCOL ORDERS)         03/04/25 0859 Nasl 2 times daily 02/27/25 1105             Patient being treated for neurosyphilis with IV PCN. In addition patient is on empirical abx for viral meningitis/encephalitis. VDLR negative.     Plan  - continue IV pcn for 10 day course  - on IV acyclovir   - CSF studies returned negative, empiric antivirals discontinued

## 2025-03-12 NOTE — SUBJECTIVE & OBJECTIVE
Interval History: NAEO    Review of Systems   Unable to perform ROS: Mental status change   Psychiatric/Behavioral:  Positive for confusion.      Objective:     Vital Signs (Most Recent):  Temp: 97.4 °F (36.3 °C) (03/12/25 0816)  Pulse: 95 (03/12/25 0816)  Resp: 16 (03/12/25 0933)  BP: (!) 143/93 (03/12/25 0816)  SpO2: 97 % (03/12/25 0816) Vital Signs (24h Range):  Temp:  [97.3 °F (36.3 °C)-98.4 °F (36.9 °C)] 97.4 °F (36.3 °C)  Pulse:  [] 95  Resp:  [14-18] 16  SpO2:  [96 %-100 %] 97 %  BP: (112-167)/(78-98) 143/93     Weight: (!) 139.7 kg (307 lb 15.7 oz)  Body mass index is 42.95 kg/m².  No intake or output data in the 24 hours ending 03/12/25 1125        Physical Exam  Vitals and nursing note reviewed.   Constitutional:       General: He is not in acute distress.     Appearance: He is normal weight. He is not toxic-appearing.   HENT:      Head: Normocephalic and atraumatic.   Eyes:      Extraocular Movements: Extraocular movements intact.      Pupils: Pupils are equal, round, and reactive to light.   Cardiovascular:      Rate and Rhythm: Normal rate and regular rhythm.   Pulmonary:      Effort: Pulmonary effort is normal. No respiratory distress.      Breath sounds: No stridor.   Abdominal:      General: Abdomen is flat. There is no distension.   Musculoskeletal:      Cervical back: Normal range of motion and neck supple.   Skin:     General: Skin is warm and dry.   Neurological:      General: No focal deficit present.      Mental Status: He is alert. He is disoriented.      GCS: GCS eye subscore is 4. GCS verbal subscore is 5. GCS motor subscore is 6.      Comments: Oriented to place and year, not situation.   Psychiatric:         Mood and Affect: Mood normal.         Behavior: Behavior normal.               Significant Labs: All pertinent labs within the past 24 hours have been reviewed.    Significant Imaging: I have reviewed all pertinent imaging results/findings within the past 24 hours.

## 2025-03-12 NOTE — DISCHARGE SUMMARY
[facility] Medicine  Discharge Summary      Patient Name: Riaz Guerra Jr.  MRN: [id number]  NANCY: [id number]  Patient Class: IP- Inpatient  Admission Date: 2/26/2025  Hospital Length of Stay: 14 days  Discharge Date and Time:  03/12/2025 11:37 AM  Attending Physician: Delano Posada DO   Discharging Provider: John Cornelius MD  Primary Care Provider: Berhane Alvarez MD  [facility] Medicine Team: [facility] 2 John Cornelius MD  Primary Care Team: [facility] 2    HPI:   Riaz Guerra is a 75-year-old man with a medical history of HTN, HLD, insulin dependent DM, CKD, bilateral inguinal hernias, chronic venous insufficiency, obesity, chronic hip/back pain, and wheelchair dependent. He presented to the ED from [facility] ([facility]) due to altered mental status, labored breathing, and refusing medications. Per phone call to [facility], the patient had normal mentation prior to this admission, but had difficulty adjusting to the facility.     At the ED the patient was A/O x2 to person, and time. Reported pain in bilateral shoulders and hip, feeling cold and thirsty. Discoloration was noted on both lower limbs suggestive of venous stasis. Cole catheter was placed due to greater than 400ml urine seen on bladder scan. Labs were significant for KEATON: Cr 4.5 from base line of 1.3. Potassium 7.0, , CO2 15. CT abdomen pelvis negative for hydronephrosis but noted for bilateral basilar atelectasis and 2mm pulmonary nodule along the fissure on the right as well as tree in bud type nodules along the medial aspect of the left lower lobe concerning for possible developing infectious or inflammatory process. CT head negative for acute intracranial changes.    Patient's sister has been contacted about the patient's location and new condition. Per his sister, patient has not been able to ambulate for the last 9 months and has been at the SNF facility for the last two months.            Procedure(s) (LRB):  Lumbar Puncture (N/A)      Hospital Course:   Patient admitted to hospital medicine for altered mental status KEATON, and hyperkalemia on presentation. Potassium was shifted and was given lokelma and calcium gluconate. Nephrology was consulted and recommendations followed, sodium bicarb, lasix + diuril, and lokelma TID. Hyperkalemia resolved. Patient's mentation waxing and waning. Psychiatry consulted. 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 FTA reactive. Started on IV PCN. LP 3/7. CSF results show some evidence for viral meningitis with lymphocytic predominance. VDLR negative. ID recommended empiric acyclovir given due to concerns for viral meningitis, however, workup returned negative and antivirals were discontinued. Patient to be discharged to SNF with IV Penicillin with infectious disease outpatient follow-up. He will also finish his macrobid for UTI.    Vitals:    03/12/25 0442 03/12/25 0802 03/12/25 0816 03/12/25 0933   BP: (!) 144/93  (!) 143/93    Pulse: 89  95    Resp: 18 14 18 16   Temp: 97.3 °F (36.3 °C)  97.4 °F (36.3 °C)    TempSrc: Oral  Oral    SpO2: 100%  97%    Weight:       Height:         Physical Exam  Vitals and nursing note reviewed.   Constitutional:       General: He is not in acute distress.     Appearance: He is normal weight. He is not toxic-appearing.   HENT:      Head: Normocephalic and atraumatic.   Eyes:      Extraocular Movements: Extraocular movements intact.      Pupils: Pupils are equal, round, and reactive to light.   Cardiovascular:      Rate and Rhythm: Normal rate and regular rhythm.   Pulmonary:      Effort: Pulmonary effort is normal. No respiratory distress.      Breath sounds: No stridor.   Abdominal:      General: Abdomen is flat. There is no distension.   Musculoskeletal:      Cervical back: Normal range of motion and neck supple.   Skin:     General: Skin is warm and dry.   Neurological:      General: No focal deficit present.       "Mental Status: He is alert. He is disoriented.      GCS: GCS eye subscore is 4. GCS verbal subscore is 5. GCS motor subscore is 6.      Comments: Oriented to place and year, not situation.   Psychiatric:         Mood and Affect: Mood normal.         Behavior: Behavior normal.         Goals of Care Treatment Preferences:  Code Status: Full Code      SDOH Screening:  The patient was screened for utility difficulties, food insecurity, transport difficulties, housing insecurity, and interpersonal safety and there were no concerns identified this admission.     Consults:   Consults (From admission, onward)          Status Ordering Provider     [facility]  Once        Provider:  (Not yet assigned)    Completed KEV BIRMINGHAM     Inpatient consult to PICC team ([facility])  Once        Provider:  (Not yet assigned)    Completed PRAVIN BARILLAS     Inpatient consult to Midline team  Once        Provider:  (Not yet assigned)    Completed MITESH STEWARD     Inpatient consult to Interventional Radiology  Once        Provider:  (Not yet assigned)   Placed in "And" Linked Group    Completed BRIAN, ZADARSHANI     Inpatient consult to Anesthesiology  Once        Provider:  (Not yet assigned)    Acknowledged BRIANALANAI     Inpatient consult to Psychiatry  Once        Provider:  (Not yet assigned)    Completed NETTA TRIPLETT     Inpatient consult to [facility] Medicine-General  Once        Provider:  (Not yet assigned)    Completed BRIAN, ZADARSHANI     Inpatient consult to Infectious Diseases  Once        Provider:  (Not yet assigned)    Completed BRIAN, ZAYANI     Inpatient consult to Vascular Surgery  Once        Provider:  (Not yet assigned)    Completed NETTA TRIPLETT     Inpatient consult to Nephrology  Once        Provider:  (Not yet assigned)    Completed SUZANNE BRIAN            Final Active Diagnoses:    Diagnosis Date Noted POA    PRINCIPAL PROBLEM:  Neurosyphilis [A52.3] 03/06/2025 Yes    CSF abnormal [R83.9] " 03/08/2025 No    Acute cystitis without hematuria [N30.00] 03/08/2025 Yes    ESR raised [R70.0] 03/05/2025 Yes    CRP elevated [R79.82] 03/05/2025 Yes    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] 03/03/2025 Yes    Mood disorder [F39] 03/02/2025 Yes    PAD (peripheral artery disease) [I73.9] 03/01/2025 Yes    Mixed hyperlipidemia [E78.2] 02/27/2025 Yes    Severe obesity (BMI >= 40) [E66.01] 02/03/2025 Yes    Debility [R53.81] 02/02/2025 Yes    Right hip pain [M25.551] 02/02/2025 Yes    Impaired mobility and activities of daily living [Z74.09, Z78.9] 12/06/2024 Yes    Chronic wounds; BLE [T14.8XXA] 12/06/2024 Yes    Urine retention [R33.9] 11/22/2024 Yes    Venous stasis of both lower extremities [I87.8] 11/21/2024 Yes    Type 2 diabetes mellitus with chronic kidney disease, without long-term current use of insulin [E11.22] 07/01/2024 Yes    Chronic pain syndrome [G89.4] 07/01/2024 Yes    Metabolic acidosis, normal anion gap (NAG) [E87.20] 07/01/2024 Yes    Anemia due to stage 3 chronic kidney disease [N18.30, D63.1] 07/01/2024 Yes      Problems Resolved During this Admission:    Diagnosis Date Noted Date Resolved POA    Uremic encephalopathy [G93.49, N19] 02/26/2025 03/11/2025 Yes    Hyperkalemia [E87.5] 07/01/2024 03/01/2025 Yes    Acute renal failure superimposed on stage 3a chronic kidney disease [N17.9, N18.31] 07/01/2024 03/09/2025 Yes       Discharged Condition: stable    Disposition: [facility]    Follow Up:   Contact information for follow-up providers       Berhane Alvarez MD Follow up in 3 day(s).    Specialty: Family Medicine  Contact information:  [address] [facility] [id number] [phone]                       Contact information for after-discharge care       Destination       [facility] .    Service: Nursing Home  Contact information:  [address] [facility] [id number] [phone]                                  Patient Instructions:      Ambulatory referral/consult to Infectious Disease   Standing Status: Future   Referral Priority: Routine Referral Type: Consultation   Referral Reason: Specialty Services Required   Requested Specialty: Infectious Diseases   Number of Visits Requested: 1     Ambulatory referral/consult to Infectious Disease   Standing Status: Future   Referral Priority: Routine Referral Type: Consultation   Referral Reason: Specialty Services Required   Requested Specialty: Infectious Diseases   Number of Visits Requested: 1       Significant Diagnostic Studies: Labs: All labs within the past 24 hours have been reviewed    Pending Diagnostic Studies:       Procedure Component Value Units Date/Time    C. trachomatis/N. gonorrhoeae by AMP DNA [facility]; Urine [[id number]] Collected: 03/02/25 1546    Order Status: Sent Lab Status: In process Updated: 03/02/25 1547    Specimen: Genital from Urine     Comprehensive Metabolic Panel [[id number]] Collected: 03/12/25 1006    Order Status: Sent Lab Status: In process Updated: 03/12/25 1107    Specimen: Blood     Narrative:      Collection has been rescheduled by JEREMY at 03/12/2025 07:05 Reason:   Unable to collect notified nurse romelia [id number]  Collection has been rescheduled by VS6 at 03/12/2025 08:38 Reason:   Patient unavailable with Pt Care will go back   Collection has been rescheduled by VS6 at 03/12/2025 09:12 Reason:   Unable to collect wants to eat first spoke with Adilia    [facility], [facility] [[id number]] Collected: 03/07/25 1040    Order Status: Sent Lab Status: No result     Specimen: CSF (Spinal Fluid) from Cerebrospinal fluid (CSF)     Magnesium [[id number]] Collected: 03/12/25 1006    Order Status: Sent Lab Status: In process Updated: 03/12/25 1107    Specimen: Blood     Narrative:      Collection has been rescheduled by JEREMY at 03/12/2025 07:05 Reason:   Unable to collect notified nurse romelia [id number]  Collection has been rescheduled by VS6 at 03/12/2025  08:38 Reason:   Patient unavailable with Pt Care will go back   Collection has been rescheduled by VS6 at 03/12/2025 09:12 Reason:   Unable to collect wants to eat first spoke with Adilia    Phosphorus [[id number]] Collected: 03/12/25 1006    Order Status: Sent Lab Status: In process Updated: 03/12/25 1107    Specimen: Blood     Narrative:      Collection has been rescheduled by JEREMY at 03/12/2025 07:05 Reason:   Unable to collect notified nurse romelia [id number]  Collection has been rescheduled by VS6 at 03/12/2025 08:38 Reason:   Patient unavailable with Pt Care will go back   Collection has been rescheduled by VS6 at 03/12/2025 09:12 Reason:   Unable to collect wants to eat first spoke with Adilia    X-Ray Abdomen AP 1 View [[id number]] Resulted: 03/12/25 1041    Order Status: Sent Lab Status: In process Updated: 03/12/25 1042           X-Ray Chest 1 View S/P PICC Line by Nursing   Final Result      As above         Electronically signed by: Ry Doshi MD   Date:    03/11/2025   Time:    17:11      FL Lumbar Puncture (xpd)   Final Result      Lumbar puncture using fluoroscopic guidance.  11  cc of CSF removed as requested.      Electronically signed by resident: Johny Deleon   Date:    03/07/2025   Time:    11:17      Electronically signed by: Merritt Masterson MD   Date:    03/07/2025   Time:    11:30      US Retroperitoneal Complete   Final Result      No evidence of hydronephrosis bilaterally or other significant sonographic abnormality.         Electronically signed by: Nila Garcia MD   Date:    02/26/2025   Time:    23:15      US Lower Extremity Veins Bilateral   Final Result      No evidence of deep venous thrombosis in either lower extremity.         Electronically signed by: Billy Lopez MD   Date:    02/26/2025   Time:    22:02      CT Abdomen Pelvis  Without Contrast   Final Result      1. Tree-in-bud type nodules within the bilateral lower lobes, concerning for developing infectious or inflammatory process.   Correlation and follow-up is advised.   2. Bilateral perinephric fat stranding, nonspecific noting there may be rim wall thickening of the urinary bladder.  Correlation with urinalysis recommended to exclude changes of infection.   3. Bilateral adrenal nodules, some of which measure attenuation suggesting adenoma, others are nonspecific.   4. The gallbladder is distended noting there may be layering sludge.  No secondary findings to suggest acute cholecystitis.   5. Moderate stool in the colon may reflect constipation.   6. Please see above for several additional findings.         Electronically signed by: Ry Doshi MD   Date:    02/26/2025   Time:    12:33      CT Head Without Contrast   Final Result      No acute intracranial findings as detailed above specifically without evidence for acute intracranial hemorrhage or sulcal effacement to suggest large territory recent infarction      Clinical correlation and further evaluation with MRI as warranted if patient compatible.         Electronically signed by: Jeyson Pisano DO   Date:    02/26/2025   Time:    09:54      X-Ray Chest AP Portable   Final Result      See above         Electronically signed by: Enoc Felix MD   Date:    02/26/2025   Time:    08:57      Anesthesia US Guide Vascular Access    (Results Pending)   Anesthesia US Guide Vascular Access    (Results Pending)   X-Ray Abdomen AP 1 View    (Results Pending)       Medications:  Reconciled Home Medications:      Medication List        START taking these medications      D5W SolP 500 mL with penicillin G potassium 5 million unit SolR 24 Million Units  Inject 24 Million Units into the vein every 4 (four) hours. for 4 days     nitrofurantoin (macrocrystal-monohydrate) 100 MG capsule  Commonly known as: MACROBID  Take 1 capsule (100 mg total) by mouth every 12 (twelve) hours. for 3 days            CONTINUE taking these medications      amLODIPine 10 MG tablet  Commonly known as: NORVASC  Take 1  tablet (10 mg total) by mouth once daily.     aspirin 81 MG EC tablet  Commonly known as: ECOTRIN  Take 81 mg by mouth once daily.     atorvastatin 20 MG tablet  Commonly known as: LIPITOR  Take 20 mg by mouth once daily.     baclofen 10 MG tablet  Commonly known as: LIORESAL  Take 10 mg by mouth 3 (three) times daily.     fluticasone propionate 50 mcg/actuation nasal spray  Commonly known as: FLONASE  2 sprays (100 mcg total) by Each Nostril route once daily.     gabapentin 800 MG tablet  Commonly known as: NEURONTIN  Take 1 tablet (800 mg total) by mouth 2 (two) times daily.     HYDROcodone-acetaminophen 7.5-325 mg per tablet  Commonly known as: NORCO  Take 1 tablet by mouth every 8 (eight) hours as needed for Pain.     losartan-hydrochlorothiazide 100-12.5 mg 100-12.5 mg Tab  Commonly known as: HYZAAR  Take 1 tablet by mouth once daily.     metFORMIN 1000 MG tablet  Commonly known as: GLUCOPHAGE  Take 1,000 mg by mouth 2 (two) times daily with meals.     polyethylene glycol 17 gram Pwpk  Commonly known as: GLYCOLAX  Take 17 g by mouth once daily.     tamsulosin 0.4 mg Cap  Commonly known as: FLOMAX  Take by mouth once daily.     vitamin D 1000 units Tab  Commonly known as: VITAMIN D3  Take 1,000 Units by mouth once daily.              Indwelling Lines/Drains at time of discharge:   Lines/Drains/Airways       Peripherally Inserted Central Catheter Line  Duration             PICC Double Lumen 03/11/25 1532 right brachial <1 day                    Time spent on the discharge of patient: 45 minutes         John Cornelius MD  Department of Hospital Medicine  [facility]

## 2025-03-12 NOTE — ASSESSMENT & PLAN NOTE
Patient's FSGs are controlled on current medication regimen.  Last A1c reviewed-   Lab Results   Component Value Date    HGBA1C 6.4 (H) 02/26/2025     Most recent fingerstick glucose reviewed-   Recent Labs   Lab 03/11/25  1551 03/11/25  2137   POCTGLUCOSE 259* 118*       Current correctional scale  Low  Maintain anti-hyperglycemic dose as follows-   Antihyperglycemics (From admission, onward)      Start     Stop Route Frequency Ordered    02/26/25 1715  insulin aspart U-100 pen 0-5 Units         -- SubQ Every 6 hours PRN 02/26/25 1615          Hold Oral hypoglycemics while patient is in the hospital.

## 2025-03-12 NOTE — ASSESSMENT & PLAN NOTE
Anemia is likely due to . Most recent hemoglobin and hematocrit are listed below.  Recent Labs     03/10/25  0526 03/12/25  1006   HGB 11.9* 11.9*   HCT 36.6* 36.4*     Plan  - Monitor serial CBC:   - Transfuse PRBC if patient becomes hemodynamically unstable, symptomatic or H/H drops below 7/21.  - Patient   - Patient's anemia is currently

## 2025-03-12 NOTE — PROGRESS NOTES
[facility] Medicine  Progress Note    Patient Name: Riaz Guerra Jr.  MRN: [id number]  Patient Class: IP- Inpatient   Admission Date: 2/26/2025  Length of Stay: 14 days  Attending Physician: Delano Posada DO  Primary Care Provider: Berhane Alvarez MD        Subjective     Principal Problem:Neurosyphilis        HPI:  Riaz Guerra is a 75-year-old man with a medical history of HTN, HLD, insulin dependent DM, CKD, bilateral inguinal hernias, chronic venous insufficiency, obesity, chronic hip/back pain, and wheelchair dependent. He presented to the ED from [facility] ([facility]) due to altered mental status, labored breathing, and refusing medications. Per phone call to [facility], the patient had normal mentation prior to this admission, but had difficulty adjusting to the facility.     At the ED the patient was A/O x2 to person, and time. Reported pain in bilateral shoulders and hip, feeling cold and thirsty. Discoloration was noted on both lower limbs suggestive of venous stasis. Cole catheter was placed due to greater than 400ml urine seen on bladder scan. Labs were significant for KEATON: Cr 4.5 from base line of 1.3. Potassium 7.0, , CO2 15. CT abdomen pelvis negative for hydronephrosis but noted for bilateral basilar atelectasis and 2mm pulmonary nodule along the fissure on the right as well as tree in bud type nodules along the medial aspect of the left lower lobe concerning for possible developing infectious or inflammatory process. CT head negative for acute intracranial changes.    Patient's sister has been contacted about the patient's location and new condition. Per his sister, patient has not been able to ambulate for the last 9 months and has been at the SNF facility for the last two months.           Overview/Hospital Course:  Patient admitted to hospital medicine for altered mental status KEATON, and hyperkalemia on presentation. Potassium was shifted and was  given lokelma and calcium gluconate. Nephrology was consulted and recommendations followed, sodium bicarb, lasix + diuril, and lokelma TID. Hyperkalemia resolved. Patient's mentation waxing and waning. Psychiatry consulted. 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 FTA reactive. Started on IV PCN. LP 3/7. CSF results show some evidence for viral meningitis with lymphocytic predominance. VDLR negative. Infectious disease agreed with starting empiric acyclovir pending HSV results and can be transitioned to PO Valacyclovir 1g q8h for a 10 day course. Viral meningitis workup negative. Patient to be discharged to SNF with IV Penicillin with infectious disease outpatient follow-up.     Interval History: NAEO    Review of Systems   Unable to perform ROS: Mental status change   Psychiatric/Behavioral:  Positive for confusion.      Objective:     Vital Signs (Most Recent):  Temp: 97.4 °F (36.3 °C) (03/12/25 0816)  Pulse: 95 (03/12/25 0816)  Resp: 16 (03/12/25 0933)  BP: (!) 143/93 (03/12/25 0816)  SpO2: 97 % (03/12/25 0816) Vital Signs (24h Range):  Temp:  [97.3 °F (36.3 °C)-98.4 °F (36.9 °C)] 97.4 °F (36.3 °C)  Pulse:  [] 95  Resp:  [14-18] 16  SpO2:  [96 %-100 %] 97 %  BP: (112-167)/(78-98) 143/93     Weight: (!) 139.7 kg (307 lb 15.7 oz)  Body mass index is 42.95 kg/m².  No intake or output data in the 24 hours ending 03/12/25 1125        Physical Exam  Vitals and nursing note reviewed.   Constitutional:       General: He is not in acute distress.     Appearance: He is normal weight. He is not toxic-appearing.   HENT:      Head: Normocephalic and atraumatic.   Eyes:      Extraocular Movements: Extraocular movements intact.      Pupils: Pupils are equal, round, and reactive to light.   Cardiovascular:      Rate and Rhythm: Normal rate and regular rhythm.   Pulmonary:      Effort: Pulmonary effort is normal. No respiratory distress.      Breath sounds: No stridor.   Abdominal:      General: Abdomen is flat. There  is no distension.   Musculoskeletal:      Cervical back: Normal range of motion and neck supple.   Skin:     General: Skin is warm and dry.   Neurological:      General: No focal deficit present.      Mental Status: He is alert. He is disoriented.      GCS: GCS eye subscore is 4. GCS verbal subscore is 5. GCS motor subscore is 6.      Comments: Oriented to place and year, not situation.   Psychiatric:         Mood and Affect: Mood normal.         Behavior: Behavior normal.               Significant Labs: All pertinent labs within the past 24 hours have been reviewed.    Significant Imaging: I have reviewed all pertinent imaging results/findings within the past 24 hours.      Assessment & Plan  Type 2 diabetes mellitus with chronic kidney disease, without long-term current use of insulin  Patient's FSGs are controlled on current medication regimen.  Last A1c reviewed-   Lab Results   Component Value Date    HGBA1C 6.4 (H) 02/26/2025     Most recent fingerstick glucose reviewed-   Recent Labs   Lab 03/11/25  1551 03/11/25  2137   POCTGLUCOSE 259* 118*       Current correctional scale  Low  Maintain anti-hyperglycemic dose as follows-   Antihyperglycemics (From admission, onward)      Start     Stop Route Frequency Ordered    02/26/25 1715  insulin aspart U-100 pen 0-5 Units         -- SubQ Every 6 hours PRN 02/26/25 1615          Hold Oral hypoglycemics while patient is in the hospital.  Chronic pain syndrome  Continue gabapentin  Anemia due to stage 3 chronic kidney disease  Anemia is likely due to . Most recent hemoglobin and hematocrit are listed below.  Recent Labs     03/10/25  0526 03/12/25  1006   HGB 11.9* 11.9*   HCT 36.6* 36.4*     Plan  - Monitor serial CBC:   - Transfuse PRBC if patient becomes hemodynamically unstable, symptomatic or H/H drops below 7/21.  - Patient   - Patient's anemia is currently     Metabolic acidosis, normal anion gap (NAG)  - see renal failure and hyperkalemia    Venous stasis of  both lower extremities  - sequential compression device  - wound care for ulcerated leg wounds    Urine retention  - Resolved  - david was placed; now removed  - strict input and out  - good urine output     Chronic wounds; BLE  - wound care consulted     Impaired mobility and activities of daily living  History of rapid decline over the last 9 months. Spent 2 months at [facility]. Staff there says patient has had difficulty adjusting.     - Physical therapy says that at this time, patient is functioning at their prior level of function and does not require further acute PT services.   - Occupational therapy: Patient is unable to continue work toward goals because of medical or psychosocial complications. and Therapist determines that the patient will no longer benefit from therapy services.   Debility  Patient with  debility due to . The patient's latest AMPAC (Activity Measure for Post Acute Care) Score is listed below.    AM-PAC Score - How much help does the patient need for each activity listed  Basic Mobility Total Score: 24  Turning over in bed (including adjusting bedclothes, sheets and blankets)?: None  Sitting down on and standing up from a chair with arms (e.g., wheelchair, bedside commode, etc.): None  Moving from lying on back to sitting on the side of the bed?: None  Moving to and from a bed to a chair (including a wheelchair)?: None  Need to walk in hospital room?: None  Climbing 3-5 steps with a railing?: None    Plan    - Patient discharging to SNF        Right hip pain  Continue gabapentin and PRN oxycodone and dilaudid    Severe obesity (BMI >= 40)  Body mass index is 42.95 kg/m². Morbid obesity complicates all aspects of disease management from diagnostic modalities to treatment. Weight loss encouraged and health benefits explained to patient.       Mixed hyperlipidemia  - continue home atorvastatin     PAD (peripheral artery disease)  75-year-old male nursing home resident  who is nonambulatory and history of insulin-dependent diabetes and chronic venous insufficiency admitted to the medicine service with altered mental status. An arterial duplex suggested moderate stenosis in his right tibial vessels.  While he is complaining of bilateral lower extremity pain he is also complaining of back, hip, and upper extremity pain.      -Continue w/ medical therapy in form of preventative wound care, ASA/statin, off loading  - Vascular surgeons do not believe it is ischemic rest pain and in the absence of tissue loss, no indication for surgical intervention.     Mood disorder  Patient has persistent depression which is unknown and is currently uncontrolled. Will Begin anti-depressant medications. We will not consult psychiatry at this time. Patient does not display psychosis at this time. Continue to monitor closely and adjust plan of care as needed.    - wellbutrin 150 mg daily discontinued  - started quetiapine     Serum positive for Treponema pallidum Antibody  - GEO and FTA reactive   - CSF for VDRL pending (see CSF abnormal)  -ESR , CRP elevated  - IV penicillin for 10 days total   ESR raised      CRP elevated      Neurosyphilis  Antibiotics (From admission, onward)      Start     Stop Route Frequency Ordered    03/09/25 0900  nitrofurantoin (macrocrystal-monohydrate) 100 MG capsule 100 mg         03/13/25 0859 Oral Every 12 hours 03/09/25 0724    03/06/25 1630  penicillin G potassium 24 Million Units in D5W 500 mL CONTINUOUS INFUSION         03/16/25 1629 IV Every 24 hours (non-standard times) 03/06/25 1521    02/27/25 1215  mupirocin 2 % ointment  (DECOLONIZATION PROTOCOL ORDERS)         03/04/25 0859 Nasl 2 times daily 02/27/25 1105             Patient being treated for neurosyphilis with IV PCN. In addition patient is on empirical abx for viral meningitis/encephalitis. VDLR negative.     Plan  - continue IV pcn for 10 day course  - on IV acyclovir   - CSF studies returned negative,  empiric antivirals discontinued  CSF abnormal  CSF showed borderline slightly elevated Glucose 76, Protein 46. VDRL in process. RBC in CSF could be from procedure. Lymphocyte predominance seen on the cell count differential suggesting aseptic meningitis. CSF culture shows NGTD.       - ordered additional CSF labs (EBV, CMV, VZV, HSV)   - ID made aware and will discuss with their team any further action.   Acute cystitis without hematuria  Urine culture growing gram negative rods. Sensitivities resulted.    - will finish course on Macrobid  VTE Risk Mitigation (From admission, onward)           Ordered     enoxaparin injection 40 mg  Every 12 hours         03/07/25 0931     IP VTE HIGH RISK PATIENT  Once         02/26/25 1159     Place sequential compression device  Until discontinued         02/26/25 1159                    Discharge Planning   KELSEY: 3/12/2025     Code Status: Full Code   Medical Readiness for Discharge Date: 3/11/2025  Discharge Plan A: Skilled Nursing Facility   Discharge Delays: None known at this time                    John Cornelius MD  Department of Hospital Medicine   [facility] Surg

## 2025-03-12 NOTE — ASSESSMENT & PLAN NOTE
History of rapid decline over the last 9 months. Spent 2 months at [facility]. Staff there says patient has had difficulty adjusting.     - Physical therapy says that at this time, patient is functioning at their prior level of function and does not require further acute PT services.   - Occupational therapy: Patient is unable to continue work toward goals because of medical or psychosocial complications. and Therapist determines that the patient will no longer benefit from therapy services.

## 2025-03-12 NOTE — PROGRESS NOTES
Attempted to call report to [facility]. On hold greater that 10 minutes.  Left message with my contact information for [facility] staff to return call for report.

## 2025-03-12 NOTE — PLAN OF CARE
[facility] - Med Surg  Discharge Final Note    Primary Care Provider: Berhane Alvarez MD    Expected Discharge Date: 3/12/2025    Final Discharge Note (most recent)       Final Note - 03/12/25 1042          Final Note    Assessment Type Final Discharge Note (P)      Anticipated Discharge Disposition [facility] (P)         Post-Acute Status    Post-Acute Authorization Placement (P)      Post-Acute Placement Status Set-up Complete/Auth obtained (P)      Discharge Delays None known at this time (P)                      Important Message from Medicare              Follow-up providers       Berhane Alvarez MD   Specialty: Family Medicine   Relationship: PCP - General    [address] [address]   Phone: [phone]       Next Steps: Follow up in 3 day(s)              After-discharge care                Destination       *[facility]   Service: Nursing Home    [address] [address]   Phone: [phone]                             Pt discharge to [facility], room 326A with continuous PCN infusion via stretcher with [facility]. Bedside nurse to call report [phone].Pt;otto londono (Issis) made aware per CM.  [facility] will assume care.    Discharge Plan A and Plan B have been determined by review of patient's clinical status, future medical and therapeutic needs, and coverage/benefits for post-acute care in coordination with multidisciplinary team members.     Neha Tobias, MSN   [facility]  [phone]

## 2025-03-12 NOTE — ASSESSMENT & PLAN NOTE
Patient with  debility due to . The patient's latest AMPAC (Activity Measure for Post Acute Care) Score is listed below.    AM-PAC Score - How much help does the patient need for each activity listed  Basic Mobility Total Score: 24  Turning over in bed (including adjusting bedclothes, sheets and blankets)?: None  Sitting down on and standing up from a chair with arms (e.g., wheelchair, bedside commode, etc.): None  Moving from lying on back to sitting on the side of the bed?: None  Moving to and from a bed to a chair (including a wheelchair)?: None  Need to walk in hospital room?: None  Climbing 3-5 steps with a railing?: None    Plan    - Patient discharging to SNF

## 2025-03-14 ENCOUNTER — TELEPHONE (OUTPATIENT)
Dept: INFECTIOUS DISEASES | Facility: CLINIC | Age: 75
End: 2025-03-14
Payer: MEDICARE

## 2025-03-14 NOTE — TELEPHONE ENCOUNTER
Pt was a no show to Bicillin injection appointment. This nurse attempted to contact pt for reschedule, phone number listed in chart is disconnected.

## 2025-03-15 LAB
CMV DNA SERPL NAA+PROBE-ACNC: NOT DETECTED IU/ML
CMV DNA SERPL NAA+PROBE-LOG IU: NOT DETECTED LOG IU/ML
SPECIMEN SOURCE: NORMAL

## 2025-03-17 NOTE — ANESTHESIA POSTPROCEDURE EVALUATION
Anesthesia Post Evaluation    Patient: Riaz Guerra JrChacho    Procedure(s) Performed: Procedure(s) (LRB):  Lumbar Puncture (N/A)    Final Anesthesia Type: MAC      Patient location during evaluation: [facility]  Patient participation: Yes- Able to Participate  Level of consciousness: awake and alert  Post-procedure vital signs: reviewed and stable  Pain management: adequate  Airway patency: patent    PONV status at discharge: No PONV  Anesthetic complications: no      Cardiovascular status: blood pressure returned to baseline  Respiratory status: unassisted  Hydration status: euvolemic  Follow-up not needed.              Vitals Value Taken Time   BP  03/17/25 06:46   Temp  03/17/25 06:46   Pulse  03/17/25 06:46   Resp  03/17/25 06:46   SpO2  03/17/25 06:46         Event Time   Out of Recovery 03/07/2025 11:50:00         Pain/Gregor Score: No data recorded

## 2025-05-02 PROBLEM — I46.9 CARDIAC ARREST: Status: ACTIVE | Noted: 2025-01-01

## 2025-05-03 LAB
BUN SERPL-MCNC: 122 MG/DL (ref 6–30)
BUN SERPL-MCNC: 135 MG/DL (ref 6–30)
CHLORIDE SERPL-SCNC: 110 MMOL/L (ref 95–110)
CHLORIDE SERPL-SCNC: 111 MMOL/L (ref 95–110)
CREAT SERPL-MCNC: 10.7 MG/DL (ref 0.5–1.4)
CREAT SERPL-MCNC: 9.9 MG/DL (ref 0.5–1.4)
GLUCOSE SERPL-MCNC: 100 MG/DL (ref 70–110)
GLUCOSE SERPL-MCNC: 73 MG/DL (ref 70–110)
HCT VFR BLD CALC: 41 %PCV (ref 36–54)
HCT VFR BLD CALC: 46 %PCV (ref 36–54)
OHS QRS DURATION: 104 MS
OHS QRS DURATION: 110 MS
OHS QRS DURATION: 70 MS
OHS QTC CALCULATION: 339 MS
OHS QTC CALCULATION: 417 MS
OHS QTC CALCULATION: 453 MS
POC IONIZED CALCIUM: 1.14 MMOL/L (ref 1.06–1.42)
POC IONIZED CALCIUM: 1.62 MMOL/L (ref 1.06–1.42)
POC TCO2 (MEASURED): 15 MMOL/L (ref 23–29)
POC TCO2 (MEASURED): 15 MMOL/L (ref 23–29)
POTASSIUM BLD-SCNC: 5.4 MMOL/L (ref 3.5–5.1)
POTASSIUM BLD-SCNC: 6.2 MMOL/L (ref 3.5–5.1)
SAMPLE: ABNORMAL
SAMPLE: ABNORMAL
SODIUM BLD-SCNC: 139 MMOL/L (ref 136–145)
SODIUM BLD-SCNC: 140 MMOL/L (ref 136–145)

## 2025-05-03 NOTE — ED TRIAGE NOTES
Riaz Guerra Jr., a 75 y.o. male presents to the ED w/ complaint of AMS, on a non-rebreather from EMS from [facility]. Pt is minimally responsive and non-verbal    Triage note:  Chief Complaint   Patient presents with    Shortness of Breath     [facility] for decreased LOC, found hypoxic, hypotensive systolic in 70s by ems arrives with NRB no iv access. Lung sounds wet exp wheezing per ems     Review of patient's allergies indicates:   Allergen Reactions    Lisinopril Other (See Comments)     cough     Past Medical History:   Diagnosis Date    Depression     Diabetes mellitus     Gout     High cholesterol     Hypertension     PAD (peripheral artery disease) 03/01/2025 2-2025 An arterial duplex suggested moderate stenosis in his right tibial vessels.   Vascular recommend medical therapy.

## 2025-05-03 NOTE — ED NOTES
Pt is severely bradycardic and hypotensive, Dr. Dillard at bedside calling code STEMI, pt is pulseless and CPR initiated at this time. Code cart brought to bedside and zoll pads applied to pt.

## 2025-05-03 NOTE — ASSESSMENT & PLAN NOTE
MartinRiaz hull is a 75-year-old man with a medical history of  neurosyphilis, HTN, HLD, insulin dependent DM, CKD, bilateral inguinal hernias, chronic venous insufficiency, obesity, chronic hip/back pain, and wheelchair dependent who was brought in by EMS from [facility] for dyspnea. In the ED, was noted to have ST elevations in the anteroseptal and inferior leads which appeared improved on subsequent ECGs. He was deemed by IC staff to not be a candidate for [facility] candidate given transient ST elevations on ECG, unstable condition, electrolyte imbalance and acute renal failure, with plan for medical management and admit to MICU. Given TTE demonstration of RV enlargement RV hypokinesis consistent with De León sign, EF appeared preserved, massive PE was suspected and so he was administered tPA with subsequent plan to treat with heparin gtt.     Unfortunately patient never achieved stability for diagnostic evaluation with CT scan. The patient continued to go into multiple PEA arrest episodes despite resuscitative efforts. No family at bedside. The patient unfortunately didn't respond to multiple resuscitative efforts in the ED. Per ED staff, bedside echo persistently showed his heart was at standstill. The patient was declared  at 21:36.

## 2025-05-03 NOTE — HPI
Riaz Guerra is a 75-year-old man with a medical history of  neurosyphilis, HTN, HLD, insulin dependent DM, CKD, bilateral inguinal hernias, chronic venous insufficiency, obesity, chronic hip/back pain, and wheelchair dependent who was brought in by EMS from [facility] for dyspnea.     On presentation to the ER, the patient reported dyspnea and had agonal breathing which prompted intubation. ECG showed ST elevations in inferior and anterioseptal leads. He subsequently developed PEA arrest and was resuscitated multiple times. Code STEMI was called in the ER for which cardiology was consulted.  During evaluation, his ST elevations improved on subsequent ECGs also showing new iRBBB and sinus tachycardia. Labs demonstrated K 6.4, sCr 10. Bedside TTE though limited due to poor acoustic windows showed RV enlargement RV hypokinesis consistent with De León sign, EF appeared preserved. Due to high suspicion for massive PE, emergent tPA was administered. Despite this, the patient continued to go into multiple PEA arrest episodes despite resuscitative efforts. No family at bedside. The patient unfortunately didn't respond to multiple resuscitative efforts in the ED. Per ED staff, bedside echo persistently showed his heart was at standstill. The patient was declared  at 21:36.

## 2025-05-03 NOTE — CONSULTS
[facility] - Emergency Dept  Cardiology  Consult Note    Patient Name: Riaz Guerra Jr.  MRN: [id number]  Admission Date: 2025  Hospital Length of Stay: 0 days  Code Status: Prior   Attending Provider: Greg Dillard MD   Consulting Provider: Michael Morin MD  Primary Care Physician: Berhane Alvarez MD  Principal Problem:<principal problem not specified>    Patient information was obtained from past medical records and ER records.     Inpatient consult to Cardiology  Consult performed by: Michael Morin MD  Consult ordered by: Greg Dillard MD        Subjective:     Chief Complaint:  dyspnea     HPI:   Riaz Guerra is a 75-year-old man with a medical history of  neurosyphilis, HTN, HLD, insulin dependent DM, CKD, bilateral inguinal hernias, chronic venous insufficiency, obesity, chronic hip/back pain, and wheelchair dependent who was brought in by EMS from [facility] for dyspnea.     On presentation to the ER, the patient reported dyspnea and had agonal breathing which prompted intubation. ECG showed ST elevations in inferior and anterioseptal leads. He subsequently developed PEA arrest and was resuscitated multiple times. Code STEMI was called in the ER for which cardiology was consulted.  During evaluation, his ST elevations improved on subsequent ECGs also showing new iRBBB and sinus tachycardia. Labs demonstrated K 6.4, sCr 10. Bedside TTE though limited due to poor acoustic windows showed RV enlargement RV hypokinesis consistent with De León sign, EF appeared preserved. Due to high suspicion for massive PE, emergent tPA was administered. Despite this, the patient continued to go into multiple PEA arrest episodes despite resuscitative efforts. No family at bedside. The patient unfortunately didn't respond to multiple resuscitative efforts in the ED. Per ED staff, bedside echo persistently showed his heart was at standstill. The patient was declared  at 21:36.         Past  Medical History:   Diagnosis Date    Depression     Diabetes mellitus     Gout     High cholesterol     Hypertension     PAD (peripheral artery disease) 03/01/2025 2-2025 An arterial duplex suggested moderate stenosis in his right tibial vessels.   Vascular recommend medical therapy.         Past Surgical History:   Procedure Laterality Date    LUMBAR PUNCTURE N/A 3/7/2025    Procedure: Lumbar Puncture;  Surgeon: Sadia Santiago;  Location: [facility];  Service: Anesthesiology;  Laterality: N/A;       Review of patient's allergies indicates:   Allergen Reactions    Lisinopril Other (See Comments)     cough       No current facility-administered medications on file prior to encounter.     Current Outpatient Medications on File Prior to Encounter   Medication Sig    amLODIPine (NORVASC) 10 MG tablet Take 1 tablet (10 mg total) by mouth once daily.    aspirin (ECOTRIN) 81 MG EC tablet Take 81 mg by mouth once daily.    atorvastatin (LIPITOR) 20 MG tablet Take 20 mg by mouth once daily.    baclofen (LIORESAL) 10 MG tablet Take 10 mg by mouth 3 (three) times daily.    fluticasone propionate (FLONASE) 50 mcg/actuation nasal spray 2 sprays (100 mcg total) by Each Nostril route once daily.    gabapentin (NEURONTIN) 800 MG tablet Take 1 tablet (800 mg total) by mouth 2 (two) times daily.    HYDROcodone-acetaminophen (NORCO) 7.5-325 mg per tablet Take 1 tablet by mouth every 8 (eight) hours as needed for Pain.    losartan-hydrochlorothiazide 100-12.5 mg (HYZAAR) 100-12.5 mg Tab Take 1 tablet by mouth once daily.    metFORMIN (GLUCOPHAGE) 1000 MG tablet Take 1,000 mg by mouth 2 (two) times daily with meals.    polyethylene glycol (GLYCOLAX) 17 gram PwPk Take 17 g by mouth once daily.    tamsulosin (FLOMAX) 0.4 mg Cap Take by mouth once daily.    vitamin D (VITAMIN D3) 1000 units Tab Take 1,000 Units by mouth once daily.     Family History       Problem Relation (Age of Onset)    Diabetes Mother, Father    Heart disease Mother  "         Tobacco Use    Smoking status: Never    Smokeless tobacco: Never   Substance and Sexual Activity    Alcohol use: Not Currently     Comment: occasionally    Drug use: Yes     Types: Marijuana    Sexual activity: Yes     Review of Systems   Unable to perform ROS: Intubated     Objective:     Vital Signs (Most Recent):  Pulse: 66 (05/02/25 2112)  Resp: (!) 22 (05/02/25 2056)  BP: 92/60 (05/02/25 2100)  SpO2: 97 % (05/02/25 2100) Vital Signs (24h Range):  Pulse:  [] 66  Resp:  [22-26] 22  SpO2:  [95 %-100 %] 97 %  BP: ()/(29-66) 92/60     Weight: (!) 139.7 kg (307 lb 15.7 oz)  Body mass index is 42.95 kg/m².    SpO2: 97 %         Intake/Output Summary (Last 24 hours) at 5/2/2025 2144  Last data filed at 5/2/2025 2103  Gross per 24 hour   Intake 50.61 ml   Output --   Net 50.61 ml       Lines/Drains/Airways       Peripherally Inserted Central Catheter Line  Duration             PICC Double Lumen 03/11/25 1532 right brachial 52 days              Airway  Duration                  Airway - Non-Surgical 05/02/25 2035 <1 day              Peripheral Intravenous Line  Duration                  Peripheral IV - Single Lumen 05/02/25 2000 20 G Anterior;Left;Proximal Forearm <1 day         Peripheral IV - Single Lumen 05/02/25 2021 20 G Anterior;Proximal;Right Forearm <1 day         Peripheral IV - Single Lumen 05/02/25 2120 20 G Right  <1 day                     Physical Exam  Constitutional:       Appearance: He is obese.   HENT:      Head: Normocephalic.   Cardiovascular:      Rate and Rhythm: Tachycardia present.   Pulmonary:      Comments: Mechanical breath sounds  Abdominal:      General: Abdomen is flat.   Musculoskeletal:         General: No swelling.   Skin:     General: Skin is warm.   Neurological:      Comments: Sedated and intubated          Significant Labs: BMP: No results for input(s): "GLU", "NA", "K", "CL", "CO2", "BUN", "CREATININE", "CALCIUM", "MG" in the last 48 hours., CMP No results for " "input(s): "NA", "K", "CL", "CO2", "GLU", "BUN", "CREATININE", "CALCIUM", "PROT", "ALBUMIN", "BILITOT", "ALKPHOS", "AST", "ALT", "ANIONGAP", "ESTGFRAFRICA", "EGFRNONAA" in the last 48 hours., CBC   Recent Labs   Lab 05/02/25 2036 05/02/25 2052 05/02/25 2129   WBC 9.46  --   --    HGB 13.7*  --   --    HCT 43.5   < > 41.6     --   --     < > = values in this interval not displayed.   , INR No results for input(s): "INR", "PROTIME" in the last 48 hours., and Troponin   Recent Labs   Lab 05/02/25 2036   TROPONINIHS 7       Significant Imaging:   Imaging Results              X-Ray Chest AP Portable (In process)                     Assessment and Plan:     Cardiac arrest  Riaz Guerra is a 75-year-old man with a medical history of  neurosyphilis, HTN, HLD, insulin dependent DM, CKD, bilateral inguinal hernias, chronic venous insufficiency, obesity, chronic hip/back pain, and wheelchair dependent who was brought in by EMS from [facility] for dyspnea. In the ED, was noted to have ST elevations in the anteroseptal and inferior leads which appeared improved on subsequent ECGs. He was deemed by IC staff to not be a candidate for [facility] candidate given transient ST elevations on ECG, unstable condition, electrolyte imbalance and acute renal failure, with plan for medical management and admit to MICU. Given TTE demonstration of RV enlargement RV hypokinesis consistent with De León sign, EF appeared preserved, massive PE was suspected and so he was administered tPA with subsequent plan to treat with heparin gtt.     Unfortunately patient never achieved stability for diagnostic evaluation with CT scan. The patient continued to go into multiple PEA arrest episodes despite resuscitative efforts. No family at bedside. The patient unfortunately didn't respond to multiple resuscitative efforts in the ED. Per ED staff, bedside echo persistently showed his heart was at standstill. The patient was declared "  at 21:36.        Case was discussed with IC staff, Dr. Mayer.       VTE Risk Mitigation (From admission, onward)           Ordered     heparin 25,000 units in dextrose 5% (100 units/ml) IV bolus from bag HIGH INTENSITY nomogram - [facility]  As needed (PRN)        Question:  Heparin Infusion Adjustment (DO NOT MODIFY ANSWER)  Answer:  [email or web address]\Images\Pharmacy\HeparinInfusions\heparin HIGH INTENSITY nomogram for [facility] [id number]    25     heparin 25,000 units in dextrose 5% (100 units/ml) IV bolus from bag HIGH INTENSITY nomogram - [facility]  As needed (PRN)        Question:  Heparin Infusion Adjustment (DO NOT MODIFY ANSWER)  Answer:  [email or web address]\[email or web address]\Images\Pharmacy\HeparinInfusions\heparin HIGH INTENSITY nomogram for [facility] [id number]    25     heparin 25,000 units in dextrose 5% (100 units/ml) IV bolus from bag HIGH INTENSITY nomogram - [facility]  Once        Question:  Heparin Infusion Adjustment (DO NOT MODIFY ANSWER)  Answer:  [email or web address]\epic\Images\Pharmacy\HeparinInfusions\heparin HIGH INTENSITY nomogram for [facility] [id number].pdf    25     heparin 25,000 units in dextrose 5% 250 mL (100 units/mL) infusion HIGH INTENSITY nomogram - [facility]  Continuous        Question:  Begin at (units/kg/hr)  Answer:  18    25                    Thank you for your consult. I will sign off. Please contact us if you have any additional questions.    Michael Morin MD  Cardiology   [facility] - Emergency Dept

## 2025-05-03 NOTE — SUBJECTIVE & OBJECTIVE
Past Medical History:   Diagnosis Date    Depression     Diabetes mellitus     Gout     High cholesterol     Hypertension     PAD (peripheral artery disease) 03/01/2025 2-2025 An arterial duplex suggested moderate stenosis in his right tibial vessels.   Vascular recommend medical therapy.         Past Surgical History:   Procedure Laterality Date    LUMBAR PUNCTURE N/A 3/7/2025    Procedure: Lumbar Puncture;  Surgeon: SurgeonSadia;  Location: [facility];  Service: Anesthesiology;  Laterality: N/A;       Review of patient's allergies indicates:   Allergen Reactions    Lisinopril Other (See Comments)     cough       No current facility-administered medications on file prior to encounter.     Current Outpatient Medications on File Prior to Encounter   Medication Sig    amLODIPine (NORVASC) 10 MG tablet Take 1 tablet (10 mg total) by mouth once daily.    aspirin (ECOTRIN) 81 MG EC tablet Take 81 mg by mouth once daily.    atorvastatin (LIPITOR) 20 MG tablet Take 20 mg by mouth once daily.    baclofen (LIORESAL) 10 MG tablet Take 10 mg by mouth 3 (three) times daily.    fluticasone propionate (FLONASE) 50 mcg/actuation nasal spray 2 sprays (100 mcg total) by Each Nostril route once daily.    gabapentin (NEURONTIN) 800 MG tablet Take 1 tablet (800 mg total) by mouth 2 (two) times daily.    HYDROcodone-acetaminophen (NORCO) 7.5-325 mg per tablet Take 1 tablet by mouth every 8 (eight) hours as needed for Pain.    losartan-hydrochlorothiazide 100-12.5 mg (HYZAAR) 100-12.5 mg Tab Take 1 tablet by mouth once daily.    metFORMIN (GLUCOPHAGE) 1000 MG tablet Take 1,000 mg by mouth 2 (two) times daily with meals.    polyethylene glycol (GLYCOLAX) 17 gram PwPk Take 17 g by mouth once daily.    tamsulosin (FLOMAX) 0.4 mg Cap Take by mouth once daily.    vitamin D (VITAMIN D3) 1000 units Tab Take 1,000 Units by mouth once daily.     Family History       Problem Relation (Age of Onset)    Diabetes Mother, Father    Heart disease  "Mother          Tobacco Use    Smoking status: Never    Smokeless tobacco: Never   Substance and Sexual Activity    Alcohol use: Not Currently     Comment: occasionally    Drug use: Yes     Types: Marijuana    Sexual activity: Yes     Review of Systems   Unable to perform ROS: Intubated     Objective:     Vital Signs (Most Recent):  Pulse: 66 (05/02/25 2112)  Resp: (!) 22 (05/02/25 2056)  BP: 92/60 (05/02/25 2100)  SpO2: 97 % (05/02/25 2100) Vital Signs (24h Range):  Pulse:  [] 66  Resp:  [22-26] 22  SpO2:  [95 %-100 %] 97 %  BP: ()/(29-66) 92/60     Weight: (!) 139.7 kg (307 lb 15.7 oz)  Body mass index is 42.95 kg/m².    SpO2: 97 %         Intake/Output Summary (Last 24 hours) at 5/2/2025 2144  Last data filed at 5/2/2025 2103  Gross per 24 hour   Intake 50.61 ml   Output --   Net 50.61 ml       Lines/Drains/Airways       Peripherally Inserted Central Catheter Line  Duration             PICC Double Lumen 03/11/25 1532 right brachial 52 days              Airway  Duration                  Airway - Non-Surgical 05/02/25 2035 <1 day              Peripheral Intravenous Line  Duration                  Peripheral IV - Single Lumen 05/02/25 2000 20 G Anterior;Left;Proximal Forearm <1 day         Peripheral IV - Single Lumen 05/02/25 2021 20 G Anterior;Proximal;Right Forearm <1 day         Peripheral IV - Single Lumen 05/02/25 2120 20 G Right  <1 day                     Physical Exam  Constitutional:       Appearance: He is obese.   HENT:      Head: Normocephalic.   Cardiovascular:      Rate and Rhythm: Tachycardia present.   Pulmonary:      Comments: Mechanical breath sounds  Abdominal:      General: Abdomen is flat.   Musculoskeletal:         General: No swelling.   Skin:     General: Skin is warm.   Neurological:      Comments: Sedated and intubated          Significant Labs: BMP: No results for input(s): "GLU", "NA", "K", "CL", "CO2", "BUN", "CREATININE", "CALCIUM", "MG" in the last 48 hours., CMP No " "results for input(s): "NA", "K", "CL", "CO2", "GLU", "BUN", "CREATININE", "CALCIUM", "PROT", "ALBUMIN", "BILITOT", "ALKPHOS", "AST", "ALT", "ANIONGAP", "ESTGFRAFRICA", "EGFRNONAA" in the last 48 hours., CBC   Recent Labs   Lab 05/02/25 2036 05/02/25 2052 05/02/25 2129   WBC 9.46  --   --    HGB 13.7*  --   --    HCT 43.5   < > 41.6     --   --     < > = values in this interval not displayed.   , INR No results for input(s): "INR", "PROTIME" in the last 48 hours., and Troponin   Recent Labs   Lab 05/02/25 2036   TROPONINIHS 7       Significant Imaging:   Imaging Results              X-Ray Chest AP Portable (In process)                     "

## 2025-05-03 NOTE — ED NOTES
Continue current treatment regimen.  Reminded to bring in blood sugar diary at next visit.  Dietary recommendations for ADA diet.  Regular aerobic exercise.  Discussed ways to avoid symptomatic hypoglycemia.  Discussed foot care.  Reminded to get yearly retinal exam.  Diabetes educator referral.  Nutritionist referral.  Most recent Hg A1C on file was   Lab Results   Component Value Date    HGBA1C 7.4 (H) 01/06/2016      Recommend Jardiance in addition or alternative to other oral diabetic medications.   Asked for Urine protein .   ACE-I: On ARB  Continue Statins. Target LDL < 70 mg/dl.      Pt intubated with 7.5 tube with noted color change on CO2 detector, 26 at the teeth secured, bilateral chest rise and fall noted

## 2025-05-03 NOTE — ED NOTES
I-STAT Chem-8+ Results:   Value Reference Range   Sodium 140 136-145 mmol/L   Potassium  5.4 3.5-5.1 mmol/L   Chloride 110  mmol/L   Ionized Calcium 1.62 1.06-1.42 mmol/L   CO2 (measured) 15 23-29 mmol/L   Glucose 100  mg/dL    6-30 mg/dL   Creatinine 9.9 0.5-1.4 mg/dL   Hematocrit 41 36-54%

## 2025-05-03 NOTE — ED PROVIDER NOTES
Encounter Date: 5/2/2025       History     Chief Complaint   Patient presents with    Shortness of Breath     [facility] for decreased LOC, found hypoxic, hypotensive systolic in 70s by ems arrives with NRB no iv access. Lung sounds wet exp wheezing per ems     75-year-old male with PMH of diabetes and neurosyphilis presents with shortness of breath.  Patient is unable to provide a comprehensive history because of his condition.    Per EMS:  Patient is coming from [facility].  They were called because the patient was short of breath and less responsive.  The nurse at [facility] said the patient usually has a GCS of 15.  Patient's initial O2 sat was 88% on room air and they put him on 15 L non-rebreather with improvement up to around 95%.  Patient's systolic blood pressure was around 75 with EMS.  They were unable to establish IV access.  They report expiratory wheezes.    Additional history unable to be obtained at this time.    The history is provided by the EMS personnel and medical records. The history is limited by the condition of the patient.     Review of patient's allergies indicates:   Allergen Reactions    Lisinopril Other (See Comments)     cough     Past Medical History:   Diagnosis Date    Depression     Diabetes mellitus     Gout     High cholesterol     Hypertension     PAD (peripheral artery disease) 03/01/2025 2-2025 An arterial duplex suggested moderate stenosis in his right tibial vessels.   Vascular recommend medical therapy.       Past Surgical History:   Procedure Laterality Date    LUMBAR PUNCTURE N/A 3/7/2025    Procedure: Lumbar Puncture;  Surgeon: Sadia Santiago;  Location: [facility];  Service: Anesthesiology;  Laterality: N/A;     Family History   Problem Relation Name Age of Onset    Heart disease Mother      Diabetes Mother      Diabetes Father       Social History[1]  Review of Systems    Physical Exam     Initial Vitals   BP Pulse Resp Temp SpO2   05/02/25 2015  05/02/25 2015 05/02/25 2015 05/02/25 2130 05/02/25 2015   137/79 (!) 121 16 99.4 °F (37.4 °C) 100 %      MAP       --                Physical Exam    Constitutional:   Alert with labored breathing   Eyes: Pupils are equal, round, and reactive to light.   Cardiovascular:  Regular rhythm and intact distal pulses.           Tachycardic   Pulmonary/Chest:   Significant increased work of breathing, mild tachypnea.  Belly breathing.  Coarse breath sounds bilaterally   Abdominal: Abdomen is soft. He exhibits distension.     Neurological: He is alert.   Patient initially was saying a few words but difficult to understand.  Unable to assess orientation   Skin: Skin is warm.         ED Course   Intubation    Date/Time: 5/2/2025 8:30 PM  Location procedure was performed: [facility] EMERGENCY DEPARTMENT    Performed by: Jeannette Turner MD  Authorized by: Greg Dillard MD  Pre-operative diagnosis: Acute hypoxemic respiratory failure  Post-operative diagnosis: Acute hypoxemic respiratory failure  Consent Done: Emergent Situation  Indications: respiratory failure, hypoxemia and airway protection  Intubation method: video-assisted  Patient status: paralyzed (RSI)  Preoxygenation: nonrebreather mask  Sedatives: etomidate  Paralytic: rocuronium  Laryngoscope size: Glide 3  Tube size: 8.0 mm  Tube type: cuffed  Number of attempts: 1  Cords visualized: yes  Post-procedure assessment: ETCO2 monitor  Breath sounds: equal and absent over the epigastrium  Cuff inflated: yes  ETT to teeth: 26 cm  Tube secured with: ETT villatoro  Chest x-ray interpreted by me.  Patient tolerance: Patient tolerated the procedure well with no immediate complications        Labs Reviewed   COMPREHENSIVE METABOLIC PANEL - Abnormal       Result Value    Sodium 140      Potassium 6.7 (*)     Chloride 108      CO2 10 (*)     Glucose 71       (*)     Creatinine 9.1 (*)     Calcium 9.5      Protein Total 8.7 (*)     Albumin 2.9 (*)     Bilirubin Total 0.5             AST 12      ALT 7 (*)     Anion Gap 22 (*)     eGFR 6 (*)    URINALYSIS, REFLEX TO URINE CULTURE - Abnormal    Color, UA Orange (*)     Appearance, UA Cloudy (*)     pH, UA 6.0      Spec Grav UA 1.020      Protein, UA 3+ (*)     Glucose, UA Negative      Ketones, UA Negative      Bilirubin, UA Negative      Blood, UA 2+ (*)     Nitrites, UA Negative      Urobilinogen, UA Negative      Leukocyte Esterase, UA 3+ (*)    CBC WITH DIFFERENTIAL - Abnormal    WBC 9.46      RBC 4.55 (*)     HGB 13.7 (*)     HCT 43.5      MCV 96      MCH 30.1      MCHC 31.5 (*)     RDW 14.6 (*)     Platelet Count 315      MPV 10.7      Nucleated RBC 0      Neut % 83.5 (*)     Lymph % 14.3 (*)     Mono % 1.5 (*)     Eos % 0.2      Basophil % 0.2      Imm Grans % 0.3      Neut # 7.90 (*)     Lymph # 1.35      Mono # 0.14 (*)     Eos # 0.02      Baso # 0.02      Imm Grans # 0.03      Narrative:     This is an appended report.  These results have been appended to a previously verified report.   URINALYSIS MICROSCOPIC - Abnormal    RBC, UA 65 (*)     WBC, UA >100 (*)     WBC Clumps, UA Many (*)     Bacteria, UA Few (*)     Squamous Epithelial Cells, UA 18      Non-Squamous Epithelial Cells 5 (*)     Microscopic Comment       B-TYPE NATRIURETIC PEPTIDE - Normal    BNP <10     TROPONIN I HIGH SENSITIVITY - Normal    Troponin High Sensitive 7     APTT - Normal    PTT 27.2     APTT - Normal    PTT 26.7     PROTIME-INR - Normal    PT 11.9      INR 1.1     CULTURE, BLOOD   CULTURE, BLOOD   CULTURE, URINE   CBC W/ AUTO DIFFERENTIAL    Narrative:     The following orders were created for panel order CBC auto differential.  Procedure                               Abnormality         Status                     ---------                               -----------         ------                     CBC with Differential[[id number]]       Abnormal            Final result                 Please view results for these tests on the individual orders.    GREY TOP URINE HOLD    Extra Tube Hold for add-ons.     ISTAT CHEM8     EKG Readings: (Independently Interpreted)   EKG at 8:37 p.m. independently interpreted shows wide complex, unclear rhythm, rate around 43, significant ST elevations anteriorly, consistent with acute STEMI.      EKG at 8:48 p.m. status post ROSC independently interpreted shows sinus tachycardia, rate 128, ST elevations have largely resolved in the anterior region       Imaging Results              X-Ray Chest AP Portable (Final result)  Result time 05/02/25 22:17:37      Final result by Chandra Frost MD (05/02/25 22:17:37)                   Impression:      1.  Bibasilar pneumonia versus pulmonary edema, right greater than left.    2.  The endotracheal tube terminates near the nichelle.  Recommend slight retraction.      Electronically signed by: Chandra Frost  Date:    05/02/2025  Time:    22:17               Narrative:    EXAMINATION:  XR CHEST AP PORTABLE    CLINICAL HISTORY:  Sepsis;    TECHNIQUE:  Single frontal view of the chest was performed.    COMPARISON:  Radiograph of the chest from 03/11/2025.    FINDINGS:  An endotracheal tube is present which terminates near the nichelle. Heart size and pulmonary vasculature are within normal limits.  Increased opacity is noted involving the lung bases bilaterally, right greater than left. No evidence of pneumothorax or pleural effusion. No evidence of acute osseous process.                                       Medications   piperacillin-tazobactam (ZOSYN) 4.5 g in D5W 100 mL IVPB (MB+) (4.5 g Intravenous Not Given 5/2/25 2015)   NORepinephrine bitartrate-D5W 4 mg/250 mL (16 mcg/mL) PERIPHERAL access infusion (0 mcg/kg/min × 139.7 kg Intravenous Stopped 5/2/25 2136)   vancomycin (VANCOCIN) 2,500 mg in 0.9% NaCl 500 mL IVPB (2,500 mg Intravenous Not Given 5/2/25 2030)   fentaNYL 2500 mcg in 0.9% sodium chloride 250 mL infusion premix ( Intravenous Not Given 5/2/25 2030)   fentanyl IV bolus  from bag/infusion 50 mcg (50 mcg Intravenous Not Given 5/2/25 2030)   propofol (DIPRIVAN) 10 mg/mL infusion (0 mcg/kg/min × 139.7 kg Intravenous Stopped 5/2/25 2036)   amiodarone 360 mg/200 mL (1.8 mg/mL) infusion (0 mg/min Intravenous Stopped 5/2/25 2109)   amiodarone 360 mg/200 mL (1.8 mg/mL) infusion (has no administration in time range)   heparin 25,000 units in dextrose 5% (100 units/ml) IV bolus from bag HIGH INTENSITY nomogram - [facility] (8,088 Units Intravenous Not Given 5/2/25 2230)   heparin 25,000 units in dextrose 5% 250 mL (100 units/mL) infusion HIGH INTENSITY nomogram - [facility] ( Intravenous Not Given 5/2/25 2230)   heparin 25,000 units in dextrose 5% (100 units/ml) IV bolus from bag HIGH INTENSITY nomogram - [facility] (has no administration in time range)   heparin 25,000 units in dextrose 5% (100 units/ml) IV bolus from bag HIGH INTENSITY nomogram - [facility] (has no administration in time range)   etomidate injection 30 mg (30 mg Intravenous Given 5/2/25 2028)   rocuronium injection 100 mg (100 mg Intravenous Given 5/2/25 2028)   calcium gluconate 1 g in NS IVPB (premixed) (0 g Intravenous Stopped 5/2/25 2059)   EPINEPHrine 0.1 mg/mL injection (1 mg Intravenous Given 5/2/25 2039)   calcium chloride 100 mg/mL (10 %) injection (1 g Intravenous Given 5/2/25 2040)   sodium bicarbonate 8.4 % (1 mEq/mL) injection (50 mEq Intravenous Given 5/2/25 2041)   amiodarone injection (150 mg Intravenous Given 5/2/25 2045)   EPINEPHrine 0.1 mg/mL injection (1 mg Intravenous Given 5/2/25 2043)   EPINEPHrine 0.1 mg/mL injection (1 mg Intravenous Given 5/2/25 2110)   calcium chloride 100 mg/mL (10 %) injection (1 g Intravenous Given 5/2/25 2111)   alteplase (ACtivase) injection 50 mg (0 mg Intravenous Stopped 5/2/25 2136)   EPINEPHrine 0.1 mg/mL injection (1 mg Intravenous Given 5/2/25 2134)   alteplase (ACtivase) injection 50 mg (0 mg Intravenous Stopped 5/2/25 2128)     Medical Decision Making  Differential diagnoses considered includes  CHF, volume overload, KEATON, electrolyte abnormality, ACS, PE    Patient was immediately evaluated in EMS hallway and then moved to ED bed 2.  He is maintaining appropriate oxygen saturation but had worsening mental status and stopped answering any questions or responding to noxious stimuli.  Patient was intubated; see procedure note. While we were preparing for the intubation, we had point of care test that showed the patient's potassium was 6.2, so calcium gluconate was ordered. Prior to intubation, the patient's blood pressure was 130 systolic.  5-10 minutes after intubation, the patient's blood pressure dropped.  He was immediately started on Levophed.  With an another 5-10 minutes, the patient had bradycardia that worsened until we lost pulses/the patient had a cardiac arrest.  CPR was initiated in the patient received multiple rounds of epinephrine.  He also received calcium, bicarb, and amiodarone.  Rhythm appeared to be VFib and the patient was shocked twice with subsequent ROSC.  EKG after ROSC showed acute STEMI, so activated code STEMI at 8:37 p.m..  Discussed the case with cardiology and as soon as I got on the phone with them, the patient lost pulses again.  Patient again received epinephrine as well as additional calcium with subsequent ROSC.  EKG after obtaining ROSC looked improved from the EKG that activated a code STEMI.  Cardiology ultimately canceled the code STEMI.  Patient has a history of blood clots so there was high concern for PE, especially because cardiology reports RV collapse on their echo.  We made the decision to give tPA given the concern for PE in the setting of a cardiac arrest and the patient was not stable for CT, nor could he get contrast with the severe acute renal failure.  The patient coded again, this time it was a PEA. We did multiple rounds of epinephrine and CPR without regaining a pulse.  Time of death called at 9:36 p.m..    From the labs we were able to obtain, the  patient had a severe acute renal failure, with a creatinine greater than 10.  His potassium was 6.2 which did correct at 5.4 after our initial interventions, though the patient is still COVID multiple times after that.  Cole catheter was placed and the returning fluid was mainly pus.  UA looks consistent with UTI, so the patient at minimum likely had UTI causing sepsis and multiorgan failure.    We contacted to family members who were coming up to the ED but ultimately, only 1 of them came.  We shared with them what happened and the likely/potential causes; all questions answered.  We offered for them to spend time bedside with the patient but they opted against that.     Fluid challenge Contraindicated- Fluid bolus is contraindicated in this patient due to Volume overload due to- severe acute renal failure   Post- resuscitation assessment No - Post resuscitation assessment not needed   Will Start Pressors- Levophed for MAP of 65  Source control achieved by: not achieved; patient coded shortly after arrival and ultimately .        Amount and/or Complexity of Data Reviewed  Labs: ordered. Decision-making details documented in ED Course.  Radiology: ordered.    Risk  Prescription drug management.               ED Course as of 05/02/25 2331   Fri May 02, 2025   2054 POC Lactate(!!): 4.5 [ES]    POC PH: 7.118 [ES]    POC PCO2: 42.3 [ES]    Time of death  [BD]      ED Course User Index  [BD] Greg Dillard MD  [ES] Jeannette Turner MD                           Clinical Impression:  Final diagnoses:  [Z13.6] Screening for cardiovascular condition  [R94.31] Abnormal EKG  [N17.9] Acute renal failure, unspecified acute renal failure type  [E87.5] Hyperkalemia  [I46.9] Cardiac arrest  [I46.9] Cardiac arrest with pulseless electrical activity (Primary)  [J96.01] Acute hypoxemic respiratory failure  [R41.82] Altered mental status, unspecified altered mental status type  [N39.0, R31.9] Urinary tract  infection with hematuria, site unspecified          ED Disposition Condition                         [1]   Social History  Tobacco Use    Smoking status: Never    Smokeless tobacco: Never   Substance Use Topics    Alcohol use: Not Currently     Comment: occasionally    Drug use: Yes     Types: Marijuana        Greg Dillard MD  [id number]

## 2025-05-05 LAB
BACTERIA BLD CULT: ABNORMAL
BACTERIA BLD CULT: ABNORMAL
BACTERIA UR CULT: ABNORMAL
GRAM STN SPEC: ABNORMAL
GRAM STN SPEC: ABNORMAL

## 2025-05-08 LAB — BACTERIA BLD CULT: NORMAL
